# Patient Record
Sex: MALE | Race: OTHER | NOT HISPANIC OR LATINO
[De-identification: names, ages, dates, MRNs, and addresses within clinical notes are randomized per-mention and may not be internally consistent; named-entity substitution may affect disease eponyms.]

---

## 2023-04-14 PROBLEM — Z00.00 ENCOUNTER FOR PREVENTIVE HEALTH EXAMINATION: Status: ACTIVE | Noted: 2023-04-14

## 2023-04-18 ENCOUNTER — APPOINTMENT (OUTPATIENT)
Dept: RADIATION ONCOLOGY | Facility: CLINIC | Age: 68
End: 2023-04-18
Payer: COMMERCIAL

## 2023-04-18 VITALS
DIASTOLIC BLOOD PRESSURE: 86 MMHG | SYSTOLIC BLOOD PRESSURE: 136 MMHG | RESPIRATION RATE: 16 BRPM | HEIGHT: 70 IN | BODY MASS INDEX: 32.5 KG/M2 | WEIGHT: 227 LBS | HEART RATE: 56 BPM | OXYGEN SATURATION: 99 %

## 2023-04-18 DIAGNOSIS — R73.03 PREDIABETES.: ICD-10-CM

## 2023-04-18 DIAGNOSIS — M19.90 UNSPECIFIED OSTEOARTHRITIS, UNSPECIFIED SITE: ICD-10-CM

## 2023-04-18 DIAGNOSIS — I10 ESSENTIAL (PRIMARY) HYPERTENSION: ICD-10-CM

## 2023-04-18 PROCEDURE — 99072 ADDL SUPL MATRL&STAF TM PHE: CPT

## 2023-04-18 PROCEDURE — 99204 OFFICE O/P NEW MOD 45 MIN: CPT | Mod: 25,GC

## 2023-04-18 RX ORDER — LEVETIRACETAM 250 MG/1
250 TABLET, FILM COATED ORAL TWICE DAILY
Refills: 0 | Status: ACTIVE | COMMUNITY

## 2023-04-18 RX ORDER — AMLODIPINE BESYLATE 10 MG/1
10 TABLET ORAL
Refills: 0 | Status: ACTIVE | COMMUNITY

## 2023-04-18 RX ORDER — CYCLOBENZAPRINE HYDROCHLORIDE 5 MG/1
5 TABLET, FILM COATED ORAL
Refills: 0 | Status: ACTIVE | COMMUNITY

## 2023-04-18 NOTE — PHYSICAL EXAM
[] : no respiratory distress [Exaggerated Use Of Accessory Muscles For Inspiration] : no accessory muscle use [Normal] : oriented to person, place and time, the affect was normal, the mood was normal and not anxious [de-identified] : patient sitting in wheelchair and with sling on right arm  [de-identified] : 5/5 strength in bilateral LE on exam, 3/5 strength in RUE, unable to raise hand past elbow level.  Cranial nerves II-XII intact.  [de-identified] : Mild right-sided facial weakness noted.  Muscle strength 3 out of 5 in right hand, 4 out of 5 in right elbow and 5 out of 5 in right shoulder region

## 2023-04-18 NOTE — REVIEW OF SYSTEMS
[Negative] : Psychiatric [Proptosis] : no proptosis [Hot Flashes] : no hot flashes [Deepening Of The Voice] : no deepening of the voice [Easy Bleeding] : no tendency for easy bleeding [Easy Bruising] : no tendency for easy bruising [Swollen Glands] : no swollen glands [FreeTextEntry9] : Weakness in right hand, and endorses some weakness in right lower extremity s/p knee replacement for osteoarthritis  [de-identified] : weakness in right hand and right lower extremity, ambulating with assistance

## 2023-04-20 ENCOUNTER — APPOINTMENT (OUTPATIENT)
Dept: HEMATOLOGY ONCOLOGY | Facility: CLINIC | Age: 68
End: 2023-04-20
Payer: COMMERCIAL

## 2023-04-20 PROCEDURE — 99205 OFFICE O/P NEW HI 60 MIN: CPT | Mod: 95

## 2023-04-20 NOTE — REASON FOR VISIT
[Home] : at home, [unfilled] , at the time of the visit. [Medical Office: ([facility])___] : at the medical office located in  [Initial Consultation] : an initial consultation [Spouse] : spouse

## 2023-04-21 ENCOUNTER — NON-APPOINTMENT (OUTPATIENT)
Age: 68
End: 2023-04-21

## 2023-04-21 ENCOUNTER — APPOINTMENT (OUTPATIENT)
Dept: NEUROSURGERY | Facility: CLINIC | Age: 68
End: 2023-04-21
Payer: COMMERCIAL

## 2023-04-21 VITALS
OXYGEN SATURATION: 98 % | BODY MASS INDEX: 32.5 KG/M2 | DIASTOLIC BLOOD PRESSURE: 74 MMHG | HEART RATE: 59 BPM | SYSTOLIC BLOOD PRESSURE: 129 MMHG | RESPIRATION RATE: 18 BRPM | HEIGHT: 70 IN | WEIGHT: 227 LBS

## 2023-04-21 VITALS
HEART RATE: 93 BPM | SYSTOLIC BLOOD PRESSURE: 127 MMHG | HEIGHT: 70 IN | RESPIRATION RATE: 18 BRPM | OXYGEN SATURATION: 98 % | TEMPERATURE: 97 F | DIASTOLIC BLOOD PRESSURE: 83 MMHG | WEIGHT: 227 LBS | BODY MASS INDEX: 32.5 KG/M2

## 2023-04-21 PROCEDURE — 99204 OFFICE O/P NEW MOD 45 MIN: CPT

## 2023-04-21 PROCEDURE — 99072 ADDL SUPL MATRL&STAF TM PHE: CPT

## 2023-04-21 NOTE — REVIEW OF SYSTEMS
[Muscle Weakness] : muscle weakness [Difficulty Walking] : difficulty walking [Negative] : Allergic/Immunologic

## 2023-04-21 NOTE — ASSESSMENT
[FreeTextEntry1] : 67 year old male with past medical history of HTN, OA of the knee, status post replacement, now with newly diagnosed glioblastoma (MGMT pending), status post stereotactic needle biopsy and laser interstitial thermal therapy by Dr. Tobar (4/7/2023). \par \par GBM - pending MGMT status, pending moleculars.\par WIll request updated path report from [facility].\par SOC consists of concurrent chemoRT followed by adjuvant chemotherapy with TMZ.\par Side effects of therapy, schedule, efficacy and prognosis discussed.\par Referral to PT/OT and home care made.\par He will need weekly blood work during chemoRT.\par Rx for TMZ, Bactrim, Miralax and Zofran to be sent.\par Referred to  for clinical trials evaluation.\par \par RTC 5/11 in person.

## 2023-04-21 NOTE — HISTORY OF PRESENT ILLNESS
[Disease: _____________________] : Disease: [unfilled] [de-identified] : 67 year old male with past medical history of HTN, OA of the knee, status post replacement, now with newly diagnosed glioblastoma (MGMT pending), status post stereotactic needle biopsy and laser interstitial thermal therapy by Dr. Tobar (4/7/2023). \par \par Onc hx:\par End March 2023: He initially presented with right upper extremity weakness that started in his right thumb, he noticed he is not able to hold the pen normally.  A few days later the weakness progressed to his whole right hand.  The weakness started progressing, to his proximal right arm, which is when he presented to [facility] for further evaluation.\par 4/1/2023: CT spine- No evidence of acute fracture or traumatic subluxation to the visualized cervical spine. \par Partial erosion of a left maxillary molar tooth. A follow-up dental exam is recommended. \par Partially visualized paranasal sinus disease. \par MRI Brain ([facility], 4/2/2023):\par 2.2 x 2.1 x 1.9 cm intra-axial mass in the superior and posterior left frontal lobe. There is surrounding vasogenic edema with resulting local regional mass effect. The differential diagnosis includes a metastasis or a primary CNS malignancy. \par \par While hospitalized, a routine CT C/A/P was performed. \par \par CT Chest, Abdomen/Pelvis ([facility], 4/3/2023):\par 1. 4 x 7 mm hypodensity at the pancreatic tail. Incompletely characterized on this single phase study. Recommend dedicated CT or MRI with pancreatic mass protocol for further evaluation as clinically appropriate. \par 2. Gallstones/sludge. \par 3. No adenopathy. \par \par A pancreatic protocol CT scan was performed. \par \par CT Pancreas ([facility], 4/4/2023):\par 1. 0.4 x 0.7 cm hypodensity at the pancreatic tail is again noted with no abnormal enhancement, possibly representing an IPMN vs small focus of fat. \par \par MRI Brain ([facility], 4/4/2023):\par Stable heterogeneously enhancing 2.2 cm left frontal lobe lesion. Surrounding T2/FLAIR hyperintensity extending to the posterior body of the corpus callosum appears stable, likely vasogenic edema. No evidence of shift of midline structures. \par \par He underwent stereotactic needle biopsy and laser interstitial thermal therapy by Dr. Tobar (4/7/2023). Dr. Tobar notes that he opted for needle biopsy followed by HIRA, in lieu of open surgical resection, due to concern for risk of resulting hand paralysis, as well as damage to descending motor fibers of arm and leg. Following completion of the procedure, he notes in his operative report that, as per T1 post-contrast MRI, he achieved a gross total ablation.\par \par Surgical Pathology ([facility], 4/7/2023):\par A, B) Brain, left, tumor (excision):\par Glioblastoma, IDH pending, CNS WHO grade 4. \par Immunopositive for GFAP and p53\par The Ki-67: 80-90%\par \par MRI Brain ([facility], 4/8/2023):\par 1. Interval biopsy and posttreatment changes involving the intra-axial mass epicenter in the left posterior frontal centrum semiovale. The solid enhancing portions within the mass have decreased, T2 bright signal surrounding the mass has slightly increased and associated local regional mass effect has slightly increased. Correlation with MRI/MRCP as clinically warranted. \par 2. 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 \par 3. Gallstones/sludge. \par \par 4/12/2023: He saw Dr. Bolaños ([facility]). They discussed TMZ with radiation for GBM and observation for IPMN pancreatic tail. \par 4/18/2023: saw [facility]  [de-identified] : He was discharged from [facility] over the weekend.  He states he was discharged without home services.\par Since his surgery he reports progressive right sided weakness, mainly right arm. Also since discharge he reports unsteadiness in his gait, not able to walk stairs.\par He is mainly independent. Uses a wheelchair due to risk of falls. Able to walk. [70: Cares for self; unalbe to carry on normal activity or do active work.] : 71: Cares for self; unable to carry on normal activity or do active work.

## 2023-04-21 NOTE — ASSESSMENT
[FreeTextEntry1] : GBM.  Needs more surgery. I have explained the risks of the procedure to the patient including but not limited to pain, infection, seizure, stroke, blindness, residual or recurrent disease, neurovascular injury, weakness, paralysis, heart attack, pulmonary embolism and death and he/she clearly understands and agrees to proceed.\par

## 2023-04-24 ENCOUNTER — INPATIENT (INPATIENT)
Facility: HOSPITAL | Age: 68
LOS: 7 days | Discharge: NURSING, REHAB OR LONG-TERM CARE | DRG: 26 | End: 2023-05-02
Attending: NEUROLOGICAL SURGERY | Admitting: NEUROLOGICAL SURGERY
Payer: COMMERCIAL

## 2023-04-24 ENCOUNTER — TRANSCRIPTION ENCOUNTER (OUTPATIENT)
Age: 68
End: 2023-04-24

## 2023-04-24 VITALS
OXYGEN SATURATION: 97 % | HEART RATE: 80 BPM | TEMPERATURE: 98 F | DIASTOLIC BLOOD PRESSURE: 62 MMHG | SYSTOLIC BLOOD PRESSURE: 135 MMHG | RESPIRATION RATE: 18 BRPM

## 2023-04-24 DIAGNOSIS — I10 ESSENTIAL (PRIMARY) HYPERTENSION: ICD-10-CM

## 2023-04-24 DIAGNOSIS — Z96.651 PRESENCE OF RIGHT ARTIFICIAL KNEE JOINT: Chronic | ICD-10-CM

## 2023-04-24 DIAGNOSIS — G93.89 OTHER SPECIFIED DISORDERS OF BRAIN: ICD-10-CM

## 2023-04-24 LAB
A1C WITH ESTIMATED AVERAGE GLUCOSE RESULT: 6.8 % — HIGH (ref 4–5.6)
ALBUMIN SERPL ELPH-MCNC: 3.6 G/DL — SIGNIFICANT CHANGE UP (ref 3.3–5)
ALP SERPL-CCNC: 137 U/L — HIGH (ref 40–120)
ALT FLD-CCNC: 43 U/L — SIGNIFICANT CHANGE UP (ref 10–45)
ANION GAP SERPL CALC-SCNC: 10 MMOL/L — SIGNIFICANT CHANGE UP (ref 5–17)
AST SERPL-CCNC: 16 U/L — SIGNIFICANT CHANGE UP (ref 10–40)
BILIRUB SERPL-MCNC: 0.2 MG/DL — SIGNIFICANT CHANGE UP (ref 0.2–1.2)
BLD GP AB SCN SERPL QL: NEGATIVE — SIGNIFICANT CHANGE UP
BUN SERPL-MCNC: 11 MG/DL — SIGNIFICANT CHANGE UP (ref 7–23)
CALCIUM SERPL-MCNC: 8.9 MG/DL — SIGNIFICANT CHANGE UP (ref 8.4–10.5)
CHLORIDE SERPL-SCNC: 101 MMOL/L — SIGNIFICANT CHANGE UP (ref 96–108)
CO2 SERPL-SCNC: 26 MMOL/L — SIGNIFICANT CHANGE UP (ref 22–31)
CREAT SERPL-MCNC: 0.76 MG/DL — SIGNIFICANT CHANGE UP (ref 0.5–1.3)
EGFR: 99 ML/MIN/1.73M2 — SIGNIFICANT CHANGE UP
ESTIMATED AVERAGE GLUCOSE: 148 MG/DL — HIGH (ref 68–114)
GLUCOSE BLDC GLUCOMTR-MCNC: 133 MG/DL — HIGH (ref 70–99)
GLUCOSE BLDC GLUCOMTR-MCNC: 145 MG/DL — HIGH (ref 70–99)
GLUCOSE SERPL-MCNC: 193 MG/DL — HIGH (ref 70–99)
HCT VFR BLD CALC: 43.2 % — SIGNIFICANT CHANGE UP (ref 39–50)
HGB BLD-MCNC: 13.7 G/DL — SIGNIFICANT CHANGE UP (ref 13–17)
MAGNESIUM SERPL-MCNC: 2 MG/DL — SIGNIFICANT CHANGE UP (ref 1.6–2.6)
MCHC RBC-ENTMCNC: 27.8 PG — SIGNIFICANT CHANGE UP (ref 27–34)
MCHC RBC-ENTMCNC: 31.7 GM/DL — LOW (ref 32–36)
MCV RBC AUTO: 87.8 FL — SIGNIFICANT CHANGE UP (ref 80–100)
NRBC # BLD: 0 /100 WBCS — SIGNIFICANT CHANGE UP (ref 0–0)
PHOSPHATE SERPL-MCNC: 3.5 MG/DL — SIGNIFICANT CHANGE UP (ref 2.5–4.5)
PLATELET # BLD AUTO: 388 K/UL — SIGNIFICANT CHANGE UP (ref 150–400)
POTASSIUM SERPL-MCNC: 4.3 MMOL/L — SIGNIFICANT CHANGE UP (ref 3.5–5.3)
POTASSIUM SERPL-SCNC: 4.3 MMOL/L — SIGNIFICANT CHANGE UP (ref 3.5–5.3)
PROT SERPL-MCNC: 6.4 G/DL — SIGNIFICANT CHANGE UP (ref 6–8.3)
RBC # BLD: 4.92 M/UL — SIGNIFICANT CHANGE UP (ref 4.2–5.8)
RBC # FLD: 14.7 % — HIGH (ref 10.3–14.5)
RH IG SCN BLD-IMP: POSITIVE — SIGNIFICANT CHANGE UP
SODIUM SERPL-SCNC: 137 MMOL/L — SIGNIFICANT CHANGE UP (ref 135–145)
WBC # BLD: 6.89 K/UL — SIGNIFICANT CHANGE UP (ref 3.8–10.5)
WBC # FLD AUTO: 6.89 K/UL — SIGNIFICANT CHANGE UP (ref 3.8–10.5)

## 2023-04-24 PROCEDURE — 71045 X-RAY EXAM CHEST 1 VIEW: CPT | Mod: 26

## 2023-04-24 PROCEDURE — 99255 IP/OBS CONSLTJ NEW/EST HI 80: CPT | Mod: GC

## 2023-04-24 PROCEDURE — 70553 MRI BRAIN STEM W/O & W/DYE: CPT | Mod: 26

## 2023-04-24 RX ORDER — DEXAMETHASONE 0.5 MG/5ML
4 ELIXIR ORAL EVERY 12 HOURS
Refills: 0 | Status: DISCONTINUED | OUTPATIENT
Start: 2023-04-24 | End: 2023-04-25

## 2023-04-24 RX ORDER — POLYETHYLENE GLYCOL 3350 17 G/17G
17 POWDER, FOR SOLUTION ORAL DAILY
Refills: 0 | Status: DISCONTINUED | OUTPATIENT
Start: 2023-04-24 | End: 2023-04-25

## 2023-04-24 RX ORDER — SODIUM CHLORIDE 9 MG/ML
1000 INJECTION INTRAMUSCULAR; INTRAVENOUS; SUBCUTANEOUS
Refills: 0 | Status: DISCONTINUED | OUTPATIENT
Start: 2023-04-24 | End: 2023-04-25

## 2023-04-24 RX ORDER — PANTOPRAZOLE SODIUM 20 MG/1
40 TABLET, DELAYED RELEASE ORAL
Refills: 0 | Status: DISCONTINUED | OUTPATIENT
Start: 2023-04-24 | End: 2023-04-25

## 2023-04-24 RX ORDER — LEVETIRACETAM 250 MG/1
750 TABLET, FILM COATED ORAL EVERY 12 HOURS
Refills: 0 | Status: DISCONTINUED | OUTPATIENT
Start: 2023-04-24 | End: 2023-04-25

## 2023-04-24 RX ORDER — CHLORHEXIDINE GLUCONATE 213 G/1000ML
1 SOLUTION TOPICAL ONCE
Refills: 0 | Status: COMPLETED | OUTPATIENT
Start: 2023-04-25 | End: 2023-04-25

## 2023-04-24 RX ORDER — SENNA PLUS 8.6 MG/1
2 TABLET ORAL AT BEDTIME
Refills: 0 | Status: DISCONTINUED | OUTPATIENT
Start: 2023-04-24 | End: 2023-04-25

## 2023-04-24 RX ORDER — INSULIN LISPRO 100/ML
VIAL (ML) SUBCUTANEOUS
Refills: 0 | Status: DISCONTINUED | OUTPATIENT
Start: 2023-04-24 | End: 2023-04-25

## 2023-04-24 RX ORDER — POVIDONE-IODINE 5 %
1 AEROSOL (ML) TOPICAL ONCE
Refills: 0 | Status: COMPLETED | OUTPATIENT
Start: 2023-04-25 | End: 2023-04-25

## 2023-04-24 RX ORDER — ACETAMINOPHEN 500 MG
650 TABLET ORAL EVERY 6 HOURS
Refills: 0 | Status: DISCONTINUED | OUTPATIENT
Start: 2023-04-24 | End: 2023-04-25

## 2023-04-24 RX ORDER — AMLODIPINE BESYLATE 2.5 MG/1
10 TABLET ORAL DAILY
Refills: 0 | Status: DISCONTINUED | OUTPATIENT
Start: 2023-04-24 | End: 2023-04-25

## 2023-04-24 RX ADMIN — POLYETHYLENE GLYCOL 3350 17 GRAM(S): 17 POWDER, FOR SOLUTION ORAL at 23:43

## 2023-04-24 RX ADMIN — LEVETIRACETAM 750 MILLIGRAM(S): 250 TABLET, FILM COATED ORAL at 23:43

## 2023-04-24 RX ADMIN — SENNA PLUS 2 TABLET(S): 8.6 TABLET ORAL at 23:43

## 2023-04-24 RX ADMIN — Medication 4 MILLIGRAM(S): at 23:43

## 2023-04-24 NOTE — CONSULT NOTE ADULT - SUBJECTIVE AND OBJECTIVE BOX
*** INCOMPLETE ***    Patient is a 67y old  Male who presents with a chief complaint of Brain Tumor (24 Apr 2023 18:09)      HPI/HOSPITAL COURSE:  HPI: 67M PMH HTN (on amlodipine 10mg PO qd), Osteoarthritis (s/p right total knee replacement 2 months ago), brain mass (s/p biopsy 4/7/23 @ [facility] with Dr. Tobar) presented for brain tumor resection. Per family, patient started to have right hand weakness about a month ago that got progressively worse, then went to ED where brain mass was diagnosed. Started keppra 750 mg BID for seizure prophylaxis. Described right arm weakness worsening after surgery and some right lower extremity weakness. States today, feels like his right lower extremity weakness is worsening. Family also reported patient just finished a taper of decadron. Denies headache, new weakness, numbness, tingling, chest pain, sob, nausea/vomiting, visual abnormalities.     Family at bedside denies history of cardiac events, SOB, NGUYEN, CP. Patient reports normally being able to perform all ADLs prior to TKA 03/2023. Denies NGUYEN while walking >4 blocks while on level ground. Able to climb >2 flights of stairs. S/p TKA patient mobility was limited i/s/o post-op mechanical limitations. Patient denies adverse reactions to anesthesia. Has NKDA. Is a /Orthodox . Reports never to have smoked or used tobacco products, denies alcohol/illicit substance abuse. Patient was receiving dexamethasone, however, completed taper yesterday. Family at bedside concerned as patient's LE symptoms (weekness) began after dexamethasone cessation.     Normotensive on admission. Last dose of anti-HTN med this AM.    INTERVAL EVENTS:    SUBJECTIVE HPI: Patient seen and examined at bedside. Patient resting comfortably, no complaints at this time. Patient denies: fever, chills, weakness, dizziness, headaches, changes in vision, chest pain, palpitations, shortness of breath, cough, N/V, diarrhea or constipation, dysuria, LE edema. ROS otherwise negative.      PAST MEDICAL & SURGICAL HISTORY:  Hypertension      Osteoarthritis      Brain mass      S/P total knee replacement, right          FAMILY HISTORY:      SOCIAL HISTORY:     -Cigarrettes: never      -Alcohol: denies      -Ilicit Drug Use: denies    Home Medications:  amLODIPine 10 mg oral tablet: 1 orally once a day (24 Apr 2023 19:08)  Keppra 750 mg oral tablet: 1 orally 2 times a day (24 Apr 2023 19:11)      MEDICATIONS  (STANDING):  amLODIPine   Tablet 10 milliGRAM(s) Oral daily  dexAMETHasone     Tablet 4 milliGRAM(s) Oral every 12 hours  insulin lispro (ADMELOG) corrective regimen sliding scale   SubCutaneous three times a day before meals  levETIRAcetam 750 milliGRAM(s) Oral every 12 hours  pantoprazole    Tablet 40 milliGRAM(s) Oral before breakfast  polyethylene glycol 3350 17 Gram(s) Oral daily  senna 2 Tablet(s) Oral at bedtime  sodium chloride 0.9%. 1000 milliLiter(s) (100 mL/Hr) IV Continuous <Continuous>    MEDICATIONS  (PRN):  acetaminophen     Tablet .. 650 milliGRAM(s) Oral every 6 hours PRN Temp greater or equal to 38C (100.4F), Mild Pain (1 - 3)      VITAL SIGNS:  Vital Signs Last 24 Hrs  T(C): 36.8 (24 Apr 2023 20:53), Max: 36.8 (24 Apr 2023 20:53)  T(F): 98.2 (24 Apr 2023 20:53), Max: 98.2 (24 Apr 2023 20:53)  HR: 87 (24 Apr 2023 20:53) (80 - 87)  BP: 123/75 (24 Apr 2023 20:53) (123/75 - 135/62)  BP(mean): --  RR: 16 (24 Apr 2023 20:53) (16 - 18)  SpO2: 97% (24 Apr 2023 20:53) (97% - 97%)    Parameters below as of 24 Apr 2023 20:53  Patient On (Oxygen Delivery Method): room air        I&O's Detail      PHYSICAL EXAM:  General: Comfortable, pleasant/anxious/agitated, Ill-appearing, well-nourished/frail/cachectic, comfortable / in distress  Neurological: AAOx3, no focal deficits  HEENT: NC/AT; EOMI, PERRL, clear conjunctiva, no nasal or oropharyngeal discharge or exudates, MMM  Neck: supple, no cervical or post-auricular lymphadenopathy  Cardiovascular: +S1/S2, no murmurs/rubs/gallops, RRR  Respiratory: CTA B/L, no diminished breath sounds, no wheezes/rales/rhonchi, no increased work of breathing or accessory muscle use  Gastrointestinal: soft, NT/ND; active BSx4 quadrants  Genitourinary: no suprapubic tenderness, no CVA tenderness  Extremities: WWP; RUE weakness present on exam, shoulder 0/5, RLE 4/5 MS testing, no edema, clubbing or cyanosis  Vascular: 2+ radial, DP/PT pulses B/L  Skin: no rashes  Lines/Drains:     LABS:                        13.7   6.89  )-----------( 388      ( 24 Apr 2023 21:22 )             43.2     04-24    137  |  101  |  11  ----------------------------<  193<H>  4.3   |  26  |  0.76    Ca    8.9      24 Apr 2023 21:22  Phos  3.5     04-24  Mg     2.0     04-24    TPro  6.4  /  Alb  3.6  /  TBili  0.2  /  DBili  x   /  AST  16  /  ALT  43  /  AlkPhos  137<H>  04-24            BNP            Microbiology:        RADIOLOGY & ADDITIONAL STUDIES: Reviewed. Patient is a 67y old  Male who presents with a chief complaint of Brain Tumor (24 Apr 2023 18:09)      HPI/HOSPITAL COURSE:  HPI: 67M PMH HTN (on amlodipine 10mg PO qd), Osteoarthritis (s/p right total knee replacement 2 months ago), brain mass (s/p biopsy 4/7/23 @ [facility] with Dr. Tobar) presented for brain tumor resection. Per family, patient started to have right hand weakness about a month ago that got progressively worse, then went to ED where brain mass was diagnosed. Started keppra 750 mg BID for seizure prophylaxis. Described right arm weakness worsening after surgery and some right lower extremity weakness. States today, feels like his right lower extremity weakness is worsening. Family also reported patient just finished a taper of decadron. Denies headache, new weakness, numbness, tingling, chest pain, sob, nausea/vomiting, visual abnormalities.     Family at bedside denies history of cardiac events, SOB, NGUYEN, CP. Patient reports normally being able to perform all ADLs prior to TKA 03/2023. Denies NGUYEN while walking >4 blocks while on level ground. Able to climb >2 flights of stairs. S/p TKA patient mobility was limited i/s/o post-op mechanical limitations. Patient denies adverse reactions to anesthesia. Has NKDA. Is a /Anabaptism . Reports never to have smoked or used tobacco products, denies alcohol/illicit substance abuse. Patient was receiving dexamethasone, however, completed taper yesterday. Family at bedside concerned as patient's LE symptoms (weekness) began after dexamethasone cessation.     Normotensive on admission. Last dose of anti-HTN med this AM.    INTERVAL EVENTS:    SUBJECTIVE HPI: Patient seen and examined at bedside. Patient resting comfortably, no complaints at this time. Patient denies: fever, chills, weakness, dizziness, headaches, changes in vision, chest pain, palpitations, shortness of breath, cough, N/V, diarrhea or constipation, dysuria, LE edema. ROS otherwise negative.      PAST MEDICAL & SURGICAL HISTORY:  Hypertension      Osteoarthritis      Brain mass      S/P total knee replacement, right          FAMILY HISTORY:      SOCIAL HISTORY:     -Cigarrettes: never      -Alcohol: denies      -Ilicit Drug Use: denies    Home Medications:  amLODIPine 10 mg oral tablet: 1 orally once a day (24 Apr 2023 19:08)  Keppra 750 mg oral tablet: 1 orally 2 times a day (24 Apr 2023 19:11)      MEDICATIONS  (STANDING):  amLODIPine   Tablet 10 milliGRAM(s) Oral daily  dexAMETHasone     Tablet 4 milliGRAM(s) Oral every 12 hours  insulin lispro (ADMELOG) corrective regimen sliding scale   SubCutaneous three times a day before meals  levETIRAcetam 750 milliGRAM(s) Oral every 12 hours  pantoprazole    Tablet 40 milliGRAM(s) Oral before breakfast  polyethylene glycol 3350 17 Gram(s) Oral daily  senna 2 Tablet(s) Oral at bedtime  sodium chloride 0.9%. 1000 milliLiter(s) (100 mL/Hr) IV Continuous <Continuous>    MEDICATIONS  (PRN):  acetaminophen     Tablet .. 650 milliGRAM(s) Oral every 6 hours PRN Temp greater or equal to 38C (100.4F), Mild Pain (1 - 3)      VITAL SIGNS:  Vital Signs Last 24 Hrs  T(C): 36.8 (24 Apr 2023 20:53), Max: 36.8 (24 Apr 2023 20:53)  T(F): 98.2 (24 Apr 2023 20:53), Max: 98.2 (24 Apr 2023 20:53)  HR: 87 (24 Apr 2023 20:53) (80 - 87)  BP: 123/75 (24 Apr 2023 20:53) (123/75 - 135/62)  BP(mean): --  RR: 16 (24 Apr 2023 20:53) (16 - 18)  SpO2: 97% (24 Apr 2023 20:53) (97% - 97%)    Parameters below as of 24 Apr 2023 20:53  Patient On (Oxygen Delivery Method): room air        I&O's Detail      PHYSICAL EXAM:  General: Comfortable, pleasant/anxious/agitated, Ill-appearing, well-nourished/frail/cachectic, comfortable / in distress  Neurological: AAOx3, no focal deficits  HEENT: NC/AT; EOMI, PERRL, clear conjunctiva, no nasal or oropharyngeal discharge or exudates, MMM  Neck: supple, no cervical or post-auricular lymphadenopathy  Cardiovascular: +S1/S2, no murmurs/rubs/gallops, RRR  Respiratory: CTA B/L, no diminished breath sounds, no wheezes/rales/rhonchi, no increased work of breathing or accessory muscle use  Gastrointestinal: soft, NT/ND; active BSx4 quadrants  Genitourinary: no suprapubic tenderness, no CVA tenderness  Extremities: WWP; RUE weakness present on exam, shoulder 0/5, RLE 4/5 MS testing, no edema, clubbing or cyanosis  Vascular: 2+ radial, DP/PT pulses B/L  Skin: no rashes  Lines/Drains:     LABS:                        13.7   6.89  )-----------( 388      ( 24 Apr 2023 21:22 )             43.2     04-24    137  |  101  |  11  ----------------------------<  193<H>  4.3   |  26  |  0.76    Ca    8.9      24 Apr 2023 21:22  Phos  3.5     04-24  Mg     2.0     04-24    TPro  6.4  /  Alb  3.6  /  TBili  0.2  /  DBili  x   /  AST  16  /  ALT  43  /  AlkPhos  137<H>  04-24            BNP            Microbiology:        RADIOLOGY & ADDITIONAL STUDIES: Reviewed.

## 2023-04-24 NOTE — PATIENT PROFILE ADULT - FALL HARM RISK - HARM RISK INTERVENTIONS
Communicate Risk of Fall with Harm to all staff/Reinforce activity limits and safety measures with patient and family/Tailored Fall Risk Interventions/Visual Cue: Yellow wristband and red socks/Bed in lowest position, wheels locked, appropriate side rails in place/Call bell, personal items and telephone in reach/Instruct patient to call for assistance before getting out of bed or chair/Non-slip footwear when patient is out of bed/[place] to call system/Physically safe environment - no spills, clutter or unnecessary equipment/Purposeful Proactive Rounding/Room/bathroom lighting operational, light cord in reach Assistance with ambulation/Assistance OOB with selected safe patient handling equipment/Communicate Risk of Fall with Harm to all staff/Discuss with provider need for PT consult/Monitor gait and stability/Provide patient with walking aids - walker, cane, crutches/Reinforce activity limits and safety measures with patient and family/Tailored Fall Risk Interventions/Visual Cue: Yellow wristband and red socks/Bed in lowest position, wheels locked, appropriate side rails in place/Call bell, personal items and telephone in reach/Instruct patient to call for assistance before getting out of bed or chair/Non-slip footwear when patient is out of bed/[place] to call system/Physically safe environment - no spills, clutter or unnecessary equipment/Purposeful Proactive Rounding/Room/bathroom lighting operational, light cord in reach

## 2023-04-24 NOTE — H&P ADULT - HISTORY OF PRESENT ILLNESS
66 y/o male with h/o HTN, Osteoarthritis (s/p right total knee replacement 2 months ago), brain mass (s/p biopsy 4/7/23 @ [facility] with Dr. Tobar) presented for brain tumor resection. Per family, patient started to have right hand weakness about a month ago that got progressively worse, then went to ED where brain mass was diagnosed. Started keppra 750 mg BID for seizure prophylaxis. Described right arm weakness worsening after surgery and some right lower extremity weakness. States today, feels like his right lower extremity weakness is worsening. Family also reported patient just finished a taper of decadron. Denies headache, new weakness, numbness, tingling, chest pain, sob, nausea/vomiting, visual abnormalities.

## 2023-04-24 NOTE — H&P ADULT - ASSESSMENT
66 y/o male presents with finding of brain mass. Admit for workup and surgical intervention.  68 y/o male with h/o HTN, Osteoarthritis (s/p right total knee replacement 2 months ago), brain mass (s/p biopsy 4/7/23 @ [facility] with Dr. Tobar) presented for brain tumor resection.

## 2023-04-24 NOTE — H&P ADULT - NSHPSOCIALHISTORY_GEN_ALL_CORE
Patient lives at home in [place], [place] with wife. Sisters present at bedside. Denies tobacco, alcohol, or ilicit drug use.

## 2023-04-24 NOTE — H&P ADULT - NSHPPHYSICALEXAM_GEN_ALL_CORE
Constitutional: 66 y/o male awake, alert in no acute distress.  Eyes: Sclera anicteric, conjunctiva noninjected.   ENMT: Oropharyngeal mucosa moist, pink. Tongue midline.    Respiratory: Clear to auscultation bilaterally.   Cardiovascular: Regular rate and rhythm.   Gastrointestinal:  Soft, nontender, nondistended.    Vascular: Extremities warm, no ulcers, no discoloration of skin.   Neurological: AA&O x 3, conversant, appropriate. CN II-XII grossly intact. ESTRADA x 4, 5/5 throughout UE/LE. Sensation intact to light touch throughout. No pronator drift, no dysmetria.  Skin: Warm, dry, no erythema. Constitutional: 68 y/o male awake, alert in no acute distress.  Eyes: Sclera anicteric, conjunctiva noninjected. Disconjugate gaze.   ENMT: Oropharyngeal mucosa moist, pink. Tongue midline.    Respiratory: Clear to auscultation bilaterally.   Cardiovascular: Regular rate and rhythm.   Gastrointestinal:  Soft, nontender, nondistended.    Vascular: Extremities warm, no ulcers, no discoloration of skin.   Neurological: AA&O x 3, hypophonic, slow to speak. + right facial droop. CN II-XII grossly intact. LUE/LLE 5/5 throughout. RUE Delt 0, Bi/Tri 2, HG 1, RLE 4/5 throughout. Sensation intact to light touch throughout. No dysmetria.   Skin: Warm, dry, no erythema. Craniotomy sutures prior surgery.

## 2023-04-24 NOTE — PROGRESS NOTE ADULT - SUBJECTIVE AND OBJECTIVE BOX
Surgery: Left craniotomy tumor resection   Consent: Signed by patient     Representative Consent: [x] Signed by patient     Allergy Status Unknown    OVERNIGHT EVENTS:     T(C): 36.7 (04-24-23 @ 17:06), Max: 36.7 (04-24-23 @ 17:06)  HR: 80 (04-24-23 @ 17:06) (80 - 80)  BP: 135/62 (04-24-23 @ 17:06) (135/62 - 135/62)  RR: 18 (04-24-23 @ 17:06) (18 - 18)  SpO2: 97% (04-24-23 @ 17:06) (97% - 97%)  Wt(kg): --    EXAM:  Constitutional: 68 y/o male awake, alert in no acute distress.  Eyes:  Sclera anicteric, conjunctiva noninjected.   ENMT: Oropharyngeal mucosa moist, pink. Tongue midline.    Respiratory: Clear to auscultation bilaterally.  No rales, rhonchi, wheezes.  Cardiovascular: Regular rate and rhythm.  S1, S2 heard.  Gastrointestinal:  Soft, nontender, nondistended.  +BS.  Vascular: Extremities warm, no ulcers, no discoloration of skin.   Neurological: AA&O x 3, conversant, appropriate. CN II-XII grossly intact. ESTRADA x 4, 5/5 throughout UE/LE. Sensation intact to light touch throughout. No pronator drift, no dysmetria.  Skin: Warm, dry, no erythema.    Type & Screen (in past 72hrs): Pending     2 Type & Screen within 72 hours if anticipate blood need in OR:  _ Y _ N     Blood ordered and on hold for OR:   [ ] No need     [ ] 1u pRBC on hold      [ ] 2u pRBC on hold    CXR:   EKG:   Medical Clearances: Pending    Last dose of antiplatelet/anticoagulation drug: N/A    Implanted Devices (pacemaker, drug pump...etc):  []YES   [X] NO                  If yes --> EPS consulted to interrogate device: [ ] YES  [ ] NO                            If yes -->  EPS called to let them know patient going for surgery: [ ] device needs to be turned off                                                                                                                                                 [ ] magnet needs to be placed for surgery                                                                                                                                                [ ] nothing to do per EP, may proceed with bovie use in OR                                   3M nasal swab ordered? YES   Cranial surgery: Order written for hair to be shampooed night before surgery and morning before surgery  YES  Chlorhexidine Wipes ordered for Neck Down? YES               Assessment:    Plan:  - Pre-op for L crani   - 5 ALA @ 10 AM   - NPO after MN     D/W Dr. Johnston  Surgery: Left craniotomy tumor resection   Consent: Signed by patient     Representative Consent: [x] Signed by patient     Allergy Status Unknown    OVERNIGHT EVENTS:     T(C): 36.7 (04-24-23 @ 17:06), Max: 36.7 (04-24-23 @ 17:06)  HR: 80 (04-24-23 @ 17:06) (80 - 80)  BP: 135/62 (04-24-23 @ 17:06) (135/62 - 135/62)  RR: 18 (04-24-23 @ 17:06) (18 - 18)  SpO2: 97% (04-24-23 @ 17:06) (97% - 97%)  Wt(kg): --    EXAM:  Constitutional: 68 y/o male awake, alert in no acute distress.  Eyes: Sclera anicteric, conjunctiva noninjected. Disconjugate gaze.   ENMT: Oropharyngeal mucosa moist, pink. Tongue midline.    Respiratory: Clear to auscultation bilaterally.   Cardiovascular: Regular rate and rhythm.   Gastrointestinal:  Soft, nontender, nondistended.    Vascular: Extremities warm, no ulcers, no discoloration of skin.   Neurological: AA&O x 3, hypophonic, slow to speak. + right facial droop. CN II-XII grossly intact. LUE/LLE 5/5 throughout. RUE Delt 0, Bi/Tri 2, HG 1, RLE 4/5 throughout. Sensation intact to light touch throughout. No dysmetria.   Skin: Warm, dry, no erythema. Craniotomy sutures prior surgery.    Type & Screen (in past 72hrs): Pending     2 Type & Screen within 72 hours if anticipate blood need in OR:  _ Y _ N     Blood ordered and on hold for OR:   [ ] No need     [ ] 1u pRBC on hold      [ ] 2u pRBC on hold    CXR:   EKG:   Medical Clearances: Pending    Last dose of antiplatelet/anticoagulation drug: N/A    Implanted Devices (pacemaker, drug pump...etc):  []YES   [X] NO                  If yes --> EPS consulted to interrogate device: [ ] YES  [ ] NO                            If yes -->  EPS called to let them know patient going for surgery: [ ] device needs to be turned off                                                                                                                                                 [ ] magnet needs to be placed for surgery                                                                                                                                                [ ] nothing to do per EP, may proceed with bovie use in OR                                   3M nasal swab ordered? YES   Cranial surgery: Order written for hair to be shampooed night before surgery and morning before surgery  YES  Chlorhexidine Wipes ordered for Neck Down? YES               Assessment:    Plan:  - Pre-op for L crani   - 5 ALA @ 10 AM   - NPO after MN     D/W Dr. Johnston  Surgery: Left craniotomy tumor resection   Consent: Signed by patient     Representative Consent: [x] Signed by patient     Allergy Status Unknown    OVERNIGHT EVENTS:     T(C): 36.7 (23 @ 17:06), Max: 36.7 (23 @ 17:06)  HR: 80 (23 @ 17:06) (80 - 80)  BP: 135/62 (23 @ 17:06) (135/62 - 135/62)  RR: 18 (23 @ 17:06) (18 - 18)  SpO2: 97% (23 @ 17:06) (97% - 97%)  Wt(kg): --    EXAM:  Constitutional: 66 y/o male awake, alert in no acute distress.  Eyes: Sclera anicteric, conjunctiva noninjected. Disconjugate gaze.   ENMT: Oropharyngeal mucosa moist, pink. Tongue midline.    Respiratory: Clear to auscultation bilaterally.   Cardiovascular: Regular rate and rhythm.   Gastrointestinal:  Soft, nontender, nondistended.    Vascular: Extremities warm, no ulcers, no discoloration of skin.   Neurological: AA&O x 3, hypophonic, slow to speak. + right facial droop. CN II-XII grossly intact. LUE/LLE 5/5 throughout. RUE Delt 0, Bi/Tri 2, HG 1, RLE 4/5 throughout. Sensation intact to light touch throughout. No dysmetria.   Skin: Warm, dry, no erythema. Craniotomy sutures prior surgery.    Type & Screen (in past 72hrs): Pending     2 Type & Screen within 72 hours if anticipate blood need in OR:  x Y _ N     Blood ordered and on hold for OR:   [ ] No need     [ ] 1u pRBC on hold      [x ] 2u pRBC on hold    CXR:   EK/24  Medical Clearances: Cleared by Dr. Cunningham     Last dose of antiplatelet/anticoagulation drug: N/A    Implanted Devices (pacemaker, drug pump...etc):  []YES   [X] NO                  If yes --> EPS consulted to interrogate device: [ ] YES  [ ] NO                            If yes -->  EPS called to let them know patient going for surgery: [ ] device needs to be turned off                                                                                                                                                 [ ] magnet needs to be placed for surgery                                                                                                                                                [ ] nothing to do per EP, may proceed with bovie use in OR                                   3M nasal swab ordered? YES   Cranial surgery: Order written for hair to be shampooed night before surgery and morning before surgery  YES  Chlorhexidine Wipes ordered for Neck Down? YES               Assessment:    Plan:  - Pre-op for L crani   - 5 ALA @ 10 AM   - NPO after MN     D/W Dr. Johnston

## 2023-04-25 ENCOUNTER — TRANSCRIPTION ENCOUNTER (OUTPATIENT)
Age: 68
End: 2023-04-25

## 2023-04-25 ENCOUNTER — RESULT REVIEW (OUTPATIENT)
Age: 68
End: 2023-04-25

## 2023-04-25 ENCOUNTER — APPOINTMENT (OUTPATIENT)
Dept: NEUROSURGERY | Facility: HOSPITAL | Age: 68
End: 2023-04-25

## 2023-04-25 LAB
A1C WITH ESTIMATED AVERAGE GLUCOSE RESULT: 6.5 % — HIGH (ref 4–5.6)
ALBUMIN SERPL ELPH-MCNC: 3.4 G/DL — SIGNIFICANT CHANGE UP (ref 3.3–5)
ALP SERPL-CCNC: 86 U/L — SIGNIFICANT CHANGE UP (ref 40–120)
ALT FLD-CCNC: 34 U/L — SIGNIFICANT CHANGE UP (ref 10–45)
ANION GAP SERPL CALC-SCNC: 10 MMOL/L — SIGNIFICANT CHANGE UP (ref 5–17)
ANION GAP SERPL CALC-SCNC: 15 MMOL/L — SIGNIFICANT CHANGE UP (ref 5–17)
APTT BLD: 29.2 SEC — SIGNIFICANT CHANGE UP (ref 27.5–35.5)
APTT BLD: 32.5 SEC — SIGNIFICANT CHANGE UP (ref 27.5–35.5)
AST SERPL-CCNC: 14 U/L — SIGNIFICANT CHANGE UP (ref 10–40)
BILIRUB SERPL-MCNC: 0.5 MG/DL — SIGNIFICANT CHANGE UP (ref 0.2–1.2)
BLD GP AB SCN SERPL QL: NEGATIVE — SIGNIFICANT CHANGE UP
BUN SERPL-MCNC: 11 MG/DL — SIGNIFICANT CHANGE UP (ref 7–23)
BUN SERPL-MCNC: 11 MG/DL — SIGNIFICANT CHANGE UP (ref 7–23)
CALCIUM SERPL-MCNC: 8.4 MG/DL — SIGNIFICANT CHANGE UP (ref 8.4–10.5)
CALCIUM SERPL-MCNC: 9 MG/DL — SIGNIFICANT CHANGE UP (ref 8.4–10.5)
CHLORIDE SERPL-SCNC: 102 MMOL/L — SIGNIFICANT CHANGE UP (ref 96–108)
CHLORIDE SERPL-SCNC: 99 MMOL/L — SIGNIFICANT CHANGE UP (ref 96–108)
CO2 SERPL-SCNC: 19 MMOL/L — LOW (ref 22–31)
CO2 SERPL-SCNC: 26 MMOL/L — SIGNIFICANT CHANGE UP (ref 22–31)
CREAT SERPL-MCNC: 0.84 MG/DL — SIGNIFICANT CHANGE UP (ref 0.5–1.3)
CREAT SERPL-MCNC: 0.88 MG/DL — SIGNIFICANT CHANGE UP (ref 0.5–1.3)
EGFR: 95 ML/MIN/1.73M2 — SIGNIFICANT CHANGE UP
EGFR: 96 ML/MIN/1.73M2 — SIGNIFICANT CHANGE UP
ESTIMATED AVERAGE GLUCOSE: 140 MG/DL — HIGH (ref 68–114)
GLUCOSE BLDC GLUCOMTR-MCNC: 131 MG/DL — HIGH (ref 70–99)
GLUCOSE BLDC GLUCOMTR-MCNC: 148 MG/DL — HIGH (ref 70–99)
GLUCOSE BLDC GLUCOMTR-MCNC: 167 MG/DL — HIGH (ref 70–99)
GLUCOSE SERPL-MCNC: 171 MG/DL — HIGH (ref 70–99)
GLUCOSE SERPL-MCNC: 192 MG/DL — HIGH (ref 70–99)
HCT VFR BLD CALC: 41.1 % — SIGNIFICANT CHANGE UP (ref 39–50)
HCT VFR BLD CALC: 41.5 % — SIGNIFICANT CHANGE UP (ref 39–50)
HCV AB S/CO SERPL IA: 0.06 S/CO — SIGNIFICANT CHANGE UP (ref 0–0.99)
HCV AB SERPL-IMP: SIGNIFICANT CHANGE UP
HGB BLD-MCNC: 13.3 G/DL — SIGNIFICANT CHANGE UP (ref 13–17)
HGB BLD-MCNC: 13.4 G/DL — SIGNIFICANT CHANGE UP (ref 13–17)
INR BLD: 0.95 — SIGNIFICANT CHANGE UP (ref 0.88–1.16)
INR BLD: 1.01 — SIGNIFICANT CHANGE UP (ref 0.88–1.16)
MAGNESIUM SERPL-MCNC: 2.1 MG/DL — SIGNIFICANT CHANGE UP (ref 1.6–2.6)
MCHC RBC-ENTMCNC: 28.1 PG — SIGNIFICANT CHANGE UP (ref 27–34)
MCHC RBC-ENTMCNC: 28.3 PG — SIGNIFICANT CHANGE UP (ref 27–34)
MCHC RBC-ENTMCNC: 32 GM/DL — SIGNIFICANT CHANGE UP (ref 32–36)
MCHC RBC-ENTMCNC: 32.6 GM/DL — SIGNIFICANT CHANGE UP (ref 32–36)
MCV RBC AUTO: 86.7 FL — SIGNIFICANT CHANGE UP (ref 80–100)
MCV RBC AUTO: 87.7 FL — SIGNIFICANT CHANGE UP (ref 80–100)
NRBC # BLD: 0 /100 WBCS — SIGNIFICANT CHANGE UP (ref 0–0)
NRBC # BLD: 0 /100 WBCS — SIGNIFICANT CHANGE UP (ref 0–0)
PHOSPHATE SERPL-MCNC: 3.1 MG/DL — SIGNIFICANT CHANGE UP (ref 2.5–4.5)
PLATELET # BLD AUTO: 327 K/UL — SIGNIFICANT CHANGE UP (ref 150–400)
PLATELET # BLD AUTO: 328 K/UL — SIGNIFICANT CHANGE UP (ref 150–400)
POTASSIUM SERPL-MCNC: 4.2 MMOL/L — SIGNIFICANT CHANGE UP (ref 3.5–5.3)
POTASSIUM SERPL-MCNC: 4.6 MMOL/L — SIGNIFICANT CHANGE UP (ref 3.5–5.3)
POTASSIUM SERPL-SCNC: 4.2 MMOL/L — SIGNIFICANT CHANGE UP (ref 3.5–5.3)
POTASSIUM SERPL-SCNC: 4.6 MMOL/L — SIGNIFICANT CHANGE UP (ref 3.5–5.3)
PROT SERPL-MCNC: 5.9 G/DL — LOW (ref 6–8.3)
PROTHROM AB SERPL-ACNC: 11.3 SEC — SIGNIFICANT CHANGE UP (ref 10.5–13.4)
PROTHROM AB SERPL-ACNC: 12 SEC — SIGNIFICANT CHANGE UP (ref 10.5–13.4)
RBC # BLD: 4.73 M/UL — SIGNIFICANT CHANGE UP (ref 4.2–5.8)
RBC # BLD: 4.74 M/UL — SIGNIFICANT CHANGE UP (ref 4.2–5.8)
RBC # FLD: 14.8 % — HIGH (ref 10.3–14.5)
RBC # FLD: 14.9 % — HIGH (ref 10.3–14.5)
RH IG SCN BLD-IMP: POSITIVE — SIGNIFICANT CHANGE UP
SODIUM SERPL-SCNC: 133 MMOL/L — LOW (ref 135–145)
SODIUM SERPL-SCNC: 138 MMOL/L — SIGNIFICANT CHANGE UP (ref 135–145)
WBC # BLD: 6.21 K/UL — SIGNIFICANT CHANGE UP (ref 3.8–10.5)
WBC # BLD: 7.26 K/UL — SIGNIFICANT CHANGE UP (ref 3.8–10.5)
WBC # FLD AUTO: 6.21 K/UL — SIGNIFICANT CHANGE UP (ref 3.8–10.5)
WBC # FLD AUTO: 7.26 K/UL — SIGNIFICANT CHANGE UP (ref 3.8–10.5)

## 2023-04-25 PROCEDURE — 88331 PATH CONSLTJ SURG 1 BLK 1SPC: CPT | Mod: 26

## 2023-04-25 PROCEDURE — 61510 CRNEC TREPH EXC BRN TUM STTL: CPT | Mod: 22

## 2023-04-25 PROCEDURE — 88341 IMHCHEM/IMCYTCHM EA ADD ANTB: CPT | Mod: 26,59

## 2023-04-25 PROCEDURE — 88360 TUMOR IMMUNOHISTOCHEM/MANUAL: CPT | Mod: 26

## 2023-04-25 PROCEDURE — 61781 SCAN PROC CRANIAL INTRA: CPT

## 2023-04-25 PROCEDURE — 88342 IMHCHEM/IMCYTCHM 1ST ANTB: CPT | Mod: 26,59

## 2023-04-25 PROCEDURE — 88307 TISSUE EXAM BY PATHOLOGIST: CPT | Mod: 26

## 2023-04-25 PROCEDURE — 15750 NEUROVASCULAR PEDICLE FLAP: CPT

## 2023-04-25 PROCEDURE — 99291 CRITICAL CARE FIRST HOUR: CPT

## 2023-04-25 PROCEDURE — 99232 SBSQ HOSP IP/OBS MODERATE 35: CPT

## 2023-04-25 DEVICE — ELCTR SPINAL KIT (6 CONTACTS): Type: IMPLANTABLE DEVICE | Site: LEFT | Status: FUNCTIONAL

## 2023-04-25 DEVICE — MAYFIELD SKULL PIN ADULT PLASTIC: Type: IMPLANTABLE DEVICE | Site: LEFT | Status: FUNCTIONAL

## 2023-04-25 DEVICE — SURGIFLO HEMOSTATIC MATRIX KIT: Type: IMPLANTABLE DEVICE | Site: LEFT | Status: FUNCTIONAL

## 2023-04-25 DEVICE — SCREW UN3 AXS SELF DRILL 1.5X4MM: Type: IMPLANTABLE DEVICE | Site: LEFT | Status: FUNCTIONAL

## 2023-04-25 DEVICE — CLIP APPLIER ETHICON LIGACLIP 9 3/8" SMALL: Type: IMPLANTABLE DEVICE | Site: LEFT | Status: FUNCTIONAL

## 2023-04-25 DEVICE — PLATE COVER BURRHOLE UN3 W/TAB 14MM: Type: IMPLANTABLE DEVICE | Site: LEFT | Status: FUNCTIONAL

## 2023-04-25 DEVICE — SURGICEL 4 X 8": Type: IMPLANTABLE DEVICE | Site: LEFT | Status: FUNCTIONAL

## 2023-04-25 DEVICE — SURGIFOAM PAD 8CM X 12.5CM X 10MM (100): Type: IMPLANTABLE DEVICE | Site: LEFT | Status: FUNCTIONAL

## 2023-04-25 RX ORDER — ACETAMINOPHEN 500 MG
650 TABLET ORAL EVERY 6 HOURS
Refills: 0 | Status: DISCONTINUED | OUTPATIENT
Start: 2023-04-25 | End: 2023-04-25

## 2023-04-25 RX ORDER — CEFAZOLIN SODIUM 1 G
2000 VIAL (EA) INJECTION EVERY 8 HOURS
Refills: 0 | Status: COMPLETED | OUTPATIENT
Start: 2023-04-25 | End: 2023-04-26

## 2023-04-25 RX ORDER — INSULIN LISPRO 100/ML
VIAL (ML) SUBCUTANEOUS
Refills: 0 | Status: DISCONTINUED | OUTPATIENT
Start: 2023-04-25 | End: 2023-05-02

## 2023-04-25 RX ORDER — AMLODIPINE BESYLATE 2.5 MG/1
10 TABLET ORAL DAILY
Refills: 0 | Status: DISCONTINUED | OUTPATIENT
Start: 2023-04-25 | End: 2023-04-27

## 2023-04-25 RX ORDER — SODIUM CHLORIDE 5 G/100ML
150 INJECTION, SOLUTION INTRAVENOUS ONCE
Refills: 0 | Status: COMPLETED | OUTPATIENT
Start: 2023-04-25 | End: 2023-04-25

## 2023-04-25 RX ORDER — AMLODIPINE BESYLATE 2.5 MG/1
10 TABLET ORAL DAILY
Refills: 0 | Status: DISCONTINUED | OUTPATIENT
Start: 2023-04-25 | End: 2023-04-25

## 2023-04-25 RX ORDER — LEVETIRACETAM 250 MG/1
500 TABLET, FILM COATED ORAL EVERY 12 HOURS
Refills: 0 | Status: DISCONTINUED | OUTPATIENT
Start: 2023-04-25 | End: 2023-04-25

## 2023-04-25 RX ORDER — HYDROMORPHONE HYDROCHLORIDE 2 MG/ML
0.25 INJECTION INTRAMUSCULAR; INTRAVENOUS; SUBCUTANEOUS ONCE
Refills: 0 | Status: DISCONTINUED | OUTPATIENT
Start: 2023-04-25 | End: 2023-04-25

## 2023-04-25 RX ORDER — DEXAMETHASONE 0.5 MG/5ML
4 ELIXIR ORAL EVERY 6 HOURS
Refills: 0 | Status: DISCONTINUED | OUTPATIENT
Start: 2023-04-25 | End: 2023-04-25

## 2023-04-25 RX ORDER — AMINOLEVULINIC ACID HYDROCHLORIDE 1500 MG/1
2070 POWDER, FOR SOLUTION ORAL ONCE
Refills: 0 | Status: COMPLETED | OUTPATIENT
Start: 2023-04-25 | End: 2023-04-25

## 2023-04-25 RX ORDER — LEVETIRACETAM 250 MG/1
750 TABLET, FILM COATED ORAL EVERY 12 HOURS
Refills: 0 | Status: DISCONTINUED | OUTPATIENT
Start: 2023-04-25 | End: 2023-04-25

## 2023-04-25 RX ORDER — DEXAMETHASONE 0.5 MG/5ML
ELIXIR ORAL
Refills: 0 | Status: DISCONTINUED | OUTPATIENT
Start: 2023-04-25 | End: 2023-04-25

## 2023-04-25 RX ORDER — ONDANSETRON 8 MG/1
4 TABLET, FILM COATED ORAL EVERY 6 HOURS
Refills: 0 | Status: DISCONTINUED | OUTPATIENT
Start: 2023-04-25 | End: 2023-05-02

## 2023-04-25 RX ORDER — PANTOPRAZOLE SODIUM 20 MG/1
40 TABLET, DELAYED RELEASE ORAL DAILY
Refills: 0 | Status: DISCONTINUED | OUTPATIENT
Start: 2023-04-25 | End: 2023-04-26

## 2023-04-25 RX ORDER — SENNA PLUS 8.6 MG/1
2 TABLET ORAL AT BEDTIME
Refills: 0 | Status: DISCONTINUED | OUTPATIENT
Start: 2023-04-25 | End: 2023-05-02

## 2023-04-25 RX ORDER — LEVETIRACETAM 250 MG/1
750 TABLET, FILM COATED ORAL EVERY 12 HOURS
Refills: 0 | Status: DISCONTINUED | OUTPATIENT
Start: 2023-04-25 | End: 2023-04-26

## 2023-04-25 RX ORDER — SODIUM CHLORIDE 9 MG/ML
1000 INJECTION INTRAMUSCULAR; INTRAVENOUS; SUBCUTANEOUS
Refills: 0 | Status: DISCONTINUED | OUTPATIENT
Start: 2023-04-25 | End: 2023-04-26

## 2023-04-25 RX ORDER — DEXAMETHASONE 0.5 MG/5ML
4 ELIXIR ORAL EVERY 6 HOURS
Refills: 0 | Status: DISCONTINUED | OUTPATIENT
Start: 2023-04-25 | End: 2023-04-27

## 2023-04-25 RX ORDER — AMINOLEVULINIC ACID HYDROCHLORIDE 1500 MG/1
2060 POWDER, FOR SOLUTION ORAL ONCE
Refills: 0 | Status: DISCONTINUED | OUTPATIENT
Start: 2023-04-25 | End: 2023-04-25

## 2023-04-25 RX ORDER — ACETAMINOPHEN 500 MG
1000 TABLET ORAL EVERY 8 HOURS
Refills: 0 | Status: DISCONTINUED | OUTPATIENT
Start: 2023-04-25 | End: 2023-04-26

## 2023-04-25 RX ORDER — NICARDIPINE HYDROCHLORIDE 30 MG/1
5 CAPSULE, EXTENDED RELEASE ORAL
Qty: 40 | Refills: 0 | Status: DISCONTINUED | OUTPATIENT
Start: 2023-04-25 | End: 2023-04-26

## 2023-04-25 RX ORDER — PANTOPRAZOLE SODIUM 20 MG/1
40 TABLET, DELAYED RELEASE ORAL
Refills: 0 | Status: DISCONTINUED | OUTPATIENT
Start: 2023-04-25 | End: 2023-04-25

## 2023-04-25 RX ORDER — CHLORHEXIDINE GLUCONATE 213 G/1000ML
1 SOLUTION TOPICAL DAILY
Refills: 0 | Status: DISCONTINUED | OUTPATIENT
Start: 2023-04-25 | End: 2023-04-28

## 2023-04-25 RX ORDER — OXYCODONE HYDROCHLORIDE 5 MG/1
5 TABLET ORAL EVERY 4 HOURS
Refills: 0 | Status: DISCONTINUED | OUTPATIENT
Start: 2023-04-25 | End: 2023-04-25

## 2023-04-25 RX ORDER — OXYCODONE HYDROCHLORIDE 5 MG/1
10 TABLET ORAL EVERY 4 HOURS
Refills: 0 | Status: DISCONTINUED | OUTPATIENT
Start: 2023-04-25 | End: 2023-04-25

## 2023-04-25 RX ADMIN — LEVETIRACETAM 400 MILLIGRAM(S): 250 TABLET, FILM COATED ORAL at 19:51

## 2023-04-25 RX ADMIN — NICARDIPINE HYDROCHLORIDE 25 MG/HR: 30 CAPSULE, EXTENDED RELEASE ORAL at 19:51

## 2023-04-25 RX ADMIN — Medication 100 MILLIGRAM(S): at 21:06

## 2023-04-25 RX ADMIN — SODIUM CHLORIDE 600 MILLILITER(S): 5 INJECTION, SOLUTION INTRAVENOUS at 18:48

## 2023-04-25 RX ADMIN — NICARDIPINE HYDROCHLORIDE 25 MG/HR: 30 CAPSULE, EXTENDED RELEASE ORAL at 23:29

## 2023-04-25 RX ADMIN — SODIUM CHLORIDE 75 MILLILITER(S): 9 INJECTION INTRAMUSCULAR; INTRAVENOUS; SUBCUTANEOUS at 19:51

## 2023-04-25 RX ADMIN — HYDROMORPHONE HYDROCHLORIDE 0.25 MILLIGRAM(S): 2 INJECTION INTRAMUSCULAR; INTRAVENOUS; SUBCUTANEOUS at 18:15

## 2023-04-25 RX ADMIN — HYDROMORPHONE HYDROCHLORIDE 0.25 MILLIGRAM(S): 2 INJECTION INTRAMUSCULAR; INTRAVENOUS; SUBCUTANEOUS at 18:06

## 2023-04-25 RX ADMIN — PANTOPRAZOLE SODIUM 40 MILLIGRAM(S): 20 TABLET, DELAYED RELEASE ORAL at 05:50

## 2023-04-25 RX ADMIN — PANTOPRAZOLE SODIUM 40 MILLIGRAM(S): 20 TABLET, DELAYED RELEASE ORAL at 18:32

## 2023-04-25 RX ADMIN — AMINOLEVULINIC ACID HYDROCHLORIDE 2070 MILLIGRAM(S): 1500 POWDER, FOR SOLUTION ORAL at 10:07

## 2023-04-25 RX ADMIN — Medication 4 MILLIGRAM(S): at 18:32

## 2023-04-25 RX ADMIN — Medication 4 MILLIGRAM(S): at 23:13

## 2023-04-25 RX ADMIN — NICARDIPINE HYDROCHLORIDE 25 MG/HR: 30 CAPSULE, EXTENDED RELEASE ORAL at 18:25

## 2023-04-25 RX ADMIN — AMLODIPINE BESYLATE 10 MILLIGRAM(S): 2.5 TABLET ORAL at 05:50

## 2023-04-25 RX ADMIN — CHLORHEXIDINE GLUCONATE 1 APPLICATION(S): 213 SOLUTION TOPICAL at 05:48

## 2023-04-25 RX ADMIN — Medication 4 MILLIGRAM(S): at 05:49

## 2023-04-25 RX ADMIN — SODIUM CHLORIDE 100 MILLILITER(S): 9 INJECTION INTRAMUSCULAR; INTRAVENOUS; SUBCUTANEOUS at 10:07

## 2023-04-25 RX ADMIN — Medication 400 MILLIGRAM(S): at 18:32

## 2023-04-25 RX ADMIN — Medication 1000 MILLIGRAM(S): at 19:28

## 2023-04-25 RX ADMIN — Medication 1 APPLICATION(S): at 05:48

## 2023-04-25 RX ADMIN — LEVETIRACETAM 750 MILLIGRAM(S): 250 TABLET, FILM COATED ORAL at 10:07

## 2023-04-25 NOTE — PRE-OP CHECKLIST - HEIGHT IN INCHES
11 Additional Area 2 Volume In Cc: 0 Anesthesia Volume In Cc: 0.5 Include Cannula Information In Note?: No Include Cannula Size?: 27G Detail Level: Detailed Consent was obtained. Post-Care Instructions: After care instructions were provided verbally and in writing. Filler: Restylane Contour Map Statment: See Attach Map for Complete Details Include Cannula Information In Note?: Yes Anesthesia Type: 1% lidocaine with epinephrine

## 2023-04-25 NOTE — PROGRESS NOTE ADULT - SUBJECTIVE AND OBJECTIVE BOX
***********************************************  ADULT NSICU PROGRESS NOTE  KAREEM GANNON [id number] [facility] [id number]  ***********************************************    24H INTERVAL EVENTS:    ROS: negative except per mentioned above in 24h interval events.      HOSPITAL COURSE CARRIED FORWARD:    VITALS:    ICU Vital Signs Last 24 Hrs  T(C): 36.4 (25 Apr 2023 17:25), Max: 36.8 (24 Apr 2023 20:53)  T(F): 97.5 (25 Apr 2023 17:25), Max: 98.3 (25 Apr 2023 05:49)  HR: 95 (25 Apr 2023 19:30) (82 - 104)  BP: 115/60 (25 Apr 2023 19:30) (115/60 - 150/74)  BP(mean): 80 (25 Apr 2023 19:30) (80 - 106)  ABP: 111/50 (25 Apr 2023 19:30) (111/50 - 165/71)  ABP(mean): 69 (25 Apr 2023 19:30) (69 - 103)  RR: 14 (25 Apr 2023 19:30) (14 - 22)  SpO2: 95% (25 Apr 2023 19:30) (95% - 100%)    O2 Parameters below as of 25 Apr 2023 19:30  Patient On (Oxygen Delivery Method): room air                I&O's Summary    24 Apr 2023 07:01  -  25 Apr 2023 07:00  --------------------------------------------------------  IN: 0 mL / OUT: 900 mL / NET: -900 mL    25 Apr 2023 07:01  -  25 Apr 2023 19:58  --------------------------------------------------------  IN: 362.5 mL / OUT: 500 mL / NET: -137.5 mL        EXAM:     [place] Coma Scale:     General: normocephalic, atraumatic, laying in bed, in no distress  Neuro     MS: A/Ox3, cooperative, normal attention, no neglect, comprehension intact, speech with preserved fluency, naming, and repetition    CN: PERRL, VF FTC, EOMI and no ptosis bilaterally, sensation intact to crude touch V1-V3, face symmetric, hearing grossly intact    Mot: bulk normal, tone normal, power 5/5 in bilateral upper and lower proximal extremities    Sens: intact to crude touch in bilateral upper and lower extremities    Reflexes: deferred    Coord: no dysmetria or ataxia on finger to nose/heel to shin, respectively, no focal bradykinetic movements    Gait: deferred  Chest: nonlabored respirations, no adventitious lung sounds bilaterally, heart regular rate/rhythm, present S1/S2, no murmurs or rubs  Abdomen: nondistended, soft and nontender without peritoneal signs, normoactive bowel sounds  Extremities: no clubbing, well-perfused, no edema    LABORATORY DATA:                            13.4   7.26  )-----------( 327      ( 25 Apr 2023 17:51 )             41.1     04-25    133<L>  |  99  |  11  ----------------------------<  192<H>  4.2   |  19<L>  |  0.84    Ca    9.0      25 Apr 2023 17:51  Phos  3.1     04-25  Mg     2.1     04-25    TPro  5.9<L>  /  Alb  3.4  /  TBili  0.5  /  DBili  x   /  AST  14  /  ALT  34  /  AlkPhos  86  04-25    LIVER FUNCTIONS - ( 25 Apr 2023 17:51 )  Alb: 3.4 g/dL / Pro: 5.9 g/dL / ALK PHOS: 86 U/L / ALT: 34 U/L / AST: 14 U/L / GGT: x           PT/INR - ( 25 Apr 2023 17:51 )   PT: 12.0 sec;   INR: 1.01          PTT - ( 25 Apr 2023 17:51 )  PTT:29.2 sec    IMAGING DATA:    CARDIOLOGY DATA:    MICROBIOLOGY DATA:        MEDICATIONS  (STANDING):  acetaminophen   IVPB .. 1000 milliGRAM(s) IV Intermittent every 8 hours  ceFAZolin   IVPB 2000 milliGRAM(s) IV Intermittent every 8 hours  dexAMETHasone  Injectable 4 milliGRAM(s) IV Push every 6 hours  insulin lispro (ADMELOG) corrective regimen sliding scale   SubCutaneous three times a day before meals  levETIRAcetam  IVPB 750 milliGRAM(s) IV Intermittent every 12 hours  niCARdipine Infusion 5 mG/Hr (25 mL/Hr) IV Continuous <Continuous>  pantoprazole  Injectable 40 milliGRAM(s) IV Push daily  senna 2 Tablet(s) Oral at bedtime  sodium chloride 0.9%. 1000 milliLiter(s) (75 mL/Hr) IV Continuous <Continuous>    MEDICATIONS  (PRN):  ondansetron Injectable 4 milliGRAM(s) IV Push every 6 hours PRN Nausea and/or Vomiting      ***********************************************  ASSESSMENT AND PLAN  ***********************************************    NEURO - admit NSICU, Q1h neuro checks / Q1h vital signs  PULM - SpO2 goal > 92%, supplemental O2 and pulm toileting as needed  CARDIO - BP goal   GI - bowel regimen, stool count, diet:    /RENAL - monitor UOP/volume status, BUN/SCr  HEME - maintain Hb > 7.0, PLT > ***  ID - monitor for infectious s/s, fever curve, leukocytosis  ENDO - SGlu goal < 200 ***********************************************  ADULT [facility] PROGRESS NOTE  KAREEM GANNON [id number] [facility] 08EA [id number]  ***********************************************    HPI: 68yo M with history of OA, HTN, prior brain biopsy (4/7/23 @[facility]) with R. sided weakness [facility] fr resection of tumor (frozen: [facility])    ROS: negative except per mentioned above in 24h interval events.      HOSPITAL COURSE CARRIED FORWARD:    VITALS:    ICU Vital Signs Last 24 Hrs  T(C): 36.4 (25 Apr 2023 17:25), Max: 36.8 (24 Apr 2023 20:53)  T(F): 97.5 (25 Apr 2023 17:25), Max: 98.3 (25 Apr 2023 05:49)  HR: 95 (25 Apr 2023 19:30) (82 - 104)  BP: 115/60 (25 Apr 2023 19:30) (115/60 - 150/74)  BP(mean): 80 (25 Apr 2023 19:30) (80 - 106)  ABP: 111/50 (25 Apr 2023 19:30) (111/50 - 165/71)  ABP(mean): 69 (25 Apr 2023 19:30) (69 - 103)  RR: 14 (25 Apr 2023 19:30) (14 - 22)  SpO2: 95% (25 Apr 2023 19:30) (95% - 100%)    O2 Parameters below as of 25 Apr 2023 19:30  Patient On (Oxygen Delivery Method): room air                I&O's Summary    24 Apr 2023 07:01  -  25 Apr 2023 07:00  --------------------------------------------------------  IN: 0 mL / OUT: 900 mL / NET: -900 mL    25 Apr 2023 07:01  -  25 Apr 2023 19:58  --------------------------------------------------------  IN: 362.5 mL / OUT: 500 mL / NET: -137.5 mL        EXAM:     [place] Coma Scale: 15    General: normocephalic, atraumatic, laying in bed, in no distress  Neuro     MS: A/Ox3, cooperative, normal attention, no neglect, comprehension intact, speech with preserved fluency, naming, and repetition    CN: PERRL, VF FTC,(+)exotropia, sensation intact to crude touch V1-V3, (+)R. facial weakness, hearing grossly intact    Mot: bulk normal, tone normal, power UPPER 0/5, LOWER 1/5    Sens: intact to crude touch in bilateral upper and lower extremities    Reflexes: deferred  Chest: nonlabored respirations, no adventitious lung sounds bilaterally, heart regular rate/rhythm, present S1/S2, no murmurs or rubs  Abdomen: nondistended, soft and nontender without peritoneal signs, normoactive bowel sounds  Extremities: no clubbing, well-perfused, no edema    LABORATORY DATA:                            13.4   7.26  )-----------( 327      ( 25 Apr 2023 17:51 )             41.1     04-25    133<L>  |  99  |  11  ----------------------------<  192<H>  4.2   |  19<L>  |  0.84    Ca    9.0      25 Apr 2023 17:51  Phos  3.1     04-25  Mg     2.1     04-25    TPro  5.9<L>  /  Alb  3.4  /  TBili  0.5  /  DBili  x   /  AST  14  /  ALT  34  /  AlkPhos  86  04-25    LIVER FUNCTIONS - ( 25 Apr 2023 17:51 )  Alb: 3.4 g/dL / Pro: 5.9 g/dL / ALK PHOS: 86 U/L / ALT: 34 U/L / AST: 14 U/L / GGT: x           PT/INR - ( 25 Apr 2023 17:51 )   PT: 12.0 sec;   INR: 1.01          PTT - ( 25 Apr 2023 17:51 )  PTT:29.2 sec    IMAGING DATA:    CARDIOLOGY DATA:    MICROBIOLOGY DATA:        MEDICATIONS  (STANDING):  acetaminophen   IVPB .. 1000 milliGRAM(s) IV Intermittent every 8 hours  ceFAZolin   IVPB 2000 milliGRAM(s) IV Intermittent every 8 hours  dexAMETHasone  Injectable 4 milliGRAM(s) IV Push every 6 hours  insulin lispro (ADMELOG) corrective regimen sliding scale   SubCutaneous three times a day before meals  levETIRAcetam  IVPB 750 milliGRAM(s) IV Intermittent every 12 hours  niCARdipine Infusion 5 mG/Hr (25 mL/Hr) IV Continuous <Continuous>  pantoprazole  Injectable 40 milliGRAM(s) IV Push daily  senna 2 Tablet(s) Oral at bedtime  sodium chloride 0.9%. 1000 milliLiter(s) (75 mL/Hr) IV Continuous <Continuous>    MEDICATIONS  (PRN):  ondansetron Injectable 4 milliGRAM(s) IV Push every 6 hours PRN Nausea and/or Vomiting      ***********************************************  ASSESSMENT AND PLAN  ***********************************************    S/p L. crani fr resection of tumor (frozen: [facility]), POD#0  History of OA, HTN    NEURO - admit NSICU, Q1h neuro checks / Q1h vital signs, pain control, PT/OT, dex, keppra 750/750, post op MRI  PULM - SpO2 goal > 92%, supplemental O2 and pulm toileting as needed  CARDIO - BP goal < 140, cardene, holding norvasc  GI - bowel regimen, stool count, diet: NPO, pending swallow, PPI  /RENAL - monitor UOP/volume status, BUN/SCr  HEME - maintain Hb > 7.0, PLT > 100,000, holding chemoppx  ID - monitor for infectious s/s, fever curve, leukocytosis, periop ancef  ENDO - SGlu goal < 200

## 2023-04-25 NOTE — BRIEF OPERATIVE NOTE - NSICDXBRIEFPREOP_GEN_ALL_CORE_FT
"SUBJECTIVE:  Subjective  Ludwin Delatorre Jr. is a 6 y.o. male who is here with parents for Well Child    HPI  Current concerns include dx strep 2 days ago. Rash on his stomach last night.     Nutrition:  Current diet:well balanced diet- three meals/healthy snacks most days and drinks milk/other calcium sources    Elimination:  Stool pattern: daily, normal consistency  Urine accidents? no    Sleep:no problems    Dental:  Brushes teeth twice a day with fluoride? yes  Dental visit within past year?  yes    Social Screening:  School/Childcare: attends school; going well; no concerns  Physical Activity: frequent/daily outside time and screen time limited <2 hrs most days  Behavior: no concerns; age appropriate    Developmental Screening:  No SWYC result filed; not completed within the past 7 days or not in age range for screening.    Review of Systems  A comprehensive review of symptoms was completed and negative except as noted above.     OBJECTIVE:  Vital signs  Vitals:    12/15/22 1535   BP: 102/60   Pulse: 92   Weight: 22.8 kg (50 lb 4.2 oz)   Height: 3' 9.87" (1.165 m)       Physical Exam  Constitutional:       General: He is active. He is not in acute distress.     Appearance: Normal appearance. He is well-developed and normal weight. He is not toxic-appearing.   HENT:      Right Ear: Tympanic membrane and ear canal normal.      Left Ear: Tympanic membrane and ear canal normal.      Nose: Nose normal. No congestion or rhinorrhea.      Mouth/Throat:      Mouth: Mucous membranes are moist.      Pharynx: Oropharynx is clear. No oropharyngeal exudate or posterior oropharyngeal erythema.   Eyes:      General:         Right eye: No discharge.         Left eye: No discharge.      Extraocular Movements: Extraocular movements intact.      Conjunctiva/sclera: Conjunctivae normal.      Pupils: Pupils are equal, round, and reactive to light.   Cardiovascular:      Rate and Rhythm: Normal rate and regular rhythm.      " Pulses: Normal pulses.      Heart sounds: Normal heart sounds. No murmur heard.  Pulmonary:      Effort: Pulmonary effort is normal. No respiratory distress.      Breath sounds: Normal breath sounds. No wheezing.   Abdominal:      General: Bowel sounds are normal. There is no distension.      Palpations: Abdomen is soft. There is no mass.      Tenderness: There is no abdominal tenderness. There is no guarding.   Genitourinary:     Penis: Normal.       Testes: Normal.   Musculoskeletal:         General: No swelling, tenderness or deformity. Normal range of motion.      Cervical back: Normal range of motion and neck supple.   Skin:     General: Skin is warm and dry.      Capillary Refill: Capillary refill takes less than 2 seconds.      Findings: No rash.      Comments: Faint small raised rash to chest and torso   Neurological:      General: No focal deficit present.      Mental Status: He is alert.      Motor: No weakness or abnormal muscle tone.      Coordination: Coordination normal.      Gait: Gait normal.   Psychiatric:         Behavior: Behavior normal.        ASSESSMENT/PLAN:  Ludwin was seen today for well child.    Diagnoses and all orders for this visit:    Encounter for well child check without abnormal findings    Encounter for screening for global developmental delays (milestones)  -     Cancel: SWYC-Developmental Test  -     SWYC-Developmental Test    Auditory acuity evaluation  -     Hearing screen    Visual testing  -     Visual acuity screening  -     Ambulatory referral/consult to Optometry; Future    Failed vision screen  -     Ambulatory referral/consult to Optometry; Future    Strep pharyngitis  Comments:  complete amoxicillin as prescribed         Preventive Health Issues Addressed:  1. Anticipatory guidance discussed and a handout covering well-child issues for age was provided.     2. Age appropriate physical activity and nutritional counseling were completed during today's visit.      3.  Immunizations and screening tests today: per orders.        Follow Up:  Follow up in about 1 year (around 12/15/2023).       PRE-OP DIAGNOSIS:  Brain tumor 25-Apr-2023 13:23:18  Primo Joaquin

## 2023-04-25 NOTE — PROGRESS NOTE ADULT - ASSESSMENT
67M PMH HTN (on amlodipine 10mg PO qd), Osteoarthritis (s/p right total knee replacement 2 months ago), brain mass (s/p biopsy 4/7/23 @ [facility] with Dr. Tobar) presented for brain tumor resection. Medicine consulted for pre-operative medicine risk stratification.     #Perioperative management  -Cleared a/p yesterdays note  -can c/w home norvasc perioperatively    #DM:   -Monitor FS q6 while NPO. Careful glycemic control post op    #?Atelectasis  -No respiratory complaints, cough, SOB, etc. Normal O2  -Recommend IS post op. Discussed with patient.    #HTN:  - can continue home med post-procedure as above

## 2023-04-25 NOTE — PROGRESS NOTE ADULT - SUBJECTIVE AND OBJECTIVE BOX
S: patient seen bedside. Feels well. No acute complaints. Is mentally/spiritually prepared for OR. We discussed his post operative care.       Vital Signs Last 24 Hrs  T(C): 36.8 (25 Apr 2023 05:49), Max: 36.8 (24 Apr 2023 20:53)  T(F): 98.3 (25 Apr 2023 05:49), Max: 98.3 (25 Apr 2023 05:49)  HR: 82 (25 Apr 2023 05:49) (80 - 87)  BP: 133/81 (25 Apr 2023 05:49) (123/75 - 135/62)  BP(mean): --  RR: 16 (25 Apr 2023 05:49) (16 - 18)  SpO2: 97% (25 Apr 2023 05:49) (97% - 97%)    Parameters below as of 25 Apr 2023 05:49  Patient On (Oxygen Delivery Method): room air        PE:   Gen: pleasant overweight male in NAD  HEENT: EOMI, NCAT  Neck: no JVD  Lungs: CTA b/l non labored  CV: RRR S1S2  GI: +BS non tender  LE: no edema  Psych: pleasant, cooperative      04-25    138  |  102  |  11  ----------------------------<  171<H>  4.6   |  26  |  0.88    Ca    8.4      25 Apr 2023 05:30  Phos  3.1     04-25  Mg     2.1     04-25    TPro  6.4  /  Alb  3.6  /  TBili  0.2  /  DBili  x   /  AST  16  /  ALT  43  /  AlkPhos  137<H>  04-24                        13.3   6.21  )-----------( 328      ( 25 Apr 2023 05:30 )             41.5

## 2023-04-25 NOTE — BRIEF OPERATIVE NOTE - NSICDXBRIEFPROCEDURE_GEN_ALL_CORE_FT
PROCEDURES:  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 25-Apr-2023 13:23:12  Primo Joaquin

## 2023-04-25 NOTE — PROGRESS NOTE ADULT - SUBJECTIVE AND OBJECTIVE BOX
NEUROSURGERY POST OP NOTE:    POD# 0 S/P L crani for tumor resection, frozen HGG    S: Patient seen and evaluated in NSICU. Patient reports feeling well and denies any complaints at this time.       T(C): 36.4 (04-25-23 @ 17:25), Max: 36.8 (04-24-23 @ 20:53)  HR: 100 (04-25-23 @ 18:30) (82 - 104)  BP: 122/62 (04-25-23 @ 18:30) (119/64 - 150/74)  RR: 20 (04-25-23 @ 18:30) (15 - 22)  SpO2: 99% (04-25-23 @ 18:30) (97% - 100%)      04-24-23 @ 07:01  -  04-25-23 @ 07:00  --------------------------------------------------------  IN: 0 mL / OUT: 900 mL / NET: -900 mL    04-25-23 @ 07:01  -  04-25-23 @ 18:38  --------------------------------------------------------  IN: 75 mL / OUT: 200 mL / NET: -125 mL        acetaminophen   IVPB .. 1000 milliGRAM(s) IV Intermittent every 8 hours  ceFAZolin   IVPB 2000 milliGRAM(s) IV Intermittent every 8 hours  dexAMETHasone  Injectable 4 milliGRAM(s) IV Push every 6 hours  insulin lispro (ADMELOG) corrective regimen sliding scale   SubCutaneous three times a day before meals  levETIRAcetam  IVPB 750 milliGRAM(s) IV Intermittent every 12 hours  niCARdipine Infusion 5 mG/Hr IV Continuous <Continuous>  ondansetron Injectable 4 milliGRAM(s) IV Push every 6 hours PRN  pantoprazole  Injectable 40 milliGRAM(s) IV Push daily  senna 2 Tablet(s) Oral at bedtime  sodium chloride 0.9%. 1000 milliLiter(s) IV Continuous <Continuous>  sodium chloride 3% Bolus 150 milliLiter(s) IV Bolus once      RADIOLOGY:     Exam:  Constitutional: Patient is resting comfortably in bed in NAD  Respiratory: breathing non-labored, symmetrical chest wall movement  Cardiovascuar: RRR, no murmurs  Gastrointestinal: abdomen soft, non tender  Genitourinary: exam deffered  Neurological:  AAOX3. Verbal function intact  R facial droop, disconjugate gaze otherwise Cranial Nerves: II-XII grossly intact  Motor: L side 5/5, RUE withdraws to noxious, RLE 2/5. Sensation intact to light touch throughout.   Sensation: intact to light touch in all extremities  Pronator Drift: no LUE drift.   Wound: L crani incision with staples in place, clena/dry/intact    Assessment: 68 y/o M PMH HTN, OA (s/p R TKR 2 months ago), brain mass (s/p biopsy 4/7/23 at [facility] w/ Dr. Tobar) presents for pre-op for L crani tumor resection 4/24/23. Now s/p L crani for tumor resection, frozen HGG (4/25).    NEURO  - neuro/vitals q1h  - pain control w/ tylenol 1gq8  - cont keppra 750 bid  - decadron 4q6 taper over 1 week to off  - pending postop MR Brain w/wo    CARDIO  - SBP < 140  - cardene gtt   - h/o HTN: amlodipine 10mg on hold    PULM   - satting well on RA   - encourage IS    GI   - NPO  - Bowel regimen   - Protonix while on steroids    RENAL  - IVF until tolerating PO diet  - s/p 150cc 3% bolus 4/25  - Shields     ENDO   - ISS  - A1c 6.5    HEME   - SCDs for DVT ppx    ID   - postop Ancef  - afebrile    DISPO: ICU status pending PT/OT, full code    D/W Dr. Johnston and Dr. Espinoza

## 2023-04-26 LAB
ANION GAP SERPL CALC-SCNC: 9 MMOL/L — SIGNIFICANT CHANGE UP (ref 5–17)
ANION GAP SERPL CALC-SCNC: 9 MMOL/L — SIGNIFICANT CHANGE UP (ref 5–17)
BUN SERPL-MCNC: 12 MG/DL — SIGNIFICANT CHANGE UP (ref 7–23)
BUN SERPL-MCNC: 16 MG/DL — SIGNIFICANT CHANGE UP (ref 7–23)
CALCIUM SERPL-MCNC: 7.9 MG/DL — LOW (ref 8.4–10.5)
CALCIUM SERPL-MCNC: 8.6 MG/DL — SIGNIFICANT CHANGE UP (ref 8.4–10.5)
CHLORIDE SERPL-SCNC: 100 MMOL/L — SIGNIFICANT CHANGE UP (ref 96–108)
CHLORIDE SERPL-SCNC: 105 MMOL/L — SIGNIFICANT CHANGE UP (ref 96–108)
CO2 SERPL-SCNC: 24 MMOL/L — SIGNIFICANT CHANGE UP (ref 22–31)
CO2 SERPL-SCNC: 24 MMOL/L — SIGNIFICANT CHANGE UP (ref 22–31)
CREAT SERPL-MCNC: 0.76 MG/DL — SIGNIFICANT CHANGE UP (ref 0.5–1.3)
CREAT SERPL-MCNC: 0.85 MG/DL — SIGNIFICANT CHANGE UP (ref 0.5–1.3)
EGFR: 96 ML/MIN/1.73M2 — SIGNIFICANT CHANGE UP
EGFR: 99 ML/MIN/1.73M2 — SIGNIFICANT CHANGE UP
GLUCOSE BLDC GLUCOMTR-MCNC: 153 MG/DL — HIGH (ref 70–99)
GLUCOSE BLDC GLUCOMTR-MCNC: 158 MG/DL — HIGH (ref 70–99)
GLUCOSE BLDC GLUCOMTR-MCNC: 204 MG/DL — HIGH (ref 70–99)
GLUCOSE SERPL-MCNC: 149 MG/DL — HIGH (ref 70–99)
GLUCOSE SERPL-MCNC: 188 MG/DL — HIGH (ref 70–99)
HCT VFR BLD CALC: 36.9 % — LOW (ref 39–50)
HGB BLD-MCNC: 11.9 G/DL — LOW (ref 13–17)
MAGNESIUM SERPL-MCNC: 1.8 MG/DL — SIGNIFICANT CHANGE UP (ref 1.6–2.6)
MCHC RBC-ENTMCNC: 27.9 PG — SIGNIFICANT CHANGE UP (ref 27–34)
MCHC RBC-ENTMCNC: 32.2 GM/DL — SIGNIFICANT CHANGE UP (ref 32–36)
MCV RBC AUTO: 86.6 FL — SIGNIFICANT CHANGE UP (ref 80–100)
NRBC # BLD: 0 /100 WBCS — SIGNIFICANT CHANGE UP (ref 0–0)
PHOSPHATE SERPL-MCNC: 4.3 MG/DL — SIGNIFICANT CHANGE UP (ref 2.5–4.5)
PLATELET # BLD AUTO: 314 K/UL — SIGNIFICANT CHANGE UP (ref 150–400)
POTASSIUM SERPL-MCNC: 4.2 MMOL/L — SIGNIFICANT CHANGE UP (ref 3.5–5.3)
POTASSIUM SERPL-MCNC: 4.3 MMOL/L — SIGNIFICANT CHANGE UP (ref 3.5–5.3)
POTASSIUM SERPL-SCNC: 4.2 MMOL/L — SIGNIFICANT CHANGE UP (ref 3.5–5.3)
POTASSIUM SERPL-SCNC: 4.3 MMOL/L — SIGNIFICANT CHANGE UP (ref 3.5–5.3)
RBC # BLD: 4.26 M/UL — SIGNIFICANT CHANGE UP (ref 4.2–5.8)
RBC # FLD: 15 % — HIGH (ref 10.3–14.5)
SODIUM SERPL-SCNC: 133 MMOL/L — LOW (ref 135–145)
SODIUM SERPL-SCNC: 138 MMOL/L — SIGNIFICANT CHANGE UP (ref 135–145)
WBC # BLD: 11.98 K/UL — HIGH (ref 3.8–10.5)
WBC # FLD AUTO: 11.98 K/UL — HIGH (ref 3.8–10.5)

## 2023-04-26 PROCEDURE — 99291 CRITICAL CARE FIRST HOUR: CPT

## 2023-04-26 PROCEDURE — 99255 IP/OBS CONSLTJ NEW/EST HI 80: CPT

## 2023-04-26 PROCEDURE — 70450 CT HEAD/BRAIN W/O DYE: CPT | Mod: 26

## 2023-04-26 PROCEDURE — 93970 EXTREMITY STUDY: CPT | Mod: 26

## 2023-04-26 RX ORDER — HYDRALAZINE HCL 50 MG
10 TABLET ORAL
Refills: 0 | Status: DISCONTINUED | OUTPATIENT
Start: 2023-04-26 | End: 2023-04-27

## 2023-04-26 RX ORDER — MAGNESIUM SULFATE 500 MG/ML
2 VIAL (ML) INJECTION ONCE
Refills: 0 | Status: COMPLETED | OUTPATIENT
Start: 2023-04-26 | End: 2023-04-26

## 2023-04-26 RX ORDER — LEVETIRACETAM 250 MG/1
750 TABLET, FILM COATED ORAL
Refills: 0 | Status: DISCONTINUED | OUTPATIENT
Start: 2023-04-26 | End: 2023-05-02

## 2023-04-26 RX ORDER — PANTOPRAZOLE SODIUM 20 MG/1
40 TABLET, DELAYED RELEASE ORAL
Refills: 0 | Status: DISCONTINUED | OUTPATIENT
Start: 2023-04-27 | End: 2023-05-02

## 2023-04-26 RX ORDER — ACETAMINOPHEN 500 MG
1000 TABLET ORAL ONCE
Refills: 0 | Status: COMPLETED | OUTPATIENT
Start: 2023-04-26 | End: 2023-04-26

## 2023-04-26 RX ORDER — ACETAMINOPHEN 500 MG
650 TABLET ORAL EVERY 6 HOURS
Refills: 0 | Status: DISCONTINUED | OUTPATIENT
Start: 2023-04-26 | End: 2023-05-02

## 2023-04-26 RX ADMIN — Medication 1000 MILLIGRAM(S): at 18:39

## 2023-04-26 RX ADMIN — Medication 4 MILLIGRAM(S): at 12:10

## 2023-04-26 RX ADMIN — Medication 4 MILLIGRAM(S): at 17:09

## 2023-04-26 RX ADMIN — Medication 4 MILLIGRAM(S): at 05:09

## 2023-04-26 RX ADMIN — Medication 4 MILLIGRAM(S): at 23:41

## 2023-04-26 RX ADMIN — Medication 25 GRAM(S): at 06:51

## 2023-04-26 RX ADMIN — Medication 1000 MILLIGRAM(S): at 05:08

## 2023-04-26 RX ADMIN — Medication 4: at 23:40

## 2023-04-26 RX ADMIN — Medication 400 MILLIGRAM(S): at 14:09

## 2023-04-26 RX ADMIN — Medication 2: at 18:38

## 2023-04-26 RX ADMIN — Medication 400 MILLIGRAM(S): at 04:32

## 2023-04-26 RX ADMIN — LEVETIRACETAM 750 MILLIGRAM(S): 250 TABLET, FILM COATED ORAL at 18:37

## 2023-04-26 RX ADMIN — SENNA PLUS 2 TABLET(S): 8.6 TABLET ORAL at 21:51

## 2023-04-26 RX ADMIN — Medication 2: at 12:10

## 2023-04-26 RX ADMIN — PANTOPRAZOLE SODIUM 40 MILLIGRAM(S): 20 TABLET, DELAYED RELEASE ORAL at 12:10

## 2023-04-26 RX ADMIN — Medication 100 MILLIGRAM(S): at 05:09

## 2023-04-26 RX ADMIN — LEVETIRACETAM 400 MILLIGRAM(S): 250 TABLET, FILM COATED ORAL at 06:10

## 2023-04-26 NOTE — SWALLOW BEDSIDE ASSESSMENT ADULT - MODE OF PRESENTATION
PHYSICIAN NEXT STEPS:   Review Only      CHIEF COMPLAINT:   Chief Complaint/Protocol Used: Patient Declines Triage   Onset: Severe mid abdominal pain x 2 weeks. ASSESSMENT:   ÃÂ» Onset: Severe mid abdominal pain x 2 weeks. ÃÂ» Normal True   -------------------------------------------------------      DISPOSITION:   Disposition Recommendation: Unspecified   Patient Directed To: Unspecified   Patient Intended Action: Seek care in the doctor's office          CALL NOTES:   05/15/2018 at 10:43 AM by Sandrita Whitehead   ÃÂ» The patient has complaint of severe mid abdominal pain x 2 weeks. The patient did not want nurse triage. The patient requested an appointment, and was connected to [facility] in appointments  for scheduling. A message was sent to Dr. Kranthi Ferrer (PCP) in [facility] to notify. ÃÂ» Denies chest pains, difficulty breathing, feeling faint.        DISPOSITION OVERRIDE/PROVIDER CONSULT:   Disposition Override: N/A   Override Source: Unspecified   Consulted with PCP: No   Consulted with On-Call Physician: No         CALLER CONTACT INFO:   Name: Juventino Skinner (Self)   Phone 1: [phone] (Home) - Preferred   Phone 2: [phone] (Cell)         ENCOUNTER STARTED:   05/15/18 10:36:03 AM   ENCOUNTER ASSIGNED TO/CLOSED BY:   Sandrita Whitehead @ 05/15/18 10:44:36 AM         -------------------------------------------------------      UNDERSTANDS CARE ADVICE: Yes      AGREES WITH CARE ADVICE: Yes      WILL FOLLOW CARE ADVICE: Yes      ------------------------------------------------------- spoon sip x1, indep cup sips x3oz; 2 bites lora cracker/cup/spoon/self fed

## 2023-04-26 NOTE — SWALLOW BEDSIDE ASSESSMENT ADULT - SLP GENERAL OBSERVATIONS
Intact language skills at the conversational level. Mild articulatory imprecision. Pt is very aware & indep stated that he need to speak slowly & intentionally to be understood.

## 2023-04-26 NOTE — PROGRESS NOTE ADULT - SUBJECTIVE AND OBJECTIVE BOX
SUBJECTIVE: Patient denies new weakness, numbness, tingling, nausea, vomiting, chest pain, palpitations, shortness of breath, vision changes.     HOSPITAL COURSE:  4/24: Pre-op L crani tumor resection   4/25: preop L crani tumor resection today. POD 0 s/p L crani for tumor resection, frozen HGG. Given dilaudid 0.25mg for pain control. Failed dysphagia, made NPO, S&S evaluation in AM. PO meds converted to IV. Postop Na 133. S/p 150cc 3% bolus.   4/26: POD 1 L crani HGG. KYA o/n.     Vital Signs Last 24 Hrs  T(C): 37.1 (26 Apr 2023 00:12), Max: 37.1 (25 Apr 2023 22:01)  T(F): 98.7 (26 Apr 2023 00:12), Max: 98.8 (25 Apr 2023 22:01)  HR: 80 (26 Apr 2023 00:00) (80 - 104)  BP: 117/59 (26 Apr 2023 00:00) (112/60 - 150/74)  BP(mean): 81 (26 Apr 2023 00:00) (80 - 106)  RR: 15 (26 Apr 2023 00:00) (14 - 26)  SpO2: 96% (26 Apr 2023 00:00) (95% - 100%)    Parameters below as of 26 Apr 2023 00:00  Patient On (Oxygen Delivery Method): room air    I&O's Summary    24 Apr 2023 07:01  -  25 Apr 2023 07:00  --------------------------------------------------------  IN: 0 mL / OUT: 900 mL / NET: -900 mL    25 Apr 2023 07:01  -  26 Apr 2023 00:36  --------------------------------------------------------  IN: 817.5 mL / OUT: 1105 mL / NET: -287.5 mL    PHYSICAL EXAM:  Constitutional: Patient is resting comfortably in bed in NAD  Respiratory: breathing non-labored, symmetrical chest wall movement  Cardiovascuar: RRR, no murmurs  Gastrointestinal: abdomen soft, non tender  Genitourinary: exam deffered  Neurological:  AAOX3. Verbal function intact  R facial droop, disconjugate gaze otherwise Cranial Nerves: II-XII grossly intact  Motor: L side 5/5, RUE withdraws to noxious, RLE 2/5. Sensation intact to light touch throughout.   Sensation: intact to light touch in all extremities  Pronator Drift: no LUE drift.   Wound: L crani incision with staples in place, clena/dry/intact    LABS:                        13.4   7.26  )-----------( 327      ( 25 Apr 2023 17:51 )             41.1     04-25    133<L>  |  99  |  11  ----------------------------<  192<H>  4.2   |  19<L>  |  0.84    Ca    9.0      25 Apr 2023 17:51  Phos  3.1     04-25  Mg     2.1     04-25    TPro  5.9<L>  /  Alb  3.4  /  TBili  0.5  /  DBili  x   /  AST  14  /  ALT  34  /  AlkPhos  86  04-25    PT/INR - ( 25 Apr 2023 17:51 )   PT: 12.0 sec;   INR: 1.01          PTT - ( 25 Apr 2023 17:51 )  PTT:29.2 sec        CAPILLARY BLOOD GLUCOSE      POCT Blood Glucose.: 167 mg/dL (25 Apr 2023 21:32)  POCT Blood Glucose.: 131 mg/dL (25 Apr 2023 11:38)  POCT Blood Glucose.: 148 mg/dL (25 Apr 2023 06:30)      Drug Levels: [] N/A    CSF Analysis: [] N/A      Allergies    No Known Allergies    Intolerances      MEDICATIONS:  Antibiotics:  ceFAZolin   IVPB 2000 milliGRAM(s) IV Intermittent every 8 hours    Neuro:  acetaminophen   IVPB .. 1000 milliGRAM(s) IV Intermittent every 8 hours  levETIRAcetam  IVPB 750 milliGRAM(s) IV Intermittent every 12 hours  ondansetron Injectable 4 milliGRAM(s) IV Push every 6 hours PRN    Anticoagulation:    OTHER:  amLODIPine   Tablet 10 milliGRAM(s) Oral daily  chlorhexidine 2% Cloths 1 Application(s) Topical daily  dexAMETHasone  Injectable 4 milliGRAM(s) IV Push every 6 hours  insulin lispro (ADMELOG) corrective regimen sliding scale   SubCutaneous three times a day before meals  niCARdipine Infusion 5 mG/Hr IV Continuous <Continuous>  pantoprazole  Injectable 40 milliGRAM(s) IV Push daily  senna 2 Tablet(s) Oral at bedtime    IVF:  sodium chloride 0.9%. 1000 milliLiter(s) IV Continuous <Continuous>    CULTURES:    RADIOLOGY & ADDITIONAL TESTS:      ASSESSMENT:  66 y/o M PMH HTN, OA (s/p R TKR 2 months ago), brain mass (s/p biopsy 4/7/23 at [facility] w/ Dr. Tobar) presents for pre-op for L crani tumor resection 4/24/23. Now s/p L crani for tumor resection, frozen HGG (4/25).    NEURO  - neuro/vitals q1h  - pain control w/ tylenol 1gq8  - cont keppra 750 bid  - decadron 4q6 taper over 1 week to off  - pending postop MR Brain w/wo    CARDIO  - SBP < 140, cardene gtt prn    - h/o HTN: amlodipine 10 mg     PULM   - satting well on RA   - encourage IS    GI   - NPO, pending formal swallow eval   - Bowel regimen   - Protonix while on steroids    RENAL  - IVF until tolerating PO diet  - s/p 150cc 3% bolus 4/25  - + Shields     ENDO   - ISS, A1c 6.5    HEME   - SCDs for DVT ppx    ID   - postop Ancef  - afebrile    DISPO: ICU status pending PT/OT, full code    D/W Dr. Johnston and Dr. Espinoza

## 2023-04-26 NOTE — CONSULT NOTE ADULT - ATTENDING COMMENTS
Seen with fellow, .    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  Now post op. R sided weakness. Will need AR placement and then SOC Stupp.      Total time spent on encounter, including but not limited to counseling/coordination of care: 75 mis.
#Preop: No hx of cardiac/pulm dx. METs ~4, somewhat limited due to right sided weakness. Denies CP/SOB/NGUYEN. RCRI 0, Class 1 risk. EKG nsr non ischemic. Pt is low risk for intermediate risk procure. No further work up indicated.

## 2023-04-26 NOTE — DIETITIAN INITIAL EVALUATION ADULT - OTHER INFO
68 y/o M PMH HTN, OA (s/p R TKR 2 months ago), brain mass (s/p biopsy 4/7/23 at [facility] w/ Dr. Tobar) presents for pre-op for L crani tumor resection 4/24/23. Now s/p L crani for tumor resection, frozen HGG (4/25). On assessment, pt resting in bed. Currently NPO pending SLP eval. No n/v/d/c. Last BM 4/25. No pain noted at this time.  66 y/o M PMH HTN, OA (s/p R TKR 2 months ago), brain mass (s/p biopsy 4/7/23 at [facility] w/ Dr. Tobar) presents for pre-op for L crani tumor resection 4/24/23. Now s/p L crani for tumor resection, frozen HGG (4/25). On assessment, pt resting in bed. Currently NPO pending SLP eval. No n/v/d/c. Last BM 4/25. No pain noted at this time. Pt follows regular diet at home. New onset DM this admission (A1C 6.5%)- team made aware with plan for team to disc with pt and place a DM educator consult. RD to follow to provide edu once team has discussion. UBW consistent with admission weight. NKFA. At this time, RD focused education on likely diet advancement pending SLP and importance of adequate protein intake to support wound healing. RD to follow.

## 2023-04-26 NOTE — CONSULT NOTE ADULT - SUBJECTIVE AND OBJECTIVE BOX
Oncology Consult Note    HPI:  66 y/o male with h/o HTN, Osteoarthritis (s/p right total knee replacement 2 months ago), brain mass (s/p biopsy 4/7/23 @ [facility] with Dr. Tobar) presented for brain tumor resection. Per family, patient started to have right hand weakness about a month ago that got progressively worse, then went to ED where brain mass was diagnosed. Started keppra 750 mg BID for seizure prophylaxis. Described right arm weakness worsening after surgery and some right lower extremity weakness. States today, feels like his right lower extremity weakness is worsening. Family also reported patient just finished a taper of decadron. Denies headache, new weakness, numbness, tingling, chest pain, sob, nausea/vomiting, visual abnormalities.  (24 Apr 2023 18:02)    status post stereotactic needle biopsy and laser interstitial thermal therapy by Dr. Tobar (4/7/2023).     Onc hx:  End March 2023: He initially presented with right upper extremity weakness that started in his right thumb, he noticed he is not able to hold the pen normally. A few days later the weakness progressed to his whole right hand. The weakness started progressing, to his proximal right arm, which is when he presented to [facility] for further evaluation.  4/1/2023: CT spine- No evidence of acute fracture or traumatic subluxation to the visualized cervical spine.   Partial erosion of a left maxillary molar tooth. A follow-up dental exam is recommended.   Partially visualized paranasal sinus disease.   MRI Brain ([facility], 4/2/2023):  2.2 x 2.1 x 1.9 cm intra-axial mass in the superior and posterior left frontal lobe. There is surrounding vasogenic edema with resulting local regional mass effect. The differential diagnosis includes a metastasis or a primary CNS malignancy.     CT Chest, Abdomen/Pelvis ([facility], 4/3/2023):  1. 4 x 7 mm hypodensity at the pancreatic tail. Incompletely characterized on this single phase study. Recommend dedicated CT or MRI with pancreatic mass protocol for further evaluation as clinically appropriate.   2. Gallstones/sludge.   3. No adenopathy.     CT Pancreas ([facility], 4/4/2023):  1. 0.4 x 0.7 cm hypodensity at the pancreatic tail is again noted with no abnormal enhancement, possibly representing an IPMN vs small focus of fat.     MRI Brain ([facility], 4/4/2023):  Stable heterogeneously enhancing 2.2 cm left frontal lobe lesion. Surrounding T2/FLAIR hyperintensity extending to the posterior body of the corpus callosum appears stable, likely vasogenic edema. No evidence of shift of midline structures.     He underwent stereotactic needle biopsy and laser interstitial thermal therapy by Dr. Tobar (4/7/2023). Dr. Tobar notes that he opted for needle biopsy followed by HIRA, in lieu of open surgical resection, due to concern for risk of resulting hand paralysis, as well as damage to descending motor fibers of arm and leg. Following completion of the procedure, he notes in his operative report that, as per T1 post-contrast MRI, he achieved a gross total ablation.    Surgical Pathology ([facility], 4/7/2023):  A, B) Brain, left, tumor (excision):  Glioblastoma, IDH pending, CNS WHO grade 4.   Immunopositive for GFAP and p53  The Ki-67: 80-90%    MRI Brain ([facility], 4/8/2023):  1. Interval biopsy and posttreatment changes involving the intra-axial mass epicenter in the left posterior frontal centrum semiovale. The solid enhancing portions within the mass have decreased, T2 bright signal surrounding the mass has slightly increased and associated local regional mass effect has slightly increased. Correlation with MRI/MRCP as clinically warranted.   2. 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   3. Gallstones/sludge.     4/12/2023: He saw Dr. Bolaños ([facility], [facility]). They discussed TMZ with radiation for GBM and observation for IPMN pancreatic tail.   4/18/2023: saw [facility] .   4/25/2023: Underwent craniotomy for resection of L sided brain tumor with Dr. Johnston        Allergies    No Known Allergies    Intolerances        MEDICATIONS  (STANDING):  amLODIPine   Tablet 10 milliGRAM(s) Oral daily  chlorhexidine 2% Cloths 1 Application(s) Topical daily  dexAMETHasone  Injectable 4 milliGRAM(s) IV Push every 6 hours  insulin lispro (ADMELOG) corrective regimen sliding scale   SubCutaneous every 6 hours  levETIRAcetam  IVPB 750 milliGRAM(s) IV Intermittent every 12 hours  pantoprazole  Injectable 40 milliGRAM(s) IV Push daily  senna 2 Tablet(s) Oral at bedtime    MEDICATIONS  (PRN):  hydrALAZINE Injectable 10 milliGRAM(s) IV Push every 2 hours PRN SBP > 140  ondansetron Injectable 4 milliGRAM(s) IV Push every 6 hours PRN Nausea and/or Vomiting      PAST MEDICAL & SURGICAL HISTORY:  Hypertension      Osteoarthritis      Brain mass      S/P total knee replacement, right          FAMILY HISTORY:      SOCIAL HISTORY: No EtOH, no tobacco    REVIEW OF SYSTEMS:    CONSTITUTIONAL: No weakness, fevers or chills  EYES/ENT: No visual changes;  No vertigo or throat pain   NECK: No pain or stiffness  RESPIRATORY: No cough, wheezing, hemoptysis; No shortness of breath  CARDIOVASCULAR: No chest pain or palpitations  GASTROINTESTINAL: No abdominal or epigastric pain. No nausea, vomiting, or hematemesis; No diarrhea or constipation. No melena or hematochezia.  GENITOURINARY: No dysuria, frequency or hematuria  NEUROLOGICAL: No numbness or weakness  SKIN: No itching, burning, rashes, or lesions   All other review of systems is negative unless indicated above.        T(F): 98.3 (04-26-23 @ 14:48), Max: 98.9 (04-26-23 @ 05:36)  HR: 85 (04-26-23 @ 16:00)  BP: 140/77 (04-26-23 @ 10:00)  RR: 18 (04-26-23 @ 16:00)  SpO2: 98% (04-26-23 @ 16:00)  Wt(kg): --    GENERAL: NAD, well-developed  HEAD:  Atraumatic, Normocephalic, +surgical incision with staples c/d/i  EYES: EOMI, PERRLA, conjunctiva and sclera clear, disconjugate gaze  NECK: Supple, No JVD  CHEST/LUNG: Clear to auscultation bilaterally; No wheeze  HEART: Regular rate and rhythm; No murmurs, rubs, or gallops  ABDOMEN: Soft, Nontender, Nondistended; Bowel sounds present  EXTREMITIES:  2+ Peripheral Pulses, No clubbing, cyanosis, or edema  NEUROLOGY: AAOx3; R UE and LE weakness, R facial droop  SKIN: No rashes or lesions                          11.9   11.98 )-----------( 314      ( 26 Apr 2023 04:29 )             36.9       04-26    138  |  105  |  16  ----------------------------<  149<H>  4.3   |  24  |  0.76    Ca    7.9<L>      26 Apr 2023 15:59  Phos  4.3     04-26  Mg     1.8     04-26    TPro  5.9<L>  /  Alb  3.4  /  TBili  0.5  /  DBili  x   /  AST  14  /  ALT  34  /  AlkPhos  86  04-25      Magnesium, Serum: 1.8 mg/dL (04-26 @ 04:29)  Phosphorus Level, Serum: 4.3 mg/dL (04-26 @ 04:29)

## 2023-04-26 NOTE — DIETITIAN INITIAL EVALUATION ADULT - DIET TYPE
Recommend SLP evaluation prior to PO intake to assess for safest, least restrictive texture modification/consistent carbohydrate (evening snack)

## 2023-04-26 NOTE — SWALLOW BEDSIDE ASSESSMENT ADULT - PHARYNGEAL PHASE
Hyolaryngeal excursion palpated during swallow trigger & appears brisk & timely. No cough, throat clear or change in voice after PO trials.

## 2023-04-26 NOTE — SWALLOW BEDSIDE ASSESSMENT ADULT - NS SPL SWALLOW CLINIC TRIAL FT
Pt demonstrates difficulty with hand-to-mouth coordination. would benefit from assistance at beginning of meals and/or f/u with OT for feeding training.

## 2023-04-26 NOTE — PROGRESS NOTE ADULT - ASSESSMENT
***********************************************  ASSESSMENT AND PLAN  ***********************************************  S/P L. crani fr resection of tumor (frozen: HGG), POD1   History of OA, HTN    NEURO:  Q1h neuro checks / Q1h vital signs  Analgesia prn   Continue decadron, continue keppra 750mg bid   Will need post op MRI  Activity: PT/OT    PULM:  SpO2 goal > 92%, supplemental O2 and pulm toileting as needed    CARDIO:  BP goal < 140, cardene, holding norvasc    GI:  Bowel regimen, stool count  LBM:  Diet: NPO, pending swallow  PPI    /RENAL: Pseudohyponatremia  Monitor UOP/volume status, BUN/SCr  Corrected Na ~ 135 monitor    HEME:  Maintain Hb > 7.0, PLT > 100,000  Chemoppx    MISC:    SOCIAL/FAMILY:  [] awaiting [] updated at bedside [] family meeting    CODE STATUS:  [] Full Code [] DNR [] DNI [] Palliative/Comfort Care    DISPOSITION:  [] ICU [] Stroke Unit [] Floor [] EMU [] RCU [] PCU    [] Patient is at high risk of neurologic deterioration/death due to:     Time spent: ___ [] critical care minutes          ID - monitor for infectious s/s, fever curve, leukocytosis, periop ancef  ENDO - SGlu goal < 200 =============================  ASSESSMENT AND PLAN  =============================    S/P L. crani for resection of tumor (frozen: HGG), POD 1   History of OA, HTN    NEURO:  Q4h neurochecks   Analgesia prn   Continue decadron, continue Keppra 750mg bid   Will need post op MRI  Activity: PT/OT    PULM:  SpO2 goal > 92%, supplemental O2 and pulm toileting as needed    CARDIO:  BP goal 100-40  Cardene weaned off  Continue Norvasc  Hydralazine prn SBP >140    GI:  Bowel regimen, stool count  LBM: 4/25  Diet: NPO, pending swallow  Continue PPI    /RENAL: Mild pseudohyponatremia  Monitor UOP/volume status, BUN/SCr  Corrected Na ~ 134 monitor.   Shields DCed.    HEME:  Maintain Hb > 7.0, PLT > 100,000  Chemoppx  Check LE dopplers as high risk    ID: Monitor for infectious s/s, fever curve, leukocytosis, periop ancef    ENDO: Glu goal 140-180mg/dL    MISC:    SOCIAL/FAMILY:  [x] awaiting [] updated at bedside [] family meeting    CODE STATUS:  [x] Full Code [] DNR [] DNI [] Palliative/Comfort Care    DISPOSITION:  [x] ICU [] Stroke Unit [] Floor [] EMU [] RCU [] PCU

## 2023-04-26 NOTE — PROGRESS NOTE ADULT - SUBJECTIVE AND OBJECTIVE BOX
==========================  ADULT NSICU PROGRESS NOTE  ==========================    HPI: Patient is a 66 y/o M with history of OA, HTN, prior brain biopsy (4/7/23 @[facility]) with R. sided weakness admitted to NSICU s/p L. crani fr resection of tumor (frozen: [facility])              ICU Vital Signs Last 24 Hrs  T(C): 37.2 (26 Apr 2023 05:36), Max: 37.2 (26 Apr 2023 05:36)  T(F): 98.9 (26 Apr 2023 05:36), Max: 98.9 (26 Apr 2023 05:36)  HR: 71 (26 Apr 2023 07:00) (71 - 104)  BP: 118/60 (26 Apr 2023 07:00) (112/60 - 150/74)  BP(mean): 83 (26 Apr 2023 07:00) (80 - 106)  ABP: 111/46 (26 Apr 2023 07:00) (105/44 - 165/71)  ABP(mean): 66 (26 Apr 2023 07:00) (63 - 103)  RR: 16 (26 Apr 2023 07:00) (14 - 26)  SpO2: 96% (26 Apr 2023 07:00) (95% - 100%)      04-25-23 @ 07:01 - 04-26-23 @ 07:00  --------------------------------------------------------  IN: 1352.5 mL / OUT: 1545 mL / NET: -192.5 mL    04-26-23 @ 07:01  - 04-26-23 @ 08:26  --------------------------------------------------------  IN: 110 mL / OUT: 0 mL / NET: 110 mL      EXAM:     [place] Coma Scale: 15    General: normocephalic, atraumatic, laying in bed, in no distress  Neuro     MS: A/Ox3, cooperative, normal attention, no neglect, comprehension intact, speech with preserved fluency, naming, and repetition    CN: PERRL, VF FTC,(+)exotropia, sensation intact to crude touch V1-V3, (+)R. facial weakness, hearing grossly intact    Mot: bulk normal, tone normal, power UPPER 0/5, LOWER 1/5    Sens: intact to crude touch in bilateral upper and lower extremities    Reflexes: deferred  Chest: nonlabored respirations, no adventitious lung sounds bilaterally, heart regular rate/rhythm, present S1/S2, no murmurs or rubs  Abdomen: nondistended, soft and nontender without peritoneal signs, normoactive bowel sounds  Extremities: no clubbing, well-perfused, no edema      MEDICATIONS:   acetaminophen   IVPB .. 1000 milliGRAM(s) IV Intermittent every 8 hours  amLODIPine   Tablet 10 milliGRAM(s) Oral daily  chlorhexidine 2% Cloths 1 Application(s) Topical daily  dexAMETHasone  Injectable 4 milliGRAM(s) IV Push every 6 hours  insulin lispro (ADMELOG) corrective regimen sliding scale   SubCutaneous every 6 hours  levETIRAcetam  IVPB 750 milliGRAM(s) IV Intermittent every 12 hours  niCARdipine Infusion 5 mG/Hr (25 mL/Hr) IV Continuous <Continuous>  ondansetron Injectable 4 milliGRAM(s) IV Push every 6 hours PRN  pantoprazole  Injectable 40 milliGRAM(s) IV Push daily  senna 2 Tablet(s) Oral at bedtime  sodium chloride 0.9%. 1000 milliLiter(s) (75 mL/Hr) IV Continuous <Continuous>    LABS:                      11.9   11.98 )-----------( 314      ( 26 Apr 2023 04:29 )             36.9     04-26    133<L>  |  100  |  12  ----------------------------<  188<H>  4.2   |  24  |  0.85    Ca    8.6      26 Apr 2023 04:29  Phos  4.3     04-26  Mg     1.8     04-26    TPro  5.9<L>  /  Alb  3.4  /  TBili  0.5  /  DBili  x   /  AST  14  /  ALT  34  /  AlkPhos  86  04-25    LIVER FUNCTIONS - ( 25 Apr 2023 17:51 )  Alb: 3.4 g/dL / Pro: 5.9 g/dL / ALK PHOS: 86 U/L / ALT: 34 U/L / AST: 14 U/L / GGT: x                                      ==========================  ADULT NSICU PROGRESS NOTE  ==========================    HPI: Patient is a 66 y/o M with history of OA, HTN, prior brain biopsy (4/7/23 @[facility]) with R. sided weakness admitted to NSICU s/p L. crani fr resection of tumor (frozen: [facility])    ICU Vital Signs Last 24 Hrs  T(C): 37.2 (26 Apr 2023 05:36), Max: 37.2 (26 Apr 2023 05:36)  T(F): 98.9 (26 Apr 2023 05:36), Max: 98.9 (26 Apr 2023 05:36)  HR: 71 (26 Apr 2023 07:00) (71 - 104)  BP: 118/60 (26 Apr 2023 07:00) (112/60 - 150/74)  BP(mean): 83 (26 Apr 2023 07:00) (80 - 106)  ABP: 111/46 (26 Apr 2023 07:00) (105/44 - 165/71)  ABP(mean): 66 (26 Apr 2023 07:00) (63 - 103)  RR: 16 (26 Apr 2023 07:00) (14 - 26)  SpO2: 96% (26 Apr 2023 07:00) (95% - 100%)      04-25-23 @ 07:01 - 04-26-23 @ 07:00  --------------------------------------------------------  IN: 1352.5 mL / OUT: 1545 mL / NET: -192.5 mL    04-26-23 @ 07:01  - 04-26-23 @ 08:26  --------------------------------------------------------  IN: 110 mL / OUT: 0 mL / NET: 110 mL      EXAM:     [place] Coma Scale: 15    General: Normocephalic, atraumatic, laying in bed, in no distress  Neuro     MS: A/Ox3, cooperative, normal attention, no neglect, comprehension intact, speech with preserved fluency, naming, and repetition    CN: PERRL, VF FTC,(+)exotropia, sensation intact to crude touch V1-V3, (+)R. facial weakness, hearing grossly intact    Mot: Power RUE            RLE:            LUE:            LLE:    Sens: intact to crude touch in bilateral upper and lower extremities  Chest: nonlabored respirations, no adventitious lung sounds bilaterally, heart regular rate/rhythm, present S1/S2, no murmurs or rubs  Abdomen: nondistended, soft and nontender without peritoneal signs, normoactive bowel sounds  Extremities: no clubbing, well-perfused, no edema      MEDICATIONS:   acetaminophen   IVPB .. 1000 milliGRAM(s) IV Intermittent every 8 hours  amLODIPine   Tablet 10 milliGRAM(s) Oral daily  chlorhexidine 2% Cloths 1 Application(s) Topical daily  dexAMETHasone  Injectable 4 milliGRAM(s) IV Push every 6 hours  insulin lispro (ADMELOG) corrective regimen sliding scale   SubCutaneous every 6 hours  levETIRAcetam  IVPB 750 milliGRAM(s) IV Intermittent every 12 hours  niCARdipine Infusion 5 mG/Hr (25 mL/Hr) IV Continuous <Continuous>  ondansetron Injectable 4 milliGRAM(s) IV Push every 6 hours PRN  pantoprazole  Injectable 40 milliGRAM(s) IV Push daily  senna 2 Tablet(s) Oral at bedtime  sodium chloride 0.9%. 1000 milliLiter(s) (75 mL/Hr) IV Continuous <Continuous>    LABS:                      11.9   11.98 )-----------( 314      ( 26 Apr 2023 04:29 )             36.9     04-26    133<L>  |  100  |  12  ----------------------------<  188<H>  4.2   |  24  |  0.85    Ca    8.6      26 Apr 2023 04:29  Phos  4.3     04-26  Mg     1.8     04-26    TPro  5.9<L>  /  Alb  3.4  /  TBili  0.5  /  DBili  x   /  AST  14  /  ALT  34  /  AlkPhos  86  04-25    LIVER FUNCTIONS - ( 25 Apr 2023 17:51 )  Alb: 3.4 g/dL / Pro: 5.9 g/dL / ALK PHOS: 86 U/L / ALT: 34 U/L / AST: 14 U/L / GGT: x                                      ==========================  ADULT NSICU PROGRESS NOTE  ==========================    HPI: Patient is a 66 y/o M with history of OA, HTN, prior brain biopsy (4/7/23 @[facility]) with R. sided weakness admitted to NSICU s/p L. crani fr resection of tumor (frozen: [facility])    ICU Vital Signs Last 24 Hrs  T(C): 37.2 (26 Apr 2023 05:36), Max: 37.2 (26 Apr 2023 05:36)  T(F): 98.9 (26 Apr 2023 05:36), Max: 98.9 (26 Apr 2023 05:36)  HR: 71 (26 Apr 2023 07:00) (71 - 104)  BP: 118/60 (26 Apr 2023 07:00) (112/60 - 150/74)  BP(mean): 83 (26 Apr 2023 07:00) (80 - 106)  ABP: 111/46 (26 Apr 2023 07:00) (105/44 - 165/71)  ABP(mean): 66 (26 Apr 2023 07:00) (63 - 103)  RR: 16 (26 Apr 2023 07:00) (14 - 26)  SpO2: 96% (26 Apr 2023 07:00) (95% - 100%)      04-25-23 @ 07:01 - 04-26-23 @ 07:00  --------------------------------------------------------  IN: 1352.5 mL / OUT: 1545 mL / NET: -192.5 mL    04-26-23 @ 07:01  - 04-26-23 @ 08:26  --------------------------------------------------------  IN: 110 mL / OUT: 0 mL / NET: 110 mL      EXAM:     [place] Coma Scale: 15    General: Normocephalic, atraumatic, laying in bed, in no distress  Neuro     MS: A/Ox3, cooperative, normal attention, no neglect, comprehension intact, speech with preserved fluency, naming, and repetition    CN: PERRL, VF FTC,(+)exotropia, sensation intact to crude touch V1-V3, (+)R. facial weakness, hearing grossly intact    Mot: Power RUE and RLE withdraws. LUE, LLE: 5/5    Sens: intact to crude touch in bilateral upper and lower extremities  Chest: nonlabored respirations, no adventitious lung sounds bilaterally, heart regular rate/rhythm, present S1/S2, no murmurs or rubs  Abdomen: nondistended, soft and nontender without peritoneal signs, normoactive bowel sounds  Extremities: no clubbing, well-perfused, no edema      MEDICATIONS:   acetaminophen   IVPB .. 1000 milliGRAM(s) IV Intermittent every 8 hours  amLODIPine   Tablet 10 milliGRAM(s) Oral daily  chlorhexidine 2% Cloths 1 Application(s) Topical daily  dexAMETHasone  Injectable 4 milliGRAM(s) IV Push every 6 hours  insulin lispro (ADMELOG) corrective regimen sliding scale   SubCutaneous every 6 hours  levETIRAcetam  IVPB 750 milliGRAM(s) IV Intermittent every 12 hours  niCARdipine Infusion 5 mG/Hr (25 mL/Hr) IV Continuous <Continuous>  ondansetron Injectable 4 milliGRAM(s) IV Push every 6 hours PRN  pantoprazole  Injectable 40 milliGRAM(s) IV Push daily  senna 2 Tablet(s) Oral at bedtime  sodium chloride 0.9%. 1000 milliLiter(s) (75 mL/Hr) IV Continuous <Continuous>    LABS:                      11.9   11.98 )-----------( 314      ( 26 Apr 2023 04:29 )             36.9     04-26    133<L>  |  100  |  12  ----------------------------<  188<H>  4.2   |  24  |  0.85    Ca    8.6      26 Apr 2023 04:29  Phos  4.3     04-26  Mg     1.8     04-26    TPro  5.9<L>  /  Alb  3.4  /  TBili  0.5  /  DBili  x   /  AST  14  /  ALT  34  /  AlkPhos  86  04-25    LIVER FUNCTIONS - ( 25 Apr 2023 17:51 )  Alb: 3.4 g/dL / Pro: 5.9 g/dL / ALK PHOS: 86 U/L / ALT: 34 U/L / AST: 14 U/L / GGT: x

## 2023-04-26 NOTE — SWALLOW BEDSIDE ASSESSMENT ADULT - SWALLOW EVAL: DIAGNOSIS
Fnxl oropharyngeal swallow for regular solids & thin liquids. Mild right-sided weakness does not impede ability to accept & prepare bolus.

## 2023-04-26 NOTE — PROGRESS NOTE ADULT - SUBJECTIVE AND OBJECTIVE BOX
***********************************************  ADULT NSICU PROGRESS NOTE  KAREEM GANNON [id number] [facility] [id number]  ***********************************************    INTERVAL:    ROS: negative except per mentioned above in 24h interval events.      HOSPITAL COURSE CARRIED FORWARD:    VITALS:    ICU Vital Signs Last 24 Hrs  T(C): 36.4 (25 Apr 2023 17:25), Max: 36.8 (24 Apr 2023 20:53)  T(F): 97.5 (25 Apr 2023 17:25), Max: 98.3 (25 Apr 2023 05:49)  HR: 95 (25 Apr 2023 19:30) (82 - 104)  BP: 115/60 (25 Apr 2023 19:30) (115/60 - 150/74)  BP(mean): 80 (25 Apr 2023 19:30) (80 - 106)  ABP: 111/50 (25 Apr 2023 19:30) (111/50 - 165/71)  ABP(mean): 69 (25 Apr 2023 19:30) (69 - 103)  RR: 14 (25 Apr 2023 19:30) (14 - 22)  SpO2: 95% (25 Apr 2023 19:30) (95% - 100%)    O2 Parameters below as of 25 Apr 2023 19:30  Patient On (Oxygen Delivery Method): room air                I&O's Summary    24 Apr 2023 07:01  -  25 Apr 2023 07:00  --------------------------------------------------------  IN: 0 mL / OUT: 900 mL / NET: -900 mL    25 Apr 2023 07:01  -  25 Apr 2023 19:58  --------------------------------------------------------  IN: 362.5 mL / OUT: 500 mL / NET: -137.5 mL        EXAM:     [place] Coma Scale: 15    General: normocephalic, atraumatic, laying in bed, in no distress  Neuro     MS: A/Ox3, cooperative, normal attention, no neglect, comprehension intact, speech with preserved fluency, naming, and repetition    CN: PERRL, VF FTC,(+)exotropia, sensation intact to crude touch V1-V3, (+)R. facial weakness, hearing grossly intact    Mot: bulk normal, tone normal, power UPPER 0/5, LOWER 1/5    Sens: intact to crude touch in bilateral upper and lower extremities    Reflexes: deferred  Chest: nonlabored respirations, no adventitious lung sounds bilaterally, heart regular rate/rhythm, present S1/S2, no murmurs or rubs  Abdomen: nondistended, soft and nontender without peritoneal signs, normoactive bowel sounds  Extremities: no clubbing, well-perfused, no edema    LABORATORY DATA:                            13.4   7.26  )-----------( 327      ( 25 Apr 2023 17:51 )             41.1     04-25    133<L>  |  99  |  11  ----------------------------<  192<H>  4.2   |  19<L>  |  0.84    Ca    9.0      25 Apr 2023 17:51  Phos  3.1     04-25  Mg     2.1     04-25    TPro  5.9<L>  /  Alb  3.4  /  TBili  0.5  /  DBili  x   /  AST  14  /  ALT  34  /  AlkPhos  86  04-25    LIVER FUNCTIONS - ( 25 Apr 2023 17:51 )  Alb: 3.4 g/dL / Pro: 5.9 g/dL / ALK PHOS: 86 U/L / ALT: 34 U/L / AST: 14 U/L / GGT: x           PT/INR - ( 25 Apr 2023 17:51 )   PT: 12.0 sec;   INR: 1.01          PTT - ( 25 Apr 2023 17:51 )  PTT:29.2 sec    IMAGING DATA:    CARDIOLOGY DATA:    MICROBIOLOGY DATA:        MEDICATIONS  (STANDING):  acetaminophen   IVPB .. 1000 milliGRAM(s) IV Intermittent every 8 hours  ceFAZolin   IVPB 2000 milliGRAM(s) IV Intermittent every 8 hours  dexAMETHasone  Injectable 4 milliGRAM(s) IV Push every 6 hours  insulin lispro (ADMELOG) corrective regimen sliding scale   SubCutaneous three times a day before meals  levETIRAcetam  IVPB 750 milliGRAM(s) IV Intermittent every 12 hours  niCARdipine Infusion 5 mG/Hr (25 mL/Hr) IV Continuous <Continuous>  pantoprazole  Injectable 40 milliGRAM(s) IV Push daily  senna 2 Tablet(s) Oral at bedtime  sodium chloride 0.9%. 1000 milliLiter(s) (75 mL/Hr) IV Continuous <Continuous>    MEDICATIONS  (PRN):  ondansetron Injectable 4 milliGRAM(s) IV Push every 6 hours PRN Nausea and/or Vomiting      ***********************************************  ASSESSMENT AND PLAN  ***********************************************    S/p L. crani fr resection of tumor (frozen: [facility]), POD#0  History of OA, HTN    NEURO - admit NSICU, Q1h neuro checks / Q1h vital signs, pain control, PT/OT, dex, keppra 750/750, post op MRI  PULM - SpO2 goal > 92%, supplemental O2 and pulm toileting as needed  CARDIO - BP goal < 140, cardene, holding norvasc  GI - bowel regimen, stool count, diet: NPO, pending swallow, PPI  /RENAL - monitor UOP/volume status, BUN/SCr  HEME - maintain Hb > 7.0, PLT > 100,000, holding chemoppx  ID - monitor for infectious s/s, fever curve, leukocytosis, periop ancef  ENDO - SGlu goal < 200 ***********************************************  ADULT NSICU PROGRESS NOTE  KAREEM GANNON [id number] [facility] [id number]  ***********************************************    INTERVAL: no acute overnight events asof documentation time of this note.    ROS: negative except per mentioned above in 24h interval events.      VITALS:    ICU Vital Signs Last 24 Hrs  T(C): 36.4 (25 Apr 2023 17:25), Max: 36.8 (24 Apr 2023 20:53)  T(F): 97.5 (25 Apr 2023 17:25), Max: 98.3 (25 Apr 2023 05:49)  HR: 95 (25 Apr 2023 19:30) (82 - 104)  BP: 115/60 (25 Apr 2023 19:30) (115/60 - 150/74)  BP(mean): 80 (25 Apr 2023 19:30) (80 - 106)  ABP: 111/50 (25 Apr 2023 19:30) (111/50 - 165/71)  ABP(mean): 69 (25 Apr 2023 19:30) (69 - 103)  RR: 14 (25 Apr 2023 19:30) (14 - 22)  SpO2: 95% (25 Apr 2023 19:30) (95% - 100%)    O2 Parameters below as of 25 Apr 2023 19:30  Patient On (Oxygen Delivery Method): room air                I&O's Summary    24 Apr 2023 07:01  -  25 Apr 2023 07:00  --------------------------------------------------------  IN: 0 mL / OUT: 900 mL / NET: -900 mL    25 Apr 2023 07:01  -  25 Apr 2023 19:58  --------------------------------------------------------  IN: 362.5 mL / OUT: 500 mL / NET: -137.5 mL        EXAM:     [place] Coma Scale: 15    General: normocephalic, atraumatic, laying in bed, in no distress  Neuro     MS: A/Ox3, cooperative, normal attention, no neglect, comprehension intact, speech with preserved fluency, naming, and repetition    CN: PERRL, VF FTC,(+)exotropia, sensation intact to crude touch V1-V3, (+)R. facial weakness, hearing grossly intact    Mot: bulk normal, tone normal, power UPPER 0/5, LOWER 1/5    Sens: intact to crude touch in bilateral upper and lower extremities    Reflexes: deferred  Chest: nonlabored respirations, no adventitious lung sounds bilaterally, heart regular rate/rhythm, present S1/S2, no murmurs or rubs  Abdomen: nondistended, soft and nontender without peritoneal signs, normoactive bowel sounds  Extremities: no clubbing, well-perfused, no edema    LABORATORY DATA:                            11.9   11.98 )-----------( 314      ( 26 Apr 2023 04:29 )             36.9     04-26    138  |  105  |  16  ----------------------------<  149<H>  4.3   |  24  |  0.76    Ca    7.9<L>      26 Apr 2023 15:59  Phos  4.3     04-26  Mg     1.8     04-26    TPro  5.9<L>  /  Alb  3.4  /  TBili  0.5  /  DBili  x   /  AST  14  /  ALT  34  /  AlkPhos  86  04-25      MEDICATIONS  (STANDING):  amLODIPine   Tablet 10 milliGRAM(s) Oral daily  chlorhexidine 2% Cloths 1 Application(s) Topical daily  dexAMETHasone  Injectable 4 milliGRAM(s) IV Push every 6 hours  insulin lispro (ADMELOG) corrective regimen sliding scale   SubCutaneous every 6 hours  levETIRAcetam 750 milliGRAM(s) Oral two times a day  pantoprazole    Tablet 40 milliGRAM(s) Oral before breakfast  senna 2 Tablet(s) Oral at bedtime    MEDICATIONS  (PRN):  acetaminophen     Tablet .. 650 milliGRAM(s) Oral every 6 hours PRN Mild Pain (1 - 3)  hydrALAZINE Injectable 10 milliGRAM(s) IV Push every 2 hours PRN SBP > 140  ondansetron Injectable 4 milliGRAM(s) IV Push every 6 hours PRN Nausea and/or Vomiting      ***********************************************  ASSESSMENT AND PLAN  ***********************************************    S/p L. crani fr resection of tumor (frozen: [facility]), POD#1  History of OA, HTN    Please refer to day note authored by Dr. Espinoza for full assessment and plan.

## 2023-04-26 NOTE — DIETITIAN INITIAL EVALUATION ADULT - PERTINENT MEDS FT
MEDICATIONS  (STANDING):  acetaminophen   IVPB .. 1000 milliGRAM(s) IV Intermittent once  amLODIPine   Tablet 10 milliGRAM(s) Oral daily  chlorhexidine 2% Cloths 1 Application(s) Topical daily  dexAMETHasone  Injectable 4 milliGRAM(s) IV Push every 6 hours  insulin lispro (ADMELOG) corrective regimen sliding scale   SubCutaneous every 6 hours  levETIRAcetam  IVPB 750 milliGRAM(s) IV Intermittent every 12 hours  niCARdipine Infusion 5 mG/Hr (25 mL/Hr) IV Continuous <Continuous>  pantoprazole  Injectable 40 milliGRAM(s) IV Push daily  senna 2 Tablet(s) Oral at bedtime  sodium chloride 0.9%. 1000 milliLiter(s) (75 mL/Hr) IV Continuous <Continuous>    MEDICATIONS  (PRN):  hydrALAZINE Injectable 10 milliGRAM(s) IV Push every 2 hours PRN SBP > 140  ondansetron Injectable 4 milliGRAM(s) IV Push every 6 hours PRN Nausea and/or Vomiting

## 2023-04-26 NOTE — DIETITIAN INITIAL EVALUATION ADULT - PERTINENT LABORATORY DATA
04-26    133<L>  |  100  |  12  ----------------------------<  188<H>  4.2   |  24  |  0.85    Ca    8.6      26 Apr 2023 04:29  Phos  4.3     04-26  Mg     1.8     04-26    TPro  5.9<L>  /  Alb  3.4  /  TBili  0.5  /  DBili  x   /  AST  14  /  ALT  34  /  AlkPhos  86  04-25  POCT Blood Glucose.: 158 mg/dL (04-26-23 @ 05:34)  A1C with Estimated Average Glucose Result: 6.5 % (04-25-23 @ 05:30)  A1C with Estimated Average Glucose Result: 6.8 % (04-24-23 @ 21:22)

## 2023-04-27 LAB
ANION GAP SERPL CALC-SCNC: 5 MMOL/L — SIGNIFICANT CHANGE UP (ref 5–17)
BUN SERPL-MCNC: 18 MG/DL — SIGNIFICANT CHANGE UP (ref 7–23)
CALCIUM SERPL-MCNC: 8.5 MG/DL — SIGNIFICANT CHANGE UP (ref 8.4–10.5)
CHLORIDE SERPL-SCNC: 102 MMOL/L — SIGNIFICANT CHANGE UP (ref 96–108)
CO2 SERPL-SCNC: 27 MMOL/L — SIGNIFICANT CHANGE UP (ref 22–31)
CREAT SERPL-MCNC: 0.79 MG/DL — SIGNIFICANT CHANGE UP (ref 0.5–1.3)
EGFR: 98 ML/MIN/1.73M2 — SIGNIFICANT CHANGE UP
GLUCOSE BLDC GLUCOMTR-MCNC: 125 MG/DL — HIGH (ref 70–99)
GLUCOSE BLDC GLUCOMTR-MCNC: 152 MG/DL — HIGH (ref 70–99)
GLUCOSE BLDC GLUCOMTR-MCNC: 164 MG/DL — HIGH (ref 70–99)
GLUCOSE BLDC GLUCOMTR-MCNC: 180 MG/DL — HIGH (ref 70–99)
GLUCOSE BLDC GLUCOMTR-MCNC: 216 MG/DL — HIGH (ref 70–99)
GLUCOSE SERPL-MCNC: 142 MG/DL — HIGH (ref 70–99)
HCT VFR BLD CALC: 36.2 % — LOW (ref 39–50)
HGB BLD-MCNC: 11.9 G/DL — LOW (ref 13–17)
MAGNESIUM SERPL-MCNC: 2.1 MG/DL — SIGNIFICANT CHANGE UP (ref 1.6–2.6)
MCHC RBC-ENTMCNC: 28.1 PG — SIGNIFICANT CHANGE UP (ref 27–34)
MCHC RBC-ENTMCNC: 32.9 GM/DL — SIGNIFICANT CHANGE UP (ref 32–36)
MCV RBC AUTO: 85.6 FL — SIGNIFICANT CHANGE UP (ref 80–100)
NRBC # BLD: 0 /100 WBCS — SIGNIFICANT CHANGE UP (ref 0–0)
PHOSPHATE SERPL-MCNC: 3.1 MG/DL — SIGNIFICANT CHANGE UP (ref 2.5–4.5)
PLATELET # BLD AUTO: 290 K/UL — SIGNIFICANT CHANGE UP (ref 150–400)
POTASSIUM SERPL-MCNC: 4.4 MMOL/L — SIGNIFICANT CHANGE UP (ref 3.5–5.3)
POTASSIUM SERPL-SCNC: 4.4 MMOL/L — SIGNIFICANT CHANGE UP (ref 3.5–5.3)
RBC # BLD: 4.23 M/UL — SIGNIFICANT CHANGE UP (ref 4.2–5.8)
RBC # FLD: 15 % — HIGH (ref 10.3–14.5)
SODIUM SERPL-SCNC: 134 MMOL/L — LOW (ref 135–145)
WBC # BLD: 9.85 K/UL — SIGNIFICANT CHANGE UP (ref 3.8–10.5)
WBC # FLD AUTO: 9.85 K/UL — SIGNIFICANT CHANGE UP (ref 3.8–10.5)

## 2023-04-27 PROCEDURE — 99024 POSTOP FOLLOW-UP VISIT: CPT

## 2023-04-27 PROCEDURE — 99233 SBSQ HOSP IP/OBS HIGH 50: CPT

## 2023-04-27 RX ORDER — DEXAMETHASONE 0.5 MG/5ML
2 ELIXIR ORAL EVERY 6 HOURS
Refills: 0 | Status: DISCONTINUED | OUTPATIENT
Start: 2023-05-02 | End: 2023-05-02

## 2023-04-27 RX ORDER — DEXAMETHASONE 0.5 MG/5ML
ELIXIR ORAL
Refills: 0 | Status: DISCONTINUED | OUTPATIENT
Start: 2023-04-27 | End: 2023-05-02

## 2023-04-27 RX ORDER — DEXAMETHASONE 0.5 MG/5ML
4 ELIXIR ORAL EVERY 6 HOURS
Refills: 0 | Status: COMPLETED | OUTPATIENT
Start: 2023-04-27 | End: 2023-04-29

## 2023-04-27 RX ORDER — DEXAMETHASONE 0.5 MG/5ML
2 ELIXIR ORAL EVERY 12 HOURS
Refills: 0 | Status: CANCELLED | OUTPATIENT
Start: 2023-05-04 | End: 2023-05-02

## 2023-04-27 RX ORDER — DEXAMETHASONE 0.5 MG/5ML
4 ELIXIR ORAL EVERY 6 HOURS
Refills: 0 | Status: DISCONTINUED | OUTPATIENT
Start: 2023-04-27 | End: 2023-04-27

## 2023-04-27 RX ORDER — SODIUM CHLORIDE 9 MG/ML
2 INJECTION INTRAMUSCULAR; INTRAVENOUS; SUBCUTANEOUS EVERY 8 HOURS
Refills: 0 | Status: DISCONTINUED | OUTPATIENT
Start: 2023-04-27 | End: 2023-05-02

## 2023-04-27 RX ORDER — DEXAMETHASONE 0.5 MG/5ML
ELIXIR ORAL
Refills: 0 | Status: DISCONTINUED | OUTPATIENT
Start: 2023-04-27 | End: 2023-04-27

## 2023-04-27 RX ORDER — HYDRALAZINE HCL 50 MG
10 TABLET ORAL EVERY 4 HOURS
Refills: 0 | Status: DISCONTINUED | OUTPATIENT
Start: 2023-04-27 | End: 2023-05-02

## 2023-04-27 RX ORDER — AMLODIPINE BESYLATE 2.5 MG/1
10 TABLET ORAL EVERY 24 HOURS
Refills: 0 | Status: DISCONTINUED | OUTPATIENT
Start: 2023-04-28 | End: 2023-05-02

## 2023-04-27 RX ORDER — ENOXAPARIN SODIUM 100 MG/ML
40 INJECTION SUBCUTANEOUS EVERY 24 HOURS
Refills: 0 | Status: DISCONTINUED | OUTPATIENT
Start: 2023-04-27 | End: 2023-05-02

## 2023-04-27 RX ORDER — ENOXAPARIN SODIUM 100 MG/ML
40 INJECTION SUBCUTANEOUS EVERY 24 HOURS
Refills: 0 | Status: DISCONTINUED | OUTPATIENT
Start: 2023-04-27 | End: 2023-04-27

## 2023-04-27 RX ORDER — DEXAMETHASONE 0.5 MG/5ML
4 ELIXIR ORAL EVERY 8 HOURS
Refills: 0 | Status: COMPLETED | OUTPATIENT
Start: 2023-04-30 | End: 2023-05-01

## 2023-04-27 RX ORDER — DEXAMETHASONE 0.5 MG/5ML
4 ELIXIR ORAL
Refills: 0 | Status: DISCONTINUED | OUTPATIENT
Start: 2023-04-27 | End: 2023-04-27

## 2023-04-27 RX ORDER — DEXAMETHASONE 0.5 MG/5ML
2 ELIXIR ORAL EVERY 8 HOURS
Refills: 0 | Status: DISCONTINUED | OUTPATIENT
Start: 2023-05-03 | End: 2023-05-02

## 2023-04-27 RX ADMIN — SODIUM CHLORIDE 2 GRAM(S): 9 INJECTION INTRAMUSCULAR; INTRAVENOUS; SUBCUTANEOUS at 19:40

## 2023-04-27 RX ADMIN — Medication 2: at 17:49

## 2023-04-27 RX ADMIN — AMLODIPINE BESYLATE 10 MILLIGRAM(S): 2.5 TABLET ORAL at 05:32

## 2023-04-27 RX ADMIN — Medication 10 MILLIGRAM(S): at 09:09

## 2023-04-27 RX ADMIN — PANTOPRAZOLE SODIUM 40 MILLIGRAM(S): 20 TABLET, DELAYED RELEASE ORAL at 07:02

## 2023-04-27 RX ADMIN — Medication 4 MILLIGRAM(S): at 05:32

## 2023-04-27 RX ADMIN — ENOXAPARIN SODIUM 40 MILLIGRAM(S): 100 INJECTION SUBCUTANEOUS at 21:43

## 2023-04-27 RX ADMIN — Medication 4 MILLIGRAM(S): at 11:37

## 2023-04-27 RX ADMIN — Medication 2: at 23:21

## 2023-04-27 RX ADMIN — SODIUM CHLORIDE 2 GRAM(S): 9 INJECTION INTRAMUSCULAR; INTRAVENOUS; SUBCUTANEOUS at 09:32

## 2023-04-27 RX ADMIN — LEVETIRACETAM 750 MILLIGRAM(S): 250 TABLET, FILM COATED ORAL at 17:50

## 2023-04-27 RX ADMIN — Medication 4 MILLIGRAM(S): at 17:50

## 2023-04-27 RX ADMIN — SENNA PLUS 2 TABLET(S): 8.6 TABLET ORAL at 21:43

## 2023-04-27 RX ADMIN — LEVETIRACETAM 750 MILLIGRAM(S): 250 TABLET, FILM COATED ORAL at 05:33

## 2023-04-27 RX ADMIN — Medication 4: at 11:36

## 2023-04-27 RX ADMIN — CHLORHEXIDINE GLUCONATE 1 APPLICATION(S): 213 SOLUTION TOPICAL at 05:32

## 2023-04-27 NOTE — PROGRESS NOTE ADULT - ASSESSMENT
=============================  ASSESSMENT AND PLAN  =============================    S/P L. crani for resection of tumor (frozen: [facility]), POD 2  History of OA, HTN    NEURO:  Q4h neurochecks   Analgesia prn   Continue decadron, continue Keppra 750mg bid   Will need post op MRI  Activity: PT/OT    PULM:  SpO2 goal > 92%, supplemental O2 and pulm toileting as needed    CARDIO:  BP goal 100-40  Cardene weaned off  Continue Norvasc  Hydralazine prn SBP >140    GI:  Bowel regimen, stool count  LBM: 4/25  Diet: Consistent carb  Continue PPI    /RENAL: Mild pseudohyponatremia  Monitor UOP/volume status, BUN/SCr  Corrected Na ~ 134 monitor.   Shields DCed.    HEME: B/L IM calf DVTs  Maintain Hb > 7.0, PLT > 100,000  Chemoppx SQL, will need bid dosing  Monitor Anti Xa  Repeat LE dopplers in 4 days    ID: Monitor for infectious s/s, fever curve, leukocytosis, periop ancef    ENDO: Glu goal 140-180mg/dL    MISC:    SOCIAL/FAMILY:  [x] awaiting [] updated at bedside [] family meeting    CODE STATUS:  [x] Full Code [] DNR [] DNI [] Palliative/Comfort Care    DISPOSITION:  [] ICU [] Stroke Unit [x] Floor [] EMU [] RCU [] PCU             =============================  ASSESSMENT AND PLAN  =============================    S/P L. crani for resection of tumor (frozen: [facility]), POD 2  History of OA, HTN    NEURO:  Q4h neurochecks   Analgesia prn   Continue decadron, continue Keppra 750mg bid   Will need post op MRI  Activity: PT/OT    PULM:  SpO2 goal > 92%, supplemental O2 and pulm toileting as needed    CARDIO:  BP goal 100-40  Cardene weaned off  Continue Norvasc    GI:  Bowel regimen, stool count  LBM: 4/26  Diet: Consistent carb  Continue PPI    /RENAL: Mild hyponatremia  Monitor UOP/volume status, BUN/Cr  Na 134. Add salt tabs 2Q8 (Goal 135-145)  Shields DCed.    HEME: B/L IM calf DVTs  Maintain Hb > 7.0, PLT > 100,000  Chemoppx SQL, may need bid dosing  Monitor Anti Xa level  Repeat LE dopplers in 4 days  If propagation to deep/ above knee and contraindication to AC, will recommend IVC filter.    ID: Monitor for infectious s/s, fever curve, leukocytosis, periop ancef    ENDO: Glu goal 140-180mg/dL    MISC:    SOCIAL/FAMILY:  [x] awaiting [] updated at bedside [] family meeting    CODE STATUS:  [x] Full Code [] DNR [] DNI [] Palliative/Comfort Care    DISPOSITION:  [] ICU [] Stroke Unit [x] Floor [] EMU [] RCU [] PCU

## 2023-04-27 NOTE — PROGRESS NOTE ADULT - SUBJECTIVE AND OBJECTIVE BOX
==========================  ADULT NSICU PROGRESS NOTE  ==========================    HPI:  68 y/o male with h/o HTN, Osteoarthritis (s/p right total knee replacement 2 months ago), brain mass (s/p biopsy 4/7/23 @ [facility] with Dr. Tobar) presented for brain tumor resection. Per family, patient started to have right hand weakness about a month ago that got progressively worse, then went to ED where brain mass was diagnosed. Started keppra 750 mg BID for seizure prophylaxis. Described right arm weakness worsening after surgery and some right lower extremity weakness. States today, feels like his right lower extremity weakness is worsening. Family also reported patient just finished a taper of decadron. Denies headache, new weakness, numbness, tingling, chest pain, sob, nausea/vomiting, visual abnormalities.  (24 Apr 2023 18:02)  He is admitted to NSICU s/p L. crani fr resection of tumor (frozen: [facility])      On admission, the patient was:  GCS:  [place]:  modified Sigala:  ICH score:  NIHSS:    *** HIGH RISK OF DVT PRESENT ON ADMISSION ***    ICU Vital Signs Last 24 Hrs  T(C): 36.7 (27 Apr 2023 05:28), Max: 37.1 (26 Apr 2023 17:29)  T(F): 98 (27 Apr 2023 05:28), Max: 98.7 (26 Apr 2023 17:29)  HR: 58 (27 Apr 2023 06:00) (57 - 88)  BP: 146/81 (27 Apr 2023 06:00) (128/68 - 148/76)  BP(mean): 109 (27 Apr 2023 06:00) (92 - 109)  ABP: 129/73 (27 Apr 2023 06:00) (105/68 - 146/75)  ABP(mean): 93 (27 Apr 2023 06:00) (79 - 101)  RR: 16 (27 Apr 2023 06:00) (15 - 20)  SpO2: 96% (27 Apr 2023 06:00) (96% - 98%)      04-26-23 @ 07:01 - 04-27-23 @ 07:00  --------------------------------------------------------  IN: 1620 mL / OUT: 2400 mL / NET: -780 mL      EXAM:   [place] Coma Scale: 15    General: Normocephalic, atraumatic  Neuro     MS: A/Ox3, cooperative    CN: PERRL, VF FTC,(+)exotropia, sensation intact to crude touch V1-V3, (+)R. facial weakness, hearing grossly intact    Mot: Power RUE and RLE withdraws. LUE, LLE: 5/5    Sens: Intact  Chest: Clear  Heart: Regular rate/rhythm, present S1/S2, no murmurs or rubs  Abdomen: nondistended, soft and nontender without peritoneal signs, normoactive bowel sounds  Extremities: no clubbing, well-perfused, no edema  Dressing: C/D/I.      MEDICATIONS:  acetaminophen     Tablet .. 650 milliGRAM(s) Oral every 6 hours PRN  amLODIPine   Tablet 10 milliGRAM(s) Oral daily  chlorhexidine 2% Cloths 1 Application(s) Topical daily  dexAMETHasone  Injectable 4 milliGRAM(s) IV Push every 6 hours  enoxaparin Injectable 40 milliGRAM(s) SubCutaneous every 24 hours  hydrALAZINE Injectable 10 milliGRAM(s) IV Push every 2 hours PRN  insulin lispro (ADMELOG) corrective regimen sliding scale   SubCutaneous every 6 hours  levETIRAcetam 750 milliGRAM(s) Oral two times a day  ondansetron Injectable 4 milliGRAM(s) IV Push every 6 hours PRN  pantoprazole    Tablet 40 milliGRAM(s) Oral before breakfast  senna 2 Tablet(s) Oral at bedtime                          11.9   9.85  )-----------( 290      ( 27 Apr 2023 05:09 )             36.2     04-27    134<L>  |  102  |  18  ----------------------------<  142<H>  4.4   |  27  |  0.79    Ca    8.5      27 Apr 2023 05:09  Phos  3.1     04-27  Mg     2.1     04-27    TPro  5.9<L>  /  Alb  3.4  /  TBili  0.5  /  DBili  x   /  AST  14  /  ALT  34  /  AlkPhos  86  04-25    LIVER FUNCTIONS - ( 25 Apr 2023 17:51 )  Alb: 3.4 g/dL / Pro: 5.9 g/dL / ALK PHOS: 86 U/L / ALT: 34 U/L / AST: 14 U/L / GGT: x                                ==========================  ADULT NSICU PROGRESS NOTE  ==========================    HPI:  68 y/o male with h/o HTN, Osteoarthritis (s/p right total knee replacement 2 months ago), brain mass (s/p biopsy 4/7/23 @ [facility] with Dr. Tobar) presented for brain tumor resection. Per family, patient started to have right hand weakness about a month ago that got progressively worse, then went to ED where brain mass was diagnosed. Started keppra 750 mg BID for seizure prophylaxis. Described right arm weakness worsening after surgery and some right lower extremity weakness. States today, feels like his right lower extremity weakness is worsening. Family also reported patient just finished a taper of decadron. Denies headache, new weakness, numbness, tingling, chest pain, sob, nausea/vomiting, visual abnormalities.  (24 Apr 2023 18:02)  He is admitted to NSICU s/p L. crani fr resection of tumor (frozen: [facility])    On admission, the patient was:  GCS: 15  [place]:  modified Sigala:  ICH score:  NIHSS:    *** HIGH RISK OF DVT PRESENT ON ADMISSION ***    ICU Vital Signs Last 24 Hrs  T(C): 36.7 (27 Apr 2023 05:28), Max: 37.1 (26 Apr 2023 17:29)  T(F): 98 (27 Apr 2023 05:28), Max: 98.7 (26 Apr 2023 17:29)  HR: 58 (27 Apr 2023 06:00) (57 - 88)  BP: 146/81 (27 Apr 2023 06:00) (128/68 - 148/76)  BP(mean): 109 (27 Apr 2023 06:00) (92 - 109)  ABP: 129/73 (27 Apr 2023 06:00) (105/68 - 146/75)  ABP(mean): 93 (27 Apr 2023 06:00) (79 - 101)  RR: 16 (27 Apr 2023 06:00) (15 - 20)  SpO2: 96% (27 Apr 2023 06:00) (96% - 98%)      04-26-23 @ 07:01  -  04-27-23 @ 07:00  --------------------------------------------------------  IN: 1620 mL / OUT: 2400 mL / NET: -780 mL      EXAM:   [place] Coma Scale: 15    General: Normocephalic, atraumatic  Neuro     MS: A/Ox3, cooperative    CN: PERRL, VF FTC,(+)exotropia, sensation intact to crude touch V1-V3, (+)R. facial weakness, hearing grossly intact    Mot: Power RUE and RLE withdraws. LUE, LLE: 5/5    Sens: Intact  Chest: Clear  Heart: Regular rate/rhythm, present S1/S2, no murmurs or rubs  Abdomen: nondistended, soft and nontender without peritoneal signs, normoactive bowel sounds  Extremities: no clubbing, well-perfused, no edema  Dressing: C/D/I.      MEDICATIONS:  acetaminophen     Tablet .. 650 milliGRAM(s) Oral every 6 hours PRN  amLODIPine   Tablet 10 milliGRAM(s) Oral daily  chlorhexidine 2% Cloths 1 Application(s) Topical daily  dexAMETHasone  Injectable 4 milliGRAM(s) IV Push every 6 hours  enoxaparin Injectable 40 milliGRAM(s) SubCutaneous every 24 hours  hydrALAZINE Injectable 10 milliGRAM(s) IV Push every 2 hours PRN  insulin lispro (ADMELOG) corrective regimen sliding scale   SubCutaneous every 6 hours  levETIRAcetam 750 milliGRAM(s) Oral two times a day  ondansetron Injectable 4 milliGRAM(s) IV Push every 6 hours PRN  pantoprazole    Tablet 40 milliGRAM(s) Oral before breakfast  senna 2 Tablet(s) Oral at bedtime    LABS:                    11.9   9.85  )-----------( 290      ( 27 Apr 2023 05:09 )             36.2     04-27    134<L>  |  102  |  18  ----------------------------<  142<H>  4.4   |  27  |  0.79    Ca    8.5      27 Apr 2023 05:09  Phos  3.1     04-27  Mg     2.1     04-27    TPro  5.9<L>  /  Alb  3.4  /  TBili  0.5  /  DBili  x   /  AST  14  /  ALT  34  /  AlkPhos  86  04-25    LIVER FUNCTIONS - ( 25 Apr 2023 17:51 )  Alb: 3.4 g/dL / Pro: 5.9 g/dL / ALK PHOS: 86 U/L / ALT: 34 U/L / AST: 14 U/L / GGT: x

## 2023-04-27 NOTE — PROGRESS NOTE ADULT - SUBJECTIVE AND OBJECTIVE BOX
S/Overnight events:  KYA overnight. Patient resting comfortably.       Hospital Course:   4/24: Pre-op L crani tumor resection   4/25: preop L crani tumor resection today. POD 0 s/p L crani for tumor resection, frozen HGG. Given dilaudid 0.25mg for pain control. Failed dysphagia, made NPO, S&S evaluation in AM. PO meds converted to IV. Postop Na 133. S/p 150cc 3% bolus.   4/26: POD 1 L crani HGG. KYA o/n. 4PM BMP to f/u sodium, cardene gtt off, passed S+S eval, LE dopplers re-ordered because high risk 2/2 plegia on R-side showing b/l IM calf DVT, CTH today stable, afternoon BMP Na 138, Pend SDU tomorrow.   4/27: POD2 L crani HGG. KYA overnight.       Vital Signs Last 24 Hrs  T(C): 36.9 (26 Apr 2023 21:28), Max: 37.2 (26 Apr 2023 05:36)  T(F): 98.5 (26 Apr 2023 21:28), Max: 98.9 (26 Apr 2023 05:36)  HR: 57 (27 Apr 2023 00:00) (57 - 89)  BP: 144/67 (26 Apr 2023 22:00) (118/60 - 144/67)  BP(mean): 97 (26 Apr 2023 22:00) (83 - 103)  RR: 16 (27 Apr 2023 00:00) (15 - 20)  SpO2: 97% (27 Apr 2023 00:00) (96% - 99%)    Parameters below as of 27 Apr 2023 00:00  Patient On (Oxygen Delivery Method): room air        I&O's Detail    25 Apr 2023 07:01  -  26 Apr 2023 07:00  --------------------------------------------------------  IN:    IV PiggyBack: 75 mL    NiCARdipine: 227.5 mL    sodium chloride 0.9%: 1050 mL  Total IN: 1352.5 mL    OUT:    Indwelling Catheter - Urethral (mL): 1545 mL  Total OUT: 1545 mL    Total NET: -192.5 mL      26 Apr 2023 07:01  -  27 Apr 2023 01:09  --------------------------------------------------------  IN:    IV PiggyBack: 125 mL    NiCARdipine: 10 mL    Oral Fluid: 360 mL    sodium chloride 0.9%: 675 mL  Total IN: 1170 mL    OUT:    Voided (mL): 1050 mL  Total OUT: 1050 mL    Total NET: 120 mL        I&O's Summary    25 Apr 2023 07:01  -  26 Apr 2023 07:00  --------------------------------------------------------  IN: 1352.5 mL / OUT: 1545 mL / NET: -192.5 mL    26 Apr 2023 07:01  -  27 Apr 2023 01:09  --------------------------------------------------------  IN: 1170 mL / OUT: 1050 mL / NET: 120 mL    PHYSICAL EXAM:  Constitutional: awake, alert, sitting up in bed. NAD.   Respiratory: non-labored breathing. Normal chest rise.   Cardiovascular: Regular rate and rhythm  Gastrointestinal:  Soft, nontender, nondistended.  .  Vascular: Extremities warm, no ulcers, no discoloration of skin.   Neurological: Gen: AA&O x 3, conversant, appropriate.      CN II-XII grossly intact.    Motor: ESTRADA x 4, 5/5 throughout UE/LE.    Sens: Sensation intact to light touch throughout.    Extremities: distal pulses 2+ x 4 DPPT symmetric throughout.     Hoffmans negative bilaterally.  Plantar downgoing bilaterally.  No clonus.      No pronator drift, no dysmetria.  Wound/incision:     TUBES/LINES:  [] CVC  [] A-line  [] Lumbar Drain  [] Ventriculostomy  [] Other    DIET:  [] NPO  [] Mechanical  [] Tube feeds    LABS:                        11.9   11.98 )-----------( 314      ( 26 Apr 2023 04:29 )             36.9     04-26    138  |  105  |  16  ----------------------------<  149<H>  4.3   |  24  |  0.76    Ca    7.9<L>      26 Apr 2023 15:59  Phos  4.3     04-26  Mg     1.8     04-26    TPro  5.9<L>  /  Alb  3.4  /  TBili  0.5  /  DBili  x   /  AST  14  /  ALT  34  /  AlkPhos  86  04-25    PT/INR - ( 25 Apr 2023 17:51 )   PT: 12.0 sec;   INR: 1.01          PTT - ( 25 Apr 2023 17:51 )  PTT:29.2 sec        CAPILLARY BLOOD GLUCOSE      POCT Blood Glucose.: 204 mg/dL (26 Apr 2023 23:36)  POCT Blood Glucose.: 153 mg/dL (26 Apr 2023 11:52)  POCT Blood Glucose.: 158 mg/dL (26 Apr 2023 05:34)      Drug Levels: [] N/A    CSF Analysis: [] N/A      Allergies    No Known Allergies    Intolerances      MEDICATIONS:  Antibiotics:    Neuro:  acetaminophen     Tablet .. 650 milliGRAM(s) Oral every 6 hours PRN  levETIRAcetam 750 milliGRAM(s) Oral two times a day  ondansetron Injectable 4 milliGRAM(s) IV Push every 6 hours PRN    Anticoagulation:    OTHER:  amLODIPine   Tablet 10 milliGRAM(s) Oral daily  chlorhexidine 2% Cloths 1 Application(s) Topical daily  dexAMETHasone  Injectable 4 milliGRAM(s) IV Push every 6 hours  hydrALAZINE Injectable 10 milliGRAM(s) IV Push every 2 hours PRN  insulin lispro (ADMELOG) corrective regimen sliding scale   SubCutaneous every 6 hours  pantoprazole    Tablet 40 milliGRAM(s) Oral before breakfast  senna 2 Tablet(s) Oral at bedtime    IVF:    CULTURES:    RADIOLOGY & ADDITIONAL TESTS:      ASSESSMENT:  67y Male s/p    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    S/P total knee replacement, right    [email or web address]        PLAN:  NEURO:    CARDIOVASCULAR:    PULMONARY:    RENAL:    GI:    HEME:    ID:    ENDO:    DVT PROPHYLAXIS:  [] Venodynes                                [] Heparin/Lovenox    FALL RISK:  [] Low Risk                                    [] Impulsive    DISPOSITION:       Assessment:  Present when checked    []  GCS  E   V  M     Heart Failure: []Acute, [] acute on chronic , []chronic  Heart Failure:  [] Diastolic (HFpEF), [] Systolic (HFrEF), []Combined (HFpEF and HFrEF), [] RHF, [] Pulm HTN, [] Other    [] ROMERO, [] ATN, [] AIN, [] other  [] CKD1, [] CKD2, [] CKD 3, [] CKD 4, [] CKD 5, []ESRD    Encephalopathy: [] Metabolic, [] Hepatic, [] toxic, [] Neurological, [] Other    Abnormal Nurtitional Status: [] malnurtition (see nutrition note), [ ]underweight: BMI < 19, [] morbid obesity: BMI >40, [] Cachexia    [] Sepsis  [] hypovolemic shock,[] cardiogenic shock, [] hemorrhagic shock, [] neuogenic shock  [] Acute Respiratory Failure  []Cerebral edema, [] Brain compression/ herniation,   [] Functional quadriplegia  [] Acute blood loss anemia   S/Overnight events:  KYA overnight. Patient resting comfortably.       Hospital Course:   4/24: Pre-op L crani tumor resection   4/25: preop L crani tumor resection today. POD 0 s/p L crani for tumor resection, frozen HGG. Given dilaudid 0.25mg for pain control. Failed dysphagia, made NPO, S&S evaluation in AM. PO meds converted to IV. Postop Na 133. S/p 150cc 3% bolus.   4/26: POD 1 L crani HGG. KYA o/n. 4PM BMP to f/u sodium, cardene gtt off, passed S+S eval, LE dopplers re-ordered because high risk 2/2 plegia on R-side showing b/l IM calf DVT, CTH today stable, afternoon BMP Na 138, Pend SDU tomorrow.   4/27: POD2 L crani HGG. KYA overnight.       Vital Signs Last 24 Hrs  T(C): 36.9 (26 Apr 2023 21:28), Max: 37.2 (26 Apr 2023 05:36)  T(F): 98.5 (26 Apr 2023 21:28), Max: 98.9 (26 Apr 2023 05:36)  HR: 57 (27 Apr 2023 00:00) (57 - 89)  BP: 144/67 (26 Apr 2023 22:00) (118/60 - 144/67)  BP(mean): 97 (26 Apr 2023 22:00) (83 - 103)  RR: 16 (27 Apr 2023 00:00) (15 - 20)  SpO2: 97% (27 Apr 2023 00:00) (96% - 99%)    Parameters below as of 27 Apr 2023 00:00  Patient On (Oxygen Delivery Method): room air        I&O's Detail    25 Apr 2023 07:01  -  26 Apr 2023 07:00  --------------------------------------------------------  IN:    IV PiggyBack: 75 mL    NiCARdipine: 227.5 mL    sodium chloride 0.9%: 1050 mL  Total IN: 1352.5 mL    OUT:    Indwelling Catheter - Urethral (mL): 1545 mL  Total OUT: 1545 mL    Total NET: -192.5 mL      26 Apr 2023 07:01  -  27 Apr 2023 01:09  --------------------------------------------------------  IN:    IV PiggyBack: 125 mL    NiCARdipine: 10 mL    Oral Fluid: 360 mL    sodium chloride 0.9%: 675 mL  Total IN: 1170 mL    OUT:    Voided (mL): 1050 mL  Total OUT: 1050 mL    Total NET: 120 mL        I&O's Summary    25 Apr 2023 07:01  -  26 Apr 2023 07:00  --------------------------------------------------------  IN: 1352.5 mL / OUT: 1545 mL / NET: -192.5 mL    26 Apr 2023 07:01  -  27 Apr 2023 01:09  --------------------------------------------------------  IN: 1170 mL / OUT: 1050 mL / NET: 120 mL    PHYSICAL EXAM:  Constitutional: awake, alert, sitting up in bed. NAD.   Respiratory: non-labored breathing. Normal chest rise.   Cardiovascular: Regular rate and rhythm  Gastrointestinal:  Soft, nontender, nondistended.  .  Vascular: Extremities warm, no ulcers, no discoloration of skin.   Neurological: Gen: AA&O x 3, conversant, appropriate.      Disconjugate gaze otherwise CN II-XII grossly intact.    Motor: ESTRADA x 4, 5/5 throughout UE/LE.    Sens: Sensation intact to light touch throughout.    Extremities: distal pulses 2+ x 4 DPPT symmetric throughout.     Hoffmans negative bilaterally.  Plantar downgoing bilaterally.  No clonus.      No pronator drift, no dysmetria.  Wound/incision:     TUBES/LINES:  [] CVC  [] A-line  [] Lumbar Drain  [] Ventriculostomy  [] Other    DIET:  [] NPO  [] Mechanical  [] Tube feeds    LABS:                        11.9   11.98 )-----------( 314      ( 26 Apr 2023 04:29 )             36.9     04-26    138  |  105  |  16  ----------------------------<  149<H>  4.3   |  24  |  0.76    Ca    7.9<L>      26 Apr 2023 15:59  Phos  4.3     04-26  Mg     1.8     04-26    TPro  5.9<L>  /  Alb  3.4  /  TBili  0.5  /  DBili  x   /  AST  14  /  ALT  34  /  AlkPhos  86  04-25    PT/INR - ( 25 Apr 2023 17:51 )   PT: 12.0 sec;   INR: 1.01          PTT - ( 25 Apr 2023 17:51 )  PTT:29.2 sec        CAPILLARY BLOOD GLUCOSE      POCT Blood Glucose.: 204 mg/dL (26 Apr 2023 23:36)  POCT Blood Glucose.: 153 mg/dL (26 Apr 2023 11:52)  POCT Blood Glucose.: 158 mg/dL (26 Apr 2023 05:34)      Drug Levels: [] N/A    CSF Analysis: [] N/A      Allergies    No Known Allergies    Intolerances      MEDICATIONS:  Antibiotics:    Neuro:  acetaminophen     Tablet .. 650 milliGRAM(s) Oral every 6 hours PRN  levETIRAcetam 750 milliGRAM(s) Oral two times a day  ondansetron Injectable 4 milliGRAM(s) IV Push every 6 hours PRN    Anticoagulation:    OTHER:  amLODIPine   Tablet 10 milliGRAM(s) Oral daily  chlorhexidine 2% Cloths 1 Application(s) Topical daily  dexAMETHasone  Injectable 4 milliGRAM(s) IV Push every 6 hours  hydrALAZINE Injectable 10 milliGRAM(s) IV Push every 2 hours PRN  insulin lispro (ADMELOG) corrective regimen sliding scale   SubCutaneous every 6 hours  pantoprazole    Tablet 40 milliGRAM(s) Oral before breakfast  senna 2 Tablet(s) Oral at bedtime    IVF:    CULTURES:    RADIOLOGY & ADDITIONAL TESTS:      ASSESSMENT:  67y Male s/p    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    S/P total knee replacement, right    [email or web address]        PLAN:  NEURO:    CARDIOVASCULAR:    PULMONARY:    RENAL:    GI:    HEME:    ID:    ENDO:    DVT PROPHYLAXIS:  [] Venodynes                                [] Heparin/Lovenox    FALL RISK:  [] Low Risk                                    [] Impulsive    DISPOSITION:       Assessment:  Present when checked    []  GCS  E   V  M     Heart Failure: []Acute, [] acute on chronic , []chronic  Heart Failure:  [] Diastolic (HFpEF), [] Systolic (HFrEF), []Combined (HFpEF and HFrEF), [] RHF, [] Pulm HTN, [] Other    [] ROMERO, [] ATN, [] AIN, [] other  [] CKD1, [] CKD2, [] CKD 3, [] CKD 4, [] CKD 5, []ESRD    Encephalopathy: [] Metabolic, [] Hepatic, [] toxic, [] Neurological, [] Other    Abnormal Nurtitional Status: [] malnurtition (see nutrition note), [ ]underweight: BMI < 19, [] morbid obesity: BMI >40, [] Cachexia    [] Sepsis  [] hypovolemic shock,[] cardiogenic shock, [] hemorrhagic shock, [] neuogenic shock  [] Acute Respiratory Failure  []Cerebral edema, [] Brain compression/ herniation,   [] Functional quadriplegia  [] Acute blood loss anemia   S/Overnight events:  KYA overnight. Patient resting comfortably.       Hospital Course:   4/24: Pre-op L crani tumor resection   4/25: preop L crani tumor resection today. POD 0 s/p L crani for tumor resection, frozen HGG. Given dilaudid 0.25mg for pain control. Failed dysphagia, made NPO, S&S evaluation in AM. PO meds converted to IV. Postop Na 133. S/p 150cc 3% bolus.   4/26: POD 1 L crani HGG. KYA o/n. 4PM BMP to f/u sodium, cardene gtt off, passed S+S eval, LE dopplers re-ordered because high risk 2/2 plegia on R-side showing b/l IM calf DVT, CTH today stable, afternoon BMP Na 138, Pend SDU tomorrow.   4/27: POD2 L crani HGG. KYA overnight.       Vital Signs Last 24 Hrs  T(C): 36.9 (26 Apr 2023 21:28), Max: 37.2 (26 Apr 2023 05:36)  T(F): 98.5 (26 Apr 2023 21:28), Max: 98.9 (26 Apr 2023 05:36)  HR: 57 (27 Apr 2023 00:00) (57 - 89)  BP: 144/67 (26 Apr 2023 22:00) (118/60 - 144/67)  BP(mean): 97 (26 Apr 2023 22:00) (83 - 103)  RR: 16 (27 Apr 2023 00:00) (15 - 20)  SpO2: 97% (27 Apr 2023 00:00) (96% - 99%)    Parameters below as of 27 Apr 2023 00:00  Patient On (Oxygen Delivery Method): room air        I&O's Detail    25 Apr 2023 07:01  -  26 Apr 2023 07:00  --------------------------------------------------------  IN:    IV PiggyBack: 75 mL    NiCARdipine: 227.5 mL    sodium chloride 0.9%: 1050 mL  Total IN: 1352.5 mL    OUT:    Indwelling Catheter - Urethral (mL): 1545 mL  Total OUT: 1545 mL    Total NET: -192.5 mL      26 Apr 2023 07:01  -  27 Apr 2023 01:09  --------------------------------------------------------  IN:    IV PiggyBack: 125 mL    NiCARdipine: 10 mL    Oral Fluid: 360 mL    sodium chloride 0.9%: 675 mL  Total IN: 1170 mL    OUT:    Voided (mL): 1050 mL  Total OUT: 1050 mL    Total NET: 120 mL        I&O's Summary    25 Apr 2023 07:01  -  26 Apr 2023 07:00  --------------------------------------------------------  IN: 1352.5 mL / OUT: 1545 mL / NET: -192.5 mL    26 Apr 2023 07:01  -  27 Apr 2023 01:09  --------------------------------------------------------  IN: 1170 mL / OUT: 1050 mL / NET: 120 mL    PHYSICAL EXAM:  Constitutional: awake, alert, sitting up in bed. NAD.   Respiratory: non-labored breathing. Normal chest rise.   Cardiovascular: Regular rate and rhythm  Gastrointestinal:  Soft, nontender, nondistended.  .  Vascular: Extremities warm, no ulcers, no discoloration of skin.   Neurological: Gen: AA&O x 3, conversant, appropriate.      Right Facial. Disconjugate gaze otherwise CN II-XII grossly intact.    Motor: RUE withdraws, RLE HF 1/5, KE/KF/DF/PF 0/5, LUE/LLE 5/5    Sens: Sensation intact to light touch throughout.    Extremities: warm and well perfused   Wound/incision: L crani incision c/d/i.     TUBES/LINES:  [] CVC  [x] A-line  [] Lumbar Drain  [] Ventriculostomy  [] Other    DIET:  [] NPO  [x] Mechanical  [] Tube feeds    LABS:                        11.9   11.98 )-----------( 314      ( 26 Apr 2023 04:29 )             36.9     04-26    138  |  105  |  16  ----------------------------<  149<H>  4.3   |  24  |  0.76    Ca    7.9<L>      26 Apr 2023 15:59  Phos  4.3     04-26  Mg     1.8     04-26    TPro  5.9<L>  /  Alb  3.4  /  TBili  0.5  /  DBili  x   /  AST  14  /  ALT  34  /  AlkPhos  86  04-25    PT/INR - ( 25 Apr 2023 17:51 )   PT: 12.0 sec;   INR: 1.01          PTT - ( 25 Apr 2023 17:51 )  PTT:29.2 sec        CAPILLARY BLOOD GLUCOSE      POCT Blood Glucose.: 204 mg/dL (26 Apr 2023 23:36)  POCT Blood Glucose.: 153 mg/dL (26 Apr 2023 11:52)  POCT Blood Glucose.: 158 mg/dL (26 Apr 2023 05:34)      Drug Levels: [] N/A    CSF Analysis: [] N/A      Allergies    No Known Allergies    Intolerances      MEDICATIONS:  Antibiotics:    Neuro:  acetaminophen     Tablet .. 650 milliGRAM(s) Oral every 6 hours PRN  levETIRAcetam 750 milliGRAM(s) Oral two times a day  ondansetron Injectable 4 milliGRAM(s) IV Push every 6 hours PRN    Anticoagulation:    OTHER:  amLODIPine   Tablet 10 milliGRAM(s) Oral daily  chlorhexidine 2% Cloths 1 Application(s) Topical daily  dexAMETHasone  Injectable 4 milliGRAM(s) IV Push every 6 hours  hydrALAZINE Injectable 10 milliGRAM(s) IV Push every 2 hours PRN  insulin lispro (ADMELOG) corrective regimen sliding scale   SubCutaneous every 6 hours  pantoprazole    Tablet 40 milliGRAM(s) Oral before breakfast  senna 2 Tablet(s) Oral at bedtime    IVF:    CULTURES:    RADIOLOGY & ADDITIONAL TESTS:      ASSESSMENT:  68 y/o M PMH HTN, OA (s/p R TKR 2 months ago), brain mass (s/p biopsy 4/7/23 at [facility] w/ Dr. Tobar) presents for pre-op for L crani tumor resection 4/24/23. Now s/p L crani for tumor resection, frozen HGG (4/25).    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    S/P total knee replacement, right    SysAdmin_VstLnk        PLAN:    NEURO  - neuro/vitals q1h  - pain control w/ tylenol 1gq8  - cont keppra 750 bid  - decadron 4q6 taper over 1 week to off  - pending postop MR Brain w/wo  - CT 4/26 stable    CARDIO  - SBP < 140, cardene gtt off   - h/o HTN: amlodipine 10 mg     PULM   - satting well on RA   - encourage IS    GI   - CCB diet  - Bowel regimen   - Protonix while on steroids  - BM 4/26    RENAL  - IVL  - s/p 150cc 3% bolus 4/25  - metzger removed, f/u TOV    ENDO   - ISS, A1c 6.5    HEME   - SCDs for DVT ppx  - b/l LE IM calf DVTs     ID   - postop Ancef  - afebrile    DISPO: ICU status pending PT/OT, full code    D/W Dr. Johnston and Dr. Espinoza    Assessment:  Present when checked    []  GCS  E   V  M     Heart Failure: []Acute, [] acute on chronic , []chronic  Heart Failure:  [] Diastolic (HFpEF), [] Systolic (HFrEF), []Combined (HFpEF and HFrEF), [] RHF, [] Pulm HTN, [] Other    [] ROMERO, [] ATN, [] AIN, [] other  [] CKD1, [] CKD2, [] CKD 3, [] CKD 4, [] CKD 5, []ESRD    Encephalopathy: [] Metabolic, [] Hepatic, [] toxic, [] Neurological, [] Other    Abnormal Nurtitional Status: [] malnurtition (see nutrition note), [ ]underweight: BMI < 19, [] morbid obesity: BMI >40, [] Cachexia    [] Sepsis  [] hypovolemic shock,[] cardiogenic shock, [] hemorrhagic shock, [] neuogenic shock  [] Acute Respiratory Failure  []Cerebral edema, [] Brain compression/ herniation,   [] Functional quadriplegia  [] Acute blood loss anemia

## 2023-04-28 DIAGNOSIS — I82.4Z9 ACUTE EMBOLISM AND THROMBOSIS OF UNSPECIFIED DEEP VEINS OF UNSPECIFIED DISTAL LOWER EXTREMITY: ICD-10-CM

## 2023-04-28 DIAGNOSIS — R00.1 BRADYCARDIA, UNSPECIFIED: ICD-10-CM

## 2023-04-28 DIAGNOSIS — E11.9 TYPE 2 DIABETES MELLITUS WITHOUT COMPLICATIONS: ICD-10-CM

## 2023-04-28 LAB
ANION GAP SERPL CALC-SCNC: 7 MMOL/L — SIGNIFICANT CHANGE UP (ref 5–17)
BUN SERPL-MCNC: 19 MG/DL — SIGNIFICANT CHANGE UP (ref 7–23)
CALCIUM SERPL-MCNC: 8.6 MG/DL — SIGNIFICANT CHANGE UP (ref 8.4–10.5)
CHLORIDE SERPL-SCNC: 102 MMOL/L — SIGNIFICANT CHANGE UP (ref 96–108)
CO2 SERPL-SCNC: 28 MMOL/L — SIGNIFICANT CHANGE UP (ref 22–31)
CREAT SERPL-MCNC: 0.77 MG/DL — SIGNIFICANT CHANGE UP (ref 0.5–1.3)
EGFR: 99 ML/MIN/1.73M2 — SIGNIFICANT CHANGE UP
GLUCOSE BLDC GLUCOMTR-MCNC: 179 MG/DL — HIGH (ref 70–99)
GLUCOSE BLDC GLUCOMTR-MCNC: 190 MG/DL — HIGH (ref 70–99)
GLUCOSE BLDC GLUCOMTR-MCNC: 204 MG/DL — HIGH (ref 70–99)
GLUCOSE BLDC GLUCOMTR-MCNC: 217 MG/DL — HIGH (ref 70–99)
GLUCOSE SERPL-MCNC: 159 MG/DL — HIGH (ref 70–99)
HCT VFR BLD CALC: 37.2 % — LOW (ref 39–50)
HGB BLD-MCNC: 12.1 G/DL — LOW (ref 13–17)
MAGNESIUM SERPL-MCNC: 2.1 MG/DL — SIGNIFICANT CHANGE UP (ref 1.6–2.6)
MCHC RBC-ENTMCNC: 28.3 PG — SIGNIFICANT CHANGE UP (ref 27–34)
MCHC RBC-ENTMCNC: 32.5 GM/DL — SIGNIFICANT CHANGE UP (ref 32–36)
MCV RBC AUTO: 86.9 FL — SIGNIFICANT CHANGE UP (ref 80–100)
NRBC # BLD: 0 /100 WBCS — SIGNIFICANT CHANGE UP (ref 0–0)
PHOSPHATE SERPL-MCNC: 3.4 MG/DL — SIGNIFICANT CHANGE UP (ref 2.5–4.5)
PLATELET # BLD AUTO: 286 K/UL — SIGNIFICANT CHANGE UP (ref 150–400)
POTASSIUM SERPL-MCNC: 4 MMOL/L — SIGNIFICANT CHANGE UP (ref 3.5–5.3)
POTASSIUM SERPL-SCNC: 4 MMOL/L — SIGNIFICANT CHANGE UP (ref 3.5–5.3)
RBC # BLD: 4.28 M/UL — SIGNIFICANT CHANGE UP (ref 4.2–5.8)
RBC # FLD: 14.8 % — HIGH (ref 10.3–14.5)
SODIUM SERPL-SCNC: 137 MMOL/L — SIGNIFICANT CHANGE UP (ref 135–145)
WBC # BLD: 6.65 K/UL — SIGNIFICANT CHANGE UP (ref 3.8–10.5)
WBC # FLD AUTO: 6.65 K/UL — SIGNIFICANT CHANGE UP (ref 3.8–10.5)

## 2023-04-28 PROCEDURE — 99233 SBSQ HOSP IP/OBS HIGH 50: CPT

## 2023-04-28 PROCEDURE — 99233 SBSQ HOSP IP/OBS HIGH 50: CPT | Mod: GC

## 2023-04-28 PROCEDURE — 93971 EXTREMITY STUDY: CPT | Mod: 26,RT

## 2023-04-28 RX ORDER — POLYETHYLENE GLYCOL 3350 17 G/17G
17 POWDER, FOR SOLUTION ORAL
Refills: 0 | Status: DISCONTINUED | OUTPATIENT
Start: 2023-04-28 | End: 2023-05-02

## 2023-04-28 RX ADMIN — Medication 4 MILLIGRAM(S): at 11:45

## 2023-04-28 RX ADMIN — SODIUM CHLORIDE 2 GRAM(S): 9 INJECTION INTRAMUSCULAR; INTRAVENOUS; SUBCUTANEOUS at 00:08

## 2023-04-28 RX ADMIN — Medication 2: at 22:30

## 2023-04-28 RX ADMIN — POLYETHYLENE GLYCOL 3350 17 GRAM(S): 17 POWDER, FOR SOLUTION ORAL at 11:45

## 2023-04-28 RX ADMIN — LEVETIRACETAM 750 MILLIGRAM(S): 250 TABLET, FILM COATED ORAL at 05:43

## 2023-04-28 RX ADMIN — Medication 4 MILLIGRAM(S): at 05:43

## 2023-04-28 RX ADMIN — Medication 4: at 17:55

## 2023-04-28 RX ADMIN — SODIUM CHLORIDE 2 GRAM(S): 9 INJECTION INTRAMUSCULAR; INTRAVENOUS; SUBCUTANEOUS at 17:55

## 2023-04-28 RX ADMIN — AMLODIPINE BESYLATE 10 MILLIGRAM(S): 2.5 TABLET ORAL at 05:43

## 2023-04-28 RX ADMIN — SODIUM CHLORIDE 2 GRAM(S): 9 INJECTION INTRAMUSCULAR; INTRAVENOUS; SUBCUTANEOUS at 09:45

## 2023-04-28 RX ADMIN — PANTOPRAZOLE SODIUM 40 MILLIGRAM(S): 20 TABLET, DELAYED RELEASE ORAL at 05:43

## 2023-04-28 RX ADMIN — SENNA PLUS 2 TABLET(S): 8.6 TABLET ORAL at 22:31

## 2023-04-28 RX ADMIN — CHLORHEXIDINE GLUCONATE 1 APPLICATION(S): 213 SOLUTION TOPICAL at 05:47

## 2023-04-28 RX ADMIN — Medication 4: at 06:38

## 2023-04-28 RX ADMIN — Medication 4 MILLIGRAM(S): at 17:55

## 2023-04-28 RX ADMIN — Medication 4 MILLIGRAM(S): at 00:08

## 2023-04-28 RX ADMIN — LEVETIRACETAM 750 MILLIGRAM(S): 250 TABLET, FILM COATED ORAL at 17:55

## 2023-04-28 RX ADMIN — ENOXAPARIN SODIUM 40 MILLIGRAM(S): 100 INJECTION SUBCUTANEOUS at 22:31

## 2023-04-28 RX ADMIN — Medication 2: at 12:23

## 2023-04-28 NOTE — PHYSICAL THERAPY INITIAL EVALUATION ADULT - MANUAL MUSCLE TESTING RESULTS, REHAB EVAL
RUE:  strength 2-/5, wrist 0/5, elbow flexion/extension 2-/5, shoulder flexion/extension 2-/5, LUE: 5/5.

## 2023-04-28 NOTE — PROGRESS NOTE ADULT - SUBJECTIVE AND OBJECTIVE BOX
INTERVAL HPI/OVERNIGHT EVENTS:    SUBJECTIVE: Patient seen and examined at bedside. He was working with OT when we walked in the room. He is very motivated and we observed him standing and taking a few steps.    VITAL SIGNS:  ICU Vital Signs Last 24 Hrs  T(C): 36.8 (28 Apr 2023 17:10), Max: 37.2 (27 Apr 2023 20:24)  T(F): 98.2 (28 Apr 2023 17:10), Max: 99 (27 Apr 2023 20:24)  HR: 64 (28 Apr 2023 17:10) (54 - 75)  BP: 149/84 (28 Apr 2023 17:10) (120/76 - 152/88)  BP(mean): --  ABP: --  ABP(mean): --  RR: 18 (28 Apr 2023 17:10) (16 - 18)  SpO2: 97% (28 Apr 2023 17:10) (95% - 100%)    O2 Parameters below as of 28 Apr 2023 17:10  Patient On (Oxygen Delivery Method): room air              04-27 @ 07:01  -  04-28 @ 07:00  --------------------------------------------------------  IN: 180 mL / OUT: 1525 mL / NET: -1345 mL      CAPILLARY BLOOD GLUCOSE      POCT Blood Glucose.: 217 mg/dL (28 Apr 2023 17:25)      PHYSICAL EXAM:    Constitutional: NAD  HEENT: NC/AT; PERRL, anicteric sclera; MMM; craniotomy incision w staples c/d/i; disconjugate gaze  Neck: supple, no JVD  Cardiovascular: +S1/S2, RRR  Respiratory: CTA B/L, no W/R/R  Gastrointestinal: abdomen soft, NT/ND; no rebound or guarding; +BSx4  Genitourinary: no suprapubic tenderness or fullness  Extremities: WWP; no LE edema; no clubbing or cyanosis  Vascular: 2+ radial, DP/PT pulses B/L  Dermatologic: normal color and turgor; no visible rashes  Neurological: AAOx3; R facial droop, R sided weakness slowly improving - pt can now shrug R shoulder    MEDICATIONS:  MEDICATIONS  (STANDING):  amLODIPine   Tablet 10 milliGRAM(s) Oral every 24 hours  dexAMETHasone     Tablet 4 milliGRAM(s) Oral every 6 hours  dexAMETHasone     Tablet   Oral   enoxaparin Injectable 40 milliGRAM(s) SubCutaneous every 24 hours  insulin lispro (ADMELOG) corrective regimen sliding scale   SubCutaneous Before meals and at bedtime  levETIRAcetam 750 milliGRAM(s) Oral two times a day  pantoprazole    Tablet 40 milliGRAM(s) Oral before breakfast  polyethylene glycol 3350 17 Gram(s) Oral two times a day  senna 2 Tablet(s) Oral at bedtime  sodium chloride 2 Gram(s) Oral every 8 hours    MEDICATIONS  (PRN):  acetaminophen     Tablet .. 650 milliGRAM(s) Oral every 6 hours PRN Mild Pain (1 - 3)  hydrALAZINE Injectable 10 milliGRAM(s) IV Push every 4 hours PRN SBP > 150  ondansetron Injectable 4 milliGRAM(s) IV Push every 6 hours PRN Nausea and/or Vomiting      ALLERGIES:  Allergies    No Known Allergies    Intolerances        LABS:                        12.1   6.65  )-----------( 286      ( 28 Apr 2023 07:34 )             37.2     04-28    137  |  102  |  19  ----------------------------<  159<H>  4.0   |  28  |  0.77    Ca    8.6      28 Apr 2023 07:34  Phos  3.4     04-28  Mg     2.1     04-28            RADIOLOGY & ADDITIONAL TESTS: Reviewed.

## 2023-04-28 NOTE — PROGRESS NOTE ADULT - ATTENDING COMMENTS
Seen with onc fellow, .    GBM, s/p re-resection of left motor cortex tumor by .  Seen working with PT today, was able to stand with assistance. 2/5 strength on righ proximal extremities.  Would consider starting AC for b/l LE DVT once safe from NeuroSx perspective. Can consider DOAC.  Plan for dc to [place], then SOC with chemoRT.    Total time spent on encounter, including but not limited to counseling/coordination of care: 35 mis. Seen with onc fellow, .    GBM, s/p re-resection of left motor cortex tumor by .  Seen working with PT today, was able to stand with assistance. 2/5 strength on righ proximal extremities.  Would consider starting AC for b/l LE DVT once safe from NeuroSx perspective if repeat 2 wks US shows clot persistence. Can consider DOAC.  Plan for dc to [place], then SOC with chemoRT.    Total time spent on encounter, including but not limited to counseling/coordination of care: 35 mis.

## 2023-04-28 NOTE — OCCUPATIONAL THERAPY INITIAL EVALUATION ADULT - BATHING, PREVIOUS LEVEL OF FUNCTION, OT EVAL
Please let pt know that she has bacterial vaginosis and I have sent flagyl to her pharmacy to take for 7 days. needed assist

## 2023-04-28 NOTE — PHYSICAL THERAPY INITIAL EVALUATION ADULT - IMPAIRMENTS FOUND, PT EVAL
cranial and peripheral nerve integrity/gait, locomotion, and balance/muscle strength/neuromotor development and sensory integration/poor safety awareness/ROM/sensory integrity

## 2023-04-28 NOTE — PHYSICAL THERAPY INITIAL EVALUATION ADULT - GENERAL OBSERVATIONS, REHAB EVAL
PT IE Completed. Pt received semi-supine in bed, A&Ox4, +RA,+heplock, in NAD and agreeable to work with PT, wife at bedside, medical team arriving, covering LORI Worthington notified.Pt s/p L crani for tumor resection POD#3 . PT exam shows trace impaired vision, R facial droop, mildly impaired LT b/l UE and LE sensation, RLE movement, 0-2-/5 RUE strength, poor plus sitting balance, impaired gait and decreased functional strength. Pt completed bed mob, transfers and gait.  Pt left as found, +bed alarm, +call plummer within reach, LORI Worthington  notified. Pt can benefit from PT in order to improve quality of life by increasing strength/endurance/balance with functional mobility and ADLS.

## 2023-04-28 NOTE — OCCUPATIONAL THERAPY INITIAL EVALUATION ADULT - PLANNED THERAPY INTERVENTIONS, OT EVAL
ADL retraining/balance training/bed mobility training/fine motor coordination training/motor coordination training/neuromuscular re-education/strengthening/stretching/transfer training

## 2023-04-28 NOTE — PROGRESS NOTE ADULT - SUBJECTIVE AND OBJECTIVE BOX
HPI:  68 y/o male with h/o HTN, Osteoarthritis (s/p right total knee replacement 2 months ago), brain mass (s/p biopsy 4/7/23 @ [facility] with Dr. Tobar) presented for brain tumor resection. Per family, patient started to have right hand weakness about a month ago that got progressively worse, then went to ED where brain mass was diagnosed. Started keppra 750 mg BID for seizure prophylaxis. Described right arm weakness worsening after surgery and some right lower extremity weakness. States today, feels like his right lower extremity weakness is worsening. Family also reported patient just finished a taper of decadron. Denies headache, new weakness, numbness, tingling, chest pain, sob, nausea/vomiting, visual abnormalities.  (24 Apr 2023 18:02)    OVERNIGHT EVENTS:   POD 3 L crani HGG, KYA ovn, neuro stable    Hospital course:   4/24: Pre-op L crani tumor resection   4/25: preop L crani tumor resection today. POD 0 s/p L crani for tumor resection, frozen HGG. Given dilaudid 0.25mg for pain control. Failed dysphagia, made NPO, S&S evaluation in AM. PO meds converted to IV. Postop Na 133. S/p 150cc 3% bolus.   4/26: POD 1 L crani HGG. KYA o/n. 4PM BMP to f/u sodium, cardene gtt off, passed S+S eval, LE dopplers re-ordered because high risk 2/2 plegia on R-side showing b/l IM calf DVT, CTH today stable, afternoon BMP Na 138, Pend SDU tomorrow.   4/27: POD2 L crani HGG. KYA overnight. SDU today. Pend MRI brain w/ and w/o on SDU. NaCl tabs 2g q8hrs for hyponatremia. SQL starting tonight, will f/u anti-xa 4/29 @2am (4 hours after 2nd dose). Decadron taper to 2 BID.   4/28: POD 3 L crani HGG, KYA ovn, neuro stable    Vital Signs Last 24 Hrs  T(C): 36.6 (28 Apr 2023 00:05), Max: 37.2 (27 Apr 2023 17:22)  T(F): 97.9 (28 Apr 2023 00:05), Max: 99 (27 Apr 2023 20:24)  HR: 64 (28 Apr 2023 00:05) (56 - 92)  BP: 146/52 (28 Apr 2023 00:05) (128/68 - 153/76)  BP(mean): 103 (27 Apr 2023 11:45) (92 - 114)  RR: 16 (28 Apr 2023 00:05) (12 - 22)  SpO2: 95% (28 Apr 2023 00:05) (95% - 97%)    Parameters below as of 28 Apr 2023 00:05  Patient On (Oxygen Delivery Method): room air        I&O's Summary    26 Apr 2023 07:01  -  27 Apr 2023 07:00  --------------------------------------------------------  IN: 1620 mL / OUT: 2400 mL / NET: -780 mL    27 Apr 2023 07:01  -  28 Apr 2023 00:30  --------------------------------------------------------  IN: 180 mL / OUT: 1325 mL / NET: -1145 mL        PHYSICAL EXAM:  Constitutional: awake, alert, sitting up in bed. NAD.   Respiratory: non-labored breathing. Normal chest rise.   Cardiovascular: Regular rate and rhythm  Gastrointestinal:  Soft, nontender, nondistended.  .  Vascular: Extremities warm, no ulcers, no discoloration of skin.   Neurological: Gen: AA&O x 3, conversant, appropriate.      Right Facial. Disconjugate gaze otherwise CN II-XII grossly intact.    Motor: RUE withdraws, RLE HF 1/5, KE/KF/DF/PF 0/5, LUE/LLE 5/5    Sens: Sensation intact to light touch throughout.    Extremities: warm and well perfused   Wound/incision: L crani incision c/d/i.     TUBES/LINES:  [] Shields  [] A-line  [] Lumbar Drain  [] Wound Drains  [] NGT   [] EVD   [] CVC  [] Other      DIET:  [] NPO  [x] Mechanical  [] Tube feeds    LABS:                        11.9   9.85  )-----------( 290      ( 27 Apr 2023 05:09 )             36.2     04-27    134<L>  |  102  |  18  ----------------------------<  142<H>  4.4   |  27  |  0.79    Ca    8.5      27 Apr 2023 05:09  Phos  3.1     04-27  Mg     2.1     04-27              CAPILLARY BLOOD GLUCOSE      POCT Blood Glucose.: 152 mg/dL (27 Apr 2023 23:20)  POCT Blood Glucose.: 180 mg/dL (27 Apr 2023 17:19)  POCT Blood Glucose.: 216 mg/dL (27 Apr 2023 11:30)  POCT Blood Glucose.: 125 mg/dL (27 Apr 2023 06:41)      Drug Levels: [] N/A    CSF Analysis: [] N/A      Allergies    No Known Allergies    Intolerances        Home Medications:  amLODIPine 10 mg oral tablet: 1 orally once a day (24 Apr 2023 19:08)  Keppra 750 mg oral tablet: 1 orally 2 times a day (24 Apr 2023 19:11)      MEDICATIONS:  MEDICATIONS  (STANDING):  amLODIPine   Tablet 10 milliGRAM(s) Oral every 24 hours  chlorhexidine 2% Cloths 1 Application(s) Topical daily  dexAMETHasone     Tablet   Oral   dexAMETHasone     Tablet 4 milliGRAM(s) Oral every 6 hours  enoxaparin Injectable 40 milliGRAM(s) SubCutaneous every 24 hours  insulin lispro (ADMELOG) corrective regimen sliding scale   SubCutaneous Before meals and at bedtime  levETIRAcetam 750 milliGRAM(s) Oral two times a day  pantoprazole    Tablet 40 milliGRAM(s) Oral before breakfast  senna 2 Tablet(s) Oral at bedtime  sodium chloride 2 Gram(s) Oral every 8 hours    MEDICATIONS  (PRN):  acetaminophen     Tablet .. 650 milliGRAM(s) Oral every 6 hours PRN Mild Pain (1 - 3)  hydrALAZINE Injectable 10 milliGRAM(s) IV Push every 4 hours PRN SBP > 150  ondansetron Injectable 4 milliGRAM(s) IV Push every 6 hours PRN Nausea and/or Vomiting      CULTURES:      RADIOLOGY & ADDITIONAL TESTS:      ASSESSMENT:  68 y/o M PMH HTN, OA (s/p R TKR 2 months ago), brain mass (s/p biopsy 4/7/23 at [facility] w/ Dr. Tobar) presents for pre-op for L crani tumor resection 4/24/23. Now s/p L crani for tumor resection, frozen HGG (4/25).    NEURO  - neuro/vitals q4h  - pain control w/ tylenol 1g q8  - cont keppra 750 bid  - decadron 4q6 taper over 1 week to 2mg BID  - pending postop MR Brain w/wo  - CT 4/26 stable    CARDIO  - SBP < 150, hydralazine prn  - h/o HTN: amlodipine 10 mg     PULM   - satting well on RA   - encourage IS    GI   - CCB diet  - Bowel regimen   - Protonix while on steroids  - BM 4/26    RENAL  - IVL  - s/p 150cc 3% bolus 4/25, NaCl 2g q8h  - voiding    ENDO   - ISS, A1c 6.5    HEME   - SCDs, SQL for DVT ppx  - b/l LE IM calf DVTs, repeat dopplers 5/1    ID   - postop Ancef  - afebrile    DISPO: ICU status pending PT/OT, full code    D/W Dr. Johnston and Dr. Espinoza    Assessment: present when checked     [] GCS   E   V   M     Heart Failure: [] Acute, [] acute on chronic, [] chronic   Heart Failure: [] Diastolic (HFpEF), [] Systolic (HRrEF), [] Combined (HFpEF and HFrEF), [] RHF, [] Pulm HTN, [] Other     [] ROMERO, [] ATN, [] AIN, [] other   [] CKD1, [] CKD2, [] CKD3, [] CKD4, [] CKD5, [] ESRD     Encephalopathy: [] Metabolic, [] Hepatic, [] Toxic, [] Neurological, [] Other     Abnormal Nutritional Status: [] malnutrition (see nutrition note), []underweight: BMI <19, [] morbid obesity: BMI >40, [] Cachexia     [] Sepsis   [] Hypovolemic shock, [] Cardiogenic shock, [] Hemorrhagic shock, [] Neurogenic shock   [] Acute respiratory failure   [] Cerebral edema, [] Brain compression / herniation   [] Functional quadriplegia   [] Acute blood loss anemia

## 2023-04-28 NOTE — OCCUPATIONAL THERAPY INITIAL EVALUATION ADULT - GROSSLY INTACT, SENSORY
mildly impaired throughout (R>L), pt demo difficulty identifying location of light touch to all extremities with vision occluded. Able to identify extremity being touched, however unable to consistently identify specific location.

## 2023-04-28 NOTE — OCCUPATIONAL THERAPY INITIAL EVALUATION ADULT - DIAGNOSIS, OT EVAL
Pt presenting with impaired RUE/RLE strength, impaired RUE/RLE motor control, visual impairment (impaired visual tracking and L eye strabismus), impaired balance, impaired sensation, and impaired postural control, impacting ability to perform functional mobility/ADLs.

## 2023-04-28 NOTE — OCCUPATIONAL THERAPY INITIAL EVALUATION ADULT - MANUAL MUSCLE TESTING RESULTS, REHAB EVAL
RUE:  strength 2-/5, wrist 0/5, elbow flexion/extension 2-/5, shoulder flexion/extension 2-/5, LUE/LLE: 5/5.; R hip 0/5, R knee flexion 0/5, R knee extension 1/5, R ankle DF/PF 1/5

## 2023-04-28 NOTE — PHYSICAL THERAPY INITIAL EVALUATION ADULT - PERTINENT HX OF CURRENT PROBLEM, REHAB EVAL
68 y/o M PMH HTN, OA (s/p R TKR Jan 2023) , brain mass (s/p biopsy 4/7/23 at [facility] w/ Dr. Tobar) presents for pre-op for L crani tumor resection 4/24/23. Now s/p L crani for tumor resection, frozen HGG (4/25).

## 2023-04-28 NOTE — PHYSICAL THERAPY INITIAL EVALUATION ADULT - MODALITIES TREATMENT COMMENTS
Cranial Nerves II - XII: II: L eye strabismus Vision H-Test: inconsistent tracking to all visual fields Convergence/Divergence: no convergence; Vision Quadrant Test: intact V: pt reported facial sensation intact to light touch V1-V3 b/l VII: no ptosis, R facial droop VIII: hearing intact to finger rub b/l  XI: head turning intact and no L shoulder shrug intact b/l XII: tongue protrusion midline.

## 2023-04-28 NOTE — PROGRESS NOTE ADULT - SUBJECTIVE AND OBJECTIVE BOX
O/N Events: KYA  Subjective/ROS: No real complaints. Denies HA, CP, SOB, n/v, changes in bowel/urinary habits.  12pt ROS otherwise negative.    VITALS  Vital Signs Last 24 Hrs  T(C): 36.8 (28 Apr 2023 08:10), Max: 37.2 (27 Apr 2023 17:22)  T(F): 98.2 (28 Apr 2023 08:10), Max: 99 (27 Apr 2023 20:24)  HR: 54 (28 Apr 2023 12:48) (54 - 75)  BP: 120/76 (28 Apr 2023 12:48) (120/76 - 152/88)  BP(mean): --  RR: 18 (28 Apr 2023 12:48) (16 - 22)  SpO2: 100% (28 Apr 2023 12:48) (95% - 100%)    Parameters below as of 28 Apr 2023 12:48  Patient On (Oxygen Delivery Method): room air        I&O's Summary    27 Apr 2023 07:01  -  28 Apr 2023 07:00  --------------------------------------------------------  IN: 180 mL / OUT: 1525 mL / NET: -1345 mL        CAPILLARY BLOOD GLUCOSE      POCT Blood Glucose.: 190 mg/dL (28 Apr 2023 11:53)  POCT Blood Glucose.: 204 mg/dL (28 Apr 2023 06:17)  POCT Blood Glucose.: 152 mg/dL (27 Apr 2023 23:20)  POCT Blood Glucose.: 180 mg/dL (27 Apr 2023 17:19)      PHYSICAL EXAM  General: A&Ox3; NAD  Head: NC/AT; PERRL; EOMI; anicteric sclera  Neck: Supple; no JVD  Respiratory: CTA B/L; no wheezes/crackles/rales auscultated w/ good air movement  Cardiovascular: Regular rhythm/rate; S1/S2; no gallops or murmurs auscultated  Gastrointestinal: Soft; NTND w/out rebound tenderness or guarding; bowel sounds normal  Extremities: WWP; no edema or cyanosis; radial/pedal pulses palpable  Neurological:  CNII-XII grossly intact; no obvious focal deficits  Skin: No rashes noted  Vasc: +2 DP/PT pulses b/l   Psych: Appropriate Affect    MEDICATIONS  (STANDING):  amLODIPine   Tablet 10 milliGRAM(s) Oral every 24 hours  dexAMETHasone     Tablet 4 milliGRAM(s) Oral every 6 hours  dexAMETHasone     Tablet   Oral   enoxaparin Injectable 40 milliGRAM(s) SubCutaneous every 24 hours  insulin lispro (ADMELOG) corrective regimen sliding scale   SubCutaneous Before meals and at bedtime  levETIRAcetam 750 milliGRAM(s) Oral two times a day  pantoprazole    Tablet 40 milliGRAM(s) Oral before breakfast  polyethylene glycol 3350 17 Gram(s) Oral two times a day  senna 2 Tablet(s) Oral at bedtime  sodium chloride 2 Gram(s) Oral every 8 hours    MEDICATIONS  (PRN):  acetaminophen     Tablet .. 650 milliGRAM(s) Oral every 6 hours PRN Mild Pain (1 - 3)  hydrALAZINE Injectable 10 milliGRAM(s) IV Push every 4 hours PRN SBP > 150  ondansetron Injectable 4 milliGRAM(s) IV Push every 6 hours PRN Nausea and/or Vomiting      No Known Allergies      LABS                        12.1   6.65  )-----------( 286      ( 28 Apr 2023 07:34 )             37.2     04-28    137  |  102  |  19  ----------------------------<  159<H>  4.0   |  28  |  0.77    Ca    8.6      28 Apr 2023 07:34  Phos  3.4     04-28  Mg     2.1     04-28                IMAGING/EKG/ETC: reviewed

## 2023-04-28 NOTE — OCCUPATIONAL THERAPY INITIAL EVALUATION ADULT - LEVEL OF INDEPENDENCE: DRESS LOWER BODY, OT EVAL
adjusting socks sitting EOB requiring assist 2/2 impaired postural control and decreased functional reach 2/2 R sided weakness and decreased motor control./maximum assist (25% patients effort)

## 2023-04-28 NOTE — OCCUPATIONAL THERAPY INITIAL EVALUATION ADULT - IMPAIRMENTS CONTRIBUTING IMPAIRED BED MOBILITY, REHAB EVAL
impaired balance/decreased flexibility/impaired motor control/abnormal muscle tone/impaired postural control/decreased sensation/decreased strength

## 2023-04-28 NOTE — PROGRESS NOTE ADULT - ASSESSMENT
67M PMH HTN (on amlodipine 10mg PO qd), Osteoarthritis (s/p right total knee replacement 2 months ago), brain mass (s/p biopsy 4/7/23 @ [facility] with Dr. Tobar) presented for brain tumor resection now s/p resection.

## 2023-04-28 NOTE — PROGRESS NOTE ADULT - ASSESSMENT
67M with HTN, OA of the knee, status post replacement, now with newly diagnosed glioblastoma (MGMT pending), status post stereotactic needle biopsy and laser interstitial thermal therapy by Dr. Tobar (4/7/2023), s/p craniotomy for resection of L brain tumor 4/25/23.    Glioblastoma Multiforme  -MGMT status pending  -molecular studies pending  -SOC consists of concurrent chemoRT followed by adjuvant chemotherapy with temozolomide as per [facility] protocol  -pending AR, w initiation of chemoRT typicall 4 weeks post surgery    Pt seen and discussed with Dr. Causey

## 2023-04-28 NOTE — OCCUPATIONAL THERAPY INITIAL EVALUATION ADULT - GENERAL OBSERVATIONS, REHAB EVAL
OT IE completed. Pt admitted to [facility] 2/2 brain mass, now s/p L crani for tumor resection. Orders received, chart reviewed, pt cleared for OT by LES dueñas (nsx) and covering RN Mason. Pt received semi supine in bed, NAD, +heplock, +tele. Pt A&Ox4, agreeable to OT, and tolerated session well. Pt is highly motivated to participate and demo good functional activity tolerance throughout session.

## 2023-04-28 NOTE — OCCUPATIONAL THERAPY INITIAL EVALUATION ADULT - IMPAIRED TRANSFERS: SIT/STAND, REHAB EVAL
impaired balance/decreased flexibility/abnormal muscle tone/impaired postural control/decreased sensation/decreased strength

## 2023-04-28 NOTE — OCCUPATIONAL THERAPY INITIAL EVALUATION ADULT - MODALITIES TREATMENT COMMENTS
Cranial Nerves II - XII: II: L eye strabismus Vision H-Test: inconsistent tracking to all visual fields Convergence/Divergence: absent convergence; Vision Quadrant Test: intact/all fields full to confrontation V: facial sensation intact to light touch V1-V3 b/l VII: no ptosis, impaired R eyebrow raise, R facial droop VIII: hearing intact to finger rub b/l  XI: head turning intact, absent R shoulder shrug, L shoulder shrug intact  b/l XII: tongue protrusion midline. // Pt able to ambulate 4 R side steps along EOB with Max Ax1 2/2 R sided weakness with L sided leaning throughout, and impaired ability to weight shift with significantly decreased RLE step length. Pt required R knee blocking throughout to prevent buckling.

## 2023-04-29 LAB
ANION GAP SERPL CALC-SCNC: 9 MMOL/L — SIGNIFICANT CHANGE UP (ref 5–17)
BUN SERPL-MCNC: 19 MG/DL — SIGNIFICANT CHANGE UP (ref 7–23)
CALCIUM SERPL-MCNC: 9.2 MG/DL — SIGNIFICANT CHANGE UP (ref 8.4–10.5)
CHLORIDE SERPL-SCNC: 102 MMOL/L — SIGNIFICANT CHANGE UP (ref 96–108)
CO2 SERPL-SCNC: 27 MMOL/L — SIGNIFICANT CHANGE UP (ref 22–31)
CREAT SERPL-MCNC: 0.76 MG/DL — SIGNIFICANT CHANGE UP (ref 0.5–1.3)
EGFR: 99 ML/MIN/1.73M2 — SIGNIFICANT CHANGE UP
GLUCOSE BLDC GLUCOMTR-MCNC: 136 MG/DL — HIGH (ref 70–99)
GLUCOSE BLDC GLUCOMTR-MCNC: 156 MG/DL — HIGH (ref 70–99)
GLUCOSE BLDC GLUCOMTR-MCNC: 170 MG/DL — HIGH (ref 70–99)
GLUCOSE BLDC GLUCOMTR-MCNC: 195 MG/DL — HIGH (ref 70–99)
GLUCOSE SERPL-MCNC: 156 MG/DL — HIGH (ref 70–99)
HCT VFR BLD CALC: 41.5 % — SIGNIFICANT CHANGE UP (ref 39–50)
HGB BLD-MCNC: 13.1 G/DL — SIGNIFICANT CHANGE UP (ref 13–17)
MAGNESIUM SERPL-MCNC: 2.1 MG/DL — SIGNIFICANT CHANGE UP (ref 1.6–2.6)
MCHC RBC-ENTMCNC: 28 PG — SIGNIFICANT CHANGE UP (ref 27–34)
MCHC RBC-ENTMCNC: 31.6 GM/DL — LOW (ref 32–36)
MCV RBC AUTO: 88.7 FL — SIGNIFICANT CHANGE UP (ref 80–100)
NRBC # BLD: 0 /100 WBCS — SIGNIFICANT CHANGE UP (ref 0–0)
PHOSPHATE SERPL-MCNC: 2.9 MG/DL — SIGNIFICANT CHANGE UP (ref 2.5–4.5)
PLATELET # BLD AUTO: 273 K/UL — SIGNIFICANT CHANGE UP (ref 150–400)
POTASSIUM SERPL-MCNC: 4.9 MMOL/L — SIGNIFICANT CHANGE UP (ref 3.5–5.3)
POTASSIUM SERPL-SCNC: 4.9 MMOL/L — SIGNIFICANT CHANGE UP (ref 3.5–5.3)
RBC # BLD: 4.68 M/UL — SIGNIFICANT CHANGE UP (ref 4.2–5.8)
RBC # FLD: 14.6 % — HIGH (ref 10.3–14.5)
SODIUM SERPL-SCNC: 138 MMOL/L — SIGNIFICANT CHANGE UP (ref 135–145)
WBC # BLD: 5.97 K/UL — SIGNIFICANT CHANGE UP (ref 3.8–10.5)
WBC # FLD AUTO: 5.97 K/UL — SIGNIFICANT CHANGE UP (ref 3.8–10.5)

## 2023-04-29 PROCEDURE — 99233 SBSQ HOSP IP/OBS HIGH 50: CPT

## 2023-04-29 PROCEDURE — 70553 MRI BRAIN STEM W/O & W/DYE: CPT | Mod: 26

## 2023-04-29 RX ADMIN — SODIUM CHLORIDE 2 GRAM(S): 9 INJECTION INTRAMUSCULAR; INTRAVENOUS; SUBCUTANEOUS at 00:28

## 2023-04-29 RX ADMIN — AMLODIPINE BESYLATE 10 MILLIGRAM(S): 2.5 TABLET ORAL at 06:21

## 2023-04-29 RX ADMIN — Medication 2: at 12:48

## 2023-04-29 RX ADMIN — Medication 2: at 19:04

## 2023-04-29 RX ADMIN — ENOXAPARIN SODIUM 40 MILLIGRAM(S): 100 INJECTION SUBCUTANEOUS at 22:02

## 2023-04-29 RX ADMIN — LEVETIRACETAM 750 MILLIGRAM(S): 250 TABLET, FILM COATED ORAL at 06:21

## 2023-04-29 RX ADMIN — Medication 4 MILLIGRAM(S): at 06:21

## 2023-04-29 RX ADMIN — Medication 4 MILLIGRAM(S): at 19:04

## 2023-04-29 RX ADMIN — LEVETIRACETAM 750 MILLIGRAM(S): 250 TABLET, FILM COATED ORAL at 19:04

## 2023-04-29 RX ADMIN — SODIUM CHLORIDE 2 GRAM(S): 9 INJECTION INTRAMUSCULAR; INTRAVENOUS; SUBCUTANEOUS at 19:04

## 2023-04-29 RX ADMIN — Medication 4 MILLIGRAM(S): at 00:28

## 2023-04-29 RX ADMIN — Medication 4 MILLIGRAM(S): at 12:48

## 2023-04-29 RX ADMIN — PANTOPRAZOLE SODIUM 40 MILLIGRAM(S): 20 TABLET, DELAYED RELEASE ORAL at 06:21

## 2023-04-29 RX ADMIN — Medication 2: at 22:02

## 2023-04-29 RX ADMIN — SODIUM CHLORIDE 2 GRAM(S): 9 INJECTION INTRAMUSCULAR; INTRAVENOUS; SUBCUTANEOUS at 10:16

## 2023-04-29 NOTE — PROGRESS NOTE ADULT - SUBJECTIVE AND OBJECTIVE BOX
HPI:  68 y/o male with h/o HTN, Osteoarthritis (s/p right total knee replacement 2 months ago), brain mass (s/p biopsy 4/7/23 @ [facility] with Dr. Tobar) presented for brain tumor resection. Per family, patient started to have right hand weakness about a month ago that got progressively worse, then went to ED where brain mass was diagnosed. Started keppra 750 mg BID for seizure prophylaxis. Described right arm weakness worsening after surgery and some right lower extremity weakness. States today, feels like his right lower extremity weakness is worsening. Family also reported patient just finished a taper of decadron. Denies headache, new weakness, numbness, tingling, chest pain, sob, nausea/vomiting, visual abnormalities.  (24 Apr 2023 18:02).    Hospital course:   4/24: Pre-op L crani tumor resection   4/25: preop L crani tumor resection today. POD 0 s/p L crani for tumor resection, frozen HGG. Given dilaudid 0.25mg for pain control. Failed dysphagia, made NPO, S&S evaluation in AM. PO meds converted to IV. Postop Na 133. S/p 150cc 3% bolus.   4/26: POD 1 L crani HGG. KYA o/n. 4PM BMP to f/u sodium, cardene gtt off, passed S+S eval, LE dopplers re-ordered because high risk 2/2 plegia on R-side showing b/l IM calf DVT, CTH today stable, afternoon BMP Na 138, Pend SDU tomorrow.   4/27: POD2 L crani HGG. KYA overnight. SDU today. Pend MRI brain w/ and w/o on SDU. NaCl tabs 2g q8hrs for hyponatremia. SQL starting tonight, will f/u anti-xa 4/29 @2am (4 hours after 2nd dose). Decadron taper to 2 BID.   4/28: POD 3 L crani HGG, KYA ovn, neuro stable  4/29: POD 4 Left craniotomy for resection of mass. Frozen = high grade glioma. RUE doppler is negative for thrombus. Numerological stable.    OVERNIGHT EVENTS: no major events overnight.    Vital Signs Last 24 Hrs  T(C): 36.5 (28 Apr 2023 21:08), Max: 36.8 (28 Apr 2023 08:10)  T(F): 97.7 (28 Apr 2023 21:08), Max: 98.2 (28 Apr 2023 08:10)  HR: 66 (28 Apr 2023 21:08) (54 - 66)  BP: 124/78 (28 Apr 2023 21:08) (120/76 - 149/84)  BP(mean): --  RR: 17 (28 Apr 2023 21:08) (17 - 18)  SpO2: 95% (28 Apr 2023 21:08) (95% - 100%)    Parameters below as of 28 Apr 2023 21:08  Patient On (Oxygen Delivery Method): room air        I&O's Summary    27 Apr 2023 07:01  -  28 Apr 2023 07:00  --------------------------------------------------------  IN: 180 mL / OUT: 1525 mL / NET: -1345 mL        PHYSICAL EXAM:  Constitutional: awake, alert, sitting up in bed. NAD.   Respiratory: non-labored breathing. Normal chest rise.   Cardiovascular: Regular rate and rhythm  Gastrointestinal:  Soft, nontender, nondistended.  .  Vascular: Extremities warm, no ulcers, no discoloration of skin.   Neurological: Gen: AA&O x 3, conversant, appropriate.      Right Facial. Disconjugate gaze otherwise CN II-XII grossly intact.    Motor: RUE withdraws, RLE HF 1/5, KE/KF/DF/PF 0/5, LUE/LLE 5/5    Sens: Sensation intact to light touch throughout.    Extremities: warm and well perfused   Wound/incision: L crani incision c/d/i.       TUBES/LINES:  none    DIET:  - consistent cabs - Vegan.    LABS:                        12.1   6.65  )-----------( 286      ( 28 Apr 2023 07:34 )             37.2     04-28    137  |  102  |  19  ----------------------------<  159<H>  4.0   |  28  |  0.77    Ca    8.6      28 Apr 2023 07:34  Phos  3.4     04-28  Mg     2.1     04-28              CAPILLARY BLOOD GLUCOSE      POCT Blood Glucose.: 179 mg/dL (28 Apr 2023 21:35)  POCT Blood Glucose.: 217 mg/dL (28 Apr 2023 17:25)  POCT Blood Glucose.: 190 mg/dL (28 Apr 2023 11:53)  POCT Blood Glucose.: 204 mg/dL (28 Apr 2023 06:17)      Drug Levels: [] N/A    CSF Analysis: [] N/A      Allergies    No Known Allergies    Intolerances      MEDICATIONS:  Antibiotics:    Neuro:  acetaminophen     Tablet .. 650 milliGRAM(s) Oral every 6 hours PRN  levETIRAcetam 750 milliGRAM(s) Oral two times a day  ondansetron Injectable 4 milliGRAM(s) IV Push every 6 hours PRN    Anticoagulation:  enoxaparin Injectable 40 milliGRAM(s) SubCutaneous every 24 hours    OTHER:  amLODIPine   Tablet 10 milliGRAM(s) Oral every 24 hours  dexAMETHasone     Tablet 4 milliGRAM(s) Oral every 6 hours  dexAMETHasone     Tablet   Oral   hydrALAZINE Injectable 10 milliGRAM(s) IV Push every 4 hours PRN  insulin lispro (ADMELOG) corrective regimen sliding scale   SubCutaneous Before meals and at bedtime  pantoprazole    Tablet 40 milliGRAM(s) Oral before breakfast  polyethylene glycol 3350 17 Gram(s) Oral two times a day  senna 2 Tablet(s) Oral at bedtime    IVF:  sodium chloride 2 Gram(s) Oral every 8 hours    CULTURES:    RADIOLOGY & ADDITIONAL TESTS:      ASSESSMENT:  67M with HTN, OA of the knee, status post replacement, now with newly diagnosed glioblastoma (MGMT pending), status post stereotactic needle biopsy and laser interstitial thermal therapy by Dr. Tobar (4/7/2023), s/p craniotomy for resection of L brain tumor 4/25/23.    BLIOGLASTOMA    Handoff    MEWS Score    Hypertension    Osteoarthritis    Brain mass    Brain tumor    Brain mass    Hypertension    DVT, lower extremity, distal    Bradycardia    Diabetes    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    S/P total knee replacement, right    [id number]        PLAN:  NEURO  - neuro/vitals q4h  - pain control w/ tylenol 1g q8  - cont keppra 750 bid  - decadron 4q6 taper over 1 week to 2mg BID  - pending postop MR Brain w/wo  - CT 4/26 stable    CARDIO  - SBP < 150, hydralazine prn  - h/o HTN: amlodipine 10 mg     PULM   - satting well on RA   - encourage IS    GI   - CCB diet  - Bowel regimen   - Protonix while on steroids  - BM 4/26    RENAL  - IVL  - s/p 150cc 3% bolus 4/25, NaCl 2g q8h  - voiding    ENDO   - ISS, A1c 6.5    HEME   - , SQL for DVT ppx  - b/l LE IM calf DVTs, repeat dopplers 5/1    ID   - postop Ancef  - afebrile    DVT PROPHYLAXIS:  [] Venodynes                                [] Heparin/Lovenox    DISPOSITION:    Assessment:  Present when checked    []  GCS  E   V  M     Heart Failure: []Acute, [] acute on chronic , []chronic  Heart Failure:  [] Diastolic (HFpEF), [] Systolic (HFrEF), []Combined (HFpEF and HFrEF), [] RHF, [] Pulm HTN, [] Other    [] ROMERO, [] ATN, [] AIN, [] other  [] CKD1, [] CKD2, [] CKD 3, [] CKD 4, [] CKD 5, []ESRD    Encephalopathy: [] Metabolic, [] Hepatic, [] toxic, [] Neurological, [] Other    Abnormal Nurtitional Status: [] malnurtition (see nutrition note), [ ]underweight: BMI < 19, [] morbid obesity: BMI >40, [] Cachexia    [] Sepsis  [] hypovolemic shock,[] cardiogenic shock, [] hemorrhagic shock, [] neuogenic shock  [] Acute Respiratory Failure  []Cerebral edema, [] Brain compression/ herniation,   [] Functional quadriplegia  [] Acute blood loss anemia

## 2023-04-29 NOTE — CONSULT NOTE ADULT - ASSESSMENT
67M PMH HTN (on amlodipine 10mg PO qd), Osteoarthritis (s/p right total knee replacement 2 months ago), brain mass (s/p biopsy 4/7/23 @ [facility] with Dr. Tobar) presented for brain tumor resection. Medicine consulted for pre-operative medicine risk stratification.     #Pre-op risk stratification: Patient without high risk cardiac hx or symptoms. METS >4 with ability to walk more than 4 blocks on level ground and > 2 flights of stairs. RCRI score 0 (3.9% risk), ROBERTSON 0.3% risk.  - patient low risk for intermediate risk procedure    #DM: given age likely represents T2DM v less likely hyperglycemia i/s/o prolonged steroid utilization.  evening of admission, A1c 6.8%. At goal A1c at this time, however will require monitoring during admission.   - recommend BG monitoring q6h while NPO -- after transitioning to diet patient should be receive BG monitoring with meals and at bedtime.   - start Franca     #HTN: on home amlodipine 10mg PO qd. Normotensive on admission. Last dose of anti-HTN med 4/24 AM.   - can continue home med post-procedure with hold parameters MAP >70
Neurosurgery    67 y o M with HTN, OA of the knee, status post replacement, now with newly diagnosed glioblastoma (MGMT pending), status post stereotactic needle biopsy and laser interstitial thermal therapy by Dr. Tobar (4/7/2023), s/p craniotomy for resection of L brain tumor 4/25/23.  PLAN:  NEURO  - neuro/vitals q4h  - pain control w/ tylenol 1g q8  - cont keppra 750 bid  - decadron 4q6 taper over 1 week to 2mg BID  - pending postop MR Brain w/wo  - CT 4/26 stable    CARDIO  - SBP < 150, hydralazine prn  - h/o HTN: amlodipine 10 mg     PULM   - satting well on RA   - encourage IS    GI   - CCB diet  - Bowel regimen   - Protonix while on steroids  - BM 4/26    RENAL  - IVL  - s/p 150cc 3% bolus 4/25, NaCl 2g q8h  - voiding    ENDO   - ISS, A1c 6.5    HEME   - , SQL for DVT ppx  - b/l LE IM calf DVTs, repeat dopplers 5/1    ID   - postop Ancef  - afebrile    DVT PROPHYLAXIS:  [] Venodynes                                [] Heparin/Lovenox    
67M with HTN, OA of the knee, status post replacement, now with newly diagnosed glioblastoma (MGMT pending), status post stereotactic needle biopsy and laser interstitial thermal therapy by Dr. Tobar (4/7/2023), s/p craniotomy for resection of L brain tumor 4/25/23.    Glioblastoma Multiforme  -MGMT status pending  -molecular studies pending  -SOC consists of concurrent chemoRT followed by adjuvant chemotherapy with temozolomide  -Referred to  for clinical trials evaluation    Pt seen and discussed with Dr. Causey

## 2023-04-29 NOTE — PROGRESS NOTE ADULT - SUBJECTIVE AND OBJECTIVE BOX
O/N Events: KYA  Subjective/ROS: No complaints. Denies HA, CP, SOB, n/v, changes in bowel/urinary habits.  12pt ROS otherwise negative.    VITALS  Vital Signs Last 24 Hrs  T(C): 36.6 (29 Apr 2023 08:24), Max: 36.8 (28 Apr 2023 17:10)  T(F): 97.9 (29 Apr 2023 08:24), Max: 98.2 (28 Apr 2023 17:10)  HR: 65 (29 Apr 2023 08:24) (52 - 66)  BP: 145/86 (29 Apr 2023 08:24) (120/76 - 149/84)  BP(mean): --  RR: 16 (29 Apr 2023 08:24) (16 - 18)  SpO2: 96% (29 Apr 2023 08:24) (95% - 100%)    Parameters below as of 29 Apr 2023 08:24  Patient On (Oxygen Delivery Method): room air        I&O's Summary    28 Apr 2023 07:01  -  29 Apr 2023 07:00  --------------------------------------------------------  IN: 180 mL / OUT: 560 mL / NET: -380 mL        CAPILLARY BLOOD GLUCOSE      POCT Blood Glucose.: 136 mg/dL (29 Apr 2023 06:07)  POCT Blood Glucose.: 179 mg/dL (28 Apr 2023 21:35)  POCT Blood Glucose.: 217 mg/dL (28 Apr 2023 17:25)  POCT Blood Glucose.: 190 mg/dL (28 Apr 2023 11:53)      PHYSICAL EXAM  General: A&Ox3; NAD  Head: NC/AT; PERRL; EOMI; anicteric sclera  Neck: Supple; no JVD  Respiratory: CTA B/L; no wheezes/crackles/rales auscultated w/ good air movement  Cardiovascular: Regular rhythm/rate; S1/S2; no gallops or murmurs auscultated  Gastrointestinal: Soft; NTND w/out rebound tenderness or guarding; bowel sounds normal  Extremities: WWP; no edema or cyanosis; radial/pedal pulses palpable  Neurological:  CNII-XII grossly intact; right sided weakness  Skin: No rashes noted  Vasc: +2 DP/PT pulses b/l   Psych: Appropriate Affect    MEDICATIONS  (STANDING):  amLODIPine   Tablet 10 milliGRAM(s) Oral every 24 hours  dexAMETHasone     Tablet 4 milliGRAM(s) Oral every 6 hours  dexAMETHasone     Tablet   Oral   enoxaparin Injectable 40 milliGRAM(s) SubCutaneous every 24 hours  insulin lispro (ADMELOG) corrective regimen sliding scale   SubCutaneous Before meals and at bedtime  levETIRAcetam 750 milliGRAM(s) Oral two times a day  pantoprazole    Tablet 40 milliGRAM(s) Oral before breakfast  polyethylene glycol 3350 17 Gram(s) Oral two times a day  senna 2 Tablet(s) Oral at bedtime  sodium chloride 2 Gram(s) Oral every 8 hours    MEDICATIONS  (PRN):  acetaminophen     Tablet .. 650 milliGRAM(s) Oral every 6 hours PRN Mild Pain (1 - 3)  hydrALAZINE Injectable 10 milliGRAM(s) IV Push every 4 hours PRN SBP > 150  ondansetron Injectable 4 milliGRAM(s) IV Push every 6 hours PRN Nausea and/or Vomiting      No Known Allergies      LABS                        13.1   5.97  )-----------( 273      ( 29 Apr 2023 05:30 )             41.5     04-29    138  |  102  |  19  ----------------------------<  156<H>  4.9   |  27  |  0.76    Ca    9.2      29 Apr 2023 05:30  Phos  2.9     04-29  Mg     2.1     04-29                IMAGING/EKG/ETC: reviewed

## 2023-04-29 NOTE — PROGRESS NOTE ADULT - ASSESSMENT
67M PMH HTN (on amlodipine 10mg PO qd), Osteoarthritis (s/p right total knee replacement 2 months ago), brain mass (s/p biopsy 4/7/23 @ [facility] with Dr. Tobar) presented for brain tumor resection now s/p resection.

## 2023-04-29 NOTE — CONSULT NOTE ADULT - SUBJECTIVE AND OBJECTIVE BOX
Patient is a 67y old  Male who presents with a chief complaint of Brain Tumor (29 Apr 2023 10:30)      HPI:  68 y/o male with h/o HTN, Osteoarthritis (s/p right total knee replacement 2 months ago), brain mass (s/p biopsy 4/7/23 @ [facility] with Dr. Tobar) presented for brain tumor resection. Per family, patient started to have right hand weakness about a month ago that got progressively worse, then went to ED where brain mass was diagnosed. Started keppra 750 mg BID for seizure prophylaxis. Described right arm weakness worsening after surgery and some right lower extremity weakness. States today, feels like his right lower extremity weakness is worsening. Family also reported patient just finished a taper of decadron. Denies headache, new weakness, numbness, tingling, chest pain, sob, nausea/vomiting, visual abnormalities.  (24 Apr 2023 18:02)    PAST MEDICAL & SURGICAL HISTORY:  Hypertension      Osteoarthritis      Brain mass      S/P total knee replacement, right        MEDICATIONS  (STANDING):  amLODIPine   Tablet 10 milliGRAM(s) Oral every 24 hours  dexAMETHasone     Tablet   Oral   dexAMETHasone     Tablet 4 milliGRAM(s) Oral every 6 hours  enoxaparin Injectable 40 milliGRAM(s) SubCutaneous every 24 hours  insulin lispro (ADMELOG) corrective regimen sliding scale   SubCutaneous Before meals and at bedtime  levETIRAcetam 750 milliGRAM(s) Oral two times a day  pantoprazole    Tablet 40 milliGRAM(s) Oral before breakfast  polyethylene glycol 3350 17 Gram(s) Oral two times a day  senna 2 Tablet(s) Oral at bedtime  sodium chloride 2 Gram(s) Oral every 8 hours    MEDICATIONS  (PRN):  acetaminophen     Tablet .. 650 milliGRAM(s) Oral every 6 hours PRN Mild Pain (1 - 3)  hydrALAZINE Injectable 10 milliGRAM(s) IV Push every 4 hours PRN SBP > 150  ondansetron Injectable 4 milliGRAM(s) IV Push every 6 hours PRN Nausea and/or Vomiting       Social History:  denied ETOH abuse,  IVDA,  recreational drugs            -  Home Living Status :  lives with his wife and 1 of 2 adult sons in [place], [place] with 2 then 5 steps to enter , 1 flight to 2nd floor          -  Prior Home Care Services :  none            -  Family support : spouse, 2 adult sons, 1 daughter (RN)          -  Occupation :     Baseline Functional Level Prior to Admission :             - ADL's/ IADL's :  independent          - Ambulatory status prior to admission :   walked with a cane post R TKR        Radiology :     < from: CT Head No Cont (04.26.23 @ 10:42) >  ACC: [id number] EXAM:  CT BRAIN   ORDERED BY: RANJIT DEUTSCH     PROCEDURE DATE:  04/26/2023          INTERPRETATION:  PROCEDURE: CT head without intravenous contrast    CLINICAL INDICATION: Right lower extremity weakness    TECHNIQUE: Axial CT imaging of the brain is obtained with multiplanar   images reviewed and displayed with both bone and soft tissue algorithm.    COMPARISON: MRI brain 04/23/2023    FINDINGS:    The patient is status post left high parietal craniotomy and resection of   a superior-posterior frontal mass. A surgical cavity is visible with   small volume gas, as expected. Follow-up MRI will better assess the   cavity for presence of potential residual tumoral disease. There is   surrounding edema and local mass effect. No hydrocephalus or midline   shift. There is small extra-axial hemorrhage deep to the craniotomy site   and also over the left lateral frontal convexity on axial image 24, 3 mm   thick. Trace left frontal pneumocephalus also demonstrated.    No acute hemorrhage or infarct identified remote from the tumor/operative   site. Cerebral white matter demonstrates a background of mild small   vessel ischemic low-density. Chronic lacunar infarcts noted at the left   anterior caudate and putamen.    No additional calvarial findings from the craniotomy. Mastoids are clear.   Fluid seen layering in the left sphenoid sinus is nonspecific.      IMPRESSION:    Status post resection of left posterior frontal lobe mass. No large or   unexpected hemorrhage or infarct change.     REVIEW OF SYSTEMS:  per hpi   Vital Signs Last 24 Hrs  T(C): 36.6 (29 Apr 2023 08:24), Max: 36.8 (28 Apr 2023 17:10)  T(F): 97.9 (29 Apr 2023 08:24), Max: 98.2 (28 Apr 2023 17:10)  HR: 65 (29 Apr 2023 08:24) (52 - 66)  BP: 145/86 (29 Apr 2023 08:24) (120/76 - 149/84)  BP(mean): --  RR: 16 (29 Apr 2023 08:24) (16 - 18)  SpO2: 96% (29 Apr 2023 08:24) (95% - 100%)    Parameters below as of 29 Apr 2023 08:24  Patient On (Oxygen Delivery Method): room air      Physical Exam :  WDWN 67 y na lying comfortably in semi Hawthorne's position , awake , alert , no acute complaints ,  wife present at bedside     Head : normocephalic , atraumatic    Eyes : PERRLA , EOMI , no nystagmus , sclera anicteric    ENT : neg nasal discharge , uvula midline , no oropharyngeal erythema / exudate    Face: no ecchymosis , hematoma     Neck : supple , negative JVD , negative carotid bruits , no thyromegaly    Chest : CTA bilaterally      Cardiovascular : regular rate and rhythm      Abdomen : soft , non distended , non tender to palpation in all 4 quadrants ,   normal bowel sounds     Extremities :  R foot edema     Neurologic Exam :    Alert and oriented to person , place , date/year , speech fluent w/o dysarthria , follows commands , recent and remote memory intact , repetition intact , comprehension intact ,  attention/concentration intact , fund of knowledge appropriate    Cranial Nerves:     II :                         pupils equal , round and reactive to light , visual fields intact   III/ IV/VI :               ? strabismus (chronic per patient and wife) , extraocular movements intact , neg nystagmus , neg ptosis  V :                        facial sensation intact , V1-3 normal  VII :                     R droop , normal eye closure    VIII :                     hearing intact to finger rub bilaterally  IX and X :             no hoarseness , gag intact , palate/ uvula rise symmetrically  XI :                       0/5 R shrug / head turning  XII :                      no tongue deviation    Motor Exam:          Right UE:              0/5     Left UE:                  5/5 :     5/5 :   wrist extensors/ flexors  5/5 :   biceps  5/5 :   triceps  5/5 :   deltoid    negative pronator drift        Right LE:  trace R EHL / hip flexor , rest 0/5        Left LE:     5/5 : dorsiflexors   5/5 : plantar flexors  5/5 : quadriceps  5/5 : hamstrings  5/5 : hip flexors      Sensation :         intact to light touch x 4 extremities                            no neglect or extinction on double simultaneous testing      DTR :     biceps/brachioradialis : equal                      patella/ankle : equal     R Babinski       Gait :  not tested    PT/OT assessment on 4/28/2023    Bed Mobility: Rolling/Turning:     · Level of [place]	moderate assist (50% patients effort)  · Physical Assist/Nonphysical Assist	verbal cues; nonverbal cues (demo/gestures); 1 person assist  · Assistive Device	bed rails    Bed Mobility: Scooting/Bridging:     · Level of [place]	maximum assist (25% patients effort)  · Physical Assist/Nonphysical Assist	2 person assist; verbal cues    Bed Mobility: Sit to Supine:     · Level of [place]	maximum assist (25% patients effort)  · Physical Assist/Nonphysical Assist	2 person assist; nonverbal cues (demo/gestures); verbal cues; set-up required    Bed Mobility: Supine to Sit:     · Level of [place]	moderate assist (50% patients effort)  · Physical Assist/Nonphysical Assist	1 person assist; nonverbal cues (demo/gestures); set-up required; verbal cues  · Assistive Device	bed rails    Bed Mobility Analysis:     · Bed Mobility Limitations	decreased ability to use arms for pushing/pulling; decreased ability to use legs for bridging/pushing; impaired ability to control trunk for mobility  · Impairments Contributing to Impaired Bed Mobility	decreased strength; impaired balance; impaired postural control; decreased ROM    Transfer: Sit to Stand:     · Level of [place]	moderate assist (50% patients effort)  · Physical Assist/Nonphysical Assist	2 person assist; nonverbal cues (demo/gestures); verbal cues; set-up required  · Weight-Bearing Restrictions	full weight-bearing  · Assistive Device	bilateral hand held assist    Transfer: Stand to Sit:     · Level of [place]	moderate assist (50% patients effort)  · Physical Assist/Nonphysical Assist	2 person assist  · Weight-Bearing Restrictions	full weight-bearing  · Assistive Device	bilateral hand held assist    Sit/Stand Transfer Safety Analysis:     · Transfer Safety Concerns Noted	decreased weight-shifting ability; Pt performed STS from EOB at elevated seat height  · Impairments Contributing to Impaired Transfers	decreased strength; impaired balance; impaired postural control; impaired coordination; impaired motor control    Gait Skills:     · Level of [place]	maximum assist (25% patients effort)  · Physical Assist/Nonphysical Assist	nonverbal cues (demo/gestures); verbal cues; set-up required; 2 person assist  · Weight-Bearing Restrictions	full weight-bearing  · Assistive Device	rolling walker  · Gait Distance	4 side steps to the R    Gait Analysis:     · Gait Pattern Used	3-point gait  · Gait Deviations Noted	decreased che; decreased weight-shifting ability; decreased step length; decreased stride length  · Impairments Contributing to Gait Deviations	impaired balance; impaired motor control; decreased strength; impaired postural control; impaired coordination; ataxic    Balance Skills Assessment:     · Sitting Balance: Static	poor plus  · Sitting Balance: Dynamic	poor plus  · Sit-to-Stand Balance	poor balance  · Standing Balance: Static	poor balance  · Standing Balance: Dynamic	poor minus  · Systems Impairment Contributing to Balance Disturbance	musculoskeletal; neuromuscular  · Identified Impairments Contributing to Balance Disturbance	decreased strength; impaired postural control; impaired coordination; impaired motor control; decreased ROM    Sensory Examination:   Sensory Examination:    Grossly Intact:   · Gross Sensory Examination	Grossly Intact; mild impairment sensory loss      Light Touch Sensation:   · Left UE	mild impairment  · Right UE	mild impairment  · Left LE	mild impairment  · Right LE	mild impairment    · Coordination Assessed	finger to nose      Proprioception:   · Left UE	within normal limits  · Right UE	within normal limits  · Left LE	within normal limits  · Right LE	within normal limits  · Coordination Assessed	finger to nose      Fine Motor Coordination:   Fine Motor Coordination Examination:    Fine Motor Coordination:   · Left Hand, Finger to Nose	normal performance  · Right Hand, Finger to Nose	unable to perform  · Left Hand Thumb/Finger Opposition Skills	normal performance  · Right Hand Thumb/Finger Opposition Skills	unable to perform  · Left Hand, Diadochokinesis Skills	not tested  · Right Hand, Diadochokinesis Skills	unable to perform    Treatment Location:   · Comments	Cranial Nerves II - XII: II: L eye strabismus Vision H-Test: inconsistent tracking to all visual fields Convergence/Divergence: no convergence; Vision Quadrant Test: intact V: pt reported facial sensation intact to light touch V1-V3 b/l VII: no ptosis, R facial droop VIII: hearing intact to finger rub b/l  XI: head turning intact and no L shoulder shrug intact b/l XII: tongue protrusion midline.        Lower Body Dressing Training:     · Level of [place]	maximum assist (25% patients effort); adjusting socks sitting EOB requiring assist 2/2 impaired postural control and decreased functional reach 2/2 R sided weakness and decreased motor control.  · Physical Assist/Nonphysical Assist	1 person assist; verbal cues          PM&R Impression :  admitted for newly diagnosed glioblastoma , status post stereotactic needle biopsy and laser interstitial thermal therapy on 4/7/2023,  s/p craniotomy for resection of L brain tumor 4/25/23.      1) Right hemiplegia       Recommendations / Plan :       1) Physical / Occupational therapy focusing on therapeutic exercises , equipment evaluation , bed mobility/transfer out of bed evaluation , progressive ambulation with assistive devices prn .    2) Disposition plan recommendation : Patient is an excellent candidate for acute rehab placement.    - Patient has an acute neurologic diagnosis .    - Patient is extremely motivated in rehab and is actively participating in PT and OT and requires ongoing multiple therapy disciplines .    - Patient is not at baseline functional level .    - Patient requires an intensive inpatient acute rehabilitation therapy and can tolerate > 3 hrs of combined PT and OT per day and at least 5 days per week with a reasonable expectation that the patient will make measurable functional improvement that only an acute rehab program can provide .    - Patient requires Physiatry supervision specializing in acute inpatient rehab care .     - Has strong family support for post acute rehab stay .

## 2023-04-30 LAB
ANION GAP SERPL CALC-SCNC: 10 MMOL/L — SIGNIFICANT CHANGE UP (ref 5–17)
BUN SERPL-MCNC: 18 MG/DL — SIGNIFICANT CHANGE UP (ref 7–23)
CALCIUM SERPL-MCNC: 8.7 MG/DL — SIGNIFICANT CHANGE UP (ref 8.4–10.5)
CHLORIDE SERPL-SCNC: 101 MMOL/L — SIGNIFICANT CHANGE UP (ref 96–108)
CO2 SERPL-SCNC: 27 MMOL/L — SIGNIFICANT CHANGE UP (ref 22–31)
CREAT SERPL-MCNC: 0.7 MG/DL — SIGNIFICANT CHANGE UP (ref 0.5–1.3)
EGFR: 102 ML/MIN/1.73M2 — SIGNIFICANT CHANGE UP
GLUCOSE BLDC GLUCOMTR-MCNC: 118 MG/DL — HIGH (ref 70–99)
GLUCOSE BLDC GLUCOMTR-MCNC: 149 MG/DL — HIGH (ref 70–99)
GLUCOSE BLDC GLUCOMTR-MCNC: 167 MG/DL — HIGH (ref 70–99)
GLUCOSE SERPL-MCNC: 140 MG/DL — HIGH (ref 70–99)
HCT VFR BLD CALC: 40 % — SIGNIFICANT CHANGE UP (ref 39–50)
HGB BLD-MCNC: 13 G/DL — SIGNIFICANT CHANGE UP (ref 13–17)
LMWH PPP CHRO-ACNC: 0.3 IU/ML — LOW (ref 0.5–1.1)
MAGNESIUM SERPL-MCNC: 2 MG/DL — SIGNIFICANT CHANGE UP (ref 1.6–2.6)
MCHC RBC-ENTMCNC: 28.3 PG — SIGNIFICANT CHANGE UP (ref 27–34)
MCHC RBC-ENTMCNC: 32.5 GM/DL — SIGNIFICANT CHANGE UP (ref 32–36)
MCV RBC AUTO: 87.1 FL — SIGNIFICANT CHANGE UP (ref 80–100)
NRBC # BLD: 0 /100 WBCS — SIGNIFICANT CHANGE UP (ref 0–0)
PHOSPHATE SERPL-MCNC: 2.9 MG/DL — SIGNIFICANT CHANGE UP (ref 2.5–4.5)
PLATELET # BLD AUTO: 286 K/UL — SIGNIFICANT CHANGE UP (ref 150–400)
POTASSIUM SERPL-MCNC: 3.9 MMOL/L — SIGNIFICANT CHANGE UP (ref 3.5–5.3)
POTASSIUM SERPL-SCNC: 3.9 MMOL/L — SIGNIFICANT CHANGE UP (ref 3.5–5.3)
RBC # BLD: 4.59 M/UL — SIGNIFICANT CHANGE UP (ref 4.2–5.8)
RBC # FLD: 14.5 % — SIGNIFICANT CHANGE UP (ref 10.3–14.5)
SODIUM SERPL-SCNC: 138 MMOL/L — SIGNIFICANT CHANGE UP (ref 135–145)
WBC # BLD: 6.15 K/UL — SIGNIFICANT CHANGE UP (ref 3.8–10.5)
WBC # FLD AUTO: 6.15 K/UL — SIGNIFICANT CHANGE UP (ref 3.8–10.5)

## 2023-04-30 PROCEDURE — 99024 POSTOP FOLLOW-UP VISIT: CPT

## 2023-04-30 PROCEDURE — 99233 SBSQ HOSP IP/OBS HIGH 50: CPT

## 2023-04-30 RX ADMIN — Medication 4 MILLIGRAM(S): at 05:18

## 2023-04-30 RX ADMIN — LEVETIRACETAM 750 MILLIGRAM(S): 250 TABLET, FILM COATED ORAL at 05:18

## 2023-04-30 RX ADMIN — Medication 4 MILLIGRAM(S): at 21:44

## 2023-04-30 RX ADMIN — SODIUM CHLORIDE 2 GRAM(S): 9 INJECTION INTRAMUSCULAR; INTRAVENOUS; SUBCUTANEOUS at 13:00

## 2023-04-30 RX ADMIN — AMLODIPINE BESYLATE 10 MILLIGRAM(S): 2.5 TABLET ORAL at 05:18

## 2023-04-30 RX ADMIN — Medication 100 MILLIGRAM(S): at 21:44

## 2023-04-30 RX ADMIN — SODIUM CHLORIDE 2 GRAM(S): 9 INJECTION INTRAMUSCULAR; INTRAVENOUS; SUBCUTANEOUS at 21:44

## 2023-04-30 RX ADMIN — SODIUM CHLORIDE 2 GRAM(S): 9 INJECTION INTRAMUSCULAR; INTRAVENOUS; SUBCUTANEOUS at 02:58

## 2023-04-30 RX ADMIN — Medication 2: at 12:56

## 2023-04-30 RX ADMIN — LEVETIRACETAM 750 MILLIGRAM(S): 250 TABLET, FILM COATED ORAL at 17:38

## 2023-04-30 RX ADMIN — ENOXAPARIN SODIUM 40 MILLIGRAM(S): 100 INJECTION SUBCUTANEOUS at 21:44

## 2023-04-30 RX ADMIN — PANTOPRAZOLE SODIUM 40 MILLIGRAM(S): 20 TABLET, DELAYED RELEASE ORAL at 07:56

## 2023-04-30 RX ADMIN — Medication 4 MILLIGRAM(S): at 13:00

## 2023-04-30 NOTE — PROGRESS NOTE ADULT - SUBJECTIVE AND OBJECTIVE BOX
HPI:  68 y/o male with h/o HTN, Osteoarthritis (s/p right total knee replacement 2 months ago), brain mass (s/p biopsy 4/7/23 @ [facility] with Dr. Tobar) presented for brain tumor resection. Per family, patient started to have right hand weakness about a month ago that got progressively worse, then went to ED where brain mass was diagnosed. Started keppra 750 mg BID for seizure prophylaxis. Described right arm weakness worsening after surgery and some right lower extremity weakness. States today, feels like his right lower extremity weakness is worsening. Family also reported patient just finished a taper of decadron. Denies headache, new weakness, numbness, tingling, chest pain, sob, nausea/vomiting, visual abnormalities.  (24 Apr 2023 18:02)    OVERNIGHT EVENTS: POD 5 L crani. KYA overnight. Neuro stable.     Hospital Course:  4/24: Pre-op L crani tumor resection   4/25: preop L crani tumor resection today. POD 0 s/p L crani for tumor resection, frozen HGG. Given dilaudid 0.25mg for pain control. Failed dysphagia, made NPO, S&S evaluation in AM. PO meds converted to IV. Postop Na 133. S/p 150cc 3% bolus.   4/26: POD 1 L crani HGG. KYA o/n. 4PM BMP to f/u sodium, cardene gtt off, passed S+S eval, LE dopplers re-ordered because high risk 2/2 plegia on R-side showing b/l IM calf DVT, CTH today stable, afternoon BMP Na 138, Pend SDU tomorrow.   4/27: POD2 L crani HGG. KYA overnight. SDU today. Pend MRI brain w/ and w/o on SDU. NaCl tabs 2g q8hrs for hyponatremia. SQL starting tonight, will f/u anti-xa 4/29 @2am (4 hours after 2nd dose). Decadron taper to 2 BID.   4/28: POD 3 L crani HGG, KYA ovn, neuro stable. Bowel regimen increased. RUE doppler completed for RUE edema, negative. Pending MRI.  4/29: POD 4 L crani HCG, KYA o/n. Neuro stable, pending repeat dopplers Monday, pending AR. Postop MR completed, follow up read.   4/30: POD 5 L crani. KYA overnight. Neuro stable.     Vital Signs Last 24 Hrs  T(C): 36.7 (29 Apr 2023 21:17), Max: 36.7 (29 Apr 2023 05:18)  T(F): 98.1 (29 Apr 2023 21:17), Max: 98.1 (29 Apr 2023 21:17)  HR: 57 (29 Apr 2023 21:17) (52 - 65)  BP: 144/78 (29 Apr 2023 21:17) (120/73 - 146/79)  BP(mean): 100 (29 Apr 2023 21:17) (100 - 100)  RR: 16 (29 Apr 2023 21:17) (16 - 18)  SpO2: 96% (29 Apr 2023 21:17) (95% - 100%)    Parameters below as of 29 Apr 2023 21:17  Patient On (Oxygen Delivery Method): room air        I&O's Summary    28 Apr 2023 07:01  -  29 Apr 2023 07:00  --------------------------------------------------------  IN: 180 mL / OUT: 560 mL / NET: -380 mL        PHYSICAL EXAM:  Constitutional: awake, alert, sitting up in bed. NAD.   Respiratory: non-labored breathing. Normal chest rise.   Cardiovascular: Regular rate and rhythm  Gastrointestinal:  Soft, nontender, nondistended.  .  Vascular: Extremities warm, no ulcers, no discoloration of skin.   Neurological: Gen: AA&O x 3, conversant, appropriate.      Right Facial. Disconjugate gaze otherwise CN II-XII grossly intact.    Motor: RUE HG 1, tricep 2, bicep 0, RLE HF 1/5, KE/KF/DF/PF 0/5, LUE/LLE 5/5    Sens: Sensation intact to light touch throughout.    Extremities: warm and well perfused   Wound/incision: L crani incision c/d/i.     LABS:                        13.1   5.97  )-----------( 273      ( 29 Apr 2023 05:30 )             41.5     04-29    138  |  102  |  19  ----------------------------<  156<H>  4.9   |  27  |  0.76    Ca    9.2      29 Apr 2023 05:30  Phos  2.9     04-29  Mg     2.1     04-29              CAPILLARY BLOOD GLUCOSE      POCT Blood Glucose.: 156 mg/dL (29 Apr 2023 21:58)  POCT Blood Glucose.: 170 mg/dL (29 Apr 2023 18:46)  POCT Blood Glucose.: 195 mg/dL (29 Apr 2023 12:34)  POCT Blood Glucose.: 136 mg/dL (29 Apr 2023 06:07)      Drug Levels: [] N/A    CSF Analysis: [] N/A      Allergies    No Known Allergies    Intolerances      MEDICATIONS:  Antibiotics:    Neuro:  acetaminophen     Tablet .. 650 milliGRAM(s) Oral every 6 hours PRN  levETIRAcetam 750 milliGRAM(s) Oral two times a day  ondansetron Injectable 4 milliGRAM(s) IV Push every 6 hours PRN    Anticoagulation:  enoxaparin Injectable 40 milliGRAM(s) SubCutaneous every 24 hours    OTHER:  amLODIPine   Tablet 10 milliGRAM(s) Oral every 24 hours  dexAMETHasone     Tablet   Oral   dexAMETHasone     Tablet 4 milliGRAM(s) Oral every 8 hours  hydrALAZINE Injectable 10 milliGRAM(s) IV Push every 4 hours PRN  insulin lispro (ADMELOG) corrective regimen sliding scale   SubCutaneous Before meals and at bedtime  pantoprazole    Tablet 40 milliGRAM(s) Oral before breakfast  polyethylene glycol 3350 17 Gram(s) Oral two times a day  senna 2 Tablet(s) Oral at bedtime    IVF:  sodium chloride 2 Gram(s) Oral every 8 hours    CULTURES:    RADIOLOGY & ADDITIONAL TESTS:      ASSESSMENT:  68 y/o M PMH HTN, OA (s/p R TKR 2 months ago), brain mass (s/p biopsy 4/7/23 at [facility] w/ Dr. Tobar) presents for pre-op for L crani tumor resection 4/24/23. Now s/p L crani for tumor resection, frozen HGG (4/25).    NEURO  - neuro/vitals q4h  - pain control w/ tylenol prn  - cont keppra 750 bid  - decadron 4q6 taper over 1 week to 2mg BID  - postop MR Brain w/wo complete 4/29: f/u final read  - CT 4/26 stable    CARDIO  - SBP < 150, hydralazine prn  - h/o HTN: amlodipine 10 mg     PULM   - satting well on RA   - encourage IS    GI   - CCB diet  - Bowel regimen   - Protonix while on steroids  - last BM 4/29    RENAL  - IVL  - salt tabs 2g q8h  - voiding    ENDO   - ISS, A1c 6.5    HEME   - SQL for DVT ppx, no SCDs b/l due to DVTs  - b/l LE IM calf DVTs on dopplers 4/26, pend repeat dopplers 5/1  - RUE doppler completed for edema 4/28: negative for DVT    ID   - afebrile    DISPO: SDU status, PT/OT recommending AR, full code    D/W Dr. Johnston

## 2023-04-30 NOTE — PROGRESS NOTE ADULT - SUBJECTIVE AND OBJECTIVE BOX
O/N Events:  Subjective/ROS: Denies HA, CP, SOB, n/v, changes in bowel/urinary habits.  12pt ROS otherwise negative.    VITALS  Vital Signs Last 24 Hrs  T(C): 36.5 (30 Apr 2023 08:59), Max: 36.7 (29 Apr 2023 21:17)  T(F): 97.7 (30 Apr 2023 08:59), Max: 98.1 (29 Apr 2023 21:17)  HR: 58 (30 Apr 2023 08:59) (45 - 58)  BP: 123/70 (30 Apr 2023 08:59) (120/73 - 174/96)  BP(mean): 111 (30 Apr 2023 05:11) (100 - 122)  RR: 16 (30 Apr 2023 08:59) (16 - 18)  SpO2: 94% (30 Apr 2023 08:59) (94% - 100%)    Parameters below as of 30 Apr 2023 08:59  Patient On (Oxygen Delivery Method): room air        I&O's Summary    29 Apr 2023 07:01  -  30 Apr 2023 07:00  --------------------------------------------------------  IN: 0 mL / OUT: 575 mL / NET: -575 mL        CAPILLARY BLOOD GLUCOSE      POCT Blood Glucose.: 118 mg/dL (30 Apr 2023 07:52)  POCT Blood Glucose.: 156 mg/dL (29 Apr 2023 21:58)  POCT Blood Glucose.: 170 mg/dL (29 Apr 2023 18:46)  POCT Blood Glucose.: 195 mg/dL (29 Apr 2023 12:34)      PHYSICAL EXAM  General: A&Ox3; NAD  Head: NC/AT; PERRL; EOMI; anicteric sclera  Neck: Supple; no JVD  Respiratory: CTA B/L; no wheezes/crackles/rales auscultated w/ good air movement  Cardiovascular: Regular rhythm/rate; S1/S2; no gallops or murmurs auscultated  Gastrointestinal: Soft; NTND w/out rebound tenderness or guarding; bowel sounds normal  Extremities: WWP; no edema or cyanosis; radial/pedal pulses palpable  Neurological:  CNII-XII grossly intact; right sided weakness  Skin: No rashes noted  Vasc: +2 DP/PT pulses b/l   Psych: Appropriate Affect      MEDICATIONS  (STANDING):  amLODIPine   Tablet 10 milliGRAM(s) Oral every 24 hours  dexAMETHasone     Tablet   Oral   dexAMETHasone     Tablet 4 milliGRAM(s) Oral every 8 hours  enoxaparin Injectable 40 milliGRAM(s) SubCutaneous every 24 hours  insulin lispro (ADMELOG) corrective regimen sliding scale   SubCutaneous Before meals and at bedtime  levETIRAcetam 750 milliGRAM(s) Oral two times a day  pantoprazole    Tablet 40 milliGRAM(s) Oral before breakfast  polyethylene glycol 3350 17 Gram(s) Oral two times a day  senna 2 Tablet(s) Oral at bedtime  sodium chloride 2 Gram(s) Oral every 8 hours    MEDICATIONS  (PRN):  acetaminophen     Tablet .. 650 milliGRAM(s) Oral every 6 hours PRN Mild Pain (1 - 3)  hydrALAZINE Injectable 10 milliGRAM(s) IV Push every 4 hours PRN SBP > 150  ondansetron Injectable 4 milliGRAM(s) IV Push every 6 hours PRN Nausea and/or Vomiting      No Known Allergies      LABS                        13.0   6.15  )-----------( 286      ( 30 Apr 2023 07:17 )             40.0     04-30    138  |  101  |  18  ----------------------------<  140<H>  3.9   |  27  |  0.70    Ca    8.7      30 Apr 2023 07:17  Phos  2.9     04-30  Mg     2.0     04-30                IMAGING/EKG/ETC: reviewed

## 2023-04-30 NOTE — PROGRESS NOTE ADULT - ASSESSMENT
67M PMH HTN (on amlodipine 10mg PO qd), Osteoarthritis (s/p right total knee replacement 2 months ago), brain mass (s/p biopsy 4/7/23 @ [facility] with Dr. Tobar) presented for brain tumor resection now s/p resection.

## 2023-05-01 LAB
ANION GAP SERPL CALC-SCNC: 7 MMOL/L — SIGNIFICANT CHANGE UP (ref 5–17)
BUN SERPL-MCNC: 17 MG/DL — SIGNIFICANT CHANGE UP (ref 7–23)
CALCIUM SERPL-MCNC: 9.4 MG/DL — SIGNIFICANT CHANGE UP (ref 8.4–10.5)
CHLORIDE SERPL-SCNC: 100 MMOL/L — SIGNIFICANT CHANGE UP (ref 96–108)
CO2 SERPL-SCNC: 33 MMOL/L — HIGH (ref 22–31)
CREAT SERPL-MCNC: 0.77 MG/DL — SIGNIFICANT CHANGE UP (ref 0.5–1.3)
EGFR: 99 ML/MIN/1.73M2 — SIGNIFICANT CHANGE UP
GLUCOSE BLDC GLUCOMTR-MCNC: 126 MG/DL — HIGH (ref 70–99)
GLUCOSE BLDC GLUCOMTR-MCNC: 152 MG/DL — HIGH (ref 70–99)
GLUCOSE BLDC GLUCOMTR-MCNC: 161 MG/DL — HIGH (ref 70–99)
GLUCOSE BLDC GLUCOMTR-MCNC: 245 MG/DL — HIGH (ref 70–99)
GLUCOSE SERPL-MCNC: 155 MG/DL — HIGH (ref 70–99)
HCT VFR BLD CALC: 42.4 % — SIGNIFICANT CHANGE UP (ref 39–50)
HGB BLD-MCNC: 13.3 G/DL — SIGNIFICANT CHANGE UP (ref 13–17)
MAGNESIUM SERPL-MCNC: 2.1 MG/DL — SIGNIFICANT CHANGE UP (ref 1.6–2.6)
MCHC RBC-ENTMCNC: 27.8 PG — SIGNIFICANT CHANGE UP (ref 27–34)
MCHC RBC-ENTMCNC: 31.4 GM/DL — LOW (ref 32–36)
MCV RBC AUTO: 88.5 FL — SIGNIFICANT CHANGE UP (ref 80–100)
NRBC # BLD: 0 /100 WBCS — SIGNIFICANT CHANGE UP (ref 0–0)
PHOSPHATE SERPL-MCNC: 3.6 MG/DL — SIGNIFICANT CHANGE UP (ref 2.5–4.5)
PLATELET # BLD AUTO: 302 K/UL — SIGNIFICANT CHANGE UP (ref 150–400)
POTASSIUM SERPL-MCNC: 5.2 MMOL/L — SIGNIFICANT CHANGE UP (ref 3.5–5.3)
POTASSIUM SERPL-SCNC: 5.2 MMOL/L — SIGNIFICANT CHANGE UP (ref 3.5–5.3)
RBC # BLD: 4.79 M/UL — SIGNIFICANT CHANGE UP (ref 4.2–5.8)
RBC # FLD: 14.1 % — SIGNIFICANT CHANGE UP (ref 10.3–14.5)
SODIUM SERPL-SCNC: 140 MMOL/L — SIGNIFICANT CHANGE UP (ref 135–145)
WBC # BLD: 6.56 K/UL — SIGNIFICANT CHANGE UP (ref 3.8–10.5)
WBC # FLD AUTO: 6.56 K/UL — SIGNIFICANT CHANGE UP (ref 3.8–10.5)

## 2023-05-01 PROCEDURE — 71046 X-RAY EXAM CHEST 2 VIEWS: CPT | Mod: 26

## 2023-05-01 PROCEDURE — 99233 SBSQ HOSP IP/OBS HIGH 50: CPT

## 2023-05-01 PROCEDURE — 93970 EXTREMITY STUDY: CPT | Mod: 26

## 2023-05-01 RX ADMIN — AMLODIPINE BESYLATE 10 MILLIGRAM(S): 2.5 TABLET ORAL at 05:43

## 2023-05-01 RX ADMIN — Medication 4 MILLIGRAM(S): at 14:41

## 2023-05-01 RX ADMIN — Medication 4: at 16:57

## 2023-05-01 RX ADMIN — SODIUM CHLORIDE 2 GRAM(S): 9 INJECTION INTRAMUSCULAR; INTRAVENOUS; SUBCUTANEOUS at 21:37

## 2023-05-01 RX ADMIN — SODIUM CHLORIDE 2 GRAM(S): 9 INJECTION INTRAMUSCULAR; INTRAVENOUS; SUBCUTANEOUS at 05:43

## 2023-05-01 RX ADMIN — LEVETIRACETAM 750 MILLIGRAM(S): 250 TABLET, FILM COATED ORAL at 05:43

## 2023-05-01 RX ADMIN — PANTOPRAZOLE SODIUM 40 MILLIGRAM(S): 20 TABLET, DELAYED RELEASE ORAL at 06:00

## 2023-05-01 RX ADMIN — LEVETIRACETAM 750 MILLIGRAM(S): 250 TABLET, FILM COATED ORAL at 19:04

## 2023-05-01 RX ADMIN — Medication 4 MILLIGRAM(S): at 05:43

## 2023-05-01 RX ADMIN — Medication 2: at 06:57

## 2023-05-01 RX ADMIN — Medication 4 MILLIGRAM(S): at 21:37

## 2023-05-01 RX ADMIN — Medication 2: at 13:09

## 2023-05-01 RX ADMIN — ENOXAPARIN SODIUM 40 MILLIGRAM(S): 100 INJECTION SUBCUTANEOUS at 21:37

## 2023-05-01 NOTE — PROGRESS NOTE ADULT - SUBJECTIVE AND OBJECTIVE BOX
O/N Events:  Subjective/ROS: Denies HA, CP, SOB, n/v, changes in bowel/urinary habits.  12pt ROS otherwise negative.    VITALS  Vital Signs Last 24 Hrs  T(C): 36.8 (01 May 2023 08:53), Max: 36.9 (01 May 2023 00:05)  T(F): 98.3 (01 May 2023 08:53), Max: 98.5 (01 May 2023 00:05)  HR: 54 (01 May 2023 08:53) (48 - 61)  BP: 137/83 (01 May 2023 08:53) (121/73 - 146/72)  BP(mean): --  RR: 17 (01 May 2023 08:53) (16 - 18)  SpO2: 97% (01 May 2023 08:53) (94% - 97%)    Parameters below as of 01 May 2023 08:53  Patient On (Oxygen Delivery Method): room air        I&O's Summary    30 Apr 2023 07:01  -  01 May 2023 07:00  --------------------------------------------------------  IN: 150 mL / OUT: 200 mL / NET: -50 mL        CAPILLARY BLOOD GLUCOSE      POCT Blood Glucose.: 161 mg/dL (01 May 2023 13:01)  POCT Blood Glucose.: 152 mg/dL (01 May 2023 06:51)  POCT Blood Glucose.: 149 mg/dL (30 Apr 2023 21:54)      PHYSICAL EXAM  General: A&Ox3; NAD  Head: NC/AT; PERRL; EOMI; anicteric sclera  Neck: Supple; no JVD  Respiratory: CTA B/L; no wheezes/crackles/rales auscultated w/ good air movement  Cardiovascular: Regular rhythm/rate; S1/S2; no gallops or murmurs auscultated  Gastrointestinal: Soft; NTND w/out rebound tenderness or guarding; bowel sounds normal  Extremities: WWP; no edema or cyanosis; radial/pedal pulses palpable  Neurological:  CNII-XII grossly intact; right sided weakness  Skin: No rashes noted  Vasc: +2 DP/PT pulses b/l   Psych: Appropriate Affect      MEDICATIONS  (STANDING):  amLODIPine   Tablet 10 milliGRAM(s) Oral every 24 hours  dexAMETHasone     Tablet   Oral   dexAMETHasone     Tablet 4 milliGRAM(s) Oral every 8 hours  enoxaparin Injectable 40 milliGRAM(s) SubCutaneous every 24 hours  insulin lispro (ADMELOG) corrective regimen sliding scale   SubCutaneous Before meals and at bedtime  levETIRAcetam 750 milliGRAM(s) Oral two times a day  pantoprazole    Tablet 40 milliGRAM(s) Oral before breakfast  polyethylene glycol 3350 17 Gram(s) Oral two times a day  senna 2 Tablet(s) Oral at bedtime  sodium chloride 2 Gram(s) Oral every 8 hours    MEDICATIONS  (PRN):  acetaminophen     Tablet .. 650 milliGRAM(s) Oral every 6 hours PRN Mild Pain (1 - 3)  benzonatate 100 milliGRAM(s) Oral three times a day PRN Cough  hydrALAZINE Injectable 10 milliGRAM(s) IV Push every 4 hours PRN SBP > 150  ondansetron Injectable 4 milliGRAM(s) IV Push every 6 hours PRN Nausea and/or Vomiting      No Known Allergies      LABS                        13.3   6.56  )-----------( 302      ( 01 May 2023 05:30 )             42.4     05-01    140  |  100  |  17  ----------------------------<  155<H>  5.2   |  33<H>  |  0.77    Ca    9.4      01 May 2023 05:30  Phos  3.6     05-01  Mg     2.1     05-01                IMAGING/EKG/ETC: reviewed

## 2023-05-01 NOTE — PROGRESS NOTE ADULT - SUBJECTIVE AND OBJECTIVE BOX
Physical Medicine and Rehabilitation Progress Note :       Patient is a 67y old  Male who presents with a chief complaint of Brain Tumor (01 May 2023 09:31)      HPI:  68 y/o male with h/o HTN, Osteoarthritis (s/p right total knee replacement 2 months ago), brain mass (s/p biopsy 4/7/23 @ [facility] with Dr. Tobar) presented for brain tumor resection. Per family, patient started to have right hand weakness about a month ago that got progressively worse, then went to ED where brain mass was diagnosed. Started keppra 750 mg BID for seizure prophylaxis. Described right arm weakness worsening after surgery and some right lower extremity weakness. States today, feels like his right lower extremity weakness is worsening. Family also reported patient just finished a taper of decadron. Denies headache, new weakness, numbness, tingling, chest pain, sob, nausea/vomiting, visual abnormalities.  (24 Apr 2023 18:02)                            13.3   6.56  )-----------( 302      ( 01 May 2023 05:30 )             42.4       05-01    140  |  100  |  17  ----------------------------<  155<H>  5.2   |  33<H>  |  0.77    Ca    9.4      01 May 2023 05:30  Phos  3.6     05-01  Mg     2.1     05-01      Vital Signs Last 24 Hrs  T(C): 36.8 (01 May 2023 08:53), Max: 36.9 (01 May 2023 00:05)  T(F): 98.3 (01 May 2023 08:53), Max: 98.5 (01 May 2023 00:05)  HR: 54 (01 May 2023 08:53) (48 - 61)  BP: 137/83 (01 May 2023 08:53) (121/73 - 146/72)  BP(mean): --  RR: 17 (01 May 2023 08:53) (16 - 18)  SpO2: 97% (01 May 2023 08:53) (94% - 97%)    Parameters below as of 01 May 2023 08:53  Patient On (Oxygen Delivery Method): room air        MEDICATIONS  (STANDING):  amLODIPine   Tablet 10 milliGRAM(s) Oral every 24 hours  dexAMETHasone     Tablet   Oral   dexAMETHasone     Tablet 4 milliGRAM(s) Oral every 8 hours  enoxaparin Injectable 40 milliGRAM(s) SubCutaneous every 24 hours  insulin lispro (ADMELOG) corrective regimen sliding scale   SubCutaneous Before meals and at bedtime  levETIRAcetam 750 milliGRAM(s) Oral two times a day  pantoprazole    Tablet 40 milliGRAM(s) Oral before breakfast  polyethylene glycol 3350 17 Gram(s) Oral two times a day  senna 2 Tablet(s) Oral at bedtime  sodium chloride 2 Gram(s) Oral every 8 hours    MEDICATIONS  (PRN):  acetaminophen     Tablet .. 650 milliGRAM(s) Oral every 6 hours PRN Mild Pain (1 - 3)  benzonatate 100 milliGRAM(s) Oral three times a day PRN Cough  hydrALAZINE Injectable 10 milliGRAM(s) IV Push every 4 hours PRN SBP > 150  ondansetron Injectable 4 milliGRAM(s) IV Push every 6 hours PRN Nausea and/or Vomiting      4/30/2023 Functional Status Assessment :         Pain Assessment/Number Scale (0-10) Adult  Presence of Pain: denies pain/discomfort (Rating = 0)  Pain Rating (0-10): Rest: 0 (no pain/absence of nonverbal indicators of pain)  Pain Rating (0-10): Activity: 0 (no pain/absence of nonverbal indicators of pain)    Safety      [facility] Functional Assessment: Basic Mobility  Type of Assessment: Daily assessment  Turning from your back to your side while in a flat bed without using bedrails?: 2 = A lot of assistance  Moving from lying on your back to sitting on the flat side of a flat bed without using bedrails?: 2 = A lot of assistance  Moving to and from a bed to a chair (including a wheelchair)?: 1 = Total assistance  Standing up from a chair using your arms (e.g. wheelchair or bedside chair)?: 1 = Total assistance  Walking in hospital room?: 1 = Total assistance  Climbing 3-5 steps with a railing?: 1 = Total assistance  Score: 8   Row Comment: Ask the patient "How much help from another person do you currently need? (If the patient hasn't done an activity recently, how much help from another person do you think he/she needs if he/she tried?)    Therapeutic Interventions      Bed Mobility  Bed Mobility Training Rehab Potential: fair, will monitor progress closely  Bed Mobility Training Symptoms Noted During/After Treatment: fatigue  Bed Mobility Training Rolling/Turning: minimum assist (75% patient effort);  1 person assist  Bed Mobility Training Scooting: moderate assist (50% patient effort);  1 person assist  Bed Mobility Training Sit-to-Supine: moderate assist (50% patient effort);  1 person assist  Bed Mobility Training Supine-to-Sit: moderate assist (50% patient effort);  1 person assist  Bed Mobility Training Limitations: decreased strength;  impaired balance;  impaired motor control;  impaired postural control;  decreased ability to use arms for pushing/pulling;  decreased ability to use legs for bridging/pushing;  impaired ability to control trunk for mobility    Sit-Stand Transfer Training  Sit-to-Stand Transfer Training Rehab Potential: fair, will monitor progress closely  Sit-to-Stand Transfer Training Symptoms Noted During/After Treatment: fatigue  Transfer Training Sit-to-Stand Transfer: maximum assist (25% patient effort);  2 person assist;  B hand held assist  Transfer Training Stand-to-Sit Transfer: maximum assist (25% patient effort);  2 person assist;  B hand held assist  Sit-to-Stand Transfer Training Transfer Safety Analysis: decreased balance;  decreased weight-shifting ability;  decreased strength;  impaired balance;  impaired coordination;  impaired motor control;  impaired postural control    Gait Training  Gait Training Rehab Potential: fair, will monitor progress closely  Gait Training Symptoms Noted During/After Treatment: fatigue  Gait Training: maximum assist (25% patient effort);  2 person assist;  B hand held assist;  5 shuffle side steps +2 side steps  Gait Analysis: increased time in double stance;  decreased hip/knee flexion;  shuffling;  decreased step length;  decreased toe clearance;  decreased weight-shifting ability;  R knee blocked throughout, max VCs for weight shifting and sequencing, no step clearance on RLE;  decreased strength;  impaired balance;  impaired coordination;  impaired motor control;  impaired postural control    Therapeutic Exercise  Therapeutic Exercise Detail: seated therapeutic exercise: weight shifting ant/post/lateral x 10 repsstanding weight shifting x 10 reps         PM&R Impression : as above    Current Disposition Plan Recommendations :    acute rehab placement

## 2023-05-01 NOTE — PROGRESS NOTE ADULT - ASSESSMENT
Neurosurgery    67 y o Male s/p brain tumor resection (4/25) with Dr. Johnston. Doing well today, no complaints. Denies headaches, nausea, vomiting, changes in vision, new onset weakness/loss of sensation.     PLAN:  NEURO:  - vitals / neuro checks q4   - pain control with Tylenol as needed   - continue Keppra 750 mg BID   - Decadron 4q6 taper over 1 week to 2 mg BID   - postop MR brain w/wo complete 4/29   - CT 4/26 stable     CARDIOVASCULAR:  - SBP <150, hydralazine as needed   - h/o HTN, amlodipine 10 mg     PULMONARY:  - breathing well on RA   - encourage IS   - benzonatate as needed for cough   - AP chest x-ray today in setting of productive cough results pending     RENAL:  - voiding   - IVL  - salt tabs 2g q8h     GI:  - consistent carb / no snacks diet   - bowel regimen  - protonix while on steroids   - last BM 5/1    HEME:  - repeat b/l LE dopplers; consider vasc consult in setting of results   - SQL for DVT ppx   - no SCDs d/t DVT hx   - b/l LE IM calf DVTs on dopplers 4/26   - RUE doppler completed for edema 4/28; negative for DVT     ID:  - afebrile     ENDO:  - DM educator consult for new onset DM   - ISS     DISPO: SDU status, PM&R /  PT/OT recommending AR, full code

## 2023-05-01 NOTE — PROGRESS NOTE ADULT - SUBJECTIVE AND OBJECTIVE BOX
HPI:  68 y/o male with h/o HTN, Osteoarthritis (s/p right total knee replacement 2 months ago), brain mass (s/p biopsy 4/7/23 @ [facility] with Dr. Tobar) presented for brain tumor resection. Per family, patient started to have right hand weakness about a month ago that got progressively worse, then went to ED where brain mass was diagnosed. Started keppra 750 mg BID for seizure prophylaxis. Described right arm weakness worsening after surgery and some right lower extremity weakness. States today, feels like his right lower extremity weakness is worsening. Family also reported patient just finished a taper of decadron. Denies headache, new weakness, numbness, tingling, chest pain, sob, nausea/vomiting, visual abnormalities.  (24 Apr 2023 18:02)    INTERVAL EVENTS: No acute events overnight. Neuro exam stable.     HOSPITAL COURSE:  4/24: Pre-op L crani tumor resection   4/25: preop L crani tumor resection today. POD 0 s/p L crani for tumor resection, frozen HGG. Given dilaudid 0.25mg for pain control. Failed dysphagia, made NPO, S&S evaluation in AM. PO meds converted to IV. Postop Na 133. S/p 150cc 3% bolus.   4/26: POD 1 L crani HGG. KYA o/n. 4PM BMP to f/u sodium, cardene gtt off, passed S+S eval, LE dopplers re-ordered because high risk 2/2 plegia on R-side showing b/l IM calf DVT, CTH today stable, afternoon BMP Na 138, Pend SDU tomorrow.   4/27: POD2 L crani HGG. KYA overnight. SDU today. Pend MRI brain w/ and w/o on SDU. NaCl tabs 2g q8hrs for hyponatremia. SQL starting tonight, will f/u anti-xa 4/29 @2am (4 hours after 2nd dose). Decadron taper to 2 BID.   4/28: POD 3 L crani HGG, KYA ovn, neuro stable. Bowel regimen increased. RUE doppler completed for RUE edema, negative. Pending MRI.  4/29: POD 4 L crani HCG, KYA o/n. Neuro stable, pending repeat dopplers Monday, pending AR. Postop MR completed, follow up read.   4/30: POD 5 L crani. KYA overnight. Neuro stable. Anti-xa 0.30, within prophylactic range. pending AR. C/o productive cough, given tessalon pearls.   5/1: POD 6 L crani. KYA overnight. Neuro stable. Pending repeat LE dopplers.     Vital Signs Last 24 Hrs  T(C): 36.8 (01 May 2023 08:53), Max: 36.9 (01 May 2023 00:05)  T(F): 98.3 (01 May 2023 08:53), Max: 98.5 (01 May 2023 00:05)  HR: 54 (01 May 2023 08:53) (48 - 61)  BP: 137/83 (01 May 2023 08:53) (121/73 - 146/72)  BP(mean): --  RR: 17 (01 May 2023 08:53) (16 - 18)  SpO2: 97% (01 May 2023 08:53) (94% - 97%)    Parameters below as of 01 May 2023 08:53  Patient On (Oxygen Delivery Method): room air    I&O's Summary    30 Apr 2023 07:01  -  01 May 2023 07:00  --------------------------------------------------------  IN: 150 mL / OUT: 200 mL / NET: -50 mL    PHYSICAL EXAM:  General: NAD. Non-toxic appearing. Sleeping comfortably.   HENT: PERRL, 3 mm b/l. Disconjugate gaze (R eye exotropia). Slight R facial droop.   Cardiac: Regular rate and rhythm   Pulm: Lungs      TUBES/LINES:  [] Shields  [] Trach  [] NGT  [] PEG  [] Wound Drains  [] Others    DIET:  [] NPO  [] Mechanical  [] Tube feeds    LABS:                        13.3   6.56  )-----------( 302      ( 01 May 2023 05:30 )             42.4     05-01    140  |  100  |  17  ----------------------------<  155<H>  5.2   |  33<H>  |  0.77    Ca    9.4      01 May 2023 05:30  Phos  3.6     05-01  Mg     2.1     05-01              CAPILLARY BLOOD GLUCOSE      POCT Blood Glucose.: 152 mg/dL (01 May 2023 06:51)  POCT Blood Glucose.: 149 mg/dL (30 Apr 2023 21:54)  POCT Blood Glucose.: 167 mg/dL (30 Apr 2023 12:07)      Drug Levels: [] N/A    CSF Analysis: [] N/A      Allergies    No Known Allergies    Intolerances      MEDICATIONS:  Antibiotics:    Neuro:  acetaminophen     Tablet .. 650 milliGRAM(s) Oral every 6 hours PRN  levETIRAcetam 750 milliGRAM(s) Oral two times a day  ondansetron Injectable 4 milliGRAM(s) IV Push every 6 hours PRN    Anticoagulation:  enoxaparin Injectable 40 milliGRAM(s) SubCutaneous every 24 hours    OTHER:  amLODIPine   Tablet 10 milliGRAM(s) Oral every 24 hours  benzonatate 100 milliGRAM(s) Oral three times a day PRN  dexAMETHasone     Tablet   Oral   dexAMETHasone     Tablet 4 milliGRAM(s) Oral every 8 hours  hydrALAZINE Injectable 10 milliGRAM(s) IV Push every 4 hours PRN  insulin lispro (ADMELOG) corrective regimen sliding scale   SubCutaneous Before meals and at bedtime  pantoprazole    Tablet 40 milliGRAM(s) Oral before breakfast  polyethylene glycol 3350 17 Gram(s) Oral two times a day  senna 2 Tablet(s) Oral at bedtime    IVF:  sodium chloride 2 Gram(s) Oral every 8 hours    CULTURES:    RADIOLOGY & ADDITIONAL TESTS:      ASSESSMENT:  67y Male s/p    BLIOGLASTOMA    Handoff    MEWS Score    Hypertension    Osteoarthritis    Brain mass    Brain tumor    Brain mass    Hypertension    DVT, lower extremity, distal    Bradycardia    Diabetes    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    S/P total knee replacement, right    [email or web address]        PLAN:  NEURO:    CARDIOVASCULAR:    PULMONARY:    RENAL:    GI:    HEME:    ID:    ENDO:    DVT PROPHYLAXIS:  [] Venodynes                                [] Heparin/Lovenox    DISPOSITION:    Assessment:  Present when checked    []  GCS  E   V  M     Heart Failure: []Acute, [] acute on chronic , []chronic  Heart Failure:  [] Diastolic (HFpEF), [] Systolic (HFrEF), []Combined (HFpEF and HFrEF), [] RHF, [] Pulm HTN, [] Other    [] ROMERO, [] ATN, [] AIN, [] other  [] CKD1, [] CKD2, [] CKD 3, [] CKD 4, [] CKD 5, []ESRD    Encephalopathy: [] Metabolic, [] Hepatic, [] toxic, [] Neurological, [] Other    Abnormal Nurtitional Status: [] malnurtition (see nutrition note), [ ]underweight: BMI < 19, [] morbid obesity: BMI >40, [] Cachexia    [] Sepsis  [] hypovolemic shock,[] cardiogenic shock, [] hemorrhagic shock, [] neuogenic shock  [] Acute Respiratory Failure  []Cerebral edema, [] Brain compression/ herniation,   [] Functional quadriplegia  [] Acute blood loss anemia   HPI:  66 y/o male with h/o HTN, Osteoarthritis (s/p right total knee replacement 2 months ago), brain mass (s/p biopsy 4/7/23 @ [facility] with Dr. Tobar) presented for brain tumor resection. Per family, patient started to have right hand weakness about a month ago that got progressively worse, then went to ED where brain mass was diagnosed. Started keppra 750 mg BID for seizure prophylaxis. Described right arm weakness worsening after surgery and some right lower extremity weakness. States today, feels like his right lower extremity weakness is worsening. Family also reported patient just finished a taper of decadron. Denies headache, new weakness, numbness, tingling, chest pain, sob, nausea/vomiting, visual abnormalities.  (24 Apr 2023 18:02)    INTERVAL EVENTS: No acute events overnight. Neuro exam stable.     HOSPITAL COURSE:  4/24: Pre-op L crani tumor resection   4/25: preop L crani tumor resection today. POD 0 s/p L crani for tumor resection, frozen HGG. Given dilaudid 0.25mg for pain control. Failed dysphagia, made NPO, S&S evaluation in AM. PO meds converted to IV. Postop Na 133. S/p 150cc 3% bolus.   4/26: POD 1 L crani HGG. KYA o/n. 4PM BMP to f/u sodium, cardene gtt off, passed S+S eval, LE dopplers re-ordered because high risk 2/2 plegia on R-side showing b/l IM calf DVT, CTH today stable, afternoon BMP Na 138, Pend SDU tomorrow.   4/27: POD2 L crani HGG. KYA overnight. SDU today. Pend MRI brain w/ and w/o on SDU. NaCl tabs 2g q8hrs for hyponatremia. SQL starting tonight, will f/u anti-xa 4/29 @2am (4 hours after 2nd dose). Decadron taper to 2 BID.   4/28: POD 3 L crani HGG, KYA ovn, neuro stable. Bowel regimen increased. RUE doppler completed for RUE edema, negative. Pending MRI.  4/29: POD 4 L crani HCG, KYA o/n. Neuro stable, pending repeat dopplers Monday, pending AR. Postop MR completed, follow up read.   4/30: POD 5 L crani. KYA overnight. Neuro stable. Anti-xa 0.30, within prophylactic range. pending AR. C/o productive cough, given tessalon pearls.   5/1: POD 6 L crani. KYA overnight. Neuro stable. Pending repeat LE dopplers.     Vital Signs Last 24 Hrs  T(C): 36.8 (01 May 2023 08:53), Max: 36.9 (01 May 2023 00:05)  T(F): 98.3 (01 May 2023 08:53), Max: 98.5 (01 May 2023 00:05)  HR: 54 (01 May 2023 08:53) (48 - 61)  BP: 137/83 (01 May 2023 08:53) (121/73 - 146/72)  BP(mean): --  RR: 17 (01 May 2023 08:53) (16 - 18)  SpO2: 97% (01 May 2023 08:53) (94% - 97%)    Parameters below as of 01 May 2023 08:53  Patient On (Oxygen Delivery Method): room air    I&O's Summary    30 Apr 2023 07:01  -  01 May 2023 07:00  --------------------------------------------------------  IN: 150 mL / OUT: 200 mL / NET: -50 mL    PHYSICAL EXAM:  General: NAD. Non-toxic appearing. Sleeping comfortably.   HENT: PERRL, 3 mm b/l. Disconjugate gaze (R eye exotropia). R facial droop. Scalp incision without erythema, purulence.   Cardiac: Regular rate and rhythm. S1, S2 appreciated. No murmurs, gallops, rubs.   Pulm: Breathing non-labored on RA. Intermittent productive cough. CTAB, breath sounds vesicular.  Abd: Soft, non-tender, nondistended +BS x 4   Neuro: A&O x3. CN II-XII grossly intact. See HENT.  RUE/RLE 0/5 throughout. Does not withdraw to noxious stimuli  LUE/LLE 5/5 throughout.   Extremities: Skin warm, well-perfused. Right calf edema > left. Scar on R knee. No erythema, tenderness. DP 2+ b/l.      LABS:                        13.3   6.56  )-----------( 302      ( 01 May 2023 05:30 )             42.4     05-01    140  |  100  |  17  ----------------------------<  155<H>  5.2   |  33<H>  |  0.77    Ca    9.4      01 May 2023 05:30  Phos  3.6     05-01  Mg     2.1     05-01    CAPILLARY BLOOD GLUCOSE      POCT Blood Glucose.: 152 mg/dL (01 May 2023 06:51)  POCT Blood Glucose.: 149 mg/dL (30 Apr 2023 21:54)  POCT Blood Glucose.: 167 mg/dL (30 Apr 2023 12:07)      Drug Levels: [] N/A    CSF Analysis: [] N/A      Allergies    No Known Allergies    Intolerances      MEDICATIONS:  Antibiotics:    Neuro:  acetaminophen     Tablet .. 650 milliGRAM(s) Oral every 6 hours PRN  levETIRAcetam 750 milliGRAM(s) Oral two times a day  ondansetron Injectable 4 milliGRAM(s) IV Push every 6 hours PRN    Anticoagulation:  enoxaparin Injectable 40 milliGRAM(s) SubCutaneous every 24 hours    OTHER:  amLODIPine   Tablet 10 milliGRAM(s) Oral every 24 hours  benzonatate 100 milliGRAM(s) Oral three times a day PRN  dexAMETHasone     Tablet   Oral   dexAMETHasone     Tablet 4 milliGRAM(s) Oral every 8 hours  hydrALAZINE Injectable 10 milliGRAM(s) IV Push every 4 hours PRN  insulin lispro (ADMELOG) corrective regimen sliding scale   SubCutaneous Before meals and at bedtime  pantoprazole    Tablet 40 milliGRAM(s) Oral before breakfast  polyethylene glycol 3350 17 Gram(s) Oral two times a day  senna 2 Tablet(s) Oral at bedtime    IVF:  sodium chloride 2 Gram(s) Oral every 8 hours      RADIOLOGY & ADDITIONAL TESTS:  - AP Chest X-ray pending today     ASSESSMENT:  67y Male s/p brain tumor resection (4/25) with Dr. Johnston. Doing well today, no complaints. Denies headaches, nausea, vomiting, changes in vision, new onset weakness/loss of sensation.     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    S/P total knee replacement, right    [email or web address]        PLAN:  NEURO:  - vitals / neuro checks q4   - pain control with Tylenol as needed   - continue Keppra 750 mg BID   - Decadron 4q6 taper over 1 week to 2 mg BID   - postop MR brain w/wo complete 4/29   - CT 4/26 stable     CARDIOVASCULAR:  - SBP <150, hydralazine as needed   - h/o HTN, amlodipine 10 mg     PULMONARY:  - breathing well on RA   - encourage IS   - benzonatate as needed for cough   - AP chest x-ray today in setting of productive cough results pending     RENAL:  - voiding   - IVL  - salt tabs 2g q8h     GI:  - consistent carb / no snacks diet   - bowel regimen  - protonix while on steroids   - last BM 4/29     HEME:  - repeat b/l LE dopplers; consider vasc consult in setting of results   - SQL for DVT ppx   - no SCDs d/t DVT hx   - b/l LE IM calf DVTs on dopplers 4/26   - RUE doppler completed for edema 4/28; negative for DVT     ID:  - afebrile     ENDO:  - DM educator consult for new onset DM   - ISS     DISPO: SDU status, PT/OT recommending AR, full code     Case discussed with Dr. Johnston  HPI:  68 y/o male with h/o HTN, Osteoarthritis (s/p right total knee replacement 2 months ago), brain mass (s/p biopsy 4/7/23 @ [facility] with Dr. Tobar) presented for brain tumor resection. Per family, patient started to have right hand weakness about a month ago that got progressively worse, then went to ED where brain mass was diagnosed. Started keppra 750 mg BID for seizure prophylaxis. Described right arm weakness worsening after surgery and some right lower extremity weakness. States today, feels like his right lower extremity weakness is worsening. Family also reported patient just finished a taper of decadron. Denies headache, new weakness, numbness, tingling, chest pain, sob, nausea/vomiting, visual abnormalities.  (24 Apr 2023 18:02)    INTERVAL EVENTS: No acute events overnight. Neuro exam stable.     HOSPITAL COURSE:  4/24: Pre-op L crani tumor resection   4/25: preop L crani tumor resection today. POD 0 s/p L crani for tumor resection, frozen HGG. Given dilaudid 0.25mg for pain control. Failed dysphagia, made NPO, S&S evaluation in AM. PO meds converted to IV. Postop Na 133. S/p 150cc 3% bolus.   4/26: POD 1 L crani HGG. KYA o/n. 4PM BMP to f/u sodium, cardene gtt off, passed S+S eval, LE dopplers re-ordered because high risk 2/2 plegia on R-side showing b/l IM calf DVT, CTH today stable, afternoon BMP Na 138, Pend SDU tomorrow.   4/27: POD2 L crani HGG. KYA overnight. SDU today. Pend MRI brain w/ and w/o on SDU. NaCl tabs 2g q8hrs for hyponatremia. SQL starting tonight, will f/u anti-xa 4/29 @2am (4 hours after 2nd dose). Decadron taper to 2 BID.   4/28: POD 3 L crani HGG, KYA ovn, neuro stable. Bowel regimen increased. RUE doppler completed for RUE edema, negative. Pending MRI.  4/29: POD 4 L crani HCG, KYA o/n. Neuro stable, pending repeat dopplers Monday, pending AR. Postop MR completed, follow up read.   4/30: POD 5 L crani. KYA overnight. Neuro stable. Anti-xa 0.30, within prophylactic range. pending AR. C/o productive cough, given tessalon pearls.   5/1: POD 6 L crani. KYA overnight. Neuro stable. Pending repeat LE dopplers.     Vital Signs Last 24 Hrs  T(C): 36.8 (01 May 2023 08:53), Max: 36.9 (01 May 2023 00:05)  T(F): 98.3 (01 May 2023 08:53), Max: 98.5 (01 May 2023 00:05)  HR: 54 (01 May 2023 08:53) (48 - 61)  BP: 137/83 (01 May 2023 08:53) (121/73 - 146/72)  BP(mean): --  RR: 17 (01 May 2023 08:53) (16 - 18)  SpO2: 97% (01 May 2023 08:53) (94% - 97%)    Parameters below as of 01 May 2023 08:53  Patient On (Oxygen Delivery Method): room air    I&O's Summary    30 Apr 2023 07:01  -  01 May 2023 07:00  --------------------------------------------------------  IN: 150 mL / OUT: 200 mL / NET: -50 mL    PHYSICAL EXAM:  General: NAD. Non-toxic appearing. Sleeping comfortably.   HENT: PERRL, 3 mm b/l. Disconjugate gaze (R eye exotropia). R facial droop. Scalp incision without erythema, purulence.   Cardiac: Regular rate and rhythm. S1, S2 appreciated. No murmurs, gallops, rubs.   Pulm: Breathing non-labored on RA. Intermittent productive cough. CTAB, breath sounds vesicular.  Abd: Soft, non-tender, nondistended +BS x 4   Neuro: A&O x3. CN II-XII grossly intact. See HENT.  RUE/RLE 0/5 throughout. Does not withdraw to noxious stimuli  LUE/LLE 5/5 throughout.   Extremities: Skin warm, well-perfused. Right calf edema > left. Scar on R knee. No erythema, tenderness. DP 2+ b/l.      LABS:                        13.3   6.56  )-----------( 302      ( 01 May 2023 05:30 )             42.4     05-01    140  |  100  |  17  ----------------------------<  155<H>  5.2   |  33<H>  |  0.77    Ca    9.4      01 May 2023 05:30  Phos  3.6     05-01  Mg     2.1     05-01    CAPILLARY BLOOD GLUCOSE      POCT Blood Glucose.: 152 mg/dL (01 May 2023 06:51)  POCT Blood Glucose.: 149 mg/dL (30 Apr 2023 21:54)  POCT Blood Glucose.: 167 mg/dL (30 Apr 2023 12:07)      Allergies    No Known Allergies    Intolerances      MEDICATIONS:  Antibiotics:    Neuro:  acetaminophen     Tablet .. 650 milliGRAM(s) Oral every 6 hours PRN  levETIRAcetam 750 milliGRAM(s) Oral two times a day  ondansetron Injectable 4 milliGRAM(s) IV Push every 6 hours PRN    Anticoagulation:  enoxaparin Injectable 40 milliGRAM(s) SubCutaneous every 24 hours    OTHER:  amLODIPine   Tablet 10 milliGRAM(s) Oral every 24 hours  benzonatate 100 milliGRAM(s) Oral three times a day PRN  dexAMETHasone     Tablet   Oral   dexAMETHasone     Tablet 4 milliGRAM(s) Oral every 8 hours  hydrALAZINE Injectable 10 milliGRAM(s) IV Push every 4 hours PRN  insulin lispro (ADMELOG) corrective regimen sliding scale   SubCutaneous Before meals and at bedtime  pantoprazole    Tablet 40 milliGRAM(s) Oral before breakfast  polyethylene glycol 3350 17 Gram(s) Oral two times a day  senna 2 Tablet(s) Oral at bedtime    IVF:  sodium chloride 2 Gram(s) Oral every 8 hours      RADIOLOGY & ADDITIONAL TESTS:  - AP Chest X-ray pending today     ASSESSMENT:  67y Male s/p brain tumor resection (4/25) with Dr. Johnston. Doing well today, no complaints. Denies headaches, nausea, vomiting, changes in vision, new onset weakness/loss of sensation.     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    S/P total knee replacement, right    [email or web address]      PLAN:  NEURO:  - vitals / neuro checks q4   - pain control with Tylenol as needed   - continue Keppra 750 mg BID   - Decadron 4q6 taper over 1 week to 2 mg BID   - postop MR brain w/wo complete 4/29   - CT 4/26 stable     CARDIOVASCULAR:  - SBP <150, hydralazine as needed   - h/o HTN, amlodipine 10 mg     PULMONARY:  - breathing well on RA   - encourage IS   - benzonatate as needed for cough   - AP chest x-ray today in setting of productive cough results pending     RENAL:  - voiding   - IVL  - salt tabs 2g q8h     GI:  - consistent carb / no snacks diet   - bowel regimen  - protonix while on steroids   - last BM 5/1    HEME:  - repeat b/l LE dopplers; consider vasc consult in setting of results   - SQL for DVT ppx   - no SCDs d/t DVT hx   - b/l LE IM calf DVTs on dopplers 4/26   - RUE doppler completed for edema 4/28; negative for DVT     ID:  - afebrile     ENDO:  - DM educator consult for new onset DM   - ISS     DISPO: SDU status, PT/OT recommending AR, full code     Case discussed with Dr. Johnston  HPI:  66 y/o male with h/o HTN, Osteoarthritis (s/p right total knee replacement 2 months ago), brain mass (s/p biopsy 4/7/23 @ [facility] with Dr. Tobar) presented for brain tumor resection. Per family, patient started to have right hand weakness about a month ago that got progressively worse, then went to ED where brain mass was diagnosed. Started keppra 750 mg BID for seizure prophylaxis. Described right arm weakness worsening after surgery and some right lower extremity weakness. States today, feels like his right lower extremity weakness is worsening. Family also reported patient just finished a taper of decadron. Denies headache, new weakness, numbness, tingling, chest pain, sob, nausea/vomiting, visual abnormalities.  (24 Apr 2023 18:02)    INTERVAL EVENTS: No acute events overnight. Neuro exam stable.     HOSPITAL COURSE:  4/24: Pre-op L crani tumor resection   4/25: preop L crani tumor resection today. POD 0 s/p L crani for tumor resection, frozen HGG. Given dilaudid 0.25mg for pain control. Failed dysphagia, made NPO, S&S evaluation in AM. PO meds converted to IV. Postop Na 133. S/p 150cc 3% bolus.   4/26: POD 1 L crani HGG. KYA o/n. 4PM BMP to f/u sodium, cardene gtt off, passed S+S eval, LE dopplers re-ordered because high risk 2/2 plegia on R-side showing b/l IM calf DVT, CTH today stable, afternoon BMP Na 138, Pend SDU tomorrow.   4/27: POD2 L crani HGG. KYA overnight. SDU today. Pend MRI brain w/ and w/o on SDU. NaCl tabs 2g q8hrs for hyponatremia. SQL starting tonight, will f/u anti-xa 4/29 @2am (4 hours after 2nd dose). Decadron taper to 2 BID.   4/28: POD 3 L crani HGG, KYA ovn, neuro stable. Bowel regimen increased. RUE doppler completed for RUE edema, negative. Pending MRI.  4/29: POD 4 L crani HCG, KYA o/n. Neuro stable, pending repeat dopplers Monday, pending AR. Postop MR completed, follow up read.   4/30: POD 5 L crani. KYA overnight. Neuro stable. Anti-xa 0.30, within prophylactic range. pending AR. C/o productive cough, given tessalon pearls.   5/1: POD 6 L crani. KYA overnight. Neuro stable. Pending repeat LE dopplers.     Vital Signs Last 24 Hrs  T(C): 36.8 (01 May 2023 08:53), Max: 36.9 (01 May 2023 00:05)  T(F): 98.3 (01 May 2023 08:53), Max: 98.5 (01 May 2023 00:05)  HR: 54 (01 May 2023 08:53) (48 - 61)  BP: 137/83 (01 May 2023 08:53) (121/73 - 146/72)  BP(mean): --  RR: 17 (01 May 2023 08:53) (16 - 18)  SpO2: 97% (01 May 2023 08:53) (94% - 97%)    Parameters below as of 01 May 2023 08:53  Patient On (Oxygen Delivery Method): room air    I&O's Summary    30 Apr 2023 07:01  -  01 May 2023 07:00  --------------------------------------------------------  IN: 150 mL / OUT: 200 mL / NET: -50 mL    PHYSICAL EXAM:  General: NAD. Non-toxic appearing. Sleeping comfortably.   HENT: PERRL, 3 mm b/l. Disconjugate gaze (R eye exotropia). R facial droop. Scalp incision without erythema, purulence.   Cardiac: Regular rate and rhythm. S1, S2 appreciated. No murmurs, gallops, rubs.   Pulm: Breathing non-labored on RA. Intermittent productive cough. CTAB, breath sounds vesicular.  Abd: Soft, non-tender, nondistended +BS x 4   Neuro: A&O x3. Affect appropriate, speech coherent. CN II-XII grossly intact. See HENT.  RUE/RLE 0/5 throughout. Does not withdraw to noxious stimuli  LUE/LLE 5/5 throughout.   SILT throughout.   Extremities: Skin warm, well-perfused. Right calf edema > left. Scar on R knee. No erythema, tenderness. DP 2+ b/l.      LABS:                        13.3   6.56  )-----------( 302      ( 01 May 2023 05:30 )             42.4     05-01    140  |  100  |  17  ----------------------------<  155<H>  5.2   |  33<H>  |  0.77    Ca    9.4      01 May 2023 05:30  Phos  3.6     05-01  Mg     2.1     05-01    CAPILLARY BLOOD GLUCOSE      POCT Blood Glucose.: 152 mg/dL (01 May 2023 06:51)  POCT Blood Glucose.: 149 mg/dL (30 Apr 2023 21:54)  POCT Blood Glucose.: 167 mg/dL (30 Apr 2023 12:07)      Allergies    No Known Allergies    Intolerances      MEDICATIONS:  Antibiotics:    Neuro:  acetaminophen     Tablet .. 650 milliGRAM(s) Oral every 6 hours PRN  levETIRAcetam 750 milliGRAM(s) Oral two times a day  ondansetron Injectable 4 milliGRAM(s) IV Push every 6 hours PRN    Anticoagulation:  enoxaparin Injectable 40 milliGRAM(s) SubCutaneous every 24 hours    OTHER:  amLODIPine   Tablet 10 milliGRAM(s) Oral every 24 hours  benzonatate 100 milliGRAM(s) Oral three times a day PRN  dexAMETHasone     Tablet   Oral   dexAMETHasone     Tablet 4 milliGRAM(s) Oral every 8 hours  hydrALAZINE Injectable 10 milliGRAM(s) IV Push every 4 hours PRN  insulin lispro (ADMELOG) corrective regimen sliding scale   SubCutaneous Before meals and at bedtime  pantoprazole    Tablet 40 milliGRAM(s) Oral before breakfast  polyethylene glycol 3350 17 Gram(s) Oral two times a day  senna 2 Tablet(s) Oral at bedtime    IVF:  sodium chloride 2 Gram(s) Oral every 8 hours      RADIOLOGY & ADDITIONAL TESTS:  - AP Chest X-ray pending today     ASSESSMENT:  67y Male s/p brain tumor resection (4/25) with Dr. Johnston. Doing well today, no complaints. Denies headaches, nausea, vomiting, changes in vision, new onset weakness/loss of sensation.     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    S/P total knee replacement, right    [id number]_[id number]      PLAN:  NEURO:  - vitals / neuro checks q4   - pain control with Tylenol as needed   - continue Keppra 750 mg BID   - Decadron 4q6 taper over 1 week to 2 mg BID   - postop MR brain w/wo complete 4/29   - CT 4/26 stable     CARDIOVASCULAR:  - SBP <150, hydralazine as needed   - h/o HTN, amlodipine 10 mg     PULMONARY:  - breathing well on RA   - encourage IS   - benzonatate as needed for cough   - AP chest x-ray today in setting of productive cough results pending     RENAL:  - voiding   - IVL  - salt tabs 2g q8h     GI:  - consistent carb / no snacks diet   - bowel regimen  - protonix while on steroids   - last BM 5/1    HEME:  - repeat b/l LE dopplers; consider vasc consult in setting of results   - SQL for DVT ppx   - no SCDs d/t DVT hx   - b/l LE IM calf DVTs on dopplers 4/26   - RUE doppler completed for edema 4/28; negative for DVT     ID:  - afebrile     ENDO:  - DM educator consult for new onset DM   - ISS     DISPO: SDU status, PT/OT recommending AR, full code     Case discussed with Dr. Johnston  HPI:  68 y/o male with h/o HTN, Osteoarthritis (s/p right total knee replacement 2 months ago), brain mass (s/p biopsy 4/7/23 @ [facility] with Dr. Tobar) presented for brain tumor resection. Per family, patient started to have right hand weakness about a month ago that got progressively worse, then went to ED where brain mass was diagnosed. Started keppra 750 mg BID for seizure prophylaxis. Described right arm weakness worsening after surgery and some right lower extremity weakness. States today, feels like his right lower extremity weakness is worsening. Family also reported patient just finished a taper of decadron. Denies headache, new weakness, numbness, tingling, chest pain, sob, nausea/vomiting, visual abnormalities.  (24 Apr 2023 18:02)    INTERVAL EVENTS: No acute events overnight. Neuro exam stable.     HOSPITAL COURSE:  4/24: Pre-op L crani tumor resection   4/25: preop L crani tumor resection today. POD 0 s/p L crani for tumor resection, frozen HGG. Given dilaudid 0.25mg for pain control. Failed dysphagia, made NPO, S&S evaluation in AM. PO meds converted to IV. Postop Na 133. S/p 150cc 3% bolus.   4/26: POD 1 L crani HGG. KYA o/n. 4PM BMP to f/u sodium, cardene gtt off, passed S+S eval, LE dopplers re-ordered because high risk 2/2 plegia on R-side showing b/l IM calf DVT, CTH today stable, afternoon BMP Na 138, Pend SDU tomorrow.   4/27: POD2 L crani HGG. KYA overnight. SDU today. Pend MRI brain w/ and w/o on SDU. NaCl tabs 2g q8hrs for hyponatremia. SQL starting tonight, will f/u anti-xa 4/29 @2am (4 hours after 2nd dose). Decadron taper to 2 BID.   4/28: POD 3 L crani HGG, KYA ovn, neuro stable. Bowel regimen increased. RUE doppler completed for RUE edema, negative. Pending MRI.  4/29: POD 4 L crani HCG, KYA o/n. Neuro stable, pending repeat dopplers Monday, pending AR. Postop MR completed, follow up read.   4/30: POD 5 L crani. KYA overnight. Neuro stable. Anti-xa 0.30, within prophylactic range. pending AR. C/o productive cough, given tessalon pearls.   5/1: POD 6 L crani. KYA overnight. Neuro stable. Pending repeat LE dopplers.     Vital Signs Last 24 Hrs  T(C): 36.8 (01 May 2023 08:53), Max: 36.9 (01 May 2023 00:05)  T(F): 98.3 (01 May 2023 08:53), Max: 98.5 (01 May 2023 00:05)  HR: 54 (01 May 2023 08:53) (48 - 61)  BP: 137/83 (01 May 2023 08:53) (121/73 - 146/72)  BP(mean): --  RR: 17 (01 May 2023 08:53) (16 - 18)  SpO2: 97% (01 May 2023 08:53) (94% - 97%)    Parameters below as of 01 May 2023 08:53  Patient On (Oxygen Delivery Method): room air    I&O's Summary    30 Apr 2023 07:01  -  01 May 2023 07:00  --------------------------------------------------------  IN: 150 mL / OUT: 200 mL / NET: -50 mL    PHYSICAL EXAM:  General: NAD. Non-toxic appearing. Sleeping comfortably.   HENT: PERRL, 3 mm b/l. Disconjugate gaze (R eye exotropia). R facial droop. Scalp incision without erythema or purulence.   Cardiac: Regular rate and rhythm. S1, S2 appreciated. No murmurs, gallops, rubs.   Pulm: Breathing non-labored on RA. Intermittent productive cough. CTAB, breath sounds vesicular.  Abd: Soft, non-tender, nondistended +BS x 4   Neuro: A&O x3. Affect appropriate, speech coherent. CN II-XII grossly intact. See HENT.  RUE/RLE 0/5 throughout. Does not withdraw to noxious stimuli  LUE/LLE 5/5 throughout.   SILT throughout.   Extremities: Skin warm, well-perfused. Right calf edema > left. Scar on R knee. No erythema, tenderness. DP 2+ b/l.      LABS:                        13.3   6.56  )-----------( 302      ( 01 May 2023 05:30 )             42.4     05-01    140  |  100  |  17  ----------------------------<  155<H>  5.2   |  33<H>  |  0.77    Ca    9.4      01 May 2023 05:30  Phos  3.6     05-01  Mg     2.1     05-01    CAPILLARY BLOOD GLUCOSE      POCT Blood Glucose.: 152 mg/dL (01 May 2023 06:51)  POCT Blood Glucose.: 149 mg/dL (30 Apr 2023 21:54)  POCT Blood Glucose.: 167 mg/dL (30 Apr 2023 12:07)      Allergies    No Known Allergies    Intolerances      MEDICATIONS:  Antibiotics:    Neuro:  acetaminophen     Tablet .. 650 milliGRAM(s) Oral every 6 hours PRN  levETIRAcetam 750 milliGRAM(s) Oral two times a day  ondansetron Injectable 4 milliGRAM(s) IV Push every 6 hours PRN    Anticoagulation:  enoxaparin Injectable 40 milliGRAM(s) SubCutaneous every 24 hours    OTHER:  amLODIPine   Tablet 10 milliGRAM(s) Oral every 24 hours  benzonatate 100 milliGRAM(s) Oral three times a day PRN  dexAMETHasone     Tablet   Oral   dexAMETHasone     Tablet 4 milliGRAM(s) Oral every 8 hours  hydrALAZINE Injectable 10 milliGRAM(s) IV Push every 4 hours PRN  insulin lispro (ADMELOG) corrective regimen sliding scale   SubCutaneous Before meals and at bedtime  pantoprazole    Tablet 40 milliGRAM(s) Oral before breakfast  polyethylene glycol 3350 17 Gram(s) Oral two times a day  senna 2 Tablet(s) Oral at bedtime    IVF:  sodium chloride 2 Gram(s) Oral every 8 hours      RADIOLOGY & ADDITIONAL TESTS:  - AP Chest X-ray pending today     ASSESSMENT:  67y Male s/p brain tumor resection (4/25) with Dr. Johnston. Doing well today, no complaints. Denies headaches, nausea, vomiting, changes in vision, new onset weakness/loss of sensation.     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    S/P total knee replacement, right    SysAdmin_VstLnk      PLAN:  NEURO:  - vitals / neuro checks q4   - pain control with Tylenol as needed   - continue Keppra 750 mg BID   - Decadron 4q6 taper over 1 week to 2 mg BID   - postop MR brain w/wo complete 4/29   - CT 4/26 stable     CARDIOVASCULAR:  - SBP <150, hydralazine as needed   - h/o HTN, amlodipine 10 mg     PULMONARY:  - breathing well on RA   - encourage IS   - benzonatate as needed for cough   - AP chest x-ray today in setting of productive cough results pending     RENAL:  - voiding   - IVL  - salt tabs 2g q8h     GI:  - consistent carb / no snacks diet   - bowel regimen  - protonix while on steroids   - last BM 5/1    HEME:  - repeat b/l LE dopplers; consider vasc consult in setting of results   - SQL for DVT ppx   - no SCDs d/t DVT hx   - b/l LE IM calf DVTs on dopplers 4/26   - RUE doppler completed for edema 4/28; negative for DVT     ID:  - afebrile     ENDO:  - DM educator consult for new onset DM   - ISS     DISPO: SDU status, PT/OT recommending AR, full code     Case discussed with Dr. Johnston

## 2023-05-01 NOTE — PROGRESS NOTE ADULT - ASSESSMENT
67M PMH HTN (on amlodipine 10mg PO qd), Osteoarthritis (s/p right total knee replacement 2 months ago), brain mass (s/p biopsy 4/7/23 @ [facility] with Dr. Tobar) presented for brain tumor resection now s/p resection.

## 2023-05-02 ENCOUNTER — INPATIENT (INPATIENT)
Facility: HOSPITAL | Age: 68
LOS: 28 days | Discharge: NURSING, REHAB OR LONG-TERM CARE | DRG: 949 | End: 2023-05-31
Attending: PHYSICAL MEDICINE & REHABILITATION | Admitting: PHYSICAL MEDICINE & REHABILITATION
Payer: COMMERCIAL

## 2023-05-02 ENCOUNTER — TRANSCRIPTION ENCOUNTER (OUTPATIENT)
Age: 68
End: 2023-05-02

## 2023-05-02 VITALS
OXYGEN SATURATION: 99 % | HEART RATE: 60 BPM | WEIGHT: 218.26 LBS | RESPIRATION RATE: 16 BRPM | DIASTOLIC BLOOD PRESSURE: 77 MMHG | SYSTOLIC BLOOD PRESSURE: 131 MMHG | HEIGHT: 70 IN | TEMPERATURE: 98 F

## 2023-05-02 VITALS
RESPIRATION RATE: 17 BRPM | DIASTOLIC BLOOD PRESSURE: 76 MMHG | SYSTOLIC BLOOD PRESSURE: 129 MMHG | OXYGEN SATURATION: 98 % | HEART RATE: 65 BPM | TEMPERATURE: 98 F

## 2023-05-02 DIAGNOSIS — Z96.651 PRESENCE OF RIGHT ARTIFICIAL KNEE JOINT: Chronic | ICD-10-CM

## 2023-05-02 DIAGNOSIS — C71.9 MALIGNANT NEOPLASM OF BRAIN, UNSPECIFIED: ICD-10-CM

## 2023-05-02 LAB
ANION GAP SERPL CALC-SCNC: 10 MMOL/L — SIGNIFICANT CHANGE UP (ref 5–17)
BUN SERPL-MCNC: 18 MG/DL — SIGNIFICANT CHANGE UP (ref 7–23)
CALCIUM SERPL-MCNC: 8.4 MG/DL — SIGNIFICANT CHANGE UP (ref 8.4–10.5)
CHLORIDE SERPL-SCNC: 100 MMOL/L — SIGNIFICANT CHANGE UP (ref 96–108)
CO2 SERPL-SCNC: 28 MMOL/L — SIGNIFICANT CHANGE UP (ref 22–31)
CREAT SERPL-MCNC: 0.74 MG/DL — SIGNIFICANT CHANGE UP (ref 0.5–1.3)
EGFR: 100 ML/MIN/1.73M2 — SIGNIFICANT CHANGE UP
GLUCOSE BLDC GLUCOMTR-MCNC: 129 MG/DL — HIGH (ref 70–99)
GLUCOSE BLDC GLUCOMTR-MCNC: 139 MG/DL — HIGH (ref 70–99)
GLUCOSE BLDC GLUCOMTR-MCNC: 167 MG/DL — HIGH (ref 70–99)
GLUCOSE BLDC GLUCOMTR-MCNC: 202 MG/DL — HIGH (ref 70–99)
GLUCOSE SERPL-MCNC: 139 MG/DL — HIGH (ref 70–99)
HCT VFR BLD CALC: 43.9 % — SIGNIFICANT CHANGE UP (ref 39–50)
HGB BLD-MCNC: 14 G/DL — SIGNIFICANT CHANGE UP (ref 13–17)
MAGNESIUM SERPL-MCNC: 2.1 MG/DL — SIGNIFICANT CHANGE UP (ref 1.6–2.6)
MCHC RBC-ENTMCNC: 28.5 PG — SIGNIFICANT CHANGE UP (ref 27–34)
MCHC RBC-ENTMCNC: 31.9 GM/DL — LOW (ref 32–36)
MCV RBC AUTO: 89.4 FL — SIGNIFICANT CHANGE UP (ref 80–100)
NRBC # BLD: 0 /100 WBCS — SIGNIFICANT CHANGE UP (ref 0–0)
PHOSPHATE SERPL-MCNC: 4.2 MG/DL — SIGNIFICANT CHANGE UP (ref 2.5–4.5)
PLATELET # BLD AUTO: 256 K/UL — SIGNIFICANT CHANGE UP (ref 150–400)
POTASSIUM SERPL-MCNC: 4.5 MMOL/L — SIGNIFICANT CHANGE UP (ref 3.5–5.3)
POTASSIUM SERPL-SCNC: 4.5 MMOL/L — SIGNIFICANT CHANGE UP (ref 3.5–5.3)
RBC # BLD: 4.91 M/UL — SIGNIFICANT CHANGE UP (ref 4.2–5.8)
RBC # FLD: 14.5 % — SIGNIFICANT CHANGE UP (ref 10.3–14.5)
SODIUM SERPL-SCNC: 138 MMOL/L — SIGNIFICANT CHANGE UP (ref 135–145)
WBC # BLD: 7.33 K/UL — SIGNIFICANT CHANGE UP (ref 3.8–10.5)
WBC # FLD AUTO: 7.33 K/UL — SIGNIFICANT CHANGE UP (ref 3.8–10.5)

## 2023-05-02 PROCEDURE — 86850 RBC ANTIBODY SCREEN: CPT

## 2023-05-02 PROCEDURE — 88360 TUMOR IMMUNOHISTOCHEM/MANUAL: CPT

## 2023-05-02 PROCEDURE — A9585: CPT

## 2023-05-02 PROCEDURE — 85027 COMPLETE CBC AUTOMATED: CPT

## 2023-05-02 PROCEDURE — 82962 GLUCOSE BLOOD TEST: CPT

## 2023-05-02 PROCEDURE — 80053 COMPREHEN METABOLIC PANEL: CPT

## 2023-05-02 PROCEDURE — 92610 EVALUATE SWALLOWING FUNCTION: CPT

## 2023-05-02 PROCEDURE — 83735 ASSAY OF MAGNESIUM: CPT

## 2023-05-02 PROCEDURE — 86901 BLOOD TYPING SEROLOGIC RH(D): CPT

## 2023-05-02 PROCEDURE — 85730 THROMBOPLASTIN TIME PARTIAL: CPT

## 2023-05-02 PROCEDURE — 70553 MRI BRAIN STEM W/O & W/DYE: CPT

## 2023-05-02 PROCEDURE — 97162 PT EVAL MOD COMPLEX 30 MIN: CPT

## 2023-05-02 PROCEDURE — 97112 NEUROMUSCULAR REEDUCATION: CPT

## 2023-05-02 PROCEDURE — 97116 GAIT TRAINING THERAPY: CPT

## 2023-05-02 PROCEDURE — 83036 HEMOGLOBIN GLYCOSYLATED A1C: CPT

## 2023-05-02 PROCEDURE — C1713: CPT

## 2023-05-02 PROCEDURE — 70450 CT HEAD/BRAIN W/O DYE: CPT

## 2023-05-02 PROCEDURE — 86803 HEPATITIS C AB TEST: CPT

## 2023-05-02 PROCEDURE — 36415 COLL VENOUS BLD VENIPUNCTURE: CPT

## 2023-05-02 PROCEDURE — 71046 X-RAY EXAM CHEST 2 VIEWS: CPT

## 2023-05-02 PROCEDURE — 88342 IMHCHEM/IMCYTCHM 1ST ANTB: CPT

## 2023-05-02 PROCEDURE — 99233 SBSQ HOSP IP/OBS HIGH 50: CPT

## 2023-05-02 PROCEDURE — 86900 BLOOD TYPING SEROLOGIC ABO: CPT

## 2023-05-02 PROCEDURE — 84100 ASSAY OF PHOSPHORUS: CPT

## 2023-05-02 PROCEDURE — 88331 PATH CONSLTJ SURG 1 BLK 1SPC: CPT

## 2023-05-02 PROCEDURE — 93971 EXTREMITY STUDY: CPT

## 2023-05-02 PROCEDURE — 93970 EXTREMITY STUDY: CPT

## 2023-05-02 PROCEDURE — 85520 HEPARIN ASSAY: CPT

## 2023-05-02 PROCEDURE — 80048 BASIC METABOLIC PNL TOTAL CA: CPT

## 2023-05-02 PROCEDURE — 85610 PROTHROMBIN TIME: CPT

## 2023-05-02 PROCEDURE — 71045 X-RAY EXAM CHEST 1 VIEW: CPT

## 2023-05-02 PROCEDURE — C1889: CPT

## 2023-05-02 PROCEDURE — 97530 THERAPEUTIC ACTIVITIES: CPT

## 2023-05-02 PROCEDURE — 88307 TISSUE EXAM BY PATHOLOGIST: CPT

## 2023-05-02 PROCEDURE — 99024 POSTOP FOLLOW-UP VISIT: CPT

## 2023-05-02 PROCEDURE — 88341 IMHCHEM/IMCYTCHM EA ADD ANTB: CPT

## 2023-05-02 PROCEDURE — 97165 OT EVAL LOW COMPLEX 30 MIN: CPT

## 2023-05-02 PROCEDURE — 97110 THERAPEUTIC EXERCISES: CPT

## 2023-05-02 RX ORDER — DEXTROSE 50 % IN WATER 50 %
25 SYRINGE (ML) INTRAVENOUS ONCE
Refills: 0 | Status: DISCONTINUED | OUTPATIENT
Start: 2023-05-02 | End: 2023-05-31

## 2023-05-02 RX ORDER — SENNA PLUS 8.6 MG/1
2 TABLET ORAL
Qty: 0 | Refills: 0 | DISCHARGE
Start: 2023-05-02

## 2023-05-02 RX ORDER — DEXAMETHASONE 0.5 MG/5ML
2 ELIXIR ORAL
Qty: 0 | Refills: 0 | DISCHARGE
Start: 2023-05-02

## 2023-05-02 RX ORDER — DEXAMETHASONE 0.5 MG/5ML
2 ELIXIR ORAL EVERY 8 HOURS
Refills: 0 | Status: COMPLETED | OUTPATIENT
Start: 2023-05-04 | End: 2023-05-04

## 2023-05-02 RX ORDER — INSULIN LISPRO 100/ML
2 VIAL (ML) SUBCUTANEOUS
Qty: 0 | Refills: 0 | DISCHARGE
Start: 2023-05-02

## 2023-05-02 RX ORDER — SODIUM CHLORIDE 9 MG/ML
1000 INJECTION, SOLUTION INTRAVENOUS
Refills: 0 | Status: DISCONTINUED | OUTPATIENT
Start: 2023-05-02 | End: 2023-05-31

## 2023-05-02 RX ORDER — INSULIN LISPRO 100/ML
VIAL (ML) SUBCUTANEOUS
Refills: 0 | Status: DISCONTINUED | OUTPATIENT
Start: 2023-05-02 | End: 2023-05-04

## 2023-05-02 RX ORDER — POLYETHYLENE GLYCOL 3350 17 G/17G
17 POWDER, FOR SOLUTION ORAL
Qty: 0 | Refills: 0 | DISCHARGE
Start: 2023-05-02

## 2023-05-02 RX ORDER — SODIUM CHLORIDE 9 MG/ML
1 INJECTION INTRAMUSCULAR; INTRAVENOUS; SUBCUTANEOUS EVERY 8 HOURS
Refills: 0 | Status: DISCONTINUED | OUTPATIENT
Start: 2023-05-02 | End: 2023-05-04

## 2023-05-02 RX ORDER — ACETAMINOPHEN 500 MG
650 TABLET ORAL EVERY 6 HOURS
Refills: 0 | Status: DISCONTINUED | OUTPATIENT
Start: 2023-05-02 | End: 2023-05-31

## 2023-05-02 RX ORDER — GLUCAGON INJECTION, SOLUTION 0.5 MG/.1ML
1 INJECTION, SOLUTION SUBCUTANEOUS ONCE
Refills: 0 | Status: DISCONTINUED | OUTPATIENT
Start: 2023-05-02 | End: 2023-05-31

## 2023-05-02 RX ORDER — DEXTROSE 50 % IN WATER 50 %
12.5 SYRINGE (ML) INTRAVENOUS ONCE
Refills: 0 | Status: DISCONTINUED | OUTPATIENT
Start: 2023-05-02 | End: 2023-05-31

## 2023-05-02 RX ORDER — LEVETIRACETAM 250 MG/1
750 TABLET, FILM COATED ORAL
Refills: 0 | Status: DISCONTINUED | OUTPATIENT
Start: 2023-05-03 | End: 2023-05-31

## 2023-05-02 RX ORDER — POLYETHYLENE GLYCOL 3350 17 G/17G
17 POWDER, FOR SOLUTION ORAL
Refills: 0 | Status: DISCONTINUED | OUTPATIENT
Start: 2023-05-02 | End: 2023-05-31

## 2023-05-02 RX ORDER — DEXTROSE 50 % IN WATER 50 %
15 SYRINGE (ML) INTRAVENOUS ONCE
Refills: 0 | Status: DISCONTINUED | OUTPATIENT
Start: 2023-05-02 | End: 2023-05-31

## 2023-05-02 RX ORDER — SODIUM CHLORIDE 9 MG/ML
1 INJECTION INTRAMUSCULAR; INTRAVENOUS; SUBCUTANEOUS EVERY 8 HOURS
Refills: 0 | Status: DISCONTINUED | OUTPATIENT
Start: 2023-05-02 | End: 2023-05-02

## 2023-05-02 RX ORDER — DEXAMETHASONE 0.5 MG/5ML
ELIXIR ORAL
Refills: 0 | Status: DISCONTINUED | OUTPATIENT
Start: 2023-05-02 | End: 2023-05-31

## 2023-05-02 RX ORDER — SODIUM CHLORIDE 9 MG/ML
1 INJECTION INTRAMUSCULAR; INTRAVENOUS; SUBCUTANEOUS
Qty: 0 | Refills: 0 | DISCHARGE
Start: 2023-05-02

## 2023-05-02 RX ORDER — PANTOPRAZOLE SODIUM 20 MG/1
40 TABLET, DELAYED RELEASE ORAL
Refills: 0 | Status: DISCONTINUED | OUTPATIENT
Start: 2023-05-03 | End: 2023-05-31

## 2023-05-02 RX ORDER — DEXAMETHASONE 0.5 MG/5ML
2 ELIXIR ORAL EVERY 6 HOURS
Refills: 0 | Status: COMPLETED | OUTPATIENT
Start: 2023-05-02 | End: 2023-05-03

## 2023-05-02 RX ORDER — PANTOPRAZOLE SODIUM 20 MG/1
1 TABLET, DELAYED RELEASE ORAL
Qty: 0 | Refills: 0 | DISCHARGE
Start: 2023-05-02

## 2023-05-02 RX ORDER — SENNA PLUS 8.6 MG/1
2 TABLET ORAL AT BEDTIME
Refills: 0 | Status: DISCONTINUED | OUTPATIENT
Start: 2023-05-02 | End: 2023-05-31

## 2023-05-02 RX ORDER — INSULIN LISPRO 100/ML
VIAL (ML) SUBCUTANEOUS AT BEDTIME
Refills: 0 | Status: DISCONTINUED | OUTPATIENT
Start: 2023-05-02 | End: 2023-05-04

## 2023-05-02 RX ORDER — DEXAMETHASONE 0.5 MG/5ML
2 ELIXIR ORAL EVERY 12 HOURS
Refills: 0 | Status: DISCONTINUED | OUTPATIENT
Start: 2023-05-05 | End: 2023-05-31

## 2023-05-02 RX ORDER — HYDRALAZINE HCL 50 MG
10 TABLET ORAL
Qty: 0 | Refills: 0 | DISCHARGE
Start: 2023-05-02

## 2023-05-02 RX ORDER — AMLODIPINE BESYLATE 2.5 MG/1
10 TABLET ORAL EVERY 24 HOURS
Refills: 0 | Status: DISCONTINUED | OUTPATIENT
Start: 2023-05-03 | End: 2023-05-31

## 2023-05-02 RX ORDER — ONDANSETRON 8 MG/1
4 TABLET, FILM COATED ORAL
Qty: 0 | Refills: 0 | DISCHARGE
Start: 2023-05-02

## 2023-05-02 RX ORDER — ENOXAPARIN SODIUM 100 MG/ML
40 INJECTION SUBCUTANEOUS EVERY 24 HOURS
Refills: 0 | Status: DISCONTINUED | OUTPATIENT
Start: 2023-05-02 | End: 2023-05-31

## 2023-05-02 RX ORDER — ACETAMINOPHEN 500 MG
2 TABLET ORAL
Qty: 0 | Refills: 0 | DISCHARGE
Start: 2023-05-02

## 2023-05-02 RX ORDER — ENOXAPARIN SODIUM 100 MG/ML
40 INJECTION SUBCUTANEOUS
Qty: 0 | Refills: 0 | DISCHARGE
Start: 2023-05-02

## 2023-05-02 RX ADMIN — PANTOPRAZOLE SODIUM 40 MILLIGRAM(S): 20 TABLET, DELAYED RELEASE ORAL at 05:37

## 2023-05-02 RX ADMIN — Medication 4: at 17:24

## 2023-05-02 RX ADMIN — Medication 2: at 12:24

## 2023-05-02 RX ADMIN — SODIUM CHLORIDE 2 GRAM(S): 9 INJECTION INTRAMUSCULAR; INTRAVENOUS; SUBCUTANEOUS at 05:37

## 2023-05-02 RX ADMIN — LEVETIRACETAM 750 MILLIGRAM(S): 250 TABLET, FILM COATED ORAL at 05:36

## 2023-05-02 RX ADMIN — Medication 2 MILLIGRAM(S): at 05:37

## 2023-05-02 RX ADMIN — SENNA PLUS 2 TABLET(S): 8.6 TABLET ORAL at 22:00

## 2023-05-02 RX ADMIN — SODIUM CHLORIDE 1 GRAM(S): 9 INJECTION INTRAMUSCULAR; INTRAVENOUS; SUBCUTANEOUS at 22:00

## 2023-05-02 RX ADMIN — Medication 2 MILLIGRAM(S): at 17:56

## 2023-05-02 RX ADMIN — SODIUM CHLORIDE 1 GRAM(S): 9 INJECTION INTRAMUSCULAR; INTRAVENOUS; SUBCUTANEOUS at 13:26

## 2023-05-02 RX ADMIN — AMLODIPINE BESYLATE 10 MILLIGRAM(S): 2.5 TABLET ORAL at 05:37

## 2023-05-02 RX ADMIN — Medication 2 MILLIGRAM(S): at 23:32

## 2023-05-02 RX ADMIN — Medication 2 MILLIGRAM(S): at 12:25

## 2023-05-02 RX ADMIN — LEVETIRACETAM 750 MILLIGRAM(S): 250 TABLET, FILM COATED ORAL at 17:56

## 2023-05-02 NOTE — H&P ADULT - NSHPPHYSICALEXAM_GEN_ALL_CORE
Constitutional - NAD, Comfortable  HEENT - NCAT, EOMI +L lateral strabismus  Neck - Supple, No limited ROM  Chest - good chest expansion, good respiratory effort, CTAB   Cardio - warm and well perfused, RRR, no murmur  Abdomen -  Soft, NTND  Extremities - R>L edema 1+, No calf tenderness   Neurologic Exam:                    Cognitive -             Orientation: Awake, Alert, AAO to self, place, date, year, situation            Attention:  Days of week, recall 3 objects without cuing            Memory: Recent/ remote memory intact            Thought: process, content appropriate     Speech - Fluent, Comprehensible, No dysarthria, No aphasia      Cranial Nerves - +R facial droop, Tongue midline, EOMI, Shoulder shrug weak on R     Motor -                      LEFT    UE - ShAB 5/5, EF 5/5, EE 5/5, WE 5/5,  WNL                    RIGHT UE - ShAB 1/5, EF 1/5, EE 0/5, WE 0/5, +Trace Finger Extension                    LEFT    LE - HF 5/5, KE 5/5, DF 5/5, PF 5/5                    RIGHT LE - HF 1/5, KE 0/5, DF 0/5, PF 0/5        Sensory - Diminished to LT bilateral     Reflexes - DTR intact including R biceps and R achilles, neg Gunderson's b/l, neg Babinski's b/l     Coordination - FTN intact     OculoVestibular -  No nystagmus  Psychiatric - Mood stable, Affect WNL  Skin on admission: +crani incision with staples CDI Constitutional - NAD, Comfortable. O x 3, follows 1-2 step commands good simple attention  HEENT - NCAT, EOMI +L lateral strabismus (not new)    Chest - good chest expansion, good respiratory effort, CTAB   Cardio - warm and well perfused, RRR, no murmur  Abdomen -  Soft, NTND  Extremities - R>L edema 1+, No calf tenderness   Neurologic Exam:                    Cognitive -             Orientation: Awake, Alert, AAO to self, place, date, year, situation            Attention:  Days of week, recall 3 objects without cuing            Memory: Recent/ remote memory intact            Thought: process, content appropriate     Speech - Fluent, Comprehensible, No dysarthria, No aphasia      Cranial Nerves - +R facial droop, Tongue midline, EOMI, Shoulder shrug weak on R     Motor -                      LEFT    UE - ShAB 5/5, EF 5/5, EE 5/5, WE 5/5,  WNL                    RIGHT UE - ShAB 1/5, EF 1/5, EE 0/5, WE 0/5, +Trace Finger Extension                    LEFT    LE - HF 5/5, KE 5/5, DF 5/5, PF 5/5                    RIGHT LE - HF 1/5, KE 0/5, DF 0/5, PF 0/5        Sensory - Diminished to LT bilateral     Reflexes - DTR intact including R biceps and R achilles, neg Gunderson's b/l, neg Babinski's b/l. no clonus     Coordination - FTN intact     OculoVestibular -  No nystagmus  Psychiatric - Mood stable, Affect WNL  Skin on admission: +crani incision with staples CDI

## 2023-05-02 NOTE — PROGRESS NOTE ADULT - PROBLEM SELECTOR PLAN 1
s/p resection  PT/OT  OOB  Bowel Regimen  Pain Control  IS

## 2023-05-02 NOTE — H&P ADULT - HISTORY OF PRESENT ILLNESS
Patient is a 68 y/o male PMH of HTN, Osteoarthritis (s/p right total knee replacement 2 months ago), brain mass (s/p biopsy 4/7/23 @ [facility] with Dr. Tobar) presented for brain tumor resection at [facility] 4/24. Per family, patient started to have right hand weakness about a month ago that got progressively worse, then went to ED where brain mass was diagnosed.Started keppra 750 mg BID for seizure prophylaxis. Patient had sterotactic needle biuopsy adn bijal interstitial thermal therpay by Dr. Tobar on 4/7/23 which was positive for glioblastoma  s/p craniotomy for resection of L brain tumor 4/25/23. Hospital course complicated by hyponatremia and RUE edema with f/u doppler negative.     Patient was evaluated by PM&R and therapy for functional deficits and gait/ ADL impairments and recommended acute rehabilitation. Patient was medically optimized for discharge to [facility] on 5/2/23     Patient is a 66 y/o male PMH of HTN, Osteoarthritis, right total knee replacement 2 months ago, brain mass s/p biopsy 4/7/23 @ [facility] with Dr. Tobar, who presented to [facility] for brain tumor resection 4/24/23. Per family, patient started to have right hand weakness about a month ago that grew progressively worse, then went to ED where brain mass was diagnosed. He was started on keppra 750 mg BID for seizure prophylaxis. Patient had sterotactic needle biopsy and laser interstitial thermal therapy by Dr. Tobar on 4/7/23 which was positive for glioblastoma  s/p craniotomy for resection of L brain tumor 4/25/23. Hospital course complicated by hyponatremia and RUE edema. Doppler negative.     Patient was evaluated by PM&R and therapy for functional deficits and gait/ ADL impairments and recommended acute rehabilitation. Patient was medically optimized for discharge to [facility] on 5/2/23     Patient is a 66 y/o male RH dominant PMH of HTN, Osteoarthritis, right total knee replacement 2 months ago, brain mass s/p biopsy 4/7/23 @ [facility] with Dr. Tobar, who presented to [facility] for brain tumor resection 4/24/23. Per family, patient started to have right hand weakness about a month ago that grew progressively worse, then went to ED where brain mass was diagnosed. He was started on keppra 750 mg BID for seizure prophylaxis. Patient had sterotactic needle biopsy and laser interstitial thermal therapy by Dr. Tobar on 4/7/23 which was positive for glioblastoma  s/p craniotomy for resection of L brain tumor 4/25/23. Hospital course complicated by hyponatremia and RUE edema. Doppler negative.     Patient was evaluated by PM&R and therapy for functional deficits and gait/ ADL impairments and recommended acute rehabilitation. Patient was medically optimized for discharge to [facility] on 5/2/23

## 2023-05-02 NOTE — PROGRESS NOTE ADULT - ASSESSMENT
67M PMH HTN (on amlodipine 10mg PO qd), Osteoarthritis (s/p right total knee replacement 2 months ago), brain mass (s/p biopsy 4/7/23 @ [facility] with Dr. Tobar) presented for brain tumor resection now s/p resection.

## 2023-05-02 NOTE — H&P ADULT - ATTENDING COMMENTS
Progress note amended to include my discussions with patient, resident, hospitalist, RN, SW, PT and my findings    Patient is pleasant male, alert O x3-4, follows 1-2 step commands. He is a , RH dominant, and communication is a strength; he has not noted signficiant deficits, and we discussed that his therapy minutes may be adjusted following SLP cognitive linguistic assessment. He denies any H/a, dizziness, N/V, appetite change. He is compliant, mood stable and appears motivated for Rx. Neuropsychology and Rec therapy ordered. Importance of positioning to prevent shoulder subluxation and rec for right PRAFO discussed. right sling ordered    EOMI, has left eye exotropia (baseline) without visual change. right UE: flaccid, trace finger subluxation, no TTP GH joint. No pain with PROM. 0/5 right shoudler, elbow flexion, extension or grasp. right LE hypotonic, HF  and quad 1/5 0/5 ankle. Has non pitting edema RLE without erythema or warmth. trace non pitting edema left no TTP. +right UE and LE reduced sensation to LT but feels is improving. +right facial droop, no signficiant dysarthria. tongue midline. Had doppler previously with DVT, most recent doppler negative, patient aware. No palpitations, SOB or CP; HR controlled    Labs and vitals reviewed, stable. Comprehensive rehab evaluation in progress. Continue steroid taper, monitoring FS qAC and qhs. Diet changed to vegan, no dairy after discussing preferences.      RECENT LABS    Vital Signs Last 24 Hrs  T(C): 36.5 (03 May 2023 07:40), Max: 36.8 (02 May 2023 15:31)  T(F): 97.7 (03 May 2023 07:40), Max: 98.3 (02 May 2023 15:31)  HR: 59 (03 May 2023 07:40) (59 - 65)  BP: 126/73 (03 May 2023 07:40) (126/73 - 135/75)  BP(mean): --  RR: 16 (03 May 2023 07:40) (16 - 17)  SpO2: 98% (03 May 2023 07:40) (97% - 99%)    Parameters below as of 03 May 2023 07:40  Patient On (Oxygen Delivery Method): room air                              13.5   6.48  )-----------( 240      ( 03 May 2023 06:00 )             41.3     05-03    136  |  101  |  19  ----------------------------<  155<H>  4.4   |  29  |  0.83    Ca    8.8      03 May 2023 06:00  Phos  4.2     05-02  Mg     2.1     05-02    TPro  6.1  /  Alb  2.6<L>  /  TBili  0.4  /  DBili  x   /  AST  25  /  ALT  83<H>  /  AlkPhos  88  05-03        CAPILLARY BLOOD GLUCOSE      POCT Blood Glucose.: 164 mg/dL (03 May 2023 11:39)  POCT Blood Glucose.: 124 mg/dL (03 May 2023 07:40)  POCT Blood Glucose.: 129 mg/dL (02 May 2023 21:36)  POCT Blood Glucose.: 202 mg/dL (02 May 2023 16:24)

## 2023-05-02 NOTE — H&P ADULT - ASSESSMENT
Patient is a 68 y/o male PMH of HTN, Osteoarthritis (s/p right total knee replacement 2 months ago), brain mass (s/p biopsy 4/7/23 @ [facility] with Dr. Tobar) presented for brain tumor resection at [facility] 4/24.Patient had sterotactic needle biuopsy adn bijal interstitial thermal therpay by Dr. Tobar on 4/7/23 which was positive for glioblastoma  s/p craniotomy for resection of L brain tumor 4/25/23.Hospital course complicated by hyponatremia and RUE edema with f/u doppler negative. Admitted for multidisciplinary rehab program      #Glioblastoma s/p craniotomy for resection of L brain tumor 4/25/23  - c/w decadron taper 2mg PO Q6 5/2-5/3  decadron 2mg PO Q8 5/3-5/4  decadron 2mg PO BID 5/4-6/3  -c/w keppra 750mg PO BID for seizure ppx  #Comprehensive Multidisciplinary Rehab Program:  - Gait, ADL, Functional impairments  - PT/OT/ SLP 3 hours a day 5 days a week    #HTN   -c/w amlodopine 10mg PO QD     #Mood / Cognition:  - Neuropsych evaluation     #Sleep:  - Maintain quiet hours and low stim environment  - Melatonin PRN    #Pain:  - Tylenol PRN  - avoid sedating meds that may affect cognitive recovery    #GI/Bowel:  - miralax 17g PO BID   - Senna 2 tabs daily  - GI ppx: pantoprazole 40mg PO before breakfast      #/Bladder:  - Monitor PVR if no void in 8h; SC for >400 cc  - Toileting schedule q4h    #Diet / Dysphagia:    - Diet: Regular, CCH   - ongoing SLP assessment  - Nutrition to follow    #Skin/ Pressure Injury Prevention:  - assessment on admission   - Incisions:  - Turn Q2hrs in bed while awake, OOB to Chair, PT/OT/SLP     #DVT prophylaxis:  - lovenox 40mg SC QD   - SCDs  - Last doppler 5/1/23 - Neg     #Precautions/ Restrictions  - Falls, Seizures   - Ortho: WBAT   - Lungs: Aspiration, Incentive Spirometer        --------------------------------------------  Outpatient Follow up:    Juanjose Johnston)  Neurosurgery  [address], [address] [place] [address]  Phone: [phone]  Fax: [phone]  Follow Up Time:     Delmis Causey; MIKHAIL)  Hematology; Internal Medicine; Medical Oncology  [address] [place] [address]  Phone: [phone]  Fax: [phone]  Follow Up Time:    --------------------------------------------      MEDICAL PROGNOSIS: GOOD            REHAB POTENTIAL: GOOD             ESTIMATED DISPOSITION: HOME WITH HOME CARE            ELOS: 10-14 Days   EXPECTED THERAPY:     P.T. 1hr/day       O.T. 1hr/day      S.L.P. 1hr/day     P&O Unnecessary     EXP FREQUENCY: 5 days per 7 day period     PRESCREEN COMPARISON:   I have reviewed the prescreen information and I have found no relevant changes between the preadmission screening and my post admission evaluation     RATIONALE FOR INPATIENT ADMISSION - Patient demonstrates the following: (check all that apply)  [X] Medically appropriate for rehabilitation admission  [X] Has attainable rehab goals with an appropriate initial discharge plan  [X] Has rehabilitation potential (expected to make a significant improvement within a reasonable period of time)   [X] Requires close medical management by a rehab physician, rehab nursing care, Hospitalist and comprehensive inte Patient is a 68 y/o male PMH of HTN, Osteoarthritis (s/p right total knee replacement 2 months ago), brain mass (s/p biopsy 4/7/23 @ [facility] with Dr. Tobar) presented for brain tumor resection at [facility] 4/24.Patient had sterotactic needle biuopsy adn bijal interstitial thermal therpay by Dr. Tobar on 4/7/23 which was positive for glioblastoma  s/p craniotomy for resection of L brain tumor 4/25/23.Hospital course complicated by hyponatremia and RUE edema with f/u doppler negative. Admitted for multidisciplinary rehab program      #Glioblastoma s/p craniotomy for resection of L brain tumor 4/25/23  - c/w decadron taper 2mg PO Q6 5/2-5/3  decadron 2mg PO Q8 5/3-5/4  decadron 2mg PO BID 5/4-6/3  -c/w keppra 750mg PO BID for seizure ppx  #Comprehensive Multidisciplinary Rehab Program:  - Gait, ADL, Functional impairments  - PT/OT/ SLP 3 hours a day 5 days a week    #HTN   -c/w amlodopine 10mg PO QD     #DMType2   -c/w ISS 3 times a day before meals and at bedtime     #Mood / Cognition:  - Neuropsych evaluation     #Sleep:  - Maintain quiet hours and low stim environment  - Melatonin PRN    #Pain:  - Tylenol PRN  - avoid sedating meds that may affect cognitive recovery    #GI/Bowel:  - miralax 17g PO BID   - Senna 2 tabs daily  - GI ppx: pantoprazole 40mg PO before breakfast      #/Bladder:  - Monitor PVR if no void in 8h; SC for >400 cc  - Toileting schedule q4h    #Diet / Dysphagia:    - Diet: Regular, CCH   - ongoing SLP assessment  - Nutrition to follow    #Skin/ Pressure Injury Prevention:  - assessment on admission   - Incisions:  - Turn Q2hrs in bed while awake, OOB to Chair, PT/OT/SLP     #DVT prophylaxis:  - lovenox 40mg SC QD   - SCDs  - Last doppler 5/1/23 - Neg     #Precautions/ Restrictions  - Falls, Seizures   - Ortho: WBAT   - Lungs: Aspiration, Incentive Spirometer        --------------------------------------------  Outpatient Follow up:    Juanjose Johnston)  Neurosurgery  [address], [address] [place] [address]  Phone: [phone]  Fax: [phone]  Follow Up Time:     Delmis Causey; MIKHAIL)  Hematology; Internal Medicine; Medical Oncology  [address] [place] [address]  Phone: [phone]  Fax: [phone]  Follow Up Time:    --------------------------------------------      MEDICAL PROGNOSIS: GOOD            REHAB POTENTIAL: GOOD             ESTIMATED DISPOSITION: HOME WITH HOME CARE            ELOS: 10-14 Days   EXPECTED THERAPY:     P.T. 1hr/day       O.T. 1hr/day      S.L.P. 1hr/day     P&O Unnecessary     EXP FREQUENCY: 5 days per 7 day period     PRESCREEN COMPARISON:   I have reviewed the prescreen information and I have found no relevant changes between the preadmission screening and my post admission evaluation     RATIONALE FOR INPATIENT ADMISSION - Patient demonstrates the following: (check all that apply)  [X] Medically appropriate for rehabilitation admission  [X] Has attainable rehab goals with an appropriate initial discharge plan  [X] Has rehabilitation potential (expected to make a significant improvement within a reasonable period of time)   [X] Requires close medical management by a rehab physician, rehab nursing care, Hospitalist and comprehensive inte Patient is a 66 y/o male PMH of HTN, OA, right TKA, who presented with recently diagnosed, brain mass, who presented to [facility] for brain tumor resection 4/24/23 Pathology positive for glioblastoma; patient  s/p craniotomy for resection of L brain tumor 4/25/23. Hospital course complicated by hyponatremia. Admitted for multidisciplinary rehab program    # Glioblastoma s/p craniotomy for resection of L brain tumor 4/25/23  - c/w decadron taper   ·	2mg PO Q6 5/2-5/3  ·	decadron 2mg PO Q8 5/3-5/4  ·	decadron 2mg PO BID 5/4-6/3  - c/w keppra 750mg PO BID for seizure ppx  - Comprehensive Multidisciplinary Rehab Program: PT/OT/ SLP 3 hours a day 5 days a week  - neuropsychology consult  - Precautions: steroid, SZ, cardiac, fall    # HTN   -c/w amlodopine 10mg PO QD   - monitor vitals, hospitalist consult    # DMType2   - c/w ISS 3 times a day before meals and at bedtime   - hospitalist and nutrition consult    # Sleep:  - Maintain quiet hours and low stim environment  - Melatonin PRN    # Pain:  - Tylenol PRN  - avoid sedating meds that may affect cognitive recovery    # GI/Bowel:  - miralax 17g PO BID   - Senna 2 tabs daily  - GI ppx: pantoprazole 40mg PO before breakfast      # /Bladder:  - Monitor PVR if no void in 8h; SC for >400 cc  - Toileting schedule q4h    # Diet   - Diet: Regular, CCH     # Skin/ Pressure Injury Prevention:  - assessment on admission   - Incisions:  - Turn Q2hrs in bed while awake, OOB to Chair, PT/OT/SLP     # DVT prophylaxis:  - Last doppler 5/1/23 - Neg   - lovenox 40mg SC QD   - SCDs    # LABs  CBC BMP 5/3      --------------------------------------------  Outpatient Follow up:    Juanjose Johnston)  Neurosurgery  [address], [address] [place] [address]  Phone: [phone]  Fax: [phone]  Follow Up Time:     Vojnic, Morana (MD; MIKHAIL)  Hematology; Internal Medicine; Medical Oncology  [address] [place] [address]  Phone: [phone]  Fax: [phone]  Follow Up Time:    --------------------------------------------      MEDICAL PROGNOSIS: GOOD            REHAB POTENTIAL: GOOD             ESTIMATED DISPOSITION: HOME WITH HOME CARE            ELOS: 10-14 Days   EXPECTED THERAPY:     P.T. 1hr/day       O.T. 1hr/day      S.L.P. 1hr/day     P&O Unnecessary     EXP FREQUENCY: 5 days per 7 day period     PRESCREEN COMPARISON:   I have reviewed the prescreen information and I have found no relevant changes between the preadmission screening and my post admission evaluation     RATIONALE FOR INPATIENT ADMISSION - Patient demonstrates the following: (check all that apply)  [X] Medically appropriate for rehabilitation admission  [X] Has attainable rehab goals with an appropriate initial discharge plan  [X] Has rehabilitation potential (expected to make a significant improvement within a reasonable period of time)   [X] Requires close medical management by a rehab physician, rehab nursing care, Hospitalist and comprehensive inte Patient is a 66 y/o male PMH of HTN, OA, right TKA, who presented with recently diagnosed, brain mass, who presented to [facility] for brain tumor resection 4/24/23 Pathology positive for glioblastoma; patient  s/p craniotomy for resection of L brain tumor 4/25/23. Hospital course complicated by hyponatremia. Admitted for multidisciplinary rehab program    # Glioblastoma s/p craniotomy for resection of L brain tumor 4/25/23  - c/w decadron taper   ·	2mg PO Q6 5/2-5/3  ·	decadron 2mg PO Q8 5/3-5/4  ·	decadron 2mg PO BID 5/4-6/3  - c/w keppra 750mg PO BID for seizure ppx  - Comprehensive Multidisciplinary Rehab Program: PT/OT/ SLP 3 hours a day 5 days a week  - neuropsychology consult  - Precautions: steroid, SZ, cardiac, fall    # HTN   -c/w amlodopine 10mg PO QD   - monitor vitals, hospitalist consult    # DMType2   - c/w ISS 3 times a day before meals and at bedtime   - hospitalist and nutrition consult    # Sleep:  - Maintain quiet hours and low stim environment  - Melatonin PRN    # Pain:  - Tylenol PRN  - avoid sedating meds that may affect cognitive recovery    # GI/Bowel:  - miralax 17g PO BID   - Senna 2 tabs daily  - GI ppx: pantoprazole 40mg PO before breakfast      # /Bladder:  - Monitor PVR if no void in 8h; SC for >400 cc  - Toileting schedule q4h    # Diet   - Diet: Regular, CCH     # Skin/ Pressure Injury Prevention:  - assessment on admission   - Incisions: Crani with staples CDI  - Turn Q2hrs in bed while awake, OOB to Chair, PT/OT/SLP     # DVT prophylaxis:  - Last doppler 5/1/23 - Neg   - lovenox 40mg SC QD   - SCDs    # LABs  CBC BMP 5/3      --------------------------------------------  Outpatient Follow up:    Juanjose Johnston)  Neurosurgery  [address], [address] [place] [address]  Phone: [phone]  Fax: [phone]  Follow Up Time:     Delmis Causey; MIKHAIL)  Hematology; Internal Medicine; Medical Oncology  [address], [address] [place] [address]  Phone: [phone]  Fax: [phone]  Follow Up Time:    --------------------------------------------      MEDICAL PROGNOSIS: GOOD            REHAB POTENTIAL: GOOD             ESTIMATED DISPOSITION: HOME WITH HOME CARE            ELOS: 10-14 Days   EXPECTED THERAPY:     P.T. 1hr/day       O.T. 1hr/day      S.L.P. 1hr/day     P&O Unnecessary     EXP FREQUENCY: 5 days per 7 day period     PRESCREEN COMPARISON:   I have reviewed the prescreen information and I have found no relevant changes between the preadmission screening and my post admission evaluation     RATIONALE FOR INPATIENT ADMISSION - Patient demonstrates the following: (check all that apply)  [X] Medically appropriate for rehabilitation admission  [X] Has attainable rehab goals with an appropriate initial discharge plan  [X] Has rehabilitation potential (expected to make a significant improvement within a reasonable period of time)   [X] Requires close medical management by a rehab physician, rehab nursing care, Hospitalist and comprehensive inte Patient is a 68 y/o male PMH of HTN, OA, right TKA, who presented with recently diagnosed, brain mass, who presented to [facility] for brain tumor resection 4/24/23 Pathology positive for glioblastoma; patient  s/p craniotomy for resection of L brain tumor 4/25/23. Hospital course complicated by hyponatremia. Admitted for multidisciplinary rehab program    # Glioblastoma s/p craniotomy for resection of L brain tumor 4/25/23 right hemiparesis  - c/w decadron taper   ·	2mg PO Q6 5/2-5/3  ·	decadron 2mg PO Q8 5/3-5/4  ·	decadron 2mg PO BID 5/4-6/3  - c/w keppra 750mg PO BID for seizure ppx  - Comprehensive Multidisciplinary Rehab Program: PT/OT/ SLP 3 hours a day 5 days a week  - right shoulder sling for support. right PRAFO  - may need right SAFO with liner for foot drop  - neuropsychology consult. Recreation therapy  - Precautions: steroid, SZ, cardiac, fall    # HTN   -c/w amlodopine 10mg PO QD   - monitor vitals, hospitalist consult  - (126/73 - 135/75) 5/3    # DMType2   - c/w ISS 3 times a day before meals and at bedtime   - hospitalist and nutrition consult    # Sleep:  - Maintain quiet hours and low stim environment  - Melatonin PRN    # Pain:  - Tylenol PRN  - avoid sedating meds that may affect cognitive recovery    # GI/Bowel:  - miralax 17g PO BID   - Senna 2 tabs daily  - GI ppx: pantoprazole 40mg PO before breakfast      # /Bladder:  - Monitor PVR if no void in 8h; SC for >400 cc  - Toileting schedule q4h    # Diet   - Diet: Regular, CCH     # Skin/ Pressure Injury Prevention:  - assessment on admission   - Incisions: Crani with staples CDI  - Turn Q2hrs in bed while awake, OOB to Chair, PT/OT/SLP     # DVT prophylaxis:  - Last doppler 5/1/23 - Neg. Patient aware  - lovenox 40mg SC QD   - SCDs    # LABs  CBC BMP 5/3      --------------------------------------------  Outpatient Follow up:    Juanjose Johnston)  Neurosurgery  [address], [address] [place] [address]  Phone: [phone]  Fax: [phone]  Follow Up Time:     Delmis Causey; MIKHAIL)  Hematology; Internal Medicine; Medical Oncology  [address], [address] [place] [address]  Phone: [phone]  Fax: [phone]  Follow Up Time:    --------------------------------------------      MEDICAL PROGNOSIS: fair-           REHAB POTENTIAL: GOOD             ESTIMATED DISPOSITION: HOME WITH HOME CARE            ELOS: 17 Days   EXPECTED THERAPY:     P.T. 1hr/day       O.T. 1hr/day      S.L.P. 1hr/day     P&O rigth SAFO evaluation with liner     EXP FREQUENCY: 5 days per 7 day period     PRESCREEN COMPARISON:   I have reviewed the prescreen information and I have found no relevant changes between the preadmission screening and my post admission evaluation     RATIONALE FOR INPATIENT ADMISSION - Patient demonstrates the following: (check all that apply)  [X] Medically appropriate for rehabilitation admission  [X] Has attainable rehab goals with an appropriate initial discharge plan  [X] Has rehabilitation potential (expected to make a significant improvement within a reasonable period of time)   [X] Requires close medical management by a rehab physician, rehab nursing care, Hospitalist and comprehensive inte

## 2023-05-02 NOTE — PROGRESS NOTE ADULT - PROVIDER SPECIALTY LIST ADULT
Neurosurgery
Neurosurgery
Hospitalist
NSICU
Neurosurgery
Neurosurgery
Heme/Onc
NSICU
Neurosurgery
Neurosurgery
Internal Medicine
NSICU
NSICU
Neurosurgery
Rehab Medicine
Hospitalist

## 2023-05-02 NOTE — PROGRESS NOTE ADULT - PROBLEM SELECTOR PROBLEM 3
DVT, lower extremity, distal

## 2023-05-02 NOTE — H&P ADULT - NSHPLABSRESULTS_GEN_ALL_CORE
RECENT LABS    Vital Signs Last 24 Hrs  T(C): 36.8 (02 May 2023 15:31), Max: 36.8 (02 May 2023 15:31)  T(F): 98.3 (02 May 2023 15:31), Max: 98.3 (02 May 2023 15:31)  HR: 65 (02 May 2023 15:31) (59 - 82)  BP: 129/76 (02 May 2023 15:31) (119/65 - 150/80)  BP(mean): --  RR: 17 (02 May 2023 15:31) (17 - 17)  SpO2: 98% (02 May 2023 15:31) (96% - 98%)                              14.0   7.33  )-----------( 256      ( 02 May 2023 07:46 )             43.9     05-02    138  |  100  |  18  ----------------------------<  139<H>  4.5   |  28  |  0.74    Ca    8.4      02 May 2023 07:46  Phos  4.2     05-02  Mg     2.1     05-02          CAPILLARY BLOOD GLUCOSE      POCT Blood Glucose.: 202 mg/dL (02 May 2023 16:24)  POCT Blood Glucose.: 167 mg/dL (02 May 2023 11:33)  POCT Blood Glucose.: 139 mg/dL (02 May 2023 06:11)  POCT Blood Glucose.: 126 mg/dL (01 May 2023 21:27)  POCT Blood Glucose.: 245 mg/dL (01 May 2023 16:49)    < from: MR Head w/wo IV Cont (04.29.23 @ 18:42) >    INDINGS:    Again demonstrated status post left parietal craniotomy for resection of   a left posterior frontal lobe mass. There is continued evolution of   postsurgical changes with subcutaneous swelling and the crescentic   subcutaneous fluid collection. There is a trace left cerebral convexity   subdural hematoma and emphysema. There is a small extra-axial fluid   collection deep to craniotomy site containing air and hemorrhagic   products which is not significant change from prior exam.    There is a surgical resection cavity within the left posterior frontal   lobe extending to the left periventricular region containing air and   hemorrhagic products. There has been resection of the heterogeneously   enhancing mass in the posterior left frontal lobe. There is intrinsic T1   hyperintensity within the mass secondary to acute/subacute blood   products. There is no evidence of nodular enhancement to suggest residual   tumor.There is interval decreased surrounding vasogenic edema and mass   effect.    The ventricles are stable in size and configuration.    There is no evidence of recent infarction diffusion-weighted imaging.    The large vessel flow voids are preserved.    There is an empty sella.    IMPRESSION:    Status post resection of left frontal lobe mass with associated   postsurgical changes as described above. Gross total resection of the   mass.    --- End of Report ---      < end of copied text >

## 2023-05-02 NOTE — PROGRESS NOTE ADULT - REASON FOR ADMISSION
Brain Tumor

## 2023-05-02 NOTE — DISCHARGE NOTE PROVIDER - NSDCFUADDAPPT_GEN_ALL_CORE_FT
Please follow up with Dr. Johnston  Please follow up with Dr. Johnston. Call [phone] after being discharged from rehab to confirm a follow up appointment.    Please follow up with Dr. Causey (oncology). Please call [phone] to make an appointment.     Please follow up with your primary care provider.

## 2023-05-02 NOTE — DISCHARGE NOTE PROVIDER - HOSPITAL COURSE
HPI:    Hospital Course:    Patient evaluated by PT/OT who recommended:  Patient is going home? rehab? hospice? Facility Name:     Hospital course c/b:     Exam on day of discharge:    Checklist:   - Obtained follow up appointment from NP  - Reviewed final recommendations of inpatient consults  - review discharge planning on provider handoff  - post op imaging completed  - Neurologically stable for discharge  - Vitals stable for discharge   - Afebrile for discharge  - WBC is stable  - Sodium level is normal  - Pain is adequately controlled  - Pt has PICC/walker/brace/collar   - LACE score (10 or > needs PCP apt)   - stroke patient? Dishcarge NIHSS score   HPI: 68 y/o male with h/o HTN, Osteoarthritis (s/p right total knee replacement 2 months ago), brain mass (s/p biopsy 4/7/23 @ [facility] with Dr. Tobar) presented for brain tumor resection. Per family, patient started to have right hand weakness about a month ago that got progressively worse, then went to ED where brain mass was diagnosed. Started keppra 750 mg BID for seizure prophylaxis. Described right arm weakness worsening after surgery and some right lower extremity weakness. States today, feels like his right lower extremity weakness is worsening. Family also reported patient just finished a taper of decadron. Denies headache, new weakness, numbness, tingling, chest pain, sob, nausea/vomiting, visual abnormalities.       Hospital Course:  4/24: Pre-op L crani tumor resection   4/25: preop L crani tumor resection today. POD 0 s/p L crani for tumor resection, frozen HGG. Given dilaudid 0.25mg for pain control. Failed dysphagia, made NPO, S&S evaluation in AM. PO meds converted to IV. Postop Na 133. S/p 150cc 3% bolus.   4/26: POD 1 L crani HGG. KYA o/n. 4PM BMP to f/u sodium, cardene gtt off, passed S+S eval, LE dopplers re-ordered because high risk 2/2 plegia on R-side showing b/l IM calf DVT, CTH today stable, afternoon BMP Na 138, Pend SDU tomorrow.   4/27: POD2 L crani HGG. KYA overnight. SDU today. Pend MRI brain w/ and w/o on SDU. NaCl tabs 2g q8hrs for hyponatremia. SQL starting tonight, will f/u anti-xa 4/29 @2am (4 hours after 2nd dose). Decadron taper to 2 BID.   4/28: POD 3 L crani HGG, KYA ovn, neuro stable. Bowel regimen increased. RUE doppler completed for RUE edema, negative. Pending MRI.  4/29: POD 4 L crani HCG, KYA o/n. Neuro stable, pending repeat dopplers Monday, pending AR. Postop MR completed, follow up read.   4/30: POD 5 L crani. KYA overnight. Neuro stable. Anti-xa 0.30, within prophylactic range. pending AR. C/o productive cough, given tessalon pearls.   5/1: POD 6 L crani. KYA overnight. Neuro stable. Pending repeat LE dopplers. Repeat dopplers show no evidence of DVT.   5/2: POD 7 L crani. KYA overnight. Neuro stable. Weaned salt tabs to 1 q8.    Patient evaluated by PT/OT who recommended: AR   Patient is going to [facility] course c/b: RUE swelling, doppler negative for DVT. Bilateral intramuscular calf DVTs, self resolved on prophylactic dosing of lovenox.     Exam on day of discharge:  General: NAD. Non-toxic appearing. Sleeping comfortably.   HENT: PERRL, 3 mm b/l. Disconjugate gaze (R eye exotropia). R facial droop. L crani incision without erythema or purulence.   Cardiac: Regular rate and rhythm. S1, S2 appreciated. No murmurs, gallops, rubs.   Pulm: Breathing non-labored on RA. Intermittent productive cough. CTAB, breath sounds vesicular.  Abd: Soft, non-tender, nondistended +BS x 4   Neuro: A&O x3. Affect appropriate, speech coherent. R facial droop, otherwise CN II-XII grossly intact. See HENT.  RUE trace withdraw to noxious, RLE HF 1/5, KE/KF/DF/PF 0/5, LUE/LLE 5/5  SILT throughout.   Extremities: Skin warm, well-perfused. Right calf edema > left. Scar on R knee. No erythema, tenderness. DP 2+ b/l.      Checklist:   - Obtained follow up appointment from NP - pending   - Reviewed final recommendations of inpatient consults - yes, oncology  - review discharge planning on provider handoff - yes   - post op imaging completed -yes   - Neurologically stable for discharge - yes   - Vitals stable for discharge - yes   - Afebrile for discharge -yes   - WBC is stable - 7.33   - Sodium level is normal- 138  - Pain is adequately controlled - yes   - Pt has PICC/walker/brace/collar - going to rehab  - LACE score (10 or > needs PCP apt)  - 8   - stroke patient? Dishcarge NIHSS score - n/a

## 2023-05-02 NOTE — H&P ADULT - NSHPSOCIALHISTORY_GEN_ALL_CORE
SOCIAL HISTORY  Smoking -   EtOH -   Marital Status -        FUNCTIONAL HISTORY  Home Living Status :  lives with his wife and 1 of 2 adult sons in [place], [place] with 2 then 5 steps to enter , 1 flight to 2nd floor   Prior Home Care Services :  none     Family support : spouse, 2 adult sons, 1 daughter (RN)   Occupation :     Previous Functional Status:  Independent in ambulation, ADL's, transfers prior to hospitalization    Current Functional Status:  Bed mobility: Mod-Max0A   Transfers: Sit to Stand, Stand to Sit  Mod-A x2 person asssit   Gait / ambulation: full weight bearing, RW 4 side steps to the R Max-A x2 person assist  Speech: Regular, CCB SOCIAL HISTORY  Smoking -   EtOH -   Marital Status -        FUNCTIONAL HISTORY  Home Living Status :  lives with his wife and 1 of 2 adult sons in [place], [place] with 2 then 5 steps to enter , 1 flight to 2nd floor . family is available to assist on dc per patient  Prior Home Care Services :  none     Family support : spouse, 2 adult sons, 1 daughter (RN)   Occupation :     Previous Functional Status:  Independent in ambulation, ADL's, transfers prior to hospitalization    Current Functional Status:  Bed mobility: Mod-Max0A   Transfers: Sit to Stand, Stand to Sit  Mod-A x2 person asssit   Gait / ambulation: full weight bearing, RW 4 side steps to the R Max-A x2 person assist  Speech: Regular, CCB

## 2023-05-02 NOTE — DISCHARGE NOTE NURSING/CASE MANAGEMENT/SOCIAL WORK - PATIENT PORTAL LINK FT
You can access the FollowMyHealth Patient Portal offered by [facility] by registering at the following website: [email or web address]. By joining [employer]’s FollowMyHealth portal, you will also be able to view your health information using other applications (apps) compatible with our system.

## 2023-05-02 NOTE — DISCHARGE NOTE PROVIDER - NSDCMRMEDTOKEN_GEN_ALL_CORE_FT
amLODIPine 10 mg oral tablet: 1 orally once a day  Keppra 750 mg oral tablet: 1 orally 2 times a day   acetaminophen 325 mg oral tablet: 2 tab(s) orally every 6 hours As needed Mild Pain (1 - 3)  amLODIPine 10 mg oral tablet: 1 orally once a day  benzonatate 100 mg oral capsule: 1 cap(s) orally 3 times a day As needed Cough  dexamethasone: 2 milligram(s) orally every 8 hours 2mg every 8 hours for 5/2 and 5/3 then 2mg every 12 hours indefinitely.  enoxaparin: 40 milligram(s) subcutaneous once a day (at bedtime)  hydrALAZINE 20 mg/mL injectable solution: 10 milligram(s) injectable every 6 hours as needed for SBP &gt;160  insulin lispro 100 units/mL injectable solution: 2 unit(s) injectable 4 times a day (before meals and at bedtime) as needed for blood glucose &gt;150 Insulin sliding scale  Keppra 750 mg oral tablet: 1 orally 2 times a day  ondansetron 2 mg/mL injectable solution: 4 milligram(s) injectable every 6 hours as needed for  nausea/vomiting  pantoprazole 40 mg oral delayed release tablet: 1 tab(s) orally once a day (before a meal)  polyethylene glycol 3350 oral powder for reconstitution: 17 gram(s) orally 2 times a day  senna leaf extract oral tablet: 2 tab(s) orally once a day (at bedtime)  sodium chloride 1 g oral tablet: 1 tab(s) orally every 8 hours

## 2023-05-02 NOTE — DISCHARGE NOTE NURSING/CASE MANAGEMENT/SOCIAL WORK - NSDCFUADDAPPT_GEN_ALL_CORE_FT
Please follow up with Dr. Johnston. Call [phone] after being discharged from rehab to confirm a follow up appointment.    Please follow up with Dr. Causey (oncology). Please call [phone] to make an appointment.     Please follow up with your primary care provider.

## 2023-05-02 NOTE — DISCHARGE NOTE PROVIDER - NSDCCPCAREPLAN_GEN_ALL_CORE_FT
PRINCIPAL DISCHARGE DIAGNOSIS  Diagnosis: Brain mass  Assessment and Plan of Treatment:       SECONDARY DISCHARGE DIAGNOSES  Diagnosis: Diabetes  Assessment and Plan of Treatment:     Diagnosis: Bradycardia  Assessment and Plan of Treatment:     Diagnosis: DVT, lower extremity, distal  Assessment and Plan of Treatment: resolved 5/1    Diagnosis: Hypertension  Assessment and Plan of Treatment:

## 2023-05-02 NOTE — PROGRESS NOTE ADULT - PROBLEM SELECTOR PLAN 4
asymptomatic, continue to monitor

## 2023-05-02 NOTE — PROGRESS NOTE ADULT - PROBLEM SELECTOR PLAN 3
Would hold AC given bleeding risk, repeat Doppler in 2-4 weeks to see if extension, if extension proximally would re-visit start of AC vs IVC filter at that time
Repeat showing clot resolution, problem resolved
Would hold AC given bleeding risk, repeat Doppler in 2-4 weeks to see if extension, if extension proximally would re-visit start of AC vs IVC filter at that time
Repeat showed extension of clot  Vasc consult for poss IVC Filter placement
Would hold AC given bleeding risk, repeat Doppler in 2-4 weeks to see if extension, if extension proximally would re-visit start of AC vs IVC filter at that time

## 2023-05-02 NOTE — PROGRESS NOTE ADULT - SUBJECTIVE AND OBJECTIVE BOX
O/N Events:  Subjective/ROS: Denies HA, CP, SOB, n/v, changes in bowel/urinary habits.  12pt ROS otherwise negative.    VITALS  Vital Signs Last 24 Hrs  T(C): 36.7 (02 May 2023 08:39), Max: 36.7 (01 May 2023 15:47)  T(F): 98 (02 May 2023 08:39), Max: 98 (01 May 2023 15:47)  HR: 61 (02 May 2023 08:39) (59 - 82)  BP: 131/68 (02 May 2023 08:39) (119/65 - 150/80)  BP(mean): --  RR: 17 (02 May 2023 08:39) (17 - 17)  SpO2: 97% (02 May 2023 08:39) (96% - 98%)    Parameters below as of 02 May 2023 08:39  Patient On (Oxygen Delivery Method): room air        I&O's Summary    01 May 2023 07:01  -  02 May 2023 07:00  --------------------------------------------------------  IN: 720 mL / OUT: 1500 mL / NET: -780 mL    02 May 2023 07:01  -  02 May 2023 13:18  --------------------------------------------------------  IN: 240 mL / OUT: 450 mL / NET: -210 mL        CAPILLARY BLOOD GLUCOSE      POCT Blood Glucose.: 167 mg/dL (02 May 2023 11:33)  POCT Blood Glucose.: 139 mg/dL (02 May 2023 06:11)  POCT Blood Glucose.: 126 mg/dL (01 May 2023 21:27)  POCT Blood Glucose.: 245 mg/dL (01 May 2023 16:49)      PHYSICAL EXAM  General: A&Ox3; NAD  Head: NC/AT; PERRL; EOMI; anicteric sclera  Neck: Supple; no JVD  Respiratory: CTA B/L; no wheezes/crackles/rales auscultated w/ good air movement  Cardiovascular: Regular rhythm/rate; S1/S2; no gallops or murmurs auscultated  Gastrointestinal: Soft; NTND w/out rebound tenderness or guarding; bowel sounds normal  Extremities: WWP; no edema or cyanosis; radial/pedal pulses palpable  Neurological:  CNII-XII grossly intact; right sided weakness  Skin: No rashes noted  Vasc: +2 DP/PT pulses b/l   Psych: Appropriate Affect    MEDICATIONS  (STANDING):  amLODIPine   Tablet 10 milliGRAM(s) Oral every 24 hours  dexAMETHasone     Tablet 2 milliGRAM(s) Oral every 6 hours  dexAMETHasone     Tablet   Oral   enoxaparin Injectable 40 milliGRAM(s) SubCutaneous every 24 hours  insulin lispro (ADMELOG) corrective regimen sliding scale   SubCutaneous Before meals and at bedtime  levETIRAcetam 750 milliGRAM(s) Oral two times a day  pantoprazole    Tablet 40 milliGRAM(s) Oral before breakfast  polyethylene glycol 3350 17 Gram(s) Oral two times a day  senna 2 Tablet(s) Oral at bedtime  sodium chloride 1 Gram(s) Oral every 8 hours    MEDICATIONS  (PRN):  acetaminophen     Tablet .. 650 milliGRAM(s) Oral every 6 hours PRN Mild Pain (1 - 3)  benzonatate 100 milliGRAM(s) Oral three times a day PRN Cough  hydrALAZINE Injectable 10 milliGRAM(s) IV Push every 4 hours PRN SBP > 150  ondansetron Injectable 4 milliGRAM(s) IV Push every 6 hours PRN Nausea and/or Vomiting      No Known Allergies      LABS                        14.0   7.33  )-----------( 256      ( 02 May 2023 07:46 )             43.9     05-02    138  |  100  |  18  ----------------------------<  139<H>  4.5   |  28  |  0.74    Ca    8.4      02 May 2023 07:46  Phos  4.2     05-02  Mg     2.1     05-02                IMAGING/EKG/ETC: reviewed

## 2023-05-02 NOTE — PATIENT PROFILE ADULT - FALL HARM RISK - HARM RISK INTERVENTIONS

## 2023-05-02 NOTE — H&P ADULT - NSHPREVIEWOFSYSTEMS_GEN_ALL_CORE
REVIEW OF SYSTEMS  Constitutional: No fever, No Chills  HEENT: No eye pain, No visual disturbances, No difficulty hearing +Chronic strabismus  Pulm: No cough,  No shortness of breath  Cardio: No chest pain, No palpitations  GI:  No abdominal pain, No nausea, No vomiting, No diarrhea, No constipation  : No dysuria, No frequency, No hematuria  Neuro: No headaches, No memory loss, +R sided weakness and numbness  Skin: No itching, No rashes, No lesions   MSK: No joint pain, No joint swelling, No muscle pain, No Neck or back pain REVIEW OF SYSTEMS  Constitutional: No fever, No Chills RH dominant  HEENT: No eye pain, No visual disturbances, No difficulty hearing +Chronic strabismus  Pulm: No cough,  No shortness of breath  Cardio: No chest pain, No palpitations  GI:  No abdominal pain, No nausea, No vomiting, No diarrhea, No constipation  : No dysuria, No frequency, No hematuria  Neuro: No headaches, No memory loss, +R sided weakness and numbness (sensation improving)  Skin: No itching, No rashes, No lesions   MSK: No joint pain, No joint swelling, No muscle pain, No Neck or back pain

## 2023-05-02 NOTE — PROGRESS NOTE ADULT - SUBJECTIVE AND OBJECTIVE BOX
HPI:  68 y/o male with h/o HTN, Osteoarthritis (s/p right total knee replacement 2 months ago), brain mass (s/p biopsy 4/7/23 @ [facility] with Dr. Tobar) presented for brain tumor resection. Per family, patient started to have right hand weakness about a month ago that got progressively worse, then went to ED where brain mass was diagnosed. Started keppra 750 mg BID for seizure prophylaxis. Described right arm weakness worsening after surgery and some right lower extremity weakness. States today, feels like his right lower extremity weakness is worsening. Family also reported patient just finished a taper of decadron. Denies headache, new weakness, numbness, tingling, chest pain, sob, nausea/vomiting, visual abnormalities.  (24 Apr 2023 18:02)    OVERNIGHT EVENTS: POD 7 L crani. KYA overnight. Neuro stable.     Hospital Course:  4/24: Pre-op L crani tumor resection   4/25: preop L crani tumor resection today. POD 0 s/p L crani for tumor resection, frozen HGG. Given dilaudid 0.25mg for pain control. Failed dysphagia, made NPO, S&S evaluation in AM. PO meds converted to IV. Postop Na 133. S/p 150cc 3% bolus.   4/26: POD 1 L crani HGG. KYA o/n. 4PM BMP to f/u sodium, cardene gtt off, passed S+S eval, LE dopplers re-ordered because high risk 2/2 plegia on R-side showing b/l IM calf DVT, CTH today stable, afternoon BMP Na 138, Pend SDU tomorrow.   4/27: POD2 L crani HGG. KYA overnight. SDU today. Pend MRI brain w/ and w/o on SDU. NaCl tabs 2g q8hrs for hyponatremia. SQL starting tonight, will f/u anti-xa 4/29 @2am (4 hours after 2nd dose). Decadron taper to 2 BID.   4/28: POD 3 L crani HGG, KYA ovn, neuro stable. Bowel regimen increased. RUE doppler completed for RUE edema, negative. Pending MRI.  4/29: POD 4 L crani HCG, KYA o/n. Neuro stable, pending repeat dopplers Monday, pending AR. Postop MR completed, follow up read.   4/30: POD 5 L crani. KYA overnight. Neuro stable. Anti-xa 0.30, within prophylactic range. pending AR. C/o productive cough, given tessalon pearls.   5/1: POD 6 L crani. KYA overnight. Neuro stable. Pending repeat LE dopplers. Repeat dopplers show no evidence of DVT.   5/2: POD 7 L crani. KYA overnight. Neuro stable.     Vital Signs Last 24 Hrs  T(C): 36.6 (01 May 2023 20:30), Max: 36.8 (01 May 2023 04:40)  T(F): 97.8 (01 May 2023 20:30), Max: 98.3 (01 May 2023 04:40)  HR: 82 (01 May 2023 20:30) (52 - 82)  BP: 119/65 (01 May 2023 20:30) (119/65 - 146/72)  BP(mean): --  RR: 17 (01 May 2023 20:30) (17 - 18)  SpO2: 96% (01 May 2023 20:30) (94% - 97%)    Parameters below as of 01 May 2023 20:30  Patient On (Oxygen Delivery Method): room air        I&O's Summary    30 Apr 2023 07:01  -  01 May 2023 07:00  --------------------------------------------------------  IN: 150 mL / OUT: 200 mL / NET: -50 mL    01 May 2023 07:01  -  02 May 2023 00:57  --------------------------------------------------------  IN: 720 mL / OUT: 1200 mL / NET: -480 mL        PHYSICAL EXAM:  General: NAD. Non-toxic appearing. Sleeping comfortably.   HENT: PERRL, 3 mm b/l. Disconjugate gaze (R eye exotropia). R facial droop. L crani incision without erythema or purulence.   Cardiac: Regular rate and rhythm. S1, S2 appreciated. No murmurs, gallops, rubs.   Pulm: Breathing non-labored on RA. Intermittent productive cough. CTAB, breath sounds vesicular.  Abd: Soft, non-tender, nondistended +BS x 4   Neuro: A&O x3. Affect appropriate, speech coherent. R facial droop, otherwise CN II-XII grossly intact. See HENT.  RUE trace withdraw to noxious, RLE HF 1/5, KE/KF/DF/PF 0/5, LUE/LLE 5/5  SILT throughout.   Extremities: Skin warm, well-perfused. Right calf edema > left. Scar on R knee. No erythema, tenderness. DP 2+ b/l.      LABS:                        13.3   6.56  )-----------( 302      ( 01 May 2023 05:30 )             42.4     05-01    140  |  100  |  17  ----------------------------<  155<H>  5.2   |  33<H>  |  0.77    Ca    9.4      01 May 2023 05:30  Phos  3.6     05-01  Mg     2.1     05-01              CAPILLARY BLOOD GLUCOSE      POCT Blood Glucose.: 126 mg/dL (01 May 2023 21:27)  POCT Blood Glucose.: 245 mg/dL (01 May 2023 16:49)  POCT Blood Glucose.: 161 mg/dL (01 May 2023 13:01)  POCT Blood Glucose.: 152 mg/dL (01 May 2023 06:51)      Drug Levels: [] N/A    CSF Analysis: [] N/A      Allergies    No Known Allergies    Intolerances      MEDICATIONS:  Antibiotics:    Neuro:  acetaminophen     Tablet .. 650 milliGRAM(s) Oral every 6 hours PRN  levETIRAcetam 750 milliGRAM(s) Oral two times a day  ondansetron Injectable 4 milliGRAM(s) IV Push every 6 hours PRN    Anticoagulation:  enoxaparin Injectable 40 milliGRAM(s) SubCutaneous every 24 hours    OTHER:  amLODIPine   Tablet 10 milliGRAM(s) Oral every 24 hours  benzonatate 100 milliGRAM(s) Oral three times a day PRN  dexAMETHasone     Tablet 2 milliGRAM(s) Oral every 6 hours  dexAMETHasone     Tablet   Oral   hydrALAZINE Injectable 10 milliGRAM(s) IV Push every 4 hours PRN  insulin lispro (ADMELOG) corrective regimen sliding scale   SubCutaneous Before meals and at bedtime  pantoprazole    Tablet 40 milliGRAM(s) Oral before breakfast  polyethylene glycol 3350 17 Gram(s) Oral two times a day  senna 2 Tablet(s) Oral at bedtime    IVF:  sodium chloride 2 Gram(s) Oral every 8 hours    CULTURES:    RADIOLOGY & ADDITIONAL TESTS:      ASSESSMENT:  68 y/o M PMH HTN, OA (s/p R TKR 2 months ago), brain mass (s/p biopsy 4/7/23 at [facility] w/ Dr. Tobar) presents for pre-op for L crani tumor resection 4/24/23. Now s/p L crani for tumor resection, frozen HGG (4/25).    NEURO  - neuro/vitals q4h  - pain control w/ tylenol prn  - cont keppra 750 bid  - decadron 4q6 taper over 1 week to 2mg BID  - postop MR Brain w/wo complete 4/29  - CT 4/26 stable    CARDIO  - SBP < 150, hydralazine prn  - h/o HTN: amlodipine 10 mg     PULM   - satting well on RA   - encourage IS    GI   - CCB diet  - Bowel regimen   - Protonix while on steroids  - last BM 4/30    RENAL  - IVL  - salt tabs 2g q8h  - voiding    ENDO   - ISS, A1c 6.5    HEME   - SQL, SCDs for DVT ppx  - b/l LE IM calf DVTs on dopplers 4/26, pend repeat dopplers 5/1 negative  - RUE doppler completed for edema 4/28: negative for DVT    ID   - afebrile    DISPO: regional status, PT/OT recommending AR, full code    D/W Dr. Johnston

## 2023-05-02 NOTE — DISCHARGE NOTE PROVIDER - PROVIDER TOKENS
PROVIDER:[TOKEN:[[id number]]] PROVIDER:[TOKEN:[[id number]]],PROVIDER:[TOKEN:[[id number]]] PROVIDER:[TOKEN:[[id number]],FOLLOWUP:[2 weeks]],PROVIDER:[TOKEN:[[id number]],FOLLOWUP:[2 weeks]]

## 2023-05-02 NOTE — DISCHARGE NOTE PROVIDER - CARE PROVIDER_API CALL
Juanjose Johnston)  Neurosurgery  [address], [place] [address]  Phone: [phone]  Fax: [phone]  Follow Up Time:    Juanjose Johnston)  Neurosurgery  [address], [address] [place] [address]  Phone: [phone]  Fax: [phone]  Follow Up Time:     Delmis Causey; MIKHAIL)  Hematology; Internal Medicine; Medical Oncology  [address] [place] [address]  Phone: [phone]  Fax: [phone]  Follow Up Time:    Juanjose Johnston)  Neurosurgery  [address], [address] [place] [address]  Phone: [phone]  Fax: [phone]  Follow Up Time: 2 weeks    Delmis Causey; MIKHAIL)  Hematology; Internal Medicine; Medical Oncology  [address] [place] [address]  Phone: [phone]  Fax: [phone]  Follow Up Time: 2 weeks

## 2023-05-02 NOTE — DISCHARGE NOTE PROVIDER - NSDCFUADDINST_GEN_ALL_CORE_FT
Neurosurgery follow up appointment date/time:  - are staples/sutures in place?  - what day should staples/sutures be removed (POD 10-14)?  - please call the office to confirm appointment:     Wound Care:  - can patient shower?  - does dressing need to be changed/removed?  - no picking at incision  - pressure ulcer?     Devices:  - does patient need collar or brace or helmet?   - does collar/brace need to be worn at all times or just when OOB?  - RW or cane for ambulation?    Drains/Lines:  - PICC in place? ID follow up? (Paper Rx for: antibiotics, heparin flush, weekly lab draws)  - CHELSEY in place? Management?  - metzger in place? Management/Urology follow up?  - Tracheostomy?  - PEG tube?      Activity:  - fatigue is common after surgery, rest if you feel tired   - no bending, lifting, twisting or heavy lifting   - walking is recommended, ambulate as tolerated  - you may shower when you get home, keep your incision dry  - no soaking in a tub/pool/hot tub   - no driving within 24 hours of anesthesia or while taking prescription pain medications   - keep hydrated, drink plenty of water   - skullbase precautions: no nose blowing, sneeze with mouth open, no drinking out of a straw, no straining      Inpatient consults:  - final recommendations  - you will need follow up with....    Please also follow up with your primary care doctor.     Pain Expectations:  - pain after surgery is expected  - please take pain meds as prescribed     Medications:  - changes to home meds (ex. AED's)?  - new meds?  - pain meds?  - when can antiplatelets or anticoagulants be restarted?  - were adverse affects of meds discussed with patients?   - pain medications can cause constipation, you should eat a high fiber diet and may take a stool softener while on pain meds   - Avoid taking Advil (ibuprofen), Motrin (naproxen), or Aspirin for pain as they can cause bleeding     Call the office or come to ED if:  - wound has drainage or bleeding, increased redness or pain at incision site, neurological change, fever (>101), chills, night sweats, syncope, nausea/vomiting, chest pain, shortness of breath      Playback:  - are discharge instruction on playback?  - is a picture of the incision on playback?     WITHIN 24 HOURS OF DISCHARGE, PLEASE CONTACT NEURO PA  WITH ANY QUESTIONS OR CONCERNS: [phone]   OTHERWISE, PLEASE CALL THE OFFICE WITH ANY QUESTIONS OR CONCERNS: [phone] Neurosurgery follow up appointment date/time:  - You have sutures in place, they will be removed on   - please call the office to confirm appointment: [phone]     Wound Care:  - You can shower and wash your hair with baby shampoo. Pat incision dry with clean towel.  - No picking/scratching at incision. Avoid wearing hats/head coverings as this can cause an infection.   - No bathing or swimming.    Activity:  - fatigue is common after surgery, rest if you feel tired   - no bending, lifting, twisting or heavy lifting   - walking is recommended, ambulate as tolerated  - you may shower when you get home, keep your incision dry  - no soaking in a tub/pool/hot tub   - no driving within 24 hours of anesthesia or while taking prescription pain medications   - keep hydrated, drink plenty of water     Inpatient consults:  - final recommendations  - you will need follow up with Dr. Causey (oncology) - you will need radiation and chemotherapy after your finish at rehab.     Please also follow up with your primary care doctor.     Pain Expectations:  - pain after surgery is expected  - please take pain meds as prescribed   - tylenol 1-2 tabs every 6 hours as needed for pain     Medications:  - Keppra 750mg BID   - decadron 2mg every 8 hours from 5/2-5/3 then decadron 2mg every 12 hours from 5/4 indefinitely.   - protonix 40mg daily while taking decadron to prevent stress ulcers   - sodium chloride tablets 1 grams every 8 hours   - Amlodipine 10mg daily  - lovenox 40mg subcutaneously daily  - senna and miralax daily.   - adverse affects of meds discussed with patients  - pain medications can cause constipation, you should eat a high fiber diet and may take a stool softener while on pain meds   - Avoid taking Advil (ibuprofen), Motrin (naproxen), or Aspirin for pain as they can cause bleeding     Call the office or come to ED if:  - wound has drainage or bleeding, increased redness or pain at incision site, neurological change, fever (>101), chills, night sweats, syncope, nausea/vomiting, chest pain, shortness of breath      Playback:  -discharge instruction on playback  - picture of the incision on playback    WITHIN 24 HOURS OF DISCHARGE, PLEASE CONTACT NEURO PA  WITH ANY QUESTIONS OR CONCERNS: [phone]   OTHERWISE, PLEASE CALL THE OFFICE WITH ANY QUESTIONS OR CONCERNS: [phone] Neurosurgery follow up appointment date/time:  - You have staples in place, they will be removed on 5/9 at rehab   - please call the office to confirm appointment: [phone]     Wound Care:  - You can shower and wash your hair with baby shampoo. Pat incision dry with clean towel.  - No picking/scratching at incision. Avoid wearing hats/head coverings as this can cause an infection.   - No bathing or swimming.    Activity:  - fatigue is common after surgery, rest if you feel tired   - no bending, lifting, twisting or heavy lifting   - walking is recommended, ambulate as tolerated  - you may shower when you get home, keep your incision dry  - no soaking in a tub/pool/hot tub   - no driving within 24 hours of anesthesia or while taking prescription pain medications   - keep hydrated, drink plenty of water     Inpatient consults:  - final recommendations  - you will need follow up with Dr. Causey (oncology) - you will need radiation and chemotherapy after your finish at rehab.     Please also follow up with your primary care doctor.     Pain Expectations:  - pain after surgery is expected  - please take pain meds as prescribed   - tylenol 1-2 tabs every 6 hours as needed for pain     Medications:  - Keppra 750mg BID   - decadron 2mg every 8 hours from 5/2-5/3 then decadron 2mg every 12 hours from 5/4 indefinitely.   - protonix 40mg daily while taking decadron to prevent stress ulcers   - sodium chloride tablets 1 grams every 8 hours   - Amlodipine 10mg daily  - lovenox 40mg subcutaneously daily  - senna and miralax daily.   - adverse affects of meds discussed with patients  - pain medications can cause constipation, you should eat a high fiber diet and may take a stool softener while on pain meds   - Avoid taking Advil (ibuprofen), Motrin (naproxen), or Aspirin for pain as they can cause bleeding     Call the office or come to ED if:  - wound has drainage or bleeding, increased redness or pain at incision site, neurological change, fever (>101), chills, night sweats, syncope, nausea/vomiting, chest pain, shortness of breath      Playback:  -discharge instruction on playback  - picture of the incision on playback    WITHIN 24 HOURS OF DISCHARGE, PLEASE CONTACT NEURO PA  WITH ANY QUESTIONS OR CONCERNS: [phone]   OTHERWISE, PLEASE CALL THE OFFICE WITH ANY QUESTIONS OR CONCERNS: [phone]

## 2023-05-02 NOTE — DISCHARGE NOTE PROVIDER - CARE PROVIDERS DIRECT ADDRESSES
Voice message left to PICC RN for IV assistance    ,[email or web address] ,[email or web address]_Unknown

## 2023-05-03 LAB
ALBUMIN SERPL ELPH-MCNC: 2.6 G/DL — LOW (ref 3.3–5)
ALP SERPL-CCNC: 88 U/L — SIGNIFICANT CHANGE UP (ref 40–120)
ALT FLD-CCNC: 83 U/L — HIGH (ref 10–45)
ANION GAP SERPL CALC-SCNC: 6 MMOL/L — SIGNIFICANT CHANGE UP (ref 5–17)
AST SERPL-CCNC: 25 U/L — SIGNIFICANT CHANGE UP (ref 10–40)
BASOPHILS # BLD AUTO: 0 K/UL — SIGNIFICANT CHANGE UP (ref 0–0.2)
BASOPHILS NFR BLD AUTO: 0 % — SIGNIFICANT CHANGE UP (ref 0–2)
BILIRUB SERPL-MCNC: 0.4 MG/DL — SIGNIFICANT CHANGE UP (ref 0.2–1.2)
BUN SERPL-MCNC: 19 MG/DL — SIGNIFICANT CHANGE UP (ref 7–23)
CALCIUM SERPL-MCNC: 8.8 MG/DL — SIGNIFICANT CHANGE UP (ref 8.4–10.5)
CHLORIDE SERPL-SCNC: 101 MMOL/L — SIGNIFICANT CHANGE UP (ref 96–108)
CO2 SERPL-SCNC: 29 MMOL/L — SIGNIFICANT CHANGE UP (ref 22–31)
CREAT SERPL-MCNC: 0.83 MG/DL — SIGNIFICANT CHANGE UP (ref 0.5–1.3)
EGFR: 96 ML/MIN/1.73M2 — SIGNIFICANT CHANGE UP
EOSINOPHIL # BLD AUTO: 0.01 K/UL — SIGNIFICANT CHANGE UP (ref 0–0.5)
EOSINOPHIL NFR BLD AUTO: 0.2 % — SIGNIFICANT CHANGE UP (ref 0–6)
GLUCOSE BLDC GLUCOMTR-MCNC: 124 MG/DL — HIGH (ref 70–99)
GLUCOSE BLDC GLUCOMTR-MCNC: 127 MG/DL — HIGH (ref 70–99)
GLUCOSE BLDC GLUCOMTR-MCNC: 164 MG/DL — HIGH (ref 70–99)
GLUCOSE BLDC GLUCOMTR-MCNC: 170 MG/DL — HIGH (ref 70–99)
GLUCOSE SERPL-MCNC: 155 MG/DL — HIGH (ref 70–99)
HCT VFR BLD CALC: 41.3 % — SIGNIFICANT CHANGE UP (ref 39–50)
HGB BLD-MCNC: 13.5 G/DL — SIGNIFICANT CHANGE UP (ref 13–17)
IMM GRANULOCYTES NFR BLD AUTO: 0.6 % — SIGNIFICANT CHANGE UP (ref 0–0.9)
LYMPHOCYTES # BLD AUTO: 0.99 K/UL — LOW (ref 1–3.3)
LYMPHOCYTES # BLD AUTO: 15.3 % — SIGNIFICANT CHANGE UP (ref 13–44)
MCHC RBC-ENTMCNC: 28 PG — SIGNIFICANT CHANGE UP (ref 27–34)
MCHC RBC-ENTMCNC: 32.7 GM/DL — SIGNIFICANT CHANGE UP (ref 32–36)
MCV RBC AUTO: 85.5 FL — SIGNIFICANT CHANGE UP (ref 80–100)
MONOCYTES # BLD AUTO: 0.44 K/UL — SIGNIFICANT CHANGE UP (ref 0–0.9)
MONOCYTES NFR BLD AUTO: 6.8 % — SIGNIFICANT CHANGE UP (ref 2–14)
NEUTROPHILS # BLD AUTO: 5 K/UL — SIGNIFICANT CHANGE UP (ref 1.8–7.4)
NEUTROPHILS NFR BLD AUTO: 77.1 % — HIGH (ref 43–77)
NRBC # BLD: 0 /100 WBCS — SIGNIFICANT CHANGE UP (ref 0–0)
PLATELET # BLD AUTO: 240 K/UL — SIGNIFICANT CHANGE UP (ref 150–400)
POTASSIUM SERPL-MCNC: 4.4 MMOL/L — SIGNIFICANT CHANGE UP (ref 3.5–5.3)
POTASSIUM SERPL-SCNC: 4.4 MMOL/L — SIGNIFICANT CHANGE UP (ref 3.5–5.3)
PROT SERPL-MCNC: 6.1 G/DL — SIGNIFICANT CHANGE UP (ref 6–8.3)
RBC # BLD: 4.83 M/UL — SIGNIFICANT CHANGE UP (ref 4.2–5.8)
RBC # FLD: 14.5 % — SIGNIFICANT CHANGE UP (ref 10.3–14.5)
SODIUM SERPL-SCNC: 136 MMOL/L — SIGNIFICANT CHANGE UP (ref 135–145)
WBC # BLD: 6.48 K/UL — SIGNIFICANT CHANGE UP (ref 3.8–10.5)
WBC # FLD AUTO: 6.48 K/UL — SIGNIFICANT CHANGE UP (ref 3.8–10.5)

## 2023-05-03 PROCEDURE — 99223 1ST HOSP IP/OBS HIGH 75: CPT

## 2023-05-03 PROCEDURE — 96116 NUBHVL XM PHYS/QHP 1ST HR: CPT

## 2023-05-03 PROCEDURE — 99255 IP/OBS CONSLTJ NEW/EST HI 80: CPT

## 2023-05-03 RX ADMIN — SODIUM CHLORIDE 1 GRAM(S): 9 INJECTION INTRAMUSCULAR; INTRAVENOUS; SUBCUTANEOUS at 22:06

## 2023-05-03 RX ADMIN — LEVETIRACETAM 750 MILLIGRAM(S): 250 TABLET, FILM COATED ORAL at 05:24

## 2023-05-03 RX ADMIN — ENOXAPARIN SODIUM 40 MILLIGRAM(S): 100 INJECTION SUBCUTANEOUS at 05:24

## 2023-05-03 RX ADMIN — Medication 2 MILLIGRAM(S): at 17:11

## 2023-05-03 RX ADMIN — SODIUM CHLORIDE 1 GRAM(S): 9 INJECTION INTRAMUSCULAR; INTRAVENOUS; SUBCUTANEOUS at 05:24

## 2023-05-03 RX ADMIN — Medication 2 MILLIGRAM(S): at 11:41

## 2023-05-03 RX ADMIN — PANTOPRAZOLE SODIUM 40 MILLIGRAM(S): 20 TABLET, DELAYED RELEASE ORAL at 05:24

## 2023-05-03 RX ADMIN — SENNA PLUS 2 TABLET(S): 8.6 TABLET ORAL at 22:06

## 2023-05-03 RX ADMIN — SODIUM CHLORIDE 1 GRAM(S): 9 INJECTION INTRAMUSCULAR; INTRAVENOUS; SUBCUTANEOUS at 13:52

## 2023-05-03 RX ADMIN — Medication 2 MILLIGRAM(S): at 05:24

## 2023-05-03 RX ADMIN — AMLODIPINE BESYLATE 10 MILLIGRAM(S): 2.5 TABLET ORAL at 05:24

## 2023-05-03 RX ADMIN — LEVETIRACETAM 750 MILLIGRAM(S): 250 TABLET, FILM COATED ORAL at 17:11

## 2023-05-03 RX ADMIN — Medication 2: at 11:40

## 2023-05-03 NOTE — CONSULT NOTE ADULT - SUBJECTIVE AND OBJECTIVE BOX
Pt is a 66 y/o right-handed male with PMHx of HTN, Osteoarthritis, right total knee replacement 2 months ago, brain mass s/p biopsy 4/7/23 @ [facility] with Dr. Tobar, who presented to [facility] for brain tumor resection 4/24/23. Per family, Pt started to have right hand weakness about a month ago that grew progressively worse, then went to ED where brain mass was diagnosed. He was started on keppra 750 mg BID for seizure prophylaxis. Pt had sterotactic needle biopsy and laser interstitial thermal therapy by Dr. Tobar on 4/7/23 which was positive for glioblastoma  s/p craniotomy for resection of L brain tumor 4/25/23. Hospital course complicated by hyponatremia and RUE edema. Doppler negative. Pt was evaluated by PM&R and therapy for functional deficits and gait/ ADL impairments and recommended acute rehabilitation. Pt was medically optimized for discharge to [facility] on 5/2/23. PMHx: As noted above. Current meds: Please see list in Pt’s chart. Social Hx: Pt is  and has three adult children. Pt has a master's degree and several credits toward a doctorate, both in divinity; Pt is a . Pt lacks hx of mental illness and substance abuse. Pt is a [employer] . Pt enjoys gardening and travelling.     Findings: Pt was seen for an initial assessment of his cognitive and emotional functioning. The Modified MMSE (3MS) was administered; Pt’s results (/100) were in the range. His scores in geriatric mood measures suggested minimal levels of anxiety and depression (GAS = 1/30; GDS = 1/15). Pt was alert, fully Ox3, and cooperative.  Attn/Conc: Simple auditory attention - intact.  Concentration/Working memory for reversed counting and spelling – mildly impaired (5/7). Language: Pt’s speech was of normal volume, pitch and pace Naming - mildly impaired (4/5 body parts correctly named). Sentence repetition - intact. Auditory Comprehension - intact. Reading - intact. Writing - not assessed, impeded by right hemiparesis. Memory: Encoding of 3 words was intact (3/3); short- and long-delayed verbal recall – both intact (3/3). LTM was intact for US presidents (4/4). Visual memory – not assessed. Visuospatial: Visuomotor integration – not assessed, impeded by right hemiparesis. Figure-ground perception - 3/4, mildly impaired. Executive Functions: Motor Planning - intact. Organizational skills - mildly impaired. Abstract reasoning - mildly impaired. Initiation/Phonemic Fluency - intact. Verbal problem solving – mildly impaired. Emotional functioning: Affect - depressed, responsive to humor. Mood - euthymic ("very grateful"); Pt reported experiencing poor sleep, low energy and fatigue. On mood measures he additionally reported feelings of isolation and fatigue.  Thought processes were goal-directed.  No abnormal thought contents were observed.  Pt denied any AH/VH. Pt also denied SI/HI/I/P. Insight - WFL. Judgment - fair.    
Patient is a 67y old  Male who presents with a chief complaint of GBm sp craniotomy and resection (03 May 2023 07:19)    HPI:  Patient is a 66 y/o male PMH of HTN, Osteoarthritis, right total knee replacement 2 months ago, brain mass s/p biopsy 23 @ [facility] with Dr. Tobar, who presented to [facility] for brain tumor resection 23. Per family, patient started to have right hand weakness about a month ago that grew progressively worse, then went to ED where brain mass was diagnosed. He was started on keppra 750 mg BID for seizure prophylaxis. Patient had sterotactic needle biopsy and laser interstitial thermal therapy by Dr. Tobar on 23 which was positive for glioblastoma  s/p craniotomy for resection of L brain tumor 23. Hospital course complicated by hyponatremia and RUE edema. Doppler negative.     Patient was evaluated by PM&R and therapy for functional deficits and gait/ ADL impairments and recommended acute rehabilitation. Patient was medically optimized for discharge to [facility] on 23     (02 May 2023 16:29)    Patient seen and examined at bedside. Reports right sided hemiparesis. Denies numbness, tingling. Denies headache.   States symptoms started initially as right thumb weakness and progressed to hand then arm prompting evaluation and discovery of brain mass.   Reports recent right total knee replacement. Denies knee pain.     PAST MEDICAL & SURGICAL HISTORY:  Hypertension      Osteoarthritis      Brain mass      S/P total knee replacement, right        SOCIAL HISTORY: Prior to hospitalization independent of ADLs and ambulation. Works as a . Denies smoking, alcohol use, illicit drug use.     FAMILY HISTORY: Brother alive age 74 hx of prostate Ca. Father  age 80 hx of HTN. Mother  age 42 hx of breast Ca.    ALLERGIES:  No Known Allergies    MEDICATIONS  (STANDING):  amLODIPine   Tablet 10 milliGRAM(s) Oral every 24 hours  dexAMETHasone     Tablet   Oral   dexAMETHasone     Tablet 2 milliGRAM(s) Oral every 6 hours  dextrose 5%. 1000 milliLiter(s) (100 mL/Hr) IV Continuous <Continuous>  dextrose 5%. 1000 milliLiter(s) (50 mL/Hr) IV Continuous <Continuous>  dextrose 50% Injectable 25 Gram(s) IV Push once  dextrose 50% Injectable 12.5 Gram(s) IV Push once  dextrose 50% Injectable 25 Gram(s) IV Push once  enoxaparin Injectable 40 milliGRAM(s) SubCutaneous every 24 hours  glucagon  Injectable 1 milliGRAM(s) IntraMuscular once  insulin lispro (ADMELOG) corrective regimen sliding scale   SubCutaneous three times a day before meals  insulin lispro (ADMELOG) corrective regimen sliding scale   SubCutaneous at bedtime  levETIRAcetam 750 milliGRAM(s) Oral two times a day  pantoprazole    Tablet 40 milliGRAM(s) Oral before breakfast  polyethylene glycol 3350 17 Gram(s) Oral two times a day  senna 2 Tablet(s) Oral at bedtime  sodium chloride 1 Gram(s) Oral every 8 hours    MEDICATIONS  (PRN):  acetaminophen     Tablet .. 650 milliGRAM(s) Oral every 6 hours PRN Mild Pain (1 - 3)  benzonatate 100 milliGRAM(s) Oral three times a day PRN Cough  dextrose Oral Gel 15 Gram(s) Oral once PRN Blood Glucose LESS THAN 70 milliGRAM(s)/deciliter    Review of Systems: Refer to HPI for pertinent positives and negatives. All other ROS reviewed and negative except as otherwise stated above.    Vital Signs Last 24 Hrs  T(F): 97.7 (03 May 2023 07:40), Max: 98.3 (02 May 2023 15:31)  HR: 59 (03 May 2023 07:40) (59 - 65)  BP: 126/73 (03 May 2023 07:40) (126/73 - 135/75)  RR: 16 (03 May 2023 07:40) (16 - 17)  SpO2: 98% (03 May 2023 07:40) (97% - 99%)  I&O's Summary    02 May 2023 07:01  -  03 May 2023 07:00  --------------------------------------------------------  IN: 0 mL / OUT: 1500 mL / NET: -1500 mL      PHYSICAL EXAM:  GENERAL: NAD  HEAD:  Atraumatic, Normocephalic  EYES: EOMI, PERRL, conjunctiva and sclera clear, left lateral strabismus  ENMT: Moist mucous membranes, Good dentition  NECK: Supple, No JVD  CHEST/LUNG: Clear to auscultation bilaterally, non-labored breathing, good air entry  HEART: RRR; S1/S2, No murmur  ABDOMEN: Soft, Nontender, Nondistended; Bowel sounds present  VASCULAR: Normal pulses, Normal capillary refill  EXTREMITIES: No cyanosis, No edema  LYMPH: No lymphadenopathy noted  SKIN: Warm, Intact  PSYCH: Normal mood and affect  NERVOUS SYSTEM:  A/O x3, Good concentration; right sided hemiparesis 0/5 muscle strength in RUE, RLE; 5/5 muscle strength in LUE, LLE    LABS:                        13.5   6.48  )-----------( 240      ( 03 May 2023 06:00 )             41.3     05-03    136  |  101  |  19  ----------------------------<  155  4.4   |  29  |  0.83    Ca    8.8      03 May 2023 06:00  Phos  4.2     05-02  Mg     2.1     05-02    TPro  6.1  /  Alb  2.6  /  TBili  0.4  /  DBili  x   /  AST  25  /  ALT  83  /  AlkPhos  88  05-03                          POCT Blood Glucose.: 124 mg/dL (03 May 2023 07:40)  POCT Blood Glucose.: 129 mg/dL (02 May 2023 21:36)  POCT Blood Glucose.: 202 mg/dL (02 May 2023 16:24)  POCT Blood Glucose.: 167 mg/dL (02 May 2023 11:33)            RADIOLOGY & ADDITIONAL TESTS: < from: MR Head w/wo IV Cont (23 @ 18:42) >    ACC: [id number] EXAM:  MR BRAIN WAW IC   ORDERED BY: VÍCTOR WOLFE     PROCEDURE DATE:  2023          INTERPRETATION:  PROCEDURE: MRI Brain with and without contrast    INDICATION: Status post resection of left-sided brain mass    TECHNIQUE: Sagittal T1, axial T2 FLAIR, axial T1, axial DWI, axial SWI,   axial T2 and coronal T2 weighted images of the brain were obtained..   Following the intravenous administration of 10 mL Gadavist contrast   material, axial T1 volumetric imaging with MPR provided.    COMPARISON: MRI brain 2023 and CT head 2023    FINDINGS:    Again demonstrated status post left parietal craniotomy for resection of   a left posterior frontal lobe mass. There is continued evolution of   postsurgical changes with subcutaneous swelling and the crescentic   subcutaneous fluid collection. There is a trace left cerebral convexity   subdural hematoma and emphysema. There is a small extra-axial fluid   collection deep to craniotomy site containing air and hemorrhagic   products which is not significant change from prior exam.    There is a surgical resection cavity within the left posterior frontal   lobe extending to the left periventricular region containing air and   hemorrhagic products. There has been resection of the heterogeneously   enhancing mass in the posterior left frontal lobe. There is intrinsic T1   hyperintensity within the mass secondary to acute/subacute blood   products. There is no evidence of nodular enhancement to suggest residual   tumor.There is interval decreased surrounding vasogenic edema and mass   effect.    The ventricles are stable in size and configuration.    There is no evidence of recent infarction diffusion-weighted imaging.    The large vessel flow voids are preserved.    There is an empty sella.    IMPRESSION:    Status post resection of left frontal lobe mass with associated   postsurgical changes as described above. Gross total resection of the   mass.    --- End of Report ---            AISHWARYA DENTON MD; Attending Radiologist  This document has been electronically signed. 2023  8:55AM    < end of copied text >      Care Discussed with Consultants/Other Providers: Dr Sepulveda (physiatrist) continue Amlodipine for HTN.

## 2023-05-03 NOTE — CONSULT NOTE ADULT - ASSESSMENT
67M with PMH HTN, who presented with recently diagnosed brain mass for brain tumor resection. Pathology positive for glioblastoma; patient s/p craniotomy for resection of L brain tumor. Hospital course complicated by hyponatremia. Now admitted to [facility] for initiation of a multidisciplinary rehab program.    #Glioblastoma s/p craniotomy for resection of L brain tumor   -Start comprehensive rehab program   -Continue decadron taper: 2mg PO q 6 hours -> q 8 hours -> BID  -Continue Keppra BID   -Continue Tylenol PRN for pain    #Hyponatremia  -Continue NaCl 1G q 8 hours  -Monitor Na levels  -Plan to taper to discontinuation with appropriate range Na    #HTN   -Continue Amlodipine  -Monitor vitals    #Type 2 DM   A1C: 6.5  -Continue moderate dose insulin sliding scale  -Hypoglycemia protocol, accu-checks  -Blood glucose goal 100-180 in hospital setting    #Prophylactic Measure  -DVT ppx: Lovenox  -GI ppx: Protonix while on steroids  -Bowel regimen  
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 Pt exhibits difficulties in concentration/working memory, visuospatial skills (i.e., figure-ground perception), language (i.e., naming) and aspects of executive functioning (including abstract reasoning, organizational skills, and problem solving). Pt's graphomotor skills are currently impeded by right hemiparesis which makes it difficulty to write or draw. Pt's affect is somewhat depressed but responsive to humor, and he only reports a few adjustment symptoms in response to his current medical condition and personal situation. FIM scores: Social Interaction 6; Problem Solving 5; Memory 7.    Plan: Individual supportive psychotherapy to monitor cognition, affect/mood, and behavior. Cognitive remediation during speech therapy sessions is recommended. Participation in recreation/art therapy in order to have pleasure and mastery experiences and regain/reestablish a sense of routine.    Time spent with Pt, 50 minutes.

## 2023-05-03 NOTE — CONSULT NOTE ADULT - TIME BILLING
Reviewing chart notes and data, face to face time counseling the patient, communicating with Dr Sepulveda (physiatrist), coordinating care with SW/CM at [facility]

## 2023-05-04 LAB
ALBUMIN SERPL ELPH-MCNC: 2.7 G/DL — LOW (ref 3.3–5)
ALP SERPL-CCNC: 89 U/L — SIGNIFICANT CHANGE UP (ref 40–120)
ALT FLD-CCNC: 83 U/L — HIGH (ref 10–45)
ANION GAP SERPL CALC-SCNC: 3 MMOL/L — LOW (ref 5–17)
AST SERPL-CCNC: 22 U/L — SIGNIFICANT CHANGE UP (ref 10–40)
BILIRUB SERPL-MCNC: 0.4 MG/DL — SIGNIFICANT CHANGE UP (ref 0.2–1.2)
BUN SERPL-MCNC: 16 MG/DL — SIGNIFICANT CHANGE UP (ref 7–23)
CALCIUM SERPL-MCNC: 9 MG/DL — SIGNIFICANT CHANGE UP (ref 8.4–10.5)
CHLORIDE SERPL-SCNC: 102 MMOL/L — SIGNIFICANT CHANGE UP (ref 96–108)
CO2 SERPL-SCNC: 34 MMOL/L — HIGH (ref 22–31)
CREAT SERPL-MCNC: 0.84 MG/DL — SIGNIFICANT CHANGE UP (ref 0.5–1.3)
EGFR: 96 ML/MIN/1.73M2 — SIGNIFICANT CHANGE UP
GLUCOSE BLDC GLUCOMTR-MCNC: 138 MG/DL — HIGH (ref 70–99)
GLUCOSE SERPL-MCNC: 130 MG/DL — HIGH (ref 70–99)
HCT VFR BLD CALC: 42.3 % — SIGNIFICANT CHANGE UP (ref 39–50)
HGB BLD-MCNC: 13.6 G/DL — SIGNIFICANT CHANGE UP (ref 13–17)
MCHC RBC-ENTMCNC: 28.2 PG — SIGNIFICANT CHANGE UP (ref 27–34)
MCHC RBC-ENTMCNC: 32.2 GM/DL — SIGNIFICANT CHANGE UP (ref 32–36)
MCV RBC AUTO: 87.6 FL — SIGNIFICANT CHANGE UP (ref 80–100)
NRBC # BLD: 0 /100 WBCS — SIGNIFICANT CHANGE UP (ref 0–0)
PLATELET # BLD AUTO: 285 K/UL — SIGNIFICANT CHANGE UP (ref 150–400)
POTASSIUM SERPL-MCNC: 4.8 MMOL/L — SIGNIFICANT CHANGE UP (ref 3.5–5.3)
POTASSIUM SERPL-SCNC: 4.8 MMOL/L — SIGNIFICANT CHANGE UP (ref 3.5–5.3)
PROT SERPL-MCNC: 6.2 G/DL — SIGNIFICANT CHANGE UP (ref 6–8.3)
RBC # BLD: 4.83 M/UL — SIGNIFICANT CHANGE UP (ref 4.2–5.8)
RBC # FLD: 14.5 % — SIGNIFICANT CHANGE UP (ref 10.3–14.5)
SODIUM SERPL-SCNC: 139 MMOL/L — SIGNIFICANT CHANGE UP (ref 135–145)
SURGICAL PATHOLOGY STUDY: SIGNIFICANT CHANGE UP
WBC # BLD: 6.08 K/UL — SIGNIFICANT CHANGE UP (ref 3.8–10.5)
WBC # FLD AUTO: 6.08 K/UL — SIGNIFICANT CHANGE UP (ref 3.8–10.5)

## 2023-05-04 PROCEDURE — 99233 SBSQ HOSP IP/OBS HIGH 50: CPT

## 2023-05-04 PROCEDURE — 99232 SBSQ HOSP IP/OBS MODERATE 35: CPT

## 2023-05-04 RX ORDER — SODIUM CHLORIDE 9 MG/ML
1 INJECTION INTRAMUSCULAR; INTRAVENOUS; SUBCUTANEOUS EVERY 12 HOURS
Refills: 0 | Status: DISCONTINUED | OUTPATIENT
Start: 2023-05-04 | End: 2023-05-31

## 2023-05-04 RX ADMIN — SODIUM CHLORIDE 1 GRAM(S): 9 INJECTION INTRAMUSCULAR; INTRAVENOUS; SUBCUTANEOUS at 05:40

## 2023-05-04 RX ADMIN — Medication 2 MILLIGRAM(S): at 14:07

## 2023-05-04 RX ADMIN — SODIUM CHLORIDE 1 GRAM(S): 9 INJECTION INTRAMUSCULAR; INTRAVENOUS; SUBCUTANEOUS at 17:19

## 2023-05-04 RX ADMIN — SENNA PLUS 2 TABLET(S): 8.6 TABLET ORAL at 22:58

## 2023-05-04 RX ADMIN — LEVETIRACETAM 750 MILLIGRAM(S): 250 TABLET, FILM COATED ORAL at 17:19

## 2023-05-04 RX ADMIN — PANTOPRAZOLE SODIUM 40 MILLIGRAM(S): 20 TABLET, DELAYED RELEASE ORAL at 05:41

## 2023-05-04 RX ADMIN — LEVETIRACETAM 750 MILLIGRAM(S): 250 TABLET, FILM COATED ORAL at 05:41

## 2023-05-04 RX ADMIN — Medication 2 MILLIGRAM(S): at 05:41

## 2023-05-04 RX ADMIN — Medication 2 MILLIGRAM(S): at 22:58

## 2023-05-04 RX ADMIN — AMLODIPINE BESYLATE 10 MILLIGRAM(S): 2.5 TABLET ORAL at 05:41

## 2023-05-04 RX ADMIN — ENOXAPARIN SODIUM 40 MILLIGRAM(S): 100 INJECTION SUBCUTANEOUS at 05:40

## 2023-05-04 NOTE — PROGRESS NOTE ADULT - MOTOR
LEFT    UE - ShAB 5/5, EF 5/5, EE 5/5, WE 5/5,  WNL  RIGHT UE - ShAB 2/5, EF 2/5, EE 0/5, WE 1/5, 2/5    LEFT    LE - HF 5/5, KE 5/5, DF 5/5, PF 5/5  RIGHT LE - HF 1/5, KE 0/5, DF 0/5, PF 0/5

## 2023-05-04 NOTE — DIETITIAN INITIAL EVALUATION ADULT - PERTINENT LABORATORY DATA
05-04    139  |  102  |  16  ----------------------------<  130<H>  4.8   |  34<H>  |  0.84    Ca    9.0      04 May 2023 06:40    TPro  6.2  /  Alb  2.7<L>  /  TBili  0.4  /  DBili  x   /  AST  22  /  ALT  83<H>  /  AlkPhos  89  05-04  POCT Blood Glucose.: 138 mg/dL (05-04-23 @ 07:15)  A1C with Estimated Average Glucose Result: 6.5 % (04-25-23 @ 05:30)  A1C with Estimated Average Glucose Result: 6.8 % (04-24-23 @ 21:22)

## 2023-05-04 NOTE — PROGRESS NOTE ADULT - ASSESSMENT
Patient is a 66 y/o male PMH of HTN, OA, right TKA, who presented with recently diagnosed, brain mass, who presented to [facility] for brain tumor resection 4/24/23 Pathology positive for glioblastoma; patient  s/p craniotomy for resection of L brain tumor 4/25/23. Hospital course complicated by hyponatremia. Admitted for multidisciplinary rehab program    # Glioblastoma s/p craniotomy for resection of L brain tumor 4/25/23 right hemiparesis  - c/w decadron taper   2mg PO Q6 5/2-5/3  decadron 2mg PO Q8 5/3-5/4  decadron 2mg PO BID 5/4-6/3  - c/w keppra 750mg PO BID for seizure ppx  - Comprehensive Multidisciplinary Rehab Program: PT/OT/ SLP 3 hours a day 5 days a week  - right shoulder sling for support. right PRAFO  - may need right SAFO with liner for foot drop  - R kinesiology tape for R shoulder 5/4  - neuropsychology consult. Recreation therapy  - Precautions: steroid, SZ, cardiac, fall    # HTN   -c/w amlodopine 10mg PO QD   - monitor vitals, hospitalist consult  - (137//80) 5/4    # DMType2   - c/w ISS 3 times a day before meals and at bedtime   - hospitalist and nutrition consult    # Sleep:  - Maintain quiet hours and low stim environment  - Melatonin PRN    # Pain:  - Tylenol PRN  - avoid sedating meds that may affect cognitive recovery    # GI/Bowel:  - miralax 17g PO BID   - Senna 2 tabs daily  - GI ppx: pantoprazole 40mg PO before breakfast      # /Bladder:  - Monitor PVR if no void in 8h; SC for >400 cc  - Toileting schedule q4h    # Diet   - Diet: Regular, CCH     # Skin/ Pressure Injury Prevention:  - assessment on admission   - Incisions: Crani with staples CDI  - Turn Q2hrs in bed while awake, OOB to Chair, PT/OT/SLP     # DVT prophylaxis:  - Last doppler 5/1/23 - Neg. Patient aware  - lovenox 40mg SC QD   - SCDs    # LABs  CBC BMP 5/8      --------------------------------------------  Outpatient Follow up:    Juanjose Johnston)  Neurosurgery  [address], [address] [place] [address]  Phone: [phone]  Fax: [phone]  Follow Up Time:     Delmis Causey; MIKHAIL)  Hematology; Internal Medicine; Medical Oncology  [address] [place] [address]  Phone: [phone]  Fax: [phone]  Follow Up Time:   Patient is a 66 y/o male PMH of HTN, OA, right TKA, who presented with recently diagnosed, brain mass, who presented to [facility] for brain tumor resection 4/24/23 Pathology positive for glioblastoma; patient  s/p craniotomy for resection of L brain tumor 4/25/23. Hospital course complicated by hyponatremia. Admitted for multidisciplinary rehab program    # Glioblastoma s/p craniotomy for resection of L brain tumor 4/25/23 right hemiparesis  - c/w decadron taper   ·	decadron 2mg PO Q8 5/3-5/4  ·	decadron 2mg PO BID 5/4-6/3  - keppra 750mg PO BID for seizure ppx  - Comprehensive Multidisciplinary Rehab Program: PT/OT/ SLP 3 hours a day 5 days a week  - may need right SAFO with liner for foot drop  - R kinesiology tape for R shoulder 5/4, FES right shoulder and forearm muscles  - neuropsychology consult. Recreation therapy  - Precautions: steroid, SZ, cardiac, fall    # HTN   - amlodopine 10mg PO QD   - (137//80) 5/4    # DM2   - c/w ISS   - controlled    # Sleep:  - Melatonin PRN    # Pain:  - Tylenol PRN    # GI/Bowel:  - miralax 17g PO BID   - Senna 2 tabs daily  - pantoprazole 40mg PO before breakfast      # /Bladder:  - Monitor PVR if no void in 8h; SC for >400 cc  - Toileting schedule q4h    # Diet   - Diet: Regular, CCH     # DVT prophylaxis:  - Last doppler 5/1/23 - Neg. Patient aware  - lovenox 40mg SC QD   - SCDs    # LABs  CBC BMP 5/8      --------------------------------------------  Outpatient Follow up:    Juanjose Johnston)  Neurosurgery  [address], [address] [place] [address]  Phone: [phone]  Fax: [phone]  Follow Up Time:     Delmis Causey; MIKHAIL)  Hematology; Internal Medicine; Medical Oncology  [address] [place] [address]  Phone: [phone]  Fax: [phone]  Follow Up Time:   Patient is a 66 y/o male PMH of HTN, OA, right TKA, who presented with recently diagnosed, brain mass, who presented to [facility] for brain tumor resection 4/24/23 Pathology positive for glioblastoma; patient  s/p craniotomy for resection of L brain tumor 4/25/23. Hospital course complicated by hyponatremia. Admitted for multidisciplinary rehab program    # Glioblastoma s/p craniotomy for resection of L brain tumor 4/25/23 right hemiparesis  - c/w decadron taper   ·	decadron 2mg PO Q8 5/3-5/4  ·	decadron 2mg PO BID 5/4-6/3  - keppra 750mg PO BID for seizure ppx  - Comprehensive Multidisciplinary Rehab Program: PT/OT/ SLP 3 hours a day 5 days a week--> adjust to 30 min SLP. 90 Pt 60 OT  - may need right SAFO with liner for foot drop  - R kinesiology tape for R shoulder 5/4, FES right shoulder and forearm muscles  - neuropsychology consult. Recreation therapy  - Precautions: steroid, SZ, cardiac, fall    # HTN   - amlodopine 10mg PO QD   - (137//80) 5/4    # DM2   - c/w ISS   - controlled    # Sleep:  - Melatonin PRN    # Pain:  - Tylenol PRN    # GI/Bowel:  - miralax 17g PO BID   - Senna 2 tabs daily  - pantoprazole 40mg PO before breakfast      # /Bladder:  - Monitor PVR if no void in 8h; SC for >400 cc  - Toileting schedule q4h    # Diet   - Diet: Regular, CCH     # DVT prophylaxis:  - Last doppler 5/1/23 - Neg. Patient aware  - lovenox 40mg SC QD   - SCDs    # LABs  CBC BMP 5/8      --------------------------------------------  Outpatient Follow up:    Juanjose Johnston)  Neurosurgery  [address], [address] [place] [address]  Phone: [phone]  Fax: [phone]  Follow Up Time:     Delmis Causey; MIKHAIL)  Hematology; Internal Medicine; Medical Oncology  [address] [place] [address]  Phone: [phone]  Fax: [phone]  Follow Up Time:

## 2023-05-04 NOTE — DIETITIAN NUTRITION RISK NOTIFICATION - TREATMENT: THE FOLLOWING DIET HAS BEEN RECOMMENDED
Diet, Consistent Carbohydrate/No Snacks:   Vegan {Accepts Vegetable Products Only}  No Dairy (05-03-23 @ 11:16) [Active]

## 2023-05-04 NOTE — PROGRESS NOTE ADULT - SUBJECTIVE AND OBJECTIVE BOX
Patient is a 67y old  Male who presents with a chief complaint of GBm sp craniotomy and resection    No events overnight      Patient seen and examined at bedside.    ALLERGIES:  No Known Allergies    MEDICATIONS  (STANDING):  amLODIPine   Tablet 10 milliGRAM(s) Oral every 24 hours  dexAMETHasone     Tablet   Oral   dexAMETHasone     Tablet 2 milliGRAM(s) Oral every 8 hours  dextrose 5%. 1000 milliLiter(s) (100 mL/Hr) IV Continuous <Continuous>  dextrose 5%. 1000 milliLiter(s) (50 mL/Hr) IV Continuous <Continuous>  dextrose 50% Injectable 25 Gram(s) IV Push once  dextrose 50% Injectable 12.5 Gram(s) IV Push once  dextrose 50% Injectable 25 Gram(s) IV Push once  enoxaparin Injectable 40 milliGRAM(s) SubCutaneous every 24 hours  glucagon  Injectable 1 milliGRAM(s) IntraMuscular once  levETIRAcetam 750 milliGRAM(s) Oral two times a day  pantoprazole    Tablet 40 milliGRAM(s) Oral before breakfast  polyethylene glycol 3350 17 Gram(s) Oral two times a day  senna 2 Tablet(s) Oral at bedtime  sodium chloride 1 Gram(s) Oral every 8 hours    MEDICATIONS  (PRN):  acetaminophen     Tablet .. 650 milliGRAM(s) Oral every 6 hours PRN Mild Pain (1 - 3)  benzonatate 100 milliGRAM(s) Oral three times a day PRN Cough  dextrose Oral Gel 15 Gram(s) Oral once PRN Blood Glucose LESS THAN 70 milliGRAM(s)/deciliter    Vital Signs Last 24 Hrs  T(F): 98 (04 May 2023 07:31), Max: 98 (03 May 2023 20:06)  HR: 57 (04 May 2023 07:31) (57 - 66)  BP: 140/80 (04 May 2023 07:31) (129/76 - 140/80)  RR: 16 (04 May 2023 07:31) (16 - 16)  SpO2: 97% (04 May 2023 07:31) (97% - 98%)  I&O's Summary    BMI (kg/m2): 31.3 (05-02-23 @ 20:36)  PHYSICAL EXAM:  GENERAL: NAD  HEAD:  Atraumatic, Normocephalic  EYES: EOMI, PERRL, conjunctiva and sclera clear, left lateral strabismus  ENMT: Moist mucous membranes, Good dentition  NECK: Supple, No JVD  CHEST/LUNG: Clear to auscultation bilaterally, non-labored breathing, good air entry  HEART: RRR; S1/S2, No murmur  ABDOMEN: Soft, Nontender, Nondistended; Bowel sounds present  VASCULAR: Normal pulses, Normal capillary refill  EXTREMITIES: No cyanosis, No edema  LYMPH: No lymphadenopathy noted  SKIN: Warm, Intact  PSYCH: Normal mood and affect  NERVOUS SYSTEM:  A/O x3, Good concentration; right sided hemiparesis 0/5 muscle strength in RUE, RLE; 5/5 muscle strength in LUE, LLE      LABS:                        13.6   6.08  )-----------( 285      ( 04 May 2023 06:40 )             42.3       05-04    139  |  102  |  16  ----------------------------<  130  4.8   |  34  |  0.84    Ca    9.0      04 May 2023 06:40  Phos  4.2     05-02  Mg     2.1     05-02    TPro  6.2  /  Alb  2.7  /  TBili  0.4  /  DBili  x   /  AST  22  /  ALT  83  /  AlkPhos  89  05-04     POCT Blood Glucose.: 138 mg/dL (04 May 2023 07:15)  POCT Blood Glucose.: 170 mg/dL (03 May 2023 22:04)  POCT Blood Glucose.: 127 mg/dL (03 May 2023 16:15)  POCT Blood Glucose.: 164 mg/dL (03 May 2023 11:39)

## 2023-05-04 NOTE — PROGRESS NOTE ADULT - ATTENDING COMMENTS
Progress note amended to include my discussions with patient, resident, OT    Patient seen in OT, sitting in wheelchair. Remembers my name, meeting yesterday. In good spirits, remains motivated and optimistic. No H/A, no visual issues, happy with diet change. No B/B complaints, had large BM yesterday. No dysuria. Noted to have trace return in RUE, hwoever also noted to have subluxation in right shoulder >1 finger breadth. Continue sling and support/positioning, but will add K tape and FES. Trace hand swelling, no erythema or warmth, no TTP. Consider edema glove if persists. Calves no TTP, reduced swelling, no erythema or warmth. left cranial incision healing well, +staples, C/D/I no subcutaneous swelling or oozing    Labs 5/4 reviewed, stable. Vitals stable, FS stable. tolerating steroid taper. Continue current program

## 2023-05-04 NOTE — DIETITIAN INITIAL EVALUATION ADULT - ORAL INTAKE PTA/DIET HISTORY
Pt follow Vegan diet at home (no animal products) with generally good appetite- tries to eat mostly whole foods (whole grain, fruits, vegetables, legumes/nuts). Main protein source: soy, beans, nuts. Micronutrient supplements: Vitamin C, Vitamin D, Zinc. Takes Vitamin B12 inconsistently. No chewing/swallowing issues.

## 2023-05-04 NOTE — DIETITIAN INITIAL EVALUATION ADULT - ENTER FROM (CAL/KG)
Patients wife is calling to verify that you received her fax regarding the patients BP records. Please advise.     Phone: [phone]

## 2023-05-04 NOTE — DIETITIAN INITIAL EVALUATION ADULT - PERTINENT MEDS FT
MEDICATIONS  (STANDING):  amLODIPine   Tablet 10 milliGRAM(s) Oral every 24 hours  dexAMETHasone     Tablet   Oral   dexAMETHasone     Tablet 2 milliGRAM(s) Oral every 8 hours  dextrose 5%. 1000 milliLiter(s) (100 mL/Hr) IV Continuous <Continuous>  dextrose 5%. 1000 milliLiter(s) (50 mL/Hr) IV Continuous <Continuous>  dextrose 50% Injectable 25 Gram(s) IV Push once  dextrose 50% Injectable 12.5 Gram(s) IV Push once  dextrose 50% Injectable 25 Gram(s) IV Push once  enoxaparin Injectable 40 milliGRAM(s) SubCutaneous every 24 hours  glucagon  Injectable 1 milliGRAM(s) IntraMuscular once  levETIRAcetam 750 milliGRAM(s) Oral two times a day  pantoprazole    Tablet 40 milliGRAM(s) Oral before breakfast  polyethylene glycol 3350 17 Gram(s) Oral two times a day  senna 2 Tablet(s) Oral at bedtime  sodium chloride 1 Gram(s) Oral every 12 hours    MEDICATIONS  (PRN):  acetaminophen     Tablet .. 650 milliGRAM(s) Oral every 6 hours PRN Mild Pain (1 - 3)  benzonatate 100 milliGRAM(s) Oral three times a day PRN Cough  dextrose Oral Gel 15 Gram(s) Oral once PRN Blood Glucose LESS THAN 70 milliGRAM(s)/deciliter

## 2023-05-04 NOTE — PROGRESS NOTE ADULT - SUBJECTIVE AND OBJECTIVE BOX
Patient is a 67y old  Male who presents with a chief complaint of Malignant neoplasm of brain     (04 May 2023 11:18)      HPI:  Patient is a 68 y/o male RH dominant PMH of HTN, Osteoarthritis, right total knee replacement 2 months ago, brain mass s/p biopsy 4/7/23 @ [facility] with Dr. Tobar, who presented to [facility] for brain tumor resection 4/24/23. Per family, patient started to have right hand weakness about a month ago that grew progressively worse, then went to ED where brain mass was diagnosed. He was started on keppra 750 mg BID for seizure prophylaxis. Patient had sterotactic needle biopsy and laser interstitial thermal therapy by Dr. Tobar on 4/7/23 which was positive for glioblastoma  s/p craniotomy for resection of L brain tumor 4/25/23. Hospital course complicated by hyponatremia and RUE edema. Doppler negative.     Patient was evaluated by PM&R and therapy for functional deficits and gait/ ADL impairments and recommended acute rehabilitation. Patient was medically optimized for discharge to [facility] on 5/2/23     (02 May 2023 16:29)      PAST MEDICAL & SURGICAL HISTORY:  Hypertension      Osteoarthritis      Brain mass      S/P total knee replacement, right          Allergies    No Known Allergies    Intolerances        VITALS  67y  Vital Signs Last 24 Hrs  T(C): 36.7 (04 May 2023 07:31), Max: 36.7 (03 May 2023 20:06)  T(F): 98 (04 May 2023 07:31), Max: 98 (03 May 2023 20:06)  HR: 57 (04 May 2023 07:31) (57 - 66)  BP: 140/80 (04 May 2023 07:31) (129/76 - 140/80)  BP(mean): --  RR: 16 (04 May 2023 07:31) (16 - 16)  SpO2: 97% (04 May 2023 07:31) (97% - 98%)    Parameters below as of 04 May 2023 07:31  Patient On (Oxygen Delivery Method): room air      Daily     Daily         RECENT LABS:                          13.6   6.08  )-----------( 285      ( 04 May 2023 06:40 )             42.3     05-04    139  |  102  |  16  ----------------------------<  130<H>  4.8   |  34<H>  |  0.84    Ca    9.0      04 May 2023 06:40    TPro  6.2  /  Alb  2.7<L>  /  TBili  0.4  /  DBili  x   /  AST  22  /  ALT  83<H>  /  AlkPhos  89  05-04    LIVER FUNCTIONS - ( 04 May 2023 06:40 )  Alb: 2.7 g/dL / Pro: 6.2 g/dL / ALK PHOS: 89 U/L / ALT: 83 U/L / AST: 22 U/L / GGT: x                   CAPILLARY BLOOD GLUCOSE      POCT Blood Glucose.: 138 mg/dL (04 May 2023 07:15)  POCT Blood Glucose.: 170 mg/dL (03 May 2023 22:04)  POCT Blood Glucose.: 127 mg/dL (03 May 2023 16:15)      MEDICATIONS  (STANDING):  amLODIPine   Tablet 10 milliGRAM(s) Oral every 24 hours  dexAMETHasone     Tablet   Oral   dexAMETHasone     Tablet 2 milliGRAM(s) Oral every 8 hours  dextrose 5%. 1000 milliLiter(s) (100 mL/Hr) IV Continuous <Continuous>  dextrose 5%. 1000 milliLiter(s) (50 mL/Hr) IV Continuous <Continuous>  dextrose 50% Injectable 25 Gram(s) IV Push once  dextrose 50% Injectable 12.5 Gram(s) IV Push once  dextrose 50% Injectable 25 Gram(s) IV Push once  enoxaparin Injectable 40 milliGRAM(s) SubCutaneous every 24 hours  glucagon  Injectable 1 milliGRAM(s) IntraMuscular once  levETIRAcetam 750 milliGRAM(s) Oral two times a day  pantoprazole    Tablet 40 milliGRAM(s) Oral before breakfast  polyethylene glycol 3350 17 Gram(s) Oral two times a day  senna 2 Tablet(s) Oral at bedtime  sodium chloride 1 Gram(s) Oral every 12 hours    MEDICATIONS  (PRN):  acetaminophen     Tablet .. 650 milliGRAM(s) Oral every 6 hours PRN Mild Pain (1 - 3)  benzonatate 100 milliGRAM(s) Oral three times a day PRN Cough  dextrose Oral Gel 15 Gram(s) Oral once PRN Blood Glucose LESS THAN 70 milliGRAM(s)/deciliter           Patient is a 67y old  Male who presents with a chief complaint of Malignant neoplasm of brain     (04 May 2023 11:18)      HPI:  Patient is a 68 y/o male RH dominant PMH of HTN, Osteoarthritis, right total knee replacement 2 months ago, brain mass s/p biopsy 4/7/23 @ [facility] with Dr. Tobar, who presented to [facility] for brain tumor resection 4/24/23. Per family, patient started to have right hand weakness about a month ago that grew progressively worse, then went to ED where brain mass was diagnosed. He was started on keppra 750 mg BID for seizure prophylaxis. Patient had sterotactic needle biopsy and laser interstitial thermal therapy by Dr. Tobar on 4/7/23 which was positive for glioblastoma  s/p craniotomy for resection of L brain tumor 4/25/23. Hospital course complicated by hyponatremia and RUE edema. Doppler negative.     Patient was evaluated by PM&R and therapy for functional deficits and gait/ ADL impairments and recommended acute rehabilitation. Patient was medically optimized for discharge to [facility] on 5/2/23     (02 May 2023 16:29)      PAST MEDICAL & SURGICAL HISTORY:  Hypertension      Osteoarthritis      Brain mass      S/P total knee replacement, right          Allergies    No Known Allergies    Intolerances        VITALS  67y  Vital Signs Last 24 Hrs  T(C): 36.7 (04 May 2023 07:31), Max: 36.7 (03 May 2023 20:06)  T(F): 98 (04 May 2023 07:31), Max: 98 (03 May 2023 20:06)  HR: 57 (04 May 2023 07:31) (57 - 66)  BP: 140/80 (04 May 2023 07:31) (129/76 - 140/80)  BP(mean): --  RR: 16 (04 May 2023 07:31) (16 - 16)  SpO2: 97% (04 May 2023 07:31) (97% - 98%)    Parameters below as of 04 May 2023 07:31  Patient On (Oxygen Delivery Method): room air      Daily     Daily         RECENT LABS:                          13.6   6.08  )-----------( 285      ( 04 May 2023 06:40 )             42.3     05-04    139  |  102  |  16  ----------------------------<  130<H>  4.8   |  34<H>  |  0.84    Ca    9.0      04 May 2023 06:40    TPro  6.2  /  Alb  2.7<L>  /  TBili  0.4  /  DBili  x   /  AST  22  /  ALT  83<H>  /  AlkPhos  89  05-04    LIVER FUNCTIONS - ( 04 May 2023 06:40 )  Alb: 2.7 g/dL / Pro: 6.2 g/dL / ALK PHOS: 89 U/L / ALT: 83 U/L / AST: 22 U/L / GGT: x                   CAPILLARY BLOOD GLUCOSE      POCT Blood Glucose.: 138 mg/dL (04 May 2023 07:15)  POCT Blood Glucose.: 170 mg/dL (03 May 2023 22:04)  POCT Blood Glucose.: 127 mg/dL (03 May 2023 16:15)      MEDICATIONS  (STANDING):  amLODIPine   Tablet 10 milliGRAM(s) Oral every 24 hours  dexAMETHasone     Tablet   Oral   dexAMETHasone     Tablet 2 milliGRAM(s) Oral every 8 hours  dextrose 5%. 1000 milliLiter(s) (100 mL/Hr) IV Continuous <Continuous>  dextrose 5%. 1000 milliLiter(s) (50 mL/Hr) IV Continuous <Continuous>  dextrose 50% Injectable 25 Gram(s) IV Push once  dextrose 50% Injectable 12.5 Gram(s) IV Push once  dextrose 50% Injectable 25 Gram(s) IV Push once  enoxaparin Injectable 40 milliGRAM(s) SubCutaneous every 24 hours  glucagon  Injectable 1 milliGRAM(s) IntraMuscular once  levETIRAcetam 750 milliGRAM(s) Oral two times a day  pantoprazole    Tablet 40 milliGRAM(s) Oral before breakfast  polyethylene glycol 3350 17 Gram(s) Oral two times a day  senna 2 Tablet(s) Oral at bedtime  sodium chloride 1 Gram(s) Oral every 12 hours    MEDICATIONS  (PRN):  acetaminophen     Tablet .. 650 milliGRAM(s) Oral every 6 hours PRN Mild Pain (1 - 3)  benzonatate 100 milliGRAM(s) Oral three times a day PRN Cough  dextrose Oral Gel 15 Gram(s) Oral once PRN Blood Glucose LESS THAN 70 milliGRAM(s)/deciliter

## 2023-05-04 NOTE — PROGRESS NOTE ADULT - CONSTITUTIONAL COMMENTS
NAD, Comfortable. O x 3 NAD, Comfortable. O x 3. exotropia (chronic) pleasant. left cranial incision healing well +staples no swelling

## 2023-05-04 NOTE — PROGRESS NOTE ADULT - COMMENTS
Patient in NAD and tolerating therapies well. Patient slept well and denies headache, dizziness, abdominal pain or N/V. No other acute primary complaints. Patient sees progress in shoulder abduction, elbow flexion, wrist extension and  RUE. Patient does exhibit some persistent weakness in the LLE. No complaints of increased tone in RUE/RLE and denies shoulder pain.

## 2023-05-04 NOTE — DIETITIAN INITIAL EVALUATION ADULT - ADD RECOMMEND
well developed, well nourished , in no acute distress , ambulating without difficulty , normal communication ability
1. Soy milk with meals  2. Encourage protein-rich vegan food sources  3. Obtain and honor preferences as able  4. Add MVI, Vitamin C, B12

## 2023-05-04 NOTE — DIETITIAN INITIAL EVALUATION ADULT - PERSON TAUGHT/METHOD
Increased nutritional needs, vegan protein sources/verbal instruction/teach back - (Patient repeats in own words)/patient instructed

## 2023-05-04 NOTE — DIETITIAN INITIAL EVALUATION ADULT - OTHER INFO
67M with PMH HTN, who presented with recently diagnosed brain mass for brain tumor resection. Pathology positive for glioblastoma; patient s/p craniotomy for resection of L brain tumor. Hospital course complicated by hyponatremia. Now admitted to [facility] for initiation of a multidisciplinary rehab program.  Pt tolerating diet with report of good appetite- consumed >75% x 1 meal. UBW 227lbs, indicates 4% weight change/9lbs. Pt continues on decadron, NaCl tabs. A1c 6.5%, reports hx pre-DM (diet controlled). Pt receptive to soy-milk (will obtain specific brand- without added sugar-from home) for added protein. Preferences obtained to optimize protein intake at meals, increased nutritional needs explained. Recommend MVI, Vitamin C & Vitamin B12 supplements added. Last BM 5/3.

## 2023-05-04 NOTE — PROGRESS NOTE ADULT - ASSESSMENT
67M with PMH HTN, who presented with recently diagnosed brain mass for brain tumor resection. Pathology positive for glioblastoma; patient s/p craniotomy for resection of L brain tumor. Hospital course complicated by hyponatremia. Now admitted to [facility] for initiation of a multidisciplinary rehab program.    #Glioblastoma s/p craniotomy for resection of L brain tumor   -Continue decadron taper: 2mg PO q 6 hours -> q 8 hours -> BID  -Continue Keppra BID   -Continue Tylenol PRN for pain    #Hyponatremia  -Will decrease NaCl  to 1gm q12hrs  -Monitor Na levels  -Plan to taper to discontinuation with appropriate range Na    #HTN   -Continue Amlodipine 10mg daily    #Type 2 DM   A1C: 6.5  -Continue moderate dose insulin sliding scale  -Hypoglycemia protocol, accu-checks  -Blood glucose goal 100-180 in hospital setting    DVT ppx: Lovenox

## 2023-05-05 PROCEDURE — 99232 SBSQ HOSP IP/OBS MODERATE 35: CPT

## 2023-05-05 RX ADMIN — LEVETIRACETAM 750 MILLIGRAM(S): 250 TABLET, FILM COATED ORAL at 18:08

## 2023-05-05 RX ADMIN — SODIUM CHLORIDE 1 GRAM(S): 9 INJECTION INTRAMUSCULAR; INTRAVENOUS; SUBCUTANEOUS at 18:08

## 2023-05-05 RX ADMIN — SENNA PLUS 2 TABLET(S): 8.6 TABLET ORAL at 21:50

## 2023-05-05 RX ADMIN — LEVETIRACETAM 750 MILLIGRAM(S): 250 TABLET, FILM COATED ORAL at 05:13

## 2023-05-05 RX ADMIN — AMLODIPINE BESYLATE 10 MILLIGRAM(S): 2.5 TABLET ORAL at 05:13

## 2023-05-05 RX ADMIN — PANTOPRAZOLE SODIUM 40 MILLIGRAM(S): 20 TABLET, DELAYED RELEASE ORAL at 05:13

## 2023-05-05 RX ADMIN — ENOXAPARIN SODIUM 40 MILLIGRAM(S): 100 INJECTION SUBCUTANEOUS at 05:13

## 2023-05-05 RX ADMIN — Medication 2 MILLIGRAM(S): at 18:08

## 2023-05-05 RX ADMIN — SODIUM CHLORIDE 1 GRAM(S): 9 INJECTION INTRAMUSCULAR; INTRAVENOUS; SUBCUTANEOUS at 05:14

## 2023-05-05 NOTE — PROGRESS NOTE ADULT - ASSESSMENT
67M with PMH HTN, who presented with recently diagnosed brain mass for brain tumor resection. Pathology positive for glioblastoma; patient s/p craniotomy for resection of L brain tumor. Hospital course complicated by hyponatremia. Now admitted to [facility] for initiation of a multidisciplinary rehab program.    #Glioblastoma s/p craniotomy for resection of L brain tumor   -Continue decadron taper: 2mg PO q 6 hours -> q 8 hours -> BID  -Continue Keppra BID   -Continue Tylenol PRN for pain    #Hyponatremia  -Will decrease NaCl  to 1gm q12hrs  -Monitor Na levels  -Plan to taper to discontinuation with appropriate range Na    #HTN   -Continue Amlodipine 10mg daily    #Type 2 DM   A1C: 6.5  -Continue moderate dose insulin sliding scale  -Hypoglycemia protocol, accu-checks  -Blood glucose goal 100-180 in hospital setting    DVT ppx: Lovenox

## 2023-05-05 NOTE — PROGRESS NOTE ADULT - ATTENDING COMMENTS
Progress note amended to include my discussions with patient, resident, hospitalist, RN, SW, PT and my findings    Patient seen in OT. Had good night's sleep, no H.A, no dizziness, no visual complaints. No N/V and doing well with meals, improved appetite. He denies any extremity pain; right UE has sublux, use of K tape for positioning and clarification between subluxation and dislocation discussed. Also starting FES. right UE with trace movement in shoulder 0/5 hand, no swelling. No LE TTP bilaterally    tolerating decadron taper, reduced again to maintenance dose starting today , to continue current dose on dc if stable. Pictures of incision taken with patient's permission and sent to NSGY for evaluation; cleared to remove staples after 10 days post op, will dc 5/8. Discussed with patient who is agreeable.    Vitals reviewed, stable. Continue program, cleared for patio privileges with staff/caregiver. For IDT rounds 5/8

## 2023-05-05 NOTE — CHART NOTE - NSCHARTNOTESELECT_GEN_ALL_CORE
4-26-23/Event Note
Event Note
Acute Rehab - 3 hours therapy note/Event Note
Event Note
Event Note
Rehab/Event Note
chart/Event Note

## 2023-05-05 NOTE — PROGRESS NOTE ADULT - MOTOR
LEFT    UE - ShAB 5/5, EF 5/5, EE 5/5, WE 5/5,  WNL  RIGHT UE - ShAB 2/5, EF 2/5, EE 0/5, WE 1/5, 2/5    LEFT    LE - HF 5/5, KE 5/5, DF 5/5, PF 5/5  RIGHT LE - HF 1/5, KE 0/5, DF 0/5, PF 0/5 LEFT    UE - ShAB 5/5, EF 5/5, EE 5/5, WE 5/5,  WNL  RIGHT UE - ShAB 2/5, EF 2/5, EE 0/5, WE 1/5, 2/5    LEFT    LE - HF 5/5, KE 5/5, DF 5/5, PF 5/5  RIGHT LE - HF 1/5, KE 0/5, DF 0/5, PF 0/5  calves soft no TTP

## 2023-05-05 NOTE — PROGRESS NOTE ADULT - SUBJECTIVE AND OBJECTIVE BOX
Patient is a 67y old  Male who presents with a chief complaint of GBm sp craniotomy and resection    No events overnight      Patient seen and examined at bedside.    ALLERGIES:  No Known Allergies    MEDICATIONS  (STANDING):  amLODIPine   Tablet 10 milliGRAM(s) Oral every 24 hours  dexAMETHasone     Tablet   Oral   dexAMETHasone     Tablet 2 milliGRAM(s) Oral every 8 hours  dextrose 5%. 1000 milliLiter(s) (100 mL/Hr) IV Continuous <Continuous>  dextrose 5%. 1000 milliLiter(s) (50 mL/Hr) IV Continuous <Continuous>  dextrose 50% Injectable 25 Gram(s) IV Push once  dextrose 50% Injectable 12.5 Gram(s) IV Push once  dextrose 50% Injectable 25 Gram(s) IV Push once  enoxaparin Injectable 40 milliGRAM(s) SubCutaneous every 24 hours  glucagon  Injectable 1 milliGRAM(s) IntraMuscular once  levETIRAcetam 750 milliGRAM(s) Oral two times a day  pantoprazole    Tablet 40 milliGRAM(s) Oral before breakfast  polyethylene glycol 3350 17 Gram(s) Oral two times a day  senna 2 Tablet(s) Oral at bedtime  sodium chloride 1 Gram(s) Oral every 8 hours    MEDICATIONS  (PRN):  acetaminophen     Tablet .. 650 milliGRAM(s) Oral every 6 hours PRN Mild Pain (1 - 3)  benzonatate 100 milliGRAM(s) Oral three times a day PRN Cough  dextrose Oral Gel 15 Gram(s) Oral once PRN Blood Glucose LESS THAN 70 milliGRAM(s)/deciliter    Vital Signs Last 24 Hrs  T(C): 36.8 (05 May 2023 07:16), Max: 36.8 (05 May 2023 07:16)  T(F): 98.2 (05 May 2023 07:16), Max: 98.2 (05 May 2023 07:16)  HR: 61 (05 May 2023 07:16) (50 - 66)  BP: 136/81 (05 May 2023 07:16) (134/77 - 144/78)  RR: 16 (05 May 2023 07:16) (16 - 16)  SpO2: 98% (05 May 2023 07:16) (97% - 98%)    Parameters below as of 05 May 2023 07:16  Patient On (Oxygen Delivery Method): room air      BMI (kg/m2): 31.3 (05-02-23 @ 20:36)  PHYSICAL EXAM:  GENERAL: NAD  HEAD:  Atraumatic, Normocephalic  EYES: EOMI, PERRL, conjunctiva and sclera clear, left lateral strabismus  ENMT: Moist mucous membranes, Good dentition  NECK: Supple, No JVD  CHEST/LUNG: Clear to auscultation bilaterally, non-labored breathing, good air entry  HEART: RRR; S1/S2, No murmur  ABDOMEN: Soft, Nontender, Nondistended; Bowel sounds present  VASCULAR: Normal pulses, Normal capillary refill  EXTREMITIES: No cyanosis, No edema  LYMPH: No lymphadenopathy noted  SKIN: Warm, Intact  PSYCH: Normal mood and affect  NERVOUS SYSTEM:  A/O x3, Good concentration; right sided hemiparesis 0/5 muscle strength in RUE, RLE; 5/5 muscle strength in LUE, LLE      LABS:                        13.6   6.08  )-----------( 285      ( 04 May 2023 06:40 )             42.3       05-04    139  |  102  |  16  ----------------------------<  130  4.8   |  34  |  0.84    Ca    9.0      04 May 2023 06:40  Phos  4.2     05-02  Mg     2.1     05-02    TPro  6.2  /  Alb  2.7  /  TBili  0.4  /  DBili  x   /  AST  22  /  ALT  83  /  AlkPhos  89  05-04     POCT Blood Glucose.: 138 mg/dL (04 May 2023 07:15)  POCT Blood Glucose.: 170 mg/dL (03 May 2023 22:04)  POCT Blood Glucose.: 127 mg/dL (03 May 2023 16:15)  POCT Blood Glucose.: 164 mg/dL (03 May 2023 11:39)

## 2023-05-05 NOTE — PROGRESS NOTE ADULT - CONSTITUTIONAL COMMENTS
NAD, Comfortable. O x 3. exotropia (chronic) pleasant. left cranial incision healing well +staples no swelling NAD, Comfortable. O x 3. exotropia (chronic) pleasant. left cranial incision healing well +staples C/D/I

## 2023-05-05 NOTE — PROGRESS NOTE ADULT - SUBJECTIVE AND OBJECTIVE BOX
Patient is a 67y old  Male who presents with a chief complaint of GBm sp craniotomy and resection (05 May 2023 11:38)      HPI:  Patient is a 66 y/o male RH dominant PMH of HTN, Osteoarthritis, right total knee replacement 2 months ago, brain mass s/p biopsy 4/7/23 @ [facility] with Dr. Tobar, who presented to [facility] for brain tumor resection 4/24/23. Per family, patient started to have right hand weakness about a month ago that grew progressively worse, then went to ED where brain mass was diagnosed. He was started on keppra 750 mg BID for seizure prophylaxis. Patient had sterotactic needle biopsy and laser interstitial thermal therapy by Dr. Tobar on 4/7/23 which was positive for glioblastoma  s/p craniotomy for resection of L brain tumor 4/25/23. Hospital course complicated by hyponatremia and RUE edema. Doppler negative.     Patient was evaluated by PM&R and therapy for functional deficits and gait/ ADL impairments and recommended acute rehabilitation. Patient was medically optimized for discharge to [facility] on 5/2/23     (02 May 2023 16:29)      PAST MEDICAL & SURGICAL HISTORY:  Hypertension      Osteoarthritis      Brain mass      S/P total knee replacement, right          Allergies    No Known Allergies    Intolerances        VITALS  67y  Vital Signs Last 24 Hrs  T(C): 36.8 (05 May 2023 07:16), Max: 36.8 (05 May 2023 07:16)  T(F): 98.2 (05 May 2023 07:16), Max: 98.2 (05 May 2023 07:16)  HR: 61 (05 May 2023 07:16) (50 - 66)  BP: 136/81 (05 May 2023 07:16) (134/77 - 144/78)  BP(mean): --  RR: 16 (05 May 2023 07:16) (16 - 16)  SpO2: 98% (05 May 2023 07:16) (97% - 98%)    Parameters below as of 05 May 2023 07:16  Patient On (Oxygen Delivery Method): room air      Daily     Daily         RECENT LABS:                          13.6   6.08  )-----------( 285      ( 04 May 2023 06:40 )             42.3     05-04    139  |  102  |  16  ----------------------------<  130<H>  4.8   |  34<H>  |  0.84    Ca    9.0      04 May 2023 06:40    TPro  6.2  /  Alb  2.7<L>  /  TBili  0.4  /  DBili  x   /  AST  22  /  ALT  83<H>  /  AlkPhos  89  05-04    LIVER FUNCTIONS - ( 04 May 2023 06:40 )  Alb: 2.7 g/dL / Pro: 6.2 g/dL / ALK PHOS: 89 U/L / ALT: 83 U/L / AST: 22 U/L / GGT: x                   CAPILLARY BLOOD GLUCOSE          MEDICATIONS  (STANDING):  amLODIPine   Tablet 10 milliGRAM(s) Oral every 24 hours  dexAMETHasone     Tablet 2 milliGRAM(s) Oral every 12 hours  dexAMETHasone     Tablet   Oral   dextrose 5%. 1000 milliLiter(s) (50 mL/Hr) IV Continuous <Continuous>  dextrose 5%. 1000 milliLiter(s) (100 mL/Hr) IV Continuous <Continuous>  dextrose 50% Injectable 12.5 Gram(s) IV Push once  dextrose 50% Injectable 25 Gram(s) IV Push once  dextrose 50% Injectable 25 Gram(s) IV Push once  enoxaparin Injectable 40 milliGRAM(s) SubCutaneous every 24 hours  glucagon  Injectable 1 milliGRAM(s) IntraMuscular once  levETIRAcetam 750 milliGRAM(s) Oral two times a day  pantoprazole    Tablet 40 milliGRAM(s) Oral before breakfast  polyethylene glycol 3350 17 Gram(s) Oral two times a day  senna 2 Tablet(s) Oral at bedtime  sodium chloride 1 Gram(s) Oral every 12 hours    MEDICATIONS  (PRN):  acetaminophen     Tablet .. 650 milliGRAM(s) Oral every 6 hours PRN Mild Pain (1 - 3)  benzonatate 100 milliGRAM(s) Oral three times a day PRN Cough  dextrose Oral Gel 15 Gram(s) Oral once PRN Blood Glucose LESS THAN 70 milliGRAM(s)/deciliter           Patient is a 67y old  Male who presents with a chief complaint of GBm sp craniotomy and resection (05 May 2023 11:38)      HPI:  Patient is a 66 y/o male RH dominant PMH of HTN, Osteoarthritis, right total knee replacement 2 months ago, brain mass s/p biopsy 4/7/23 @ [facility] with Dr. Tobar, who presented to [facility] for brain tumor resection 4/24/23. Per family, patient started to have right hand weakness about a month ago that grew progressively worse, then went to ED where brain mass was diagnosed. He was started on keppra 750 mg BID for seizure prophylaxis. Patient had sterotactic needle biopsy and laser interstitial thermal therapy by Dr. Tobar on 4/7/23 which was positive for glioblastoma  s/p craniotomy for resection of L brain tumor 4/25/23. Hospital course complicated by hyponatremia and RUE edema. Doppler negative.     Patient was evaluated by PM&R and therapy for functional deficits and gait/ ADL impairments and recommended acute rehabilitation. Patient was medically optimized for discharge to [facility] on 5/2/23     (02 May 2023 16:29)      PAST MEDICAL & SURGICAL HISTORY:  Hypertension      Osteoarthritis      Brain mass      S/P total knee replacement, right          Allergies    No Known Allergies    Intolerances        VITALS  67y  Vital Signs Last 24 Hrs  T(C): 36.8 (05 May 2023 07:16), Max: 36.8 (05 May 2023 07:16)  T(F): 98.2 (05 May 2023 07:16), Max: 98.2 (05 May 2023 07:16)  HR: 61 (05 May 2023 07:16) (50 - 66)  BP: 136/81 (05 May 2023 07:16) (134/77 - 144/78)  BP(mean): --  RR: 16 (05 May 2023 07:16) (16 - 16)  SpO2: 98% (05 May 2023 07:16) (97% - 98%)    Parameters below as of 05 May 2023 07:16  Patient On (Oxygen Delivery Method): room air      Daily     Daily         RECENT LABS:                          13.6   6.08  )-----------( 285      ( 04 May 2023 06:40 )             42.3     05-04    139  |  102  |  16  ----------------------------<  130<H>  4.8   |  34<H>  |  0.84    Ca    9.0      04 May 2023 06:40    TPro  6.2  /  Alb  2.7<L>  /  TBili  0.4  /  DBili  x   /  AST  22  /  ALT  83<H>  /  AlkPhos  89  05-04    LIVER FUNCTIONS - ( 04 May 2023 06:40 )  Alb: 2.7 g/dL / Pro: 6.2 g/dL / ALK PHOS: 89 U/L / ALT: 83 U/L / AST: 22 U/L / GGT: x                   CAPILLARY BLOOD GLUCOSE          MEDICATIONS  (STANDING):  amLODIPine   Tablet 10 milliGRAM(s) Oral every 24 hours  dexAMETHasone     Tablet 2 milliGRAM(s) Oral every 12 hours  dexAMETHasone     Tablet   Oral   dextrose 5%. 1000 milliLiter(s) (50 mL/Hr) IV Continuous <Continuous>  dextrose 5%. 1000 milliLiter(s) (100 mL/Hr) IV Continuous <Continuous>  dextrose 50% Injectable 12.5 Gram(s) IV Push once  dextrose 50% Injectable 25 Gram(s) IV Push once  dextrose 50% Injectable 25 Gram(s) IV Push once  enoxaparin Injectable 40 milliGRAM(s) SubCutaneous every 24 hours  glucagon  Injectable 1 milliGRAM(s) IntraMuscular once  levETIRAcetam 750 milliGRAM(s) Oral two times a day  pantoprazole    Tablet 40 milliGRAM(s) Oral before breakfast  polyethylene glycol 3350 17 Gram(s) Oral two times a day  senna 2 Tablet(s) Oral at bedtime  sodium chloride 1 Gram(s) Oral every 12 hours    MEDICATIONS  (PRN):  acetaminophen     Tablet .. 650 milliGRAM(s) Oral every 6 hours PRN Mild Pain (1 - 3)  benzonatate 100 milliGRAM(s) Oral three times a day PRN Cough  dextrose Oral Gel 15 Gram(s) Oral once PRN Blood Glucose LESS THAN 70 milliGRAM(s)/deciliter

## 2023-05-05 NOTE — CHART NOTE - NSCHARTNOTEFT_GEN_A_CORE
Bowel regimen increased. RUE doppler completed for RUE edema. Pending MRI.
POD 4 L crani HCG, KYA o/n. Neuro stable, pending repeat dopplers Monday, pending AR
Patient is able to tolerate 3 hours daily of rehabilitation therapy at acute rehab.
Patient will not require chemotherapy or radiation while at rehab.
findings in both CT and MRI  regarding post op subdural hematoma and hemorrhagic products:    Expected post op surgical hemorrhage not clinically significant, as well as a surgical bed surrounding edema.
4/26: POD 1 L crani HGG. KYA o/n. 4PM BMP to f/u sodium, cardene gtt off, pending S+S, LE dopplers re-ordered because high risk 2/2 plegia on R-side showing b/l IM calf DVT, CTH today stable, afternoon BMP Na 138, Pend SDU tomorrow.
POD 5 L crani. KYA overnight. Neuro stable. Anti-xa 0.30, within prophylactic range. pending AR
Patient examined at bedside.    EXAM:  AO x3, +r facial, left intact, RUE 0/5, LUE 2/5; patient states there is a waxing and waning component to his strength but current exam is not drastically different from baseline.
The patient is a 2-person assist and will require medical transportation for a safe discharge to rehab.

## 2023-05-05 NOTE — PROGRESS NOTE ADULT - ASSESSMENT
Patient is a 68 y/o male PMH of HTN, OA, right TKA, who presented with recently diagnosed, brain mass, who presented to [facility] for brain tumor resection 4/24/23 Pathology positive for glioblastoma; patient  s/p craniotomy for resection of L brain tumor 4/25/23. Hospital course complicated by hyponatremia. Admitted for multidisciplinary rehab program    # Glioblastoma s/p craniotomy for resection of L brain tumor 4/25/23 right hemiparesis  - c/w decadron taper   decadron 2mg PO Q8 5/3-5/4  decadron 2mg PO BID 5/4-6/3  - keppra 750mg PO BID for seizure ppx  - Comprehensive Multidisciplinary Rehab Program: PT/OT/ SLP 3 hours a day 5 days a week--> adjust to 30 min SLP. 90 Pt 60 OT  - may need right SAFO with liner for foot drop  - R kinesiology tape for R shoulder 5/4, FES right shoulder and forearm muscles  - neuropsychology consult. Recreation therapy  - Precautions: steroid, SZ, cardiac, fall    # HTN   - amlodopine 10mg PO QD   - (136/81 - 144/78) 5/5    # DM2   - c/w ISS   - controlled    # Sleep:  - Melatonin PRN    # Pain:  - Tylenol PRN    # GI/Bowel:  - miralax 17g PO BID   - Senna 2 tabs daily  - pantoprazole 40mg PO before breakfast      # /Bladder:  - Monitor PVR if no void in 8h; SC for >400 cc  - Toileting schedule q4h    # Diet   - Diet: Regular, CCH     # DVT prophylaxis:  - Last doppler 5/1/23 - Neg. Patient aware  - lovenox 40mg SC QD   - SCDs    # LABs  CBC BMP 5/8      --------------------------------------------  Outpatient Follow up:    Juanjose Johnston)  Neurosurgery  [address], [address] [place] [address]  Phone: [phone]  Fax: [phone]  Follow Up Time:     Delmis Causey; MIKHAIL)  Hematology; Internal Medicine; Medical Oncology  [address], [address] [place] [address]  Phone: [phone]  Fax: [phone]  Follow Up Time:   Patient is a 66 y/o male PMH of HTN, OA, right TKA, who presented with recently diagnosed, brain mass, who presented to [facility] for brain tumor resection 4/24/23 Pathology positive for glioblastoma; patient  s/p craniotomy for resection of L brain tumor 4/25/23. Hospital course complicated by hyponatremia. Admitted for multidisciplinary rehab program    # Glioblastoma s/p craniotomy for resection of L brain tumor 4/25/23 right hemiparesis  - c/w decadron taper   ·	decadron 2mg PO BID 5/4-6/3  - keppra 750mg PO BID for seizure ppx  - for staple removal 5/8  - Comprehensive Multidisciplinary Rehab Program: PT/OT/ SLP 3 hours a day 5 days a week--> adjust to 30 min SLP. 90 Pt 60 OT  - may need right SAFO with liner for foot drop  - R kinesiology tape for R shoulder 5/4, FES right shoulder and forearm muscles  - neuropsychology consult. Recreation therapy  - Precautions: steroid, SZ, cardiac, fall    # HTN   - amlodipine 10mg PO QD   - (136/81 - 144/78) 5/5    # DM2   - c/w ISS   - controlled    # Sleep:  - Melatonin PRN    # Pain:  - Tylenol PRN    # GI/Bowel:  - miralax 17g PO BID   - Senna 2 tabs daily  - pantoprazole 40mg PO before breakfast      # /Bladder:  - Toileting schedule q4h    # Diet   - Diet: Regular, CCH     # DVT prophylaxis:  - Last doppler 5/1/23 - Neg. Patient aware  - lovenox 40mg SC QD   - SCDs    # Case to be discussed in IDT rounds 5/5    # LABs  CBC BMP 5/8      --------------------------------------------  Outpatient Follow up:    Juanjose Johnston)  Neurosurgery  [address], [address] [place] [address]  Phone: [phone]  Fax: [phone]  Follow Up Time:     Delmis Causey; MIKHAIL)  Hematology; Internal Medicine; Medical Oncology  [address] [place] [address]  Phone: [phone]  Fax: [phone]  Follow Up Time:

## 2023-05-05 NOTE — PROGRESS NOTE ADULT - COMMENTS
Patient in NAD and tolerating therapies well. No other acute primary complaints. Denies HA, NV, abdominal pain, SOB or N/V. Slept well last night. Discussed patient plan with current surgical incision. Discussed with neurosurgeon Dr. Juanjose Johnston regarding sutures which can be taken out 10 days post op. Plan for suture removal on Monday. Craniotomy incision c/d/i. Patient in NAD and tolerating therapies well. No other acute primary complaints. Denies HA, NV, abdominal pain, SOB or N/V. Slept well last night. Discussed patient plan with current surgical incision.     Discussed with neurosurgeon Dr. Juanjose Johnston regarding staples which can be taken out 10 days post op. Plan for staple removal on Monday. Craniotomy incision c/d/i. Reviewed with patient

## 2023-05-06 PROCEDURE — 99232 SBSQ HOSP IP/OBS MODERATE 35: CPT | Mod: GC

## 2023-05-06 PROCEDURE — 99232 SBSQ HOSP IP/OBS MODERATE 35: CPT

## 2023-05-06 RX ADMIN — Medication 2 MILLIGRAM(S): at 17:36

## 2023-05-06 RX ADMIN — LEVETIRACETAM 750 MILLIGRAM(S): 250 TABLET, FILM COATED ORAL at 05:26

## 2023-05-06 RX ADMIN — SENNA PLUS 2 TABLET(S): 8.6 TABLET ORAL at 21:53

## 2023-05-06 RX ADMIN — AMLODIPINE BESYLATE 10 MILLIGRAM(S): 2.5 TABLET ORAL at 05:26

## 2023-05-06 RX ADMIN — LEVETIRACETAM 750 MILLIGRAM(S): 250 TABLET, FILM COATED ORAL at 17:36

## 2023-05-06 RX ADMIN — ENOXAPARIN SODIUM 40 MILLIGRAM(S): 100 INJECTION SUBCUTANEOUS at 05:27

## 2023-05-06 RX ADMIN — SODIUM CHLORIDE 1 GRAM(S): 9 INJECTION INTRAMUSCULAR; INTRAVENOUS; SUBCUTANEOUS at 17:36

## 2023-05-06 RX ADMIN — SODIUM CHLORIDE 1 GRAM(S): 9 INJECTION INTRAMUSCULAR; INTRAVENOUS; SUBCUTANEOUS at 05:27

## 2023-05-06 RX ADMIN — Medication 2 MILLIGRAM(S): at 05:27

## 2023-05-06 RX ADMIN — PANTOPRAZOLE SODIUM 40 MILLIGRAM(S): 20 TABLET, DELAYED RELEASE ORAL at 05:26

## 2023-05-06 NOTE — PROGRESS NOTE ADULT - SUBJECTIVE AND OBJECTIVE BOX
Patient is a 67y old  Male who presents with a chief complaint of GBm sp craniotomy and resection (05 May 2023 12:29)      Patient seen and examined at bedside. No overnight events.    REVIEW OF SYSTEMS:  CONSTITUTIONAL: No fever or chills  CARDIOVASCULAR: No chest pain, palpitations    ALLERGIES:  No Known Allergies    MEDICATIONS  (STANDING):  amLODIPine   Tablet 10 milliGRAM(s) Oral every 24 hours  dexAMETHasone     Tablet   Oral   dexAMETHasone     Tablet 2 milliGRAM(s) Oral every 12 hours  dextrose 5%. 1000 milliLiter(s) (50 mL/Hr) IV Continuous <Continuous>  dextrose 5%. 1000 milliLiter(s) (100 mL/Hr) IV Continuous <Continuous>  dextrose 50% Injectable 25 Gram(s) IV Push once  dextrose 50% Injectable 25 Gram(s) IV Push once  dextrose 50% Injectable 12.5 Gram(s) IV Push once  enoxaparin Injectable 40 milliGRAM(s) SubCutaneous every 24 hours  glucagon  Injectable 1 milliGRAM(s) IntraMuscular once  levETIRAcetam 750 milliGRAM(s) Oral two times a day  pantoprazole    Tablet 40 milliGRAM(s) Oral before breakfast  polyethylene glycol 3350 17 Gram(s) Oral two times a day  senna 2 Tablet(s) Oral at bedtime  sodium chloride 1 Gram(s) Oral every 12 hours    MEDICATIONS  (PRN):  acetaminophen     Tablet .. 650 milliGRAM(s) Oral every 6 hours PRN Mild Pain (1 - 3)  benzonatate 100 milliGRAM(s) Oral three times a day PRN Cough  dextrose Oral Gel 15 Gram(s) Oral once PRN Blood Glucose LESS THAN 70 milliGRAM(s)/deciliter    Vital Signs Last 24 Hrs  T(F): 97.8 (06 May 2023 09:25), Max: 98.1 (05 May 2023 19:59)  HR: 84 (06 May 2023 09:25) (60 - 84)  BP: 123/79 (06 May 2023 09:25) (118/76 - 142/81)  RR: 16 (06 May 2023 09:25) (16 - 16)  SpO2: 98% (06 May 2023 09:25) (98% - 98%)  I&O's Summary    05 May 2023 07:01  -  06 May 2023 07:00  --------------------------------------------------------  IN: 0 mL / OUT: 950 mL / NET: -950 mL      BMI (kg/m2): 31.3 (05-02-23 @ 20:36)    PHYSICAL EXAM:  GENERAL: NAD  HEENT:  cranial staples incision healed c/d/i, anicteric, moist mucous membranes, EOMI, PERRL, no lid-lag, conjunctiva and sclera clear  CHEST/LUNG:  CTA b/l, no rales, wheezes, or rhonchi,  normal respiratory effort, no intercostal retractions  HEART:  RRR, S1, S2, no murmurs; no pitting edema  ABDOMEN:  BS+, soft, nontender, nondistended  MSK/EXTREMITIES: palpable peripheral pulses, no clubbing or cyanosis  NERVOUS SYSTEM: answers questions and follows commands appropriately A&Ox3 right sided weakness   PSYCH: Appropriate affect, Alert & Awake; Good judgement    LABS: Personally reviewed    CBC                        13.6   6.08  )-----------( 285      ( 04 May 2023 06:40 )             42.3         CMP  05-04    139  |  102  |  16  ----------------------------<  130  4.8   |  34  |  0.84    Ca    9.0      04 May 2023 06:40    TPro  6.2  /  Alb  2.7  /  TBili  0.4  /  DBili  x   /  AST  22  /  ALT  83  /  AlkPhos  89  05-04                  RADIOLOGY & ADDITIONAL TESTS: Personally reviewed    Medical management discussed with Dr. Palma (physiatry)

## 2023-05-06 NOTE — PROGRESS NOTE ADULT - ASSESSMENT
67M with PMH HTN, who presented with recently diagnosed brain mass for brain tumor resection. Pathology positive for glioblastoma; patient s/p craniotomy for resection of L brain tumor. Hospital course complicated by hyponatremia. Now admitted to [facility] for initiation of a multidisciplinary rehab program.    #Glioblastoma s/p craniotomy for resection of L brain tumor   -Continue comprehensive rehab program  -Continue decadron taper: currently on 2mg BID. Continue Protonix while on steroids  -Continue Keppra BID   -Continue Tylenol PRN for pain    #Hyponatremia  -Decrease NaCl  to 1gm q12hrs  -Monitor Na levels  -Plan to taper to discontinuation with appropriate range Na    #HTN   -Continue Amlodipine 10mg daily  -Monitor vitals     #Type 2 DM   A1C: 6.5  -Continue moderate dose insulin sliding scale  -Hypoglycemia protocol, accu-checks  -Blood glucose goal 100-180 in hospital setting    DVT ppx: Lovenox

## 2023-05-06 NOTE — PROGRESS NOTE ADULT - SUBJECTIVE AND OBJECTIVE BOX
Cc: Gait dysfunction    HPI: Patient with no new medical issues today. +bm   Pain controlled, no chest pain, no N/V, no Fevers/Chills. No other new ROS  Has been tolerating rehabilitation program.    acetaminophen     Tablet .. 650 milliGRAM(s) Oral every 6 hours PRN  amLODIPine   Tablet 10 milliGRAM(s) Oral every 24 hours  benzonatate 100 milliGRAM(s) Oral three times a day PRN  dexAMETHasone     Tablet   Oral   dexAMETHasone     Tablet 2 milliGRAM(s) Oral every 12 hours  dextrose 5%. 1000 milliLiter(s) IV Continuous <Continuous>  dextrose 5%. 1000 milliLiter(s) IV Continuous <Continuous>  dextrose 50% Injectable 12.5 Gram(s) IV Push once  dextrose 50% Injectable 25 Gram(s) IV Push once  dextrose 50% Injectable 25 Gram(s) IV Push once  dextrose Oral Gel 15 Gram(s) Oral once PRN  enoxaparin Injectable 40 milliGRAM(s) SubCutaneous every 24 hours  glucagon  Injectable 1 milliGRAM(s) IntraMuscular once  levETIRAcetam 750 milliGRAM(s) Oral two times a day  pantoprazole    Tablet 40 milliGRAM(s) Oral before breakfast  polyethylene glycol 3350 17 Gram(s) Oral two times a day  senna 2 Tablet(s) Oral at bedtime  sodium chloride 1 Gram(s) Oral every 12 hours      T(C): 36.6 (05-06-23 @ 09:25), Max: 36.7 (05-05-23 @ 19:59)  HR: 84 (05-06-23 @ 09:25) (60 - 84)  BP: 123/79 (05-06-23 @ 09:25) (118/76 - 142/81)  RR: 16 (05-06-23 @ 09:25) (16 - 16)  SpO2: 98% (05-06-23 @ 09:25) (98% - 98%)    In NAD  HEENT- EOMI  Heart- RRR, S1S2  Lungs- CTA bl.  Abd- + BS, NT  Ext- No calf pain  Neuro- Exam unchanged          Imp: Patient with diagnosis of GBM admitted for comprehensive acute rehabilitation.    Plan:  - Continue PT/OT/SLP  - DVT prophylaxis  - Skin- Turn q2h, check skin daily  - Continue current medications; patient medically stable.   -Active issues- none  - Patient is stable to continue current rehabilitation program.

## 2023-05-07 PROCEDURE — 99232 SBSQ HOSP IP/OBS MODERATE 35: CPT | Mod: GC

## 2023-05-07 RX ADMIN — SODIUM CHLORIDE 1 GRAM(S): 9 INJECTION INTRAMUSCULAR; INTRAVENOUS; SUBCUTANEOUS at 05:23

## 2023-05-07 RX ADMIN — SODIUM CHLORIDE 1 GRAM(S): 9 INJECTION INTRAMUSCULAR; INTRAVENOUS; SUBCUTANEOUS at 17:48

## 2023-05-07 RX ADMIN — AMLODIPINE BESYLATE 10 MILLIGRAM(S): 2.5 TABLET ORAL at 05:24

## 2023-05-07 RX ADMIN — Medication 2 MILLIGRAM(S): at 05:23

## 2023-05-07 RX ADMIN — PANTOPRAZOLE SODIUM 40 MILLIGRAM(S): 20 TABLET, DELAYED RELEASE ORAL at 05:23

## 2023-05-07 RX ADMIN — LEVETIRACETAM 750 MILLIGRAM(S): 250 TABLET, FILM COATED ORAL at 05:24

## 2023-05-07 RX ADMIN — LEVETIRACETAM 750 MILLIGRAM(S): 250 TABLET, FILM COATED ORAL at 17:48

## 2023-05-07 RX ADMIN — Medication 2 MILLIGRAM(S): at 17:48

## 2023-05-07 NOTE — PROGRESS NOTE ADULT - SUBJECTIVE AND OBJECTIVE BOX
Cc: Gait dysfunction    HPI: Patient with no new medical issues today. +bm   Pain controlled, no chest pain, no N/V, no Fevers/Chills. No other new ROS  Has been tolerating rehabilitation program.    acetaminophen     Tablet .. 650 milliGRAM(s) Oral every 6 hours PRN  amLODIPine   Tablet 10 milliGRAM(s) Oral every 24 hours  benzonatate 100 milliGRAM(s) Oral three times a day PRN  dexAMETHasone     Tablet   Oral   dexAMETHasone     Tablet 2 milliGRAM(s) Oral every 12 hours  dextrose 5%. 1000 milliLiter(s) IV Continuous <Continuous>  dextrose 5%. 1000 milliLiter(s) IV Continuous <Continuous>  dextrose 50% Injectable 12.5 Gram(s) IV Push once  dextrose 50% Injectable 25 Gram(s) IV Push once  dextrose 50% Injectable 25 Gram(s) IV Push once  dextrose Oral Gel 15 Gram(s) Oral once PRN  enoxaparin Injectable 40 milliGRAM(s) SubCutaneous every 24 hours  glucagon  Injectable 1 milliGRAM(s) IntraMuscular once  levETIRAcetam 750 milliGRAM(s) Oral two times a day  pantoprazole    Tablet 40 milliGRAM(s) Oral before breakfast  polyethylene glycol 3350 17 Gram(s) Oral two times a day  senna 2 Tablet(s) Oral at bedtime  sodium chloride 1 Gram(s) Oral every 12 hours      Vital Signs Last 24 Hrs  T(C): 36.6 (07 May 2023 08:11), Max: 36.6 (07 May 2023 08:11)  T(F): 97.8 (07 May 2023 08:11), Max: 97.8 (07 May 2023 08:11)  HR: 62 (07 May 2023 08:11) (62 - 76)  BP: 136/78 (07 May 2023 08:11) (132/79 - 137/83)  BP(mean): --  RR: 16 (07 May 2023 08:11) (16 - 16)  SpO2: 98% (07 May 2023 08:11) (98% - 98%)    Parameters below as of 07 May 2023 08:11  Patient On (Oxygen Delivery Method): room air        In NAD  HEENT- EOMI  Heart- RRR, S1S2  Lungs- CTA bl.  Abd- + BS, NT  Ext- No calf pain  Neuro- Exam unchanged          Imp: Patient with diagnosis of GBM admitted for comprehensive acute rehabilitation.    Plan:  - Continue PT/OT/SLP  - DVT prophylaxis  - Skin- Turn q2h, check skin daily  - Continue current medications; patient medically stable.   -Active issues- none  - Patient is stable to continue current rehabilitation program.

## 2023-05-08 ENCOUNTER — NON-APPOINTMENT (OUTPATIENT)
Age: 68
End: 2023-05-08

## 2023-05-08 LAB
ALBUMIN SERPL ELPH-MCNC: 2.9 G/DL — LOW (ref 3.3–5)
ALP SERPL-CCNC: 89 U/L — SIGNIFICANT CHANGE UP (ref 40–120)
ALT FLD-CCNC: 59 U/L — HIGH (ref 10–45)
ANION GAP SERPL CALC-SCNC: 6 MMOL/L — SIGNIFICANT CHANGE UP (ref 5–17)
AST SERPL-CCNC: 17 U/L — SIGNIFICANT CHANGE UP (ref 10–40)
BILIRUB SERPL-MCNC: 0.4 MG/DL — SIGNIFICANT CHANGE UP (ref 0.2–1.2)
BUN SERPL-MCNC: 16 MG/DL — SIGNIFICANT CHANGE UP (ref 7–23)
CALCIUM SERPL-MCNC: 9 MG/DL — SIGNIFICANT CHANGE UP (ref 8.4–10.5)
CHLORIDE SERPL-SCNC: 102 MMOL/L — SIGNIFICANT CHANGE UP (ref 96–108)
CO2 SERPL-SCNC: 31 MMOL/L — SIGNIFICANT CHANGE UP (ref 22–31)
CREAT SERPL-MCNC: 0.86 MG/DL — SIGNIFICANT CHANGE UP (ref 0.5–1.3)
EGFR: 96 ML/MIN/1.73M2 — SIGNIFICANT CHANGE UP
GLUCOSE SERPL-MCNC: 132 MG/DL — HIGH (ref 70–99)
HCT VFR BLD CALC: 39.6 % — SIGNIFICANT CHANGE UP (ref 39–50)
HGB BLD-MCNC: 13 G/DL — SIGNIFICANT CHANGE UP (ref 13–17)
MCHC RBC-ENTMCNC: 28.4 PG — SIGNIFICANT CHANGE UP (ref 27–34)
MCHC RBC-ENTMCNC: 32.8 GM/DL — SIGNIFICANT CHANGE UP (ref 32–36)
MCV RBC AUTO: 86.5 FL — SIGNIFICANT CHANGE UP (ref 80–100)
NRBC # BLD: 0 /100 WBCS — SIGNIFICANT CHANGE UP (ref 0–0)
PLATELET # BLD AUTO: 282 K/UL — SIGNIFICANT CHANGE UP (ref 150–400)
POTASSIUM SERPL-MCNC: 4.6 MMOL/L — SIGNIFICANT CHANGE UP (ref 3.5–5.3)
POTASSIUM SERPL-SCNC: 4.6 MMOL/L — SIGNIFICANT CHANGE UP (ref 3.5–5.3)
PROT SERPL-MCNC: 6.1 G/DL — SIGNIFICANT CHANGE UP (ref 6–8.3)
RBC # BLD: 4.58 M/UL — SIGNIFICANT CHANGE UP (ref 4.2–5.8)
RBC # FLD: 15.2 % — HIGH (ref 10.3–14.5)
SODIUM SERPL-SCNC: 139 MMOL/L — SIGNIFICANT CHANGE UP (ref 135–145)
WBC # BLD: 6.27 K/UL — SIGNIFICANT CHANGE UP (ref 3.8–10.5)
WBC # FLD AUTO: 6.27 K/UL — SIGNIFICANT CHANGE UP (ref 3.8–10.5)

## 2023-05-08 PROCEDURE — 99232 SBSQ HOSP IP/OBS MODERATE 35: CPT

## 2023-05-08 RX ORDER — TRAZODONE HCL 50 MG
25 TABLET ORAL AT BEDTIME
Refills: 0 | Status: DISCONTINUED | OUTPATIENT
Start: 2023-05-08 | End: 2023-05-10

## 2023-05-08 RX ADMIN — Medication 2 MILLIGRAM(S): at 05:10

## 2023-05-08 RX ADMIN — LEVETIRACETAM 750 MILLIGRAM(S): 250 TABLET, FILM COATED ORAL at 05:10

## 2023-05-08 RX ADMIN — ENOXAPARIN SODIUM 40 MILLIGRAM(S): 100 INJECTION SUBCUTANEOUS at 05:11

## 2023-05-08 RX ADMIN — AMLODIPINE BESYLATE 10 MILLIGRAM(S): 2.5 TABLET ORAL at 05:10

## 2023-05-08 RX ADMIN — SODIUM CHLORIDE 1 GRAM(S): 9 INJECTION INTRAMUSCULAR; INTRAVENOUS; SUBCUTANEOUS at 21:02

## 2023-05-08 RX ADMIN — Medication 25 MILLIGRAM(S): at 21:02

## 2023-05-08 RX ADMIN — LEVETIRACETAM 750 MILLIGRAM(S): 250 TABLET, FILM COATED ORAL at 21:02

## 2023-05-08 RX ADMIN — Medication 2 MILLIGRAM(S): at 21:02

## 2023-05-08 RX ADMIN — PANTOPRAZOLE SODIUM 40 MILLIGRAM(S): 20 TABLET, DELAYED RELEASE ORAL at 05:10

## 2023-05-08 RX ADMIN — SODIUM CHLORIDE 1 GRAM(S): 9 INJECTION INTRAMUSCULAR; INTRAVENOUS; SUBCUTANEOUS at 05:10

## 2023-05-08 NOTE — PROGRESS NOTE ADULT - SUBJECTIVE AND OBJECTIVE BOX
Patient is a 67y old  Male who presents with a chief complaint of GBm sp craniotomy and resection (07 May 2023 14:21)      HPI:  Patient is a 66 y/o male RH dominant PMH of HTN, Osteoarthritis, right total knee replacement 2 months ago, brain mass s/p biopsy 23 @ [facility] with Dr. Tobar, who presented to [facility] for brain tumor resection 23. Per family, patient started to have right hand weakness about a month ago that grew progressively worse, then went to ED where brain mass was diagnosed. He was started on keppra 750 mg BID for seizure prophylaxis. Patient had sterotactic needle biopsy and laser interstitial thermal therapy by Dr. Tobar on 23 which was positive for glioblastoma  s/p craniotomy for resection of L brain tumor 23. Hospital course complicated by hyponatremia and RUE edema. Doppler negative.     Patient was evaluated by PM&R and therapy for functional deficits and gait/ ADL impairments and recommended acute rehabilitation. Patient was medically optimized for discharge to [facility] on 23     (02 May 2023 16:29)      PAST MEDICAL & SURGICAL HISTORY:  Hypertension      Osteoarthritis      Brain mass      S/P total knee replacement, right          MEDICATIONS  (STANDING):  amLODIPine   Tablet 10 milliGRAM(s) Oral every 24 hours  dexAMETHasone     Tablet 2 milliGRAM(s) Oral every 12 hours  dexAMETHasone     Tablet   Oral   dextrose 5%. 1000 milliLiter(s) (100 mL/Hr) IV Continuous <Continuous>  dextrose 5%. 1000 milliLiter(s) (50 mL/Hr) IV Continuous <Continuous>  dextrose 50% Injectable 12.5 Gram(s) IV Push once  dextrose 50% Injectable 25 Gram(s) IV Push once  dextrose 50% Injectable 25 Gram(s) IV Push once  enoxaparin Injectable 40 milliGRAM(s) SubCutaneous every 24 hours  glucagon  Injectable 1 milliGRAM(s) IntraMuscular once  levETIRAcetam 750 milliGRAM(s) Oral two times a day  pantoprazole    Tablet 40 milliGRAM(s) Oral before breakfast  polyethylene glycol 3350 17 Gram(s) Oral two times a day  senna 2 Tablet(s) Oral at bedtime  sodium chloride 1 Gram(s) Oral every 12 hours  traZODone 25 milliGRAM(s) Oral at bedtime    MEDICATIONS  (PRN):  acetaminophen     Tablet .. 650 milliGRAM(s) Oral every 6 hours PRN Mild Pain (1 - 3)  benzonatate 100 milliGRAM(s) Oral three times a day PRN Cough  dextrose Oral Gel 15 Gram(s) Oral once PRN Blood Glucose LESS THAN 70 milliGRAM(s)/deciliter      Allergies    No Known Allergies    Intolerances          VITALS  67y  Vital Signs Last 24 Hrs  T(C): 36.7 (08 May 2023 07:53), Max: 36.7 (07 May 2023 19:22)  T(F): 98.1 (08 May 2023 07:53), Max: 98.1 (08 May 2023 07:53)  HR: 50 (08 May 2023 07:53) (50 - 64)  BP: 133/78 (08 May 2023 07:53) (122/71 - 144/84)  BP(mean): --  RR: 16 (08 May 2023 07:53) (16 - 17)  SpO2: 97% (08 May 2023 07:53) (97% - 98%)    Parameters below as of 08 May 2023 07:53  Patient On (Oxygen Delivery Method): room air      Daily     Daily Weight in k (07 May 2023 23:49)        RECENT LABS:                          13.0   6.27  )-----------( 282      ( 08 May 2023 06:16 )             39.6     05-08    139  |  102  |  16  ----------------------------<  132<H>  4.6   |  31  |  0.86    Ca    9.0      08 May 2023 06:16    TPro  6.1  /  Alb  2.9<L>  /  TBili  0.4  /  DBili  x   /  AST  17  /  ALT  59<H>  /  AlkPhos  89  05-08    LIVER FUNCTIONS - ( 08 May 2023 06:16 )  Alb: 2.9 g/dL / Pro: 6.1 g/dL / ALK PHOS: 89 U/L / ALT: 59 U/L / AST: 17 U/L / GGT: x                   CAPILLARY BLOOD GLUCOSE

## 2023-05-08 NOTE — PROGRESS NOTE ADULT - COMMENTS
Patient pleasant, stable over weekend, had good sessions of therapy. he does report some difficulty sleeping--denies that it isdue to pain or environemnt. Has interrupted sleep, several times, and can't fall back asleep until around 5 AM. Takes naps during day. Is open to medication to assist, and this was disucssed with his wife as well who is agreeable

## 2023-05-08 NOTE — PROGRESS NOTE ADULT - MOTOR
2 finger shoulder subluxation  no GH joint TTP  shoulder elevation 2/5 0/5 elbow flexoin, wrist flexion or finger movement  calves soft noTTP

## 2023-05-08 NOTE — PROGRESS NOTE ADULT - ASSESSMENT
Patient is a 66 y/o male PMH of HTN, OA, right TKA, who presented with recently diagnosed, brain mass, who presented to [facility] for brain tumor resection 4/24/23 Pathology positive for glioblastoma; patient  s/p craniotomy for resection of L brain tumor 4/25/23. Hospital course complicated by hyponatremia. Admitted for multidisciplinary rehab program    # Glioblastoma s/p craniotomy for resection of L brain tumor 4/25/23 right hemiparesis  - continue decadron 2mg PO BID through 6/3  - keppra 750mg PO BID for seizure ppx  - dc staples tomorrow 5/9  - Continue PT/OT/ SLP 3 hours a day 5 days a week  - R kinesiology tape for R shoulder 5/4, FES right shoulder and forearm muscles  - neuropsychology consult. Recreation therapy  - Precautions: steroid, SZ, cardiac, fall    # HTN   - amlodipine 10mg PO QD   -  (122/71 - 144/84) 5/8    # DM2   - c/w ISS     # Sleep:  - Melatonin PRN  - interrupted sleep. Will trial trazodone 25 qhs, patient and wife agreeable    # Pain:  - Tylenol PRN    # GI/Bowel:  - miralax 17g PO BID   - Senna 2 tabs daily  - pantoprazole 40mg PO before breakfast      # Diet   - Regular, [facility]     # DVT prophylaxis:  - Last doppler 5/1/23 - Neg. Patient aware  - lovenox 40mg SC QD   - SCDs    # Case discussed in IDT rounds 5/8:  - max assist bADLs , max assist transfers, mod assit ambulation 16 feet with fernando rail and WC follow, tolerating reg solid thin liquid diet, mild receptive language defictis, reduced complex reasoning and attention, mild dysarthria, difficulty in unstructured taskss  - goals: min assist bADLs, CG/min assist transfers and short distance household ambulation  - target: patient may benefit from BAIRON for continued OT PT SLP 5/17/23 given severity of right sided weakness, stairs. Will discuss with family    # LABS  CBC [facility] 5/8: reviewed, stable   CBC [facility] 5/11      --------------------------------------------  Outpatient Follow up:    Juanjose Johnston)  Neurosurgery  [address], [address] [place] [address]  Phone: [phone]  Fax: [phone]  Follow Up Time:     Delmis Causey; MIKHAIL)  Hematology; Internal Medicine; Medical Oncology  [address] [place] [address]  Phone: [phone]  Fax: [phone]  Follow Up Time:

## 2023-05-09 DIAGNOSIS — R29.810 FACIAL WEAKNESS: ICD-10-CM

## 2023-05-09 DIAGNOSIS — K03.2 EROSION OF TEETH: ICD-10-CM

## 2023-05-09 DIAGNOSIS — E87.1 HYPO-OSMOLALITY AND HYPONATREMIA: ICD-10-CM

## 2023-05-09 DIAGNOSIS — R00.1 BRADYCARDIA, UNSPECIFIED: ICD-10-CM

## 2023-05-09 DIAGNOSIS — I82.4Z3 ACUTE EMBOLISM AND THROMBOSIS OF UNSPECIFIED DEEP VEINS OF DISTAL LOWER EXTREMITY, BILATERAL: ICD-10-CM

## 2023-05-09 DIAGNOSIS — C71.0 MALIGNANT NEOPLASM OF CEREBRUM, EXCEPT LOBES AND VENTRICLES: ICD-10-CM

## 2023-05-09 DIAGNOSIS — G93.9 DISORDER OF BRAIN, UNSPECIFIED: ICD-10-CM

## 2023-05-09 DIAGNOSIS — Z86.73 PERSONAL HISTORY OF TRANSIENT ISCHEMIC ATTACK (TIA), AND CEREBRAL INFARCTION WITHOUT RESIDUAL DEFICITS: ICD-10-CM

## 2023-05-09 DIAGNOSIS — R05.9 COUGH, UNSPECIFIED: ICD-10-CM

## 2023-05-09 DIAGNOSIS — E11.9 TYPE 2 DIABETES MELLITUS WITHOUT COMPLICATIONS: ICD-10-CM

## 2023-05-09 DIAGNOSIS — H50.10 UNSPECIFIED EXOTROPIA: ICD-10-CM

## 2023-05-09 DIAGNOSIS — Z96.651 PRESENCE OF RIGHT ARTIFICIAL KNEE JOINT: ICD-10-CM

## 2023-05-09 DIAGNOSIS — G81.91 HEMIPLEGIA, UNSPECIFIED AFFECTING RIGHT DOMINANT SIDE: ICD-10-CM

## 2023-05-09 DIAGNOSIS — I10 ESSENTIAL (PRIMARY) HYPERTENSION: ICD-10-CM

## 2023-05-09 PROBLEM — Z00.00 ENCOUNTER FOR PREVENTIVE HEALTH EXAMINATION: Noted: 2023-05-09

## 2023-05-09 PROCEDURE — 99233 SBSQ HOSP IP/OBS HIGH 50: CPT

## 2023-05-09 RX ADMIN — Medication 2 MILLIGRAM(S): at 18:50

## 2023-05-09 RX ADMIN — AMLODIPINE BESYLATE 10 MILLIGRAM(S): 2.5 TABLET ORAL at 05:45

## 2023-05-09 RX ADMIN — Medication 2 MILLIGRAM(S): at 05:45

## 2023-05-09 RX ADMIN — LEVETIRACETAM 750 MILLIGRAM(S): 250 TABLET, FILM COATED ORAL at 05:44

## 2023-05-09 RX ADMIN — Medication 650 MILLIGRAM(S): at 08:48

## 2023-05-09 RX ADMIN — PANTOPRAZOLE SODIUM 40 MILLIGRAM(S): 20 TABLET, DELAYED RELEASE ORAL at 05:45

## 2023-05-09 RX ADMIN — SODIUM CHLORIDE 1 GRAM(S): 9 INJECTION INTRAMUSCULAR; INTRAVENOUS; SUBCUTANEOUS at 18:50

## 2023-05-09 RX ADMIN — Medication 650 MILLIGRAM(S): at 09:48

## 2023-05-09 RX ADMIN — Medication 25 MILLIGRAM(S): at 21:35

## 2023-05-09 RX ADMIN — LEVETIRACETAM 750 MILLIGRAM(S): 250 TABLET, FILM COATED ORAL at 18:50

## 2023-05-09 RX ADMIN — SODIUM CHLORIDE 1 GRAM(S): 9 INJECTION INTRAMUSCULAR; INTRAVENOUS; SUBCUTANEOUS at 05:44

## 2023-05-09 RX ADMIN — ENOXAPARIN SODIUM 40 MILLIGRAM(S): 100 INJECTION SUBCUTANEOUS at 05:45

## 2023-05-09 NOTE — PROGRESS NOTE ADULT - COMMENTS
Patient seen in PT, ambulating with ACE wrap for DF assist, using fernando rail for ambulation with mod assist and WC follow/cues. Patient reports sleeping better last night, awoke twice but was able to return to sleep quickly (once was at 5 AM). feels less fatigued today. No H/A no B/B complaints. He is spiritual, and thanks God and the staff for his improvement.     continued progress, recommendations for BAIRON also discussed to continue intensity of therapy. He is motivated for his 40 wedding anniversary to walk, which is in July

## 2023-05-09 NOTE — PROGRESS NOTE ADULT - ASSESSMENT
Patient is a 66 y/o male PMH of HTN, OA, right TKA, who presented with recently diagnosed, brain mass, who presented to [facility] for brain tumor resection 4/24/23 Pathology positive for glioblastoma; patient  s/p craniotomy for resection of L brain tumor 4/25/23. Hospital course complicated by hyponatremia. Admitted for multidisciplinary rehab program    # Glioblastoma s/p craniotomy for resection of L brain tumor 4/25/23 right hemiparesis  - continue decadron 2mg PO BID through 6/3  - keppra 750mg PO BID for seizure ppx  - Odell craniotomy dc'd today 5/9. Patient wished to take video and pictures of removal on his phone to share with his wife; recorded on his own device. Staples removed without incident. May shower, wash over incision with gentle baby shampoo, no scratching  - Continue PT/OT/ SLP 3 hours a day 5 days a week  - R kinesiology tape for R shoulder 5/4, FES right shoulder and forearm muscles  - will benefit from sAFO. Recommend waiting for evaluaton at [facility] given potential for further motor recovery, discussed with PT and patient  - neuropsychology consult. Recreation therapy  - Precautions: steroid, SZ, cardiac, fall    # HTN   - amlodipine 10mg PO QD   -  (119/71 - 123/72) 5/9    # DM2   - c/w ISS     # Sleep:  - Melatonin PRN  -  trazodone 25 qhs 5/8. improved sleep, awoke at 1 and 5 AM with ability to fall back to sleep after. consider increased dose if still with interruptions    # Pain:  - Tylenol PRN    # GI/Bowel:  - miralax 17g PO BID   - Senna 2 tabs daily  - pantoprazole 40mg PO before breakfast      # Diet   - Regular, [facility]     # DVT prophylaxis:  - Last doppler 5/1/23 - Neg. Patient aware  - lovenox 40mg SC QD   - SCDs    # Case discussed in IDT rounds 5/8:  - max assist bADLs , max assist transfers, mod assit ambulation 16 feet with fernando rail and WC follow, tolerating reg solid thin liquid diet, mild receptive language defictis, reduced complex reasoning and attention, mild dysarthria, difficulty in unstructured tasks  - goals: min assist bADLs, CG/min assist transfers and short distance household ambulation  - target: patient may benefit from BAIRON for continued OT PT SLP 5/17/23 given severity of right sided weakness, stairs. Will discuss with family  - discussed with patient today. including progress, time course for neuro recovery, and benefits of daily therapy at this stage. He is agreeable to [facility] working with family on facility choices  - time spent for evaluation, education, management and staple removal 50 min    # LABS   CBC BMP 5/11      --------------------------------------------  Outpatient Follow up:    Juanjose Johnston)  Neurosurgery  [address], [address] [place] [address]  Phone: [phone]  Fax: [phone]  Follow Up Time:     Delmis Causey; MIKHAIL)  Hematology; Internal Medicine; Medical Oncology  [address] [place] [address]  Phone: [phone]  Fax: [phone]  Follow Up Time:   Patient is a 66 y/o male PMH of HTN, OA, right TKA, who presented with recently diagnosed, brain mass, who presented to [facility] for brain tumor resection 4/24/23 Pathology positive for glioblastoma; patient  s/p craniotomy for resection of L brain tumor 4/25/23. Hospital course complicated by hyponatremia. Admitted for multidisciplinary rehab program    # Glioblastoma s/p craniotomy for resection of L brain tumor 4/25/23 right hemiparesis  - continue decadron 2mg PO BID through 6/3  - keppra 750mg PO BID for seizure ppx  - Odell craniotomy dc'd today 5/9. Patient wished to take video and pictures of removal on his phone to share with his wife; recorded on his own device. Staples removed without incident. May shower, wash over incision with gentle baby shampoo, no scratching  - Continue PT/OT/ SLP 3 hours a day 5 days a week  - R kinesiology tape for R shoulder 5/4, FES right shoulder and forearm muscles  - will benefit from sAFO. Recommend waiting for evaluaton at [facility] given potential for further motor recovery, discussed with PT and patient  - neuropsychology consult. Recreation therapy  - Precautions: steroid, SZ, cardiac, fall    # HTN   - amlodipine 10mg PO QD   -  (119/71 - 123/72) 5/9    # DM2   - c/w ISS     # Sleep:  - Melatonin PRN  -  trazodone 25 qhs 5/8. improved sleep, awoke at 1 and 5 AM with ability to fall back to sleep after. consider increased dose if still with interruptions    # Pain:  - Tylenol PRN    # GI/Bowel:  - miralax 17g PO BID   - Senna 2 tabs daily  - pantoprazole 40mg PO before breakfast      # Diet   - Regular, [facility]   - add MVI 5/9    # DVT prophylaxis:  - Last doppler 5/1/23 - Neg. Patient aware  - lovenox 40mg SC QD   - SCDs    # Case discussed in IDT rounds 5/8:  - max assist bADLs , max assist transfers, mod assit ambulation 16 feet with fernando rail and WC follow, tolerating reg solid thin liquid diet, mild receptive language defictis, reduced complex reasoning and attention, mild dysarthria, difficulty in unstructured tasks  - goals: min assist bADLs, CG/min assist transfers and short distance household ambulation  - target: patient may benefit from BAIRON for continued OT PT SLP 5/17/23 given severity of right sided weakness, stairs. Will discuss with family  - discussed with patient today. including progress, time course for neuro recovery, and benefits of daily therapy at this stage. He is agreeable to [facility] working with family on facility choices  - time spent for evaluation, education, management and staple removal 50 min    # LABS   CBC BMP 5/11  B12 level 5/10    --------------------------------------------  Outpatient Follow up:    Juanjose Johnston)  Neurosurgery  [address], [address] [place] [address]  Phone: [phone]  Fax: [phone]  Follow Up Time:     Delmis Causey; MIKHAIL)  Hematology; Internal Medicine; Medical Oncology  [address] [place] [address]  Phone: [phone]  Fax: [phone]  Follow Up Time:

## 2023-05-09 NOTE — PROGRESS NOTE ADULT - SUBJECTIVE AND OBJECTIVE BOX
Patient is a 67y old  Male who presents with a chief complaint of GBm sp craniotomy and resection (08 May 2023 13:08)      HPI:  Patient is a 66 y/o male RH dominant PMH of HTN, Osteoarthritis, right total knee replacement 2 months ago, brain mass s/p biopsy 4/7/23 @ [facility] with Dr. Tobar, who presented to [facility] for brain tumor resection 4/24/23. Per family, patient started to have right hand weakness about a month ago that grew progressively worse, then went to ED where brain mass was diagnosed. He was started on keppra 750 mg BID for seizure prophylaxis. Patient had sterotactic needle biopsy and laser interstitial thermal therapy by Dr. Tobar on 4/7/23 which was positive for glioblastoma  s/p craniotomy for resection of L brain tumor 4/25/23. Hospital course complicated by hyponatremia and RUE edema. Doppler negative.     Patient was evaluated by PM&R and therapy for functional deficits and gait/ ADL impairments and recommended acute rehabilitation. Patient was medically optimized for discharge to [facility] on 5/2/23     (02 May 2023 16:29)      PAST MEDICAL & SURGICAL HISTORY:  Hypertension      Osteoarthritis      Brain mass      S/P total knee replacement, right          MEDICATIONS  (STANDING):  amLODIPine   Tablet 10 milliGRAM(s) Oral every 24 hours  dexAMETHasone     Tablet   Oral   dexAMETHasone     Tablet 2 milliGRAM(s) Oral every 12 hours  dextrose 5%. 1000 milliLiter(s) (50 mL/Hr) IV Continuous <Continuous>  dextrose 5%. 1000 milliLiter(s) (100 mL/Hr) IV Continuous <Continuous>  dextrose 50% Injectable 12.5 Gram(s) IV Push once  dextrose 50% Injectable 25 Gram(s) IV Push once  dextrose 50% Injectable 25 Gram(s) IV Push once  enoxaparin Injectable 40 milliGRAM(s) SubCutaneous every 24 hours  glucagon  Injectable 1 milliGRAM(s) IntraMuscular once  levETIRAcetam 750 milliGRAM(s) Oral two times a day  pantoprazole    Tablet 40 milliGRAM(s) Oral before breakfast  polyethylene glycol 3350 17 Gram(s) Oral two times a day  senna 2 Tablet(s) Oral at bedtime  sodium chloride 1 Gram(s) Oral every 12 hours  traZODone 25 milliGRAM(s) Oral at bedtime    MEDICATIONS  (PRN):  acetaminophen     Tablet .. 650 milliGRAM(s) Oral every 6 hours PRN Mild Pain (1 - 3)  benzonatate 100 milliGRAM(s) Oral three times a day PRN Cough  dextrose Oral Gel 15 Gram(s) Oral once PRN Blood Glucose LESS THAN 70 milliGRAM(s)/deciliter      Allergies    No Known Allergies    Intolerances          VITALS  67y  Vital Signs Last 24 Hrs  T(C): 36.6 (08 May 2023 21:05), Max: 36.6 (08 May 2023 21:05)  T(F): 97.9 (08 May 2023 21:05), Max: 97.9 (08 May 2023 21:05)  HR: 54 (09 May 2023 05:40) (54 - 78)  BP: 123/72 (09 May 2023 05:40) (119/71 - 123/72)  BP(mean): --  RR: 16 (09 May 2023 05:40) (16 - 16)  SpO2: 98% (09 May 2023 05:40) (95% - 98%)    Parameters below as of 09 May 2023 05:40  Patient On (Oxygen Delivery Method): room air      Daily     Daily         RECENT LABS:                          13.0   6.27  )-----------( 282      ( 08 May 2023 06:16 )             39.6     05-08    139  |  102  |  16  ----------------------------<  132<H>  4.6   |  31  |  0.86    Ca    9.0      08 May 2023 06:16    TPro  6.1  /  Alb  2.9<L>  /  TBili  0.4  /  DBili  x   /  AST  17  /  ALT  59<H>  /  AlkPhos  89  05-08    LIVER FUNCTIONS - ( 08 May 2023 06:16 )  Alb: 2.9 g/dL / Pro: 6.1 g/dL / ALK PHOS: 89 U/L / ALT: 59 U/L / AST: 17 U/L / GGT: x                   CAPILLARY BLOOD GLUCOSE

## 2023-05-10 LAB — VIT B12 SERPL-MCNC: 654 PG/ML — SIGNIFICANT CHANGE UP (ref 232–1245)

## 2023-05-10 PROCEDURE — 99233 SBSQ HOSP IP/OBS HIGH 50: CPT

## 2023-05-10 PROCEDURE — 99232 SBSQ HOSP IP/OBS MODERATE 35: CPT

## 2023-05-10 RX ORDER — TRAZODONE HCL 50 MG
50 TABLET ORAL AT BEDTIME
Refills: 0 | Status: DISCONTINUED | OUTPATIENT
Start: 2023-05-10 | End: 2023-05-31

## 2023-05-10 RX ADMIN — ENOXAPARIN SODIUM 40 MILLIGRAM(S): 100 INJECTION SUBCUTANEOUS at 05:04

## 2023-05-10 RX ADMIN — POLYETHYLENE GLYCOL 3350 17 GRAM(S): 17 POWDER, FOR SOLUTION ORAL at 05:04

## 2023-05-10 RX ADMIN — Medication 2 MILLIGRAM(S): at 18:12

## 2023-05-10 RX ADMIN — AMLODIPINE BESYLATE 10 MILLIGRAM(S): 2.5 TABLET ORAL at 05:04

## 2023-05-10 RX ADMIN — PANTOPRAZOLE SODIUM 40 MILLIGRAM(S): 20 TABLET, DELAYED RELEASE ORAL at 05:03

## 2023-05-10 RX ADMIN — Medication 50 MILLIGRAM(S): at 21:24

## 2023-05-10 RX ADMIN — LEVETIRACETAM 750 MILLIGRAM(S): 250 TABLET, FILM COATED ORAL at 05:04

## 2023-05-10 RX ADMIN — Medication 1 TABLET(S): at 12:05

## 2023-05-10 RX ADMIN — SODIUM CHLORIDE 1 GRAM(S): 9 INJECTION INTRAMUSCULAR; INTRAVENOUS; SUBCUTANEOUS at 05:03

## 2023-05-10 RX ADMIN — SODIUM CHLORIDE 1 GRAM(S): 9 INJECTION INTRAMUSCULAR; INTRAVENOUS; SUBCUTANEOUS at 18:11

## 2023-05-10 RX ADMIN — LEVETIRACETAM 750 MILLIGRAM(S): 250 TABLET, FILM COATED ORAL at 18:12

## 2023-05-10 RX ADMIN — Medication 2 MILLIGRAM(S): at 05:04

## 2023-05-10 NOTE — PROGRESS NOTE ADULT - TIME BILLING
Reviewing chart notes and data, face to face time counseling the patient, communicating with Dr. Palma (physiatrist), coordinating care with SW/CM at [facility]
care coordination, plan of care discussed with patient face to face, [facility] team

## 2023-05-10 NOTE — PROGRESS NOTE ADULT - SUBJECTIVE AND OBJECTIVE BOX
Patient is a 67y old  Male who presents with a chief complaint of GBm sp craniotomy and resection (10 May 2023 07:27)      HPI:  Patient is a 68 y/o male RH dominant PMH of HTN, Osteoarthritis, right total knee replacement 2 months ago, brain mass s/p biopsy 23 @ [facility] with Dr. Tobar, who presented to [facility] for brain tumor resection 23. Per family, patient started to have right hand weakness about a month ago that grew progressively worse, then went to ED where brain mass was diagnosed. He was started on keppra 750 mg BID for seizure prophylaxis. Patient had sterotactic needle biopsy and laser interstitial thermal therapy by Dr. Tobar on 23 which was positive for glioblastoma  s/p craniotomy for resection of L brain tumor 23. Hospital course complicated by hyponatremia and RUE edema. Doppler negative.     Patient was evaluated by PM&R and therapy for functional deficits and gait/ ADL impairments and recommended acute rehabilitation. Patient was medically optimized for discharge to [facility] on 23     (02 May 2023 16:29)      PAST MEDICAL & SURGICAL HISTORY:  Hypertension      Osteoarthritis      Brain mass      S/P total knee replacement, right          MEDICATIONS  (STANDING):  amLODIPine   Tablet 10 milliGRAM(s) Oral every 24 hours  dexAMETHasone     Tablet 2 milliGRAM(s) Oral every 12 hours  dexAMETHasone     Tablet   Oral   dextrose 5%. 1000 milliLiter(s) (100 mL/Hr) IV Continuous <Continuous>  dextrose 5%. 1000 milliLiter(s) (50 mL/Hr) IV Continuous <Continuous>  dextrose 50% Injectable 12.5 Gram(s) IV Push once  dextrose 50% Injectable 25 Gram(s) IV Push once  dextrose 50% Injectable 25 Gram(s) IV Push once  enoxaparin Injectable 40 milliGRAM(s) SubCutaneous every 24 hours  glucagon  Injectable 1 milliGRAM(s) IntraMuscular once  levETIRAcetam 750 milliGRAM(s) Oral two times a day  multivitamin 1 Tablet(s) Oral daily  pantoprazole    Tablet 40 milliGRAM(s) Oral before breakfast  polyethylene glycol 3350 17 Gram(s) Oral two times a day  senna 2 Tablet(s) Oral at bedtime  sodium chloride 1 Gram(s) Oral every 12 hours  traZODone 50 milliGRAM(s) Oral at bedtime    MEDICATIONS  (PRN):  acetaminophen     Tablet .. 650 milliGRAM(s) Oral every 6 hours PRN Mild Pain (1 - 3)  benzonatate 100 milliGRAM(s) Oral three times a day PRN Cough  dextrose Oral Gel 15 Gram(s) Oral once PRN Blood Glucose LESS THAN 70 milliGRAM(s)/deciliter      Allergies    No Known Allergies    Intolerances          VITALS  67y  Vital Signs Last 24 Hrs  T(C): 36.7 (10 May 2023 08:16), Max: 36.8 (09 May 2023 18:56)  T(F): 98 (10 May 2023 08:16), Max: 98.2 (09 May 2023 18:56)  HR: 61 (10 May 2023 08:16) (61 - 100)  BP: 132/77 (10 May 2023 08:16) (129/78 - 132/77)  BP(mean): --  RR: 16 (10 May 2023 08:16) (16 - 16)  SpO2: 96% (10 May 2023 08:16) (96% - 97%)    Parameters below as of 09 May 2023 20:38  Patient On (Oxygen Delivery Method): room air      Daily     Daily Weight in k.1 (09 May 2023 22:27)        RECENT LABS:                      CAPILLARY BLOOD GLUCOSE

## 2023-05-10 NOTE — PROGRESS NOTE ADULT - COMMENTS
Patient seen in PT. He is doing well, reports improved sleep throughout night although he still awakens early around 4:30 AM. He would like to trial increased trazodone dose 50 for longer period sleep; no sedation on 25 mg. Will increase and monitor    He asks about BAIRON, prognosis for return, his goals of standing/walking. We discussed neuro recovery, time frame, prognosis, bracing

## 2023-05-10 NOTE — PROGRESS NOTE ADULT - ASSESSMENT
67M with PMH HTN, who presented with recently diagnosed brain mass for brain tumor resection. Pathology positive for glioblastoma; patient s/p craniotomy for resection of L brain tumor. Hospital course complicated by hyponatremia. Now admitted to [facility] for initiation of a multidisciplinary rehab program.    #Glioblastoma s/p craniotomy for resection of L brain tumor   -Continue comprehensive rehab program  -- continue decadron 2mg PO BID through 6/3  - keppra 750mg PO BID for seizure ppx      #Hyponatremia-resolved  -Decrease NaCl  to 1gm q12hrs, may change to daily tomorrow if na ok  -Monitor Na levels  -Plan to taper to discontinuation with appropriate range Na    #HTN   -Continue Amlodipine 10mg daily  -Monitor vitals     #Type 2 DM   A1C: 6.5  -Continue moderate dose insulin sliding scale  -Hypoglycemia protocol, accu-checks  -Blood glucose goal 100-180 in hospital setting    DVT ppx: Lovenox

## 2023-05-10 NOTE — PROGRESS NOTE ADULT - SUBJECTIVE AND OBJECTIVE BOX
Patient is a 67y old  Male who presents with a chief complaint of GBm sp craniotomy and resection (09 May 2023 12:51)      INTERVAL HPI:  OVERNIGHT EVENTS:  T(F): 98 (05-10-23 @ 08:16), Max: 98.2 (05-09-23 @ 18:56)  HR: 61 (05-10-23 @ 08:16) (61 - 100)  BP: 132/77 (05-10-23 @ 08:16) (129/78 - 132/77)  RR: 16 (05-10-23 @ 08:16) (16 - 16)  SpO2: 96% (05-10-23 @ 08:16) (96% - 97%)  I&O's Summary        PHYSICAL EXAM:  GENERAL: NAD, well-groomed, well-developed  HEAD:  Atraumatic, Normocephalic  EYES: EOMI, PERRLA, conjunctiva and sclera clear  ENMT: No tonsillar erythema, exudates, or enlargement; Moist mucous membranes, Good dentition, No lesions  NECK: Supple, No JVD, Normal thyroid  NERVOUS SYSTEM:  Alert & Oriented X3, Good concentration; Motor Strength 5/5 B/L upper and lower extremities; DTRs 2+ intact and symmetric  CHEST/LUNG: Clear to percussion bilaterally; No rales, rhonchi, wheezing, or rubs  HEART: Regular rate and rhythm; No murmurs, rubs, or gallops  ABDOMEN: Soft, Nontender, Nondistended; Bowel sounds present  EXTREMITIES:  2+ Peripheral Pulses, No clubbing, cyanosis, or edema  LYMPH: No lymphadenopathy noted  SKIN: No rashes or lesions    LABS:              CAPILLARY BLOOD GLUCOSE                  MEDICATIONS  (STANDING):  amLODIPine   Tablet 10 milliGRAM(s) Oral every 24 hours  dexAMETHasone     Tablet 2 milliGRAM(s) Oral every 12 hours  dexAMETHasone     Tablet   Oral   dextrose 5%. 1000 milliLiter(s) (50 mL/Hr) IV Continuous <Continuous>  dextrose 5%. 1000 milliLiter(s) (100 mL/Hr) IV Continuous <Continuous>  dextrose 50% Injectable 25 Gram(s) IV Push once  dextrose 50% Injectable 25 Gram(s) IV Push once  dextrose 50% Injectable 12.5 Gram(s) IV Push once  enoxaparin Injectable 40 milliGRAM(s) SubCutaneous every 24 hours  glucagon  Injectable 1 milliGRAM(s) IntraMuscular once  levETIRAcetam 750 milliGRAM(s) Oral two times a day  multivitamin 1 Tablet(s) Oral daily  pantoprazole    Tablet 40 milliGRAM(s) Oral before breakfast  polyethylene glycol 3350 17 Gram(s) Oral two times a day  senna 2 Tablet(s) Oral at bedtime  sodium chloride 1 Gram(s) Oral every 12 hours  traZODone 25 milliGRAM(s) Oral at bedtime    MEDICATIONS  (PRN):  acetaminophen     Tablet .. 650 milliGRAM(s) Oral every 6 hours PRN Mild Pain (1 - 3)  benzonatate 100 milliGRAM(s) Oral three times a day PRN Cough  dextrose Oral Gel 15 Gram(s) Oral once PRN Blood Glucose LESS THAN 70 milliGRAM(s)/deciliter       Patient is a 67y old  Male who presents with a chief complaint of GBm sp craniotomy and resection (09 May 2023 12:51)      INTERVAL HPI: Pt seen and examined. States he feels ok, denies any other acute complaints at htis time.     OVERNIGHT EVENTS: none noted  T(F): 98 (05-10-23 @ 08:16), Max: 98.2 (05-09-23 @ 18:56)  HR: 61 (05-10-23 @ 08:16) (61 - 100)  BP: 132/77 (05-10-23 @ 08:16) (129/78 - 132/77)  RR: 16 (05-10-23 @ 08:16) (16 - 16)  SpO2: 96% (05-10-23 @ 08:16) (96% - 97%)  I&O's Summary        PHYSICAL EXAM:  GENERAL: NAD  HEAD:  Atraumatic, Normocephalic  EYES: EOMI, conjunctiva and sclera clear, left lateral strabismus  ENMT: Moist mucous membranes, Good dentition  NECK: Supple, No JVD  CHEST/LUNG: Clear to auscultation bilaterally, non-labored breathing, good air entry  HEART: RRR; S1/S2, No murmur  ABDOMEN: Soft, Nontender, Nondistended; Bowel sounds present  VASCULAR: Normal pulses, Normal capillary refill  EXTREMITIES: No cyanosis, No edema  SKIN: Warm, Intact  PSYCH: Normal mood and affect  NERVOUS SYSTEM:  A/O x3, Good concentration; right sided hemiparesis 0/5 muscle strength in RUE, RLE; 5/5 muscle strength in LUE, LLE    LABS:              CAPILLARY BLOOD GLUCOSE                  MEDICATIONS  (STANDING):  amLODIPine   Tablet 10 milliGRAM(s) Oral every 24 hours  dexAMETHasone     Tablet 2 milliGRAM(s) Oral every 12 hours  dexAMETHasone     Tablet   Oral   dextrose 5%. 1000 milliLiter(s) (50 mL/Hr) IV Continuous <Continuous>  dextrose 5%. 1000 milliLiter(s) (100 mL/Hr) IV Continuous <Continuous>  dextrose 50% Injectable 25 Gram(s) IV Push once  dextrose 50% Injectable 25 Gram(s) IV Push once  dextrose 50% Injectable 12.5 Gram(s) IV Push once  enoxaparin Injectable 40 milliGRAM(s) SubCutaneous every 24 hours  glucagon  Injectable 1 milliGRAM(s) IntraMuscular once  levETIRAcetam 750 milliGRAM(s) Oral two times a day  multivitamin 1 Tablet(s) Oral daily  pantoprazole    Tablet 40 milliGRAM(s) Oral before breakfast  polyethylene glycol 3350 17 Gram(s) Oral two times a day  senna 2 Tablet(s) Oral at bedtime  sodium chloride 1 Gram(s) Oral every 12 hours  traZODone 25 milliGRAM(s) Oral at bedtime    MEDICATIONS  (PRN):  acetaminophen     Tablet .. 650 milliGRAM(s) Oral every 6 hours PRN Mild Pain (1 - 3)  benzonatate 100 milliGRAM(s) Oral three times a day PRN Cough  dextrose Oral Gel 15 Gram(s) Oral once PRN Blood Glucose LESS THAN 70 milliGRAM(s)/deciliter

## 2023-05-10 NOTE — PROGRESS NOTE ADULT - ASSESSMENT
Patient is a 66 y/o male PMH of HTN, OA, right TKA, who presented with recently diagnosed, brain mass, who presented to [facility] for brain tumor resection 4/24/23 Pathology positive for glioblastoma; patient  s/p craniotomy for resection of L brain tumor 4/25/23. Hospital course complicated by hyponatremia. Admitted for multidisciplinary rehab program    # Glioblastoma s/p craniotomy for resection of L brain tumor 4/25/23 right hemiparesis  - continue decadron 2mg PO BID through 6/3  - keppra 750mg PO BID for seizure ppx  - [place] craniotomy dc'd 5/9, Cleared to shower, no scrubbing over incision, gentle baby shampoo  - Continue PT/OT/ SLP 3 hours a day 5 days a week  - R kinesiology tape for R shoulder 5/4, FES right shoulder and forearm muscles  - stroke counseling and recovery timeframe discussed 5/10  - neuropsychology consult. Recreation therapy  - Precautions: steroid, SZ, cardiac, fall    # HTN   - amlodipine 10mg PO QD   - (129/78 - 132/77) 5/10    # DM2   - c/w ISS     # Sleep:  - Melatonin PRN  -  trazodone 25 qhs 5/8. --> increased to 50 mg qhs 5/10 due to persistent early awakening although overall improved    # Pain:  - Tylenol PRN    # GI/Bowel:  - miralax 17g PO BID   - Senna 2 tabs daily  - pantoprazole 40mg PO before breakfast      # Diet   - Regular, CCH   - MVI 5/9  - B12 level 5/10 654 WNL    # DVT prophylaxis:  - Last doppler 5/1/23 - Neg. Patient aware  - lovenox 40mg SC QD   - SCDs    # Case discussed in IDT rounds 5/8:  - max assist bADLs , max assist transfers, mod assit ambulation 16 feet with fernando rail and WC follow, tolerating reg solid thin liquid diet, mild receptive language defictis, reduced complex reasoning and attention, mild dysarthria, difficulty in unstructured tasks  - goals: min assist bADLs, CG/min assist transfers and short distance household ambulation  - target: patient may benefit from BAIRON for continued OT PT SLP 5/17/23 given severity of right sided weakness, stairs. Will discuss with family    # LABS   CBC BMP 5/11      --------------------------------------------  Outpatient Follow up:    Juanjose Johnston)  Neurosurgery  [address] [place] [address]  Phone: [phone]  Fax: [phone]  Follow Up Time:     Delmis Causey; MIKHAIL)  Hematology; Internal Medicine; Medical Oncology  [address] [place] [address]  Phone: [phone]  Fax: [phone]  Follow Up Time:

## 2023-05-10 NOTE — PROGRESS NOTE ADULT - MOTOR
right shoulder: KT done in OT today  right HF 2-/5 ham 2-/5 0/5 ankle  no calf swelling +soft no TTP

## 2023-05-11 ENCOUNTER — TRANSCRIPTION ENCOUNTER (OUTPATIENT)
Age: 68
End: 2023-05-11

## 2023-05-11 LAB
ALBUMIN SERPL ELPH-MCNC: 2.9 G/DL — LOW (ref 3.3–5)
ALP SERPL-CCNC: 139 U/L — HIGH (ref 40–120)
ALT FLD-CCNC: 45 U/L — SIGNIFICANT CHANGE UP (ref 10–45)
ANION GAP SERPL CALC-SCNC: 6 MMOL/L — SIGNIFICANT CHANGE UP (ref 5–17)
AST SERPL-CCNC: 13 U/L — SIGNIFICANT CHANGE UP (ref 10–40)
BILIRUB SERPL-MCNC: 0.3 MG/DL — SIGNIFICANT CHANGE UP (ref 0.2–1.2)
BUN SERPL-MCNC: 16 MG/DL — SIGNIFICANT CHANGE UP (ref 7–23)
CALCIUM SERPL-MCNC: 8.9 MG/DL — SIGNIFICANT CHANGE UP (ref 8.4–10.5)
CHLORIDE SERPL-SCNC: 103 MMOL/L — SIGNIFICANT CHANGE UP (ref 96–108)
CO2 SERPL-SCNC: 31 MMOL/L — SIGNIFICANT CHANGE UP (ref 22–31)
CREAT SERPL-MCNC: 0.81 MG/DL — SIGNIFICANT CHANGE UP (ref 0.5–1.3)
EGFR: 97 ML/MIN/1.73M2 — SIGNIFICANT CHANGE UP
GLUCOSE BLDC GLUCOMTR-MCNC: 135 MG/DL — HIGH (ref 70–99)
GLUCOSE SERPL-MCNC: 154 MG/DL — HIGH (ref 70–99)
HCT VFR BLD CALC: 39.8 % — SIGNIFICANT CHANGE UP (ref 39–50)
HGB BLD-MCNC: 12.9 G/DL — LOW (ref 13–17)
MCHC RBC-ENTMCNC: 28.4 PG — SIGNIFICANT CHANGE UP (ref 27–34)
MCHC RBC-ENTMCNC: 32.4 GM/DL — SIGNIFICANT CHANGE UP (ref 32–36)
MCV RBC AUTO: 87.7 FL — SIGNIFICANT CHANGE UP (ref 80–100)
NRBC # BLD: 0 /100 WBCS — SIGNIFICANT CHANGE UP (ref 0–0)
PLATELET # BLD AUTO: 318 K/UL — SIGNIFICANT CHANGE UP (ref 150–400)
POTASSIUM SERPL-MCNC: 4.3 MMOL/L — SIGNIFICANT CHANGE UP (ref 3.5–5.3)
POTASSIUM SERPL-SCNC: 4.3 MMOL/L — SIGNIFICANT CHANGE UP (ref 3.5–5.3)
PROT SERPL-MCNC: 5.9 G/DL — LOW (ref 6–8.3)
RBC # BLD: 4.54 M/UL — SIGNIFICANT CHANGE UP (ref 4.2–5.8)
RBC # FLD: 15.3 % — HIGH (ref 10.3–14.5)
SODIUM SERPL-SCNC: 140 MMOL/L — SIGNIFICANT CHANGE UP (ref 135–145)
WBC # BLD: 6.28 K/UL — SIGNIFICANT CHANGE UP (ref 3.8–10.5)
WBC # FLD AUTO: 6.28 K/UL — SIGNIFICANT CHANGE UP (ref 3.8–10.5)

## 2023-05-11 PROCEDURE — 99232 SBSQ HOSP IP/OBS MODERATE 35: CPT

## 2023-05-11 RX ADMIN — LEVETIRACETAM 750 MILLIGRAM(S): 250 TABLET, FILM COATED ORAL at 05:40

## 2023-05-11 RX ADMIN — AMLODIPINE BESYLATE 10 MILLIGRAM(S): 2.5 TABLET ORAL at 05:41

## 2023-05-11 RX ADMIN — SODIUM CHLORIDE 1 GRAM(S): 9 INJECTION INTRAMUSCULAR; INTRAVENOUS; SUBCUTANEOUS at 19:22

## 2023-05-11 RX ADMIN — Medication 1 TABLET(S): at 12:05

## 2023-05-11 RX ADMIN — SODIUM CHLORIDE 1 GRAM(S): 9 INJECTION INTRAMUSCULAR; INTRAVENOUS; SUBCUTANEOUS at 05:41

## 2023-05-11 RX ADMIN — ENOXAPARIN SODIUM 40 MILLIGRAM(S): 100 INJECTION SUBCUTANEOUS at 05:41

## 2023-05-11 RX ADMIN — Medication 50 MILLIGRAM(S): at 21:57

## 2023-05-11 RX ADMIN — Medication 2 MILLIGRAM(S): at 05:41

## 2023-05-11 RX ADMIN — Medication 2 MILLIGRAM(S): at 19:23

## 2023-05-11 RX ADMIN — PANTOPRAZOLE SODIUM 40 MILLIGRAM(S): 20 TABLET, DELAYED RELEASE ORAL at 05:42

## 2023-05-11 RX ADMIN — LEVETIRACETAM 750 MILLIGRAM(S): 250 TABLET, FILM COATED ORAL at 19:23

## 2023-05-11 NOTE — PROGRESS NOTE ADULT - SUBJECTIVE AND OBJECTIVE BOX
Patient is a 67y old  Male who presents with a chief complaint of GBm sp craniotomy and resection (10 May 2023 07:27)    HPI:  Patient is a 66 y/o male RH dominant PMH of HTN, Osteoarthritis, right total knee replacement 2 months ago, brain mass s/p biopsy 23 @ [facility] with Dr. Tobar, who presented to [facility] for brain tumor resection 23. Per family, patient started to have right hand weakness about a month ago that grew progressively worse, then went to ED where brain mass was diagnosed. He was started on keppra 750 mg BID for seizure prophylaxis. Patient had sterotactic needle biopsy and laser interstitial thermal therapy by Dr. Tobar on 23 which was positive for glioblastoma  s/p craniotomy for resection of L brain tumor 23. Hospital course complicated by hyponatremia and RUE edema. Doppler negative.     Patient was evaluated by PM&R and therapy for functional deficits and gait/ ADL impairments and recommended acute rehabilitation. Patient was medically optimized for discharge to [facility] on 23    PAST MEDICAL & SURGICAL HISTORY:  Hypertension  Osteoarthritis  Brain mass  S/P total knee replacement, right    MEDICATIONS  (STANDING):  amLODIPine   Tablet 10 milliGRAM(s) Oral every 24 hours  dexAMETHasone     Tablet 2 milliGRAM(s) Oral every 12 hours  dexAMETHasone     Tablet   Oral   dextrose 5%. 1000 milliLiter(s) (100 mL/Hr) IV Continuous <Continuous>  dextrose 5%. 1000 milliLiter(s) (50 mL/Hr) IV Continuous <Continuous>  dextrose 50% Injectable 12.5 Gram(s) IV Push once  dextrose 50% Injectable 25 Gram(s) IV Push once  dextrose 50% Injectable 25 Gram(s) IV Push once  enoxaparin Injectable 40 milliGRAM(s) SubCutaneous every 24 hours  glucagon  Injectable 1 milliGRAM(s) IntraMuscular once  levETIRAcetam 750 milliGRAM(s) Oral two times a day  multivitamin 1 Tablet(s) Oral daily  pantoprazole    Tablet 40 milliGRAM(s) Oral before breakfast  polyethylene glycol 3350 17 Gram(s) Oral two times a day  senna 2 Tablet(s) Oral at bedtime  sodium chloride 1 Gram(s) Oral every 12 hours  traZODone 50 milliGRAM(s) Oral at bedtime    MEDICATIONS  (PRN):  acetaminophen     Tablet .. 650 milliGRAM(s) Oral every 6 hours PRN Mild Pain (1 - 3)  benzonatate 100 milliGRAM(s) Oral three times a day PRN Cough  dextrose Oral Gel 15 Gram(s) Oral once PRN Blood Glucose LESS THAN 70 milliGRAM(s)/deciliter    Allergies  No Known Allergies    VITALS  67y  Vital Signs Last 24 Hrs  T(C): 36.5 (11 May 2023 08:10), Max: 36.7 (10 May 2023 20:09)  T(F): 97.7 (11 May 2023 08:10), Max: 98.1 (10 May 2023 20:09)  HR: 62 (11 May 2023 08:10) (61 - 83)  BP: 118/69 (11 May 2023 08:10) (118/69 - 128/78)  RR: 16 (11 May 2023 08:10) (15 - 16)  SpO2: 97% (11 May 2023 08:10) (97% - 98%)    Parameters below as of 11 May 2023 08:10  Patient On (Oxygen Delivery Method): room air    Daily     Daily Weight in k.4 (10 May 2023 23:07)    RECENT LABS:               12.9   6.28  )-----------( 318      ( 11 May 2023 06:10 )             39.8     05-11    140  |  103  |  16  ----------------------------<  154<H>  4.3   |  31  |  0.81    Ca    8.9      11 May 2023 06:10    TPro  5.9<L>  /  Alb  2.9<L>  /  TBili  0.3  /  DBili  x   /  AST  13  /  ALT  45  /  AlkPhos  139<H>  05-11    LIVER FUNCTIONS - ( 11 May 2023 06:10 )  Alb: 2.9 g/dL / Pro: 5.9 g/dL / ALK PHOS: 139 U/L / ALT: 45 U/L / AST: 13 U/L / GGT: x

## 2023-05-11 NOTE — PROGRESS NOTE ADULT - ASSESSMENT
Patient is a 68 y/o male PMH of HTN, OA, right TKA, who presented with recently diagnosed, brain mass, who presented to [facility] for brain tumor resection 4/24/23 Pathology positive for glioblastoma; patient  s/p craniotomy for resection of L brain tumor 4/25/23. Hospital course complicated by hyponatremia. Admitted for multidisciplinary rehab program    # Glioblastoma s/p craniotomy for resection of L brain tumor 4/25/23 right hemiparesis  - continue decadron 2mg PO BID through 6/3  - keppra 750mg PO BID for seizure ppx  - [place] craniotomy dc'd 5/9, Cleared to shower, no scrubbing over incision, gentle baby shampoo  - Continue PT/OT/ SLP 3 hours a day 5 days a week  - R kinesiology tape for R shoulder 5/4, FES right shoulder and forearm muscles  - stroke counseling and recovery timeframe discussed 5/10  - neuropsychology consult. Recreation therapy  - Precautions: steroid, SZ, cardiac, fall  #right shoulder subluxation  - KT tape  - have right elbow supported on pillow    # HTN   - amlodipine 10mg PO QD   - 119/71 - 132/77 (5/11)    # DM2   - c/w ISS - dc, monitor via lab  - monitor: CMP Glucose 154 (5/11)    # Sleep:  - Melatonin PRN  -  trazodone 25 qhs 5/8. --> increased to 50 mg qhs 5/10 - slept slightly better, interrupted sleep d/t nocturia     # Pain:  - Tylenol PRN    # GI/Bowel:  - miralax 17g PO BID   - Senna 2 tabs daily  - pantoprazole 40mg PO before breakfast      # Diet   - Regular, CCH   - MVI 5/9  - B12 level: 654 (5/10) WNL    # DVT prophylaxis:  - Last doppler 5/1/23 - Neg. Patient aware  - lovenox 40mg SC QD   - SCDs    # Case discussed in IDT rounds 5/8:  - max assist bADLs , max assist transfers, mod assit ambulation 16 feet with fernando rail and WC follow, tolerating reg solid thin liquid diet, mild receptive language defictis, reduced complex reasoning and attention, mild dysarthria, difficulty in unstructured tasks  - goals: min assist bADLs, CG/min assist transfers and short distance household ambulation  - target: patient may benefit from BAIRON for continued OT PT SLP 5/17/23 given severity of right sided weakness, stairs. Will discuss with family    # LABS   CBC BMP 5/15      --------------------------------------------  Outpatient Follow up:    Juanjose Johnston)  Neurosurgery  [address], [address] [place] [address]  Phone: [phone]  Fax: [phone]  Follow Up Time:     Delmis Causey; MIKHAIL)  Hematology; Internal Medicine; Medical Oncology  [address] [place] [address]  Phone: [phone]  Fax: [phone]  Follow Up Time:   Patient is a 68 y/o male PMH of HTN, OA, right TKA, who presented with recently diagnosed, brain mass, who presented to [facility] for brain tumor resection 4/24/23 Pathology positive for glioblastoma; patient  s/p craniotomy for resection of L brain tumor 4/25/23. Hospital course complicated by hyponatremia. Admitted for multidisciplinary rehab program    # Glioblastoma s/p craniotomy for resection of L brain tumor 4/25/23 right hemiparesis  - continue decadron 2mg PO BID through 6/3  - keppra 750mg PO BID for seizure ppx  - [place] craniotomy dc'd 5/9, Cleared to shower, no scrubbing over incision, gentle baby shampoo  - Continue PT/OT 3 hours daily 5 x week. SLP dc, 90 min each Ot and PT 5/11  - R kinesiology tape for R shoulder 5/4, FES right shoulder and forearm muscles  - stroke counseling and recovery timeframe discussed. Planning for future Radiation therapy and chemo also discussed 5/11  patient has had mapping/mask made already   - neuropsychology consult. Recreation therapy  - Precautions: steroid, SZ, cardiac, fall    #right shoulder subluxation  - KT tape  - have right elbow supported on pillow    # HTN   - amlodipine 10mg PO QD   - 119/71 - 132/77 (5/11)    # DM2   - c/w ISS - dc, monitor via lab  - monitor: CMP Glucose 154 (5/11)    # Sleep:  - Melatonin PRN  -  trazodone increased to 50 mg qhs 5/10 - slept slightly better, interrupted sleep d/t nocturia. Adjusting intake fluid evening/night hours to decrease freqeuncy discussed    # Pain:  - Tylenol PRN    # GI/Bowel:  - miralax 17g PO BID   - Senna 2 tabs daily  - pantoprazole 40mg PO before breakfast    - elevated alk phos 139 5/11. Benign abdominal exam, no h/o stones. monitor, LFTs in AM 5/12    # Diet   - Regular, CCH   - MVI 5/9  - B12 level: 654 (5/10) WNL. Discussed with patient    # DVT prophylaxis:  - Last doppler 5/1/23 - Neg. Patient aware  - lovenox 40mg SC QD   - SCDs    # Case discussed in IDT rounds 5/8:  - max assist bADLs , max assist transfers, mod assit ambulation 16 feet with fernando rail and WC follow, tolerating reg solid thin liquid diet, mild receptive language defictis, reduced complex reasoning and attention, mild dysarthria, difficulty in unstructured tasks  - goals: min assist bADLs, CG/min assist transfers and short distance household ambulation  - target: patient may benefit from BAIRON for continued OT PT SLP 5/17/23 given severity of right sided weakness, stairs. Will discuss with family  - arranging dc with facility that can provide radiation Rx as well. Patient states that he had planned for Rx in [facility]    # LABS  LFTs 5/12   CBC BMP 5/15      --------------------------------------------  Outpatient Follow up:    Juanjose Johnston)  Neurosurgery  [address], [address] [place] [address]  Phone: [phone]  Fax: [phone]  Follow Up Time:     Delmis Causey; MIKHAIL)  Hematology; Internal Medicine; Medical Oncology  [address] [place] [address]  Phone: [phone]  Fax: [phone]  Follow Up Time:

## 2023-05-11 NOTE — PROGRESS NOTE ADULT - NS ATTEND AMEND GEN_ALL_CORE FT
Progress note amended to include my discussions with patient, NP, SW, SLP    Patient has made progress in SLP and communication WFL, to adjust time to 90 min OT and PT from today onward. Patient states sleep continues to improve overall, no sedation from increased trazodone dose, but still awakens, mostly to urinate. He does drink a lot of fluids including PM hours; no dysuria, no hesitancy. adjusting fluid intake for reduced amounts dinner and onward discussed, will trial tonight. Also noted to have elevation alk phos but dnies history of gallstones, no abdominal pain, no N/v. Eats vegan diet. Abdominal exam soft, benign, no masses, no TTP. Will repeat LFTs in AM. He inquires about whether a keto diet would be good for him in preparation for chemo, however not advised especially as undergoing treatment and with vegan diet.    Otherwise, labs and vitals stable. continue current program

## 2023-05-11 NOTE — PROGRESS NOTE ADULT - NS ATTEND BILL GEN_ALL_CORE
Attending to bill Island Pedicle Flap With Canthal Suspension Text: The defect edges were debeveled with a #15 scalpel blade.  Given the location of the defect, shape of the defect and the proximity to free margins an island pedicle advancement flap was deemed most appropriate.  Using a sterile surgical marker, an appropriate advancement flap was drawn incorporating the defect, outlining the appropriate donor tissue and placing the expected incisions within the relaxed skin tension lines where possible. The area thus outlined was incised deep to adipose tissue with a #15 scalpel blade.  The skin margins were undermined to an appropriate distance in all directions around the primary defect and laterally outward around the island pedicle utilizing iris scissors.  There was minimal undermining beneath the pedicle flap. A suspension suture was placed in the canthal tendon to prevent tension and prevent ectropion.

## 2023-05-11 NOTE — PROGRESS NOTE ADULT - COMMENTS
Patient seen and evaluated while resting in bed.  He was able to eat 100% of his breakfast using LUE.  Slept better on trazodone 50mg.  Reports interrupted sleep d/t nocturia (2 times nightly).  Educated on limiting fluid intake after 4pm.  Denies fever, chills, CP, SOB, HA, dizziness, nausea, abd pain, or dysuria.  He asked about BAIRON and radiation treatment.  Evaluate discharge home vs BAIRON - at this time because he's requiring 50-75% assistance, he's benefit from BAIRON.  Goal is eventually home, when he requires maybe 25% assistance.

## 2023-05-12 ENCOUNTER — NON-APPOINTMENT (OUTPATIENT)
Age: 68
End: 2023-05-12

## 2023-05-12 LAB
ALBUMIN SERPL ELPH-MCNC: 2.9 G/DL — LOW (ref 3.3–5)
ALP SERPL-CCNC: 107 U/L — SIGNIFICANT CHANGE UP (ref 40–120)
ALT FLD-CCNC: 43 U/L — SIGNIFICANT CHANGE UP (ref 10–45)
AST SERPL-CCNC: 15 U/L — SIGNIFICANT CHANGE UP (ref 10–40)
BILIRUB DIRECT SERPL-MCNC: 0.1 MG/DL — SIGNIFICANT CHANGE UP (ref 0–0.3)
BILIRUB INDIRECT FLD-MCNC: 0.3 MG/DL — SIGNIFICANT CHANGE UP (ref 0.2–1)
BILIRUB SERPL-MCNC: 0.4 MG/DL — SIGNIFICANT CHANGE UP (ref 0.2–1.2)
PROT SERPL-MCNC: 6.1 G/DL — SIGNIFICANT CHANGE UP (ref 6–8.3)

## 2023-05-12 PROCEDURE — 99232 SBSQ HOSP IP/OBS MODERATE 35: CPT

## 2023-05-12 RX ADMIN — SODIUM CHLORIDE 1 GRAM(S): 9 INJECTION INTRAMUSCULAR; INTRAVENOUS; SUBCUTANEOUS at 05:13

## 2023-05-12 RX ADMIN — SODIUM CHLORIDE 1 GRAM(S): 9 INJECTION INTRAMUSCULAR; INTRAVENOUS; SUBCUTANEOUS at 20:51

## 2023-05-12 RX ADMIN — Medication 650 MILLIGRAM(S): at 12:01

## 2023-05-12 RX ADMIN — Medication 50 MILLIGRAM(S): at 20:51

## 2023-05-12 RX ADMIN — LEVETIRACETAM 750 MILLIGRAM(S): 250 TABLET, FILM COATED ORAL at 20:51

## 2023-05-12 RX ADMIN — AMLODIPINE BESYLATE 10 MILLIGRAM(S): 2.5 TABLET ORAL at 05:13

## 2023-05-12 RX ADMIN — LEVETIRACETAM 750 MILLIGRAM(S): 250 TABLET, FILM COATED ORAL at 05:13

## 2023-05-12 RX ADMIN — Medication 650 MILLIGRAM(S): at 11:01

## 2023-05-12 RX ADMIN — Medication 2 MILLIGRAM(S): at 05:13

## 2023-05-12 RX ADMIN — PANTOPRAZOLE SODIUM 40 MILLIGRAM(S): 20 TABLET, DELAYED RELEASE ORAL at 05:14

## 2023-05-12 RX ADMIN — Medication 1 TABLET(S): at 20:51

## 2023-05-12 RX ADMIN — Medication 2 MILLIGRAM(S): at 20:51

## 2023-05-12 RX ADMIN — ENOXAPARIN SODIUM 40 MILLIGRAM(S): 100 INJECTION SUBCUTANEOUS at 05:12

## 2023-05-12 NOTE — PROGRESS NOTE ADULT - SUBJECTIVE AND OBJECTIVE BOX
Patient is a 67y old  Male who presents with a chief complaint of GBm sp craniotomy and resection (11 May 2023 11:39)      HPI:  Patient is a 68 y/o male RH dominant PMH of HTN, Osteoarthritis, right total knee replacement 2 months ago, brain mass s/p biopsy 4/7/23 @ [facility] with Dr. Tobar, who presented to [facility] for brain tumor resection 4/24/23. Per family, patient started to have right hand weakness about a month ago that grew progressively worse, then went to ED where brain mass was diagnosed. He was started on keppra 750 mg BID for seizure prophylaxis. Patient had sterotactic needle biopsy and laser interstitial thermal therapy by Dr. Tobar on 4/7/23 which was positive for glioblastoma  s/p craniotomy for resection of L brain tumor 4/25/23. Hospital course complicated by hyponatremia and RUE edema. Doppler negative.     Patient was evaluated by PM&R and therapy for functional deficits and gait/ ADL impairments and recommended acute rehabilitation. Patient was medically optimized for discharge to [facility] on 5/2/23     (02 May 2023 16:29)      PAST MEDICAL & SURGICAL HISTORY:  Hypertension      Osteoarthritis      Brain mass      S/P total knee replacement, right          MEDICATIONS  (STANDING):  amLODIPine   Tablet 10 milliGRAM(s) Oral every 24 hours  dexAMETHasone     Tablet 2 milliGRAM(s) Oral every 12 hours  dexAMETHasone     Tablet   Oral   dextrose 5%. 1000 milliLiter(s) (50 mL/Hr) IV Continuous <Continuous>  dextrose 5%. 1000 milliLiter(s) (100 mL/Hr) IV Continuous <Continuous>  dextrose 50% Injectable 12.5 Gram(s) IV Push once  dextrose 50% Injectable 25 Gram(s) IV Push once  dextrose 50% Injectable 25 Gram(s) IV Push once  enoxaparin Injectable 40 milliGRAM(s) SubCutaneous every 24 hours  glucagon  Injectable 1 milliGRAM(s) IntraMuscular once  levETIRAcetam 750 milliGRAM(s) Oral two times a day  multivitamin 1 Tablet(s) Oral daily  pantoprazole    Tablet 40 milliGRAM(s) Oral before breakfast  polyethylene glycol 3350 17 Gram(s) Oral two times a day  senna 2 Tablet(s) Oral at bedtime  sodium chloride 1 Gram(s) Oral every 12 hours  traZODone 50 milliGRAM(s) Oral at bedtime    MEDICATIONS  (PRN):  acetaminophen     Tablet .. 650 milliGRAM(s) Oral every 6 hours PRN Mild Pain (1 - 3)  benzonatate 100 milliGRAM(s) Oral three times a day PRN Cough  dextrose Oral Gel 15 Gram(s) Oral once PRN Blood Glucose LESS THAN 70 milliGRAM(s)/deciliter      Allergies    No Known Allergies    Intolerances          VITALS  67y  Vital Signs Last 24 Hrs  T(C): 36.2 (12 May 2023 08:18), Max: 36.7 (11 May 2023 19:58)  T(F): 97.2 (12 May 2023 08:18), Max: 98 (11 May 2023 19:58)  HR: 62 (12 May 2023 08:18)     BP: 127/72 (12 May 2023 08:18) (127/72 - 135/74)  BP(mean): --  RR: 16 (12 May 2023 08:18) (14 - 16)  SpO2: 96% (12 May 2023 08:18) (96% - 100%)    Parameters below as of 12 May 2023 08:18  Patient On (Oxygen Delivery Method): room air      Daily     Daily         RECENT LABS:                          12.9   6.28  )-----------( 318      ( 11 May 2023 06:10 )             39.8     05-11    140  |  103  |  16  ----------------------------<  154<H>  4.3   |  31  |  0.81    Ca    8.9      11 May 2023 06:10    TPro  6.1  /  Alb  2.9<L>  /  TBili  0.4  /  DBili  0.1  /  AST  15  /  ALT  43  /  AlkPhos  107  05-12    LIVER FUNCTIONS - ( 12 May 2023 05:44 )  Alb: 2.9 g/dL / Pro: 6.1 g/dL / ALK PHOS: 107 U/L / ALT: 43 U/L / AST: 15 U/L / GGT: x                   CAPILLARY BLOOD GLUCOSE

## 2023-05-12 NOTE — PROGRESS NOTE ADULT - ASSESSMENT
Patient is a 68 y/o male PMH of HTN, OA, right TKA, who presented with recently diagnosed, brain mass, who presented to [facility] for brain tumor resection 4/24/23 Pathology positive for glioblastoma; patient  s/p craniotomy for resection of L brain tumor 4/25/23. Hospital course complicated by hyponatremia. Admitted for multidisciplinary rehab program    # Glioblastoma s/p craniotomy for resection of L brain tumor 4/25/23 right hemiparesis  - continue decadron 2mg PO BID through 6/3  - keppra 750mg PO BID for seizure ppx  - [place] craniotomy dc'd 5/9, Cleared to shower, no scrubbing over incision, gentle baby shampoo  - Continue PT/OT 3 hours daily 5 x week. Neuropsychology and rec therapy  - R kinesiology tape for R shoulder 5/4, FES right shoulder and forearm muscles  - Planning for future Radiation therapy and chemo. patient has had mapping/mask made already   - Precautions: steroid, SZ, cardiac, fall    # right shoulder subluxation  - KT tape  - have right elbow supported on pillow    # HTN   - amlodipine 10mg PO QD   -  (127/72 - 135/74) 5/12    # DM2   - glu 154 5/11    # Sleep:  - Melatonin PRN  -  trazodone 50 mg qhs for sleep    # Pain:  - Tylenol PRN    # GI/Bowel:  - miralax 17g PO BID   - Senna 2 tabs daily  - pantoprazole 40mg PO before breakfast    - elevated alk phos 139 5/11. Benign abdominal exam, no h/o stones. monitor, Repeat alk phos 107 5/12, imrpoved, WNL    # Diet   - Regular, CCH   - MVI 5/9  - B12 level: 654 (5/10) WNL. Discussed with patient    # DVT prophylaxis:  - Last doppler 5/1/23 - Neg. Patient aware  - lovenox 40mg SC QD   - SCDs    # Case discussed in IDT rounds 5/8:  - max assist bADLs , max assist transfers, mod assit ambulation 16 feet with fernando rail and WC follow, tolerating reg solid thin liquid diet, mild receptive language defictis, reduced complex reasoning and attention, mild dysarthria, difficulty in unstructured tasks  - goals: min assist bADLs, CG/min assist transfers and short distance household ambulation  - target: patient may benefit from BAIRON for continued OT PT SLP 5/17/23 given severity of right sided weakness, stairs. Will discuss with family  - arranging dc with facility that can provide radiation Rx as well. Patient states that he had planned for Rx in [facility]    # LABS   CBC BMP 5/15      --------------------------------------------  Outpatient Follow up:    Juanjose Johnston)  Neurosurgery  [address], [address] [place] [address]  Phone: [phone]  Fax: [phone]  Follow Up Time:     Delmis Causey; MIKHAIL)  Hematology; Internal Medicine; Medical Oncology  [address], [address] [place] [address]  Phone: [phone]  Fax: [phone]  Follow Up Time:

## 2023-05-12 NOTE — PROGRESS NOTE ADULT - COMMENTS
Patient doing well, reports sleeping last night, woke up once for urination then fell back asleep after. No dysuria, no hesitancy, no dribbling. Urine clear yeallow. no hematuria or fever    no H/A no dizzines, no new complaints.

## 2023-05-12 NOTE — PROGRESS NOTE ADULT - MOTOR
calves soft no TTP  right shoulder subluxation  1/5 shoulder 0/5 elbow and hand  2/5 hip flexor, ham 0.5 ankle

## 2023-05-13 PROCEDURE — 99232 SBSQ HOSP IP/OBS MODERATE 35: CPT | Mod: GC

## 2023-05-13 RX ADMIN — PANTOPRAZOLE SODIUM 40 MILLIGRAM(S): 20 TABLET, DELAYED RELEASE ORAL at 05:17

## 2023-05-13 RX ADMIN — SODIUM CHLORIDE 1 GRAM(S): 9 INJECTION INTRAMUSCULAR; INTRAVENOUS; SUBCUTANEOUS at 05:17

## 2023-05-13 RX ADMIN — ENOXAPARIN SODIUM 40 MILLIGRAM(S): 100 INJECTION SUBCUTANEOUS at 05:17

## 2023-05-13 RX ADMIN — SODIUM CHLORIDE 1 GRAM(S): 9 INJECTION INTRAMUSCULAR; INTRAVENOUS; SUBCUTANEOUS at 17:59

## 2023-05-13 RX ADMIN — LEVETIRACETAM 750 MILLIGRAM(S): 250 TABLET, FILM COATED ORAL at 05:17

## 2023-05-13 RX ADMIN — Medication 2 MILLIGRAM(S): at 05:17

## 2023-05-13 RX ADMIN — Medication 50 MILLIGRAM(S): at 21:03

## 2023-05-13 RX ADMIN — Medication 2 MILLIGRAM(S): at 17:59

## 2023-05-13 RX ADMIN — LEVETIRACETAM 750 MILLIGRAM(S): 250 TABLET, FILM COATED ORAL at 17:59

## 2023-05-13 RX ADMIN — AMLODIPINE BESYLATE 10 MILLIGRAM(S): 2.5 TABLET ORAL at 05:17

## 2023-05-13 RX ADMIN — Medication 1 TABLET(S): at 11:31

## 2023-05-13 NOTE — PROGRESS NOTE ADULT - ASSESSMENT
Patient is a 66 y/o male PMH of HTN, OA, right TKA, who presented with recently diagnosed, brain mass, who presented to [facility] for brain tumor resection 4/24/23 Pathology positive for glioblastoma; patient  s/p craniotomy for resection of L brain tumor 4/25/23. Hospital course complicated by hyponatremia. Admitted for multidisciplinary rehab program    # Glioblastoma s/p craniotomy for resection of L brain tumor 4/25/23 right hemiparesis  - continue decadron 2mg PO BID through 6/3  - keppra 750mg PO BID for seizure ppx  - [place] craniotomy dc'd 5/9, Cleared to shower, no scrubbing over incision, gentle baby shampoo  - Continue PT/OT 3 hours daily 5 x week. Neuropsychology and rec therapy  - R kinesiology tape for R shoulder 5/4, FES right shoulder and forearm muscles  - Planning for future Radiation therapy and chemo. patient has had mapping/mask made already   - Precautions: steroid, SZ, cardiac, fall    # right shoulder subluxation  - KT tape  - have right elbow supported on pillow    # HTN   - amlodipine 10mg PO QD   -  (127/72 - 135/74) 5/12  - BP: 148/81 (05-13-23 @ 08:13) (130/70 - 148/81)    # DM2   - glu 154 5/11, 135 5/12    # Sleep:  - Melatonin PRN  -  trazodone 50 mg qhs for sleep    # Pain:  - Tylenol PRN    # GI/Bowel:  - miralax 17g PO BID   - Senna 2 tabs daily  - pantoprazole 40mg PO before breakfast    - elevated alk phos 139 5/11. Benign abdominal exam, no h/o stones. monitor, Repeat alk phos 107 5/12, imrroved, WNL    # Diet   - Regular, CCH   - MVI 5/9  - B12 level: 654 (5/10) WNL. Discussed with patient    # DVT prophylaxis:  - Last doppler 5/1/23 - Neg. Patient aware  - lovenox 40mg SC QD   - SCDs    # Case discussed in IDT rounds 5/8:  - max assist bADLs , max assist transfers, mod assit ambulation 16 feet with fernando rail and WC follow, tolerating reg solid thin liquid diet, mild receptive language defictis, reduced complex reasoning and attention, mild dysarthria, difficulty in unstructured tasks  - goals: min assist bADLs, CG/min assist transfers and short distance household ambulation  - target: patient may benefit from BAIRON for continued OT PT SLP 5/17/23 given severity of right sided weakness, stairs. Will discuss with family  - arranging dc with facility that can provide radiation Rx as well. Patient states that he had planned for Rx in [facility]    # LABS   CBC BMP 5/15      --------------------------------------------  Outpatient Follow up:    Juanjose Johnston)  Neurosurgery  [address], [address] [place] [address]  Phone: [phone]  Fax: [phone]  Follow Up Time:     Delmis Causey; MIKHAIL)  Hematology; Internal Medicine; Medical Oncology  [address], [address] [place] [address]  Phone: [phone]  Fax: [phone]  Follow Up Time:

## 2023-05-13 NOTE — PROGRESS NOTE ADULT - SUBJECTIVE AND OBJECTIVE BOX
Cc:     HPI: Patient with no new medical issues today.  Pain controlled, no chest pain, no N/V, no Fevers/Chills. No other new ROS  Has been tolerating rehabilitation program.    acetaminophen     Tablet .. 650 milliGRAM(s) Oral every 6 hours PRN  amLODIPine   Tablet 10 milliGRAM(s) Oral every 24 hours  benzonatate 100 milliGRAM(s) Oral three times a day PRN  dexAMETHasone     Tablet   Oral   dexAMETHasone     Tablet 2 milliGRAM(s) Oral every 12 hours  dextrose 5%. 1000 milliLiter(s) IV Continuous <Continuous>  dextrose 5%. 1000 milliLiter(s) IV Continuous <Continuous>  dextrose 50% Injectable 12.5 Gram(s) IV Push once  dextrose 50% Injectable 25 Gram(s) IV Push once  dextrose 50% Injectable 25 Gram(s) IV Push once  dextrose Oral Gel 15 Gram(s) Oral once PRN  enoxaparin Injectable 40 milliGRAM(s) SubCutaneous every 24 hours  glucagon  Injectable 1 milliGRAM(s) IntraMuscular once  levETIRAcetam 750 milliGRAM(s) Oral two times a day  multivitamin 1 Tablet(s) Oral daily  pantoprazole    Tablet 40 milliGRAM(s) Oral before breakfast  polyethylene glycol 3350 17 Gram(s) Oral two times a day  senna 2 Tablet(s) Oral at bedtime  sodium chloride 1 Gram(s) Oral every 12 hours  traZODone 50 milliGRAM(s) Oral at bedtime      T(C): 36.6 (05-13-23 @ 08:13), Max: 36.9 (05-12-23 @ 20:56)  HR: 60 (05-13-23 @ 08:13) (60 - 70)  BP: 148/81 (05-13-23 @ 08:13) (130/70 - 148/81)  RR: 17 (05-13-23 @ 08:13) (14 - 17)  SpO2: 98% (05-13-23 @ 08:13) (97% - 99%)    In NAD  HEENT- EOMI  Heart- Well Perfused  Lungs- No resp distress, no use of accessory resp muscles  Abd- + BS, NT  Ext- No calf pain  Neuro- Exam unchanged  Psych- Affect wnl

## 2023-05-14 PROCEDURE — 99232 SBSQ HOSP IP/OBS MODERATE 35: CPT | Mod: GC

## 2023-05-14 RX ADMIN — Medication 2 MILLIGRAM(S): at 05:12

## 2023-05-14 RX ADMIN — Medication 2 MILLIGRAM(S): at 17:22

## 2023-05-14 RX ADMIN — SODIUM CHLORIDE 1 GRAM(S): 9 INJECTION INTRAMUSCULAR; INTRAVENOUS; SUBCUTANEOUS at 17:22

## 2023-05-14 RX ADMIN — Medication 650 MILLIGRAM(S): at 15:10

## 2023-05-14 RX ADMIN — Medication 1 TABLET(S): at 11:12

## 2023-05-14 RX ADMIN — PANTOPRAZOLE SODIUM 40 MILLIGRAM(S): 20 TABLET, DELAYED RELEASE ORAL at 05:12

## 2023-05-14 RX ADMIN — Medication 50 MILLIGRAM(S): at 21:56

## 2023-05-14 RX ADMIN — LEVETIRACETAM 750 MILLIGRAM(S): 250 TABLET, FILM COATED ORAL at 05:12

## 2023-05-14 RX ADMIN — ENOXAPARIN SODIUM 40 MILLIGRAM(S): 100 INJECTION SUBCUTANEOUS at 05:12

## 2023-05-14 RX ADMIN — LEVETIRACETAM 750 MILLIGRAM(S): 250 TABLET, FILM COATED ORAL at 17:22

## 2023-05-14 RX ADMIN — AMLODIPINE BESYLATE 10 MILLIGRAM(S): 2.5 TABLET ORAL at 05:12

## 2023-05-14 RX ADMIN — SODIUM CHLORIDE 1 GRAM(S): 9 INJECTION INTRAMUSCULAR; INTRAVENOUS; SUBCUTANEOUS at 05:12

## 2023-05-14 RX ADMIN — Medication 650 MILLIGRAM(S): at 14:39

## 2023-05-14 NOTE — PROGRESS NOTE ADULT - ASSESSMENT
Patient is a 68 y/o male PMH of HTN, OA, right TKA, who presented with recently diagnosed, brain mass, who presented to [facility] for brain tumor resection 4/24/23 Pathology positive for glioblastoma; patient  s/p craniotomy for resection of L brain tumor 4/25/23. Hospital course complicated by hyponatremia. Admitted for multidisciplinary rehab program    # Glioblastoma s/p craniotomy for resection of L brain tumor 4/25/23 right hemiparesis  - continue decadron 2mg PO BID through 6/3  - keppra 750mg PO BID for seizure ppx  - [place] craniotomy dc'd 5/9, Cleared to shower, no scrubbing over incision, gentle baby shampoo  - Continue PT/OT 3 hours daily 5 x week. Neuropsychology and rec therapy  - R kinesiology tape for R shoulder 5/4, FES right shoulder and forearm muscles  - Planning for future Radiation therapy and chemo. patient has had mapping/mask made already   - Precautions: steroid, SZ, cardiac, fall    # right shoulder subluxation  - KT tape  - have right elbow supported on pillow    # HTN   - amlodipine 10mg PO QD   -  (127/72 - 135/74) 5/12  - BP: 148/81 (05-13-23 @ 08:13) (130/70 - 148/81)  - BP: 142/81 (05-14-23 @ 07:35) (133/78 - 144/76), stable    # DM2   - glu 135 5/11    # Sleep:  - Melatonin PRN  -  trazodone 50 mg qhs for sleep    # Pain:  - Tylenol PRN    # GI/Bowel:  - miralax 17g PO BID   - Senna 2 tabs daily  - pantoprazole 40mg PO before breakfast    - elevated alk phos 139 5/11. Benign abdominal exam, no h/o stones. monitor, Repeat alk phos 107 5/12, improved, WNL    # Diet   - Regular, CCH   - MVI 5/9  - B12 level: 654 (5/10) WNL. Discussed with patient    # DVT prophylaxis:  - Last doppler 5/1/23 - Neg. Patient aware  - lovenox 40mg SC QD   - SCDs    # Case discussed in IDT rounds 5/8:  - max assist bADLs , max assist transfers, mod assit ambulation 16 feet with fernando rail and WC follow, tolerating reg solid thin liquid diet, mild receptive language defictis, reduced complex reasoning and attention, mild dysarthria, difficulty in unstructured tasks  - goals: min assist bADLs, CG/min assist transfers and short distance household ambulation  - target: patient may benefit from BAIRON for continued OT PT SLP 5/17/23 given severity of right sided weakness, stairs. Will discuss with family  - arranging dc with facility that can provide radiation Rx as well. Patient states that he had planned for Rx in [facility]    # LABS   CBC BMP 5/15      --------------------------------------------  Outpatient Follow up:    Juanjose Johnston)  Neurosurgery  [address], [address] [place] [address]  Phone: [phone]  Fax: [phone]  Follow Up Time:     Delmis Causey; MIKHAIL)  Hematology; Internal Medicine; Medical Oncology  [address] [place] [address]  Phone: [phone]  Fax: [phone]  Follow Up Time:

## 2023-05-14 NOTE — PROGRESS NOTE ADULT - SUBJECTIVE AND OBJECTIVE BOX
S: Patient with no new medical issues today.  No headache, no chest pain, no N/V, no Fevers/Chills. No other new ROS  Has been tolerating rehabilitation program.    acetaminophen     Tablet .. 650 milliGRAM(s) Oral every 6 hours PRN  amLODIPine   Tablet 10 milliGRAM(s) Oral every 24 hours  benzonatate 100 milliGRAM(s) Oral three times a day PRN  dexAMETHasone     Tablet 2 milliGRAM(s) Oral every 12 hours  dexAMETHasone     Tablet   Oral   dextrose 5%. 1000 milliLiter(s) IV Continuous <Continuous>  dextrose 5%. 1000 milliLiter(s) IV Continuous <Continuous>  dextrose 50% Injectable 12.5 Gram(s) IV Push once  dextrose 50% Injectable 25 Gram(s) IV Push once  dextrose 50% Injectable 25 Gram(s) IV Push once  dextrose Oral Gel 15 Gram(s) Oral once PRN  enoxaparin Injectable 40 milliGRAM(s) SubCutaneous every 24 hours  glucagon  Injectable 1 milliGRAM(s) IntraMuscular once  levETIRAcetam 750 milliGRAM(s) Oral two times a day  multivitamin 1 Tablet(s) Oral daily  pantoprazole    Tablet 40 milliGRAM(s) Oral before breakfast  polyethylene glycol 3350 17 Gram(s) Oral two times a day  senna 2 Tablet(s) Oral at bedtime  sodium chloride 1 Gram(s) Oral every 12 hours  traZODone 50 milliGRAM(s) Oral at bedtime      T(C): 36.6 (05-14-23 @ 07:35), Max: 36.9 (05-13-23 @ 20:01)  HR: 55 (05-14-23 @ 07:35) (55 - 84)  BP: 142/81 (05-14-23 @ 07:35) (133/78 - 144/76)  RR: 15 (05-14-23 @ 07:35) (14 - 15)  SpO2: 98% (05-14-23 @ 07:35) (97% - 98%)    In NAD  HEENT- EOMI  Heart- Well Perfused  Lungs- No resp distress, no use of accessory resp muscles  Abd- + BS, NT  Ext- No calf pain  Neuro- Exam unchanged  Psych- Affect wnl

## 2023-05-15 ENCOUNTER — APPOINTMENT (OUTPATIENT)
Dept: NEUROSURGERY | Facility: CLINIC | Age: 68
End: 2023-05-15

## 2023-05-15 LAB
ALBUMIN SERPL ELPH-MCNC: 3.2 G/DL — LOW (ref 3.3–5)
ALP SERPL-CCNC: 98 U/L — SIGNIFICANT CHANGE UP (ref 40–120)
ALT FLD-CCNC: 45 U/L — SIGNIFICANT CHANGE UP (ref 10–45)
ANION GAP SERPL CALC-SCNC: 4 MMOL/L — LOW (ref 5–17)
AST SERPL-CCNC: 15 U/L — SIGNIFICANT CHANGE UP (ref 10–40)
BILIRUB SERPL-MCNC: 0.5 MG/DL — SIGNIFICANT CHANGE UP (ref 0.2–1.2)
BUN SERPL-MCNC: 14 MG/DL — SIGNIFICANT CHANGE UP (ref 7–23)
CALCIUM SERPL-MCNC: 9.5 MG/DL — SIGNIFICANT CHANGE UP (ref 8.4–10.5)
CHLORIDE SERPL-SCNC: 102 MMOL/L — SIGNIFICANT CHANGE UP (ref 96–108)
CO2 SERPL-SCNC: 34 MMOL/L — HIGH (ref 22–31)
CREAT SERPL-MCNC: 0.81 MG/DL — SIGNIFICANT CHANGE UP (ref 0.5–1.3)
EGFR: 97 ML/MIN/1.73M2 — SIGNIFICANT CHANGE UP
GLUCOSE SERPL-MCNC: 132 MG/DL — HIGH (ref 70–99)
HCT VFR BLD CALC: 43.4 % — SIGNIFICANT CHANGE UP (ref 39–50)
HGB BLD-MCNC: 14 G/DL — SIGNIFICANT CHANGE UP (ref 13–17)
MCHC RBC-ENTMCNC: 28.5 PG — SIGNIFICANT CHANGE UP (ref 27–34)
MCHC RBC-ENTMCNC: 32.3 GM/DL — SIGNIFICANT CHANGE UP (ref 32–36)
MCV RBC AUTO: 88.4 FL — SIGNIFICANT CHANGE UP (ref 80–100)
NRBC # BLD: 0 /100 WBCS — SIGNIFICANT CHANGE UP (ref 0–0)
PLATELET # BLD AUTO: 343 K/UL — SIGNIFICANT CHANGE UP (ref 150–400)
POTASSIUM SERPL-MCNC: 4.8 MMOL/L — SIGNIFICANT CHANGE UP (ref 3.5–5.3)
POTASSIUM SERPL-SCNC: 4.8 MMOL/L — SIGNIFICANT CHANGE UP (ref 3.5–5.3)
PROT SERPL-MCNC: 6.5 G/DL — SIGNIFICANT CHANGE UP (ref 6–8.3)
RBC # BLD: 4.91 M/UL — SIGNIFICANT CHANGE UP (ref 4.2–5.8)
RBC # FLD: 15.3 % — HIGH (ref 10.3–14.5)
SODIUM SERPL-SCNC: 140 MMOL/L — SIGNIFICANT CHANGE UP (ref 135–145)
WBC # BLD: 5.56 K/UL — SIGNIFICANT CHANGE UP (ref 3.8–10.5)
WBC # FLD AUTO: 5.56 K/UL — SIGNIFICANT CHANGE UP (ref 3.8–10.5)

## 2023-05-15 PROCEDURE — 99232 SBSQ HOSP IP/OBS MODERATE 35: CPT

## 2023-05-15 PROCEDURE — 90832 PSYTX W PT 30 MINUTES: CPT

## 2023-05-15 RX ADMIN — SODIUM CHLORIDE 1 GRAM(S): 9 INJECTION INTRAMUSCULAR; INTRAVENOUS; SUBCUTANEOUS at 17:46

## 2023-05-15 RX ADMIN — Medication 50 MILLIGRAM(S): at 21:49

## 2023-05-15 RX ADMIN — Medication 2 MILLIGRAM(S): at 05:36

## 2023-05-15 RX ADMIN — LEVETIRACETAM 750 MILLIGRAM(S): 250 TABLET, FILM COATED ORAL at 17:47

## 2023-05-15 RX ADMIN — PANTOPRAZOLE SODIUM 40 MILLIGRAM(S): 20 TABLET, DELAYED RELEASE ORAL at 05:37

## 2023-05-15 RX ADMIN — LEVETIRACETAM 750 MILLIGRAM(S): 250 TABLET, FILM COATED ORAL at 05:36

## 2023-05-15 RX ADMIN — Medication 1 TABLET(S): at 11:11

## 2023-05-15 RX ADMIN — Medication 2 MILLIGRAM(S): at 17:47

## 2023-05-15 RX ADMIN — AMLODIPINE BESYLATE 10 MILLIGRAM(S): 2.5 TABLET ORAL at 05:36

## 2023-05-15 RX ADMIN — SODIUM CHLORIDE 1 GRAM(S): 9 INJECTION INTRAMUSCULAR; INTRAVENOUS; SUBCUTANEOUS at 05:36

## 2023-05-15 RX ADMIN — ENOXAPARIN SODIUM 40 MILLIGRAM(S): 100 INJECTION SUBCUTANEOUS at 05:36

## 2023-05-15 NOTE — PROGRESS NOTE ADULT - SUBJECTIVE AND OBJECTIVE BOX
Patient is a 67y old  Male who presents with a chief complaint of GBm sp craniotomy and resection (14 May 2023 12:12)      HPI:  Patient is a 68 y/o male RH dominant PMH of HTN, Osteoarthritis, right total knee replacement 2 months ago, brain mass s/p biopsy 4/7/23 @ [facility] with Dr. Tobar, who presented to [facility] for brain tumor resection 4/24/23. Per family, patient started to have right hand weakness about a month ago that grew progressively worse, then went to ED where brain mass was diagnosed. He was started on keppra 750 mg BID for seizure prophylaxis. Patient had sterotactic needle biopsy and laser interstitial thermal therapy by Dr. Tobar on 4/7/23 which was positive for glioblastoma  s/p craniotomy for resection of L brain tumor 4/25/23. Hospital course complicated by hyponatremia and RUE edema. Doppler negative.     Patient was evaluated by PM&R and therapy for functional deficits and gait/ ADL impairments and recommended acute rehabilitation. Patient was medically optimized for discharge to [facility] on 5/2/23     (02 May 2023 16:29)      SUBJECTIVE HISTORY:  Patient reports poor sleep over the weekend.  He states he is typically a night person.  Discussed options to increase the trazodone but patient would prefer to keep dose as is.  Reinforced to try not to take nap during the day.    REVIEW OF SYSTEMS:  +poor sleep other wise without complaints       PHYSICAL EXAM  Constitutional - NAD, Comfortable  Neck - Supple, No limited ROM  Chest - good chest expansion, good respiratory effort, CTAB   Cardio - warm and well perfused, RRR, no murmur  Abdomen -  Soft, NTND  Extremities - No peripheral edema, No calf tenderness   Neurologic Exam:                    Cognitive -             Orientation: Awake, Alert, AAO to self, place, date, year, situation            Thought: process, content appropriate     Motor -                      RIGHT UE - ShAB 1/5, EF 0/5, EE 0/5, WE 0/5,  0/5                    RIGHT LE - HF 2/5, KE 2/5, DF 0/5, PF 0/5     Psychiatric - Mood stable, Affect WNL      VITALS  67y  Vital Signs Last 24 Hrs  T(C): 36.6 (15 May 2023 07:45), Max: 36.6 (14 May 2023 21:55)  T(F): 97.8 (15 May 2023 07:45), Max: 97.9 (14 May 2023 21:55)  HR: 58 (15 May 2023 07:45) (56 - 68)  BP: 145/87 (15 May 2023 07:45) (121/74 - 145/87)  BP(mean): --  RR: 12 (15 May 2023 07:45) (12 - 14)  SpO2: 99% (15 May 2023 07:45) (98% - 99%)    Parameters below as of 15 May 2023 07:45  Patient On (Oxygen Delivery Method): room air      Daily     Daily         RECENT LABS:                          14.0   5.56  )-----------( 343      ( 15 May 2023 07:20 )             43.4     05-15    140  |  102  |  14  ----------------------------<  132<H>  4.8   |  34<H>  |  0.81    Ca    9.5      15 May 2023 07:20    TPro  6.5  /  Alb  3.2<L>  /  TBili  0.5  /  DBili  x   /  AST  15  /  ALT  45  /  AlkPhos  98  05-15    LIVER FUNCTIONS - ( 15 May 2023 07:20 )  Alb: 3.2 g/dL / Pro: 6.5 g/dL / ALK PHOS: 98 U/L / ALT: 45 U/L / AST: 15 U/L / GGT: x                   CAPILLARY BLOOD GLUCOSE          MEDICATIONS  (STANDING):  amLODIPine   Tablet 10 milliGRAM(s) Oral every 24 hours  dexAMETHasone     Tablet 2 milliGRAM(s) Oral every 12 hours  dexAMETHasone     Tablet   Oral   dextrose 5%. 1000 milliLiter(s) (100 mL/Hr) IV Continuous <Continuous>  dextrose 5%. 1000 milliLiter(s) (50 mL/Hr) IV Continuous <Continuous>  dextrose 50% Injectable 12.5 Gram(s) IV Push once  dextrose 50% Injectable 25 Gram(s) IV Push once  dextrose 50% Injectable 25 Gram(s) IV Push once  enoxaparin Injectable 40 milliGRAM(s) SubCutaneous every 24 hours  glucagon  Injectable 1 milliGRAM(s) IntraMuscular once  levETIRAcetam 750 milliGRAM(s) Oral two times a day  multivitamin 1 Tablet(s) Oral daily  pantoprazole    Tablet 40 milliGRAM(s) Oral before breakfast  polyethylene glycol 3350 17 Gram(s) Oral two times a day  senna 2 Tablet(s) Oral at bedtime  sodium chloride 1 Gram(s) Oral every 12 hours  traZODone 50 milliGRAM(s) Oral at bedtime    MEDICATIONS  (PRN):  acetaminophen     Tablet .. 650 milliGRAM(s) Oral every 6 hours PRN Mild Pain (1 - 3)  benzonatate 100 milliGRAM(s) Oral three times a day PRN Cough  dextrose Oral Gel 15 Gram(s) Oral once PRN Blood Glucose LESS THAN 70 milliGRAM(s)/deciliter           Patient is a 67y old  Male who presents with a chief complaint of GBm sp craniotomy and resection (14 May 2023 12:12)      HPI:  Patient is a 66 y/o male RH dominant PMH of HTN, Osteoarthritis, right total knee replacement 2 months ago, brain mass s/p biopsy 4/7/23 @ [facility] with Dr. Tobar, who presented to [facility] for brain tumor resection 4/24/23. Per family, patient started to have right hand weakness about a month ago that grew progressively worse, then went to ED where brain mass was diagnosed. He was started on keppra 750 mg BID for seizure prophylaxis. Patient had sterotactic needle biopsy and laser interstitial thermal therapy by Dr. Tobar on 4/7/23 which was positive for glioblastoma  s/p craniotomy for resection of L brain tumor 4/25/23. Hospital course complicated by hyponatremia and RUE edema. Doppler negative.     Patient was evaluated by PM&R and therapy for functional deficits and gait/ ADL impairments and recommended acute rehabilitation. Patient was medically optimized for discharge to [facility] on 5/2/23     (02 May 2023 16:29)      SUBJECTIVE HISTORY:  Patient reports poor sleep over the weekend.  He states he is typically a night person.  Discussed options to increase the trazodone but patient would prefer to keep dose as is.  Reinforced to try not to take nap during the day.    REVIEW OF SYSTEMS:  +poor sleep other wise without complaints       PHYSICAL EXAM  Constitutional - NAD, Comfortable Incision healing well  Neck - Supple, No limited ROM  Chest - good chest expansion, good respiratory effort, CTAB   Cardio - warm and well perfused, RRR, no murmur  Abdomen -  Soft, NTND  Extremities - No peripheral edema, No calf tenderness   Neurologic Exam:                    Cognitive -             Orientation: Awake, Alert, AAO to self, place, date, year, situation            Thought: process, content appropriate     Motor -                      RIGHT UE - ShAB 1/5, EF 0/5, EE 0/5, WE 0/5,  0/5                    RIGHT LE - HF 2/5, KE 2/5, DF 0/5, PF 0/5     Psychiatric - Mood stable, Affect WNL      VITALS  67y  Vital Signs Last 24 Hrs  T(C): 36.6 (15 May 2023 07:45), Max: 36.6 (14 May 2023 21:55)  T(F): 97.8 (15 May 2023 07:45), Max: 97.9 (14 May 2023 21:55)  HR: 58 (15 May 2023 07:45) (56 - 68)  BP: 145/87 (15 May 2023 07:45) (121/74 - 145/87)  BP(mean): --  RR: 12 (15 May 2023 07:45) (12 - 14)  SpO2: 99% (15 May 2023 07:45) (98% - 99%)    Parameters below as of 15 May 2023 07:45  Patient On (Oxygen Delivery Method): room air      Daily     Daily         RECENT LABS:                          14.0   5.56  )-----------( 343      ( 15 May 2023 07:20 )             43.4     05-15    140  |  102  |  14  ----------------------------<  132<H>  4.8   |  34<H>  |  0.81    Ca    9.5      15 May 2023 07:20    TPro  6.5  /  Alb  3.2<L>  /  TBili  0.5  /  DBili  x   /  AST  15  /  ALT  45  /  AlkPhos  98  05-15    LIVER FUNCTIONS - ( 15 May 2023 07:20 )  Alb: 3.2 g/dL / Pro: 6.5 g/dL / ALK PHOS: 98 U/L / ALT: 45 U/L / AST: 15 U/L / GGT: x                   CAPILLARY BLOOD GLUCOSE          MEDICATIONS  (STANDING):  amLODIPine   Tablet 10 milliGRAM(s) Oral every 24 hours  dexAMETHasone     Tablet 2 milliGRAM(s) Oral every 12 hours  dexAMETHasone     Tablet   Oral   dextrose 5%. 1000 milliLiter(s) (100 mL/Hr) IV Continuous <Continuous>  dextrose 5%. 1000 milliLiter(s) (50 mL/Hr) IV Continuous <Continuous>  dextrose 50% Injectable 12.5 Gram(s) IV Push once  dextrose 50% Injectable 25 Gram(s) IV Push once  dextrose 50% Injectable 25 Gram(s) IV Push once  enoxaparin Injectable 40 milliGRAM(s) SubCutaneous every 24 hours  glucagon  Injectable 1 milliGRAM(s) IntraMuscular once  levETIRAcetam 750 milliGRAM(s) Oral two times a day  multivitamin 1 Tablet(s) Oral daily  pantoprazole    Tablet 40 milliGRAM(s) Oral before breakfast  polyethylene glycol 3350 17 Gram(s) Oral two times a day  senna 2 Tablet(s) Oral at bedtime  sodium chloride 1 Gram(s) Oral every 12 hours  traZODone 50 milliGRAM(s) Oral at bedtime    MEDICATIONS  (PRN):  acetaminophen     Tablet .. 650 milliGRAM(s) Oral every 6 hours PRN Mild Pain (1 - 3)  benzonatate 100 milliGRAM(s) Oral three times a day PRN Cough  dextrose Oral Gel 15 Gram(s) Oral once PRN Blood Glucose LESS THAN 70 milliGRAM(s)/deciliter

## 2023-05-15 NOTE — PROGRESS NOTE ADULT - SUBJECTIVE AND OBJECTIVE BOX
Pt was seen for 25 min for supportive tx. Pt had no complaints. Pt reported doing well since last seen. He denied pain, feelings of anxiety or sadness or any pressing concerns. Pt reported feeling elated, excited of being alive even if there were additional difficulties over the course of his illness. Pt reported being open to whatever the course that his illness takes, whether he improves or not, or eventually dies. Discussed the importance of sin, and realistic optimism in regards to brain cancer rehabilitation, and encouraged Pt to use his strengths in the [employer] field (Pt is a ) to cope with his current health difficulties. Also discussed how when adversity cannot be changed there is always the possibility ot changing one's attitudes, feelings and behaviors ot better cope with the situation. Pt expressed awareness of the seriousness of his illness and of its lethality, as well as of being willing to continue his tx; he reported going to a facility in [facility] later this week to continue his rehab and brain cancer txs. Pt reported improvement in response to rehab txs, including standing up, ambulation assisted by grabbing the hand rails on the unit walls, and stimulation for his right arm.   Pt reported good appetite, sleep and energy levels since last seen. Support and encouragement were provided.

## 2023-05-15 NOTE — PROGRESS NOTE ADULT - ASSESSMENT
Patient is a 68 y/o male PMH of HTN, OA, right TKA, who presented with recently diagnosed, brain mass, who presented to [facility] for brain tumor resection 4/24/23 Pathology positive for glioblastoma; patient  s/p craniotomy for resection of L brain tumor 4/25/23. Hospital course complicated by hyponatremia. Admitted for multidisciplinary rehab program    # Glioblastoma s/p craniotomy for resection of L brain tumor 4/25/23 right hemiparesis  - continue decadron 2mg PO BID through 6/3  - keppra 750mg PO BID for seizure ppx  - Odell craniotomy dc'd 5/9, Cleared to shower, no scrubbing over incision, gentle baby shampoo  - Continue PT/OT 3 hours daily 5 x week. Neuropsychology and rec therapy  - R kinesiology tape for R shoulder 5/4, FES right shoulder and forearm muscles  - Planning for future Radiation therapy and chemo. patient has had mapping/mask made already   - Precautions: steroid, SZ, cardiac, fall    # right shoulder subluxation  - KT tape  - have right elbow supported on pillow    # HTN   - amlodipine 10mg PO QD   -  (121/74 - 133/71) 5/15    # DM2   - glu 154 5/11    # Sleep:  - Melatonin PRN  -  trazodone 50 mg qhs for sleep    # Pain:  - Tylenol PRN    # GI/Bowel:  - miralax 17g PO BID   - Senna 2 tabs daily  - pantoprazole 40mg PO before breakfast    - elevated alk phos 139 5/11. Benign abdominal exam, no h/o stones. monitor, Repeat alk phos5/12 and 5/15, improved WNL    # Diet   - Regular, [facility]   - MVI 5/9  - B12 level: 654 (5/10) WNL. Discussed with patient    # DVT prophylaxis:  - Last doppler 5/1/23 - Neg. Patient aware  - lovenox 40mg SC QD   - SCDs    # Case discussed in IDT rounds 5/15:  - max assist bADLs , max assist transfers (from prior report), min/mod assist ambulation 40 feet with fernando rail, tolerating reg solid thin liquid diet, SLP goals met  - goals: min assist bADLs, CG/min assist transfers and short distance household ambulation  - target: patient may benefit from BAIRON for continued OT PT SLP 5/17/23 given severity of right sided weakness, stairs. Will discuss with family  - arranging dc with facility that can provide radiation Rx as well. Patient states that he had planned for Rx in [facility]  -Medical forms for provided to for verification for cancelled travel plans due to illness    # LABS   CBC BMP 5/18    --------------------------------------------  Outpatient Follow up:    Juanjose Johnston)  Neurosurgery  [address], [address] [place] [address]  Phone: [phone]  Fax: [phone]  Follow Up Time:     Delmis Causey; MIKHAIL)  Hematology; Internal Medicine; Medical Oncology  [address] [place] [address]  Phone: [phone]  Fax: [phone]  Follow Up Time:   Patient is a 66 y/o male PMH of HTN, OA, right TKA, who presented with recently diagnosed, brain mass, who presented to [facility] for brain tumor resection 4/24/23 Pathology positive for glioblastoma; patient  s/p craniotomy for resection of L brain tumor 4/25/23. Hospital course complicated by hyponatremia. Admitted for multidisciplinary rehab program    # Glioblastoma s/p craniotomy for resection of L brain tumor 4/25/23 right hemiparesis  - continue decadron 2mg PO BID through 6/3  - keppra 750mg PO BID for seizure ppx  - Odell craniotomy dc'd 5/9, Cleared to shower, no scrubbing over incision, gentle baby shampoo  - Continue PT/OT 3 hours daily 5 x week. Neuropsychology and rec therapy  - Planning for future Radiation therapy and chemo. patient has had mapping/mask made already   - Precautions: steroid, SZ, cardiac, fall    # right shoulder subluxation  - KT tape, FES  - have right elbow supported on pillow    # HTN   - amlodipine 10mg PO QD   -  (121/74 - 133/71) 5/15    # DM2   - glu 154 5/11    # Sleep:  - Melatonin PRN  -  trazodone 50 mg qhs for sleep. Patient will practice sleep hygiene tonight, reduce naps during day    # Pain:  - Tylenol PRN    # GI/Bowel:  - miralax 17g PO BID   - Senna 2 tabs daily  - pantoprazole   - elevated alk phos 139 5/11. Benign abdominal exam, no h/o stones. monitor, Repeat alk phos5/12 and 5/15, improved WNL    # Diet   - Regular, C[facility]   - MVI 5/9  - B12 level: 654 (5/10) WNL. Discussed with patient    # DVT prophylaxis:  - Last doppler 5/1/23 - Neg. Patient aware  - lovenox 40mg SC QD   - SCDs    # Case discussed in IDT rounds 5/15:  - max assist bADLs , max assist transfers (from prior report), min/mod assist ambulation 40 feet with fernando rail, tolerating reg solid thin liquid diet, SLP goals met  - goals: min assist bADLs, CG/min assist transfers and short distance household ambulation  - target: BAIRON for continued OT PT SLP 5/17/23. arranging dc with facility that can provide radiation Rx as well. Patient states that he had planned for Rx in [facility]  -Medical forms for provided to for verification for cancelled travel plans due to illness, completed 5/15    # LABS   CBC BMP 5/18    --------------------------------------------  Outpatient Follow up:    Juanjose Johnston)  Neurosurgery  [address], [address] [place] [address]  Phone: [phone]  Fax: [phone]  Follow Up Time:     Delmis Causey; MIKHAIL)  Hematology; Internal Medicine; Medical Oncology  [address] [place] [address]  Phone: [phone]  Fax: [phone]  Follow Up Time:

## 2023-05-15 NOTE — PROGRESS NOTE ADULT - ASSESSMENT
Pt alert, attentive, relatively intact language, thought processes goal-directed , no abnormal thought contents noted, euthymic affect and mood, denied AH/VH, denied SI/HI/I/P, calm behavior. Plan: Continue supportive tx.

## 2023-05-15 NOTE — PROGRESS NOTE ADULT - ATTENDING COMMENTS
Progress note amended to include my discussions with patient, resident, hospitalist, RN, SW, PT and my findings    Patient seen in PT. He reports difficulty sleeping, is reducing po intake fluids prior to bed. States trazodone worked initially and had great sleep but kept having interrupted sleep yesteday. Denies mood being factor; states he is generally a positive person, relies on spiritual prayer. He does admit to napping, especially over weekend, and slept for about 2 hours napping yesterday. Will try to avoid today , sleep hygiene, and assess    Patient provides travel insurance paperwork to fill out, completed today 5/15. Vitals, CBC and BMP today reviewed, stable    Case discussed in IDt rounds today. Performs bed mobility min-mod assist, ambulates 40 feet with left hallrail min-mod assist, improvements in sequencing. Cotninue program, awaiting BAIRON this week, patient and family aware, working with facility choices that allow him to pursue RT/chemo. DC 5/17 targeted

## 2023-05-16 PROCEDURE — 99232 SBSQ HOSP IP/OBS MODERATE 35: CPT

## 2023-05-16 RX ORDER — TEMOZOLOMIDE 20 MG/1
20 CAPSULE ORAL
Qty: 45 | Refills: 0 | Status: ACTIVE | COMMUNITY
Start: 2023-05-16 | End: 1900-01-01

## 2023-05-16 RX ORDER — ONDANSETRON 4 MG/1
4 TABLET, ORALLY DISINTEGRATING ORAL EVERY 8 HOURS
Qty: 60 | Refills: 2 | Status: ACTIVE | COMMUNITY
Start: 2023-05-16 | End: 1900-01-01

## 2023-05-16 RX ADMIN — AMLODIPINE BESYLATE 10 MILLIGRAM(S): 2.5 TABLET ORAL at 05:50

## 2023-05-16 RX ADMIN — Medication 2 MILLIGRAM(S): at 05:48

## 2023-05-16 RX ADMIN — Medication 1 TABLET(S): at 12:35

## 2023-05-16 RX ADMIN — Medication 50 MILLIGRAM(S): at 21:41

## 2023-05-16 RX ADMIN — SODIUM CHLORIDE 1 GRAM(S): 9 INJECTION INTRAMUSCULAR; INTRAVENOUS; SUBCUTANEOUS at 17:59

## 2023-05-16 RX ADMIN — Medication 2 MILLIGRAM(S): at 17:59

## 2023-05-16 RX ADMIN — ENOXAPARIN SODIUM 40 MILLIGRAM(S): 100 INJECTION SUBCUTANEOUS at 05:48

## 2023-05-16 RX ADMIN — PANTOPRAZOLE SODIUM 40 MILLIGRAM(S): 20 TABLET, DELAYED RELEASE ORAL at 05:48

## 2023-05-16 RX ADMIN — LEVETIRACETAM 750 MILLIGRAM(S): 250 TABLET, FILM COATED ORAL at 17:58

## 2023-05-16 RX ADMIN — LEVETIRACETAM 750 MILLIGRAM(S): 250 TABLET, FILM COATED ORAL at 05:49

## 2023-05-16 RX ADMIN — SODIUM CHLORIDE 1 GRAM(S): 9 INJECTION INTRAMUSCULAR; INTRAVENOUS; SUBCUTANEOUS at 05:48

## 2023-05-16 NOTE — PROGRESS NOTE ADULT - SUBJECTIVE AND OBJECTIVE BOX
Patient is a 67y old  Male who presents with a chief complaint of GBm sp craniotomy and resection (15 May 2023 15:00)      HPI:  Patient is a 68 y/o male RH dominant PMH of HTN, Osteoarthritis, right total knee replacement 2 months ago, brain mass s/p biopsy 4/7/23 @ [facility] with Dr. Tobar, who presented to [facility] for brain tumor resection 4/24/23. Per family, patient started to have right hand weakness about a month ago that grew progressively worse, then went to ED where brain mass was diagnosed. He was started on keppra 750 mg BID for seizure prophylaxis. Patient had sterotactic needle biopsy and laser interstitial thermal therapy by Dr. Tobar on 4/7/23 which was positive for glioblastoma  s/p craniotomy for resection of L brain tumor 4/25/23. Hospital course complicated by hyponatremia and RUE edema. Doppler negative.     Patient was evaluated by PM&R and therapy for functional deficits and gait/ ADL impairments and recommended acute rehabilitation. Patient was medically optimized for discharge to [facility] on 5/2/23     (02 May 2023 16:29)      PAST MEDICAL & SURGICAL HISTORY:  Hypertension      Osteoarthritis      Brain mass      S/P total knee replacement, right          MEDICATIONS  (STANDING):  amLODIPine   Tablet 10 milliGRAM(s) Oral every 24 hours  dexAMETHasone     Tablet 2 milliGRAM(s) Oral every 12 hours  dexAMETHasone     Tablet   Oral   dextrose 5%. 1000 milliLiter(s) (100 mL/Hr) IV Continuous <Continuous>  dextrose 5%. 1000 milliLiter(s) (50 mL/Hr) IV Continuous <Continuous>  dextrose 50% Injectable 12.5 Gram(s) IV Push once  dextrose 50% Injectable 25 Gram(s) IV Push once  dextrose 50% Injectable 25 Gram(s) IV Push once  enoxaparin Injectable 40 milliGRAM(s) SubCutaneous every 24 hours  glucagon  Injectable 1 milliGRAM(s) IntraMuscular once  levETIRAcetam 750 milliGRAM(s) Oral two times a day  multivitamin 1 Tablet(s) Oral daily  pantoprazole    Tablet 40 milliGRAM(s) Oral before breakfast  polyethylene glycol 3350 17 Gram(s) Oral two times a day  senna 2 Tablet(s) Oral at bedtime  sodium chloride 1 Gram(s) Oral every 12 hours  traZODone 50 milliGRAM(s) Oral at bedtime    MEDICATIONS  (PRN):  acetaminophen     Tablet .. 650 milliGRAM(s) Oral every 6 hours PRN Mild Pain (1 - 3)  benzonatate 100 milliGRAM(s) Oral three times a day PRN Cough  dextrose Oral Gel 15 Gram(s) Oral once PRN Blood Glucose LESS THAN 70 milliGRAM(s)/deciliter      Allergies    No Known Allergies    Intolerances          VITALS  67y  Vital Signs Last 24 Hrs  T(C): 36.7 (15 May 2023 21:48), Max: 36.7 (15 May 2023 21:48)  T(F): 98.1 (15 May 2023 21:48), Max: 98.1 (15 May 2023 21:48)  HR: 65 (16 May 2023 05:50) (65 - 83)  BP: 143/83 (16 May 2023 05:50) (127/81 - 143/83)  BP(mean): --  RR: 14 (15 May 2023 21:48) (14 - 14)  SpO2: 97% (15 May 2023 21:48) (97% - 97%)    Parameters below as of 15 May 2023 21:48  Patient On (Oxygen Delivery Method): room air      Daily     Daily         RECENT LABS:                          14.0   5.56  )-----------( 343      ( 15 May 2023 07:20 )             43.4     05-15    140  |  102  |  14  ----------------------------<  132<H>  4.8   |  34<H>  |  0.81    Ca    9.5      15 May 2023 07:20    TPro  6.5  /  Alb  3.2<L>  /  TBili  0.5  /  DBili  x   /  AST  15  /  ALT  45  /  AlkPhos  98  05-15    LIVER FUNCTIONS - ( 15 May 2023 07:20 )  Alb: 3.2 g/dL / Pro: 6.5 g/dL / ALK PHOS: 98 U/L / ALT: 45 U/L / AST: 15 U/L / GGT: x                   CAPILLARY BLOOD GLUCOSE

## 2023-05-16 NOTE — PROGRESS NOTE ADULT - COMMENTS
Patient slept much better last night. Seen this morning with wife Toya on speaker phone per patient request. He is aware that he has not had an acceptance yet from [facility], and is looking into facility in [place] as well. SW working with family. He received the travel insurance medical forms yesterday    requests some imaging and chart reports that he can't find on patient portal. I will provide recent labs; will have SW follow up with  authorization request for reports from [facility] as wife and pt request. Last BM 5/15

## 2023-05-16 NOTE — PROGRESS NOTE ADULT - MOTOR
right UE hypotonic, no pain with ROM 2/5 shoulder shrug 0/5 hand  mild right ortiz inattention  no calf swelling +soft no TTP

## 2023-05-16 NOTE — PROGRESS NOTE ADULT - ASSESSMENT
Patient is a 68 y/o male PMH of HTN, OA, right TKA, who presented with recently diagnosed, brain mass, who presented to [facility] for brain tumor resection 4/24/23 Pathology positive for glioblastoma; patient  s/p craniotomy for resection of L brain tumor 4/25/23. Hospital course complicated by hyponatremia. Admitted for multidisciplinary rehab program    # Glioblastoma s/p craniotomy for resection of L brain tumor 4/25/23 right hemiparesis  - continue decadron 2mg PO BID through 6/3  - keppra 750mg PO BID for seizure ppx  - Odell craniotomy dc'd 5/9  - Continue PT/OT 3 hours daily 5 x week. Neuropsychology and rec therapy. BAIRON in progress  - Planning for future Radiation therapy and chemo. patient has had mapping/mask made already   - Precautions: steroid, SZ, cardiac, fall    # right shoulder subluxation  - KT tape, FES  - have right elbow supported on pillow    # HTN   - amlodipine 10mg PO QD   - PCP f/u on dc    # DM2   - glu 154 5/11  - PCP f/u on dc    # Sleep:  - Melatonin PRN  -  trazodone 50 mg qhs for sleep. Improved today 5/16, continue sleep hygiene    # Pain:  - Tylenol PRN    # GI/Bowel:  - miralax 17g PO BID   - Senna 2 tabs daily  - pantoprazole   - asymptomatic elevated alk phos 139 5/11--> 98 5/15, WNL    # Diet   - Regular, [facility]   - MVI 5/9  - B12 level: 654 (5/10) WNL. Discussed with patient    # DVT prophylaxis:  - Last doppler 5/1/23 - Neg. Patient aware  - lovenox 40mg SC QD   - SCDs    # Case discussed in IDT rounds 5/15:  - max assist bADLs , max assist transfers (from prior report), min/mod assist ambulation 40 feet with fernando rail, tolerating reg solid thin liquid diet, SLP goals met  - goals: min assist bADLs, CG/min assist transfers and short distance household ambulation  - target: BAIRON for continued OT PT SLP 5/17/23. arranging dc with facility that can provide radiation Rx as well. Patient states that he had planned for Rx in [facility]  -Medical forms for provided to for verification for cancelled travel plans due to illness, completed 5/15  - will provide copies recent labs to patient per request. SW and nutrition to follow up with patient/family and request for copies of chart notes from [facility]/nutrition notes    # LABS   CBC BMP 5/18    --------------------------------------------  Outpatient Follow up:    Juanjose Johnston)  Neurosurgery  [address], [address] [place] [address]  Phone: [phone]  Fax: [phone]  Follow Up Time:     Delmis Causey; MIKHAIL)  Hematology; Internal Medicine; Medical Oncology  [address] [place] [address]  Phone: [phone]  Fax: [phone]  Follow Up Time:

## 2023-05-17 ENCOUNTER — TRANSCRIPTION ENCOUNTER (OUTPATIENT)
Age: 68
End: 2023-05-17

## 2023-05-17 LAB
SARS-COV-2 RNA SPEC QL NAA+PROBE: DETECTED
SARS-COV-2 RNA SPEC QL NAA+PROBE: DETECTED

## 2023-05-17 PROCEDURE — 99232 SBSQ HOSP IP/OBS MODERATE 35: CPT

## 2023-05-17 RX ORDER — PANTOPRAZOLE SODIUM 20 MG/1
1 TABLET, DELAYED RELEASE ORAL
Qty: 0 | Refills: 0 | DISCHARGE
Start: 2023-05-17

## 2023-05-17 RX ORDER — AMLODIPINE BESYLATE 2.5 MG/1
1 TABLET ORAL
Qty: 0 | Refills: 0 | DISCHARGE
Start: 2023-05-17

## 2023-05-17 RX ORDER — SODIUM CHLORIDE 9 MG/ML
1 INJECTION INTRAMUSCULAR; INTRAVENOUS; SUBCUTANEOUS
Qty: 0 | Refills: 0 | DISCHARGE
Start: 2023-05-17

## 2023-05-17 RX ORDER — TRAZODONE HCL 50 MG
1 TABLET ORAL
Qty: 0 | Refills: 0 | DISCHARGE
Start: 2023-05-17

## 2023-05-17 RX ORDER — SENNA PLUS 8.6 MG/1
2 TABLET ORAL
Qty: 0 | Refills: 0 | DISCHARGE
Start: 2023-05-17

## 2023-05-17 RX ORDER — ENOXAPARIN SODIUM 100 MG/ML
40 INJECTION SUBCUTANEOUS
Qty: 0 | Refills: 0 | DISCHARGE
Start: 2023-05-17

## 2023-05-17 RX ORDER — LEVETIRACETAM 250 MG/1
1 TABLET, FILM COATED ORAL
Qty: 0 | Refills: 0 | DISCHARGE
Start: 2023-05-17

## 2023-05-17 RX ORDER — POLYETHYLENE GLYCOL 3350 17 G/17G
17 POWDER, FOR SOLUTION ORAL
Qty: 0 | Refills: 0 | DISCHARGE
Start: 2023-05-17

## 2023-05-17 RX ORDER — ACETAMINOPHEN 500 MG
2 TABLET ORAL
Qty: 0 | Refills: 0 | DISCHARGE
Start: 2023-05-17

## 2023-05-17 RX ADMIN — LEVETIRACETAM 750 MILLIGRAM(S): 250 TABLET, FILM COATED ORAL at 05:18

## 2023-05-17 RX ADMIN — LEVETIRACETAM 750 MILLIGRAM(S): 250 TABLET, FILM COATED ORAL at 17:20

## 2023-05-17 RX ADMIN — AMLODIPINE BESYLATE 10 MILLIGRAM(S): 2.5 TABLET ORAL at 05:18

## 2023-05-17 RX ADMIN — Medication 2 MILLIGRAM(S): at 19:04

## 2023-05-17 RX ADMIN — Medication 2 MILLIGRAM(S): at 05:18

## 2023-05-17 RX ADMIN — SODIUM CHLORIDE 1 GRAM(S): 9 INJECTION INTRAMUSCULAR; INTRAVENOUS; SUBCUTANEOUS at 05:18

## 2023-05-17 RX ADMIN — PANTOPRAZOLE SODIUM 40 MILLIGRAM(S): 20 TABLET, DELAYED RELEASE ORAL at 05:18

## 2023-05-17 RX ADMIN — SODIUM CHLORIDE 1 GRAM(S): 9 INJECTION INTRAMUSCULAR; INTRAVENOUS; SUBCUTANEOUS at 17:20

## 2023-05-17 RX ADMIN — Medication 1 TABLET(S): at 12:41

## 2023-05-17 RX ADMIN — Medication 50 MILLIGRAM(S): at 20:11

## 2023-05-17 RX ADMIN — ENOXAPARIN SODIUM 40 MILLIGRAM(S): 100 INJECTION SUBCUTANEOUS at 05:19

## 2023-05-17 NOTE — PROGRESS NOTE ADULT - CONSTITUTIONAL COMMENTS
alert, pleasant Incision healing well C/D/I. some increased senxation around edgardo incisonal area, no redness or swelling

## 2023-05-17 NOTE — DISCHARGE NOTE PROVIDER - CARE PROVIDERS DIRECT ADDRESSES
,[email or web address]

## 2023-05-17 NOTE — DISCHARGE NOTE NURSING/CASE MANAGEMENT/SOCIAL WORK - NSDPFAC_GEN_ALL_CORE
[facility] [place] Phone: [phone]   [facility] [phone]

## 2023-05-17 NOTE — PROGRESS NOTE ADULT - SUBJECTIVE AND OBJECTIVE BOX
67y old  Male who presents with a chief complaint of GBM sp craniotomy and resection (15 May 2023 15:00)      Vital Signs Last 24 Hrs  T(C): 36.7 (17 May 2023 10:17), Max: 36.8 (16 May 2023 21:38)  T(F): 98 (17 May 2023 10:17), Max: 98.2 (16 May 2023 21:38)  HR: 64 (17 May 2023 10:17) (64 - 86)  BP: 134/75 (17 May 2023 10:17) (125/78 - 134/75)  BP(mean): --  RR: 16 (17 May 2023 10:17) (14 - 16)  SpO2: 98% (17 May 2023 10:17) (97% - 98%)    Parameters below as of 17 May 2023 10:17  Patient On (Oxygen Delivery Method): room air    GENERAL- NAD  EAR/NOSE/MOUTH/THROAT -   MMM  EYES- SHANTELL, conjunctiva and Sclera clear  NECK- supple  RESPIRATORY-  clear to auscultation bilaterally, non laboured breathing  CARDIOVASCULAR - SIS2, RRR  GI - soft NT BS present  EXTREMITIES- no pedal edema  NEUROLOGY- right sided weakness, facial droop  PSYCHIATRY- AAO X 3      CAPILLARY BLOOD GLUCOSE        MEDICATIONS  (STANDING):  amLODIPine   Tablet 10 milliGRAM(s) Oral every 24 hours  dexAMETHasone     Tablet   Oral   dexAMETHasone     Tablet 2 milliGRAM(s) Oral every 12 hours  enoxaparin Injectable 40 milliGRAM(s) SubCutaneous every 24 hours  glucagon  Injectable 1 milliGRAM(s) IntraMuscular once  levETIRAcetam 750 milliGRAM(s) Oral two times a day  multivitamin 1 Tablet(s) Oral daily  pantoprazole    Tablet 40 milliGRAM(s) Oral before breakfast  polyethylene glycol 3350 17 Gram(s) Oral two times a day  senna 2 Tablet(s) Oral at bedtime  sodium chloride 1 Gram(s) Oral every 12 hours  traZODone 50 milliGRAM(s) Oral at bedtime    MEDICATIONS  (PRN):  acetaminophen     Tablet .. 650 milliGRAM(s) Oral every 6 hours PRN Mild Pain (1 - 3)  benzonatate 100 milliGRAM(s) Oral three times a day PRN Cough  dextrose Oral Gel 15 Gram(s) Oral once PRN Blood Glucose LESS THAN 70 milliGRAM(s)/deciliter

## 2023-05-17 NOTE — DISCHARGE NOTE PROVIDER - HOSPITAL COURSE
Patient is a 66 y/o male PMH of HTN, OA, right TKA, who presented with recently diagnosed, brain mass, who presented to [facility] for brain tumor resection 4/24/23 Pathology positive for glioblastoma; patient  s/p craniotomy for resection of L brain tumor 4/25/23. Hospital course complicated by hyponatremia. Admitted for multidisciplinary rehab program    REHAB COURSE:  Patient was stable upon rehab admission to  [facility]. Admitted with gait instability, ADL, and functional impairments.     Rehab Course was significant for ____     Patient to continue decadron 2mg PO BID through 6/3/23  Patient tolerated course of inpatient PT/OT/SLP rehab with significant functional improvements and met rehab goals prior to discharge. Patient was medically stable and optimized for discharge ____ home with follow-up with ______.   Patient is a 68 y/o male RH dominant PMH of HTN, Osteoarthritis, right total knee replacement 2 months ago, brain mass s/p biopsy 4/7/23 @ [facility] with Dr. Tobar, who presented to [facility] for brain tumor resection 4/24/23. Per family, patient started to have right hand weakness about a month ago that grew progressively worse, then went to ED where brain mass was diagnosed. He was started on keppra 750 mg BID for seizure prophylaxis. Patient had sterotactic needle biopsy and laser interstitial thermal therapy by Dr. Tobar on 4/7/23 which was positive for glioblastoma  s/p craniotomy for resection of L brain tumor 4/25/23. Hospital course complicated by hyponatremia and RUE edema. Doppler negative.     Patient was evaluated by PM&R and therapy for functional deficits and gait/ ADL impairments and recommended acute rehabilitation. Patient was medically optimized for discharge to [facility] on 5/2/23    Craniotomy incisoin staples were removed 5/9 without incident after confirmation with neurosurgery. He remained without seizure on keppra PPX. He was admitted with a significant right shoulder subluxation, and trialed kinseotaping and FES as well as use of sling for support and correction. His BP and glucose levels were well controlled - (125/78 - 134/75) 5/17. he had difficulty sleeping, and sleep hygiene as well as avoidance large intake fluids in evenings; trazodone 50 qhs also started with improvement.     Patient to continue decadron 2mg PO BID through 6/3/23. His plan is to start radiation and chemotherapy with neuro oncology and rads oncology    Patient tolerated course of inpatient PT/OT/SLP rehab with significant functional improvements and met rehab goals prior to discharge. At the time of discharge, patient was max assist bADLs , max assist transfers (from prior report), min/mod assist ambulation 40 feet with fernando rail, tolerating reg solid thin liquid diet,  He is being transferred to [facility] for additional OT PT SLP and medical care/skilled nursing care   Patient is a 66 y/o male RH dominant PMH of HTN, Osteoarthritis, right total knee replacement 2 months ago, brain mass s/p biopsy 4/7/23 @ [facility] with Dr. Tobar, who presented to [facility] for brain tumor resection 4/24/23. Per family, patient started to have right hand weakness about a month ago that grew progressively worse, then went to ED where brain mass was diagnosed. He was started on keppra 750 mg BID for seizure prophylaxis. Patient had sterotactic needle biopsy and laser interstitial thermal therapy by Dr. Tobar on 4/7/23 which was positive for glioblastoma  s/p craniotomy for resection of L brain tumor 4/25/23. Hospital course complicated by hyponatremia and RUE edema. Doppler negative.     Patient was evaluated by PM&R and therapy for functional deficits and gait/ ADL impairments and recommended acute rehabilitation. Patient was medically optimized for discharge to [facility] on 5/2/23    Craniotomy incisoin staples were removed 5/9 without incident after confirmation with neurosurgery. He remained without seizure on keppra PPX. He was admitted with a significant right shoulder subluxation, and trialed kinseotaping and FES as well as use of sling for support and correction. His BP and glucose levels were well controlled - (125/78 - 134/75) 5/17. he had difficulty sleeping, and sleep hygiene as well as avoidance large intake fluids in evenings; trazodone 50 qhs also started with improvement.     Patient to continue decadron 2mg PO BID through 6/3/23. His plan is to start radiation and chemotherapy with neuro oncology and rads oncology.  On 5/17 patient tested positive on routine Covid testing.  Patient completed 10 days of isolation per hospital protocol.  During this time patient remained asymptomatic.  Patient developed right foot swelling.  Doppler ultrasound was negative for DVT on 5/25.  Swelling improved with ANJELICA stockings.    Patient tolerated course of inpatient PT/OT/SLP rehab with significant functional improvements and met rehab goals prior to discharge. At the time of discharge, patient was max assist bADLs , max assist transfers (from prior report), min/mod assist ambulation 40 feet with fernando rail, tolerating reg solid thin liquid diet,  He is being transferred to [facility] for additional OT PT SLP and medical care/skilled nursing care   Patient is a 68 y/o male RH dominant PMH of HTN, Osteoarthritis, right total knee replacement 2 months ago, brain mass s/p biopsy 4/7/23 @ [facility] with Dr. Tobar, who presented to [facility] for brain tumor resection 4/24/23. Per family, patient started to have right hand weakness about a month ago that grew progressively worse, then went to ED where brain mass was diagnosed. He was started on keppra 750 mg BID for seizure prophylaxis. Patient had sterotactic needle biopsy and laser interstitial thermal therapy by Dr. Tobar on 4/7/23 which was positive for glioblastoma  s/p craniotomy for resection of L brain tumor 4/25/23. Hospital course complicated by hyponatremia and RUE edema. Doppler negative.     Patient was evaluated by PM&R and therapy for functional deficits and gait/ ADL impairments and recommended acute rehabilitation. Patient was medically optimized for discharge to [facility] on 5/2/23    Craniotomy incisoin staples were removed 5/9 without incident after confirmation with neurosurgery. He remained without seizure on keppra PPX. He was admitted with a significant right shoulder subluxation, and trialed kinseotaping and FES as well as use of sling for support and correction. His BP and glucose levels were well controlled - (125/78 - 134/75) 5/17. he had difficulty sleeping, and sleep hygiene as well as avoidance large intake fluids in evenings; trazodone 50 qhs also started with improvement.     Patient to continue decadron 2mg PO BID through 6/3/23. His plan is to start radiation and chemotherapy with neuro oncology and rads oncology.  On 5/17 patient tested positive on routine Covid testing.  Patient completed 10 days of isolation per hospital protocol.  During this time patient remained asymptomatic, afebrile, with stable O2 sats and no leukocytosis.  Patient developed right foot swelling.  Doppler ultrasound was negative for DVT on 5/25.  Swelling improved with ANJELICA stockings.    Patient tolerated course of inpatient PT/OT/SLP rehab with significant functional improvements and met rehab goals prior to discharge. At the time of discharge, patient was max assist bADLs , max assist transfers (from prior report), min/mod assist ambulation 40 feet with fernando rail, tolerating reg solid thin liquid diet,  He is being transferred to [facility] for additional OT PT SLP and medical care/skilled nursing care as well as initiation of RT treatment

## 2023-05-17 NOTE — DISCHARGE NOTE PROVIDER - NSDCMRMEDTOKEN_GEN_ALL_CORE_FT
acetaminophen 325 mg oral tablet: 2 tab(s) orally every 6 hours As needed Mild Pain (1 - 3)  amLODIPine 10 mg oral tablet: 1 orally once a day  benzonatate 100 mg oral capsule: 1 cap(s) orally 3 times a day As needed Cough  dexamethasone: 2 milligram(s) orally every 8 hours 2mg every 8 hours for 5/2 and 5/3 then 2mg every 12 hours indefinitely.  enoxaparin: 40 milligram(s) subcutaneous once a day (at bedtime)  hydrALAZINE 20 mg/mL injectable solution: 10 milligram(s) injectable every 6 hours as needed for SBP &gt;160  insulin lispro 100 units/mL injectable solution: 2 unit(s) injectable 4 times a day (before meals and at bedtime) as needed for blood glucose &gt;150 Insulin sliding scale  Keppra 750 mg oral tablet: 1 orally 2 times a day  ondansetron 2 mg/mL injectable solution: 4 milligram(s) injectable every 6 hours as needed for  nausea/vomiting  pantoprazole 40 mg oral delayed release tablet: 1 tab(s) orally once a day (before a meal)  polyethylene glycol 3350 oral powder for reconstitution: 17 gram(s) orally 2 times a day  senna leaf extract oral tablet: 2 tab(s) orally once a day (at bedtime)  sodium chloride 1 g oral tablet: 1 tab(s) orally every 8 hours   acetaminophen 325 mg oral tablet: 2 tab(s) orally every 6 hours As needed Mild Pain (1 - 3)  amLODIPine 10 mg oral tablet: 1 tab(s) orally every 24 hours  benzonatate 100 mg oral capsule: 1 cap(s) orally 3 times a day As needed Cough  dexamethasone: 2 milligram(s) orally every 8 hours 2mg every 12 hours until 6/3/23  enoxaparin: 40 milligram(s) subcutaneous every 24 hours  levETIRAcetam 750 mg oral tablet: 1 tab(s) orally 2 times a day  Multiple Vitamins oral tablet: 1 tab(s) orally once a day  pantoprazole 40 mg oral delayed release tablet: 1 tab(s) orally once a day (before a meal)  polyethylene glycol 3350 oral powder for reconstitution: 17 gram(s) orally 2 times a day  senna leaf extract oral tablet: 2 tab(s) orally once a day (at bedtime)  sodium chloride 1 g oral tablet: 1 tab(s) orally every 12 hours  traZODone 50 mg oral tablet: 1 tab(s) orally once a day (at bedtime)   acetaminophen 325 mg oral tablet: 2 tab(s) orally every 6 hours As needed Mild Pain (1 - 3)  amLODIPine 10 mg oral tablet: 1 tab(s) orally every 24 hours  benzonatate 100 mg oral capsule: 1 cap(s) orally 3 times a day As needed Cough  dexamethasone: 2 milligram(s) orally every 12 hours until 6/3/23  enoxaparin: 40 milligram(s) subcutaneous every 24 hours  levETIRAcetam 750 mg oral tablet: 1 tab(s) orally 2 times a day  Multiple Vitamins oral tablet: 1 tab(s) orally once a day  pantoprazole 40 mg oral delayed release tablet: 1 tab(s) orally once a day (before a meal)  polyethylene glycol 3350 oral powder for reconstitution: 17 gram(s) orally 2 times a day  senna leaf extract oral tablet: 2 tab(s) orally once a day (at bedtime)  sodium chloride 1 g oral tablet: 1 tab(s) orally every 12 hours  traZODone 50 mg oral tablet: 1 tab(s) orally once a day (at bedtime)

## 2023-05-17 NOTE — PROGRESS NOTE ADULT - SUBJECTIVE AND OBJECTIVE BOX
Patient is a 67y old  Male who presents with a chief complaint of GBm sp craniotomy and resection (17 May 2023 12:24)      HPI:  Patient is a 68 y/o male RH dominant PMH of HTN, Osteoarthritis, right total knee replacement 2 months ago, brain mass s/p biopsy 4/7/23 @ [facility] with Dr. Tobar, who presented to [facility] for brain tumor resection 4/24/23. Per family, patient started to have right hand weakness about a month ago that grew progressively worse, then went to ED where brain mass was diagnosed. He was started on keppra 750 mg BID for seizure prophylaxis. Patient had sterotactic needle biopsy and laser interstitial thermal therapy by Dr. Tobar on 4/7/23 which was positive for glioblastoma  s/p craniotomy for resection of L brain tumor 4/25/23. Hospital course complicated by hyponatremia and RUE edema. Doppler negative.     Patient was evaluated by PM&R and therapy for functional deficits and gait/ ADL impairments and recommended acute rehabilitation. Patient was medically optimized for discharge to [facility] on 5/2/23     (02 May 2023 16:29)      PAST MEDICAL & SURGICAL HISTORY:  Hypertension      Osteoarthritis      Brain mass      S/P total knee replacement, right          MEDICATIONS  (STANDING):  amLODIPine   Tablet 10 milliGRAM(s) Oral every 24 hours  dexAMETHasone     Tablet   Oral   dexAMETHasone     Tablet 2 milliGRAM(s) Oral every 12 hours  dextrose 5%. 1000 milliLiter(s) (100 mL/Hr) IV Continuous <Continuous>  dextrose 5%. 1000 milliLiter(s) (50 mL/Hr) IV Continuous <Continuous>  dextrose 50% Injectable 12.5 Gram(s) IV Push once  dextrose 50% Injectable 25 Gram(s) IV Push once  dextrose 50% Injectable 25 Gram(s) IV Push once  enoxaparin Injectable 40 milliGRAM(s) SubCutaneous every 24 hours  glucagon  Injectable 1 milliGRAM(s) IntraMuscular once  levETIRAcetam 750 milliGRAM(s) Oral two times a day  multivitamin 1 Tablet(s) Oral daily  pantoprazole    Tablet 40 milliGRAM(s) Oral before breakfast  polyethylene glycol 3350 17 Gram(s) Oral two times a day  senna 2 Tablet(s) Oral at bedtime  sodium chloride 1 Gram(s) Oral every 12 hours  traZODone 50 milliGRAM(s) Oral at bedtime    MEDICATIONS  (PRN):  acetaminophen     Tablet .. 650 milliGRAM(s) Oral every 6 hours PRN Mild Pain (1 - 3)  benzonatate 100 milliGRAM(s) Oral three times a day PRN Cough  dextrose Oral Gel 15 Gram(s) Oral once PRN Blood Glucose LESS THAN 70 milliGRAM(s)/deciliter      Allergies    No Known Allergies    Intolerances          VITALS  67y  Vital Signs Last 24 Hrs  T(C): 36.7 (17 May 2023 10:17), Max: 36.8 (16 May 2023 21:38)  T(F): 98 (17 May 2023 10:17), Max: 98.2 (16 May 2023 21:38)  HR: 64 (17 May 2023 10:17) (64 - 86)  BP: 134/75 (17 May 2023 10:17) (125/78 - 134/75)  BP(mean): --  RR: 16 (17 May 2023 10:17) (14 - 16)  SpO2: 98% (17 May 2023 10:17) (97% - 98%)    Parameters below as of 17 May 2023 10:17  Patient On (Oxygen Delivery Method): room air      Daily     Daily         RECENT LABS:                      CAPILLARY BLOOD GLUCOSE

## 2023-05-17 NOTE — DISCHARGE NOTE NURSING/CASE MANAGEMENT/SOCIAL WORK - PATIENT PORTAL LINK FT
You can access the FollowMyHealth Patient Portal offered by [facility] by registering at the following website: [email or web address]. By joining [facility]’s FollowMyHealth portal, you will also be able to view your health information using other applications (apps) compatible with our system.

## 2023-05-17 NOTE — DISCHARGE NOTE NURSING/CASE MANAGEMENT/SOCIAL WORK - NSDCPEFALRISK_GEN_ALL_CORE
For information on Fall & Injury Prevention, visit: [email or web address] OR  [email or web address] OR  https://www.cdc.gov/steadi/patient.html

## 2023-05-17 NOTE — PROGRESS NOTE ADULT - ASSESSMENT
Patient is a 68 y/o male PMH of HTN, OA, right TKA, who presented with recently diagnosed, brain mass, who presented to [facility] for brain tumor resection 4/24/23 Pathology positive for glioblastoma; patient  s/p craniotomy for resection of L brain tumor 4/25/23. Hospital course complicated by hyponatremia. Admitted for multidisciplinary rehab program- pt/ot/dvt ppx    # Glioblastoma s/p craniotomy for resection of L brain tumor 4/25/23 right hemiparesis  - continue decadron  through 6/3  - Keppra seizure ppx  - Planning for future Radiation therapy and chemo. patient has had mapping/mask made already   - Precautions: steroid, SZ, cardiac, fall    # right shoulder subluxation  - KT tape, FES    # HTN   - amlodipine     # DM2   - diet controlled.  - A1C with Estimated Average Glucose Result: 6.5 % (04.25.23 @ 05:30)      # insomnia  - Melatonin   -  trazodone     # DVT prophylaxis:  - Lovenox     will follow  d/w dr. Sepulveda

## 2023-05-17 NOTE — PROGRESS NOTE ADULT - COMMENTS
Patient seen with wife via speaker phone. Printout of recent lab values provided and reviewed. Sheila also wishes to have reports from prior hospital, including biopsy results, op report, dc summary etc. Will have SW help facilitate request through med records and sign release form, discussed with SW and patient and family.     Authorization obtained . For [facility] tomorrow at one of their choices   Patient reports sleeping well last night, woke at 5 AM but feels more well rested, no significant interrupted sleep. Last BM 5/16

## 2023-05-17 NOTE — DISCHARGE NOTE PROVIDER - NSDCCPCAREPLAN_GEN_ALL_CORE_FT
PRINCIPAL DISCHARGE DIAGNOSIS  Diagnosis: Glioblastoma  Assessment and Plan of Treatment: continue decadron 2 mg every 12 hours until at leaset 6/3 or instructed by your neurosurgeon/oncologist. Notify your provider for any worsening weakness, headaches, difficulty speaking, visual change, or seizure. Continue keppra for prevention seizures. Continue PT, OT SLP therapies to address the weakness on the right side and you function. Follow with your radiation and neuro oncologist to begin radiation therapy and chemo treatments      SECONDARY DISCHARGE DIAGNOSES  Diagnosis: Essential hypertension  Assessment and Plan of Treatment: continue amlodipine. follow with your PCP    Diagnosis: Diabetes mellitus  Assessment and Plan of Treatment: continue carb controlled diet . Follow with your PCP and montior sugars especially on decadron    Diagnosis: Insomnia  Assessment and Plan of Treatment: trazodone for sleep. Practice sleep hygiene techniques

## 2023-05-17 NOTE — DISCHARGE NOTE PROVIDER - CARE PROVIDER_API CALL
Juanjose Johnston)  Neurosurgery  [address], [address] [place] [address]  Phone: [phone]  Fax: [phone]  Follow Up Time: 1 month    Maxine Sepulveda  Physical Medicine and Rehabilitation  [address] [place] [address]  Phone: [phone]  Fax: [phone]  Follow Up Time: 1 month    Delmis Causey; MIKHAIL)  Hematology; Internal Medicine; Medical Oncology  [address] [place] [address]  Phone: [phone]  Fax: [phone]  Follow Up Time: 1 week

## 2023-05-17 NOTE — PROGRESS NOTE ADULT - MOTOR
bilateral calves soft no TTP  right shoulder subluxation, no warmth 0/5 fingers, elbow 2/5 shoulder elevation

## 2023-05-17 NOTE — PROGRESS NOTE ADULT - ASSESSMENT
Patient is a 68 y/o male PMH of HTN, OA, right TKA, who presented with recently diagnosed, brain mass, who presented to [facility] for brain tumor resection 4/24/23 Pathology positive for glioblastoma; patient  s/p craniotomy for resection of L brain tumor 4/25/23.    # Glioblastoma s/p craniotomy for resection of L brain tumor 4/25/23 right hemiparesis  - continue decadron 2mg PO BID through 6/3  - keppra 750mg PO BID for seizure ppx  - Odell craniotomy dc'd 5/9  - Continue PT/OT 3 hours daily 5 x week. Neuropsychology and rec therapy. For [facility] transfer tomorrow 5/18  - Planning for future Radiation therapy and chemo. patient has had mapping/mask made already   - Precautions: steroid, SZ, cardiac, fall    # right shoulder subluxation  - KT tape, FES  - have right elbow supported on pillow    # HTN   - amlodipine 10mg PO QD   - (125/78 - 134/75) 5/17  - PCP f/u on dc    # DM2   - glu 154 5/11  - PCP f/u on dc    # Sleep:  - Melatonin PRN  -  trazodone 50 mg qhs    # Pain:  - Tylenol PRN    # GI/Bowel:  - miralax 17g PO BID   - Senna 2 tabs daily  - pantoprazole   - asymptomatic elevated alk phos 139 5/11--> 98 5/15, WNL    # Diet   - Regular, CCH   - MVI 5/9  - B12 level: 654 (5/10) WNL. Discussed with patient , provided copies of labwork    # DVT prophylaxis:  - Last doppler 5/1/23 - Neg.  - lovenox 40mg SC QD   - SCDs    # Case discussed in IDT rounds 5/15:  - max assist bADLs , max assist transfers (from prior report), min/mod assist ambulation 40 feet with fernando rail, tolerating reg solid thin liquid diet, SLP goals met  - goals: min assist bADLs, CG/min assist transfers and short distance household ambulation  -Medical forms for provided to for verification for cancelled travel plans due to illness, completed 5/15  - COVID swab PCR ordered for dc to [facility]. Scheduled for dc tomorrow 5/18 per facility request, auth obtained    # LABS   COVID swab    --------------------------------------------  Outpatient Follow up:    Juanjose Johnston)  Neurosurgery  [address] [address]  Phone: [phone]  Fax: [phone]  Follow Up Time:     Delmis Causey; MIKHAIL)  Hematology; Internal Medicine; Medical Oncology  [address] [place] [address]  Phone: [phone]  Fax: [phone]  Follow Up Time:

## 2023-05-17 NOTE — DISCHARGE NOTE PROVIDER - PROVIDER TOKENS
PROVIDER:[TOKEN:[[id number]],FOLLOWUP:[1 month]],PROVIDER:[TOKEN:[[id number]],FOLLOWUP:[1 month]],PROVIDER:[TOKEN:[[id number]],FOLLOWUP:[1 week]]

## 2023-05-17 NOTE — DISCHARGE NOTE PROVIDER - NSDCCAREPROVSEEN_GEN_ALL_CORE_FT
Kim, Bibiana Sepulveda, Maxine Aranda, Sarika Vazquez-Casals, Gonzalo A Bibiana Alvarez, Lisa P Vazquez-Casals, Jessica Conteh

## 2023-05-18 ENCOUNTER — APPOINTMENT (OUTPATIENT)
Dept: HEMATOLOGY ONCOLOGY | Facility: CLINIC | Age: 68
End: 2023-05-18
Payer: COMMERCIAL

## 2023-05-18 PROCEDURE — 99232 SBSQ HOSP IP/OBS MODERATE 35: CPT

## 2023-05-18 PROCEDURE — 99215 OFFICE O/P EST HI 40 MIN: CPT | Mod: 95

## 2023-05-18 RX ADMIN — AMLODIPINE BESYLATE 10 MILLIGRAM(S): 2.5 TABLET ORAL at 05:38

## 2023-05-18 RX ADMIN — ENOXAPARIN SODIUM 40 MILLIGRAM(S): 100 INJECTION SUBCUTANEOUS at 05:37

## 2023-05-18 RX ADMIN — SODIUM CHLORIDE 1 GRAM(S): 9 INJECTION INTRAMUSCULAR; INTRAVENOUS; SUBCUTANEOUS at 17:07

## 2023-05-18 RX ADMIN — Medication 2 MILLIGRAM(S): at 17:07

## 2023-05-18 RX ADMIN — Medication 1 TABLET(S): at 17:06

## 2023-05-18 RX ADMIN — PANTOPRAZOLE SODIUM 40 MILLIGRAM(S): 20 TABLET, DELAYED RELEASE ORAL at 05:36

## 2023-05-18 RX ADMIN — LEVETIRACETAM 750 MILLIGRAM(S): 250 TABLET, FILM COATED ORAL at 17:07

## 2023-05-18 RX ADMIN — SODIUM CHLORIDE 1 GRAM(S): 9 INJECTION INTRAMUSCULAR; INTRAVENOUS; SUBCUTANEOUS at 05:38

## 2023-05-18 RX ADMIN — Medication 50 MILLIGRAM(S): at 21:51

## 2023-05-18 RX ADMIN — Medication 2 MILLIGRAM(S): at 05:37

## 2023-05-18 RX ADMIN — LEVETIRACETAM 750 MILLIGRAM(S): 250 TABLET, FILM COATED ORAL at 05:38

## 2023-05-18 NOTE — HISTORY OF PRESENT ILLNESS
[Disease: _____________________] : Disease: [unfilled] [de-identified] : 67 year old male with past medical history of HTN, OA of the knee, status post replacement, now with newly diagnosed glioblastoma (MGMT methylated), status post stereotactic needle biopsy and laser interstitial thermal therapy by Dr. Tobar (4/7/2023) here for f/u after recent admission with re-operation of motor cortex GBM by .\par \par Onc hx:\par End March 2023: He initially presented with right upper extremity weakness that started in his right thumb, he noticed he is not able to hold the pen normally.  A few days later the weakness progressed to his whole right hand.  The weakness started progressing, to his proximal right arm, which is when he presented to [facility] [place] for further evaluation.\par 4/1/2023: CT spine- No evidence of acute fracture or traumatic subluxation to the visualized cervical spine. \par Partial erosion of a left maxillary molar tooth. A follow-up dental exam is recommended. \par Partially visualized paranasal sinus disease. \par MRI Brain ([facility], 4/2/2023):\par 2.2 x 2.1 x 1.9 cm intra-axial mass in the superior and posterior left frontal lobe. There is surrounding vasogenic edema with resulting local regional mass effect. The differential diagnosis includes a metastasis or a primary CNS malignancy. \par \par While hospitalized, a routine CT C/A/P was performed. \par \par CT Chest, Abdomen/Pelvis ([facility], 4/3/2023):\par 1. 4 x 7 mm hypodensity at the pancreatic tail. Incompletely characterized on this single phase study. Recommend dedicated CT or MRI with pancreatic mass protocol for further evaluation as clinically appropriate. \par 2. Gallstones/sludge. \par 3. No adenopathy. \par \par A pancreatic protocol CT scan was performed. \par \par CT Pancreas ([facility], 4/4/2023):\par 1. 0.4 x 0.7 cm hypodensity at the pancreatic tail is again noted with no abnormal enhancement, possibly representing an IPMN vs small focus of fat. \par \par MRI Brain ([facility], 4/4/2023):\par Stable heterogeneously enhancing 2.2 cm left frontal lobe lesion. Surrounding T2/FLAIR hyperintensity extending to the posterior body of the corpus callosum appears stable, likely vasogenic edema. No evidence of shift of midline structures. \par \par He underwent stereotactic needle biopsy and laser interstitial thermal therapy by Dr. Tobar (4/7/2023). Dr. Tobar notes that he opted for needle biopsy followed by [facility], in lieu of open surgical resection, due to concern for risk of resulting hand paralysis, as well as damage to descending motor fibers of arm and leg. Following completion of the procedure, he notes in his operative report that, as per T1 post-contrast MRI, he achieved a gross total ablation.\par \par Surgical Pathology ([facility], 4/7/2023):\par A, B) Brain, left, tumor (excision):\par Glioblastoma, IDH pending, CNS WHO grade 4. \par Immunopositive for GFAP and p53\par The Ki-67: 80-90%\par \par MRI Brain ([facility], 4/8/2023):\par 1. Interval biopsy and posttreatment changes involving the intra-axial mass epicenter in the left posterior frontal centrum semiovale. The solid enhancing portions within the mass have decreased, T2 bright signal surrounding the mass has slightly increased and associated local regional mass effect has slightly increased. Correlation with MRI/MRCP as clinically warranted. \par 2. 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 \par 3. Gallstones/sludge. \par \par 4/12/2023: He saw Dr. Bolaños ([facility], [facility]). They discussed TMZ with radiation for GBM and observation for IPMN pancreatic tail. \par 4/18/2023: saw [facility] \par 4/25/2023: Re-operation by var: Path with WHO grade IV GBM, MGMT methylated, IDH WT, EGFR 3+\par Tier I: Variants of Strong Clinical Significance\par TERT C228T\par TP53 p.([id number])\par PIK3CA p.([id number])\par PTEN p.([id number])\par RB1 p.([id number])\par Tier III: Variants of Unknown Clinical\par Significance\par ARID1A p.([id number])\par ERBB2 p.(Qox74Fnq)\par GNAS p.([id number])\par GNAS p.([id number])\par POLE p.([id number]) [de-identified] : Rupesh grade IV, MGMT methylated, IDH WT [de-identified] : NeuroSx: \par Radonc: \par  [Therapy: ___] : Therapy: [unfilled] [FreeTextEntry1] : Planned to start TMZ-RT next week [de-identified] : He reports feeling well.  Currently at [facility] rehab during exam.\par He shows me videos of how he is ambulating with a walker.\par He was supposed to be discharged from [facility] today to [facility], however he was just diagnosed incidentally with COVID.  He was told he needs to be isolated for 3-5 days prior to any transfer to another facility.\par

## 2023-05-18 NOTE — ASSESSMENT
[FreeTextEntry1] : 67 year old male with past medical history of HTN, OA of the knee, status post replacement, now with newly diagnosed glioblastoma (MGMT methylated), status post stereotactic needle biopsy and laser interstitial thermal therapy by Dr. Tobar (4/7/2023). \par \par GBM - MGMT methylated\par Plan to start concurrent chemoRT next week after he goes to subacute rehab, followed by adjuvant chemotherapy with TMZ.\par Side effects of therapy, schedule, efficacy and prognosis discussed.\par He will need weekly blood work during chemoRT.\par Rx for TMZ, Bactrim, Miralax and Zofran all sent to pharmacy.\par Seeing  for clinical trials eval tomorrow via TH.\par \par RTC 6/22 in person at [facility].

## 2023-05-18 NOTE — REASON FOR VISIT
[Home] : at home, [unfilled] , at the time of the visit. [Medical Office: ([facility])___] : at the medical office located in  [Spouse] : spouse [Follow-Up Visit] : a follow-up

## 2023-05-18 NOTE — PROGRESS NOTE ADULT - SUBJECTIVE AND OBJECTIVE BOX
Patient is a 67y old  Male who presents with a chief complaint of GBm sp craniotomy and resection (17 May 2023 15:47)      HPI:  Patient is a 68 y/o male RH dominant PMH of HTN, Osteoarthritis, right total knee replacement 2 months ago, brain mass s/p biopsy 4/7/23 @ [facility] with Dr. Tobar, who presented to [facility] for brain tumor resection 4/24/23. Per family, patient started to have right hand weakness about a month ago that grew progressively worse, then went to ED where brain mass was diagnosed. He was started on keppra 750 mg BID for seizure prophylaxis. Patient had sterotactic needle biopsy and laser interstitial thermal therapy by Dr. Tobar on 4/7/23 which was positive for glioblastoma  s/p craniotomy for resection of L brain tumor 4/25/23. Hospital course complicated by hyponatremia and RUE edema. Doppler negative.     Patient was evaluated by PM&R and therapy for functional deficits and gait/ ADL impairments and recommended acute rehabilitation. Patient was medically optimized for discharge to [facility] on 5/2/23     (02 May 2023 16:29)      PAST MEDICAL & SURGICAL HISTORY:  Hypertension      Osteoarthritis      Brain mass      S/P total knee replacement, right          MEDICATIONS  (STANDING):  amLODIPine   Tablet 10 milliGRAM(s) Oral every 24 hours  dexAMETHasone     Tablet   Oral   dexAMETHasone     Tablet 2 milliGRAM(s) Oral every 12 hours  dextrose 5%. 1000 milliLiter(s) (50 mL/Hr) IV Continuous <Continuous>  dextrose 5%. 1000 milliLiter(s) (100 mL/Hr) IV Continuous <Continuous>  dextrose 50% Injectable 25 Gram(s) IV Push once  dextrose 50% Injectable 25 Gram(s) IV Push once  dextrose 50% Injectable 12.5 Gram(s) IV Push once  enoxaparin Injectable 40 milliGRAM(s) SubCutaneous every 24 hours  glucagon  Injectable 1 milliGRAM(s) IntraMuscular once  levETIRAcetam 750 milliGRAM(s) Oral two times a day  multivitamin 1 Tablet(s) Oral daily  pantoprazole    Tablet 40 milliGRAM(s) Oral before breakfast  polyethylene glycol 3350 17 Gram(s) Oral two times a day  senna 2 Tablet(s) Oral at bedtime  sodium chloride 1 Gram(s) Oral every 12 hours  traZODone 50 milliGRAM(s) Oral at bedtime    MEDICATIONS  (PRN):  acetaminophen     Tablet .. 650 milliGRAM(s) Oral every 6 hours PRN Mild Pain (1 - 3)  benzonatate 100 milliGRAM(s) Oral three times a day PRN Cough  dextrose Oral Gel 15 Gram(s) Oral once PRN Blood Glucose LESS THAN 70 milliGRAM(s)/deciliter      Allergies    No Known Allergies    Intolerances          VITALS  67y  Vital Signs Last 24 Hrs  T(C): 37 (17 May 2023 20:19), Max: 37 (17 May 2023 20:19)  T(F): 98.6 (17 May 2023 20:19), Max: 98.6 (17 May 2023 20:19)  HR: 68 (18 May 2023 05:43) (68 - 81)  BP: 127/79 (18 May 2023 05:43) (124/73 - 127/79)  BP(mean): --  RR: 16 (18 May 2023 05:43) (16 - 16)  SpO2: 96% (18 May 2023 05:43) (96% - 96%)    Parameters below as of 18 May 2023 05:43  Patient On (Oxygen Delivery Method): room air      Daily     Daily         RECENT LABS:                      CAPILLARY BLOOD GLUCOSE

## 2023-05-18 NOTE — PROGRESS NOTE ADULT - ASSESSMENT
Patient is a 66 y/o male PMH of HTN, OA, right TKA, who presented with recently diagnosed, brain mass, who presented to [facility] for brain tumor resection 4/24/23 Pathology positive for glioblastoma; patient  s/p craniotomy for resection of L brain tumor 4/25/23.    # Glioblastoma s/p craniotomy for resection of L brain tumor 4/25/23 right hemiparesis  - continue decadron 2mg PO BID through 6/3  - keppra 750mg PO BID for seizure ppx  - Odell craniotomy dc'd 5/9  - Continue PT/OT 3 hours daily 5 x week. Neuropsychology and rec therapy  - Planning for future Radiation therapy and chemo. patient has had mapping/mask made already   - Precautions: steroid, SZ, cardiac, fall. contact .airborne precautions    # right shoulder subluxation  - KT tape, FES  - have right elbow supported on pillow    # newly dx COVID + 5/17  - on isolation day #1 5/18  - asymnptomatic, afebrile O2 sat RA 96%  - reviewed dx, isolation protocol with patient and family 5/18    # HTN   - amlodipine 10mg PO QD   -  (124/73 - 127/79) 5/18  - PCP f/u on dc    # DM2   - glu 154 5/11  - PCP f/u on dc    # Sleep:  - Melatonin PRN  -  trazodone 50 mg qhs    # Pain:  - Tylenol PRN    # GI/Bowel:  - miralax 17g PO BID   - Senna 2 tabs daily  - pantoprazole   - asymptomatic elevated alk phos, resolved    # Diet   - Regular, CCH   - MVI 5/9  - B12 level: 654 (5/10) WNL. Discussed with patient , provided copies of labwork 5/17    # DVT prophylaxis:  - Last doppler 5/1/23 - Neg.  - lovenox 40mg SC QD   - SCDs    # Case discussed in IDT rounds 5/15:  - max assist bADLs , max assist transfers (from prior report), min/mod assist ambulation 40 feet with fernando rail, tolerating reg solid thin liquid diet, SLP goals met  - goals: min assist bADLs, CG/min assist transfers and short distance household ambulation  -Medical forms for provided to for verification for cancelled travel plans due to illness, completed 5/15  - Joann on hold due to newly dx COVID + status 5/17 resulted. Patient and family aware, RT also delayed due to isolation and diagnosis    # LABS  CBC BMP 5/22      --------------------------------------------  Outpatient Follow up:    Juanjose Johnston)  Neurosurgery  [address], [address] [place] [address]  Phone: [phone]  Fax: [phone]  Follow Up Time:     Delmis Causey; MIKHAIL)  Hematology; Internal Medicine; Medical Oncology  [address] [place] [address]  Phone: [phone]  Fax: [phone]  Follow Up Time:

## 2023-05-18 NOTE — PROGRESS NOTE ADULT - COMMENTS
Patient seen with family at bedside. No fever, no cough, no chills or myalgia. No nasal congestion, no N/V or diarrhea. no leukocytosis    COVId+ test, recommended isolation period, and impact on planned RT discussed with patient and wife

## 2023-05-19 ENCOUNTER — NON-APPOINTMENT (OUTPATIENT)
Age: 68
End: 2023-05-19

## 2023-05-19 ENCOUNTER — APPOINTMENT (OUTPATIENT)
Dept: NEUROSURGERY | Facility: CLINIC | Age: 68
End: 2023-05-19
Payer: COMMERCIAL

## 2023-05-19 PROCEDURE — 99024 POSTOP FOLLOW-UP VISIT: CPT

## 2023-05-19 PROCEDURE — 99232 SBSQ HOSP IP/OBS MODERATE 35: CPT

## 2023-05-19 RX ADMIN — Medication 2 MILLIGRAM(S): at 17:32

## 2023-05-19 RX ADMIN — Medication 1 TABLET(S): at 17:32

## 2023-05-19 RX ADMIN — AMLODIPINE BESYLATE 10 MILLIGRAM(S): 2.5 TABLET ORAL at 05:58

## 2023-05-19 RX ADMIN — SODIUM CHLORIDE 1 GRAM(S): 9 INJECTION INTRAMUSCULAR; INTRAVENOUS; SUBCUTANEOUS at 17:31

## 2023-05-19 RX ADMIN — LEVETIRACETAM 750 MILLIGRAM(S): 250 TABLET, FILM COATED ORAL at 05:58

## 2023-05-19 RX ADMIN — Medication 50 MILLIGRAM(S): at 21:36

## 2023-05-19 RX ADMIN — SODIUM CHLORIDE 1 GRAM(S): 9 INJECTION INTRAMUSCULAR; INTRAVENOUS; SUBCUTANEOUS at 05:59

## 2023-05-19 RX ADMIN — ENOXAPARIN SODIUM 40 MILLIGRAM(S): 100 INJECTION SUBCUTANEOUS at 05:58

## 2023-05-19 RX ADMIN — PANTOPRAZOLE SODIUM 40 MILLIGRAM(S): 20 TABLET, DELAYED RELEASE ORAL at 05:59

## 2023-05-19 RX ADMIN — Medication 2 MILLIGRAM(S): at 05:58

## 2023-05-19 RX ADMIN — LEVETIRACETAM 750 MILLIGRAM(S): 250 TABLET, FILM COATED ORAL at 17:31

## 2023-05-19 NOTE — PROGRESS NOTE ADULT - ASSESSMENT
Patient is a 68 y/o male PMH of HTN, OA, right TKA, who presented with recently diagnosed, brain mass, who presented to [facility] for brain tumor resection 4/24/23 Pathology positive for glioblastoma; patient  s/p craniotomy for resection of L brain tumor 4/25/23.    # Glioblastoma s/p craniotomy for resection of L brain tumor 4/25/23 right hemiparesis  - continue decadron 2mg PO BID through 6/3  - keppra 750mg PO BID for seizure ppx  - Odell craniotomy dc'd 5/9  - Continue PT/OT 3 hours daily 5 x week. Neuropsychology and rec therapy  - Planning for future Radiation therapy and chemo. patient has had mapping/mask made already   - Precautions: steroid, SZ, cardiac, fall. contact .airborne precautions    # right shoulder subluxation  - KT tape, FES  - have right elbow supported on pillow    # newly dx COVID + 5/17  - on isolation day #2 5/19  - asymptomatic afebrile O2 sat RA 96%  - reviewed dx, isolation protocol with patient and family 5/18    # HTN   - amlodipine 10mg PO QD   -  (125/72 - 146/83) 5/19  - PCP f/u on dc    # DM2   - glu 154 5/11  - PCP f/u on dc    # Sleep:  - Melatonin PRN  -  trazodone 50 mg qhs    # Pain:  - Tylenol PRN    # GI/Bowel:  - miralax 17g PO BID   - Senna 2 tabs daily  - pantoprazole   - asymptomatic elevated alk phos, resolved    # Diet   - Regular, CCH   - MVI 5/9  - B12 level: 654 (5/10) WNL. Discussed with patient , provided copies of labwork 5/17    # DVT prophylaxis:  - Last doppler 5/1/23 - Neg.  - lovenox 40mg SC QD   - SCDs    # Case discussed in IDT rounds 5/15:  - max assist bADLs , max assist transfers (from prior report), min/mod assist ambulation 40 feet with fernando rail, tolerating reg solid thin liquid diet, SLP goals met  - goals: min assist bADLs, CG/min assist transfers and short distance household ambulation  -Medical forms for provided to for verification for cancelled travel plans due to illness, completed 5/15  - Joann on hold due to newly dx COVID + status 5/17 resulted. Patient and family aware, RT also delayed due to isolation and diagnosis    # LABS  CBC BMP 5/22      --------------------------------------------  Outpatient Follow up:    Juanjose Johnston)  Neurosurgery  [address], [address] [place] [address]  Phone: [phone]  Fax: [phone]  Follow Up Time:     Delmis Causey; MIKHAIL)  Hematology; Internal Medicine; Medical Oncology  [address] [place] [address]  Phone: [phone]  Fax: [phone]  Follow Up Time:   Patient is a 68 y/o male PMH of HTN, OA, right TKA, who presented with recently diagnosed, brain mass, who presented to [facility] for brain tumor resection 4/24/23 Pathology positive for glioblastoma; patient  s/p craniotomy for resection of L brain tumor 4/25/23.    # Glioblastoma s/p craniotomy for resection of L brain tumor 4/25/23 right hemiparesis  - continue decadron 2mg PO BID through 6/3  - keppra 750mg PO BID for seizure ppx  - Odell craniotomy dc'd 5/9  - Continue PT/OT 3 hours daily 5 x week. Neuropsychology and rec therapy  - Planning for future Radiation therapy and chemo. patient has had mapping/mask made already   - Precautions: steroid, SZ, cardiac, fall. contact .airborne precautions    # right shoulder subluxation  - KT tape, FES  - have right elbow supported on pillow    # newly dx COVID + 5/17  - on isolation day #2 5/19  - asymptomatic afebrile O2 sat RA 96%  - reviewed dx, isolation protocol with patient and family 5/18  - SW will f/u with isolation protocol at [facility] as may not require 10 days    # HTN   - amlodipine 10mg PO QD   - (125/72 - 146/83) 5/19  - PCP f/u on dc    # DM2   - glu 154 5/11  - PCP f/u on dc    # Sleep:  - Melatonin PRN  -  trazodone 50 mg qhs    # Pain:  - Tylenol PRN    # GI/Bowel:  - miralax 17g PO BID   - Senna 2 tabs daily  - pantoprazole   - asymptomatic elevated alk phos, resolved    # Diet   - Regular, CCH   - MVI 5/9  - B12 level: 654 (5/10) WNL. Discussed with patient , provided copies of labwork 5/17    # DVT prophylaxis:  - Last doppler 5/1/23 - Neg.  - lovenox 40mg SC QD   - SCDs    # Case discussed in IDT rounds 5/15:  - max assist bADLs , max assist transfers (from prior report), min/mod assist ambulation 40 feet with fernando rail, tolerating reg solid thin liquid diet, SLP goals met  - goals: min assist bADLs, CG/min assist transfers and short distance household ambulation  -Medical forms for provided to for verification for cancelled travel plans due to illness, completed 5/15  - Joann on hold due to newly dx COVID + status 5/17 resulted. Patient and family aware, RT also delayed due to isolation and diagnosis. SW to follow on facility's isolation protocol  - copies of labwork, MRI brain, surgical path report provided to patient 5/19    # LABS  CBC BMP 5/22      --------------------------------------------  Outpatient Follow up:    Juanjose Johnston)  Neurosurgery  [address], [address] [place] [address]  Phone: [phone]  Fax: [phone]  Follow Up Time:     Delmis Causey; MIKHAIL)  Hematology; Internal Medicine; Medical Oncology  [address] [place] [address]  Phone: [phone]  Fax: [phone]  Follow Up Time:

## 2023-05-19 NOTE — ASSESSMENT
[FreeTextEntry1] : My impression is that the patient suffers from a newly diagnosed WHO grade IV GBM.Wound is healing well.  Will start ChemoRT soon and will come in with next MRI

## 2023-05-19 NOTE — PROGRESS NOTE ADULT - ATTENDING COMMENTS
Progress note amended to include my discussions with patient, resident, hospitalist, RN, SW, PT and my findings    I had met with patient and family yesterday to discuss COVID diagnosis and our isolation protocol, delay in [facility] transfer. Was informed today that protocol in [facility] may differ (5 days); team will follow up. Provided patient this morning with path results as well as recent MRI read as per his/family's request. He is sitting in EOB, comfortable, no cough orSOB, no fever, no chills. Baseline mentation. He wasn't sure if there was reduced eye opening on right; no visual  change, no injection. EOMI, no ptosis, no lid or periorbital swelling. He later states he may have lay on that side to sleep and only noted it on awakening. O2 sat after exertion with PT 96% RA.     Incision healing well, has teleheatlh appt with NSGY at 10 AM. Current steroid dose and right HP as result of location GBM/surgery and f/u discussed. Vitals reviewed, stable. On airborne/contact isolation due to new COVID status Day #2. continue program, dc planning

## 2023-05-19 NOTE — REASON FOR VISIT
[de-identified] : craniotomy for resection [de-identified] : 4/25/23 [de-identified] : PATH: WHO grade IV GBM, IDH wild type, 1p19q non-codeleted, MGMT methylated, EGFR amplified

## 2023-05-19 NOTE — HISTORY OF PRESENT ILLNESS
[FreeTextEntry1] :  66 y/o male with h/o HTN, Osteoarthritis (s/p right total knee replacement \par 2 months ago), brain mass (s/p biopsy 4/7/23 @ [facility] with Dr. Tobar) presented \par for brain tumor resection. Per family, patient started to have right hand \par weakness about a month ago that got progressively worse, then went to ED where \par brain mass was diagnosed. Started keppra 750 mg BID for seizure prophylaxis. \par Described right arm weakness worsening after surgery and some right lower \par extremity weakness. \par \par S/p craniotomy for resection 4/25/23\par PATH: WHO grade IV GBM, IDH wild type, 1p19q non-codeleted, MGMT methylated, EGFR amplified 3+\par \par Presents today for routine postop visit and screening for upfront clinical trials

## 2023-05-19 NOTE — PROGRESS NOTE ADULT - SUBJECTIVE AND OBJECTIVE BOX
Patient is a 67y old  Male who presents with a chief complaint of GBm sp craniotomy and resection (18 May 2023 17:21)      HPI:  Patient is a 68 y/o male RH dominant PMH of HTN, Osteoarthritis, right total knee replacement 2 months ago, brain mass s/p biopsy 4/7/23 @ [facility] with Dr. Tobar, who presented to [facility] for brain tumor resection 4/24/23. Per family, patient started to have right hand weakness about a month ago that grew progressively worse, then went to ED where brain mass was diagnosed. He was started on keppra 750 mg BID for seizure prophylaxis. Patient had sterotactic needle biopsy and laser interstitial thermal therapy by Dr. Tobar on 4/7/23 which was positive for glioblastoma  s/p craniotomy for resection of L brain tumor 4/25/23. Hospital course complicated by hyponatremia and RUE edema. Doppler negative.     Patient was evaluated by PM&R and therapy for functional deficits and gait/ ADL impairments and recommended acute rehabilitation. Patient was medically optimized for discharge to [facility] on 5/2/23     (02 May 2023 16:29)      SUBJECTIVE HISTORY:  Patient seen and evaluated at bedside.  He reports poor sleep but otherwise without complaints.  Discussed the need to wait till isolation is over to move forward with radiation.    REVIEW OF SYSTEMS:  Denies SOB, cough      PHYSICAL EXAM  Constitutional - NAD, Comfortable  HEENT - EOMI  Chest - good chest expansion, good respiratory effort, CTAB   Cardio - warm and well perfused, RRR, no murmur  Abdomen -  Soft, NTND  Extremities - No peripheral edema, No calf tenderness   Neurologic Exam:                    Cognitive - Awake, Alert, AAOx 3     Cranial Nerves - No facial asymmetry,     Motor -  RUE in sling     OculoVestibular -  No nystagmus  Psychiatric - Mood stable, Affect WNL    VITALS  67y  Vital Signs Last 24 Hrs  T(C): 36.7 (18 May 2023 21:49), Max: 36.7 (18 May 2023 21:49)  T(F): 98 (18 May 2023 21:49), Max: 98 (18 May 2023 21:49)  HR: 63 (19 May 2023 05:56) (63 - 82)  BP: 146/83 (19 May 2023 05:56) (125/72 - 146/83)  BP(mean): --  RR: 16 (18 May 2023 21:49) (16 - 16)  SpO2: 96% (18 May 2023 21:49) (96% - 96%)    Parameters below as of 18 May 2023 21:49  Patient On (Oxygen Delivery Method): room air      Daily     Daily         RECENT LABS:                      CAPILLARY BLOOD GLUCOSE          MEDICATIONS  (STANDING):  amLODIPine   Tablet 10 milliGRAM(s) Oral every 24 hours  dexAMETHasone     Tablet   Oral   dexAMETHasone     Tablet 2 milliGRAM(s) Oral every 12 hours  dextrose 5%. 1000 milliLiter(s) (50 mL/Hr) IV Continuous <Continuous>  dextrose 5%. 1000 milliLiter(s) (100 mL/Hr) IV Continuous <Continuous>  dextrose 50% Injectable 25 Gram(s) IV Push once  dextrose 50% Injectable 25 Gram(s) IV Push once  dextrose 50% Injectable 12.5 Gram(s) IV Push once  enoxaparin Injectable 40 milliGRAM(s) SubCutaneous every 24 hours  glucagon  Injectable 1 milliGRAM(s) IntraMuscular once  levETIRAcetam 750 milliGRAM(s) Oral two times a day  multivitamin 1 Tablet(s) Oral daily  pantoprazole    Tablet 40 milliGRAM(s) Oral before breakfast  polyethylene glycol 3350 17 Gram(s) Oral two times a day  senna 2 Tablet(s) Oral at bedtime  sodium chloride 1 Gram(s) Oral every 12 hours  traZODone 50 milliGRAM(s) Oral at bedtime    MEDICATIONS  (PRN):  acetaminophen     Tablet .. 650 milliGRAM(s) Oral every 6 hours PRN Mild Pain (1 - 3)  benzonatate 100 milliGRAM(s) Oral three times a day PRN Cough  dextrose Oral Gel 15 Gram(s) Oral once PRN Blood Glucose LESS THAN 70 milliGRAM(s)/deciliter           Patient is a 67y old  Male who presents with a chief complaint of GBm sp craniotomy and resection (18 May 2023 17:21)      HPI:  Patient is a 68 y/o male RH dominant PMH of HTN, Osteoarthritis, right total knee replacement 2 months ago, brain mass s/p biopsy 4/7/23 @ [facility] with Dr. Tobar, who presented to [facility] for brain tumor resection 4/24/23. Per family, patient started to have right hand weakness about a month ago that grew progressively worse, then went to ED where brain mass was diagnosed. He was started on keppra 750 mg BID for seizure prophylaxis. Patient had sterotactic needle biopsy and laser interstitial thermal therapy by Dr. Tobar on 4/7/23 which was positive for glioblastoma  s/p craniotomy for resection of L brain tumor 4/25/23. Hospital course complicated by hyponatremia and RUE edema. Doppler negative.     Patient was evaluated by PM&R and therapy for functional deficits and gait/ ADL impairments and recommended acute rehabilitation. Patient was medically optimized for discharge to [facility] on 5/2/23     (02 May 2023 16:29)      SUBJECTIVE HISTORY:  Patient seen and evaluated at bedside.  He reports poor sleep but otherwise without complaints.  Discussed the need to wait till isolation is over to move forward with radiation. Has teleheatlh appt  this morning with NSGY 10 AM    REVIEW OF SYSTEMS:  Denies SOB, cough      PHYSICAL EXAM  Constitutional - NAD, Comfortable. incision healing well C/D/I  HEENT - EOMI  Chest - good chest expansion, good respiratory effort, CTAB   Cardio - warm and well perfused, RRR, no murmur  Abdomen -  Soft, NTND  Extremities - No peripheral edema, No calf tenderness   Neurologic Exam:                    Cognitive - Awake, Alert, AAOx 3     Cranial Nerves - No facial asymmetry,     Motor -  RUE in sling     OculoVestibular -  No nystagmus  Psychiatric - Mood stable, Affect WNL    VITALS  67y  Vital Signs Last 24 Hrs  T(C): 36.7 (18 May 2023 21:49), Max: 36.7 (18 May 2023 21:49)  T(F): 98 (18 May 2023 21:49), Max: 98 (18 May 2023 21:49)  HR: 63 (19 May 2023 05:56) (63 - 82)  BP: 146/83 (19 May 2023 05:56) (125/72 - 146/83)  BP(mean): --  RR: 16 (18 May 2023 21:49) (16 - 16)  SpO2: 96% (18 May 2023 21:49) (96% - 96%)    Parameters below as of 18 May 2023 21:49  Patient On (Oxygen Delivery Method): room air      Daily     Daily         RECENT LABS:                      CAPILLARY BLOOD GLUCOSE          MEDICATIONS  (STANDING):  amLODIPine   Tablet 10 milliGRAM(s) Oral every 24 hours  dexAMETHasone     Tablet   Oral   dexAMETHasone     Tablet 2 milliGRAM(s) Oral every 12 hours  dextrose 5%. 1000 milliLiter(s) (50 mL/Hr) IV Continuous <Continuous>  dextrose 5%. 1000 milliLiter(s) (100 mL/Hr) IV Continuous <Continuous>  dextrose 50% Injectable 25 Gram(s) IV Push once  dextrose 50% Injectable 25 Gram(s) IV Push once  dextrose 50% Injectable 12.5 Gram(s) IV Push once  enoxaparin Injectable 40 milliGRAM(s) SubCutaneous every 24 hours  glucagon  Injectable 1 milliGRAM(s) IntraMuscular once  levETIRAcetam 750 milliGRAM(s) Oral two times a day  multivitamin 1 Tablet(s) Oral daily  pantoprazole    Tablet 40 milliGRAM(s) Oral before breakfast  polyethylene glycol 3350 17 Gram(s) Oral two times a day  senna 2 Tablet(s) Oral at bedtime  sodium chloride 1 Gram(s) Oral every 12 hours  traZODone 50 milliGRAM(s) Oral at bedtime    MEDICATIONS  (PRN):  acetaminophen     Tablet .. 650 milliGRAM(s) Oral every 6 hours PRN Mild Pain (1 - 3)  benzonatate 100 milliGRAM(s) Oral three times a day PRN Cough  dextrose Oral Gel 15 Gram(s) Oral once PRN Blood Glucose LESS THAN 70 milliGRAM(s)/deciliter

## 2023-05-20 PROCEDURE — 99232 SBSQ HOSP IP/OBS MODERATE 35: CPT

## 2023-05-20 RX ADMIN — SODIUM CHLORIDE 1 GRAM(S): 9 INJECTION INTRAMUSCULAR; INTRAVENOUS; SUBCUTANEOUS at 05:56

## 2023-05-20 RX ADMIN — AMLODIPINE BESYLATE 10 MILLIGRAM(S): 2.5 TABLET ORAL at 05:56

## 2023-05-20 RX ADMIN — SODIUM CHLORIDE 1 GRAM(S): 9 INJECTION INTRAMUSCULAR; INTRAVENOUS; SUBCUTANEOUS at 17:56

## 2023-05-20 RX ADMIN — Medication 50 MILLIGRAM(S): at 21:56

## 2023-05-20 RX ADMIN — Medication 1 TABLET(S): at 18:02

## 2023-05-20 RX ADMIN — LEVETIRACETAM 750 MILLIGRAM(S): 250 TABLET, FILM COATED ORAL at 17:55

## 2023-05-20 RX ADMIN — PANTOPRAZOLE SODIUM 40 MILLIGRAM(S): 20 TABLET, DELAYED RELEASE ORAL at 06:00

## 2023-05-20 RX ADMIN — Medication 2 MILLIGRAM(S): at 05:56

## 2023-05-20 RX ADMIN — LEVETIRACETAM 750 MILLIGRAM(S): 250 TABLET, FILM COATED ORAL at 05:55

## 2023-05-20 RX ADMIN — Medication 2 MILLIGRAM(S): at 17:56

## 2023-05-20 RX ADMIN — ENOXAPARIN SODIUM 40 MILLIGRAM(S): 100 INJECTION SUBCUTANEOUS at 05:56

## 2023-05-20 NOTE — PROGRESS NOTE ADULT - ASSESSMENT
66 yo M  admitted to acute Rehabilitation with Left GBM s/p resection    Pt. Stable  Active Issues    GBM:    --Cont. Dexamethasone   --Cont. Keppra    COVID +  --on isolation day #3  - asymptomatic afebrile O2 sat RA 96%    HTN:   --amlodipine    Sleep--  Trazodone    Hyponatremia  --NaCl tab    DVT ppx:  --Lovenox    No Medication changes:    Cont. comprehensive inpatient PT, OT and Speech    No acute issues,   Cont. current plan of care and medications as per primary team

## 2023-05-20 NOTE — PROGRESS NOTE ADULT - SUBJECTIVE AND OBJECTIVE BOX
Subjective: Seen this AM.  No new complaints or overnight issues, No fever/chills/coughing/congestion    VITALS  T(C): 37.1 (05-19-23 @ 20:47), Max: 37.1 (05-19-23 @ 20:47)  HR: 60 (05-20-23 @ 06:04) (60 - 74)  BP: 138/79 (05-20-23 @ 06:04) (138/79 - 156/72)  RR: 15 (05-20-23 @ 06:04) (15 - 16)  SpO2: 98% (05-20-23 @ 06:04) (98% - 99%)  Wt(kg): --    REVIEW OF SYSTEMS  Pertinent in last 24 h: Neurological deficits      Physical Exam:  Constitutional - NAD, Comfortable  HEENT - NCAT, EOMI  Chest - breathing comfortably on RA  Cardiovascular -  Warm well perfused  Extremities - No edema, No calf tenderness   Neurologic Exam -    Stable                Cognitive - Awake, Alert, AAO x3     Cranial Nerves -stable     Motor - right HP  Psychiatric - Mood stable, Affect WNL  -    RECENT LABS/IMAGING                  MEDICATIONS   acetaminophen     Tablet .. 650 milliGRAM(s) every 6 hours PRN  amLODIPine   Tablet 10 milliGRAM(s) every 24 hours  benzonatate 100 milliGRAM(s) three times a day PRN  dexAMETHasone     Tablet 2 milliGRAM(s) every 12 hours  dexAMETHasone     Tablet     dextrose 5%. 1000 milliLiter(s) <Continuous>  dextrose 5%. 1000 milliLiter(s) <Continuous>  dextrose 50% Injectable 12.5 Gram(s) once  dextrose 50% Injectable 25 Gram(s) once  dextrose 50% Injectable 25 Gram(s) once  dextrose Oral Gel 15 Gram(s) once PRN  enoxaparin Injectable 40 milliGRAM(s) every 24 hours  glucagon  Injectable 1 milliGRAM(s) once  levETIRAcetam 750 milliGRAM(s) two times a day  multivitamin 1 Tablet(s) daily  pantoprazole    Tablet 40 milliGRAM(s) before breakfast  polyethylene glycol 3350 17 Gram(s) two times a day  senna 2 Tablet(s) at bedtime  sodium chloride 1 Gram(s) every 12 hours  traZODone 50 milliGRAM(s) at bedtime      --------------------------------------------------------------------

## 2023-05-21 PROCEDURE — 99232 SBSQ HOSP IP/OBS MODERATE 35: CPT

## 2023-05-21 RX ADMIN — Medication 1 TABLET(S): at 12:35

## 2023-05-21 RX ADMIN — LEVETIRACETAM 750 MILLIGRAM(S): 250 TABLET, FILM COATED ORAL at 06:20

## 2023-05-21 RX ADMIN — SODIUM CHLORIDE 1 GRAM(S): 9 INJECTION INTRAMUSCULAR; INTRAVENOUS; SUBCUTANEOUS at 18:50

## 2023-05-21 RX ADMIN — SODIUM CHLORIDE 1 GRAM(S): 9 INJECTION INTRAMUSCULAR; INTRAVENOUS; SUBCUTANEOUS at 06:20

## 2023-05-21 RX ADMIN — PANTOPRAZOLE SODIUM 40 MILLIGRAM(S): 20 TABLET, DELAYED RELEASE ORAL at 06:20

## 2023-05-21 RX ADMIN — ENOXAPARIN SODIUM 40 MILLIGRAM(S): 100 INJECTION SUBCUTANEOUS at 06:20

## 2023-05-21 RX ADMIN — LEVETIRACETAM 750 MILLIGRAM(S): 250 TABLET, FILM COATED ORAL at 18:50

## 2023-05-21 RX ADMIN — Medication 50 MILLIGRAM(S): at 21:13

## 2023-05-21 RX ADMIN — AMLODIPINE BESYLATE 10 MILLIGRAM(S): 2.5 TABLET ORAL at 06:20

## 2023-05-21 RX ADMIN — Medication 2 MILLIGRAM(S): at 18:50

## 2023-05-21 RX ADMIN — Medication 2 MILLIGRAM(S): at 06:20

## 2023-05-21 NOTE — PROGRESS NOTE ADULT - SUBJECTIVE AND OBJECTIVE BOX
Subjective: No new complaints or overnight issues,  Seen this AM.  Denies coughing, congestion, Fever, chills or myalgias    VITALS  T(C): 36.8 (05-21-23 @ 08:20), Max: 36.8 (05-21-23 @ 08:20)  HR: 62 (05-21-23 @ 08:20) (60 - 81)  BP: 146/83 (05-21-23 @ 08:20) (122/77 - 146/83)  RR: 14 (05-21-23 @ 08:20) (14 - 16)  SpO2: 98% (05-21-23 @ 08:20) (97% - 98%)  Wt(kg): --    REVIEW OF SYSTEMS  Pertinent in last 24 h: Neurological deficits      Physical Exam:  Constitutional - NAD, Comfortable  HEENT - NCAT, EOMI  Chest - breathing comfortably on RA  Cardiovascular -  Warm well perfused  Extremities - No edema, No calf tenderness   Neurologic Exam -    Stable                Cognitive - Awake, Alert, AAO x3     Cranial Nerves -stable     Motor - right HP  Psychiatric - Mood stable, Affect WNL  -    RECENT LABS/IMAGING                  MEDICATIONS   acetaminophen     Tablet .. 650 milliGRAM(s) every 6 hours PRN  amLODIPine   Tablet 10 milliGRAM(s) every 24 hours  benzonatate 100 milliGRAM(s) three times a day PRN  dexAMETHasone     Tablet     dexAMETHasone     Tablet 2 milliGRAM(s) every 12 hours  dextrose 5%. 1000 milliLiter(s) <Continuous>  dextrose 5%. 1000 milliLiter(s) <Continuous>  dextrose 50% Injectable 12.5 Gram(s) once  dextrose 50% Injectable 25 Gram(s) once  dextrose 50% Injectable 25 Gram(s) once  dextrose Oral Gel 15 Gram(s) once PRN  enoxaparin Injectable 40 milliGRAM(s) every 24 hours  glucagon  Injectable 1 milliGRAM(s) once  levETIRAcetam 750 milliGRAM(s) two times a day  multivitamin 1 Tablet(s) daily  pantoprazole    Tablet 40 milliGRAM(s) before breakfast  polyethylene glycol 3350 17 Gram(s) two times a day  senna 2 Tablet(s) at bedtime  sodium chloride 1 Gram(s) every 12 hours  traZODone 50 milliGRAM(s) at bedtime      --------------------------------------------------------------------

## 2023-05-21 NOTE — PROGRESS NOTE ADULT - ASSESSMENT
66 yo M  admitted to acute Rehabilitation with Left GBM s/p resection    Pt. Stable  Active Issues    GBM:    --Cont. Dexamethasone   --Cont. Keppra    COVID +  --on isolation day #4  - asymptomatic afebrile O2 sat RA 96%    HTN:   --amlodipine    Sleep--  Trazodone    Hyponatremia  --NaCl tab    DVT ppx:  --Lovenox    No Medication changes:    Cont. comprehensive inpatient PT, OT and Speech    No acute issues,   Cont. current plan of care and medications as per primary team

## 2023-05-22 LAB
ALBUMIN SERPL ELPH-MCNC: 3.2 G/DL — LOW (ref 3.3–5)
ALP SERPL-CCNC: 102 U/L — SIGNIFICANT CHANGE UP (ref 40–120)
ALT FLD-CCNC: 40 U/L — SIGNIFICANT CHANGE UP (ref 10–45)
ANION GAP SERPL CALC-SCNC: 8 MMOL/L — SIGNIFICANT CHANGE UP (ref 5–17)
AST SERPL-CCNC: 14 U/L — SIGNIFICANT CHANGE UP (ref 10–40)
BILIRUB SERPL-MCNC: 0.4 MG/DL — SIGNIFICANT CHANGE UP (ref 0.2–1.2)
BUN SERPL-MCNC: 17 MG/DL — SIGNIFICANT CHANGE UP (ref 7–23)
CALCIUM SERPL-MCNC: 9.2 MG/DL — SIGNIFICANT CHANGE UP (ref 8.4–10.5)
CHLORIDE SERPL-SCNC: 103 MMOL/L — SIGNIFICANT CHANGE UP (ref 96–108)
CO2 SERPL-SCNC: 29 MMOL/L — SIGNIFICANT CHANGE UP (ref 22–31)
CREAT SERPL-MCNC: 0.86 MG/DL — SIGNIFICANT CHANGE UP (ref 0.5–1.3)
EGFR: 95 ML/MIN/1.73M2 — SIGNIFICANT CHANGE UP
GLUCOSE SERPL-MCNC: 132 MG/DL — HIGH (ref 70–99)
HCT VFR BLD CALC: 43.9 % — SIGNIFICANT CHANGE UP (ref 39–50)
HGB BLD-MCNC: 14.3 G/DL — SIGNIFICANT CHANGE UP (ref 13–17)
MCHC RBC-ENTMCNC: 28.9 PG — SIGNIFICANT CHANGE UP (ref 27–34)
MCHC RBC-ENTMCNC: 32.6 GM/DL — SIGNIFICANT CHANGE UP (ref 32–36)
MCV RBC AUTO: 88.7 FL — SIGNIFICANT CHANGE UP (ref 80–100)
NRBC # BLD: 0 /100 WBCS — SIGNIFICANT CHANGE UP (ref 0–0)
PLATELET # BLD AUTO: 306 K/UL — SIGNIFICANT CHANGE UP (ref 150–400)
POTASSIUM SERPL-MCNC: 4.3 MMOL/L — SIGNIFICANT CHANGE UP (ref 3.5–5.3)
POTASSIUM SERPL-SCNC: 4.3 MMOL/L — SIGNIFICANT CHANGE UP (ref 3.5–5.3)
PROT SERPL-MCNC: 6.6 G/DL — SIGNIFICANT CHANGE UP (ref 6–8.3)
RBC # BLD: 4.95 M/UL — SIGNIFICANT CHANGE UP (ref 4.2–5.8)
RBC # FLD: 15.2 % — HIGH (ref 10.3–14.5)
SODIUM SERPL-SCNC: 140 MMOL/L — SIGNIFICANT CHANGE UP (ref 135–145)
WBC # BLD: 5.03 K/UL — SIGNIFICANT CHANGE UP (ref 3.8–10.5)
WBC # FLD AUTO: 5.03 K/UL — SIGNIFICANT CHANGE UP (ref 3.8–10.5)

## 2023-05-22 PROCEDURE — 99232 SBSQ HOSP IP/OBS MODERATE 35: CPT

## 2023-05-22 RX ADMIN — ENOXAPARIN SODIUM 40 MILLIGRAM(S): 100 INJECTION SUBCUTANEOUS at 05:35

## 2023-05-22 RX ADMIN — Medication 2 MILLIGRAM(S): at 05:35

## 2023-05-22 RX ADMIN — Medication 1 TABLET(S): at 12:42

## 2023-05-22 RX ADMIN — PANTOPRAZOLE SODIUM 40 MILLIGRAM(S): 20 TABLET, DELAYED RELEASE ORAL at 05:35

## 2023-05-22 RX ADMIN — AMLODIPINE BESYLATE 10 MILLIGRAM(S): 2.5 TABLET ORAL at 05:35

## 2023-05-22 RX ADMIN — LEVETIRACETAM 750 MILLIGRAM(S): 250 TABLET, FILM COATED ORAL at 18:30

## 2023-05-22 RX ADMIN — LEVETIRACETAM 750 MILLIGRAM(S): 250 TABLET, FILM COATED ORAL at 05:35

## 2023-05-22 RX ADMIN — SODIUM CHLORIDE 1 GRAM(S): 9 INJECTION INTRAMUSCULAR; INTRAVENOUS; SUBCUTANEOUS at 18:30

## 2023-05-22 RX ADMIN — Medication 50 MILLIGRAM(S): at 21:34

## 2023-05-22 RX ADMIN — Medication 2 MILLIGRAM(S): at 18:30

## 2023-05-22 RX ADMIN — SODIUM CHLORIDE 1 GRAM(S): 9 INJECTION INTRAMUSCULAR; INTRAVENOUS; SUBCUTANEOUS at 05:35

## 2023-05-22 NOTE — PROGRESS NOTE ADULT - SUBJECTIVE AND OBJECTIVE BOX
Patient is a 67y old  Male who presents with a chief complaint of GBm sp craniotomy and resection (21 May 2023 14:19)      HPI:  Patient is a 68 y/o male RH dominant PMH of HTN, Osteoarthritis, right total knee replacement 2 months ago, brain mass s/p biopsy 23 @ [facility] with Dr. Tobar, who presented to [facility] for brain tumor resection 23. Per family, patient started to have right hand weakness about a month ago that grew progressively worse, then went to ED where brain mass was diagnosed. He was started on keppra 750 mg BID for seizure prophylaxis. Patient had sterotactic needle biopsy and laser interstitial thermal therapy by Dr. Tobar on 23 which was positive for glioblastoma  s/p craniotomy for resection of L brain tumor 23. Hospital course complicated by hyponatremia and RUE edema. Doppler negative.     Patient was evaluated by PM&R and therapy for functional deficits and gait/ ADL impairments and recommended acute rehabilitation. Patient was medically optimized for discharge to [facility] on 23     (02 May 2023 16:29)      SUBJECTIVE HISTORY:      REVIEW OF SYSTEMS:        PHYSICAL EXAM    VITALS  67y  Vital Signs Last 24 Hrs  T(C): 36.7 (22 May 2023 08:00), Max: 36.7 (22 May 2023 08:00)  T(F): 98 (22 May 2023 08:00), Max: 98 (22 May 2023 08:00)  HR: 84 (22 May 2023 08:00) (79 - 84)  BP: 154/82 (22 May 2023 08:00) (129/80 - 154/82)  BP(mean): --  RR: 14 (22 May 2023 08:00) (14 - 16)  SpO2: 95% (22 May 2023 08:00) (95% - 100%)    Parameters below as of 22 May 2023 08:00  Patient On (Oxygen Delivery Method): room air      Daily     Daily Weight in k.4 (21 May 2023 23:59)        RECENT LABS:                      CAPILLARY BLOOD GLUCOSE          MEDICATIONS  (STANDING):  amLODIPine   Tablet 10 milliGRAM(s) Oral every 24 hours  dexAMETHasone     Tablet 2 milliGRAM(s) Oral every 12 hours  dexAMETHasone     Tablet   Oral   dextrose 5%. 1000 milliLiter(s) (100 mL/Hr) IV Continuous <Continuous>  dextrose 5%. 1000 milliLiter(s) (50 mL/Hr) IV Continuous <Continuous>  dextrose 50% Injectable 12.5 Gram(s) IV Push once  dextrose 50% Injectable 25 Gram(s) IV Push once  dextrose 50% Injectable 25 Gram(s) IV Push once  enoxaparin Injectable 40 milliGRAM(s) SubCutaneous every 24 hours  glucagon  Injectable 1 milliGRAM(s) IntraMuscular once  levETIRAcetam 750 milliGRAM(s) Oral two times a day  multivitamin 1 Tablet(s) Oral daily  pantoprazole    Tablet 40 milliGRAM(s) Oral before breakfast  polyethylene glycol 3350 17 Gram(s) Oral two times a day  senna 2 Tablet(s) Oral at bedtime  sodium chloride 1 Gram(s) Oral every 12 hours  traZODone 50 milliGRAM(s) Oral at bedtime    MEDICATIONS  (PRN):  acetaminophen     Tablet .. 650 milliGRAM(s) Oral every 6 hours PRN Mild Pain (1 - 3)  benzonatate 100 milliGRAM(s) Oral three times a day PRN Cough  dextrose Oral Gel 15 Gram(s) Oral once PRN Blood Glucose LESS THAN 70 milliGRAM(s)/deciliter           Patient is a 67y old  Male who presents with a chief complaint of GBm sp craniotomy and resection (21 May 2023 14:19)      HPI:  Patient is a 68 y/o male RH dominant PMH of HTN, Osteoarthritis, right total knee replacement 2 months ago, brain mass s/p biopsy 23 @ [facility] with Dr. Tobar, who presented to [facility] for brain tumor resection 23. Per family, patient started to have right hand weakness about a month ago that grew progressively worse, then went to ED where brain mass was diagnosed. He was started on keppra 750 mg BID for seizure prophylaxis. Patient had sterotactic needle biopsy and laser interstitial thermal therapy by Dr. Tobar on 23 which was positive for glioblastoma  s/p craniotomy for resection of L brain tumor 23. Hospital course complicated by hyponatremia and RUE edema. Doppler negative.     Patient was evaluated by PM&R and therapy for functional deficits and gait/ ADL impairments and recommended acute rehabilitation. Patient was medically optimized for discharge to [facility] on 23     (02 May 2023 16:29)      SUBJECTIVE HISTORY:  Patient seen and evaluated at bedside.  He request letter for medical diagnosis and hospitalization dates.  Discussed possible discharge Wednesday after completion of 5 day isolation.  He reports feeling like his mouth on the right is weaker.  Per patient and OT no noted impact on speech or swallowing.      REVIEW OF SYSTEMS:  Denies SOB, cough  +mild facial droop on right       PHYSICAL EXAM  Constitutional - NAD, Comfortable. incision healing well C/D/I  HEENT - EOMI  Chest - good chest expansion, good respiratory effort, CTAB   Cardio - warm and well perfused, RRR, no murmur  Abdomen -  Soft, NTND  Extremities - No peripheral edema, No calf tenderness   Neurologic Exam:                    Cognitive - Awake, Alert, AAOx 3     Cranial Nerves - mild right sided lip droop     Motor -  RUE EF 2/5 and EE 1/5     OculoVestibular -  No nystagmus  Psychiatric - Mood stable, Affect WNL      VITALS  67y  Vital Signs Last 24 Hrs  T(C): 36.7 (22 May 2023 08:00), Max: 36.7 (22 May 2023 08:00)  T(F): 98 (22 May 2023 08:00), Max: 98 (22 May 2023 08:00)  HR: 84 (22 May 2023 08:00) (79 - 84)  BP: 154/82 (22 May 2023 08:00) (129/80 - 154/82)  BP(mean): --  RR: 14 (22 May 2023 08:00) (14 - 16)  SpO2: 95% (22 May 2023 08:00) (95% - 100%)    Parameters below as of 22 May 2023 08:00  Patient On (Oxygen Delivery Method): room air      Daily     Daily Weight in k.4 (21 May 2023 23:59)        RECENT LABS:                      CAPILLARY BLOOD GLUCOSE          MEDICATIONS  (STANDING):  amLODIPine   Tablet 10 milliGRAM(s) Oral every 24 hours  dexAMETHasone     Tablet 2 milliGRAM(s) Oral every 12 hours  dexAMETHasone     Tablet   Oral   dextrose 5%. 1000 milliLiter(s) (100 mL/Hr) IV Continuous <Continuous>  dextrose 5%. 1000 milliLiter(s) (50 mL/Hr) IV Continuous <Continuous>  dextrose 50% Injectable 12.5 Gram(s) IV Push once  dextrose 50% Injectable 25 Gram(s) IV Push once  dextrose 50% Injectable 25 Gram(s) IV Push once  enoxaparin Injectable 40 milliGRAM(s) SubCutaneous every 24 hours  glucagon  Injectable 1 milliGRAM(s) IntraMuscular once  levETIRAcetam 750 milliGRAM(s) Oral two times a day  multivitamin 1 Tablet(s) Oral daily  pantoprazole    Tablet 40 milliGRAM(s) Oral before breakfast  polyethylene glycol 3350 17 Gram(s) Oral two times a day  senna 2 Tablet(s) Oral at bedtime  sodium chloride 1 Gram(s) Oral every 12 hours  traZODone 50 milliGRAM(s) Oral at bedtime    MEDICATIONS  (PRN):  acetaminophen     Tablet .. 650 milliGRAM(s) Oral every 6 hours PRN Mild Pain (1 - 3)  benzonatate 100 milliGRAM(s) Oral three times a day PRN Cough  dextrose Oral Gel 15 Gram(s) Oral once PRN Blood Glucose LESS THAN 70 milliGRAM(s)/deciliter           Patient is a 67y old  Male who presents with a chief complaint of GBm sp craniotomy and resection (21 May 2023 14:19)      HPI:  Patient is a 68 y/o male RH dominant PMH of HTN, Osteoarthritis, right total knee replacement 2 months ago, brain mass s/p biopsy 23 @ [facility] with Dr. Tobar, who presented to [facility] for brain tumor resection 23. Per family, patient started to have right hand weakness about a month ago that grew progressively worse, then went to ED where brain mass was diagnosed. He was started on keppra 750 mg BID for seizure prophylaxis. Patient had sterotactic needle biopsy and laser interstitial thermal therapy by Dr. Tobar on 23 which was positive for glioblastoma  s/p craniotomy for resection of L brain tumor 23. Hospital course complicated by hyponatremia and RUE edema. Doppler negative.     Patient was evaluated by PM&R and therapy for functional deficits and gait/ ADL impairments and recommended acute rehabilitation. Patient was medically optimized for discharge to [facility] on 23     (02 May 2023 16:29)      SUBJECTIVE HISTORY:  Patient seen and evaluated at bedside.  He request letter for medical diagnosis and hospitalization dates.  Discussed possible discharge Wednesday after completion of 5 day isolation.  He reports feeling like his mouth on the right is weaker.  Per patient and OT no noted impact on speech or swallowing.  relation to location GBM and addressing through planned RT/chemo discussed    requests letter for airline/canceled flight.     REVIEW OF SYSTEMS:  Denies SOB, cough  +mild facial droop on right       PHYSICAL EXAM  Constitutional - NAD, Comfortable. incision healing well C/D/I  HEENT - EOMI  Chest - good chest expansion, good respiratory effort, CTAB   Cardio - warm and well perfused, RRR, no murmur  Abdomen -  Soft, NTND  Extremities - No peripheral edema, No calf tenderness   Neurologic Exam:                    Cognitive - Awake, Alert, AAOx 3     Cranial Nerves - mild right sided lip droop     Motor -  RUE EF 2/5 and EE 1/5     OculoVestibular -  No nystagmus  Psychiatric - Mood stable, Affect WNL      VITALS  67y  Vital Signs Last 24 Hrs  T(C): 36.7 (22 May 2023 08:00), Max: 36.7 (22 May 2023 08:00)  T(F): 98 (22 May 2023 08:00), Max: 98 (22 May 2023 08:00)  HR: 84 (22 May 2023 08:00) (79 - 84)  BP: 154/82 (22 May 2023 08:00) (129/80 - 154/82)  BP(mean): --  RR: 14 (22 May 2023 08:00) (14 - 16)  SpO2: 95% (22 May 2023 08:00) (95% - 100%)    Parameters below as of 22 May 2023 08:00  Patient On (Oxygen Delivery Method): room air      Daily     Daily Weight in k.4 (21 May 2023 23:59)        RECENT LABS:                      CAPILLARY BLOOD GLUCOSE          MEDICATIONS  (STANDING):  amLODIPine   Tablet 10 milliGRAM(s) Oral every 24 hours  dexAMETHasone     Tablet 2 milliGRAM(s) Oral every 12 hours  dexAMETHasone     Tablet   Oral   dextrose 5%. 1000 milliLiter(s) (100 mL/Hr) IV Continuous <Continuous>  dextrose 5%. 1000 milliLiter(s) (50 mL/Hr) IV Continuous <Continuous>  dextrose 50% Injectable 12.5 Gram(s) IV Push once  dextrose 50% Injectable 25 Gram(s) IV Push once  dextrose 50% Injectable 25 Gram(s) IV Push once  enoxaparin Injectable 40 milliGRAM(s) SubCutaneous every 24 hours  glucagon  Injectable 1 milliGRAM(s) IntraMuscular once  levETIRAcetam 750 milliGRAM(s) Oral two times a day  multivitamin 1 Tablet(s) Oral daily  pantoprazole    Tablet 40 milliGRAM(s) Oral before breakfast  polyethylene glycol 3350 17 Gram(s) Oral two times a day  senna 2 Tablet(s) Oral at bedtime  sodium chloride 1 Gram(s) Oral every 12 hours  traZODone 50 milliGRAM(s) Oral at bedtime    MEDICATIONS  (PRN):  acetaminophen     Tablet .. 650 milliGRAM(s) Oral every 6 hours PRN Mild Pain (1 - 3)  benzonatate 100 milliGRAM(s) Oral three times a day PRN Cough  dextrose Oral Gel 15 Gram(s) Oral once PRN Blood Glucose LESS THAN 70 milliGRAM(s)/deciliter

## 2023-05-22 NOTE — PROGRESS NOTE ADULT - ASSESSMENT
Patient is a 68 y/o male PMH of HTN, OA, right TKA, who presented with recently diagnosed, brain mass, who presented to [facility] for brain tumor resection 4/24/23 Pathology positive for glioblastoma; patient  s/p craniotomy for resection of L brain tumor 4/25/23.    # Glioblastoma s/p craniotomy for resection of L brain tumor 4/25/23 right hemiparesis  - continue decadron 2mg PO BID through 6/3  - keppra 750mg PO BID for seizure ppx  - Odell craniotomy dc'd 5/9  - Continue PT/OT 3 hours daily 5 x week. Neuropsychology and rec therapy  - Planning for future Radiation therapy and chemo. patient has had mapping/mask made already   - Precautions: steroid, SZ, cardiac, fall. contact .airborne precautions    # right shoulder subluxation  - KT tape, FES  - have right elbow supported on pillow    # newly dx COVID + 5/17  - on isolation day #2 5/19  - asymptomatic afebrile O2 sat RA 96%  - reviewed dx, isolation protocol with patient and family 5/18  - SW will f/u with isolation protocol at [facility] as may not require 10 days    # HTN   - amlodipine 10mg PO QD   - (125/72 - 146/83) 5/19  - PCP f/u on dc    # DM2   - glu 154 5/11  - PCP f/u on dc    # Sleep:  - Melatonin PRN  -  trazodone 50 mg qhs    # Pain:  - Tylenol PRN    # GI/Bowel:  - miralax 17g PO BID   - Senna 2 tabs daily  - pantoprazole   - asymptomatic elevated alk phos, resolved    # Diet   - Regular, CCH   - MVI 5/9  - B12 level: 654 (5/10) WNL. Discussed with patient , provided copies of labwork 5/17    # DVT prophylaxis:  - Last doppler 5/1/23 - Neg.  - lovenox 40mg SC QD   - SCDs    # Case discussed in IDT rounds 5/15:  - max assist bADLs , max assist transfers (from prior report), min/mod assist ambulation 40 feet with fernando rail, tolerating reg solid thin liquid diet, SLP goals met  - goals: min assist bADLs, CG/min assist transfers and short distance household ambulation  -Medical forms for provided to for verification for cancelled travel plans due to illness, completed 5/15  - Joann on hold due to newly dx COVID + status 5/17 resulted. Patient and family aware, RT also delayed due to isolation and diagnosis. SW to follow on facility's isolation protocol  - copies of labwork, MRI brain, surgical path report provided to patient 5/19    # LABS  CBC BMP 5/22      --------------------------------------------  Outpatient Follow up:    Juanjose Johnston)  Neurosurgery  [address], [address] [place] [address]  Phone: [phone]  Fax: [phone]  Follow Up Time:     Delmis Causey; MIKHAIL)  Hematology; Internal Medicine; Medical Oncology  [address] [place] [address]  Phone: [phone]  Fax: [phone]  Follow Up Time:   Patient is a 66 y/o male PMH of HTN, OA, right TKA, who presented with recently diagnosed, brain mass, who presented to [facility] for brain tumor resection 4/24/23 Pathology positive for glioblastoma; patient  s/p craniotomy for resection of L brain tumor 4/25/23.    # Glioblastoma s/p craniotomy for resection of L brain tumor 4/25/23 right hemiparesis  - continue decadron 2mg PO BID through 6/3  - keppra 750mg PO BID for seizure ppx  - Odell craniotomy dc'd 5/9  - Continue PT/OT 3 hours daily 5 x week. Neuropsychology and rec therapy  - Planning for future Radiation therapy and chemo. patient has had mapping/mask made already   - Precautions: steroid, SZ, cardiac, fall. contact .airborne precautions    # right shoulder subluxation  - KT tape, FES  - have right elbow supported on pillow    # newly dx COVID + 5/17  - on isolation day #4 of 5 5/22  - asymptomatic afebrile O2 sat RA 96%  - reviewed dx, isolation protocol with patient and family 5/18  - [facility] requires 5 day isolation but requesting negative covid swab    # HTN   - amlodipine 10mg PO QD   - (129/80 - 154/82) 5/20  - PCP f/u on dc    # DM2   - glu 154 5/11  - PCP f/u on dc    # Sleep:  - Melatonin PRN  -  trazodone 50 mg qhs    # Pain:  - Tylenol PRN    # GI/Bowel:  - miralax 17g PO BID   - Senna 2 tabs daily  - pantoprazole   - asymptomatic elevated alk phos, resolved    # Diet   - Regular, CCH   - MVI 5/9  - B12 level: 654 (5/10) WNL. Discussed with patient , provided copies of labwork 5/17    # DVT prophylaxis:  - Last doppler 5/1/23 - Neg.  - lovenox 40mg SC QD   - SCDs    # Case discussed in IDT rounds 5/22:  - min-mod assist bADLs , min assist transfers (from prior report), min/mod assist ambulation 25 feet limited to room due to isolation, tolerating reg solid thin liquid diet, SLP goals met and not following at this time.  - goals: min assist bADLs, CG/min assist transfers and short distance household ambulation  -Medical forms for provided to for verification for cancelled travel plans due to illness, completed 5/15  - [facility] on hold due to newly dx COVID + status 5/17 resulted. Patient and family aware, RT also delayed due to isolation and diagnosis. SW to follow on facility's isolation protocol  - copies of labwork, MRI brain, surgical path report provided to patient 5/19  -Plans for possible [facility] discharge 5/24 pending neg covid swab    # LABS  CBC BMP 5/25      --------------------------------------------  Outpatient Follow up:    Juanjose Johnston)  Neurosurgery  [address], [address] [place] [address]  Phone: [phone]  Fax: [phone]  Follow Up Time:     Delmis Causey; MIKHAIL)  Hematology; Internal Medicine; Medical Oncology  [address] [place] [address]  Phone: [phone]  Fax: [phone]  Follow Up Time:   Patient is a 66 y/o male PMH of HTN, OA, right TKA, who presented with recently diagnosed, brain mass, who presented to [facility] for brain tumor resection 4/24/23 Pathology positive for glioblastoma; patient  s/p craniotomy for resection of L brain tumor 4/25/23.    # Glioblastoma s/p craniotomy for resection of L brain tumor 4/25/23 right hemiparesis  - continue decadron 2mg PO BID through 6/3  - keppra 750mg PO BID for seizure ppx  - Odell craniotomy dc'd 5/9  - Continue PT/OT 3 hours daily 5 x week. Neuropsychology and rec therapy  - Planning for future Radiation therapy and chemo. patient has had mapping/mask made already   - Precautions: steroid, SZ, cardiac, fall. contact .airborne precautions day #4 5/22    # right shoulder subluxation  - KT tape, FES  - have right elbow supported on pillow    # newly dx COVID + 5/17  - on isolation day #4 of 5 5/22  - asymptomatic afebrile O2 sat RA 96%  - reviewed dx, isolation protocol with patient and family 5/18  - [facility] requires 5 day isolation but requesting negative covid swab. sent    # HTN   - amlodipine 10mg PO QD   - PCP f/u on dc    # DM2   - glu 154 5/11  - PCP f/u on dc    # Sleep:  - Melatonin PRN  -  trazodone 50 mg qhs    # Pain:  - Tylenol PRN    # GI/Bowel:  - miralax 17g PO BID   - Senna 2 tabs daily  - pantoprazole   - asymptomatic elevated alk phos, resolved    # Diet   - Regular, CCH   - MVI 5/9  - B12 level: 654 (5/10) WNL. Discussed with patient , provided copies of labwork 5/17    # DVT prophylaxis:  - Last doppler 5/1/23 - Neg.  - lovenox 40mg SC QD   - SCDs    # Case discussed in IDT rounds 5/22:  - min-mod assist bADLs , min assist transfers (from prior report), min/mod assist ambulation 25 feet limited to room due to isolation, tolerating reg solid thin liquid diet, SLP goals met and not following at this time.  - goals: min assist bADLs, CG/min assist transfers and short distance household ambulation  -Medical forms for provided to for verification for cancelled travel plans due to illness, completed 5/15  - [facility] on hold due to newly dx COVID + status 5/17 resulted. Patient and family aware, RT also delayed due to isolation and diagnosis. SW to follow on facility's isolation protocol  - copies of labwork, MRI brain, surgical path report provided to patient 5/19  -Plans for possible [facility] discharge 5/24 pending neg covid swab  - letter to be provided re: hospitalization dates    # LABS  CBC BMP 5/23: labs reviewed, stable  COVID swab      --------------------------------------------  Outpatient Follow up:    Juanjose Johnston)  Neurosurgery  [address], [address] [place] [address]  Phone: [phone]  Fax: [phone]  Follow Up Time:     Delmis Causey; MIKHAIL)  Hematology; Internal Medicine; Medical Oncology  [address] [place] [address]  Phone: [phone]  Fax: [phone]  Follow Up Time:   Patient is a 66 y/o male PMH of HTN, OA, right TKA, who presented with recently diagnosed, brain mass, who presented to [facility] for brain tumor resection 4/24/23 Pathology positive for glioblastoma; patient  s/p craniotomy for resection of L brain tumor 4/25/23.    # Glioblastoma s/p craniotomy for resection of L brain tumor 4/25/23 right hemiparesis  - continue decadron 2mg PO BID through 6/3  - keppra 750mg PO BID for seizure ppx  - Odell craniotomy dc'd 5/9  - Continue PT/OT 3 hours daily 5 x week. Neuropsychology and rec therapy  - Planning for future Radiation therapy and chemo. patient has had mapping/mask made already   - Precautions: steroid, SZ, cardiac, fall. contact .airborne precautions day #4 5/22    # right shoulder subluxation  - KT tape, FES  - have right elbow supported on pillow    # newly dx COVID + 5/17  - on isolation day #4 of 5 5/22  - asymptomatic afebrile O2 sat RA 96%  - reviewed dx, isolation protocol with patient and family 5/18  - [facility] requires 5 day isolation but requesting negative covid swab. sent    # HTN   - amlodipine 10mg PO QD   - PCP f/u on dc    # DM2   - glu 154 5/11  - PCP f/u on dc    # Sleep:  - Melatonin PRN  -  trazodone 50 mg qhs    # Pain:  - Tylenol PRN    # GI/Bowel:  - miralax 17g PO BID   - Senna 2 tabs daily  - pantoprazole   - asymptomatic elevated alk phos, resolved    # Diet   - Regular, CCH   - MVI 5/9  - B12 level: 654 (5/10) WNL. Discussed with patient , provided copies of labwork 5/17    # DVT prophylaxis:  - Last doppler 5/1/23 - Neg.  - lovenox 40mg SC QD   - SCDs    # Case discussed in IDT rounds 5/22:  - min-mod assist bADLs , min assist transfers (from prior report), min/mod assist ambulation 25 feet limited to room due to isolation, tolerating reg solid thin liquid diet, SLP goals met and not following at this time.  - goals: min assist bADLs, CG/min assist transfers and short distance household ambulation  -Medical forms for provided to for verification for cancelled travel plans due to illness, completed 5/15  - [facility] on hold due to newly dx COVID + status 5/17 resulted. Patient and family aware, RT also delayed due to isolation and diagnosis. SW to follow on facility's isolation protocol  - copies of labwork, MRI brain, surgical path report provided to patient 5/19  -Plans for possible [facility] discharge 5/24 pending neg covid swab  - letter to be provided re: hospitalization dates    # LABS  CBC BMP 5/23: labs reviewed, stable  COVID swab      --------------------------------------------  Outpatient Follow up:    Juanjose Johnston)  Neurosurgery  [address], [address] [place] [address]  Phone: [phone]  Fax: [phone]  Follow Up Time:     Delmis Causey; MIKHAIL)  Hematology; Internal Medicine; Medical Oncology  [address] [place] [address]  Phone: [phone]  Fax: [phone]  Follow Up Time:

## 2023-05-22 NOTE — PROGRESS NOTE ADULT - ATTENDING COMMENTS
Progress note amended to include my discussions with patient, resident, hospitalist, RN, SW, PT and my findings    Patient seen in isolation, day #4. no fever, no cough, no chilles or myalgia. He does feel some residual "tightness" in right eye and questions whether right face/mouth droop more pronounced. looks sl more pronounced although no change in speech intelligiblity, volume; denies change in swallow. No change in vision. No sensory deficits LT and tongue mildine. EOMI. has improvement in right UE motor return with right elbow flexion 2/5 tricep 2-/5 synergistic pattern. right sided involvement in relation to GBM location, residual swelling and CA discussed as well as importance of planned RT and chemo. Possible dc Wed due to [facility] isolation policy of only 5 days if asymptomatic COVID discussed, he will also reach out to oncologists to let them know and coordination    continue program. Will provide requested letter. Labs reviewed as below, stable    RECENT LABS    Vital Signs Last 24 Hrs  T(C): 36.7 (22 May 2023 08:00), Max: 36.7 (22 May 2023 08:00)  T(F): 98 (22 May 2023 08:00), Max: 98 (22 May 2023 08:00)  HR: 84 (22 May 2023 08:00) (79 - 84)  BP: 154/82 (22 May 2023 08:00) (129/80 - 154/82)  BP(mean): --  RR: 14 (22 May 2023 08:00) (14 - 16)  SpO2: 95% (22 May 2023 08:00) (95% - 100%)    Parameters below as of 22 May 2023 08:00  Patient On (Oxygen Delivery Method): room air                              14.3   5.03  )-----------( 306      ( 22 May 2023 09:30 )             43.9     05-22    140  |  103  |  17  ----------------------------<  132<H>  4.3   |  29  |  0.86    Ca    9.2      22 May 2023 09:30    TPro  6.6  /  Alb  3.2<L>  /  TBili  0.4  /  DBili  x   /  AST  14  /  ALT  40  /  AlkPhos  102  05-22        CAPILLARY BLOOD GLUCOSE Progress note amended to include my discussions with patient, resident, hospitalist, RN, SW, PT and my findings    Patient seen in isolation, day #4. no fever, no cough, no chilles or myalgia. He does feel some residual "tightness" in right eye and questions whether right face/mouth droop more pronounced. looks sl more pronounced although no change in speech intelligiblity, volume; denies change in swallow. No change in vision. No sensory deficits LT and tongue mildine. EOMI. has improvement in right UE motor return with right elbow flexion 2/5 tricep 2-/5 synergistic pattern. right sided involvement in relation to GBM location, residual swelling and CA discussed as well as importance of planned RT and chemo. Possible dc Wed due to [facility] isolation policy of only 5 days if asymptomatic COVID discussed, he will also reach out to oncologists to let them know and coordination    Case discussed in IDt rounds. continue program. Will provide requested letter. Labs reviewed as below, stable    RECENT LABS    Vital Signs Last 24 Hrs  T(C): 36.7 (22 May 2023 08:00), Max: 36.7 (22 May 2023 08:00)  T(F): 98 (22 May 2023 08:00), Max: 98 (22 May 2023 08:00)  HR: 84 (22 May 2023 08:00) (79 - 84)  BP: 154/82 (22 May 2023 08:00) (129/80 - 154/82)  BP(mean): --  RR: 14 (22 May 2023 08:00) (14 - 16)  SpO2: 95% (22 May 2023 08:00) (95% - 100%)    Parameters below as of 22 May 2023 08:00  Patient On (Oxygen Delivery Method): room air                              14.3   5.03  )-----------( 306      ( 22 May 2023 09:30 )             43.9     05-22    140  |  103  |  17  ----------------------------<  132<H>  4.3   |  29  |  0.86    Ca    9.2      22 May 2023 09:30    TPro  6.6  /  Alb  3.2<L>  /  TBili  0.4  /  DBili  x   /  AST  14  /  ALT  40  /  AlkPhos  102  05-22        CAPILLARY BLOOD GLUCOSE

## 2023-05-23 LAB — SARS-COV-2 RNA SPEC QL NAA+PROBE: DETECTED

## 2023-05-23 PROCEDURE — 99232 SBSQ HOSP IP/OBS MODERATE 35: CPT

## 2023-05-23 RX ADMIN — SODIUM CHLORIDE 1 GRAM(S): 9 INJECTION INTRAMUSCULAR; INTRAVENOUS; SUBCUTANEOUS at 18:10

## 2023-05-23 RX ADMIN — Medication 1 TABLET(S): at 12:59

## 2023-05-23 RX ADMIN — LEVETIRACETAM 750 MILLIGRAM(S): 250 TABLET, FILM COATED ORAL at 18:10

## 2023-05-23 RX ADMIN — SODIUM CHLORIDE 1 GRAM(S): 9 INJECTION INTRAMUSCULAR; INTRAVENOUS; SUBCUTANEOUS at 05:54

## 2023-05-23 RX ADMIN — ENOXAPARIN SODIUM 40 MILLIGRAM(S): 100 INJECTION SUBCUTANEOUS at 05:53

## 2023-05-23 RX ADMIN — LEVETIRACETAM 750 MILLIGRAM(S): 250 TABLET, FILM COATED ORAL at 05:54

## 2023-05-23 RX ADMIN — PANTOPRAZOLE SODIUM 40 MILLIGRAM(S): 20 TABLET, DELAYED RELEASE ORAL at 05:55

## 2023-05-23 RX ADMIN — Medication 2 MILLIGRAM(S): at 05:55

## 2023-05-23 RX ADMIN — AMLODIPINE BESYLATE 10 MILLIGRAM(S): 2.5 TABLET ORAL at 05:54

## 2023-05-23 RX ADMIN — Medication 2 MILLIGRAM(S): at 18:10

## 2023-05-23 RX ADMIN — Medication 50 MILLIGRAM(S): at 21:49

## 2023-05-23 NOTE — PROGRESS NOTE ADULT - ATTENDING COMMENTS
Progress note amended to include my discussions with patient, resident, hospitalist, RN, SW, PT and my findings    Patient seen in room with son. Afebrile, no cough or SOB, no chest discomfort, no chills or myalgia. Lungs clear, no tachycardia. He expresses that he hasn't had any visual change, or difficulty finding words, but he has to focus more on chewing or speaking. He can feel his left side of the tongue well but less on right, as if it's pressing against the teeth, and if he doesn't focus he will bite on it. Oral cavity examined, no bleeding/ulceration/biting noted. has mild buccinator weakness. no worsening lid weakness +stable right facial droop. Discussed with team, will add SLP for evaluation swallow and for oral motor exercises for strengthening.    COVID test returend positive, on day #5 isolation. [facility] will not accept with positive test; discussed with patient that it may remain positive even long after infectious period. Will reattempt swab tomorrow, but if persists, may need to look at other alternatives including potential dc home as patient requires RT/chemo. Will need ramp, family training. [facility], as patient does not live in [facility] Reviewed with patient an dfamily    continue program, dc planning

## 2023-05-23 NOTE — PROGRESS NOTE ADULT - ASSESSMENT
Patient is a 66 y/o male PMH of HTN, OA, right TKA, who presented with recently diagnosed, brain mass, who presented to [facility] for brain tumor resection 4/24/23 Pathology positive for glioblastoma; patient  s/p craniotomy for resection of L brain tumor 4/25/23. Hospital course complicated by hyponatremia. Admitted for multidisciplinary rehab program- pt/ot/dvt ppx    # Glioblastoma s/p craniotomy for resection of L brain tumor 4/25/23 right hemiparesis  - continue decadron  through 6/3  - Keppra seizure ppx  - Planning for future Radiation therapy and chemo. patient has had mapping/mask made already   - Precautions: steroid, SZ, cardiac, fall    # right shoulder subluxation  - KT tape, FES    # HTN   - amlodipine     # DM2   - A1C 6.5  - diet controlled    # DVT prophylaxis:  - Lovenox

## 2023-05-23 NOTE — PROGRESS NOTE ADULT - SUBJECTIVE AND OBJECTIVE BOX
Patient is a 67y old  Male who presents with a chief complaint of GBm sp craniotomy and resection (23 May 2023 08:33)      HPI:  Patient is a 66 y/o male RH dominant PMH of HTN, Osteoarthritis, right total knee replacement 2 months ago, brain mass s/p biopsy 4/7/23 @ [facility] with Dr. Tobar, who presented to [facility] for brain tumor resection 4/24/23. Per family, patient started to have right hand weakness about a month ago that grew progressively worse, then went to ED where brain mass was diagnosed. He was started on keppra 750 mg BID for seizure prophylaxis. Patient had sterotactic needle biopsy and laser interstitial thermal therapy by Dr. Tobar on 4/7/23 which was positive for glioblastoma  s/p craniotomy for resection of L brain tumor 4/25/23. Hospital course complicated by hyponatremia and RUE edema. Doppler negative.     Patient was evaluated by PM&R and therapy for functional deficits and gait/ ADL impairments and recommended acute rehabilitation. Patient was medically optimized for discharge to [facility] on 5/2/23     (02 May 2023 16:29)      SUBJECTIVE HISTORY:  Patient seen and evaluated at bedside.  Discussed discharge plans as today is the last day of isolation.  Explained [facility] requirements and whether discharge tomorrow will be feasible.  Explained how covid testing may continue to show positive despite lack of symptoms and completion of isolation.  He reports right sided facial weakness.  He reports being more aware to avoid biting his tongue when speaking and eating.  We discussed resuming SLP therapies to help provide some muscle strengthening.      REVIEW OF SYSTEMS:  denies SOB, cough  + right facial droop      PHYSICAL EXAM  Constitutional - NAD, Comfortable. incision healing well C/D/I  HEENT - EOMI  Chest - good chest expansion, good respiratory effort, CTAB   Cardio - warm and well perfused, RRR, no murmur  Abdomen -  Soft, NTND  Extremities - No peripheral edema, No calf tenderness   Neurologic Exam:                    Cognitive - Awake, Alert, AAOx 3     Cranial Nerves - mild right sided lip droop     Motor -  RUE EF 2/5 and EE 1/5     OculoVestibular -  No nystagmus  Psychiatric - Mood stable, Affect WNL    VITALS  67y  Vital Signs Last 24 Hrs  T(C): 36.7 (23 May 2023 08:53), Max: 36.8 (22 May 2023 21:45)  T(F): 98 (23 May 2023 08:53), Max: 98.2 (22 May 2023 21:45)  HR: 71 (23 May 2023 08:53) (64 - 74)  BP: 136/83 (23 May 2023 08:53) (119/76 - 136/83)  BP(mean): --  RR: 16 (23 May 2023 08:53) (15 - 16)  SpO2: 96% (23 May 2023 08:53) (96% - 97%)    Parameters below as of 23 May 2023 08:53  Patient On (Oxygen Delivery Method): room air      Daily     Daily         RECENT LABS:                          14.3   5.03  )-----------( 306      ( 22 May 2023 09:30 )             43.9     05-22    140  |  103  |  17  ----------------------------<  132<H>  4.3   |  29  |  0.86    Ca    9.2      22 May 2023 09:30    TPro  6.6  /  Alb  3.2<L>  /  TBili  0.4  /  DBili  x   /  AST  14  /  ALT  40  /  AlkPhos  102  05-22    LIVER FUNCTIONS - ( 22 May 2023 09:30 )  Alb: 3.2 g/dL / Pro: 6.6 g/dL / ALK PHOS: 102 U/L / ALT: 40 U/L / AST: 14 U/L / GGT: x                   CAPILLARY BLOOD GLUCOSE          MEDICATIONS  (STANDING):  amLODIPine   Tablet 10 milliGRAM(s) Oral every 24 hours  dexAMETHasone     Tablet   Oral   dexAMETHasone     Tablet 2 milliGRAM(s) Oral every 12 hours  dextrose 5%. 1000 milliLiter(s) (100 mL/Hr) IV Continuous <Continuous>  dextrose 5%. 1000 milliLiter(s) (50 mL/Hr) IV Continuous <Continuous>  dextrose 50% Injectable 25 Gram(s) IV Push once  dextrose 50% Injectable 12.5 Gram(s) IV Push once  dextrose 50% Injectable 25 Gram(s) IV Push once  enoxaparin Injectable 40 milliGRAM(s) SubCutaneous every 24 hours  glucagon  Injectable 1 milliGRAM(s) IntraMuscular once  levETIRAcetam 750 milliGRAM(s) Oral two times a day  multivitamin 1 Tablet(s) Oral daily  pantoprazole    Tablet 40 milliGRAM(s) Oral before breakfast  polyethylene glycol 3350 17 Gram(s) Oral two times a day  senna 2 Tablet(s) Oral at bedtime  sodium chloride 1 Gram(s) Oral every 12 hours  traZODone 50 milliGRAM(s) Oral at bedtime    MEDICATIONS  (PRN):  acetaminophen     Tablet .. 650 milliGRAM(s) Oral every 6 hours PRN Mild Pain (1 - 3)  benzonatate 100 milliGRAM(s) Oral three times a day PRN Cough  dextrose Oral Gel 15 Gram(s) Oral once PRN Blood Glucose LESS THAN 70 milliGRAM(s)/deciliter           Patient is a 67y old  Male who presents with a chief complaint of GBm sp craniotomy and resection (23 May 2023 08:33)      HPI:  Patient is a 68 y/o male RH dominant PMH of HTN, Osteoarthritis, right total knee replacement 2 months ago, brain mass s/p biopsy 4/7/23 @ [facility] with Dr. Tobar, who presented to [facility] for brain tumor resection 4/24/23. Per family, patient started to have right hand weakness about a month ago that grew progressively worse, then went to ED where brain mass was diagnosed. He was started on keppra 750 mg BID for seizure prophylaxis. Patient had sterotactic needle biopsy and laser interstitial thermal therapy by Dr. Tobar on 4/7/23 which was positive for glioblastoma  s/p craniotomy for resection of L brain tumor 4/25/23. Hospital course complicated by hyponatremia and RUE edema. Doppler negative.     Patient was evaluated by PM&R and therapy for functional deficits and gait/ ADL impairments and recommended acute rehabilitation. Patient was medically optimized for discharge to [facility] on 5/2/23     (02 May 2023 16:29)      SUBJECTIVE HISTORY:  Patient seen and evaluated at bedside.  Discussed discharge plans as today is the last day of isolation.  Explained [facility] requirements and whether discharge tomorrow will be feasible.  Explained how covid testing may continue to show positive despite lack of symptoms and completion of isolation.  He reports right sided facial weakness.  He reports being more aware to avoid biting his tongue when speaking and eating.  We discussed resuming SLP therapies to help provide some muscle strengthening and evaluation swallow although he denies coughing/choking    REVIEW OF SYSTEMS:  denies SOB, cough  + right facial droop      PHYSICAL EXAM  Constitutional - NAD, Comfortable. incision healing well C/D/I  HEENT - EOMI. no ulcreations/swelling/biting tonue or inner cheek  Chest - good chest expansion, good respiratory effort, CTAB   Cardio - warm and well perfused, RRR, no murmur  Abdomen -  Soft, NTND  Extremities - No peripheral edema, No calf tenderness   Neurologic Exam:                    Cognitive - Awake, Alert, AAOx 3     Cranial Nerves - mild right sided lip droop     Motor -  RUE EF 2/5 and EE 1/5     OculoVestibular -  No nystagmus  Psychiatric - Mood stable, Affect WNL    VITALS  67y  Vital Signs Last 24 Hrs  T(C): 36.7 (23 May 2023 08:53), Max: 36.8 (22 May 2023 21:45)  T(F): 98 (23 May 2023 08:53), Max: 98.2 (22 May 2023 21:45)  HR: 71 (23 May 2023 08:53) (64 - 74)  BP: 136/83 (23 May 2023 08:53) (119/76 - 136/83)  BP(mean): --  RR: 16 (23 May 2023 08:53) (15 - 16)  SpO2: 96% (23 May 2023 08:53) (96% - 97%)    Parameters below as of 23 May 2023 08:53  Patient On (Oxygen Delivery Method): room air      Daily     Daily         RECENT LABS:                          14.3   5.03  )-----------( 306      ( 22 May 2023 09:30 )             43.9     05-22    140  |  103  |  17  ----------------------------<  132<H>  4.3   |  29  |  0.86    Ca    9.2      22 May 2023 09:30    TPro  6.6  /  Alb  3.2<L>  /  TBili  0.4  /  DBili  x   /  AST  14  /  ALT  40  /  AlkPhos  102  05-22    LIVER FUNCTIONS - ( 22 May 2023 09:30 )  Alb: 3.2 g/dL / Pro: 6.6 g/dL / ALK PHOS: 102 U/L / ALT: 40 U/L / AST: 14 U/L / GGT: x                   CAPILLARY BLOOD GLUCOSE          MEDICATIONS  (STANDING):  amLODIPine   Tablet 10 milliGRAM(s) Oral every 24 hours  dexAMETHasone     Tablet   Oral   dexAMETHasone     Tablet 2 milliGRAM(s) Oral every 12 hours  dextrose 5%. 1000 milliLiter(s) (100 mL/Hr) IV Continuous <Continuous>  dextrose 5%. 1000 milliLiter(s) (50 mL/Hr) IV Continuous <Continuous>  dextrose 50% Injectable 25 Gram(s) IV Push once  dextrose 50% Injectable 12.5 Gram(s) IV Push once  dextrose 50% Injectable 25 Gram(s) IV Push once  enoxaparin Injectable 40 milliGRAM(s) SubCutaneous every 24 hours  glucagon  Injectable 1 milliGRAM(s) IntraMuscular once  levETIRAcetam 750 milliGRAM(s) Oral two times a day  multivitamin 1 Tablet(s) Oral daily  pantoprazole    Tablet 40 milliGRAM(s) Oral before breakfast  polyethylene glycol 3350 17 Gram(s) Oral two times a day  senna 2 Tablet(s) Oral at bedtime  sodium chloride 1 Gram(s) Oral every 12 hours  traZODone 50 milliGRAM(s) Oral at bedtime    MEDICATIONS  (PRN):  acetaminophen     Tablet .. 650 milliGRAM(s) Oral every 6 hours PRN Mild Pain (1 - 3)  benzonatate 100 milliGRAM(s) Oral three times a day PRN Cough  dextrose Oral Gel 15 Gram(s) Oral once PRN Blood Glucose LESS THAN 70 milliGRAM(s)/deciliter

## 2023-05-23 NOTE — PROGRESS NOTE ADULT - SUBJECTIVE AND OBJECTIVE BOX
Patient is a 67y old  Male who presents with a chief complaint of GBm sp craniotomy and resection (22 May 2023 08:24)      SUBJECTIVE / OVERNIGHT EVENTS:  Pt seen and examined at bedside in the presence of therapist while pt sitting in a wheelchair. No acute events overnight.  Pt denies cp, palpitations, sob, abd pain, N/V, fever, chills.    ROS:  All other review of systems negative    Allergies    No Known Allergies    Intolerances        MEDICATIONS  (STANDING):  amLODIPine   Tablet 10 milliGRAM(s) Oral every 24 hours  dexAMETHasone     Tablet   Oral   dexAMETHasone     Tablet 2 milliGRAM(s) Oral every 12 hours  dextrose 5%. 1000 milliLiter(s) (100 mL/Hr) IV Continuous <Continuous>  dextrose 5%. 1000 milliLiter(s) (50 mL/Hr) IV Continuous <Continuous>  dextrose 50% Injectable 25 Gram(s) IV Push once  dextrose 50% Injectable 12.5 Gram(s) IV Push once  dextrose 50% Injectable 25 Gram(s) IV Push once  enoxaparin Injectable 40 milliGRAM(s) SubCutaneous every 24 hours  glucagon  Injectable 1 milliGRAM(s) IntraMuscular once  levETIRAcetam 750 milliGRAM(s) Oral two times a day  multivitamin 1 Tablet(s) Oral daily  pantoprazole    Tablet 40 milliGRAM(s) Oral before breakfast  polyethylene glycol 3350 17 Gram(s) Oral two times a day  senna 2 Tablet(s) Oral at bedtime  sodium chloride 1 Gram(s) Oral every 12 hours  traZODone 50 milliGRAM(s) Oral at bedtime    MEDICATIONS  (PRN):  acetaminophen     Tablet .. 650 milliGRAM(s) Oral every 6 hours PRN Mild Pain (1 - 3)  benzonatate 100 milliGRAM(s) Oral three times a day PRN Cough  dextrose Oral Gel 15 Gram(s) Oral once PRN Blood Glucose LESS THAN 70 milliGRAM(s)/deciliter      Vital Signs Last 24 Hrs  T(C): 36.8 (22 May 2023 21:45), Max: 36.8 (22 May 2023 21:45)  T(F): 98.2 (22 May 2023 21:45), Max: 98.2 (22 May 2023 21:45)  HR: 64 (23 May 2023 05:50) (64 - 74)  BP: 128/77 (23 May 2023 05:50) (119/76 - 128/77)  BP(mean): --  RR: 15 (22 May 2023 21:45) (15 - 15)  SpO2: 97% (22 May 2023 21:45) (97% - 97%)    Parameters below as of 22 May 2023 21:45  Patient On (Oxygen Delivery Method): room air      CAPILLARY BLOOD GLUCOSE        I&O's Summary      PHYSICAL EXAM:  GENERAL: NAD, well-developed male  HEAD:   Normocephalic, Craniotomy scar  NECK: Supple, No JVD  CHEST/LUNG: Clear to auscultation bilaterally; No wheeze, nonlabored breathing  HEART: Regular rate and rhythm; No murmurs, rubs, or gallops  ABDOMEN: Soft, Nontender, Nondistended; Bowel sounds present  EXTREMITIES: No clubbing, cyanosis. Right foot swelling, RUE in sling  NEUROLOGY: AAOx3, Right sided weakness   PSYCH: calm, appropriate mood    LABS:                        14.3   5.03  )-----------( 306      ( 22 May 2023 09:30 )             43.9     05-22    140  |  103  |  17  ----------------------------<  132<H>  4.3   |  29  |  0.86    Ca    9.2      22 May 2023 09:30    TPro  6.6  /  Alb  3.2<L>  /  TBili  0.4  /  DBili  x   /  AST  14  /  ALT  40  /  AlkPhos  102  05-22              RADIOLOGY & ADDITIONAL TESTS:  Results Reviewed:   Imaging Personally Reviewed:  Electrocardiogram Personally Reviewed:    COORDINATION OF CARE:  Care Discussed with Consultants/Other Providers [Y/N]:  Prior or Outpatient Records Reviewed [Y/N]:

## 2023-05-23 NOTE — PROGRESS NOTE ADULT - ASSESSMENT
Patient is a 68 y/o male PMH of HTN, OA, right TKA, who presented with recently diagnosed, brain mass, who presented to [facility] for brain tumor resection 4/24/23 Pathology positive for glioblastoma; patient  s/p craniotomy for resection of L brain tumor 4/25/23.    # Glioblastoma s/p craniotomy for resection of L brain tumor 4/25/23 right hemiparesis  - continue decadron 2mg PO BID through 6/3  - keppra 750mg PO BID for seizure ppx  - Odell craniotomy dc'd 5/9  - Continue PT/OT 3 hours daily 5 x week. Neuropsychology and rec therapy  - Planning for future Radiation therapy and chemo. patient has had mapping/mask made already   - Precautions: steroid, SZ, cardiac, fall. contact .airborne precautions day #5 5/23    # right shoulder subluxation  - KT tape, FES  - have right elbow supported on pillow    # newly dx COVID + 5/17  - on isolation day #5 of 5 5/23  - asymptomatic afebrile O2 sat RA 96%  - reviewed dx, isolation protocol with patient and family 5/18  - [facility] requires 5 day isolation but requesting negative covid swab. sent    # HTN   - amlodipine 10mg PO QD   - PCP f/u on dc    # DM2   - glu 154 5/11  - PCP f/u on dc    # Sleep:  - Melatonin PRN  -  trazodone 50 mg qhs    # Pain:  - Tylenol PRN    # GI/Bowel:  - miralax 17g PO BID   - Senna 2 tabs daily  - pantoprazole   - asymptomatic elevated alk phos, resolved    # Diet   - Regular, CCH   - MVI 5/9  - B12 level: 654 (5/10) WNL. Discussed with patient , provided copies of labwork 5/17    # DVT prophylaxis:  - Last doppler 5/1/23 - Neg.  - lovenox 40mg SC QD   - SCDs    # Case discussed in IDT rounds 5/22:  - min-mod assist bADLs , min assist transfers (from prior report), min/mod assist ambulation 25 feet limited to room due to isolation, tolerating reg solid thin liquid diet, SLP goals met and not following at this time.  - goals: min assist bADLs, CG/min assist transfers and short distance household ambulation  -Medical forms for provided to for verification for cancelled travel plans due to illness, completed 5/15  - [facility] on hold due to newly dx COVID + status 5/17 resulted. Patient and family aware, RT also delayed due to isolation and diagnosis. SW to follow on facility's isolation protocol  - copies of labwork, MRI brain, surgical path report provided to patient 5/19  -Plans for possible [facility] discharge 5/24 pending neg covid swab  - letter to be provided re: hospitalization dates    # LABS  CBC CMP 5/25 pending dc planning      --------------------------------------------  Outpatient Follow up:    Juanjose Johnston)  Neurosurgery  [address], [address] [place] [address]  Phone: [phone]  Fax: [phone]  Follow Up Time:     Delmis Causey; MIKHAIL)  Hematology; Internal Medicine; Medical Oncology  [address] [place] [address]  Phone: [phone]  Fax: [phone]  Follow Up Time:   Patient is a 66 y/o male PMH of HTN, OA, right TKA, who presented with recently diagnosed, brain mass, who presented to [facility] for brain tumor resection 4/24/23 Pathology positive for glioblastoma; patient  s/p craniotomy for resection of L brain tumor 4/25/23.    # Glioblastoma s/p craniotomy for resection of L brain tumor 4/25/23 right hemiparesis  - continue decadron 2mg PO BID through 6/3  - keppra 750mg PO BID for seizure ppx  - Odell craniotomy dc'd 5/9  - Continue PT/OT. SLP added for evaluation communication and swallow given right facial weakness and tongue biting  - may need AFO if dc home  - Planning for future Radiation therapy and chemo. patient has had mapping/mask made already   - Precautions: steroid, SZ, cardiac, fall. contact .airborne precautions day #5 5/23    # right shoulder subluxation  - KT tape, FES  - have right elbow supported on pillow    # newly dx COVID + 5/17  - on isolation day #5 of 5 5/23  - asymptomatic afebrile O2 sat RA 96%  - reviewed dx, isolation protocol with patient and family 5/18  - [facility] requires 5 day isolation but requesting negative covid swab. sent. Will re-send 5/24 (5/23 positive)    # HTN   - amlodipine 10mg PO QD   - PCP f/u on dc    # DM2   - glu 154 5/11  - PCP f/u on dc    # Sleep:  - Melatonin PRN  -  trazodone 50 mg qhs    # Pain:  - Tylenol PRN    # GI/Bowel:  - miralax 17g PO BID   - Senna 2 tabs daily  - pantoprazole   - asymptomatic elevated alk phos, resolved    # Diet   - Regular, CCH   - MVI 5/9  - B12 level: 654 (5/10) WNL. Discussed with patient , provided copies of labwork 5/17    # DVT prophylaxis:  - Last doppler 5/1/23 - Neg.  - lovenox 40mg SC QD   - SCDs    # Case discussed in IDT rounds 5/22:  - min-mod assist bADLs , min assist transfers (from prior report), min/mod assist ambulation 25 feet limited to room due to isolation, tolerating reg solid thin liquid diet, SLP goals met and not following at this time.  - goals: min assist bADLs, CG/min assist transfers and short distance household ambulation  -Medical forms for provided to for verification for cancelled travel plans due to illness, completed 5/15  - [facility] on hold due to newly dx COVID + status 5/17 resulted. Patient and family aware, RT also delayed due to isolation and diagnosis. SW to follow on facility's isolation protocol  - copies of labwork, MRI brain, surgical path report provided to patient 5/19  - letter to be provided re: hospitalization dates  - COVID swab 5/23 positive, although afebrile and clinically asymptomatic. [facility] requires negative test for acceptance. Will reswab tomorrow. May need to consider alternatives for dc including potential home as patient needs to begin RT/chemo treatment          --------------------------------------------  Outpatient Follow up:    Juanjose Johnston)  Neurosurgery  [address], [address] [place] [address]  Phone: [phone]  Fax: [phone]  Follow Up Time:     Delmis Causey; MIKHAIL)  Hematology; Internal Medicine; Medical Oncology  [address] [place] [address]  Phone: [phone]  Fax: [phone]  Follow Up Time:

## 2023-05-24 LAB — SARS-COV-2 RNA SPEC QL NAA+PROBE: DETECTED

## 2023-05-24 PROCEDURE — 99232 SBSQ HOSP IP/OBS MODERATE 35: CPT

## 2023-05-24 RX ADMIN — Medication 1 TABLET(S): at 12:55

## 2023-05-24 RX ADMIN — ENOXAPARIN SODIUM 40 MILLIGRAM(S): 100 INJECTION SUBCUTANEOUS at 05:40

## 2023-05-24 RX ADMIN — LEVETIRACETAM 750 MILLIGRAM(S): 250 TABLET, FILM COATED ORAL at 05:41

## 2023-05-24 RX ADMIN — PANTOPRAZOLE SODIUM 40 MILLIGRAM(S): 20 TABLET, DELAYED RELEASE ORAL at 05:42

## 2023-05-24 RX ADMIN — SODIUM CHLORIDE 1 GRAM(S): 9 INJECTION INTRAMUSCULAR; INTRAVENOUS; SUBCUTANEOUS at 05:41

## 2023-05-24 RX ADMIN — LEVETIRACETAM 750 MILLIGRAM(S): 250 TABLET, FILM COATED ORAL at 17:49

## 2023-05-24 RX ADMIN — SODIUM CHLORIDE 1 GRAM(S): 9 INJECTION INTRAMUSCULAR; INTRAVENOUS; SUBCUTANEOUS at 17:49

## 2023-05-24 RX ADMIN — Medication 50 MILLIGRAM(S): at 21:55

## 2023-05-24 RX ADMIN — Medication 2 MILLIGRAM(S): at 05:41

## 2023-05-24 RX ADMIN — Medication 2 MILLIGRAM(S): at 17:49

## 2023-05-24 RX ADMIN — AMLODIPINE BESYLATE 10 MILLIGRAM(S): 2.5 TABLET ORAL at 05:42

## 2023-05-24 NOTE — PROGRESS NOTE ADULT - ASSESSMENT
Patient is a 66 y/o male PMH of HTN, OA, right TKA, who presented with recently diagnosed, brain mass, who presented to [facility] for brain tumor resection 4/24/23 Pathology positive for glioblastoma; patient  s/p craniotomy for resection of L brain tumor 4/25/23.    # Glioblastoma s/p craniotomy for resection of L brain tumor 4/25/23 right hemiparesis  - continue decadron 2mg PO BID through 6/3  - keppra 750mg PO BID for seizure ppx  - Odell craniotomy dc'd 5/9  - Continue PT/OT./SLP 3 hours daily 5 x week  - Planning for future Radiation therapy and chemo. patient has had mapping/mask made already   - Precautions: steroid, SZ, cardiac, fall. contact /airborne precautions day #6/10 5/24    # right shoulder subluxation  - KT tape, FES  - have right elbow supported on pillow    # newly dx COVID + 5/17  - afebrile, asymptomatic  - COVID swab 5/24 positive  - continue isolation day #6/10    # HTN   - amlodipine 10mg PO QD   - (118/71 - 148/82) 5/24  - PCP f/u on dc    # DM2   - glu 154 5/11  - PCP f/u on dc    # Sleep:  - Melatonin PRN  -  trazodone 50 mg qhs    # Pain:  - Tylenol PRN    # GI/Bowel:  - miralax 17g PO BID   - Senna 2 tabs daily  - pantoprazole   - asymptomatic elevated alk phos, resolved    # Diet   - Regular, CCH   - MVI 5/9  - B12 level: 654 (5/10) WNL. Discussed with patient , provided copies of labwork 5/17    # DVT prophylaxis:  - Last doppler 5/1/23 - Neg.  - lovenox 40mg SC QD   - SCDs    # Case discussed in IDT rounds 5/22:  - min-mod assist bADLs , min assist transfers (from prior report), min/mod assist ambulation 25 feet limited to room due to isolation, tolerating reg solid thin liquid diet, SLP goals met and not following at this time.  - goals: min assist bADLs, CG/min assist transfers and short distance household ambulation  -Medical forms for provided to for verification for cancelled travel plans due to illness, completed 5/15  - [facility] on hold due to newly dx COVID + status 5/17 resulted. Patient and family aware, RT also delayed due to isolation and diagnosis. SW to follow on facility's isolation protocol. Also investigating second [facility] and possible dc home although ramp and WC accessibility, caregiver training will need to be followed through  - copies of labwork, MRI brain, surgical path report provided to patient 5/19  - letter provided re: hospitalization dates  - handicap permit completed 5/24    # LABs  CBC BMP 5/25      --------------------------------------------  Outpatient Follow up:    Juanjose Johnston)  Neurosurgery  [address], [address] [place] [address]  Phone: [phone]  Fax: [phone]  Follow Up Time:     Delmis Causey; MIKHAIL)  Hematology; Internal Medicine; Medical Oncology  [address] [place] [address]  Phone: [phone]  Fax: [phone]  Follow Up Time:

## 2023-05-24 NOTE — PROGRESS NOTE ADULT - COMMENTS
Patient remains on airborne/contact precautions, hospital policy requires 10 days isolation as confirmed with infection control. Remains afebrile, without cough/SOB, no chills or myalgia. No worsening facial droop or numbness from yesterday, started on SLP.    repeat COVID swab today +. Working on [facility] alternatives, but possibility of home dc with family assist if treatment will be delayed further discussed. Patient is open to idea, states he already has town approval for ramp to home. Physical assist required and necessary DME also discussed

## 2023-05-24 NOTE — PROGRESS NOTE ADULT - SUBJECTIVE AND OBJECTIVE BOX
Patient is a 67y old  Male who presents with a chief complaint of GBm sp craniotomy and resection (23 May 2023 11:59)      HPI:  Patient is a 66 y/o male RH dominant PMH of HTN, Osteoarthritis, right total knee replacement 2 months ago, brain mass s/p biopsy 4/7/23 @ [facility] with Dr. Tobar, who presented to [facility] for brain tumor resection 4/24/23. Per family, patient started to have right hand weakness about a month ago that grew progressively worse, then went to ED where brain mass was diagnosed. He was started on keppra 750 mg BID for seizure prophylaxis. Patient had sterotactic needle biopsy and laser interstitial thermal therapy by Dr. Tobar on 4/7/23 which was positive for glioblastoma  s/p craniotomy for resection of L brain tumor 4/25/23. Hospital course complicated by hyponatremia and RUE edema. Doppler negative.     Patient was evaluated by PM&R and therapy for functional deficits and gait/ ADL impairments and recommended acute rehabilitation. Patient was medically optimized for discharge to [facility] on 5/2/23     (02 May 2023 16:29)      PAST MEDICAL & SURGICAL HISTORY:  Hypertension      Osteoarthritis      Brain mass      S/P total knee replacement, right          MEDICATIONS  (STANDING):  amLODIPine   Tablet 10 milliGRAM(s) Oral every 24 hours  dexAMETHasone     Tablet   Oral   dexAMETHasone     Tablet 2 milliGRAM(s) Oral every 12 hours  dextrose 5%. 1000 milliLiter(s) (100 mL/Hr) IV Continuous <Continuous>  dextrose 5%. 1000 milliLiter(s) (50 mL/Hr) IV Continuous <Continuous>  dextrose 50% Injectable 25 Gram(s) IV Push once  dextrose 50% Injectable 12.5 Gram(s) IV Push once  dextrose 50% Injectable 25 Gram(s) IV Push once  enoxaparin Injectable 40 milliGRAM(s) SubCutaneous every 24 hours  glucagon  Injectable 1 milliGRAM(s) IntraMuscular once  levETIRAcetam 750 milliGRAM(s) Oral two times a day  multivitamin 1 Tablet(s) Oral daily  pantoprazole    Tablet 40 milliGRAM(s) Oral before breakfast  polyethylene glycol 3350 17 Gram(s) Oral two times a day  senna 2 Tablet(s) Oral at bedtime  sodium chloride 1 Gram(s) Oral every 12 hours  traZODone 50 milliGRAM(s) Oral at bedtime    MEDICATIONS  (PRN):  acetaminophen     Tablet .. 650 milliGRAM(s) Oral every 6 hours PRN Mild Pain (1 - 3)  benzonatate 100 milliGRAM(s) Oral three times a day PRN Cough  dextrose Oral Gel 15 Gram(s) Oral once PRN Blood Glucose LESS THAN 70 milliGRAM(s)/deciliter      Allergies    No Known Allergies    Intolerances          VITALS  67y  Vital Signs Last 24 Hrs  T(C): 36.6 (24 May 2023 08:29), Max: 37.1 (23 May 2023 21:45)  T(F): 97.8 (24 May 2023 08:29), Max: 98.7 (23 May 2023 21:45)  HR: 55 (24 May 2023 08:29) (55 - 89)  BP: 148/82 (24 May 2023 08:29) (118/71 - 148/82)  BP(mean): --  RR: 16 (24 May 2023 08:29) (16 - 17)  SpO2: 97% (24 May 2023 08:29) (97% - 97%)    Parameters below as of 24 May 2023 08:29  Patient On (Oxygen Delivery Method): room air      Daily     Daily         RECENT LABS:                      CAPILLARY BLOOD GLUCOSE

## 2023-05-24 NOTE — PROGRESS NOTE ADULT - MOTOR
right shoulder 2/5 elbow flexion 2/5 extension 2-/5 flexor syndergy pattern no pain  no calf swelling or TTP

## 2023-05-25 LAB
ALBUMIN SERPL ELPH-MCNC: 3.4 G/DL — SIGNIFICANT CHANGE UP (ref 3.3–5)
ALP SERPL-CCNC: 123 U/L — HIGH (ref 40–120)
ALT FLD-CCNC: 35 U/L — SIGNIFICANT CHANGE UP (ref 10–45)
ANION GAP SERPL CALC-SCNC: 8 MMOL/L — SIGNIFICANT CHANGE UP (ref 5–17)
AST SERPL-CCNC: 13 U/L — SIGNIFICANT CHANGE UP (ref 10–40)
BILIRUB SERPL-MCNC: 0.2 MG/DL — SIGNIFICANT CHANGE UP (ref 0.2–1.2)
BUN SERPL-MCNC: 22 MG/DL — SIGNIFICANT CHANGE UP (ref 7–23)
CALCIUM SERPL-MCNC: 9.1 MG/DL — SIGNIFICANT CHANGE UP (ref 8.4–10.5)
CHLORIDE SERPL-SCNC: 104 MMOL/L — SIGNIFICANT CHANGE UP (ref 96–108)
CO2 SERPL-SCNC: 29 MMOL/L — SIGNIFICANT CHANGE UP (ref 22–31)
CREAT SERPL-MCNC: 0.69 MG/DL — SIGNIFICANT CHANGE UP (ref 0.5–1.3)
EGFR: 102 ML/MIN/1.73M2 — SIGNIFICANT CHANGE UP
GLUCOSE SERPL-MCNC: 122 MG/DL — HIGH (ref 70–99)
HCT VFR BLD CALC: 43.6 % — SIGNIFICANT CHANGE UP (ref 39–50)
HGB BLD-MCNC: 13.9 G/DL — SIGNIFICANT CHANGE UP (ref 13–17)
MCHC RBC-ENTMCNC: 28.4 PG — SIGNIFICANT CHANGE UP (ref 27–34)
MCHC RBC-ENTMCNC: 31.9 GM/DL — LOW (ref 32–36)
MCV RBC AUTO: 89.2 FL — SIGNIFICANT CHANGE UP (ref 80–100)
NRBC # BLD: 0 /100 WBCS — SIGNIFICANT CHANGE UP (ref 0–0)
PLATELET # BLD AUTO: 309 K/UL — SIGNIFICANT CHANGE UP (ref 150–400)
POTASSIUM SERPL-MCNC: 4.2 MMOL/L — SIGNIFICANT CHANGE UP (ref 3.5–5.3)
POTASSIUM SERPL-SCNC: 4.2 MMOL/L — SIGNIFICANT CHANGE UP (ref 3.5–5.3)
PROT SERPL-MCNC: 6.8 G/DL — SIGNIFICANT CHANGE UP (ref 6–8.3)
RBC # BLD: 4.89 M/UL — SIGNIFICANT CHANGE UP (ref 4.2–5.8)
RBC # FLD: 15.6 % — HIGH (ref 10.3–14.5)
SODIUM SERPL-SCNC: 141 MMOL/L — SIGNIFICANT CHANGE UP (ref 135–145)
WBC # BLD: 6.16 K/UL — SIGNIFICANT CHANGE UP (ref 3.8–10.5)
WBC # FLD AUTO: 6.16 K/UL — SIGNIFICANT CHANGE UP (ref 3.8–10.5)

## 2023-05-25 PROCEDURE — 99232 SBSQ HOSP IP/OBS MODERATE 35: CPT

## 2023-05-25 PROCEDURE — 93971 EXTREMITY STUDY: CPT | Mod: 26,RT

## 2023-05-25 RX ADMIN — SODIUM CHLORIDE 1 GRAM(S): 9 INJECTION INTRAMUSCULAR; INTRAVENOUS; SUBCUTANEOUS at 06:23

## 2023-05-25 RX ADMIN — PANTOPRAZOLE SODIUM 40 MILLIGRAM(S): 20 TABLET, DELAYED RELEASE ORAL at 06:24

## 2023-05-25 RX ADMIN — ENOXAPARIN SODIUM 40 MILLIGRAM(S): 100 INJECTION SUBCUTANEOUS at 06:22

## 2023-05-25 RX ADMIN — SODIUM CHLORIDE 1 GRAM(S): 9 INJECTION INTRAMUSCULAR; INTRAVENOUS; SUBCUTANEOUS at 18:34

## 2023-05-25 RX ADMIN — AMLODIPINE BESYLATE 10 MILLIGRAM(S): 2.5 TABLET ORAL at 06:24

## 2023-05-25 RX ADMIN — Medication 2 MILLIGRAM(S): at 18:34

## 2023-05-25 RX ADMIN — LEVETIRACETAM 750 MILLIGRAM(S): 250 TABLET, FILM COATED ORAL at 18:34

## 2023-05-25 RX ADMIN — Medication 2 MILLIGRAM(S): at 06:24

## 2023-05-25 RX ADMIN — LEVETIRACETAM 750 MILLIGRAM(S): 250 TABLET, FILM COATED ORAL at 06:23

## 2023-05-25 RX ADMIN — Medication 50 MILLIGRAM(S): at 21:33

## 2023-05-25 RX ADMIN — Medication 1 TABLET(S): at 12:40

## 2023-05-25 NOTE — PROGRESS NOTE ADULT - ASSESSMENT
Patient is a 66 y/o male PMH of HTN, OA, right TKA, who presented with recently diagnosed, brain mass, who presented to [facility] for brain tumor resection 4/24/23 Pathology positive for glioblastoma; patient  s/p craniotomy for resection of L brain tumor 4/25/23.    # Glioblastoma s/p craniotomy for resection of L brain tumor 4/25/23 right hemiparesis  - continue decadron 2mg PO BID through 6/3  - keppra 750mg PO BID for seizure ppx  - Odell craniotomy dc'd 5/9  - Continue PT/OT./SLP 3 hours daily 5 x week  - Planning for future Radiation therapy and chemo. patient has had mapping/mask made already   - Precautions: steroid, SZ, cardiac, fall. contact /airborne precautions day #6/10 5/24    # right shoulder subluxation  - KT tape, FES  - have right elbow supported on pillow    # newly dx COVID + 5/17  - afebrile, asymptomatic  - COVID swab 5/24 positive  - continue isolation day #6/10    # HTN   - amlodipine 10mg PO QD   - (118/71 - 148/82) 5/24  - PCP f/u on dc    # DM2   - glu 154 5/11  - PCP f/u on dc    # Sleep:  - Melatonin PRN  -  trazodone 50 mg qhs    # Pain:  - Tylenol PRN    # GI/Bowel:  - miralax 17g PO BID   - Senna 2 tabs daily  - pantoprazole   - asymptomatic elevated alk phos, resolved    # Diet   - Regular, CCH   - MVI 5/9  - B12 level: 654 (5/10) WNL. Discussed with patient , provided copies of labwork 5/17    # DVT prophylaxis:  - Last doppler 5/1/23 - Neg.  - lovenox 40mg SC QD   - SCDs    # Case discussed in IDT rounds 5/22:  - min-mod assist bADLs , min assist transfers (from prior report), min/mod assist ambulation 25 feet limited to room due to isolation, tolerating reg solid thin liquid diet, SLP goals met and not following at this time.  - goals: min assist bADLs, CG/min assist transfers and short distance household ambulation  -Medical forms for provided to for verification for cancelled travel plans due to illness, completed 5/15  - [facility] on hold due to newly dx COVID + status 5/17 resulted. Patient and family aware, RT also delayed due to isolation and diagnosis. SW to follow on facility's isolation protocol. Also investigating second [facility] and possible dc home although ramp and WC accessibility, caregiver training will need to be followed through  - copies of labwork, MRI brain, surgical path report provided to patient 5/19  - letter provided re: hospitalization dates  - handicap permit completed 5/24    # LABs  CBC BMP 5/25      --------------------------------------------  Outpatient Follow up:    Juanjose Johnston)  Neurosurgery  [address], [address] [place] [address]  Phone: [phone]  Fax: [phone]  Follow Up Time:     Delmis Causey; MIKHAIL)  Hematology; Internal Medicine; Medical Oncology  [address] [place] [address]  Phone: [phone]  Fax: [phone]  Follow Up Time:   Patient is a 66 y/o male PMH of HTN, OA, right TKA, who presented with recently diagnosed, brain mass, who presented to [facility] for brain tumor resection 4/24/23 Pathology positive for glioblastoma; patient  s/p craniotomy for resection of L brain tumor 4/25/23.    # Glioblastoma s/p craniotomy for resection of L brain tumor 4/25/23 right hemiparesis  - continue decadron 2mg PO BID through 6/3  - keppra 750mg PO BID for seizure ppx  - Odell craniotomy dc'd 5/9  - Continue PT/OT./SLP 3 hours daily 5 x week  - Planning for future Radiation therapy and chemo. patient has had mapping/mask made already   - Precautions: steroid, SZ, cardiac, fall. contact /airborne precautions day #7/10 5/24    # right shoulder subluxation  - KT tape, FES  - have right elbow supported on pillow    # newly dx COVID + 5/17  - afebrile, asymptomatic  - COVID swab 5/24 positive  - continue isolation day #7/10    # HTN   - amlodipine 10mg PO QD   - (122/77 - 123/76) 5/25  - PCP f/u on dc    # DM2   - glu 154 5/11  - PCP f/u on dc    # Sleep:  - Melatonin PRN  -  trazodone 50 mg qhs    # Pain:  - Tylenol PRN    # GI/Bowel:  - miralax 17g PO BID   - Senna 2 tabs daily  - pantoprazole   - asymptomatic elevated alk phos, resolved    # Diet   - Regular, CCH   - MVI 5/9  - B12 level: 654 (5/10) WNL. Discussed with patient , provided copies of labwork 5/17    # DVT prophylaxis:  - Last doppler RLE 5/25/23 - Neg.  - lovenox 40mg SC QD   - SCDs    # Case discussed in IDT rounds 5/22:  - min-mod assist bADLs , min assist transfers (from prior report), min/mod assist ambulation 25 feet limited to room due to isolation, tolerating reg solid thin liquid diet, SLP goals met and not following at this time.  - goals: min assist bADLs, CG/min assist transfers and short distance household ambulation  -Medical forms for provided to for verification for cancelled travel plans due to illness, completed 5/15  - [place] on hold due to newly dx COVID + status 5/17 resulted. Patient and family aware, RT also delayed due to isolation and diagnosis. SW to follow on facility's isolation protocol. Also investigating second [facility] and possible dc home although ramp and WC accessibility, caregiver training will need to be followed through  - copies of labwork, MRI brain, surgical path report provided to patient 5/19  - letter provided re: hospitalization dates  - handicap permit completed 5/24    # LABs  CBC BMP 5/25 -reviewed      --------------------------------------------  Outpatient Follow up:    Juanjose Johnston)  Neurosurgery  [address], [address] [place] [address]  Phone: [phone]  Fax: [phone]  Follow Up Time:     Delmis Causey; MIKHAIL)  Hematology; Internal Medicine; Medical Oncology  [address] [place] [address]  Phone: [phone]  Fax: [phone]  Follow Up Time:   Patient is a 66 y/o male PMH of HTN, OA, right TKA, who presented with recently diagnosed, brain mass, who presented to [facility] for brain tumor resection 4/24/23 Pathology positive for glioblastoma; patient  s/p craniotomy for resection of L brain tumor 4/25/23.    # Glioblastoma s/p craniotomy for resection of L brain tumor 4/25/23 right hemiparesis  - continue decadron 2mg PO BID through 6/3  - keppra 750mg PO BID for seizure ppx  - Odell craniotomy dc'd 5/9  - Continue PT/OT./SLP 3 hours daily 5 x week--> switched to 90 min PT/90 OT starting 5/26  - Planning for future Radiation therapy and chemo. patient has had mapping/mask made already   - Precautions: steroid, SZ, cardiac, fall. contact /airborne precautions day #7/10 5/25    # right shoulder subluxation  - KT tape, FES  - have right elbow supported on pillow    # newly dx COVID + 5/17  - afebrile, asymptomatic  - COVID swab 5/24 positive  - continue isolation day #7/10    # HTN   - amlodipine 10mg PO QD   - (122/77 - 123/76) 5/25  - PCP f/u on dc    # DM2   - glu 154 5/11  - PCP f/u on dc    # Sleep:  - Melatonin PRN  -  trazodone 50 mg qhs    # Pain:  - Tylenol PRN    # GI/Bowel:  - miralax 17g PO BID   - Senna 2 tabs daily  - pantoprazole   - asymptomatic elevated alk phos, resolved    # Diet   - Regular, CCH   - MVI 5/9  - B12 level: 654 (5/10) WNL. Discussed with patient , provided copies of labwork 5/17    # DVT prophylaxis:  - Last doppler RLE 5/25/23 for swelling - Neg.  - lovenox 40mg SC QD   - SCDs    # Case discussed in IDT rounds 5/22:  - min-mod assist bADLs , min assist transfers (from prior report), min/mod assist ambulation 25 feet limited to room due to isolation, tolerating reg solid thin liquid diet, SLP goals met and not following at this time.  - goals: min assist bADLs, CG/min assist transfers and short distance household ambulation  -Medical forms for provided to for verification for cancelled travel plans due to illness, completed 5/15  - [place] on hold due to newly dx COVID + status 5/17 resulted. Patient and family aware, RT also delayed due to isolation and diagnosis. SW to follow on facility's isolation protocol. Also investigating second [facility] and possible dc home although ramp and WC accessibility, caregiver training will need to be followed through  - copies of labwork, MRI brain, surgical path report provided to patient 5/19  - letter provided re: hospitalization dates  - handicap permit completed 5/24  - family caregiver training     # LABs  CBC BMP 5/25 -reviewed      --------------------------------------------  Outpatient Follow up:    Juanjose Johnston)  Neurosurgery  [address], [address] [place] [address]  Phone: [phone]  Fax: [phone]  Follow Up Time:     Delmis Causey; MIKHAIL)  Hematology; Internal Medicine; Medical Oncology  [address] [place] [address]  Phone: [phone]  Fax: [phone]  Follow Up Time:

## 2023-05-25 NOTE — PROGRESS NOTE ADULT - ATTENDING COMMENTS
Progress note amended to include my discussions with patient, resident, SW, therapy coordinators, dc planners     Plan is now switched to try to get patient home given persistent COVID swab +. He is on day #7/10 isolation and last day, if afebrile/asymptomatic which he has been since diagnosis, will be Sunday. Will plan for terminal clean of room and shower on Monday with dc isolation precautions. He reports he is setting up ramp, but follow through on WC accessibility inside home and DME needs to be done with family in addition to training, tentatively set up for 5/29. Patient has been making gains with PT and OT, especially in transfers and ambualtion, and would like to remain on 90 min each of OT and PT as he states his mild facial weakness is not affecting him functionally. Will also need right sAFO/orthotics eval    Labs reviewed, stable. Continue program    ACC: [id number] EXAM:  US DPLX LWR EXT VEINS LTD RT   ORDERED BY: JENNI CHAMBERS     PROCEDURE DATE:  05/25/2023          INTERPRETATION:  CLINICAL INFORMATION: Right lower extremity swelling.   History of glioblastoma. Covid.    COMPARISON: Bilateral lower extremity venous Doppler 5/1/2023 and   4/26/2023    TECHNIQUE: Duplex sonography of the RIGHT LOWER extremity veins with   color and spectral Doppler, with and without compression.    FINDINGS:    There is normal compressibility of the right common femoral, femoral and   popliteal veins.  The contralateral common femoral vein is patent.  Doppler examination shows normal spontaneous and phasic flow.    No calf vein thrombosis is detected in the visualized calf veins.    IMPRESSION:  No evidence of deep venous thrombosis in the visualized right lower   extremity veins.    --- End of Report ---    PRAVEEN URIBE MD; Attending Radiologist  This document has been electronically signed. May 25 2023  2:23PM

## 2023-05-25 NOTE — PROGRESS NOTE ADULT - SUBJECTIVE AND OBJECTIVE BOX
Patient is a 67y old  Male who presents with a chief complaint of GBm sp craniotomy and resection (24 May 2023 12:56)      HPI:  Patient is a 68 y/o male RH dominant PMH of HTN, Osteoarthritis, right total knee replacement 2 months ago, brain mass s/p biopsy 4/7/23 @ [facility] with Dr. Tobar, who presented to [facility] for brain tumor resection 4/24/23. Per family, patient started to have right hand weakness about a month ago that grew progressively worse, then went to ED where brain mass was diagnosed. He was started on keppra 750 mg BID for seizure prophylaxis. Patient had sterotactic needle biopsy and laser interstitial thermal therapy by Dr. Tobar on 4/7/23 which was positive for glioblastoma  s/p craniotomy for resection of L brain tumor 4/25/23. Hospital course complicated by hyponatremia and RUE edema. Doppler negative.     Patient was evaluated by PM&R and therapy for functional deficits and gait/ ADL impairments and recommended acute rehabilitation. Patient was medically optimized for discharge to [facility] on 5/2/23     (02 May 2023 16:29)      SUBJECTIVE HISTORY:      REVIEW OF SYSTEMS:        PHYSICAL EXAM    VITALS  67y  Vital Signs Last 24 Hrs  T(C): 36.4 (25 May 2023 10:20), Max: 36.9 (24 May 2023 22:21)  T(F): 97.5 (25 May 2023 10:20), Max: 98.4 (24 May 2023 22:21)  HR: 78 (25 May 2023 10:20) (78 - 86)  BP: 123/76 (25 May 2023 10:20) (122/77 - 123/76)  BP(mean): --  RR: 16 (25 May 2023 10:20) (14 - 16)  SpO2: 99% (25 May 2023 10:20) (97% - 99%)    Parameters below as of 25 May 2023 10:20  Patient On (Oxygen Delivery Method): room air      Daily     Daily         RECENT LABS:                          13.9   6.16  )-----------( 309      ( 25 May 2023 05:13 )             43.6     05-25    141  |  104  |  22  ----------------------------<  122<H>  4.2   |  29  |  0.69    Ca    9.1      25 May 2023 05:13    TPro  6.8  /  Alb  3.4  /  TBili  0.2  /  DBili  x   /  AST  13  /  ALT  35  /  AlkPhos  123<H>  05-25    LIVER FUNCTIONS - ( 25 May 2023 05:13 )  Alb: 3.4 g/dL / Pro: 6.8 g/dL / ALK PHOS: 123 U/L / ALT: 35 U/L / AST: 13 U/L / GGT: x                   CAPILLARY BLOOD GLUCOSE          MEDICATIONS  (STANDING):  amLODIPine   Tablet 10 milliGRAM(s) Oral every 24 hours  dexAMETHasone     Tablet   Oral   dexAMETHasone     Tablet 2 milliGRAM(s) Oral every 12 hours  dextrose 5%. 1000 milliLiter(s) (100 mL/Hr) IV Continuous <Continuous>  dextrose 5%. 1000 milliLiter(s) (50 mL/Hr) IV Continuous <Continuous>  dextrose 50% Injectable 25 Gram(s) IV Push once  dextrose 50% Injectable 12.5 Gram(s) IV Push once  dextrose 50% Injectable 25 Gram(s) IV Push once  enoxaparin Injectable 40 milliGRAM(s) SubCutaneous every 24 hours  glucagon  Injectable 1 milliGRAM(s) IntraMuscular once  levETIRAcetam 750 milliGRAM(s) Oral two times a day  multivitamin 1 Tablet(s) Oral daily  pantoprazole    Tablet 40 milliGRAM(s) Oral before breakfast  polyethylene glycol 3350 17 Gram(s) Oral two times a day  senna 2 Tablet(s) Oral at bedtime  sodium chloride 1 Gram(s) Oral every 12 hours  traZODone 50 milliGRAM(s) Oral at bedtime    MEDICATIONS  (PRN):  acetaminophen     Tablet .. 650 milliGRAM(s) Oral every 6 hours PRN Mild Pain (1 - 3)  benzonatate 100 milliGRAM(s) Oral three times a day PRN Cough  dextrose Oral Gel 15 Gram(s) Oral once PRN Blood Glucose LESS THAN 70 milliGRAM(s)/deciliter           Patient is a 67y old  Male who presents with a chief complaint of GBm sp craniotomy and resection (24 May 2023 12:56)      HPI:  Patient is a 66 y/o male RH dominant PMH of HTN, Osteoarthritis, right total knee replacement 2 months ago, brain mass s/p biopsy 4/7/23 @ [facility] with Dr. Tobar, who presented to [facility] for brain tumor resection 4/24/23. Per family, patient started to have right hand weakness about a month ago that grew progressively worse, then went to ED where brain mass was diagnosed. He was started on keppra 750 mg BID for seizure prophylaxis. Patient had sterotactic needle biopsy and laser interstitial thermal therapy by Dr. Tobar on 4/7/23 which was positive for glioblastoma  s/p craniotomy for resection of L brain tumor 4/25/23. Hospital course complicated by hyponatremia and RUE edema. Doppler negative.     Patient was evaluated by PM&R and therapy for functional deficits and gait/ ADL impairments and recommended acute rehabilitation. Patient was medically optimized for discharge to [facility] on 5/2/23     (02 May 2023 16:29)      SUBJECTIVE HISTORY:  Patient seen and evaluated at bedside.  He reports some right foot swelling but without pain.  Notified that handicap car tag forms filled out.  Discussed plans for discharge and family training scheduled for tomorrow.    REVIEW OF SYSTEMS:  denies SOB, cough  +right foot swelling      PHYSICAL EXAM  Constitutional - NAD, Comfortable. incision healing well C/D/I  HEENT - EOMI. no ulcreations/swelling/biting tonue or inner cheek  Chest - good chest expansion, good respiratory effort, CTAB   Cardio - warm and well perfused, RRR, no murmur  Abdomen -  Soft, NTND  Extremities - +right foot swelling, No calf tenderness   Neurologic Exam:                    Cognitive - Awake, Alert, AAOx 3     Cranial Nerves - mild right sided lip droop     Motor -  RUE EF 2/5 and EE 1/5     OculoVestibular -  No nystagmus  Psychiatric - Mood stable, Affect WNL    VITALS  67y  Vital Signs Last 24 Hrs  T(C): 36.4 (25 May 2023 10:20), Max: 36.9 (24 May 2023 22:21)  T(F): 97.5 (25 May 2023 10:20), Max: 98.4 (24 May 2023 22:21)  HR: 78 (25 May 2023 10:20) (78 - 86)  BP: 123/76 (25 May 2023 10:20) (122/77 - 123/76)  BP(mean): --  RR: 16 (25 May 2023 10:20) (14 - 16)  SpO2: 99% (25 May 2023 10:20) (97% - 99%)    Parameters below as of 25 May 2023 10:20  Patient On (Oxygen Delivery Method): room air      Daily     Daily         RECENT LABS:                          13.9   6.16  )-----------( 309      ( 25 May 2023 05:13 )             43.6     05-25    141  |  104  |  22  ----------------------------<  122<H>  4.2   |  29  |  0.69    Ca    9.1      25 May 2023 05:13    TPro  6.8  /  Alb  3.4  /  TBili  0.2  /  DBili  x   /  AST  13  /  ALT  35  /  AlkPhos  123<H>  05-25    LIVER FUNCTIONS - ( 25 May 2023 05:13 )  Alb: 3.4 g/dL / Pro: 6.8 g/dL / ALK PHOS: 123 U/L / ALT: 35 U/L / AST: 13 U/L / GGT: x                   CAPILLARY BLOOD GLUCOSE          MEDICATIONS  (STANDING):  amLODIPine   Tablet 10 milliGRAM(s) Oral every 24 hours  dexAMETHasone     Tablet   Oral   dexAMETHasone     Tablet 2 milliGRAM(s) Oral every 12 hours  dextrose 5%. 1000 milliLiter(s) (100 mL/Hr) IV Continuous <Continuous>  dextrose 5%. 1000 milliLiter(s) (50 mL/Hr) IV Continuous <Continuous>  dextrose 50% Injectable 25 Gram(s) IV Push once  dextrose 50% Injectable 12.5 Gram(s) IV Push once  dextrose 50% Injectable 25 Gram(s) IV Push once  enoxaparin Injectable 40 milliGRAM(s) SubCutaneous every 24 hours  glucagon  Injectable 1 milliGRAM(s) IntraMuscular once  levETIRAcetam 750 milliGRAM(s) Oral two times a day  multivitamin 1 Tablet(s) Oral daily  pantoprazole    Tablet 40 milliGRAM(s) Oral before breakfast  polyethylene glycol 3350 17 Gram(s) Oral two times a day  senna 2 Tablet(s) Oral at bedtime  sodium chloride 1 Gram(s) Oral every 12 hours  traZODone 50 milliGRAM(s) Oral at bedtime    MEDICATIONS  (PRN):  acetaminophen     Tablet .. 650 milliGRAM(s) Oral every 6 hours PRN Mild Pain (1 - 3)  benzonatate 100 milliGRAM(s) Oral three times a day PRN Cough  dextrose Oral Gel 15 Gram(s) Oral once PRN Blood Glucose LESS THAN 70 milliGRAM(s)/deciliter           Patient is a 67y old  Male who presents with a chief complaint of GBm sp craniotomy and resection (24 May 2023 12:56)      HPI:  Patient is a 68 y/o male RH dominant PMH of HTN, Osteoarthritis, right total knee replacement 2 months ago, brain mass s/p biopsy 4/7/23 @ [facility] with Dr. Tobar, who presented to [facility] for brain tumor resection 4/24/23. Per family, patient started to have right hand weakness about a month ago that grew progressively worse, then went to ED where brain mass was diagnosed. He was started on keppra 750 mg BID for seizure prophylaxis. Patient had sterotactic needle biopsy and laser interstitial thermal therapy by Dr. Tobar on 4/7/23 which was positive for glioblastoma  s/p craniotomy for resection of L brain tumor 4/25/23. Hospital course complicated by hyponatremia and RUE edema. Doppler negative.     Patient was evaluated by PM&R and therapy for functional deficits and gait/ ADL impairments and recommended acute rehabilitation. Patient was medically optimized for discharge to [facility] on 5/2/23     (02 May 2023 16:29)      SUBJECTIVE HISTORY:  Patient seen and evaluated at bedside.  He reports some right foot swelling but without pain.  Notified that handicap car tag forms filled out.  Discussed plans for discharge and family training scheduled for tomorrow. with possible change in dc plan to home so patient may begin treatment    He denies recurrent biting on tongue and has modified the way he chews and would like to go back to 90 min Pt and OT and defer SLP for now.    REVIEW OF SYSTEMS:  denies SOB, cough  +right foot swelling      PHYSICAL EXAM  Constitutional - NAD, Comfortable. incision healing well C/D/I  HEENT - EOMI. no ulcerations/swelling/biting tongue or inner cheek  Chest - good chest expansion, good respiratory effort, CTAB   Cardio - warm and well perfused, RRR, no murmur  Abdomen -  Soft, NTND  Extremities - +right foot swelling, No calf tenderness   Neurologic Exam:                    Cognitive - Awake, Alert, AAOx 3     Cranial Nerves - mild right sided lip droop     Motor -  RUE EF 2/5 and EE 1/5     OculoVestibular -  No nystagmus  Psychiatric - Mood stable, Affect WNL    VITALS  67y  Vital Signs Last 24 Hrs  T(C): 36.4 (25 May 2023 10:20), Max: 36.9 (24 May 2023 22:21)  T(F): 97.5 (25 May 2023 10:20), Max: 98.4 (24 May 2023 22:21)  HR: 78 (25 May 2023 10:20) (78 - 86)  BP: 123/76 (25 May 2023 10:20) (122/77 - 123/76)  BP(mean): --  RR: 16 (25 May 2023 10:20) (14 - 16)  SpO2: 99% (25 May 2023 10:20) (97% - 99%)    Parameters below as of 25 May 2023 10:20  Patient On (Oxygen Delivery Method): room air      Daily     Daily         RECENT LABS:                          13.9   6.16  )-----------( 309      ( 25 May 2023 05:13 )             43.6     05-25    141  |  104  |  22  ----------------------------<  122<H>  4.2   |  29  |  0.69    Ca    9.1      25 May 2023 05:13    TPro  6.8  /  Alb  3.4  /  TBili  0.2  /  DBili  x   /  AST  13  /  ALT  35  /  AlkPhos  123<H>  05-25    LIVER FUNCTIONS - ( 25 May 2023 05:13 )  Alb: 3.4 g/dL / Pro: 6.8 g/dL / ALK PHOS: 123 U/L / ALT: 35 U/L / AST: 13 U/L / GGT: x                   CAPILLARY BLOOD GLUCOSE          MEDICATIONS  (STANDING):  amLODIPine   Tablet 10 milliGRAM(s) Oral every 24 hours  dexAMETHasone     Tablet   Oral   dexAMETHasone     Tablet 2 milliGRAM(s) Oral every 12 hours  dextrose 5%. 1000 milliLiter(s) (100 mL/Hr) IV Continuous <Continuous>  dextrose 5%. 1000 milliLiter(s) (50 mL/Hr) IV Continuous <Continuous>  dextrose 50% Injectable 25 Gram(s) IV Push once  dextrose 50% Injectable 12.5 Gram(s) IV Push once  dextrose 50% Injectable 25 Gram(s) IV Push once  enoxaparin Injectable 40 milliGRAM(s) SubCutaneous every 24 hours  glucagon  Injectable 1 milliGRAM(s) IntraMuscular once  levETIRAcetam 750 milliGRAM(s) Oral two times a day  multivitamin 1 Tablet(s) Oral daily  pantoprazole    Tablet 40 milliGRAM(s) Oral before breakfast  polyethylene glycol 3350 17 Gram(s) Oral two times a day  senna 2 Tablet(s) Oral at bedtime  sodium chloride 1 Gram(s) Oral every 12 hours  traZODone 50 milliGRAM(s) Oral at bedtime    MEDICATIONS  (PRN):  acetaminophen     Tablet .. 650 milliGRAM(s) Oral every 6 hours PRN Mild Pain (1 - 3)  benzonatate 100 milliGRAM(s) Oral three times a day PRN Cough  dextrose Oral Gel 15 Gram(s) Oral once PRN Blood Glucose LESS THAN 70 milliGRAM(s)/deciliter

## 2023-05-26 PROCEDURE — 99232 SBSQ HOSP IP/OBS MODERATE 35: CPT

## 2023-05-26 RX ADMIN — SODIUM CHLORIDE 1 GRAM(S): 9 INJECTION INTRAMUSCULAR; INTRAVENOUS; SUBCUTANEOUS at 05:09

## 2023-05-26 RX ADMIN — Medication 2 MILLIGRAM(S): at 17:24

## 2023-05-26 RX ADMIN — Medication 2 MILLIGRAM(S): at 05:12

## 2023-05-26 RX ADMIN — SODIUM CHLORIDE 1 GRAM(S): 9 INJECTION INTRAMUSCULAR; INTRAVENOUS; SUBCUTANEOUS at 17:24

## 2023-05-26 RX ADMIN — LEVETIRACETAM 750 MILLIGRAM(S): 250 TABLET, FILM COATED ORAL at 05:12

## 2023-05-26 RX ADMIN — Medication 1 TABLET(S): at 13:02

## 2023-05-26 RX ADMIN — LEVETIRACETAM 750 MILLIGRAM(S): 250 TABLET, FILM COATED ORAL at 17:24

## 2023-05-26 RX ADMIN — AMLODIPINE BESYLATE 10 MILLIGRAM(S): 2.5 TABLET ORAL at 05:12

## 2023-05-26 RX ADMIN — PANTOPRAZOLE SODIUM 40 MILLIGRAM(S): 20 TABLET, DELAYED RELEASE ORAL at 05:10

## 2023-05-26 RX ADMIN — Medication 50 MILLIGRAM(S): at 21:27

## 2023-05-26 RX ADMIN — ENOXAPARIN SODIUM 40 MILLIGRAM(S): 100 INJECTION SUBCUTANEOUS at 05:11

## 2023-05-26 NOTE — PROGRESS NOTE ADULT - COMMENTS
Patient remains on contact and airborne precautions, day #8/10 isolation. no fever or chils. He is happy to have more time with PT and OT. No CP or SOB, no cough. Results of doppler discussed; he also has accepting bed in [facility] close to home that can accommodate his treatment needs , which he is very happy about    no new complaints

## 2023-05-26 NOTE — PROGRESS NOTE ADULT - ASSESSMENT
Patient is a 66 y/o male PMH of HTN, OA, right TKA, who presented with recently diagnosed, brain mass, who presented to [facility] for brain tumor resection 4/24/23 Pathology positive for glioblastoma; patient  s/p craniotomy for resection of L brain tumor 4/25/23.    # Glioblastoma s/p craniotomy for resection of L brain tumor 4/25/23 right hemiparesis  - continue decadron 2mg PO BID through 6/3  - keppra 750mg PO BID for seizure ppx  - Odell craniotomy dc'd 5/9  - Continue PT/OT. 3 hours daily 5 x week. [facility] in progress, awaiting authorization  - Pending Radiation therapy and chemo. Has mapping/mask made already   - Precautions: steroid, SZ, cardiac, fall. contact /airborne precautions day #8/10 5/26    # newly dx COVID + 5/17  - remains afebrile, asymptomatic  - continue isolation day #8/10  - May be off isolation 5/29 after terminal cleaning room    # HTN   - amlodipine 10mg PO QD   - controlled  - PCP f/u on dc    # DM2   - glu 154 5/11  - PCP f/u on dc    # Sleep:  - Melatonin PRN  - trazodone 50 mg qhs    # Pain:  - Tylenol PRN    # GI/Bowel:  - miralax 17g PO BID   - Senna 2 tabs daily  - pantoprazole     # Diet   - Regular, CCH   - MVI 5/9  - B12 level: 654 (5/10) WNL    # DVT prophylaxis:  - Doppler RLE 5/25/23 for swelling - Neg. Reviewed with patient  - bilateral TEDs OOB, patient agreeable  - lovenox 40mg SC QD   - SCDs    # Case discussed in IDT rounds 5/27:  -  min assist LB dressing, mod assist bathroom transfers, mod assist toiletig, transfers bed to chair min-mod assist, beginning ambulation with min assist, HR and standard AFO  - goals: min assist bADLs, CG/min assist transfers and short distance household ambulation  -Medical forms for provided to for verification for cancelled travel plans due to illness, completed 5/15  - copies of labwork, MRI brain, surgical path report provided to patient 5/19  - letter provided re: hospitalization dates  - handicap permit completed 5/24  - For [facility] transfer for continued Ot PT services, orthotics, in conjunction with skilled nursing care, medical oversight and RT. Has acceptance at [facility], awaiting insurance auth    # LABs  CBC BMP 5/29      --------------------------------------------  Outpatient Follow up:    Juanjose Johnston)  Neurosurgery  [address], [address] [place] [address]  Phone: [phone]  Fax: [phone]  Follow Up Time:     Delmis Causey; MIKHAIL)  Hematology; Internal Medicine; Medical Oncology  [address] [place] [address]  Phone: [phone]  Fax: [phone]  Follow Up Time:   Patient is a 66 y/o male PMH of HTN, OA, right TKA, who presented with recently diagnosed, brain mass, who presented to [facility] for brain tumor resection 4/24/23 Pathology positive for glioblastoma; patient  s/p craniotomy for resection of L brain tumor 4/25/23.    # Glioblastoma s/p craniotomy for resection of L brain tumor 4/25/23 right hemiparesis  - continue decadron 2mg PO BID through 6/3  - keppra 750mg PO BID for seizure ppx  - Odell craniotomy dc'd 5/9  - Continue PT/OT. 3 hours daily 5 x week. [facility] in progress, awaiting authorization  - Pending Radiation therapy and chemo. Has mapping/mask made already   - Precautions: steroid, SZ, cardiac, fall. contact /airborne precautions day #8/10 5/26    # newly dx COVID + 5/17  - remains afebrile, asymptomatic  - continue isolation day #8/10  - May be off isolation 5/29 after terminal cleaning room    # HTN   - amlodipine 10mg PO QD   - controlled  - PCP f/u on dc    # DM2   - glu 154 5/11  - PCP f/u on dc    # Sleep:  - Melatonin PRN  - trazodone 50 mg qhs    # Pain:  - Tylenol PRN    # GI/Bowel:  - miralax 17g PO BID   - Senna 2 tabs daily  - pantoprazole     # Diet   - Regular, CCH   - MVI 5/9  - B12 level: 654 (5/10) WNL    # DVT prophylaxis:  - Doppler RLE 5/25/23 for swelling - Neg. Reviewed with patient  - bilateral TEDs OOB, patient agreeable  - lovenox 40mg SC QD   - SCDs    # Case discussed in IDT rounds 5/26:  -  min assist LB dressing, mod assist bathroom transfers, mod assist toiletig, transfers bed to chair min-mod assist, beginning ambulation with min assist, HR and standard AFO  - goals: min assist bADLs, CG/min assist transfers and short distance household ambulation  -Medical forms for provided to for verification for cancelled travel plans due to illness, completed 5/15  - copies of labwork, MRI brain, surgical path report provided to patient 5/19  - letter provided re: hospitalization dates  - handicap permit completed 5/24  - For [facility] transfer for continued Ot PT services, orthotics, in conjunction with skilled nursing care, medical oversight and RT. Has acceptance at [facility], awaiting insurance auth    # LABs  CBC BMP 5/29      --------------------------------------------  Outpatient Follow up:    Juanjose Johnston)  Neurosurgery  [address], [address] [place] [address]  Phone: [phone]  Fax: [phone]  Follow Up Time:     Delmis Causey; MIKHAIL)  Hematology; Internal Medicine; Medical Oncology  [address] [place] [address]  Phone: [phone]  Fax: [phone]  Follow Up Time:

## 2023-05-26 NOTE — PROGRESS NOTE ADULT - SUBJECTIVE AND OBJECTIVE BOX
Patient is a 67y old  Male who presents with a chief complaint of GBm sp craniotomy and resection (25 May 2023 13:01)      HPI:  Patient is a 66 y/o male RH dominant PMH of HTN, Osteoarthritis, right total knee replacement 2 months ago, brain mass s/p biopsy 4/7/23 @ [facility] with Dr. Tobar, who presented to [facility] for brain tumor resection 4/24/23. Per family, patient started to have right hand weakness about a month ago that grew progressively worse, then went to ED where brain mass was diagnosed. He was started on keppra 750 mg BID for seizure prophylaxis. Patient had sterotactic needle biopsy and laser interstitial thermal therapy by Dr. Tobar on 4/7/23 which was positive for glioblastoma  s/p craniotomy for resection of L brain tumor 4/25/23. Hospital course complicated by hyponatremia and RUE edema. Doppler negative.     Patient was evaluated by PM&R and therapy for functional deficits and gait/ ADL impairments and recommended acute rehabilitation. Patient was medically optimized for discharge to [facility] on 5/2/23     (02 May 2023 16:29)      PAST MEDICAL & SURGICAL HISTORY:  Hypertension      Osteoarthritis      Brain mass      S/P total knee replacement, right          MEDICATIONS  (STANDING):  amLODIPine   Tablet 10 milliGRAM(s) Oral every 24 hours  dexAMETHasone     Tablet   Oral   dexAMETHasone     Tablet 2 milliGRAM(s) Oral every 12 hours  dextrose 5%. 1000 milliLiter(s) (100 mL/Hr) IV Continuous <Continuous>  dextrose 5%. 1000 milliLiter(s) (50 mL/Hr) IV Continuous <Continuous>  dextrose 50% Injectable 25 Gram(s) IV Push once  dextrose 50% Injectable 12.5 Gram(s) IV Push once  dextrose 50% Injectable 25 Gram(s) IV Push once  enoxaparin Injectable 40 milliGRAM(s) SubCutaneous every 24 hours  glucagon  Injectable 1 milliGRAM(s) IntraMuscular once  levETIRAcetam 750 milliGRAM(s) Oral two times a day  multivitamin 1 Tablet(s) Oral daily  pantoprazole    Tablet 40 milliGRAM(s) Oral before breakfast  polyethylene glycol 3350 17 Gram(s) Oral two times a day  senna 2 Tablet(s) Oral at bedtime  sodium chloride 1 Gram(s) Oral every 12 hours  traZODone 50 milliGRAM(s) Oral at bedtime    MEDICATIONS  (PRN):  acetaminophen     Tablet .. 650 milliGRAM(s) Oral every 6 hours PRN Mild Pain (1 - 3)  benzonatate 100 milliGRAM(s) Oral three times a day PRN Cough  dextrose Oral Gel 15 Gram(s) Oral once PRN Blood Glucose LESS THAN 70 milliGRAM(s)/deciliter      Allergies    No Known Allergies    Intolerances          VITALS  67y  Vital Signs Last 24 Hrs  T(C): 36.7 (26 May 2023 10:30), Max: 36.7 (25 May 2023 19:46)  T(F): 98 (26 May 2023 10:30), Max: 98.1 (25 May 2023 19:46)  HR: 79 (26 May 2023 10:30) (77 - 79)  BP: 120/78 (26 May 2023 10:30) (120/78 - 151/84)  BP(mean): --  RR: 17 (26 May 2023 10:30) (16 - 17)  SpO2: 97% (26 May 2023 10:30) (97% - 98%)    Parameters below as of 26 May 2023 10:30  Patient On (Oxygen Delivery Method): room air      Daily     Daily         RECENT LABS:                          13.9   6.16  )-----------( 309      ( 25 May 2023 05:13 )             43.6     05-25    141  |  104  |  22  ----------------------------<  122<H>  4.2   |  29  |  0.69    Ca    9.1      25 May 2023 05:13    TPro  6.8  /  Alb  3.4  /  TBili  0.2  /  DBili  x   /  AST  13  /  ALT  35  /  AlkPhos  123<H>  05-25    LIVER FUNCTIONS - ( 25 May 2023 05:13 )  Alb: 3.4 g/dL / Pro: 6.8 g/dL / ALK PHOS: 123 U/L / ALT: 35 U/L / AST: 13 U/L / GGT: x                   CAPILLARY BLOOD GLUCOSE

## 2023-05-27 PROCEDURE — 99232 SBSQ HOSP IP/OBS MODERATE 35: CPT

## 2023-05-27 RX ADMIN — LEVETIRACETAM 750 MILLIGRAM(S): 250 TABLET, FILM COATED ORAL at 05:49

## 2023-05-27 RX ADMIN — ENOXAPARIN SODIUM 40 MILLIGRAM(S): 100 INJECTION SUBCUTANEOUS at 05:49

## 2023-05-27 RX ADMIN — Medication 2 MILLIGRAM(S): at 17:55

## 2023-05-27 RX ADMIN — AMLODIPINE BESYLATE 10 MILLIGRAM(S): 2.5 TABLET ORAL at 05:52

## 2023-05-27 RX ADMIN — LEVETIRACETAM 750 MILLIGRAM(S): 250 TABLET, FILM COATED ORAL at 17:55

## 2023-05-27 RX ADMIN — Medication 2 MILLIGRAM(S): at 05:49

## 2023-05-27 RX ADMIN — SODIUM CHLORIDE 1 GRAM(S): 9 INJECTION INTRAMUSCULAR; INTRAVENOUS; SUBCUTANEOUS at 05:49

## 2023-05-27 RX ADMIN — PANTOPRAZOLE SODIUM 40 MILLIGRAM(S): 20 TABLET, DELAYED RELEASE ORAL at 06:00

## 2023-05-27 RX ADMIN — Medication 50 MILLIGRAM(S): at 21:35

## 2023-05-27 RX ADMIN — SODIUM CHLORIDE 1 GRAM(S): 9 INJECTION INTRAMUSCULAR; INTRAVENOUS; SUBCUTANEOUS at 17:54

## 2023-05-27 NOTE — PROGRESS NOTE ADULT - COMMENTS
Patient remains on day #9/10 isoltaion. Afebrile, good spirits, some difficulty sleeping but coping with dc issues. Wife is also on the phone, they are aware of trnasportation issues that need to be clarified from [facility] to treatment center. Wife is also coming in for training Monday    no new complaints. Eating well, no new numbness or weakness

## 2023-05-27 NOTE — PROGRESS NOTE ADULT - MOTOR
mild right facial droop. no significant noted dysarthric speech. no ptosis  calves soft noTTP  reduced RLE swelling

## 2023-05-27 NOTE — PROGRESS NOTE ADULT - ASSESSMENT
Patient is a 66 y/o male PMH of HTN, OA, right TKA, who presented with recently diagnosed, brain mass, who presented to [facility] for brain tumor resection 4/24/23 Pathology positive for glioblastoma; patient  s/p craniotomy for resection of L brain tumor 4/25/23.    # Glioblastoma s/p craniotomy for resection of L brain tumor 4/25/23 right hemiparesis  - continue decadron 2mg PO BID through 6/3  - keppra 750mg PO BID for seizure ppx  - Odell craniotomy dc'd 5/9  - Continue PT/OT. 3 hours daily 5 x weeks  - Pending Radiation therapy and chemo. Has mapping/mask made already   - Precautions: steroid, SZ, cardiac, fall. contact /airborne precautions day #9/10 5/27    # newly dx COVID + 5/17  - remains afebrile, asymptomatic  - continue isolation day #9/10  - May be off isolation 5/29 after terminal cleaning room    # HTN   - amlodipine 10mg PO QD   - controlled (124/81 - 144/80) 5/27  - PCP f/u on dc    # DM2   - glu 154 5/11  - PCP f/u on dc    # Sleep:  - Melatonin PRN  - trazodone 50 mg qhs    # Pain:  - Tylenol PRN    # GI/Bowel:  - miralax 17g PO BID   - Senna 2 tabs daily  - pantoprazole     # Diet   - Regular, CCH   - MVI 5/9  - B12 level: 654 (5/10) WNL    # DVT prophylaxis:  - Doppler RLE 5/25/23 for swelling - Neg. Reviewed with patient  - bilateral TEDs OOB, patient agreeable  - lovenox 40mg SC QD   - SCDs    # Case discussed in IDT rounds 5/26:  -  min assist LB dressing, mod assist bathroom transfers, mod assist toiletig, transfers bed to chair min-mod assist, beginning ambulation with min assist, HR and standard AFO  - goals: min assist bADLs, CG/min assist transfers and short distance household ambulation  -Medical forms for provided to for verification for cancelled travel plans due to illness, completed 5/15  - copies of labwork, MRI brain, surgical path report provided to patient 5/19  - letter provided re: hospitalization dates  - handicap permit completed 5/24  - For [facility] transfer for continued Ot PT services, orthotics, in conjunction with skilled nursing care, medical oversight and RT. Has acceptance at [facility], awaiting insurance auth and arrangement transportation between [facility] and treatment center. Patient and wife aware  - wife also coming in for training 5/29    # LABs  CBC BMP 5/29      --------------------------------------------  Outpatient Follow up:    Juanjose Johnston)  Neurosurgery  [address], [address] [place] [address]  Phone: [phone]  Fax: [phone]  Follow Up Time:     Delmis Causey; MIKHAIL)  Hematology; Internal Medicine; Medical Oncology  [address] [place] [address]  Phone: [phone]  Fax: [phone]  Follow Up Time:

## 2023-05-27 NOTE — PROGRESS NOTE ADULT - SUBJECTIVE AND OBJECTIVE BOX
Patient is a 67y old  Male who presents with a chief complaint of GBm sp craniotomy and resection (26 May 2023 14:43)      HPI:  Patient is a 66 y/o male RH dominant PMH of HTN, Osteoarthritis, right total knee replacement 2 months ago, brain mass s/p biopsy 4/7/23 @ [facility] with Dr. Tobar, who presented to [facility] for brain tumor resection 4/24/23. Per family, patient started to have right hand weakness about a month ago that grew progressively worse, then went to ED where brain mass was diagnosed. He was started on keppra 750 mg BID for seizure prophylaxis. Patient had sterotactic needle biopsy and laser interstitial thermal therapy by Dr. Tobar on 4/7/23 which was positive for glioblastoma  s/p craniotomy for resection of L brain tumor 4/25/23. Hospital course complicated by hyponatremia and RUE edema. Doppler negative.     Patient was evaluated by PM&R and therapy for functional deficits and gait/ ADL impairments and recommended acute rehabilitation. Patient was medically optimized for discharge to [facility] on 5/2/23     (02 May 2023 16:29)      PAST MEDICAL & SURGICAL HISTORY:  Hypertension      Osteoarthritis      Brain mass      S/P total knee replacement, right          MEDICATIONS  (STANDING):  amLODIPine   Tablet 10 milliGRAM(s) Oral every 24 hours  dexAMETHasone     Tablet 2 milliGRAM(s) Oral every 12 hours  dexAMETHasone     Tablet   Oral   dextrose 5%. 1000 milliLiter(s) (100 mL/Hr) IV Continuous <Continuous>  dextrose 5%. 1000 milliLiter(s) (50 mL/Hr) IV Continuous <Continuous>  dextrose 50% Injectable 12.5 Gram(s) IV Push once  dextrose 50% Injectable 25 Gram(s) IV Push once  dextrose 50% Injectable 25 Gram(s) IV Push once  enoxaparin Injectable 40 milliGRAM(s) SubCutaneous every 24 hours  glucagon  Injectable 1 milliGRAM(s) IntraMuscular once  levETIRAcetam 750 milliGRAM(s) Oral two times a day  multivitamin 1 Tablet(s) Oral daily  pantoprazole    Tablet 40 milliGRAM(s) Oral before breakfast  polyethylene glycol 3350 17 Gram(s) Oral two times a day  senna 2 Tablet(s) Oral at bedtime  sodium chloride 1 Gram(s) Oral every 12 hours  traZODone 50 milliGRAM(s) Oral at bedtime    MEDICATIONS  (PRN):  acetaminophen     Tablet .. 650 milliGRAM(s) Oral every 6 hours PRN Mild Pain (1 - 3)  benzonatate 100 milliGRAM(s) Oral three times a day PRN Cough  dextrose Oral Gel 15 Gram(s) Oral once PRN Blood Glucose LESS THAN 70 milliGRAM(s)/deciliter      Allergies    No Known Allergies    Intolerances          VITALS  67y  Vital Signs Last 24 Hrs  T(C): 36.7 (27 May 2023 08:54), Max: 36.7 (27 May 2023 08:54)  T(F): 98 (27 May 2023 08:54), Max: 98 (27 May 2023 08:54)  HR: 60 (27 May 2023 08:54) (60 - 72)  BP: 144/80 (27 May 2023 08:54) (124/81 - 144/80)  BP(mean): --  RR: 16 (27 May 2023 08:54) (15 - 16)  SpO2: 95% (27 May 2023 08:54) (95% - 96%)    Parameters below as of 27 May 2023 08:54  Patient On (Oxygen Delivery Method): room air      Daily     Daily         RECENT LABS:                      CAPILLARY BLOOD GLUCOSE

## 2023-05-28 PROCEDURE — 99232 SBSQ HOSP IP/OBS MODERATE 35: CPT

## 2023-05-28 RX ADMIN — SODIUM CHLORIDE 1 GRAM(S): 9 INJECTION INTRAMUSCULAR; INTRAVENOUS; SUBCUTANEOUS at 17:59

## 2023-05-28 RX ADMIN — Medication 1 TABLET(S): at 12:23

## 2023-05-28 RX ADMIN — PANTOPRAZOLE SODIUM 40 MILLIGRAM(S): 20 TABLET, DELAYED RELEASE ORAL at 06:16

## 2023-05-28 RX ADMIN — Medication 50 MILLIGRAM(S): at 22:33

## 2023-05-28 RX ADMIN — Medication 2 MILLIGRAM(S): at 17:59

## 2023-05-28 RX ADMIN — LEVETIRACETAM 750 MILLIGRAM(S): 250 TABLET, FILM COATED ORAL at 17:59

## 2023-05-28 RX ADMIN — LEVETIRACETAM 750 MILLIGRAM(S): 250 TABLET, FILM COATED ORAL at 06:15

## 2023-05-28 RX ADMIN — Medication 2 MILLIGRAM(S): at 06:16

## 2023-05-28 RX ADMIN — ENOXAPARIN SODIUM 40 MILLIGRAM(S): 100 INJECTION SUBCUTANEOUS at 06:15

## 2023-05-28 RX ADMIN — AMLODIPINE BESYLATE 10 MILLIGRAM(S): 2.5 TABLET ORAL at 06:16

## 2023-05-28 RX ADMIN — SODIUM CHLORIDE 1 GRAM(S): 9 INJECTION INTRAMUSCULAR; INTRAVENOUS; SUBCUTANEOUS at 06:16

## 2023-05-28 NOTE — PROGRESS NOTE ADULT - ASSESSMENT
Patient is a 68 y/o male PMH of HTN, OA, right TKA, who presented with recently diagnosed, brain mass, who presented to [facility] for brain tumor resection 4/24/23 Pathology positive for glioblastoma; patient  s/p craniotomy for resection of L brain tumor 4/25/23. Hospital course complicated by hyponatremia. Admitted for multidisciplinary rehab program- pt/ot/dvt ppx    # Glioblastoma s/p craniotomy for resection of L brain tumor 4/25/23 right hemiparesis  - continue decadron  through 6/3  - Keppra seizure ppx  - Planning for future Radiation therapy and chemo  - Precautions: steroid, SZ, cardiac, fall    # HTN   - amlodipine     # DM2   - A1C 6.5  - diet controlled    # DVT prophylaxis:  - Lovenox

## 2023-05-28 NOTE — PROGRESS NOTE ADULT - COMMENTS
Patient stable. Better sleep last night. no cough, no nasal congestion, no SOB. no new weakness, difficulty swallowing or visual issues. Last day of isolation

## 2023-05-28 NOTE — PROGRESS NOTE ADULT - ASSESSMENT
Patient is a 68 y/o male PMH of HTN, OA, right TKA, who presented with recently diagnosed, brain mass, who presented to [facility] for brain tumor resection 4/24/23 Pathology positive for glioblastoma; patient  s/p craniotomy for resection of L brain tumor 4/25/23.    # Glioblastoma s/p craniotomy for resection of L brain tumor 4/25/23 right hemiparesis  - continue decadron 2mg PO BID through 6/3  - keppra 750mg PO BID for seizure ppx  - Odell craniotomy dc'd 5/9  - Continue PT/OT. 3 hours daily 5 x weeks  - Pending Radiation therapy and chemo. Has mapping/mask made already   - Precautions: steroid, SZ, cardiac, fall. contact /airborne precautions day #10/10 5/28    # newly dx COVID + 5/17  - remains afebrile, asymptomatic  - continue isolation day #10/10.   - May be off isolation 5/29 after terminal cleaning room. Reviewed with patient and staff    # HTN   - amlodipine 10mg PO QD   - (123/84 - 151/72) 5/28  - PCP f/u on dc    # DM2   - glu 154 5/11  - PCP f/u on dc    # Sleep:  - Melatonin PRN  - trazodone 50 mg qhs    # Pain:  - Tylenol PRN    # GI/Bowel:  - miralax 17g PO BID   - Senna 2 tabs daily  - pantoprazole     # Diet   - Regular, CCH   - MVI 5/9  - B12 level: 654 (5/10) WNL    # DVT prophylaxis:  - Doppler RLE 5/25/23 for swelling - Neg. Reviewed with patient  - bilateral TEDs OOB, patient agreeable  - lovenox 40mg SC QD   - SCDs    # Case discussed in IDT rounds 5/26:  -  min assist LB dressing, mod assist bathroom transfers, mod assist toiletig, transfers bed to chair min-mod assist, beginning ambulation with min assist, HR and standard AFO  - goals: min assist bADLs, CG/min assist transfers and short distance household ambulation  -Medical forms for provided to for verification for cancelled travel plans due to illness, completed 5/15  - copies of labwork, MRI brain, surgical path report provided to patient 5/19  - letter provided re: hospitalization dates  - handicap permit completed 5/24  - For [facility] transfer for continued Ot PT services, orthotics, in conjunction with skilled nursing care, medical oversight and RT. Has acceptance at [facility], awaiting insurance auth and arrangement transportation between [facility] and treatment center. Patient and wife aware  - wife also coming in for training 5/29    # LABs  CBC BMP 5/29      --------------------------------------------  Outpatient Follow up:    Juanjose Johnston)  Neurosurgery  [address], [address] [place] [address]  Phone: [phone]  Fax: [phone]  Follow Up Time:     Delmis Causey; MIKHAIL)  Hematology; Internal Medicine; Medical Oncology  [address] [place] [address]  Phone: [phone]  Fax: [phone]  Follow Up Time:

## 2023-05-28 NOTE — PROGRESS NOTE ADULT - SUBJECTIVE AND OBJECTIVE BOX
Patient is a 67y old  Male who presents with a chief complaint of GBm sp craniotomy and resection (27 May 2023 12:09)      SUBJECTIVE / OVERNIGHT EVENTS:  Pt seen and examined at bedside. No acute events overnight.  Pt denies cp, palpitations, sob, abd pain, N/V, fever, chills.    ROS:  All other review of systems negative    Allergies    No Known Allergies    Intolerances        MEDICATIONS  (STANDING):  amLODIPine   Tablet 10 milliGRAM(s) Oral every 24 hours  dexAMETHasone     Tablet 2 milliGRAM(s) Oral every 12 hours  dexAMETHasone     Tablet   Oral   dextrose 5%. 1000 milliLiter(s) (100 mL/Hr) IV Continuous <Continuous>  dextrose 5%. 1000 milliLiter(s) (50 mL/Hr) IV Continuous <Continuous>  dextrose 50% Injectable 12.5 Gram(s) IV Push once  dextrose 50% Injectable 25 Gram(s) IV Push once  dextrose 50% Injectable 25 Gram(s) IV Push once  enoxaparin Injectable 40 milliGRAM(s) SubCutaneous every 24 hours  glucagon  Injectable 1 milliGRAM(s) IntraMuscular once  levETIRAcetam 750 milliGRAM(s) Oral two times a day  multivitamin 1 Tablet(s) Oral daily  pantoprazole    Tablet 40 milliGRAM(s) Oral before breakfast  polyethylene glycol 3350 17 Gram(s) Oral two times a day  senna 2 Tablet(s) Oral at bedtime  sodium chloride 1 Gram(s) Oral every 12 hours  traZODone 50 milliGRAM(s) Oral at bedtime    MEDICATIONS  (PRN):  acetaminophen     Tablet .. 650 milliGRAM(s) Oral every 6 hours PRN Mild Pain (1 - 3)  benzonatate 100 milliGRAM(s) Oral three times a day PRN Cough  dextrose Oral Gel 15 Gram(s) Oral once PRN Blood Glucose LESS THAN 70 milliGRAM(s)/deciliter      Vital Signs Last 24 Hrs  T(C): 36.7 (28 May 2023 08:31), Max: 36.7 (27 May 2023 22:00)  T(F): 98 (28 May 2023 08:31), Max: 98.1 (27 May 2023 22:00)  HR: 69 (28 May 2023 08:31) (60 - 76)  BP: 134/86 (28 May 2023 08:31) (123/84 - 151/72)  BP(mean): --  RR: 16 (28 May 2023 08:31) (16 - 16)  SpO2: 96% (28 May 2023 08:31) (96% - 96%)    Parameters below as of 28 May 2023 08:31  Patient On (Oxygen Delivery Method): room air      CAPILLARY BLOOD GLUCOSE        I&O's Summary    27 May 2023 07:01  -  28 May 2023 07:00  --------------------------------------------------------  IN: 0 mL / OUT: 1400 mL / NET: -1400 mL        PHYSICAL EXAM:  GENERAL: NAD, well-developed male  HEAD:   Normocephalic, Craniotomy scar  NECK: Supple, No JVD  CHEST/LUNG: Clear to auscultation bilaterally; No wheeze, nonlabored breathing  HEART: Regular rate and rhythm; No murmurs, rubs, or gallops  ABDOMEN: Soft, Nontender, Nondistended; Bowel sounds present  EXTREMITIES: No clubbing, cyanosis. Right foot swelling, RUE in sling  NEUROLOGY: AAOx3, Right sided weakness   PSYCH: calm, appropriate mood      LABS:                    RADIOLOGY & ADDITIONAL TESTS:  Results Reviewed:   Imaging Personally Reviewed:  Electrocardiogram Personally Reviewed:    COORDINATION OF CARE:  Care Discussed with Consultants/Other Providers [Y/N]:  Prior or Outpatient Records Reviewed [Y/N]:

## 2023-05-28 NOTE — PROGRESS NOTE ADULT - SUBJECTIVE AND OBJECTIVE BOX
Patient is a 67y old  Male who presents with a chief complaint of GBm sp craniotomy and resection (28 May 2023 09:01)      HPI:  Patient is a 68 y/o male RH dominant PMH of HTN, Osteoarthritis, right total knee replacement 2 months ago, brain mass s/p biopsy 4/7/23 @ [facility] with Dr. Tobar, who presented to [facility] for brain tumor resection 4/24/23. Per family, patient started to have right hand weakness about a month ago that grew progressively worse, then went to ED where brain mass was diagnosed. He was started on keppra 750 mg BID for seizure prophylaxis. Patient had sterotactic needle biopsy and laser interstitial thermal therapy by Dr. Tobar on 4/7/23 which was positive for glioblastoma  s/p craniotomy for resection of L brain tumor 4/25/23. Hospital course complicated by hyponatremia and RUE edema. Doppler negative.     Patient was evaluated by PM&R and therapy for functional deficits and gait/ ADL impairments and recommended acute rehabilitation. Patient was medically optimized for discharge to [facility] on 5/2/23     (02 May 2023 16:29)      PAST MEDICAL & SURGICAL HISTORY:  Hypertension      Osteoarthritis      Brain mass      S/P total knee replacement, right          MEDICATIONS  (STANDING):  amLODIPine   Tablet 10 milliGRAM(s) Oral every 24 hours  dexAMETHasone     Tablet   Oral   dexAMETHasone     Tablet 2 milliGRAM(s) Oral every 12 hours  dextrose 5%. 1000 milliLiter(s) (50 mL/Hr) IV Continuous <Continuous>  dextrose 5%. 1000 milliLiter(s) (100 mL/Hr) IV Continuous <Continuous>  dextrose 50% Injectable 12.5 Gram(s) IV Push once  dextrose 50% Injectable 25 Gram(s) IV Push once  dextrose 50% Injectable 25 Gram(s) IV Push once  enoxaparin Injectable 40 milliGRAM(s) SubCutaneous every 24 hours  glucagon  Injectable 1 milliGRAM(s) IntraMuscular once  levETIRAcetam 750 milliGRAM(s) Oral two times a day  multivitamin 1 Tablet(s) Oral daily  pantoprazole    Tablet 40 milliGRAM(s) Oral before breakfast  polyethylene glycol 3350 17 Gram(s) Oral two times a day  senna 2 Tablet(s) Oral at bedtime  sodium chloride 1 Gram(s) Oral every 12 hours  traZODone 50 milliGRAM(s) Oral at bedtime    MEDICATIONS  (PRN):  acetaminophen     Tablet .. 650 milliGRAM(s) Oral every 6 hours PRN Mild Pain (1 - 3)  benzonatate 100 milliGRAM(s) Oral three times a day PRN Cough  dextrose Oral Gel 15 Gram(s) Oral once PRN Blood Glucose LESS THAN 70 milliGRAM(s)/deciliter      Allergies    No Known Allergies    Intolerances          VITALS  67y  Vital Signs Last 24 Hrs  T(C): 36.7 (28 May 2023 08:31), Max: 36.7 (27 May 2023 22:00)  T(F): 98 (28 May 2023 08:31), Max: 98.1 (27 May 2023 22:00)  HR: 69 (28 May 2023 08:31) (60 - 76)  BP: 134/86 (28 May 2023 08:31) (123/84 - 151/72)  BP(mean): --  RR: 16 (28 May 2023 08:31) (16 - 16)  SpO2: 96% (28 May 2023 08:31) (96% - 96%)    Parameters below as of 28 May 2023 08:31  Patient On (Oxygen Delivery Method): room air      Daily     Daily         RECENT LABS:                      CAPILLARY BLOOD GLUCOSE

## 2023-05-29 LAB
ALBUMIN SERPL ELPH-MCNC: 3 G/DL — LOW (ref 3.3–5)
ALP SERPL-CCNC: 95 U/L — SIGNIFICANT CHANGE UP (ref 40–120)
ALT FLD-CCNC: 35 U/L — SIGNIFICANT CHANGE UP (ref 10–45)
ANION GAP SERPL CALC-SCNC: 5 MMOL/L — SIGNIFICANT CHANGE UP (ref 5–17)
AST SERPL-CCNC: 21 U/L — SIGNIFICANT CHANGE UP (ref 10–40)
BILIRUB SERPL-MCNC: 0.3 MG/DL — SIGNIFICANT CHANGE UP (ref 0.2–1.2)
BUN SERPL-MCNC: 18 MG/DL — SIGNIFICANT CHANGE UP (ref 7–23)
CALCIUM SERPL-MCNC: 9.1 MG/DL — SIGNIFICANT CHANGE UP (ref 8.4–10.5)
CHLORIDE SERPL-SCNC: 105 MMOL/L — SIGNIFICANT CHANGE UP (ref 96–108)
CO2 SERPL-SCNC: 29 MMOL/L — SIGNIFICANT CHANGE UP (ref 22–31)
CREAT SERPL-MCNC: 0.51 MG/DL — SIGNIFICANT CHANGE UP (ref 0.5–1.3)
EGFR: 112 ML/MIN/1.73M2 — SIGNIFICANT CHANGE UP
GLUCOSE SERPL-MCNC: 116 MG/DL — HIGH (ref 70–99)
HCT VFR BLD CALC: 40.7 % — SIGNIFICANT CHANGE UP (ref 39–50)
HGB BLD-MCNC: 12.9 G/DL — LOW (ref 13–17)
MCHC RBC-ENTMCNC: 28.4 PG — SIGNIFICANT CHANGE UP (ref 27–34)
MCHC RBC-ENTMCNC: 31.7 GM/DL — LOW (ref 32–36)
MCV RBC AUTO: 89.5 FL — SIGNIFICANT CHANGE UP (ref 80–100)
NRBC # BLD: 0 /100 WBCS — SIGNIFICANT CHANGE UP (ref 0–0)
PLATELET # BLD AUTO: 302 K/UL — SIGNIFICANT CHANGE UP (ref 150–400)
POTASSIUM SERPL-MCNC: 4.1 MMOL/L — SIGNIFICANT CHANGE UP (ref 3.5–5.3)
POTASSIUM SERPL-SCNC: 4.1 MMOL/L — SIGNIFICANT CHANGE UP (ref 3.5–5.3)
PROT SERPL-MCNC: 6.1 G/DL — SIGNIFICANT CHANGE UP (ref 6–8.3)
RBC # BLD: 4.55 M/UL — SIGNIFICANT CHANGE UP (ref 4.2–5.8)
RBC # FLD: 15.2 % — HIGH (ref 10.3–14.5)
SODIUM SERPL-SCNC: 139 MMOL/L — SIGNIFICANT CHANGE UP (ref 135–145)
WBC # BLD: 5.2 K/UL — SIGNIFICANT CHANGE UP (ref 3.8–10.5)
WBC # FLD AUTO: 5.2 K/UL — SIGNIFICANT CHANGE UP (ref 3.8–10.5)

## 2023-05-29 PROCEDURE — 99232 SBSQ HOSP IP/OBS MODERATE 35: CPT

## 2023-05-29 RX ADMIN — LEVETIRACETAM 750 MILLIGRAM(S): 250 TABLET, FILM COATED ORAL at 17:51

## 2023-05-29 RX ADMIN — Medication 2 MILLIGRAM(S): at 05:27

## 2023-05-29 RX ADMIN — LEVETIRACETAM 750 MILLIGRAM(S): 250 TABLET, FILM COATED ORAL at 05:26

## 2023-05-29 RX ADMIN — ENOXAPARIN SODIUM 40 MILLIGRAM(S): 100 INJECTION SUBCUTANEOUS at 05:26

## 2023-05-29 RX ADMIN — AMLODIPINE BESYLATE 10 MILLIGRAM(S): 2.5 TABLET ORAL at 05:27

## 2023-05-29 RX ADMIN — Medication 2 MILLIGRAM(S): at 17:51

## 2023-05-29 RX ADMIN — Medication 1 TABLET(S): at 12:07

## 2023-05-29 RX ADMIN — PANTOPRAZOLE SODIUM 40 MILLIGRAM(S): 20 TABLET, DELAYED RELEASE ORAL at 05:27

## 2023-05-29 RX ADMIN — SODIUM CHLORIDE 1 GRAM(S): 9 INJECTION INTRAMUSCULAR; INTRAVENOUS; SUBCUTANEOUS at 05:27

## 2023-05-29 RX ADMIN — SODIUM CHLORIDE 1 GRAM(S): 9 INJECTION INTRAMUSCULAR; INTRAVENOUS; SUBCUTANEOUS at 17:51

## 2023-05-29 RX ADMIN — Medication 50 MILLIGRAM(S): at 21:06

## 2023-05-29 NOTE — PROGRESS NOTE ADULT - SUBJECTIVE AND OBJECTIVE BOX
Patient is a 67y old  Male who presents with a chief complaint of GBm sp craniotomy and resection (29 May 2023 09:00)      SUBJECTIVE / OVERNIGHT EVENTS:  Pt seen and examined at bedside. No acute events overnight.  Pt denies cp, palpitations, sob, abd pain, N/V, fever, chills.    ROS:  All other review of systems negative    Allergies    No Known Allergies    Intolerances        MEDICATIONS  (STANDING):  amLODIPine   Tablet 10 milliGRAM(s) Oral every 24 hours  dexAMETHasone     Tablet   Oral   dexAMETHasone     Tablet 2 milliGRAM(s) Oral every 12 hours  dextrose 5%. 1000 milliLiter(s) (100 mL/Hr) IV Continuous <Continuous>  dextrose 5%. 1000 milliLiter(s) (50 mL/Hr) IV Continuous <Continuous>  dextrose 50% Injectable 12.5 Gram(s) IV Push once  dextrose 50% Injectable 25 Gram(s) IV Push once  dextrose 50% Injectable 25 Gram(s) IV Push once  enoxaparin Injectable 40 milliGRAM(s) SubCutaneous every 24 hours  glucagon  Injectable 1 milliGRAM(s) IntraMuscular once  levETIRAcetam 750 milliGRAM(s) Oral two times a day  multivitamin 1 Tablet(s) Oral daily  pantoprazole    Tablet 40 milliGRAM(s) Oral before breakfast  polyethylene glycol 3350 17 Gram(s) Oral two times a day  senna 2 Tablet(s) Oral at bedtime  sodium chloride 1 Gram(s) Oral every 12 hours  traZODone 50 milliGRAM(s) Oral at bedtime    MEDICATIONS  (PRN):  acetaminophen     Tablet .. 650 milliGRAM(s) Oral every 6 hours PRN Mild Pain (1 - 3)  benzonatate 100 milliGRAM(s) Oral three times a day PRN Cough  dextrose Oral Gel 15 Gram(s) Oral once PRN Blood Glucose LESS THAN 70 milliGRAM(s)/deciliter      Vital Signs Last 24 Hrs  T(C): 36.3 (29 May 2023 08:44), Max: 36.6 (28 May 2023 19:57)  T(F): 97.4 (29 May 2023 08:44), Max: 97.9 (28 May 2023 19:57)  HR: 58 (29 May 2023 08:44) (58 - 75)  BP: 151/89 (29 May 2023 08:44) (118/- - 151/89)  BP(mean): 70 (28 May 2023 19:57) (70 - 70)  RR: 16 (29 May 2023 08:44) (14 - 16)  SpO2: 97% (29 May 2023 08:44) (97% - 98%)    Parameters below as of 29 May 2023 08:44  Patient On (Oxygen Delivery Method): room air      CAPILLARY BLOOD GLUCOSE        I&O's Summary    28 May 2023 07:01  -  29 May 2023 07:00  --------------------------------------------------------  IN: 0 mL / OUT: 1200 mL / NET: -1200 mL        PHYSICAL EXAM:  GENERAL: NAD, well-developed male  HEAD:   Normocephalic, Craniotomy scar  NECK: Supple, No JVD  CHEST/LUNG: Clear to auscultation bilaterally; No wheeze, nonlabored breathing  HEART: Regular rate and rhythm; No murmurs, rubs, or gallops  ABDOMEN: Soft, Nontender, Nondistended; Bowel sounds present  EXTREMITIES: No clubbing, cyanosis. Right foot swelling, RUE in sling  NEUROLOGY: AAOx3, Right sided weakness   PSYCH: calm, appropriate mood    LABS:                        12.9   5.20  )-----------( 302      ( 29 May 2023 05:14 )             40.7     05-29    139  |  105  |  18  ----------------------------<  116<H>  4.1   |  29  |  0.51    Ca    9.1      29 May 2023 05:14    TPro  6.1  /  Alb  3.0<L>  /  TBili  0.3  /  DBili  x   /  AST  21  /  ALT  35  /  AlkPhos  95  05-29              RADIOLOGY & ADDITIONAL TESTS:  Results Reviewed:   Imaging Personally Reviewed:  Electrocardiogram Personally Reviewed:    COORDINATION OF CARE:  Care Discussed with Consultants/Other Providers [Y/N]:  Prior or Outpatient Records Reviewed [Y/N]:

## 2023-05-29 NOTE — PROGRESS NOTE ADULT - COMMENTS
Patient is in good spirits. Off isolation, had shower. wife visiting for family training today. denies feeling feverish, no cough or SOB. No progressive right sided weakness, no new complaints. Had virtual meeting with [facility] members and feels supported

## 2023-05-29 NOTE — PROGRESS NOTE ADULT - ASSESSMENT
Patient is a 68 y/o male PMH of HTN, OA, right TKA, who presented with recently diagnosed, brain mass, who presented to [facility] for brain tumor resection 4/24/23 Pathology positive for glioblastoma; patient  s/p craniotomy for resection of L brain tumor 4/25/23. Hospital course complicated by hyponatremia. Admitted for multidisciplinary rehab program- pt/ot/dvt ppx    # Glioblastoma s/p craniotomy for resection of L brain tumor 4/25/23 right hemiparesis  - continue decadron  through 6/3  - Keppra seizure ppx  - Planning for future Radiation therapy and chemo  - Precautions: steroid, SZ, cardiac, fall    # HTN   - amlodipine     # DM2   - A1C 6.5  - diet controlled    # DVT prophylaxis:  - Lovenox

## 2023-05-29 NOTE — PROGRESS NOTE ADULT - SUBJECTIVE AND OBJECTIVE BOX
Patient is a 67y old  Male who presents with a chief complaint of GBm sp craniotomy and resection (28 May 2023 11:23)      HPI:  Patient is a 66 y/o male RH dominant PMH of HTN, Osteoarthritis, right total knee replacement 2 months ago, brain mass s/p biopsy 23 @ [facility] with Dr. Tobar, who presented to [facility] for brain tumor resection 23. Per family, patient started to have right hand weakness about a month ago that grew progressively worse, then went to ED where brain mass was diagnosed. He was started on keppra 750 mg BID for seizure prophylaxis. Patient had sterotactic needle biopsy and laser interstitial thermal therapy by Dr. Tobar on 23 which was positive for glioblastoma  s/p craniotomy for resection of L brain tumor 23. Hospital course complicated by hyponatremia and RUE edema. Doppler negative.     Patient was evaluated by PM&R and therapy for functional deficits and gait/ ADL impairments and recommended acute rehabilitation. Patient was medically optimized for discharge to [facility] on 23     (02 May 2023 16:29)      PAST MEDICAL & SURGICAL HISTORY:  Hypertension      Osteoarthritis      Brain mass      S/P total knee replacement, right          MEDICATIONS  (STANDING):  amLODIPine   Tablet 10 milliGRAM(s) Oral every 24 hours  dexAMETHasone     Tablet   Oral   dexAMETHasone     Tablet 2 milliGRAM(s) Oral every 12 hours  dextrose 5%. 1000 milliLiter(s) (100 mL/Hr) IV Continuous <Continuous>  dextrose 5%. 1000 milliLiter(s) (50 mL/Hr) IV Continuous <Continuous>  dextrose 50% Injectable 12.5 Gram(s) IV Push once  dextrose 50% Injectable 25 Gram(s) IV Push once  dextrose 50% Injectable 25 Gram(s) IV Push once  enoxaparin Injectable 40 milliGRAM(s) SubCutaneous every 24 hours  glucagon  Injectable 1 milliGRAM(s) IntraMuscular once  levETIRAcetam 750 milliGRAM(s) Oral two times a day  multivitamin 1 Tablet(s) Oral daily  pantoprazole    Tablet 40 milliGRAM(s) Oral before breakfast  polyethylene glycol 3350 17 Gram(s) Oral two times a day  senna 2 Tablet(s) Oral at bedtime  sodium chloride 1 Gram(s) Oral every 12 hours  traZODone 50 milliGRAM(s) Oral at bedtime    MEDICATIONS  (PRN):  acetaminophen     Tablet .. 650 milliGRAM(s) Oral every 6 hours PRN Mild Pain (1 - 3)  benzonatate 100 milliGRAM(s) Oral three times a day PRN Cough  dextrose Oral Gel 15 Gram(s) Oral once PRN Blood Glucose LESS THAN 70 milliGRAM(s)/deciliter      Allergies    No Known Allergies    Intolerances          VITALS  67y  Vital Signs Last 24 Hrs  T(C): 36.3 (29 May 2023 08:44), Max: 36.6 (28 May 2023 19:57)  T(F): 97.4 (29 May 2023 08:44), Max: 97.9 (28 May 2023 19:57)  HR: 58 (29 May 2023 08:44) (58 - 75)  BP: 151/89 (29 May 2023 08:44) (118/- - 151/89)  BP(mean): 70 (28 May 2023 19:57) (70 - 70)  RR: 16 (29 May 2023 08:44) (14 - 16)  SpO2: 97% (29 May 2023 08:44) (97% - 98%)    Parameters below as of 29 May 2023 08:44  Patient On (Oxygen Delivery Method): room air      Daily     Daily Weight in k.3 (28 May 2023 23:57)        RECENT LABS:                          12.9   5.20  )-----------( 302      ( 29 May 2023 05:14 )             40.7     05-29    139  |  105  |  18  ----------------------------<  116<H>  4.1   |  29  |  0.51    Ca    9.1      29 May 2023 05:14    TPro  6.1  /  Alb  3.0<L>  /  TBili  0.3  /  DBili  x   /  AST  21  /  ALT  35  /  AlkPhos  95  05-29    LIVER FUNCTIONS - ( 29 May 2023 05:14 )  Alb: 3.0 g/dL / Pro: 6.1 g/dL / ALK PHOS: 95 U/L / ALT: 35 U/L / AST: 21 U/L / GGT: x                   CAPILLARY BLOOD GLUCOSE

## 2023-05-29 NOTE — PROGRESS NOTE ADULT - ASSESSMENT
Patient is a 68 y/o male PMH of HTN, OA, right TKA, who presented with recently diagnosed, brain mass, who presented to [facility] for brain tumor resection 4/24/23 Pathology positive for glioblastoma; patient  s/p craniotomy for resection of L brain tumor 4/25/23.    # Glioblastoma s/p craniotomy for resection of L brain tumor 4/25/23 right hemiparesis  - continue decadron 2mg PO BID through 6/3  - keppra 750mg PO BID for seizure ppx  - [facility]ell craniotomy dc'd 5/9  - Continue PT/OT. 3 hours daily 5 x weeks  - Pending Radiation therapy and chemo. Has mapping/mask made already   - Precautions: steroid, SZ, cardiac, fall. OFF isolation, completed 10 days    # newly dx COVID + 5/17  - afebrile, asymptomatic  - completed 10 day isolation period    # HTN   - amlodipine 10mg PO QD   - (118/- - 151/89) 5/29  - PCP f/u on dc    # DM2   - glu 154 5/11  - PCP f/u on dc    # Sleep:  - Melatonin PRN  - trazodone 50 mg qhs    # Pain:  - Tylenol PRN    # GI/Bowel:  - miralax 17g PO BID   - Senna 2 tabs daily  - pantoprazole     # Diet   - Regular, CCH   - MVI 5/9  - B12 level: 654 (5/10) WNL    # DVT prophylaxis:  - Doppler RLE 5/25/23 for swelling - Neg. Reviewed with patient  - bilateral TEDs OOB, patient agreeable  - lovenox 40mg SC QD   - SCDs    # Case discussed in IDT rounds 5/26:  -  min assist LB dressing, mod assist bathroom transfers, mod assist toiletig, transfers bed to chair min-mod assist, beginning ambulation with min assist, HR and standard AFO  - goals: min assist bADLs, CG/min assist transfers and short distance household ambulation  -Medical forms for provided to for verification for cancelled travel plans due to illness, completed 5/15  - copies of labwork, MRI brain, surgical path report provided to patient 5/19  - letter provided re: hospitalization dates  - handicap permit completed 5/24  - For [facility] transfer for continued Ot PT services, orthotics, in conjunction with skilled nursing care, medical oversight and RT. Has acceptance at [place] [facility], awaiting insurance auth and arrangement transportation between [facility] and treatment center. Patient and wife aware  - caregiver training today 5/29    # LABs  CBC BMP 5/29: labs reviewed, stable      --------------------------------------------  Outpatient Follow up:    Juanjose Johnston)  Neurosurgery  [address], [address] [place] [address]  Phone: [phone]  Fax: [phone]  Follow Up Time:     Delmis Causey; MIKHAIL)  Hematology; Internal Medicine; Medical Oncology  [address] [place] [address]  Phone: [phone]  Fax: [phone]  Follow Up Time:

## 2023-05-30 LAB — SARS-COV-2 RNA SPEC QL NAA+PROBE: DETECTED

## 2023-05-30 PROCEDURE — 99232 SBSQ HOSP IP/OBS MODERATE 35: CPT

## 2023-05-30 RX ADMIN — Medication 2 MILLIGRAM(S): at 17:10

## 2023-05-30 RX ADMIN — ENOXAPARIN SODIUM 40 MILLIGRAM(S): 100 INJECTION SUBCUTANEOUS at 05:34

## 2023-05-30 RX ADMIN — Medication 50 MILLIGRAM(S): at 21:37

## 2023-05-30 RX ADMIN — PANTOPRAZOLE SODIUM 40 MILLIGRAM(S): 20 TABLET, DELAYED RELEASE ORAL at 05:33

## 2023-05-30 RX ADMIN — SODIUM CHLORIDE 1 GRAM(S): 9 INJECTION INTRAMUSCULAR; INTRAVENOUS; SUBCUTANEOUS at 17:10

## 2023-05-30 RX ADMIN — Medication 1 TABLET(S): at 12:24

## 2023-05-30 RX ADMIN — Medication 2 MILLIGRAM(S): at 05:34

## 2023-05-30 RX ADMIN — LEVETIRACETAM 750 MILLIGRAM(S): 250 TABLET, FILM COATED ORAL at 17:10

## 2023-05-30 RX ADMIN — LEVETIRACETAM 750 MILLIGRAM(S): 250 TABLET, FILM COATED ORAL at 05:34

## 2023-05-30 RX ADMIN — AMLODIPINE BESYLATE 10 MILLIGRAM(S): 2.5 TABLET ORAL at 05:34

## 2023-05-30 RX ADMIN — SODIUM CHLORIDE 1 GRAM(S): 9 INJECTION INTRAMUSCULAR; INTRAVENOUS; SUBCUTANEOUS at 05:33

## 2023-05-30 NOTE — PROGRESS NOTE ADULT - SUBJECTIVE AND OBJECTIVE BOX
Patient is a 67y old  Male who presents with a chief complaint of GBm sp craniotomy and resection (30 May 2023 11:35)      HPI:  Patient is a 68 y/o male RH dominant PMH of HTN, Osteoarthritis, right total knee replacement 2 months ago, brain mass s/p biopsy 4/7/23 @ [facility] with Dr. Tobar, who presented to [facility] for brain tumor resection 4/24/23. Per family, patient started to have right hand weakness about a month ago that grew progressively worse, then went to ED where brain mass was diagnosed. He was started on keppra 750 mg BID for seizure prophylaxis. Patient had sterotactic needle biopsy and laser interstitial thermal therapy by Dr. Tobar on 4/7/23 which was positive for glioblastoma  s/p craniotomy for resection of L brain tumor 4/25/23. Hospital course complicated by hyponatremia and RUE edema. Doppler negative.     Patient was evaluated by PM&R and therapy for functional deficits and gait/ ADL impairments and recommended acute rehabilitation. Patient was medically optimized for discharge to [facility] on 5/2/23     (02 May 2023 16:29)      SUBJECTIVE HISTORY:  Patient seen and evaluated at bedside.  Discussed discharge planning with.  He prefers discharge to [place] due to proximity to his home.  He reports feeling more of right sided facial droop but denies difficulty with eating or speaking.    REVIEW OF SYSTEMS:  +right facial droop  Denies SOB, difficulty eating and speaking      PHYSICAL EXAM  Constitutional - NAD, Comfortable  Chest - good chest expansion, good respiratory effort, CTAB   Cardio - warm and well perfused, RRR, no murmur  Abdomen -  Soft, NTND  Extremities - improved RLE swelling  Neurologic Exam:                    Cognitive -             Orientation: Awake, Alert, AAO to self, place, date, year, situation     Speech - Fluent, Comprehensible, No dysarthria, No aphasia      Cranial Nerves - + right facial asymmetry  Psychiatric - Mood stable, Affect WNL    VITALS  67y  Vital Signs Last 24 Hrs  T(C): 36.4 (30 May 2023 07:59), Max: 36.6 (29 May 2023 21:07)  T(F): 97.6 (30 May 2023 07:59), Max: 97.9 (29 May 2023 21:07)  HR: 67 (30 May 2023 07:59) (67 - 92)  BP: 124/77 (30 May 2023 07:59) (121/77 - 136/82)  BP(mean): --  RR: 16 (30 May 2023 07:59) (14 - 16)  SpO2: 97% (30 May 2023 07:59) (95% - 97%)    Parameters below as of 30 May 2023 07:59  Patient On (Oxygen Delivery Method): room air      Daily     Daily         RECENT LABS:                          12.9   5.20  )-----------( 302      ( 29 May 2023 05:14 )             40.7     05-29    139  |  105  |  18  ----------------------------<  116<H>  4.1   |  29  |  0.51    Ca    9.1      29 May 2023 05:14    TPro  6.1  /  Alb  3.0<L>  /  TBili  0.3  /  DBili  x   /  AST  21  /  ALT  35  /  AlkPhos  95  05-29    LIVER FUNCTIONS - ( 29 May 2023 05:14 )  Alb: 3.0 g/dL / Pro: 6.1 g/dL / ALK PHOS: 95 U/L / ALT: 35 U/L / AST: 21 U/L / GGT: x                   CAPILLARY BLOOD GLUCOSE          MEDICATIONS  (STANDING):  amLODIPine   Tablet 10 milliGRAM(s) Oral every 24 hours  dexAMETHasone     Tablet   Oral   dexAMETHasone     Tablet 2 milliGRAM(s) Oral every 12 hours  dextrose 5%. 1000 milliLiter(s) (100 mL/Hr) IV Continuous <Continuous>  dextrose 5%. 1000 milliLiter(s) (50 mL/Hr) IV Continuous <Continuous>  dextrose 50% Injectable 12.5 Gram(s) IV Push once  dextrose 50% Injectable 25 Gram(s) IV Push once  dextrose 50% Injectable 25 Gram(s) IV Push once  enoxaparin Injectable 40 milliGRAM(s) SubCutaneous every 24 hours  glucagon  Injectable 1 milliGRAM(s) IntraMuscular once  levETIRAcetam 750 milliGRAM(s) Oral two times a day  multivitamin 1 Tablet(s) Oral daily  pantoprazole    Tablet 40 milliGRAM(s) Oral before breakfast  polyethylene glycol 3350 17 Gram(s) Oral two times a day  senna 2 Tablet(s) Oral at bedtime  sodium chloride 1 Gram(s) Oral every 12 hours  traZODone 50 milliGRAM(s) Oral at bedtime    MEDICATIONS  (PRN):  acetaminophen     Tablet .. 650 milliGRAM(s) Oral every 6 hours PRN Mild Pain (1 - 3)  benzonatate 100 milliGRAM(s) Oral three times a day PRN Cough  dextrose Oral Gel 15 Gram(s) Oral once PRN Blood Glucose LESS THAN 70 milliGRAM(s)/deciliter           Patient is a 67y old  Male who presents with a chief complaint of GBm sp craniotomy and resection (30 May 2023 11:35)      HPI:  Patient is a 68 y/o male RH dominant PMH of HTN, Osteoarthritis, right total knee replacement 2 months ago, brain mass s/p biopsy 4/7/23 @ [facility] with Dr. Tobar, who presented to [facility] for brain tumor resection 4/24/23. Per family, patient started to have right hand weakness about a month ago that grew progressively worse, then went to ED where brain mass was diagnosed. He was started on keppra 750 mg BID for seizure prophylaxis. Patient had sterotactic needle biopsy and laser interstitial thermal therapy by Dr. Tobar on 4/7/23 which was positive for glioblastoma  s/p craniotomy for resection of L brain tumor 4/25/23. Hospital course complicated by hyponatremia and RUE edema. Doppler negative.     Patient was evaluated by PM&R and therapy for functional deficits and gait/ ADL impairments and recommended acute rehabilitation. Patient was medically optimized for discharge to [facility] on 5/2/23     (02 May 2023 16:29)      SUBJECTIVE HISTORY:  Patient seen and evaluated at bedside.  Discussed discharge planning with.  He prefers discharge to [place] due to proximity to his home.  He reports feeling more of right sided facial droop but denies difficulty with eating or speaking. wife present during exam    REVIEW OF SYSTEMS:  +right facial droop  Denies SOB, difficulty eating and speaking      PHYSICAL EXAM  Constitutional - NAD, Comfortable. mild dysarthria, stable right facial droop  Chest - good chest expansion, good respiratory effort, CTAB   Cardio - warm and well perfused, RRR, no murmur  Abdomen -  Soft, NTND  Extremities - improved RLE swelling  Neurologic Exam:                    Cognitive -             Orientation: Awake, Alert, AAO to self, place, date, year, situation     Speech - Fluent, Comprehensible, No dysarthria, No aphasia      Cranial Nerves - + right facial asymmetry  Psychiatric - Mood stable, Affect WNL    VITALS  67y  Vital Signs Last 24 Hrs  T(C): 36.4 (30 May 2023 07:59), Max: 36.6 (29 May 2023 21:07)  T(F): 97.6 (30 May 2023 07:59), Max: 97.9 (29 May 2023 21:07)  HR: 67 (30 May 2023 07:59) (67 - 92)  BP: 124/77 (30 May 2023 07:59) (121/77 - 136/82)  BP(mean): --  RR: 16 (30 May 2023 07:59) (14 - 16)  SpO2: 97% (30 May 2023 07:59) (95% - 97%)    Parameters below as of 30 May 2023 07:59  Patient On (Oxygen Delivery Method): room air      Daily     Daily         RECENT LABS:                          12.9   5.20  )-----------( 302      ( 29 May 2023 05:14 )             40.7     05-29    139  |  105  |  18  ----------------------------<  116<H>  4.1   |  29  |  0.51    Ca    9.1      29 May 2023 05:14    TPro  6.1  /  Alb  3.0<L>  /  TBili  0.3  /  DBili  x   /  AST  21  /  ALT  35  /  AlkPhos  95  05-29    LIVER FUNCTIONS - ( 29 May 2023 05:14 )  Alb: 3.0 g/dL / Pro: 6.1 g/dL / ALK PHOS: 95 U/L / ALT: 35 U/L / AST: 21 U/L / GGT: x                   CAPILLARY BLOOD GLUCOSE          MEDICATIONS  (STANDING):  amLODIPine   Tablet 10 milliGRAM(s) Oral every 24 hours  dexAMETHasone     Tablet   Oral   dexAMETHasone     Tablet 2 milliGRAM(s) Oral every 12 hours  dextrose 5%. 1000 milliLiter(s) (100 mL/Hr) IV Continuous <Continuous>  dextrose 5%. 1000 milliLiter(s) (50 mL/Hr) IV Continuous <Continuous>  dextrose 50% Injectable 12.5 Gram(s) IV Push once  dextrose 50% Injectable 25 Gram(s) IV Push once  dextrose 50% Injectable 25 Gram(s) IV Push once  enoxaparin Injectable 40 milliGRAM(s) SubCutaneous every 24 hours  glucagon  Injectable 1 milliGRAM(s) IntraMuscular once  levETIRAcetam 750 milliGRAM(s) Oral two times a day  multivitamin 1 Tablet(s) Oral daily  pantoprazole    Tablet 40 milliGRAM(s) Oral before breakfast  polyethylene glycol 3350 17 Gram(s) Oral two times a day  senna 2 Tablet(s) Oral at bedtime  sodium chloride 1 Gram(s) Oral every 12 hours  traZODone 50 milliGRAM(s) Oral at bedtime    MEDICATIONS  (PRN):  acetaminophen     Tablet .. 650 milliGRAM(s) Oral every 6 hours PRN Mild Pain (1 - 3)  benzonatate 100 milliGRAM(s) Oral three times a day PRN Cough  dextrose Oral Gel 15 Gram(s) Oral once PRN Blood Glucose LESS THAN 70 milliGRAM(s)/deciliter

## 2023-05-30 NOTE — PROGRESS NOTE ADULT - ASSESSMENT
Patient is a 68 y/o male PMH of HTN, OA, right TKA, who presented with recently diagnosed, brain mass, who presented to [facility] for brain tumor resection 4/24/23 Pathology positive for glioblastoma; patient  s/p craniotomy for resection of L brain tumor 4/25/23.    # Glioblastoma s/p craniotomy for resection of L brain tumor 4/25/23 right hemiparesis  - continue decadron 2mg PO BID through 6/3  - keppra 750mg PO BID for seizure ppx  - [facility] craniotomy dc'd 5/9  - Continue PT/OT. 3 hours daily 5 x weeks  - Pending Radiation therapy and chemo. Has mapping/mask made already   - Precautions: steroid, SZ, cardiac, fall. OFF isolation, completed 10 days    # newly dx COVID + 5/17  - afebrile, asymptomatic  - completed 10 day isolation period    # HTN   - amlodipine 10mg PO QD   - (121/77 - 136/82) 5/30  - PCP f/u on dc    # DM2   - glu 154 5/11  - PCP f/u on dc    # Sleep:  - Melatonin PRN  - trazodone 50 mg qhs    # Pain:  - Tylenol PRN    # GI/Bowel:  - miralax 17g PO BID   - Senna 2 tabs daily  - pantoprazole     # Diet   - Regular, CCH   - MVI 5/9  - B12 level: 654 (5/10) WNL    # DVT prophylaxis:  - Doppler RLE 5/25/23 for swelling - Neg. Reviewed with patient  - bilateral TEDs OOB, patient agreeable  - lovenox 40mg SC QD   - SCDs    # Case discussed in IDT rounds 5/26:  -  min assist LB dressing, mod assist bathroom transfers, mod assist toiletig, transfers bed to chair min-mod assist, beginning ambulation with min assist, HR and standard AFO  - goals: min assist bADLs, CG/min assist transfers and short distance household ambulation  -Medical forms for provided to for verification for cancelled travel plans due to illness, completed 5/15  - copies of labwork, MRI brain, surgical path report provided to patient 5/19  - letter provided re: hospitalization dates  - handicap permit completed 5/24  - For [facility] transfer for continued Ot PT services, orthotics, in conjunction with skilled nursing care, medical oversight and RT. Has acceptance at [facility], awaiting insurance auth and arrangement transportation between [facility] and treatment center. Patient and wife aware  - caregiver training 5/29    # LABs  CBC BMP 6/1      --------------------------------------------  Outpatient Follow up:    Juanjose Johnston)  Neurosurgery  [address], [address] [place] [address]  Phone: [phone]  Fax: [phone]  Follow Up Time:     Delmis Causey; MIKHAIL)  Hematology; Internal Medicine; Medical Oncology  [address] [place] [address]  Phone: [phone]  Fax: [phone]  Follow Up Time:   Patient is a 66 y/o male PMH of HTN, OA, right TKA, who presented with recently diagnosed, brain mass, who presented to [facility] for brain tumor resection 4/24/23 Pathology positive for glioblastoma; patient  s/p craniotomy for resection of L brain tumor 4/25/23.    # Glioblastoma s/p craniotomy for resection of L brain tumor 4/25/23 right hemiparesis  - continue decadron 2mg PO BID through 6/3  - keppra 750mg PO BID for seizure ppx  - [facility] craniotomy dc'd 5/9  - Continue PT/OT. 3 hours daily 5 x weeks  - Pending Radiation therapy and chemo. Has mapping/mask made already   - Precautions: steroid, SZ, cardiac, fall.    # newly dx COVID + 5/17  - afebrile, asymptomatic  - completed 10 day isolation period. Off isolation    # HTN   - amlodipine 10mg PO QD   - (121/77 - 136/82) 5/30  - PCP f/u on dc    # DM2   - glu 154 5/11  - PCP f/u on dc    # Sleep:  - Melatonin PRN  - trazodone 50 mg qhs    # Pain:  - Tylenol PRN    # GI/Bowel:  - miralax 17g PO BID   - Senna 2 tabs daily  - pantoprazole     # Diet   - Regular, CCH   - MVI 5/9  - B12 level: 654 (5/10) WNL    # DVT prophylaxis:  - Doppler RLE 5/25/23 for swelling - Neg. Reviewed with patient  - bilateral TEDs OOB, patient agreeable  - lovenox 40mg SC QD   - SCDs    # Case discussed in IDT rounds 5/26:  -  min assist LB dressing, mod assist bathroom transfers, mod assist toiletig, transfers bed to chair min-mod assist, beginning ambulation with min assist, HR and standard AFO  - goals: min assist bADLs, CG/min assist transfers and short distance household ambulation  -Medical forms for provided to for verification for cancelled travel plans due to illness, completed 5/15  - copies of labwork, MRI brain, surgical path report provided to patient 5/19  - letter provided re: hospitalization dates  - handicap permit completed 5/24  - For [facility] transfer for continued Ot PT services, orthotics, in conjunction with skilled nursing care, medical oversight and RT. Has acceptance at [facility], awaiting insurance auth and arrangement transportation between [facility] and treatment center. Patient and wife aware  - caregiver training 5/29 completed  - repeat COVID swab 5/30 remains positive, completed isolation    # LABs  CBC BMP 6/1      --------------------------------------------  Outpatient Follow up:    Juanjose Johnston)  Neurosurgery  [address] [address]  Phone: [phone]  Fax: [phone]  Follow Up Time:     Delmis Causey; MIKHAIL)  Hematology; Internal Medicine; Medical Oncology  [address] [place] [address]  Phone: [phone]  Fax: [phone]  Follow Up Time:

## 2023-05-30 NOTE — CHART NOTE - NSCHARTNOTESELECT_GEN_ALL_CORE
Event Note
IPOC/Event Note
Nutrition Services

## 2023-05-30 NOTE — CHART NOTE - NSCHARTNOTEFT_GEN_A_CORE
Nutrition Follow Up Note  Source: Medical Record [X] Patient [X] Family [ ]         Diet: Consistent carbohydrate, no snacks, Vegan, no dairy  Pt tolerating diet with typically good PO intake, eating >75% of meals per nursing flow sheets. Pt reports good appetite, feels he is eating well. Denies nausea, vomiting, diarrhea, constipation. Pt reports he is going to be discharged soon and will continue following a healthy diet.    Enteral/Parenteral Nutrition: N/A    Current Weight: 216.7 lbs (5/13)  218.4 lbs (5/9)    Pertinent Medications: MEDICATIONS  (STANDING):  amLODIPine   Tablet 10 milliGRAM(s) Oral every 24 hours  dexAMETHasone     Tablet   Oral   dexAMETHasone     Tablet 2 milliGRAM(s) Oral every 12 hours  dextrose 5%. 1000 milliLiter(s) (100 mL/Hr) IV Continuous <Continuous>  dextrose 5%. 1000 milliLiter(s) (50 mL/Hr) IV Continuous <Continuous>  dextrose 50% Injectable 12.5 Gram(s) IV Push once  dextrose 50% Injectable 25 Gram(s) IV Push once  dextrose 50% Injectable 25 Gram(s) IV Push once  enoxaparin Injectable 40 milliGRAM(s) SubCutaneous every 24 hours  glucagon  Injectable 1 milliGRAM(s) IntraMuscular once  levETIRAcetam 750 milliGRAM(s) Oral two times a day  multivitamin 1 Tablet(s) Oral daily  pantoprazole    Tablet 40 milliGRAM(s) Oral before breakfast  polyethylene glycol 3350 17 Gram(s) Oral two times a day  senna 2 Tablet(s) Oral at bedtime  sodium chloride 1 Gram(s) Oral every 12 hours  traZODone 50 milliGRAM(s) Oral at bedtime    MEDICATIONS  (PRN):  acetaminophen     Tablet .. 650 milliGRAM(s) Oral every 6 hours PRN Mild Pain (1 - 3)  benzonatate 100 milliGRAM(s) Oral three times a day PRN Cough  dextrose Oral Gel 15 Gram(s) Oral once PRN Blood Glucose LESS THAN 70 milliGRAM(s)/deciliter      Pertinent Labs:  05-15 Na140 mmol/L Glu 132 mg/dL<H> K+ 4.8 mmol/L Cr  0.81 mg/dL BUN 14 mg/dL 05-15 Alb 3.2 g/dL<L>        Skin: surgical incision per nursing flow sheets     Edema: No edema per nursing flow sheets     Last BM: on 5/15 Per nursing flowsheets     Estimated Needs:   [X] No Change since Previous Assessment  [ ] Recalculated:     Previous Nutrition Diagnosis:   Moderate malnutrition    Nutrition Diagnosis is [X] Ongoing  - currently on MVI     New Nutrition Diagnosis: [X] Not Applicable  [ ] Inadequate Protein Energy Intake   [ ] Inadequate Oral Intake   [ ] Excessive Energy Intake   [ ] Increased Nutrient Needs   [ ] Obesity   [ ] Altered GI Function   [ ] Unintended Weight Loss   [ ] Food & Nutrition Related Knowledge Deficit  [ ] Limited Adherence to nutrition related recommendations   [ ] Malnutrition      Interventions:   1. Recommend continuing with current plan of care  2. Obtain and honor food preferences as able in order to optimize PO intake.     Monitoring & Evaluation:   [X] Weights   [X] PO Intake   [X] Follow Up (Per Protocol)  [X] Tolerance to Diet Prescription   [X] Other: Labs    RD Remains Available.  Sushila Abraham RD
Nutrition Follow Up Note  Source: Medical Record [X] Patient [X] Family [ ]         Diet: Vegan, consistent carbohydrate/no snacks, no dairy  Pt tolerating diet with fair-good PO intake per nursing flow sheets (eating % of meals). Pt reports good appetite/PO intake and is pleased with the meals. Discussed pt's vegan diet - pt's wife is well-versed on ensuring a well-balanced, nutrient-dense vegan diet and will be cooking his meals once he returns home. No digestive issues reported.     Enteral/Parenteral Nutrition: N/A    Current Weight: 218.2 lbs (5/7)    Pertinent Medications: MEDICATIONS  (STANDING):  amLODIPine   Tablet 10 milliGRAM(s) Oral every 24 hours  dexAMETHasone     Tablet   Oral   dexAMETHasone     Tablet 2 milliGRAM(s) Oral every 12 hours  dextrose 5%. 1000 milliLiter(s) (100 mL/Hr) IV Continuous <Continuous>  dextrose 5%. 1000 milliLiter(s) (50 mL/Hr) IV Continuous <Continuous>  dextrose 50% Injectable 12.5 Gram(s) IV Push once  dextrose 50% Injectable 25 Gram(s) IV Push once  dextrose 50% Injectable 25 Gram(s) IV Push once  enoxaparin Injectable 40 milliGRAM(s) SubCutaneous every 24 hours  glucagon  Injectable 1 milliGRAM(s) IntraMuscular once  levETIRAcetam 750 milliGRAM(s) Oral two times a day  pantoprazole    Tablet 40 milliGRAM(s) Oral before breakfast  polyethylene glycol 3350 17 Gram(s) Oral two times a day  senna 2 Tablet(s) Oral at bedtime  sodium chloride 1 Gram(s) Oral every 12 hours  traZODone 25 milliGRAM(s) Oral at bedtime    MEDICATIONS  (PRN):  acetaminophen     Tablet .. 650 milliGRAM(s) Oral every 6 hours PRN Mild Pain (1 - 3)  benzonatate 100 milliGRAM(s) Oral three times a day PRN Cough  dextrose Oral Gel 15 Gram(s) Oral once PRN Blood Glucose LESS THAN 70 milliGRAM(s)/deciliter      Pertinent Labs:  05-08 Na139 mmol/L Glu 132 mg/dL<H> K+ 4.6 mmol/L Cr  0.86 mg/dL BUN 16 mg/dL 05-08 Alb 2.9 g/dL<L>        Skin: surgical incision per nursing flow sheets     Edema: No edema per nursing flow sheets     Last BM: on 5/8 Per nursing flowsheets     Estimated Needs:   [X] No Change since Previous Assessment  [ ] Recalculated:     Previous Nutrition Diagnosis:   Moderate malnutrition    Nutrition Diagnosis is [X] Ongoing  - food preferences obtained, will honor. Suggest MVI, Vitamin b12, consider vitamin C    New Nutrition Diagnosis: [X] Not Applicable  [ ] Inadequate Protein Energy Intake   [ ] Inadequate Oral Intake   [ ] Excessive Energy Intake   [ ] Increased Nutrient Needs   [ ] Obesity   [ ] Altered GI Function   [ ] Unintended Weight Loss   [ ] Food & Nutrition Related Knowledge Deficit  [ ] Limited Adherence to nutrition related recommendations   [ ] Malnutrition      Interventions:   1. Recommend continuing with current plan of care  2. Encourage PO intake    Monitoring & Evaluation:   [X] Weights   [X] PO Intake   [X] Follow Up (Per Protocol)  [X] Tolerance to Diet Prescription   [X] Other: Labs    RD Remains Available.  Sushila Abraham RD
Nutrition Follow Up Note  Source: Medical Record [X] Patient [X] Family [ ]         Diet: consistent carbohydrate (no snacks), Vegan, no dairy  Pt tolerating diet with good PO intake, eating >75% of meals per nursing flow sheets. Pt reports good appetite/PO intake. Also receives healthy snacks that pt's wife brings. Denies nausea, vomiting, diarrhea, constipation. Pt denies chewing/swallowing difficulties.     Enteral/Parenteral Nutrition: N/A    Current Weight: 218.9 lbs (5/28)  219.1 lbs (5/21)  219.3 lbs (5/19)    Pertinent Medications: MEDICATIONS  (STANDING):  amLODIPine   Tablet 10 milliGRAM(s) Oral every 24 hours  dexAMETHasone     Tablet 2 milliGRAM(s) Oral every 12 hours  dexAMETHasone     Tablet   Oral   dextrose 5%. 1000 milliLiter(s) (100 mL/Hr) IV Continuous <Continuous>  dextrose 5%. 1000 milliLiter(s) (50 mL/Hr) IV Continuous <Continuous>  dextrose 50% Injectable 12.5 Gram(s) IV Push once  dextrose 50% Injectable 25 Gram(s) IV Push once  dextrose 50% Injectable 25 Gram(s) IV Push once  enoxaparin Injectable 40 milliGRAM(s) SubCutaneous every 24 hours  glucagon  Injectable 1 milliGRAM(s) IntraMuscular once  levETIRAcetam 750 milliGRAM(s) Oral two times a day  multivitamin 1 Tablet(s) Oral daily  pantoprazole    Tablet 40 milliGRAM(s) Oral before breakfast  polyethylene glycol 3350 17 Gram(s) Oral two times a day  senna 2 Tablet(s) Oral at bedtime  sodium chloride 1 Gram(s) Oral every 12 hours  traZODone 50 milliGRAM(s) Oral at bedtime    MEDICATIONS  (PRN):  acetaminophen     Tablet .. 650 milliGRAM(s) Oral every 6 hours PRN Mild Pain (1 - 3)  benzonatate 100 milliGRAM(s) Oral three times a day PRN Cough  dextrose Oral Gel 15 Gram(s) Oral once PRN Blood Glucose LESS THAN 70 milliGRAM(s)/deciliter      Pertinent Labs:  05-29 Na139 mmol/L Glu 116 mg/dL<H> K+ 4.1 mmol/L Cr  0.51 mg/dL BUN 18 mg/dL 05-29 Alb 3.0 g/dL<L>        Skin: surgical incision per nursing flow sheets     Edema: No edema per nursing flow sheets     Last BM: on 5/28 Per nursing flowsheets     Estimated Needs:   [X] No Change since Previous Assessment  [ ] Recalculated:     Previous Nutrition Diagnosis:   Moderate malnutrition    Nutrition Diagnosis is [X] Ongoing  - continues on MVI    New Nutrition Diagnosis: [X] Not Applicable  [ ] Inadequate Protein Energy Intake   [ ] Inadequate Oral Intake   [ ] Excessive Energy Intake   [ ] Increased Nutrient Needs   [ ] Obesity   [ ] Altered GI Function   [ ] Unintended Weight Loss   [ ] Food & Nutrition Related Knowledge Deficit  [ ] Limited Adherence to nutrition related recommendations   [ ] Malnutrition      Interventions:   1. Recommend continuing with current plan of care    Monitoring & Evaluation:   [X] Weights   [X] PO Intake   [X] Follow Up (Per Protocol)  [X] Tolerance to Diet Prescription   [X] Other: Labs     RD Remains Available.  Sushila Abraham RD
Nutrition Follow Up Note  Source: Medical Record [X] Patient [X] Family [ ]         Diet: consistent carbohydrate, no snacks, vegan, no dairy  Pt tolerating diet with good PO intake, eating >75% of meals per nursing flow sheets. Pt also receiving additional foods that wife has been bringing. No digestive issues reported.    Enteral/Parenteral Nutrition: N/A    Current Weight: 219.1 lbs (5/21)  216.7 lbs (5/13)    Pertinent Medications: MEDICATIONS  (STANDING):  amLODIPine   Tablet 10 milliGRAM(s) Oral every 24 hours  dexAMETHasone     Tablet   Oral   dexAMETHasone     Tablet 2 milliGRAM(s) Oral every 12 hours  dextrose 5%. 1000 milliLiter(s) (100 mL/Hr) IV Continuous <Continuous>  dextrose 5%. 1000 milliLiter(s) (50 mL/Hr) IV Continuous <Continuous>  dextrose 50% Injectable 25 Gram(s) IV Push once  dextrose 50% Injectable 12.5 Gram(s) IV Push once  dextrose 50% Injectable 25 Gram(s) IV Push once  enoxaparin Injectable 40 milliGRAM(s) SubCutaneous every 24 hours  glucagon  Injectable 1 milliGRAM(s) IntraMuscular once  levETIRAcetam 750 milliGRAM(s) Oral two times a day  multivitamin 1 Tablet(s) Oral daily  pantoprazole    Tablet 40 milliGRAM(s) Oral before breakfast  polyethylene glycol 3350 17 Gram(s) Oral two times a day  senna 2 Tablet(s) Oral at bedtime  sodium chloride 1 Gram(s) Oral every 12 hours  traZODone 50 milliGRAM(s) Oral at bedtime    MEDICATIONS  (PRN):  acetaminophen     Tablet .. 650 milliGRAM(s) Oral every 6 hours PRN Mild Pain (1 - 3)  benzonatate 100 milliGRAM(s) Oral three times a day PRN Cough  dextrose Oral Gel 15 Gram(s) Oral once PRN Blood Glucose LESS THAN 70 milliGRAM(s)/deciliter      Pertinent Labs:  05-22 Na140 mmol/L Glu 132 mg/dL<H> K+ 4.3 mmol/L Cr  0.86 mg/dL BUN 17 mg/dL 05-22 Alb 3.2 g/dL<L>        Skin: surgical incision per nursing flow sheets     Edema: No edema per nursing flow sheets     Last BM: on 5-21 Per nursing flowsheets     Estimated Needs:   [X] No Change since Previous Assessment  [ ] Recalculated:     Previous Nutrition Diagnosis:   Moderate malnutrition    Nutrition Diagnosis is [X] Ongoing  - continues on MVI    New Nutrition Diagnosis: [X] Not Applicable  [ ] Inadequate Protein Energy Intake   [ ] Inadequate Oral Intake   [ ] Excessive Energy Intake   [ ] Increased Nutrient Needs   [ ] Obesity   [ ] Altered GI Function   [ ] Unintended Weight Loss   [ ] Food & Nutrition Related Knowledge Deficit  [ ] Limited Adherence to nutrition related recommendations   [ ] Malnutrition      Interventions:   1. Recommend continuing with current plan of care  2. Encourage PO intake - food preferences on file  3. Ongoing diet education    Monitoring & Evaluation:   [X] Weights   [X] PO Intake   [X] Follow Up (Per Protocol)  [X] Tolerance to Diet Prescription   [X] Other: Labs    RD Remains Available.  Sushila Abraham RD
REHABILITATION DIAGNOSIS:  glioblastoma        COMORBIDITIES/COMPLICATING CONDITIONS IMPACTING REHABILITATION:  HEALTH ISSUES - PROBLEM Dx:    s/p craniotomy and resection  right hemiplegia  right ortiz sensory deficits  right foot dorp  seizure prophylaxis  steroid use    PAST MEDICAL & SURGICAL HISTORY:  Hypertension      Osteoarthritis      Brain mass      S/P total knee replacement, right          Based upon consideration of the patient's impairments, functional status, complicating conditions and any other contributing factors and after information garnered from the assessments of all therapy disciplines involved in treating the patient and other pertinent clinicians:    INTERDISCIPLINARY REHABILITATION INTERVENTIONS:    [ x  ] Transfer Training  [ x  ] Bed Mobility  [  x ] Therapeutic Exercise  [  x ] Balance/Coordination Exercises  [  x ] Locomotion training  [  x ] Stairs    [  x ] Functional transfers  [ x  ] Activities of daily living  [ x  ] Visual Perceptual training    [  x ] Bowel/Bladder program  [   ] Pain Management  [   ] Skin/Wound Care    [ x  ] Speech/Communication Exercise  [   ] Swallowing Exercises    [ x  ] Cognitive Exercises  [ x  ] Cognitive-linguistic Treatment  [ x  ] Behavioral Program    [ x  ] Patient/Family Counseling  [ x  ] Family Training  [   ] Community Re-entry  [ x  ] Orthotic Evaluation/Training  [   ] Prosthetic Eval/Training    MEDICAL PROGNOSIS:  fair    REHAB POTENTIAL:  good-  EXPECTED DAILY THERAPIES:    [x   ] PT  [   x] OT  [ x  ] ST    EXPECTED INTENSITY OF PROGRAM:  3 hours daily    EXPECTED FREQUENCY OF PROGRAM:  5 x week    ESTIMATED LOS:  17 days    ESTIMATED DISPOSITION:  home with caregiver support and home Ptand OT    INTERDISCIPLINARY FUNCTIONAL OUTCOMES/GOALS:         Gait/Mobility: WC level mobility       Transfers: min assist       ADLs: CG/min assist       Functional Transfers: min assist       Medication Management: mod independent       Communication: independent       Cognitive: mod independent       Nutrition: regular solid thin liquids       Bladder: continent       Bowel: continent
Patient tested positive for COVID on discharge screening. Repeat COVID swab confirmed positive result. Denies cough, fever, muscle aches, SOB/dyspnea or congestion. Tolerating therapies well with no acute primary complaints.     Vital Signs Last 24 Hrs  T(C): 37 (17 May 2023 20:19), Max: 37 (17 May 2023 20:19)  T(F): 98.6 (17 May 2023 20:19), Max: 98.6 (17 May 2023 20:19)  HR: 81 (17 May 2023 20:19) (64 - 82)  BP: 124/73 (17 May 2023 20:19) (124/73 - 134/75)  BP(mean): --  RR: 16 (17 May 2023 20:19) (16 - 16)  SpO2: 96% (17 May 2023 20:19) (96% - 98%)    Parameters below as of 17 May 2023 20:19  Patient On (Oxygen Delivery Method): room air      PE:   Con: NAD  Resp: No signs of respiratory distress, CTAB   Cardio: RRR, no m/r/g   GI: soft, NTND abdomen      A/P:  Patient is a 68 y/o male PMH of HTN, OA, right TKA, who presented with recently diagnosed, brain mass, who presented to [facility] for brain tumor resection 4/24/23 Pathology positive for glioblastoma; patient  s/p craniotomy for resection of L brain tumor 4/25/23.    -monitor vitals and oxygen saturation   -airborne/contact isolation precautions       Discussed + COVID result and plan with spouse.

## 2023-05-30 NOTE — PROGRESS NOTE ADULT - ATTENDING COMMENTS
Progress note amended to include my discussions with patient, resident, hospitalist, RN, SW, PT and my findings    Patient seen in room with wife. Stable, he continues to have awareness of right oral weakness but no decline in swallow, speech or other new weakness. No ptosis, buccinator strength stable, tongue remains midline on exam. +right facial droop and degree of dysarthria are stable.    He has completed COVID isolation and remained asymptomatic, no fever or respiratory symptoms or leukocytosis throughout. lungs today are clear, Heart RRR no tachy. He is aware of transportation issue at [facility]; due to proximity to his family , he prefers [place], and will discuss options with SW later for transportation to facilitate. COVID PCR repeated to see if [place] would be an option but remains +; had discussed with patient that tests may remain positive for months after initial diagnosis past infectious period.    Medically stable, awaiting dc to [facility]. continue program, patient and wife agreeable with plan Progress note amended to include my discussions with patient, resident, hospitalist, RN, SW, PT and my findings    Patient seen in room with wife. Stable, he continues to have awareness of right oral weakness but no decline in swallow, speech or other new weakness. No ptosis, buccinator strength stable, tongue remains midline on exam. +right facial droop and degree of dysarthria are stable. Le swelling resolved, TEDs in place along with right stock AFO    He has completed COVID isolation and remained asymptomatic, no fever or respiratory symptoms or leukocytosis throughout. lungs today are clear, Heart RRR no tachy. He is aware of transportation issue at [facility]; due to proximity to his family , he prefers [place], and will discuss options with PATI later for transportation to facilitate. COVID PCR repeated to see if [place] would be an option but remains +; had discussed with patient that tests may remain positive for months after initial diagnosis past infectious period.    Medically stable, awaiting dc to [facility]. continue program, patient and wife agreeable with plan

## 2023-05-31 VITALS
HEART RATE: 71 BPM | OXYGEN SATURATION: 97 % | SYSTOLIC BLOOD PRESSURE: 132 MMHG | RESPIRATION RATE: 16 BRPM | DIASTOLIC BLOOD PRESSURE: 83 MMHG | TEMPERATURE: 98 F

## 2023-05-31 PROCEDURE — 92507 TX SP LANG VOICE COMM INDIV: CPT

## 2023-05-31 PROCEDURE — 85027 COMPLETE CBC AUTOMATED: CPT

## 2023-05-31 PROCEDURE — 97167 OT EVAL HIGH COMPLEX 60 MIN: CPT

## 2023-05-31 PROCEDURE — 97163 PT EVAL HIGH COMPLEX 45 MIN: CPT

## 2023-05-31 PROCEDURE — 36415 COLL VENOUS BLD VENIPUNCTURE: CPT

## 2023-05-31 PROCEDURE — 82962 GLUCOSE BLOOD TEST: CPT

## 2023-05-31 PROCEDURE — 97530 THERAPEUTIC ACTIVITIES: CPT

## 2023-05-31 PROCEDURE — 85025 COMPLETE CBC W/AUTO DIFF WBC: CPT

## 2023-05-31 PROCEDURE — 93971 EXTREMITY STUDY: CPT

## 2023-05-31 PROCEDURE — 80076 HEPATIC FUNCTION PANEL: CPT

## 2023-05-31 PROCEDURE — 97110 THERAPEUTIC EXERCISES: CPT

## 2023-05-31 PROCEDURE — 97116 GAIT TRAINING THERAPY: CPT

## 2023-05-31 PROCEDURE — 97535 SELF CARE MNGMENT TRAINING: CPT

## 2023-05-31 PROCEDURE — 99239 HOSP IP/OBS DSCHRG MGMT >30: CPT

## 2023-05-31 PROCEDURE — 92610 EVALUATE SWALLOWING FUNCTION: CPT

## 2023-05-31 PROCEDURE — 92522 EVALUATE SPEECH PRODUCTION: CPT

## 2023-05-31 PROCEDURE — 82607 VITAMIN B-12: CPT

## 2023-05-31 PROCEDURE — 92523 SPEECH SOUND LANG COMPREHEN: CPT

## 2023-05-31 PROCEDURE — 97112 NEUROMUSCULAR REEDUCATION: CPT

## 2023-05-31 PROCEDURE — 87635 SARS-COV-2 COVID-19 AMP PRB: CPT

## 2023-05-31 PROCEDURE — 99232 SBSQ HOSP IP/OBS MODERATE 35: CPT

## 2023-05-31 PROCEDURE — 80053 COMPREHEN METABOLIC PANEL: CPT

## 2023-05-31 RX ADMIN — ENOXAPARIN SODIUM 40 MILLIGRAM(S): 100 INJECTION SUBCUTANEOUS at 05:28

## 2023-05-31 RX ADMIN — LEVETIRACETAM 750 MILLIGRAM(S): 250 TABLET, FILM COATED ORAL at 05:27

## 2023-05-31 RX ADMIN — PANTOPRAZOLE SODIUM 40 MILLIGRAM(S): 20 TABLET, DELAYED RELEASE ORAL at 05:27

## 2023-05-31 RX ADMIN — Medication 2 MILLIGRAM(S): at 05:27

## 2023-05-31 RX ADMIN — AMLODIPINE BESYLATE 10 MILLIGRAM(S): 2.5 TABLET ORAL at 05:27

## 2023-05-31 RX ADMIN — SODIUM CHLORIDE 1 GRAM(S): 9 INJECTION INTRAMUSCULAR; INTRAVENOUS; SUBCUTANEOUS at 05:27

## 2023-05-31 NOTE — PROGRESS NOTE ADULT - SUBJECTIVE AND OBJECTIVE BOX
Patient is a 67y old  Male who presents with a chief complaint of GBm sp craniotomy and resection (30 May 2023 11:47)      HPI:  Patient is a 68 y/o male RH dominant PMH of HTN, Osteoarthritis, right total knee replacement 2 months ago, brain mass s/p biopsy 4/7/23 @ [facility] with Dr. Tobar, who presented to [facility] for brain tumor resection 4/24/23. Per family, patient started to have right hand weakness about a month ago that grew progressively worse, then went to ED where brain mass was diagnosed. He was started on keppra 750 mg BID for seizure prophylaxis. Patient had sterotactic needle biopsy and laser interstitial thermal therapy by Dr. Tobar on 4/7/23 which was positive for glioblastoma  s/p craniotomy for resection of L brain tumor 4/25/23. Hospital course complicated by hyponatremia and RUE edema. Doppler negative.     Patient was evaluated by PM&R and therapy for functional deficits and gait/ ADL impairments and recommended acute rehabilitation. Patient was medically optimized for discharge to [facility] on 5/2/23     (02 May 2023 16:29)      PAST MEDICAL & SURGICAL HISTORY:  Hypertension      Osteoarthritis      Brain mass      S/P total knee replacement, right          MEDICATIONS  (STANDING):  amLODIPine   Tablet 10 milliGRAM(s) Oral every 24 hours  dexAMETHasone     Tablet   Oral   dexAMETHasone     Tablet 2 milliGRAM(s) Oral every 12 hours  dextrose 5%. 1000 milliLiter(s) (50 mL/Hr) IV Continuous <Continuous>  dextrose 5%. 1000 milliLiter(s) (100 mL/Hr) IV Continuous <Continuous>  dextrose 50% Injectable 25 Gram(s) IV Push once  dextrose 50% Injectable 25 Gram(s) IV Push once  dextrose 50% Injectable 12.5 Gram(s) IV Push once  enoxaparin Injectable 40 milliGRAM(s) SubCutaneous every 24 hours  glucagon  Injectable 1 milliGRAM(s) IntraMuscular once  levETIRAcetam 750 milliGRAM(s) Oral two times a day  multivitamin 1 Tablet(s) Oral daily  pantoprazole    Tablet 40 milliGRAM(s) Oral before breakfast  polyethylene glycol 3350 17 Gram(s) Oral two times a day  senna 2 Tablet(s) Oral at bedtime  sodium chloride 1 Gram(s) Oral every 12 hours  traZODone 50 milliGRAM(s) Oral at bedtime    MEDICATIONS  (PRN):  acetaminophen     Tablet .. 650 milliGRAM(s) Oral every 6 hours PRN Mild Pain (1 - 3)  benzonatate 100 milliGRAM(s) Oral three times a day PRN Cough  dextrose Oral Gel 15 Gram(s) Oral once PRN Blood Glucose LESS THAN 70 milliGRAM(s)/deciliter      Allergies    No Known Allergies    Intolerances          VITALS  67y  Vital Signs Last 24 Hrs  T(C): 36.4 (31 May 2023 08:09), Max: 36.7 (30 May 2023 21:41)  T(F): 97.6 (31 May 2023 08:09), Max: 98.1 (30 May 2023 21:41)  HR: 76 (31 May 2023 08:09) (62 - 85)  BP: 146/92 (31 May 2023 08:09) (136/83 - 146/92)  BP(mean): --  RR: 16 (31 May 2023 08:09) (14 - 16)  SpO2: 97% (31 May 2023 08:09) (96% - 98%)    Parameters below as of 31 May 2023 08:09  Patient On (Oxygen Delivery Method): room air      Daily     Daily         RECENT LABS:                      CAPILLARY BLOOD GLUCOSE

## 2023-05-31 NOTE — PROGRESS NOTE ADULT - COMMENTS
Patient seen with Patient seen with wife. He still feels some numbness in right perioral area but no worsening of speech or swallow. No H/A or dizziness, no visual change, no new motor weakness. He is scheduled for transfer to [facility] today and will begin RT in conjunction with rehab,. Importance of monitoring symptoms as another clinical response to Rx discussed, and to continue communication with his providers. neurological exam today stable

## 2023-05-31 NOTE — PROGRESS NOTE ADULT - PROVIDER SPECIALTY LIST ADULT
Physiatry
Physiatry
Rehab Medicine
Hospitalist
Physiatry
Physiatry
Rehab Medicine
Rehab Medicine
Hospitalist
Neuropsychology
Physiatry
Hospitalist
Physiatry
Physiatry
Hospitalist
Physiatry
Rehab Medicine
Physiatry

## 2023-05-31 NOTE — PROGRESS NOTE ADULT - SUBJECTIVE AND OBJECTIVE BOX
Patient is a 67y old  Male who presents with a chief complaint of GBm sp craniotomy and resection (31 May 2023 11:19)      Patient seen and examined at bedside.  No overnight events  No complaints this morning    ALLERGIES:  No Known Allergies    MEDICATIONS  (STANDING):  amLODIPine   Tablet 10 milliGRAM(s) Oral every 24 hours  dexAMETHasone     Tablet 2 milliGRAM(s) Oral every 12 hours  dexAMETHasone     Tablet   Oral   dextrose 5%. 1000 milliLiter(s) (50 mL/Hr) IV Continuous <Continuous>  dextrose 5%. 1000 milliLiter(s) (100 mL/Hr) IV Continuous <Continuous>  dextrose 50% Injectable 25 Gram(s) IV Push once  dextrose 50% Injectable 25 Gram(s) IV Push once  dextrose 50% Injectable 12.5 Gram(s) IV Push once  enoxaparin Injectable 40 milliGRAM(s) SubCutaneous every 24 hours  glucagon  Injectable 1 milliGRAM(s) IntraMuscular once  levETIRAcetam 750 milliGRAM(s) Oral two times a day  multivitamin 1 Tablet(s) Oral daily  pantoprazole    Tablet 40 milliGRAM(s) Oral before breakfast  polyethylene glycol 3350 17 Gram(s) Oral two times a day  senna 2 Tablet(s) Oral at bedtime  sodium chloride 1 Gram(s) Oral every 12 hours  traZODone 50 milliGRAM(s) Oral at bedtime    MEDICATIONS  (PRN):  acetaminophen     Tablet .. 650 milliGRAM(s) Oral every 6 hours PRN Mild Pain (1 - 3)  benzonatate 100 milliGRAM(s) Oral three times a day PRN Cough  dextrose Oral Gel 15 Gram(s) Oral once PRN Blood Glucose LESS THAN 70 milliGRAM(s)/deciliter    Vital Signs Last 24 Hrs  T(F): 97.5 (31 May 2023 12:26), Max: 98.1 (30 May 2023 21:41)  HR: 71 (31 May 2023 12:26) (62 - 85)  BP: 132/83 (31 May 2023 12:26) (132/83 - 146/92)  RR: 16 (31 May 2023 12:26) (14 - 16)  SpO2: 97% (31 May 2023 12:26) (96% - 98%)  I&O's Summary    30 May 2023 07:01  -  31 May 2023 07:00  --------------------------------------------------------  IN: 0 mL / OUT: 550 mL / NET: -550 mL    PHYSICAL EXAM:  GENERAL: NAD  HENT:  Atraumatic, Normocephalic; No tonsillar erythema, exudates, or enlargement; Moist mucous membranes;   EYES: EOMI, PERRLA, conjunctiva and sclera clear, no lid-lag  NECK: Supple, No JVD, Normal thyroid  CHEST/LUNG: Clear to percussion bilaterally; No rales, rhonchi, wheezing, or rubs; normal respiratory effort, no intercostal retractions  HEART: Regular rate and rhythm; No murmurs, rubs, or gallops; No pitting edema  ABDOMEN: Soft, Nontender, Nondistended; Bowel sounds present; No HSM  MUSCULOSKELETAL/EXTREMITIES:  2+ Peripheral Pulses, No clubbing or digital cyanosis  PSYCH: Appropriate affect, Alert & Awake; Good judgement    LABS:                        12.9   5.20  )-----------( 302      ( 29 May 2023 05:14 )             40.7       05-29    139  |  105  |  18  ----------------------------<  116  4.1   |  29  |  0.51    Ca    9.1      29 May 2023 05:14    TPro  6.1  /  Alb  3.0  /  TBili  0.3  /  DBili  x   /  AST  21  /  ALT  35  /  AlkPhos  95  05-29     COVID-19 PCR: Detected (05-30-23 @ 09:19)  COVID-19 PCR: Detected (05-24-23 @ 05:50)  COVID-19 PCR: Detected (05-23-23 @ 06:00)  COVID-19 PCR: Detected (05-17-23 @ 18:55)  COVID-19 PCR: Detected (05-17-23 @ 12:05)    Care Discussed with Rehab Attending and Other Providers

## 2023-05-31 NOTE — PROGRESS NOTE ADULT - ASSESSMENT
Patient is a 68 y/o male PMH of HTN, OA, right TKA, who presented with recently diagnosed, brain mass, who presented to [facility] for brain tumor resection 4/24/23 Pathology positive for glioblastoma; patient  s/p craniotomy for resection of L brain tumor 4/25/23. Hospital course complicated by hyponatremia. Admitted for multidisciplinary rehab program- pt/ot/dvt ppx    # Glioblastoma s/p craniotomy for resection of L brain tumor 4/25/23 right hemiparesis  - continue decadron  through 6/3  - Keppra seizure ppx  - Planning for future Radiation therapy and chemo  - Precautions: steroid, SZ, cardiac, fall    # HTN   - amlodipine     # DM2   - A1C 6.5  - diet controlled    # DVT prophylaxis:  - Lovenox  Medically stable

## 2023-05-31 NOTE — PROGRESS NOTE ADULT - NUTRITIONAL ASSESSMENT
This patient has been assessed with a concern for Malnutrition and has been determined to have a diagnosis/diagnoses of Moderate protein-calorie malnutrition.    This patient is being managed with:   Diet Consistent Carbohydrate/No Snacks-  Vegan {Accepts Vegetable Products Only}  No Dairy  Entered: May  3 2023 11:16AM  

## 2023-05-31 NOTE — PROGRESS NOTE ADULT - ASSESSMENT
Patient is a 68 y/o male PMH of HTN, OA, right TKA, who presented with recently diagnosed, brain mass, who presented to [facility] for brain tumor resection 4/24/23 Pathology positive for glioblastoma; patient  s/p craniotomy for resection of L brain tumor 4/25/23.    # Glioblastoma s/p craniotomy for resection of L brain tumor 4/25/23 right hemiparesis  - continue decadron 2mg PO BID through 6/3  - keppra 750mg PO BID for seizure ppx  - Odell craniotomy dc'd 5/9  - Continue PT/OT. 3 hours daily 5 x weeks  - Pending Radiation therapy and chemo. Has mapping/mask made already   - Precautions: steroid, SZ, cardiac, fall.    # newly dx COVID + 5/17  - afebrile, asymptomatic  - completed 10 day isolation period. Off isolation    # HTN   - amlodipine 10mg PO QD   - (121/77 - 136/82) 5/30  - PCP f/u on dc    # DM2   - glu 154 5/11  - PCP f/u on dc    # Sleep:  - Melatonin PRN  - trazodone 50 mg qhs    # Pain:  - Tylenol PRN    # GI/Bowel:  - miralax 17g PO BID   - Senna 2 tabs daily  - pantoprazole     # Diet   - Regular, CCH   - MVI 5/9  - B12 level: 654 (5/10) WNL    # DVT prophylaxis:  - Doppler RLE 5/25/23 for swelling - Neg. Reviewed with patient  - bilateral TEDs OOB, patient agreeable  - lovenox 40mg SC QD   - SCDs    # Case discussed in IDT rounds 5/26:  -  min assist LB dressing, mod assist bathroom transfers, mod assist toiletig, transfers bed to chair min-mod assist, beginning ambulation with min assist, HR and standard AFO  - goals: min assist bADLs, CG/min assist transfers and short distance household ambulation  -Medical forms for provided to for verification for cancelled travel plans due to illness, completed 5/15  - copies of labwork, MRI brain, surgical path report provided to patient 5/19  - letter provided re: hospitalization dates  - handicap permit completed 5/24  - For [facility] transfer for continued Ot PT services, orthotics, in conjunction with skilled nursing care, medical oversight and RT. [facility], awaiting insurance auth and arrangement transportation between [facility] and treatment center. Patient and wife aware  - caregiver training 5/29 completed  - repeat COVID swab 5/30 remains positive, completed isolation    # LABs  CBC BMP 6/1      --------------------------------------------  Outpatient Follow up:    Juanjose Johnston)  Neurosurgery  [address] [address]  Phone: [phone]  Fax: [phone]  Follow Up Time:     Delmis Causey; MIKHAIL)  Hematology; Internal Medicine; Medical Oncology  [address] [place] [address]  Phone: [phone]  Fax: [phone]  Follow Up Time:   Patient is a 66 y/o male PMH of HTN, OA, right TKA, who presented with recently diagnosed, brain mass, who presented to [facility] for brain tumor resection 4/24/23 Pathology positive for glioblastoma; patient  s/p craniotomy for resection of L brain tumor 4/25/23.    # Glioblastoma s/p craniotomy for resection of L brain tumor 4/25/23 right hemiparesis  - continue decadron 2mg PO BID through 6/3. Further adjustment dose f/u with oncology, discussed with patient  - continue keppra 750mg PO BID for seizure ppx  - Odell craniotomy dc'd 5/9. Incision healing well, intact  - For dc to [facility] today for additional PT OT SLP, patient and family aware and agreeable  - Pending Radiation therapy and chemo. Has mapping/mask made already   - Precautions: steroid, SZ, cardiac, fall.    # newly dx COVID + 5/17  - afebrile, asymptomatic, O2 sats stable on RA  - completed 10 day isolation period. Off isolation    # HTN   - amlodipine 10mg PO QD   - 136/83 - 146/92) 5/31  - PCP f/u on dc    # DM2   - glu 154 5/11  - PCP f/u on dc    # Sleep:  - Melatonin PRN  - trazodone 50 mg qhs    # Pain:  - Tylenol PRN    # GI/Bowel:  - miralax 17g PO BID   - Senna 2 tabs daily  - pantoprazole     # Diet   - Regular, CCH   - MVI 5/9  - B12 level: 654 (5/10) WNL    # DVT prophylaxis:  - Doppler RLE 5/25/23 for swelling - Neg. Reviewed with patient  - bilateral TEDs OOB, patient agreeable  - lovenox 40mg SC QD   - SCDs    # Case discussed in IDT rounds 5/26:  -  min assist LB dressing, mod assist bathroom transfers, mod assist toiletig, transfers bed to chair min-mod assist, beginning ambulation with min assist, HR and standard AFO  - goals: min assist bADLs, CG/min assist transfers and short distance household ambulation  -Medical forms for provided to for verification for cancelled travel plans due to illness, completed 5/15  - copies of labwork, MRI brain, surgical path report provided to patient 5/19  - letter provided re: hospitalization dates  - handicap permit completed 5/24  - For [facility] transfer, medically cleared. DC plan reviewed with patient including monitoring for S/S progression/edema, Patient inquires about DME, discussed will be evaluated at [facility] as needs/function may change with treatment and therapies. Patient and family aware and agreeable to [facility], transportation established, Time spent for evaluation, education, coordination discharge 45 min          --------------------------------------------  Outpatient Follow up:    Juanjose Johnston)  Neurosurgery  [address], [address] [place] [address]  Phone: [phone]  Fax: [phone]  Follow Up Time:     Delmis Causey; MIKHIAL)  Hematology; Internal Medicine; Medical Oncology  [address] [place] [address]  Phone: [phone]  Fax: [phone]  Follow Up Time:

## 2023-05-31 NOTE — PROGRESS NOTE ADULT - GASTROINTESTINAL
normal/soft/nontender/nondistended/normal active bowel sounds

## 2023-05-31 NOTE — PROGRESS NOTE ADULT - CARDIOVASCULAR
normal/regular rate and rhythm/S1 S2 present/no gallops/no rub/no murmur

## 2023-05-31 NOTE — PROGRESS NOTE ADULT - REASON FOR ADMISSION
GBM s/p craniotomy and resection
GBm sp craniotomy and resection

## 2023-05-31 NOTE — PROGRESS NOTE ADULT - RESPIRATORY
normal/clear to auscultation bilaterally/no wheezes/no rales/no rhonchi

## 2023-06-06 ENCOUNTER — LABORATORY RESULT (OUTPATIENT)
Age: 68
End: 2023-06-06

## 2023-06-07 ENCOUNTER — RESULT REVIEW (OUTPATIENT)
Age: 68
End: 2023-06-07

## 2023-06-07 RX ORDER — LEVETIRACETAM 250 MG/1
1 TABLET, FILM COATED ORAL
Refills: 0 | DISCHARGE

## 2023-06-07 RX ORDER — AMLODIPINE BESYLATE 2.5 MG/1
1 TABLET ORAL
Refills: 0 | DISCHARGE

## 2023-06-08 ENCOUNTER — NON-APPOINTMENT (OUTPATIENT)
Age: 68
End: 2023-06-08

## 2023-06-08 VITALS
DIASTOLIC BLOOD PRESSURE: 76 MMHG | OXYGEN SATURATION: 96 % | HEART RATE: 66 BPM | SYSTOLIC BLOOD PRESSURE: 128 MMHG | RESPIRATION RATE: 16 BRPM | TEMPERATURE: 98 F

## 2023-06-08 NOTE — REVIEW OF SYSTEMS
[Nausea: Grade 0] : Nausea: Grade 0 [Fatigue: Grade 0] : Fatigue: Grade 0 [Tinnitus - Grade 0] : Tinnitus - Grade 0 [Blurred Vision: Grade 0] : Blurred Vision: Grade 0 [Dizziness: Grade 0] : Dizziness: Grade 0  [Headache: Grade 0] : Headache: Grade 0

## 2023-06-08 NOTE — VITALS
[Maximal Pain Intensity: 0/10] : 0/10 [Least Pain Intensity: 0/10] : 0/10 [NoTreatment Scheduled] : no treatment scheduled [50: Requires considerable assistance and frequent medical care.] : 50: Requires considerable assistance and frequent medical care.

## 2023-06-12 ENCOUNTER — LABORATORY RESULT (OUTPATIENT)
Age: 68
End: 2023-06-12

## 2023-06-13 ENCOUNTER — NON-APPOINTMENT (OUTPATIENT)
Age: 68
End: 2023-06-13

## 2023-06-13 VITALS
DIASTOLIC BLOOD PRESSURE: 67 MMHG | OXYGEN SATURATION: 95 % | RESPIRATION RATE: 16 BRPM | HEART RATE: 65 BPM | WEIGHT: 227 LBS | HEIGHT: 70 IN | BODY MASS INDEX: 32.5 KG/M2 | SYSTOLIC BLOOD PRESSURE: 121 MMHG

## 2023-06-13 NOTE — VITALS
[Maximal Pain Intensity: 0/10] : 0/10 [50: Requires considerable assistance and frequent medical care.] : 50: Requires considerable assistance and frequent medical care.

## 2023-06-14 NOTE — HISTORY OF PRESENT ILLNESS
[FreeTextEntry1] : Mr. Quiroga is a 67 year old male with past medical history of HTN, OA of the knee, status post replacement, now with newly diagnosed glioblastoma, status post stereotactic needle biopsy and laser interstitial thermal therapy by Dr. Tobar (4/7/2023). IDH, MGMT and EGFR status pending. \par \par 6/8/2023\par Mr. Quiroga presents today for his OTV, completed 3/30 fractions to the brain to a dose of 600 cGy. He states that the last few days the right sided weakness and facial droop have become more pronounce. He has been off steroids since 5/30/2023 and we will restart him today on Decadron 4 mg BID. He had another MRI today at [facility] ( report not available ) We discussed skin care. HE is otherwise feeling well. \par \par 6/13/2023\par Mr. Quiroga presents today for his OTV.  He completed 6/30 fractions for a total of 1200 cGy to the brain.  The  patient reports  right sided weakness, with slight improvement..  He is continuing on Decadron 4mg BID.  We reviewed his recent MRI.  Patient denies any pain, is eating and drinking well.

## 2023-06-14 NOTE — DISEASE MANAGEMENT
[Clinical] : TNM Stage: c [TTNM] : x [NTNM] : x [MTNM] : x [N/A] : Currently not applicable [de-identified] : Patient completed 6/30 fractions for a total of 1200 cGy to the brain

## 2023-06-14 NOTE — PHYSICAL EXAM
[General Appearance - Well Developed] : well developed [] : no respiratory distress [Skin Color & Pigmentation] : normal skin color and pigmentation [Oriented To Time, Place, And Person] : oriented to person, place, and time [de-identified] : left eye deviated to the right [de-identified] : Patient in a wheelchair

## 2023-06-14 NOTE — REVIEW OF SYSTEMS
[Fatigue: Grade 0] : Fatigue: Grade 0 [Dizziness: Grade 0] : Dizziness: Grade 0  [Headache: Grade 0] : Headache: Grade 0 [Lethargy: Grade 0] : Lethargy: Grade 0 [Dermatitis Radiation: Grade 0] : Dermatitis Radiation: Grade 0

## 2023-06-20 ENCOUNTER — LABORATORY RESULT (OUTPATIENT)
Age: 68
End: 2023-06-20

## 2023-06-20 ENCOUNTER — NON-APPOINTMENT (OUTPATIENT)
Age: 68
End: 2023-06-20

## 2023-06-20 VITALS
DIASTOLIC BLOOD PRESSURE: 87 MMHG | TEMPERATURE: 98 F | OXYGEN SATURATION: 96 % | RESPIRATION RATE: 16 BRPM | SYSTOLIC BLOOD PRESSURE: 151 MMHG | HEART RATE: 66 BPM

## 2023-06-20 PROBLEM — G93.89 OTHER SPECIFIED DISORDERS OF BRAIN: Chronic | Status: ACTIVE | Noted: 2023-04-24

## 2023-06-20 PROBLEM — I10 ESSENTIAL (PRIMARY) HYPERTENSION: Chronic | Status: ACTIVE | Noted: 2023-04-24

## 2023-06-20 PROBLEM — M19.90 UNSPECIFIED OSTEOARTHRITIS, UNSPECIFIED SITE: Chronic | Status: ACTIVE | Noted: 2023-04-24

## 2023-06-20 NOTE — REVIEW OF SYSTEMS
[Fatigue: Grade 1 - Fatigue relieved by rest] : Fatigue: Grade 1 - Fatigue relieved by rest [Dizziness: Grade 0] : Dizziness: Grade 0  [Headache: Grade 0] : Headache: Grade 0

## 2023-06-21 NOTE — HISTORY OF PRESENT ILLNESS
[FreeTextEntry1] : Mr. Quiroga is a 67 year old male with past medical history of HTN, OA of the knee, status post replacement, now with newly diagnosed glioblastoma, status post stereotactic needle biopsy and laser interstitial thermal therapy by Dr. Tobar (4/7/2023). IDH, MGMT and EGFR status pending. \par \par 6/8/2023\par Mr. Quiroga presents today for his OTV, completed 3/30 fractions to the brain to a dose of 600 cGy. He states that the last few days the right sided weakness and facial droop have become more pronounce. He has been off steroids since 5/30/2023 and we will restart him today on Decadron 4 mg BID. He had another MRI today at [facility] ( report not available ) We discussed skin care. HE is otherwise feeling well.

## 2023-06-21 NOTE — REASON FOR VISIT
[Follow-Up Visit] : a follow-up [Home] : at home, [unfilled] , at the time of the visit. [Medical Office: ([facility])___] : at the medical office located in  [Spouse] : spouse

## 2023-06-21 NOTE — PHYSICAL EXAM
[Normal] : oriented to person, place and time, the affect was normal, the mood was normal and not anxious [de-identified] : Muscle power 1 out of 5 on right upper and light lower extremity and 5 out of 5 in left upper and left lower extremity.  No sensory deficits noted

## 2023-06-22 ENCOUNTER — APPOINTMENT (OUTPATIENT)
Dept: HEMATOLOGY ONCOLOGY | Facility: CLINIC | Age: 68
End: 2023-06-22
Payer: COMMERCIAL

## 2023-06-22 VITALS
RESPIRATION RATE: 18 BRPM | HEART RATE: 57 BPM | WEIGHT: 225 LBS | BODY MASS INDEX: 32.21 KG/M2 | HEIGHT: 70 IN | OXYGEN SATURATION: 97 % | SYSTOLIC BLOOD PRESSURE: 128 MMHG | TEMPERATURE: 98.1 F | DIASTOLIC BLOOD PRESSURE: 73 MMHG

## 2023-06-22 PROCEDURE — 99215 OFFICE O/P EST HI 40 MIN: CPT

## 2023-06-22 RX ORDER — NORMAL SALT TABLETS 1 G/G
1 TABLET ORAL TWICE DAILY
Qty: 60 | Refills: 0 | Status: ACTIVE | COMMUNITY
Start: 2023-06-22 | End: 1900-01-01

## 2023-06-23 ENCOUNTER — NON-APPOINTMENT (OUTPATIENT)
Age: 68
End: 2023-06-23

## 2023-06-24 NOTE — ASSESSMENT
[FreeTextEntry1] : 68 year old male with past medical history of HTN, OA of the knee, status post replacement, now with newly diagnosed glioblastoma (MGMT methylated), status post stereotactic needle biopsy and laser interstitial thermal therapy by Dr. Tobar (4/7/2023), then re-operation with  on 4/25/2023 with GTR.\par \par GBM - MGMT methylated, IDH WT, PTEN, TP53, TERT mutant\par On TMZ+RT since 6/8. He needs assistance with his ADLs due to right sided hemiplegia, but o/w he is independent with his assistive devices. He states DME was not provided by [facility]. Will involve SW to help send rx to provide wheelchair, shower side etc.\par \par Unfortunately he has been experiencing worsening right face numbness despite dex 4 mg BID  put him on. I am concerned about POD while on chemoRT. Repeat MRIB stat, c/w TMZ+RT for now. C/w dex 4 mg BID.\par RTC in person on 7/13 at [facility]. Sooner via TH depending on MRIB results.

## 2023-06-24 NOTE — HISTORY OF PRESENT ILLNESS
[Disease: _____________________] : Disease: [unfilled] [Therapy: ___] : Therapy: [unfilled] [70: Cares for self; unalbe to carry on normal activity or do active work.] : 70: Cares for self; unable to carry on normal activity or do active work. [de-identified] : 68 year old male with past medical history of HTN, OA of the knee, status post replacement, now with newly diagnosed glioblastoma (MGMT methylated), status post stereotactic needle biopsy and laser interstitial thermal therapy by Dr. Tobar (4/7/2023) here for f/u after recent admission with re-operation of motor cortex GBM by .\par \par Onc hx:\par End March 2023: He initially presented with right upper extremity weakness that started in his right thumb, he noticed he is not able to hold the pen normally.  A few days later the weakness progressed to his whole right hand.  The weakness started progressing, to his proximal right arm, which is when he presented to [facility] for further evaluation.\par 4/1/2023: CT spine- No evidence of acute fracture or traumatic subluxation to the visualized cervical spine. \par Partial erosion of a left maxillary molar tooth. A follow-up dental exam is recommended. \par Partially visualized paranasal sinus disease. \par MRI Brain ([facility], 4/2/2023):\par 2.2 x 2.1 x 1.9 cm intra-axial mass in the superior and posterior left frontal lobe. There is surrounding vasogenic edema with resulting local regional mass effect. The differential diagnosis includes a metastasis or a primary CNS malignancy. \par \par While hospitalized, a routine CT C/A/P was performed. \par \par CT Chest, Abdomen/Pelvis ([facility], 4/3/2023):\par 1. 4 x 7 mm hypodensity at the pancreatic tail. Incompletely characterized on this single phase study. Recommend dedicated CT or MRI with pancreatic mass protocol for further evaluation as clinically appropriate. \par 2. Gallstones/sludge. \par 3. No adenopathy. \par \par A pancreatic protocol CT scan was performed. \par \par CT Pancreas ([facility], 4/4/2023):\par 1. 0.4 x 0.7 cm hypodensity at the pancreatic tail is again noted with no abnormal enhancement, possibly representing an IPMN vs small focus of fat. \par \par MRI Brain ([facility], 4/4/2023):\par Stable heterogeneously enhancing 2.2 cm left frontal lobe lesion. Surrounding T2/FLAIR hyperintensity extending to the posterior body of the corpus callosum appears stable, likely vasogenic edema. No evidence of shift of midline structures. \par \par He underwent stereotactic needle biopsy and laser interstitial thermal therapy by Dr. Tobar (4/7/2023). Dr. Tobar notes that he opted for needle biopsy followed by HIRA, in lieu of open surgical resection, due to concern for risk of resulting hand paralysis, as well as damage to descending motor fibers of arm and leg. Following completion of the procedure, he notes in his operative report that, as per T1 post-contrast MRI, he achieved a gross total ablation.\par \par Surgical Pathology ([facility], 4/7/2023):\par A, B) Brain, left, tumor (excision):\par Glioblastoma, IDH pending, CNS WHO grade 4. \par Immunopositive for GFAP and p53\par The Ki-67: 80-90%\par \par MRI Brain ([facility], 4/8/2023):\par 1. Interval biopsy and posttreatment changes involving the intra-axial mass epicenter in the left posterior frontal centrum semiovale. The solid enhancing portions within the mass have decreased, T2 bright signal surrounding the mass has slightly increased and associated local regional mass effect has slightly increased. Correlation with MRI/MRCP as clinically warranted. \par 2. 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 \par 3. Gallstones/sludge. \par \par 4/12/2023: He saw Dr. Bolaños ([facility], [facility]). They discussed TMZ with radiation for GBM and observation for IPMN pancreatic tail. \par 4/18/2023: saw [facility] \par 4/25/2023: Re-operation by var: Path with WHO grade IV GBM, MGMT methylated, IDH WT, EGFR 3+\par Tier I: Variants of Strong Clinical Significance\par TERT C228T\par TP53 p.([id number])\par PIK3CA p.([id number])\par PTEN p.([id number])\par RB1 p.([id number])\par Tier III: Variants of Unknown Clinical\par Significance\par ARID1A p.([id number])\par ERBB2 p.([id number])\par GNAS p.([id number])\par GNAS p.([id number])\par POLE p.([id number])\par 6/6/2023: started chemoRT. Was delayed because his dc from [place] was delayed due to testing positive for COVID. [de-identified] : Rupesh grade IV, MGMT methylated, IDH WT [de-identified] : NeuroSx: \par Radonc: \par  [FreeTextEntry1] : TMZ+RT (6/6/2023-) [de-identified] : He is currently on dexamehtasone 4 mg BID. Has been having worsening right face numbness. He is noticing over the last week the numbness has spready to his right lip as well. He was discharged from [facility] yesterday. Has persistent right sided hemiplegia.

## 2023-06-24 NOTE — REVIEW OF SYSTEMS
[Muscle Weakness] : muscle weakness [Difficulty Walking] : difficulty walking [Negative] : Allergic/Immunologic [de-identified] : per HPI

## 2023-06-24 NOTE — PHYSICAL EXAM
[Ambulatory and capable of all self care but unable to carry out any work activities] : Status 2- Ambulatory and capable of all self care but unable to carry out any work activities. Up and about more than 50% of waking hours [Normal] : affect appropriate [de-identified] : Right finger abduction 2/5, R arm flexion and extension 0/5, R foot/knee flexion and extension 0/5, right hip flexion/extension 2/5 [de-identified] : Right face + right lip numbness to touch

## 2023-06-26 ENCOUNTER — RESULT REVIEW (OUTPATIENT)
Age: 68
End: 2023-06-26

## 2023-06-27 ENCOUNTER — LABORATORY RESULT (OUTPATIENT)
Age: 68
End: 2023-06-27

## 2023-06-27 ENCOUNTER — NON-APPOINTMENT (OUTPATIENT)
Age: 68
End: 2023-06-27

## 2023-06-27 VITALS
RESPIRATION RATE: 18 BRPM | WEIGHT: 225 LBS | HEIGHT: 70 IN | DIASTOLIC BLOOD PRESSURE: 79 MMHG | BODY MASS INDEX: 32.21 KG/M2 | SYSTOLIC BLOOD PRESSURE: 124 MMHG | OXYGEN SATURATION: 97 % | HEART RATE: 74 BPM

## 2023-06-28 NOTE — DISEASE MANAGEMENT
[Clinical] : TNM Stage: c [N/A] : Currently not applicable [TTNM] : x [NTNM] : x [MTNM] : x [de-identified] : Patient completed 16/30 fractions for a total of 3200 cGy to the brain

## 2023-06-28 NOTE — HISTORY OF PRESENT ILLNESS
[FreeTextEntry1] : Mr. Quiroga is a 67 year old male with past medical history of HTN, OA of the knee, status post replacement, now with newly diagnosed glioblastoma, status post stereotactic needle biopsy and laser interstitial thermal therapy by Dr. Tobar (4/7/2023). IDH, MGMT and EGFR status pending. \par \par 6/8/2023\par Mr. Quiroga presents today for his OTV, completed 3/30 fractions to the brain to a dose of 600 cGy. He states that the last few days the right sided weakness and facial droop have become more pronounce. He has been off steroids since 5/30/2023 and we will restart him today on Decadron 4 mg BID. He had another MRI today at [facility] ( report not available ) We discussed skin care. HE is otherwise feeling well. \par \par 6/13/2023\par Mr. Quiroga presents today for his OTV.  He completed 6/30 fractions for a total of 1200 cGy to the brain.  The  patient reports  right sided weakness, with slight improvement..  He is continuing on Decadron 4mg BID.  We reviewed his recent MRI.  Patient denies any pain, is eating and drinking well.\par \par 6/20/2023\par Mr. Quiroga presents today for his OTV.  He lzsyplouk00/30 fractions for a total of 2200 cGy to the brain. He is continuing his Decadron 4 mg BID and will start a taper dose. He has a great appetite and has switched to Keto which is helping with his glucose levels and the steroids. He is overall feeling well and denies any headaches, dizziness, or vision changes. \par \par 6/27/2023\par Mr. Quiroga presents today for his OTV.  He completed 16/30 fractions for a total of 3200 cGy to the brain.  He states he is feeling well, denies any headaches, dizziness or visual changes.  He had a recent MRI 6/26/23 at [place].

## 2023-06-28 NOTE — PHYSICAL EXAM
[Normal] : oriented to person, place and time, the affect was normal, the mood was normal and not anxious [de-identified] : Residual weakness on right side of body remains unchanged

## 2023-06-30 ENCOUNTER — NON-APPOINTMENT (OUTPATIENT)
Age: 68
End: 2023-06-30

## 2023-07-03 ENCOUNTER — NON-APPOINTMENT (OUTPATIENT)
Age: 68
End: 2023-07-03

## 2023-07-05 ENCOUNTER — NON-APPOINTMENT (OUTPATIENT)
Age: 68
End: 2023-07-05

## 2023-07-05 ENCOUNTER — LABORATORY RESULT (OUTPATIENT)
Age: 68
End: 2023-07-05

## 2023-07-05 VITALS
BODY MASS INDEX: 33.21 KG/M2 | WEIGHT: 232 LBS | HEART RATE: 73 BPM | OXYGEN SATURATION: 96 % | RESPIRATION RATE: 18 BRPM | HEIGHT: 70 IN | SYSTOLIC BLOOD PRESSURE: 120 MMHG | DIASTOLIC BLOOD PRESSURE: 71 MMHG

## 2023-07-05 NOTE — PHYSICAL EXAM
[Normal] : oriented to person, place and time, the affect was normal, the mood was normal and not anxious [de-identified] : Right-sided weakness stable

## 2023-07-05 NOTE — HISTORY OF PRESENT ILLNESS
[FreeTextEntry1] : Mr. Quiroga is a 67 year old male with past medical history of HTN, OA of the knee, status post replacement, now with newly diagnosed glioblastoma, status post stereotactic needle biopsy and laser interstitial thermal therapy by Dr. Tobar (4/7/2023). IDH, MGMT and EGFR status pending. \par \par 6/8/2023\par Mr. Quiroga presents today for his OTV, completed 3/30 fractions to the brain to a dose of 600 cGy. He states that the last few days the right sided weakness and facial droop have become more pronounce. He has been off steroids since 5/30/2023 and we will restart him today on Decadron 4 mg BID. He had another MRI today at [facility] ( report not available ) We discussed skin care. HE is otherwise feeling well. \par \par 6/13/2023\par Mr. Quiroga presents today for his OTV.  He completed 6/30 fractions for a total of 1200 cGy to the brain.  The  patient reports  right sided weakness, with slight improvement..  He is continuing on Decadron 4mg BID.  We reviewed his recent MRI.  Patient denies any pain, is eating and drinking well.\par \par 6/20/2023\par Mr. Quiroga presents today for his OTV.  He pctmudzww66/30 fractions for a total of 2200 cGy to the brain. He is continuing his Decadron 4 mg BID and will start a taper dose. He has a great appetite and has switched to Keto which is helping with his glucose levels and the steroids. He is overall feeling well and denies any headaches, dizziness, or vision changes. \par \par 6/27/2023\par Mr. Quiroga presents today for his OTV.  He completed 16/30 fractions for a total of 3200 cGy to the brain.  He states he is feeling well, denies any headaches, dizziness or visual changes.  He had a recent MRI 6/26/23 at [facility].  \par \par 7/5/2023\par Mr. Quiroga presents today for his OTV.  He has completed 21/30 fractions for a total of 4200 cGy tot he brain.  He reports he is feeling well.  He denies any visual changes , nausea, vomiting or pain.  The patient is currently taking Decadron 4mg BID and Kepra 750mg BID.

## 2023-07-05 NOTE — DISEASE MANAGEMENT
[Clinical] : TNM Stage: c [N/A] : Currently not applicable [TTNM] : x [NTNM] : x [MTNM] : x [de-identified] : Patient completed 21/30 fractions for a total of 4200  cGy to the brain

## 2023-07-06 ENCOUNTER — APPOINTMENT (OUTPATIENT)
Dept: HEMATOLOGY ONCOLOGY | Facility: CLINIC | Age: 68
End: 2023-07-06
Payer: COMMERCIAL

## 2023-07-06 PROCEDURE — 99214 OFFICE O/P EST MOD 30 MIN: CPT | Mod: 95

## 2023-07-06 NOTE — ASSESSMENT
[FreeTextEntry1] : 68 year old male with past medical history of HTN, OA of the knee, status post replacement, now with newly diagnosed glioblastoma (MGMT methylated), status post stereotactic needle biopsy and laser interstitial thermal therapy by Dr. Tobar (4/7/2023), then re-operation with  on 4/25/2023 with GTR.\par \par GBM - MGMT methylated, IDH WT, PTEN, TP53, TERT mutant\par On TMZ+RT since 6/8. He needs assistance with his ADLs due to right sided hemiplegia, but o/w he is independent with his assistive devices. He states DME was not provided by [facility]. [facility] involved.\par MRIB repeat on 6/28 due to new right face numbness. Looks stable.\par C/w weekly labs while on TMZ+RT.\par I recommend clinical trials evaluation after chemoRT.\par RTC in person on 7/13/2023.\par All questions answered.

## 2023-07-06 NOTE — REVIEW OF SYSTEMS
[Muscle Weakness] : muscle weakness [Difficulty Walking] : difficulty walking [Negative] : Allergic/Immunologic [de-identified] : per HPI

## 2023-07-06 NOTE — REASON FOR VISIT
[Follow-Up Visit] : a follow-up [Home] : at home, [unfilled] , at the time of the visit. [Medical Office: ([facility])___] : at the medical office located in

## 2023-07-06 NOTE — HISTORY OF PRESENT ILLNESS
[Disease: _____________________] : Disease: [unfilled] [Therapy: ___] : Therapy: [unfilled] [70: Cares for self; unalbe to carry on normal activity or do active work.] : 70: Cares for self; unable to carry on normal activity or do active work. [de-identified] : 68 year old male with past medical history of HTN, OA of the knee, status post replacement, now with newly diagnosed glioblastoma (MGMT methylated), status post stereotactic needle biopsy and laser interstitial thermal therapy by Dr. Tobar (4/7/2023) here for f/u after recent admission with re-operation of motor cortex GBM by .\par \par Onc hx:\par End March 2023: He initially presented with right upper extremity weakness that started in his right thumb, he noticed he is not able to hold the pen normally.  A few days later the weakness progressed to his whole right hand.  The weakness started progressing, to his proximal right arm, which is when he presented to [facility] for further evaluation.\par 4/1/2023: CT spine- No evidence of acute fracture or traumatic subluxation to the visualized cervical spine. \par Partial erosion of a left maxillary molar tooth. A follow-up dental exam is recommended. \par Partially visualized paranasal sinus disease. \par MRI Brain ([facility], 4/2/2023):\par 2.2 x 2.1 x 1.9 cm intra-axial mass in the superior and posterior left frontal lobe. There is surrounding vasogenic edema with resulting local regional mass effect. The differential diagnosis includes a metastasis or a primary CNS malignancy. \par \par While hospitalized, a routine CT C/A/P was performed. \par \par CT Chest, Abdomen/Pelvis ([facility], 4/3/2023):\par 1. 4 x 7 mm hypodensity at the pancreatic tail. Incompletely characterized on this single phase study. Recommend dedicated CT or MRI with pancreatic mass protocol for further evaluation as clinically appropriate. \par 2. Gallstones/sludge. \par 3. No adenopathy. \par \par A pancreatic protocol CT scan was performed. \par \par CT Pancreas ([facility], 4/4/2023):\par 1. 0.4 x 0.7 cm hypodensity at the pancreatic tail is again noted with no abnormal enhancement, possibly representing an IPMN vs small focus of fat. \par \par MRI Brain ([facility], 4/4/2023):\par Stable heterogeneously enhancing 2.2 cm left frontal lobe lesion. Surrounding T2/FLAIR hyperintensity extending to the posterior body of the corpus callosum appears stable, likely vasogenic edema. No evidence of shift of midline structures. \par \par He underwent stereotactic needle biopsy and laser interstitial thermal therapy by Dr. Tobar (4/7/2023). Dr. Tobar notes that he opted for needle biopsy followed by HIRA, in lieu of open surgical resection, due to concern for risk of resulting hand paralysis, as well as damage to descending motor fibers of arm and leg. Following completion of the procedure, he notes in his operative report that, as per T1 post-contrast MRI, he achieved a gross total ablation.\par \par Surgical Pathology ([facility], 4/7/2023):\par A, B) Brain, left, tumor (excision):\par Glioblastoma, IDH pending, CNS WHO grade 4. \par Immunopositive for GFAP and p53\par The Ki-67: 80-90%\par \par MRI Brain ([facility], 4/8/2023):\par 1. Interval biopsy and posttreatment changes involving the intra-axial mass epicenter in the left posterior frontal centrum semiovale. The solid enhancing portions within the mass have decreased, T2 bright signal surrounding the mass has slightly increased and associated local regional mass effect has slightly increased. Correlation with MRI/MRCP as clinically warranted. \par 2. 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 \par 3. Gallstones/sludge. \par \par 4/12/2023: He saw Dr. Bolaños ([facility], [facility]). They discussed TMZ with radiation for GBM and observation for IPMN pancreatic tail. \par 4/18/2023: saw [facility] \par 4/25/2023: Re-operation by var: Path with WHO grade IV GBM, MGMT methylated, IDH WT, EGFR 3+\par Tier I: Variants of Strong Clinical Significance\par TERT C228T\par TP53 p.([id number])\par PIK3CA p.([id number])\par PTEN p.([id number])\par RB1 p.([id number])\par Tier III: Variants of Unknown Clinical\par Significance\par ARID1A p.([id number])\par ERBB2 p.(Edc45Afz)\par GNAS p.(Alt013Ooa)\par GNAS p.([id number])\par POLE p.([id number])\par 6/6/2023: started chemoRT. Was delayed because his dc from [place] was delayed due to testing positive for COVID.\par 6/26/2023: MRIB - Impression: Status post  ost left high parietal craniotomy. Interval minimal decrease in size of the\par in the high left posterior frontal mass lesion with mild decrease in surrounding edema/tumoral\par infiltration. No evidence of acute infarction. Empty/partially empty sella that may be the result of increased intracranial pressure, unchanged. [de-identified] : Rupesh grade IV, MGMT methylated, IDH WT [de-identified] : NeuroSx: \par Radonc: \par  [FreeTextEntry1] : TMZ+RT (6/6/2023-) [de-identified] : He is doing well. Right face numbness has improved. Labs all wnl. No constipation.\par Currently on dexamethasone 4 mg BID.

## 2023-07-06 NOTE — PHYSICAL EXAM
[Ambulatory and capable of all self care but unable to carry out any work activities] : Status 2- Ambulatory and capable of all self care but unable to carry out any work activities. Up and about more than 50% of waking hours [Normal] : affect appropriate [de-identified] : Right face + right lip numbness to touch [de-identified] : Right finger abduction 2/5, R arm flexion and extension 0/5, R foot/knee flexion and extension 0/5, right hip flexion/extension 2/5

## 2023-07-10 ENCOUNTER — NON-APPOINTMENT (OUTPATIENT)
Age: 68
End: 2023-07-10

## 2023-07-11 ENCOUNTER — NON-APPOINTMENT (OUTPATIENT)
Age: 68
End: 2023-07-11

## 2023-07-11 VITALS
OXYGEN SATURATION: 94 % | HEART RATE: 80 BPM | RESPIRATION RATE: 18 BRPM | DIASTOLIC BLOOD PRESSURE: 81 MMHG | WEIGHT: 232 LBS | HEIGHT: 70 IN | SYSTOLIC BLOOD PRESSURE: 117 MMHG | BODY MASS INDEX: 33.21 KG/M2

## 2023-07-11 NOTE — DISEASE MANAGEMENT
[Clinical] : TNM Stage: c [N/A] : Currently not applicable [TTNM] : x [NTNM] : x [MTNM] : x [de-identified] : Patient completed 25/30 fractions for a total of 5000 cGy to the brain

## 2023-07-11 NOTE — VITALS
[Maximal Pain Intensity: 0/10] : 0/10 [40: Disabled, requires special care and assistance.] : 40: Disabled, requires special care and assistance.

## 2023-07-11 NOTE — REASON FOR VISIT
[Follow-Up Visit] : a follow-up [Home] : at home, [unfilled] , at the time of the visit. [Medical Office: ([facility])___] : at the medical office located in

## 2023-07-11 NOTE — HISTORY OF PRESENT ILLNESS
[FreeTextEntry1] : Mr. Quiroga is a 67 year old male with past medical history of HTN, OA of the knee, status post replacement, now with newly diagnosed glioblastoma, status post stereotactic needle biopsy and laser interstitial thermal therapy by Dr. Tobar (4/7/2023). IDH, MGMT and EGFR status pending. \par \par 6/8/2023\par Mr. Quiroga presents today for his OTV, completed 3/30 fractions to the brain to a dose of 600 cGy. He states that the last few days the right sided weakness and facial droop have become more pronounce. He has been off steroids since 5/30/2023 and we will restart him today on Decadron 4 mg BID. He had another MRI today at [facility] ( report not available ) We discussed skin care. HE is otherwise feeling well. \par \par 6/13/2023\par Mr. Quiroga presents today for his OTV.  He completed 6/30 fractions for a total of 1200 cGy to the brain.  The  patient reports  right sided weakness, with slight improvement..  He is continuing on Decadron 4mg BID.  We reviewed his recent MRI.  Patient denies any pain, is eating and drinking well.\par \par 6/20/2023\par Mr. Quiroga presents today for his OTV.  He biwzhhpgd19/30 fractions for a total of 2200 cGy to the brain. He is continuing his Decadron 4 mg BID and will start a taper dose. He has a great appetite and has switched to Keto which is helping with his glucose levels and the steroids. He is overall feeling well and denies any headaches, dizziness, or vision changes. \par \par 6/27/2023\par Mr. Quiroga presents today for his OTV.  He completed 16/30 fractions for a total of 3200 cGy to the brain.  He states he is feeling well, denies any headaches, dizziness or visual changes.  He had a recent MRI 6/26/23 at [facility].  \par \par 7/5/2023\par Mr. Quiroga presents today for his OTV.  He has completed 21/30 fractions for a total of 4200 cGy to the brain.  He reports he is feeling well.  He denies any visual changes , nausea, vomiting or pain.  The patient is currently taking Decadron 4mg BID and Kepra 750mg BID.\par \par 7/11/2023\par Mr. Quiroga presents today for his OTV.  He completed 25/30 fractions for a total of 5000 cGy to the brain.  The patient reports he is feeling well.  He denies any pain, headaches, visual changes , nausea or vomiting.  He is doing physical therapy at home, and is waiting to have PT as an out patient.

## 2023-07-11 NOTE — PHYSICAL EXAM
[Normal] : oriented to person, place and time, the affect was normal, the mood was normal and not anxious [de-identified] : Residual right-sided deficit unchanged

## 2023-07-12 ENCOUNTER — LABORATORY RESULT (OUTPATIENT)
Age: 68
End: 2023-07-12

## 2023-07-12 NOTE — PATIENT PROFILE ADULT - MEDICATIONS/VISITS
Quality 226: Preventive Care And Screening: Tobacco Use: Screening And Cessation Intervention: Patient screened for tobacco use and is an ex/non-smoker Quality 47: Advance Care Plan: Advance Care Planning discussed and documented; advance care plan or surrogate decision maker documented in the medical record. Detail Level: Detailed no

## 2023-07-13 ENCOUNTER — APPOINTMENT (OUTPATIENT)
Dept: HEMATOLOGY ONCOLOGY | Facility: CLINIC | Age: 68
End: 2023-07-13
Payer: COMMERCIAL

## 2023-07-13 ENCOUNTER — NON-APPOINTMENT (OUTPATIENT)
Age: 68
End: 2023-07-13

## 2023-07-13 VITALS
OXYGEN SATURATION: 97 % | HEIGHT: 70 IN | BODY MASS INDEX: 32.64 KG/M2 | RESPIRATION RATE: 18 BRPM | WEIGHT: 228 LBS | HEART RATE: 65 BPM | SYSTOLIC BLOOD PRESSURE: 124 MMHG | TEMPERATURE: 97.5 F | DIASTOLIC BLOOD PRESSURE: 73 MMHG

## 2023-07-13 PROCEDURE — 99215 OFFICE O/P EST HI 40 MIN: CPT

## 2023-07-14 ENCOUNTER — NON-APPOINTMENT (OUTPATIENT)
Age: 68
End: 2023-07-14

## 2023-07-14 NOTE — HISTORY OF PRESENT ILLNESS
[Disease: _____________________] : Disease: [unfilled] [Therapy: ___] : Therapy: [unfilled] [70: Cares for self; unalbe to carry on normal activity or do active work.] : 70: Cares for self; unable to carry on normal activity or do active work. [de-identified] : 68 year old male with past medical history of HTN, OA of the knee, status post replacement, now with newly diagnosed glioblastoma (MGMT methylated), status post stereotactic needle biopsy and laser interstitial thermal therapy by Dr. Tobar (4/7/2023) here for f/u after recent admission with re-operation of motor cortex GBM by .\par \par Onc hx:\par End March 2023: He initially presented with right upper extremity weakness that started in his right thumb, he noticed he is not able to hold the pen normally.  A few days later the weakness progressed to his whole right hand.  The weakness started progressing, to his proximal right arm, which is when he presented to [facility] for further evaluation.\par 4/1/2023: CT spine- No evidence of acute fracture or traumatic subluxation to the visualized cervical spine. \par Partial erosion of a left maxillary molar tooth. A follow-up dental exam is recommended. \par Partially visualized paranasal sinus disease. \par MRI Brain ([facility], 4/2/2023):\par 2.2 x 2.1 x 1.9 cm intra-axial mass in the superior and posterior left frontal lobe. There is surrounding vasogenic edema with resulting local regional mass effect. The differential diagnosis includes a metastasis or a primary CNS malignancy. \par \par While hospitalized, a routine CT C/A/P was performed. \par \par CT Chest, Abdomen/Pelvis ([facility], 4/3/2023):\par 1. 4 x 7 mm hypodensity at the pancreatic tail. Incompletely characterized on this single phase study. Recommend dedicated CT or MRI with pancreatic mass protocol for further evaluation as clinically appropriate. \par 2. Gallstones/sludge. \par 3. No adenopathy. \par \par A pancreatic protocol CT scan was performed. \par \par CT Pancreas ([facility], 4/4/2023):\par 1. 0.4 x 0.7 cm hypodensity at the pancreatic tail is again noted with no abnormal enhancement, possibly representing an IPMN vs small focus of fat. \par \par MRI Brain ([facility], 4/4/2023):\par Stable heterogeneously enhancing 2.2 cm left frontal lobe lesion. Surrounding T2/FLAIR hyperintensity extending to the posterior body of the corpus callosum appears stable, likely vasogenic edema. No evidence of shift of midline structures. \par \par He underwent stereotactic needle biopsy and laser interstitial thermal therapy by Dr. Tobar (4/7/2023). Dr. Tobar notes that he opted for needle biopsy followed by HIRA, in lieu of open surgical resection, due to concern for risk of resulting hand paralysis, as well as damage to descending motor fibers of arm and leg. Following completion of the procedure, he notes in his operative report that, as per T1 post-contrast MRI, he achieved a gross total ablation.\par \par Surgical Pathology ([facility], 4/7/2023):\par A, B) Brain, left, tumor (excision):\par Glioblastoma, IDH pending, CNS WHO grade 4. \par Immunopositive for GFAP and p53\par The Ki-67: 80-90%\par \par MRI Brain ([facility], 4/8/2023):\par 1. Interval biopsy and posttreatment changes involving the intra-axial mass epicenter in the left posterior frontal centrum semiovale. The solid enhancing portions within the mass have decreased, T2 bright signal surrounding the mass has slightly increased and associated local regional mass effect has slightly increased. Correlation with MRI/MRCP as clinically warranted. \par 2. 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 \par 3. Gallstones/sludge. \par \par 4/12/2023: He saw Dr. Bolaños ([facility], [facility]). They discussed TMZ with radiation for GBM and observation for IPMN pancreatic tail. \par 4/18/2023: saw [facility] \par 4/25/2023: Re-operation by var: Path with WHO grade IV GBM, MGMT methylated, IDH WT, EGFR 3+\par Tier I: Variants of Strong Clinical Significance\par TERT C228T\par TP53 p.([id number])\par PIK3CA p.(Jtk10Xun)\par PTEN p.([id number])\par RB1 p.([id number])\par Tier III: Variants of Unknown Clinical\par Significance\par ARID1A p.([id number])\par ERBB2 p.(Nlm97Xmb)\par GNAS p.(Dcd412Bzi)\par GNAS p.([id number])\par POLE p.([id number])\par 6/6/2023: started chemoRT. Was delayed because his dc from [place] was delayed due to testing positive for COVID.\par 6/26/2023: MRIB - Impression: Status post  left high parietal craniotomy. Interval minimal decrease in size of the\par in the high left posterior frontal mass lesion with mild decrease in surrounding edema/tumoral\par infiltration. No evidence of acute infarction. Empty/partially empty sella that may be the result of increased intracranial pressure, unchanged. [de-identified] : Urpesh grade IV, MGMT methylated, IDH WT [de-identified] : NeuroSx: \par Radonc: \par  [FreeTextEntry1] : TMZ+RT (6/6/2023-) [de-identified] : He is doing well. Right face numbness has improved. Labs all wnl. No constipation.\par Currently on dexamethasone 4 mg BID. [ECOG Performance Status: 2 - Ambulatory and capable of all self care but unable to carry out any work activities] : Performance Status: 2 - Ambulatory and capable of all self care but unable to carry out any work activities. Up and about more than 50% of waking hours

## 2023-07-14 NOTE — ASSESSMENT
[FreeTextEntry1] : 68 year old male with past medical history of HTN, OA of the knee, status post replacement, now with newly diagnosed glioblastoma (MGMT methylated), status post stereotactic needle biopsy and laser interstitial thermal therapy by Dr. Tobar (4/7/2023), then re-operation with  on 4/25/2023 with GTR.\par \par GBM - MGMT methylated, IDH WT, PTEN, TP53, TERT mutant\par On TMZ+RT since 6/8. He needs assistance with his ADLs due to right sided hemiplegia, but o/w he is independent with his assistive devices. He states DME was not provided by [facility] involved. MRIB repeat on 6/28 due to new right face numbness. Looks stable.\par C/w weekly labs while on TMZ+RT.\par \par KPS is 70, will send him to clinical trials team for screening.\par RTC in person on 8/10/2023.\par Repeat MRIB in 4 wks post chemoRT.\par All questions answered.

## 2023-07-14 NOTE — REVIEW OF SYSTEMS
[Muscle Weakness] : muscle weakness [Difficulty Walking] : difficulty walking [Negative] : Allergic/Immunologic [de-identified] : per HPI

## 2023-07-14 NOTE — PHYSICAL EXAM
[Ambulatory and capable of all self care but unable to carry out any work activities] : Status 2- Ambulatory and capable of all self care but unable to carry out any work activities. Up and about more than 50% of waking hours [Normal] : affect appropriate [de-identified] : Right: 3/5 proximal extremities (shoulder, hip); 1/5 right hand, 0/5 LE; Left: full strength; sensation intact, cranial nerves intact

## 2023-07-17 ENCOUNTER — NON-APPOINTMENT (OUTPATIENT)
Age: 68
End: 2023-07-17

## 2023-07-17 VITALS
OXYGEN SATURATION: 99 % | SYSTOLIC BLOOD PRESSURE: 119 MMHG | RESPIRATION RATE: 16 BRPM | HEART RATE: 74 BPM | WEIGHT: 228 LBS | DIASTOLIC BLOOD PRESSURE: 78 MMHG | BODY MASS INDEX: 32.71 KG/M2

## 2023-07-17 NOTE — DISEASE MANAGEMENT
[Clinical] : TNM Stage: c [N/A] : Currently not applicable [TTNM] : x [NTNM] : x [MTNM] : x [de-identified] : Patient completed 25/30 fractions for a total of 5000 cGy to the brain

## 2023-07-17 NOTE — HISTORY OF PRESENT ILLNESS
[FreeTextEntry1] : Mr. Quiroga is a 67 year old male with past medical history of HTN, OA of the knee, status post replacement, now with newly diagnosed glioblastoma, status post stereotactic needle biopsy and laser interstitial thermal therapy by Dr. Tobar (4/7/2023). IDH, MGMT and EGFR status pending. \par \par 6/8/2023\par Mr. Quiroga presents today for his OTV, completed 3/30 fractions to the brain to a dose of 600 cGy. He states that the last few days the right sided weakness and facial droop have become more pronounce. He has been off steroids since 5/30/2023 and we will restart him today on Decadron 4 mg BID. He had another MRI today at [facility] ( report not available ) We discussed skin care. HE is otherwise feeling well. \par \par 6/13/2023\par Mr. Quiroga presents today for his OTV.  He completed 6/30 fractions for a total of 1200 cGy to the brain.  The  patient reports  right sided weakness, with slight improvement..  He is continuing on Decadron 4mg BID.  We reviewed his recent MRI.  Patient denies any pain, is eating and drinking well.\par \par 6/20/2023\par Mr. Quiroga presents today for his OTV.  He hfakbkiux76/30 fractions for a total of 2200 cGy to the brain. He is continuing his Decadron 4 mg BID and will start a taper dose. He has a great appetite and has switched to Keto which is helping with his glucose levels and the steroids. He is overall feeling well and denies any headaches, dizziness, or vision changes. \par \par 6/27/2023\par Mr. Quiroga presents today for his OTV.  He completed 16/30 fractions for a total of 3200 cGy to the brain.  He states he is feeling well, denies any headaches, dizziness or visual changes.  He had a recent MRI 6/26/23 at [place].  \par \par 7/5/2023\par Mr. Quiroga presents today for his OTV.  He has completed 21/30 fractions for a total of 4200 cGy to the brain.  He reports he is feeling well.  He denies any visual changes , nausea, vomiting or pain.  The patient is currently taking Decadron 4mg BID and Kepra 750mg BID.\par \par 7/11/2023\par Mr. Quiroga presents today for his OTV.  He completed 25/30 fractions for a total of 5000 cGy to the brain.  The patient reports he is feeling well.  He denies any pain, headaches, visual changes , nausea or vomiting.  He is doing physical therapy at home, and is waiting to have PT as an out patient.\par \par 7/17/23\par FX 29 today.\par He states that he is feeling well.  He denies any headache, N/V, vision changes.  He is currently on Dexamethasone 4 mg BID.  He is doing PT at home.  His appetite is good.

## 2023-07-17 NOTE — PHYSICAL EXAM
[General Appearance - Well Developed] : well developed [] : no respiratory distress [Oriented To Time, Place, And Person] : oriented to person, place, and time [de-identified] : right-sided weakness; patient in wheelchair

## 2023-07-17 NOTE — REVIEW OF SYSTEMS
[Fatigue: Grade 0] : Fatigue: Grade 0 [Dizziness: Grade 0] : Dizziness: Grade 0  [Lethargy: Grade 0] : Lethargy: Grade 0 [Somnolence: Grade 0] : Somnolence: Grade 0 [Dermatitis Radiation: Grade 0] : Dermatitis Radiation: Grade 0

## 2023-07-18 ENCOUNTER — LABORATORY RESULT (OUTPATIENT)
Age: 68
End: 2023-07-18

## 2023-07-20 ENCOUNTER — NON-APPOINTMENT (OUTPATIENT)
Age: 68
End: 2023-07-20

## 2023-07-21 ENCOUNTER — NON-APPOINTMENT (OUTPATIENT)
Age: 68
End: 2023-07-21

## 2023-07-26 NOTE — HISTORY OF PRESENT ILLNESS
[FreeTextEntry1] : Mr. Quiroga is a 67 year old male with past medical history of HTN, OA of the knee, status post replacement, now with newly diagnosed glioblastoma, status post stereotactic needle biopsy and laser interstitial thermal therapy by Dr. Tobar (4/7/2023). IDH, MGMT and EGFR status pending. \par \par 6/8/2023\par Mr. Quiroga presents today for his OTV, completed 3/30 fractions to the brain to a dose of 600 cGy. He states that the last few days the right sided weakness and facial droop have become more pronounce. He has been off steroids since 5/30/2023 and we will restart him today on Decadron 4 mg BID. He had another MRI today at [facility] ( report not available ) We discussed skin care. HE is otherwise feeling well. \par \par 6/13/2023\par Mr. Quiroga presents today for his OTV.  He completed 6/30 fractions for a total of 1200 cGy to the brain.  The  patient reports  right sided weakness, with slight improvement..  He is continuing on Decadron 4mg BID.  We reviewed his recent MRI.  Patient denies any pain, is eating and drinking well.\par \par 6/20/2023\par Mr. Quiroga presents today for his OTV.  He kkybjvuag96/30 fractions for a total of 2200 cGy to the brain. He is continuing his Decadron 4 mg BID and will start a taper dose. He has a great appetite and has switched to Keto which is helping with his glucose levels and the steroids. He is overall feeling well and denies any headaches, dizziness, or vision changes.

## 2023-07-26 NOTE — PHYSICAL EXAM
[Normal] : oriented to person, place and time, the affect was normal, the mood was normal and not anxious [de-identified] : Right-sided weakness unchanged

## 2023-07-26 NOTE — DISEASE MANAGEMENT
[Clinical] : TNM Stage: c [N/A] : Currently not applicable [TTNM] : x [NTNM] : x [MTNM] : x [de-identified] : Patient completed 11/30 fractions for a total of 2200 cGy to the brain

## 2023-07-27 ENCOUNTER — APPOINTMENT (OUTPATIENT)
Dept: NEUROSURGERY | Facility: CLINIC | Age: 68
End: 2023-07-27
Payer: COMMERCIAL

## 2023-07-27 PROCEDURE — 99214 OFFICE O/P EST MOD 30 MIN: CPT | Mod: 95

## 2023-07-27 NOTE — ASSESSMENT
[FreeTextEntry1] : My impression is that the patient suffers from glioblastoma. The patient's established problem of right sided weakness is related.  His KPS is 80. I had a long discussion with the patient regarding the role of eligible clinical trials including Mimivax and CAPTEM. The patient was extensively educated about the nature of his disease process. Therapeutic and diagnostic tests include post RT MRI. The patient should continue to see    . I will see the patient back in     . I have explained the alternatives, risks and benefits to the patient and she understands and agrees to proceed. This risk of the procedure including but not limited to pain, infection, seizure, stroke, recurrence, residual disease, neurovascular injury, heart attack, pulmonary embolism, blindness, weakness, paralysis, and death have been carefully explained to the patient who clearly understands and agrees to proceed.\par

## 2023-07-27 NOTE — REASON FOR VISIT
[Follow-Up: _____] : a [unfilled] follow-up visit [Home] : at home, [unfilled] , at the time of the visit. [Medical Office: ([facility])___] : at the medical office located in  [Spouse] : spouse [Patient] : the patient [FreeTextEntry1] : review MRI and discuss clinical trials

## 2023-07-27 NOTE — DATA REVIEWED
[de-identified] : I have reviewed the most recent MRI which shows\par rt             Final\par \par No Documents Attached\par \par \par \par \par   [facility]\par                                          [address]\par                                    [place], [place]  [address]\par                                        Department of Radiology\par                                             [phone]\par \par \par Patient Name:      KAREEM GANNON                Location:       [facility]\par Med Rec #:        [id number]                    Account #:      [id number]\par YOB: 1955                    Ordering:       Steve Gaspar\par Age: 68               Sex:    M                 Attending:      Steve Gaspar\par PCP:        Steve Gaspar\par ______________________________________________________________________________________\par \par Exam Date:      06/26/23\par Exam:         MRI BRAIN [facility] IC\par Order#:       [id number]\par \par \par \par Glioblastoma multiformae.\par \par  Technique: MRI of the brain was performed utilizing sagittal, coronal and axial reformatted T1 MP\par rage, axial T2, axial gradient echo, axial, sagittal and coronal reformatted 3-D FLAIR and diffusion\par imaging. Following the administration of 7.5 cc of Gadavist, sagittal axial and coronal reformatted\par T1 MP rage images were obtained.\par \par  Comparison is made to a prior MRI of the brain performed on 6/8/2023.\par \par  Findings:\par  The patient is status post left high parietal craniotomy with a surgical cavity seen in the high\par left posterior frontal parietal lobe. There is a very tiny fluid collection underlying the\par craniotomy site with high left posterior frontal parietal\par  Dural enhancement, unchanged.\par \par \par  There has been interval very minimal decrease in overall size of the previously seen surgical\par cavity and enhancing lesion within the left frontal posterior lobe measuring approximately 4.4 cm\par cranial caudal by 2.3 cm wide (image #31 series 18) by 5.1 cm cranial caudal by 2.4 cm wide (image\par #121 series 16). There has been interval minimal contraction of the surgical cavity and decrease in\par subacute hemorrhage within the surgical site. There is mild ex vacuo dilation of the left lateral\par ventricle, unchanged.. Medially the enhancing nodular component abuts the posterior superior lateral\par wall of the left lateral ventricle and mildly extending into the left corpus callosum,\par unchanged.There has also been mild decrease in surrounding T2 and FLAIR signal hyperintensity that\par may represent edema and/or tumoral infiltration.\par \par \par  There are patchy foci of T2 and Flair signal hyperintensities within the white matter that are\par nonspecific, unchanged. There is no evidence of mass-effect or midline shift. There is no evidence\par of hydrocephalus. There is no evidence of acute infarction. Evaluation of the intracranial vascular\par flow-voids appear within normal limits.\par \par  Again noted is enlargement the sulci sella with flattening of the pituitary gland consistent with\par an empty/partially empty sella, unchanged. There is dilation of the optic nerve sheaths with mild\par flattening at the optic insertion. These findings may represent increased intracranial pressure.\par \par  The visualized paranasal sinuses and bilateral mastoid air cells are clear.\par \par \par Impression: Status post  ost left high parietal craniotomy. Interval minimal decrease in size of the\par in the high left posterior frontal mass lesion with mild decrease in surrounding edema/tumoral\par infiltration. No evidence of acute infarction.\par \par  Empty/partially empty sella that may be the result of increased intracranial pressure, unchanged.\par \par  --- End of Report ---\par \par ***Electronically Signed ***\par -----------------------------------------------\par Joie Gaspar MD              06/29/23 1205\par \par Dictated on 06/29/23\par \par \par Report cc:  Steve Gaspar PA;\par \par  \par \par  Ordered by: STEVE GASPAR       Collected/Examined: 26Jun2023 10:38AM       \par Verified by: STEVE GASPAR [id number] 07:14AM       \par  Result Communication: No patient communication needed at this time;\par Stage: Final       \par  Performed at: [facility]       Resulted: [id number] 12:05PM       Last Updated: [id number] 07:14AM       Accession: [id number]

## 2023-07-27 NOTE — HISTORY OF PRESENT ILLNESS
[FreeTextEntry1] : 68 y/o male with h/o HTN, Osteoarthritis (s/p right total knee replacement \par 2 months ago), brain mass (s/p biopsy 4/7/23 @ OSH with Dr. Tobar) presented \par for brain tumor resection. Per family, patient started to have right hand \par weakness about a month ago that got progressively worse, then went to ED where \par brain mass was diagnosed. Started keppra 750 mg BID for seizure prophylaxis. \par Described right arm weakness worsening after surgery and some right lower \par extremity weakness. \par \par S/p craniotomy for resection 4/25/23\par PATH: WHO grade IV GBM, IDH wild type, 1p19q non-codeleted, MGMT methylated, EGFR amplified 3+\par \par \par \par TODAY 7/27/23:\par Patient presents to review MRI from 6/26/23 performed at [place]. He has since started chemo/RT and tolerating well. He is currently taking Dexamethasone 4 mg BID and performing in patient home PT.\par \par \par Care Team:\par Rad/Onc: Burton\par

## 2023-08-02 ENCOUNTER — RESULT REVIEW (OUTPATIENT)
Age: 68
End: 2023-08-02

## 2023-08-14 ENCOUNTER — APPOINTMENT (OUTPATIENT)
Dept: HEMATOLOGY ONCOLOGY | Facility: CLINIC | Age: 68
End: 2023-08-14

## 2023-08-14 ENCOUNTER — APPOINTMENT (OUTPATIENT)
Dept: HEMATOLOGY ONCOLOGY | Facility: CLINIC | Age: 68
End: 2023-08-14
Payer: COMMERCIAL

## 2023-08-14 ENCOUNTER — APPOINTMENT (OUTPATIENT)
Dept: NEUROSURGERY | Facility: CLINIC | Age: 68
End: 2023-08-14
Payer: COMMERCIAL

## 2023-08-14 PROCEDURE — 99214 OFFICE O/P EST MOD 30 MIN: CPT | Mod: 95

## 2023-08-14 RX ORDER — ONDANSETRON 8 MG/1
8 TABLET, ORALLY DISINTEGRATING ORAL
Qty: 60 | Refills: 3 | Status: ACTIVE | COMMUNITY
Start: 2023-05-16 | End: 1900-01-01

## 2023-08-14 RX ORDER — TEMOZOLOMIDE 140 MG/1
140 CAPSULE ORAL
Qty: 49 | Refills: 0 | Status: DISCONTINUED | COMMUNITY
Start: 2023-05-16 | End: 2023-08-14

## 2023-08-14 RX ORDER — DEXAMETHASONE 4 MG/1
4 TABLET ORAL
Qty: 60 | Refills: 1 | Status: DISCONTINUED | COMMUNITY
Start: 2023-06-08 | End: 2023-08-14

## 2023-08-14 NOTE — PHYSICAL EXAM
[General Appearance - Alert] : alert [General Appearance - In No Acute Distress] : in no acute distress [Oriented To Time, Place, And Person] : oriented to person, place, and time [Person] : oriented to person [Place] : oriented to place [Time] : oriented to time [Fluency] : fluency intact [Comprehension] : comprehension intact

## 2023-08-14 NOTE — HISTORY OF PRESENT ILLNESS
[FreeTextEntry1] : 66 y/o male with h/o HTN, Osteoarthritis (s/p right total knee replacement  2 months ago), brain mass (s/p biopsy 4/7/23 @ OSH with Dr. Tobar) presented  for brain tumor resection. Per family, patient started to have right hand  weakness about a month ago that got progressively worse, then went to ED where  brain mass was diagnosed. Started keppra 750 mg BID for seizure prophylaxis.  Described right arm weakness worsening after surgery and some right lower  extremity weakness.   S/p craniotomy for resection 4/25/23 PATH: WHO grade IV GBM, IDH wild type, 1p19q non-codeleted, MGMT methylated, EGFR amplified 3+  In brief: 4/7/23: needle biopsy and HIRA by Dr. Tobar 4/25/23: Craniotomy for resection ([place]) 4/29/23: post-op MRI 6/6/23: chemoRT start 7/18/23: chemoRT complete  TODAY 8/14/23: Patient presents to review MRI from 8/3/23 which displays stability.  He is currently taking Dexamethasone 1 mg daily and performing in patient home PT. Patient completed chemoRT on 7/18/23 and is recovering well at home. He continues to work with PT and ambulate with a cane. Patient endorses having thrush for which he will start magic mouthwash for. He is anticipated to start maintenance TMZ tomorrow evening.    Care Team: Rad/Onc: Mick Oncology: Mar[facility]

## 2023-08-14 NOTE — PHYSICAL EXAM
[Ambulatory and capable of all self care but unable to carry out any work activities] : Status 2- Ambulatory and capable of all self care but unable to carry out any work activities. Up and about more than 50% of waking hours [Normal] : affect appropriate [de-identified] : Right: 3/5 proximal extremities (shoulder, hip); 1/5 right hand, 0/5 LE; Left: full strength; sensation intact, cranial nerves intact

## 2023-08-14 NOTE — ASSESSMENT
[FreeTextEntry1] : My impression is that the patient suffers from glioblastoma. MRI brain w/wo from 8/3/23 shows stability. The patient's established problem of right sided weakness is related. His KPS is 80. I had a long discussion with the patient regarding the role of eligible clinical trials including Mimivax vs CAPTEM. The patient was extensively educated about the nature of his disease process. The patient should continue to see Dr Causey. I will see the patient back for screening and consenting. I have explained the alternatives, risks and benefits to the patient and she understands and agrees to proceed. This risk of the procedure including but not limited to pain, infection, seizure, stroke, recurrence, residual disease, neurovascular injury, heart attack, pulmonary embolism, blindness, weakness, paralysis, and death have been carefully explained to the patient who clearly understands and agrees to proceed.

## 2023-08-14 NOTE — DATA REVIEWED
[de-identified] :  	 [facility]                                          [address] [place] [address]                                             [phone]  PATIENT NAME:           KAREEM GANNON                      YOB: 1955 ORDERING MD:           Ritu Ramirez PRIMARY CARE MD:           Kaur Arzola MD                      ATTENDING MD:           Ritu Ramirez MEDICAL REC#:           [id number]                       ACCOUNT#:             [id number] LOCATION:             [facility]                            ORDER DATE/TIME:           08/03/23 PROCEDURE:            MRI BRAIN [facility] IC  ORDER #:              [id number] CC:                                         ;                     ;                     ; End of cc's  PROCEDURE: MRI brain with and without contrast, 8/3/2023  CLINICAL INFORMATION: Status post biopsy and laser interstitial therapy at [facility]. Subsequent frontal craniotomy [facility] 4/25/2023 for resection of WHO grade IV GBM, IDH wild type, 1p19q non-codeleted, MGMT methylated, EGFR amplified 3+. Chemoradiation initiated.  TECHNIQUE: 1.5 Hui magnet. Multiplanar/multisequence imaging with and without contrast.  INTRAVENOUS CONTRAST: 10 mL Gadavist  COMPARISON: Most recent study 6/26/2023  IMAGING FINDINGS: Resection cavity in the posterior left frontal lobe has decreased in size and enhancement. On axial image 73 series 1003, resection cavity measures 15 mm x 12 mm. Equivalent measurements on study 6/26/2023 1 23 mm x 22 mm. As the cavity has decreased in size, ex vacuo dilatation of the left lateral ventricle has increased. Again demonstrated is low signal on susceptibility weighted imaging and the margins of the resection cavity from old hemorrhage.  High signal on T2-weighted and FLAIR imaging around the resection cavity has increased. High signal intensity has also increased in the right sided cerebral white matter. Some of the high signal demonstrates findings consistent with small vessel ischemia/infarction that was present on both sides on the initial outside imaging study 4/2/2023. There is no evidence of recent infarction with high signal on diffusion imaging. Some of the high signal likely represents treatment effect. However, increased tumor infiltration can also result in increasing high signal. High signal is now demonstrated extending into the posterior limb of the left internal capsule slightly into the left cerebral peduncle consistent with developing Wallerian degeneration.  Again demonstrated are dilated perivascular spaces in bilateral ganglionic regions most prominent in the region of the left caudate nucleus and putamen.  No new enhancing lesion is demonstrated.  There is no hydrocephalus.  There is no residual extra-axial collection.  Partially empty sella is again incidentally noted.  No neoplastic replacement of bone marrow is demonstrated.  There is a small retention cyst in the inferior aspect of the left frontal sinus.  Mastoid air cells are unremarkable.  No orbital abnormality is demonstrated.   IMPRESSION: Decrease in size of resection cavity in posterior left frontal lobe. Decreased enhancement associated with the resection cavity. Increased ex vacuo dilatation of the left lateral ventricle. Increase in high signal around the resection cavity likely related to treatment effect, but continued follow-up is recommended as tumor infiltration could result in high signal around the cavity.  --- End of Report ---           Electronically Signed:          ____________________________________________          Coy Helton MD          08/07/23 0930

## 2023-08-14 NOTE — REVIEW OF SYSTEMS
[Arm Weakness] : arm weakness [Leg Weakness] : leg weakness [Difficulty Walking] : difficulty walking [Feeling Poorly] : not feeling poorly [Feeling Tired] : not feeling tired [Confused or Disoriented] : no confusion [Seizures] : no convulsions [Dizziness] : no dizziness [Cluster Headache] : no cluster headache [Eyesight Problems] : no eyesight problems

## 2023-08-15 ENCOUNTER — NON-APPOINTMENT (OUTPATIENT)
Age: 68
End: 2023-08-15

## 2023-08-15 NOTE — REASON FOR VISIT
[Follow-Up Visit] : a follow-up [Home] : at home, [unfilled] , at the time of the visit. [Medical Office: ([facility])___] : at the medical office located in  [Spouse] : spouse

## 2023-08-15 NOTE — ASSESSMENT
[FreeTextEntry1] : 68 year old male with past medical history of HTN, OA of the knee, status post replacement, now with newly diagnosed glioblastoma (MGMT methylated), status post stereotactic needle biopsy and laser interstitial thermal therapy by Dr. Tobar (4/7/2023), then re-operation with  on 4/25/2023 with GTR.  GBM - MGMT methylated, IDH WT, PTEN, TP53, TERT mutant Completed chemoRT, MRI brain from 8/3/2023 with good response to chemotherapy and radiation. He will be screened for clinical trials later today. He is due to start his maintenance temozolomide now, 330 mg total. KPS is 70, will send him to clinical trials team for screening. RTC in person on 9/14/2023. Repeat MRIB in early October. All questions answered.

## 2023-08-15 NOTE — HISTORY OF PRESENT ILLNESS
[Disease: _____________________] : Disease: [unfilled] [Therapy: ___] : Therapy: [unfilled] [70: Cares for self; unalbe to carry on normal activity or do active work.] : 70: Cares for self; unable to carry on normal activity or do active work. [ECOG Performance Status: 2 - Ambulatory and capable of all self care but unable to carry out any work activities] : Performance Status: 2 - Ambulatory and capable of all self care but unable to carry out any work activities. Up and about more than 50% of waking hours [de-identified] : 68 year old male with past medical history of HTN, OA of the knee, status post replacement, now with newly diagnosed glioblastoma (MGMT methylated), status post stereotactic needle biopsy and laser interstitial thermal therapy by Dr. Tobar (4/7/2023) here for f/u after recent admission with re-operation of motor cortex GBM by .  Onc hx: End March 2023: He initially presented with right upper extremity weakness that started in his right thumb, he noticed he is not able to hold the pen normally.  A few days later the weakness progressed to his whole right hand.  The weakness started progressing, to his proximal right arm, which is when he presented to [facility] for further evaluation. 4/1/2023: CT spine- No evidence of acute fracture or traumatic subluxation to the visualized cervical spine.  Partial erosion of a left maxillary molar tooth. A follow-up dental exam is recommended.  Partially visualized paranasal sinus disease.  MRI Brain ([facility], 4/2/2023): 2.2 x 2.1 x 1.9 cm intra-axial mass in the superior and posterior left frontal lobe. There is surrounding vasogenic edema with resulting local regional mass effect. The differential diagnosis includes a metastasis or a primary CNS malignancy.   While hospitalized, a routine CT C/A/P was performed.   CT Chest, Abdomen/Pelvis ([facility], 4/3/2023): 1. 4 x 7 mm hypodensity at the pancreatic tail. Incompletely characterized on this single phase study. Recommend dedicated CT or MRI with pancreatic mass protocol for further evaluation as clinically appropriate.  2. Gallstones/sludge.  3. No adenopathy.   A pancreatic protocol CT scan was performed.   CT Pancreas ([facility], 4/4/2023): 1. 0.4 x 0.7 cm hypodensity at the pancreatic tail is again noted with no abnormal enhancement, possibly representing an IPMN vs small focus of fat.   MRI Brain ([facility], 4/4/2023): Stable heterogeneously enhancing 2.2 cm left frontal lobe lesion. Surrounding T2/FLAIR hyperintensity extending to the posterior body of the corpus callosum appears stable, likely vasogenic edema. No evidence of shift of midline structures.   He underwent stereotactic needle biopsy and laser interstitial thermal therapy by Dr. Tobar (4/7/2023). Dr. Tobar notes that he opted for needle biopsy followed by HIRA, in lieu of open surgical resection, due to concern for risk of resulting hand paralysis, as well as damage to descending motor fibers of arm and leg. Following completion of the procedure, he notes in his operative report that, as per T1 post-contrast MRI, he achieved a gross total ablation.  Surgical Pathology ([facility], 4/7/2023): A, B) Brain, left, tumor (excision): Glioblastoma, IDH pending, CNS WHO grade 4.  Immunopositive for GFAP and p53 The Ki-67: 80-90%  MRI Brain ([facility], 4/8/2023): 1. Interval biopsy and posttreatment changes involving the intra-axial mass epicenter in the left posterior frontal centrum semiovale. The solid enhancing portions within the mass have decreased, T2 bright signal surrounding the mass has slightly increased and associated local regional mass effect has slightly increased. Correlation with MRI/MRCP as clinically warranted.  2. 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  3. Gallstones/sludge.   4/12/2023: He saw Dr. Bolaños ([facility], [facility]). They discussed TMZ with radiation for GBM and observation for IPMN pancreatic tail.  4/18/2023: saw [facility]  4/25/2023: Re-operation by var: Path with WHO grade IV GBM, MGMT methylated, IDH WT, EGFR 3+ Tier I: Variants of Strong Clinical Significance TERT C228T TP53 p.([id number]) PIK3CA p.(Gyh95Jmf) PTEN p.(Gbz128Vco) RB1 p.([id number]) Tier III: Variants of Unknown Clinical Significance ARID1A p.([id number]) ERBB2 p.(Vhe46Ruw) GNAS p.(Dii575Acc) GNAS p.(Qxs122Gxx) POLE p.([id number]) 6/6/2023: started chemoRT. Was delayed because his dc from [place] was delayed due to testing positive for COVID. 6/26/2023: MRIB - Impression: Status post  left high parietal craniotomy. Interval minimal decrease in size of the in the high left posterior frontal mass lesion with mild decrease in surrounding edema/tumoral infiltration. No evidence of acute infarction. Empty/partially empty sella that may be the result of increased intracranial pressure, unchanged. 8/3/2023: MRIB - Decrease in size of resection cavity in posterior left frontal lobe. Decreased enhancement associated with the resection cavity. Increased ex vacuo dilatation of the left lateral ventricle. Increase in high signal around the resection cavity likely related to treatment effect, but continued follow-up is recommended as tumor infiltration could result in high signal around the cavity. [de-identified] : Rupesh grade IV, MGMT methylated, IDH WT [de-identified] : NeuroSx: \par  Radonc: \par   [FreeTextEntry1] : TMZ+RT (6/6/2023-7/18/2023) Due for C1  mg/m2 on 8/15/2023 (total 330 mg daily x 5 days) [de-identified] : He is doing well.  Currently on 2 mg dexamethasone daily.  Recently had MRI brain.

## 2023-08-15 NOTE — REVIEW OF SYSTEMS
[Muscle Weakness] : muscle weakness [Difficulty Walking] : difficulty walking [Negative] : Allergic/Immunologic [de-identified] : per HPI

## 2023-08-17 RX ORDER — TEMOZOLOMIDE 140 MG/1
140 CAPSULE ORAL
Qty: 5 | Refills: 0 | Status: ACTIVE | COMMUNITY
Start: 2023-08-14 | End: 1900-01-01

## 2023-08-17 RX ORDER — TEMOZOLOMIDE 5 MG/1
5 CAPSULE ORAL
Qty: 10 | Refills: 0 | Status: ACTIVE | COMMUNITY
Start: 2023-08-14 | End: 1900-01-01

## 2023-08-17 RX ORDER — TEMOZOLOMIDE 180 MG/1
180 CAPSULE ORAL
Qty: 5 | Refills: 0 | Status: ACTIVE | COMMUNITY
Start: 2023-08-14 | End: 1900-01-01

## 2023-08-29 ENCOUNTER — NON-APPOINTMENT (OUTPATIENT)
Age: 68
End: 2023-08-29

## 2023-08-29 RX ORDER — DIPHENHYDRAMINE HYDROCHLORIDE AND LIDOCAINE HYDROCHLORIDE AND ALUMINUM HYDROXIDE AND MAGNESIUM HYDRO
KIT EVERY 6 HOURS
Qty: 1 | Refills: 1 | Status: ACTIVE | COMMUNITY
Start: 2023-08-29 | End: 1900-01-01

## 2023-08-31 NOTE — REASON FOR VISIT
[Post-Treatment Evaluation] : post-treatment evaluation for [Brain Tumor] : brain tumor [Family Member] : family member

## 2023-09-05 ENCOUNTER — APPOINTMENT (OUTPATIENT)
Dept: RADIATION ONCOLOGY | Facility: CLINIC | Age: 68
End: 2023-09-05
Payer: COMMERCIAL

## 2023-09-05 VITALS
HEIGHT: 70 IN | HEART RATE: 94 BPM | BODY MASS INDEX: 32.64 KG/M2 | RESPIRATION RATE: 16 BRPM | DIASTOLIC BLOOD PRESSURE: 80 MMHG | TEMPERATURE: 98 F | WEIGHT: 228 LBS | OXYGEN SATURATION: 96 % | SYSTOLIC BLOOD PRESSURE: 111 MMHG

## 2023-09-05 PROCEDURE — 99024 POSTOP FOLLOW-UP VISIT: CPT

## 2023-09-05 RX ORDER — DEXAMETHASONE 1 MG/1
1 TABLET ORAL
Qty: 90 | Refills: 0 | Status: DISCONTINUED | COMMUNITY
Start: 2023-07-18 | End: 2023-09-05

## 2023-09-05 NOTE — REVIEW OF SYSTEMS
[Fatigue: Grade 1 - Fatigue relieved by rest] : Fatigue: Grade 1 - Fatigue relieved by rest [Blurred Vision: Grade 0] : Blurred Vision: Grade 0 [Dizziness: Grade 0] : Dizziness: Grade 0  [Headache: Grade 0] : Headache: Grade 0 [Lethargy: Grade 1 - Mild symptoms; reduced alertness and awareness] : Lethargy: Grade 1 - Mild symptoms; reduced alertness and awareness [Depression: Grade 1 - Mild depressive symptoms] : Depression: Grade 1 - Mild depressive symptoms

## 2023-09-05 NOTE — VITALS
[Maximal Pain Intensity: 0/10] : 0/10 [Least Pain Intensity: 0/10] : 0/10 [NoTreatment Scheduled] : no treatment scheduled [60: Requires occasional assistance, but is able to care for most of his/her needs] : 60: Requires occasional assistance, but is able to care for most of his/her needs

## 2023-09-06 ENCOUNTER — NON-APPOINTMENT (OUTPATIENT)
Age: 68
End: 2023-09-06

## 2023-09-06 NOTE — PHYSICAL EXAM
[Sensation] : the sensory exam was normal to light touch and pinprick [Normal] : oriented to person, place and time, the affect was normal, the mood was normal and not anxious [de-identified] : Right-sided hemiparesis unchanged.  No facial weakness noted

## 2023-09-06 NOTE — DISEASE MANAGEMENT
[Clinical] : TNM Stage: c [TTNM] : x [NTNM] : x [MTNM] : x [N/A] : Currently not applicable [de-identified] : completed 30 fractions whole brain to a dose of 6000 cGy on 7/18/2023.

## 2023-09-06 NOTE — HISTORY OF PRESENT ILLNESS
[FreeTextEntry1] :  Mr. Quiroga, 67 year old male with past medical history of HTN, OA of the knee, status post replacement, now with newly diagnosed glioblastoma, status post stereotactic needle biopsy and laser interstitial thermal therapy by Dr. Tobar (4/7/2023).  He initially presented with right upper extremity weakness.  CT Spine ([facility], 4/1/2023) revealed: No evidence of acute fracture or traumatic subluxation to the visualized cervical spine. Partial erosion of a left maxillary molar tooth. A follow-up dental exam is recommended. Partially visualized paranasal sinus disease.  MRI Brain ([facility], 4/2/2023): 2.2 x 2.1 x 1.9 cm intra-axial mass in the superior and posterior left frontal lobe. There is surrounding vasogenic edema with resulting local regional mass effect. The differential diagnosis includes a metastasis or a primary CNS malignancy.  While hospitalized, a routine CT C/A/P was performed.  CT Chest, Abdomen/Pelvis ([facility], 4/3/2023): 1. 4 x 7 mm hypodensity at the pancreatic tail. Incompletely characterized on this single phase study. Recommend dedicated CT or MRI with pancreatic mass protocol for further evaluation as clinically appropriate. 2. Gallstones/sludge. 3. No adenopathy.  A pancreatic protocol CT scan was performed.  CT Pancreas ([facility], 4/4/2023): 1. 0.4 x 0.7 cm hypodensity at the pancreatic tail is again noted with no abnormal enhancement, possibly representing an IPMN vs small focus of fat.  MRI Brain ([facility], 4/4/2023): Stable heterogeneously enhancing 2.2 cm left frontal lobe lesion. Surrounding T2/FLAIR hyperintensity extending to the posterior body of the corpus callosum appears stable, likely vasogenic edema. No evidence of shift of midline structures.  He underwent stereotactic needle biopsy and laser interstitial thermal therapy by Dr. Tobar (4/7/2023). Dr. Tobar notes that he opted for needle biopsy followed by HIRA, in lieu of open surgical resection, due to concern for risk of resulting hand paralysis, as well as damage to descending motor fibers of arm and leg. Following completion of the procedure, he notes in his operative report that, as per T1 post-contrast MRI, he achieved a gross total ablation.  Surgical Pathology ([facility], 4/7/2023): A, B) Brain, left, tumor (excision): Glioblastoma, IDH pending, CNS WHO grade 4. Immunopositive for GFAP and p53 The Ki-67: 80-90%  MRI Brain ([facility], 4/8/2023): 1. Interval biopsy and posttreatment changes involving the intra-axial mass epicenter in the left posterior frontal centrum semiovale. The solid enhancing portions within the mass have decreased, T2 bright signal surrounding the mass has slightly increased and associated local regional mass effect has slightly increased. Correlation with MRI/MRCP as clinically warranted. 2. 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 3. Gallstones/sludge.  He saw Dr. Bolaños ([facility], [facility]) on 4/12/2023. They discussed TMZ with radiation for GBM and observation for IPMN pancreatic tail.  9/5/202 Mr. Quiroga presents today for PTE. He completed 30 fractions whole brain to a dose of 6000 cGy on 7/18/2023. 8/3/2023 MRI Brain ([facility], [facility]) revealed decrease in size of resection cavity in posterior left frontal lobe. Decreased enhancement associated with the resection cavity. Increased exvacuo dilatation of the left lateral ventricle. Increase in high signal around the resection cavity likely related to treatment effect, but continued follow up is recommended.  He saw Dr. Causey on 8/14/2023, he will begin maintenance TMZ and has tapered off the Decadron.  HE saw Dr. Hall on the same day, discussed eligible clinical trials.  Mr. Quiroga states that he feels week and has fatigue. His appetite has declined. He denies any blurred vision or headaches. HE has doubled up his dose of chemotherapy two weeks ago which makes him feel lousy. He was not eligible for clinical trials.

## 2023-09-08 ENCOUNTER — NON-APPOINTMENT (OUTPATIENT)
Age: 68
End: 2023-09-08

## 2023-09-14 ENCOUNTER — APPOINTMENT (OUTPATIENT)
Dept: HEMATOLOGY ONCOLOGY | Facility: CLINIC | Age: 68
End: 2023-09-14
Payer: COMMERCIAL

## 2023-09-14 ENCOUNTER — RESULT REVIEW (OUTPATIENT)
Age: 68
End: 2023-09-14

## 2023-09-14 VITALS
OXYGEN SATURATION: 97 % | RESPIRATION RATE: 16 BRPM | DIASTOLIC BLOOD PRESSURE: 85 MMHG | WEIGHT: 227 LBS | HEIGHT: 70 IN | SYSTOLIC BLOOD PRESSURE: 122 MMHG | BODY MASS INDEX: 32.5 KG/M2 | TEMPERATURE: 98.4 F | HEART RATE: 90 BPM

## 2023-09-14 PROCEDURE — 99215 OFFICE O/P EST HI 40 MIN: CPT

## 2023-09-14 RX ORDER — TEMOZOLOMIDE 100 MG/1
100 CAPSULE ORAL
Qty: 20 | Refills: 6 | Status: ACTIVE | COMMUNITY
Start: 2023-09-14 | End: 1900-01-01

## 2023-09-21 ENCOUNTER — RESULT REVIEW (OUTPATIENT)
Age: 68
End: 2023-09-21

## 2023-10-09 ENCOUNTER — APPOINTMENT (OUTPATIENT)
Dept: HEMATOLOGY ONCOLOGY | Facility: CLINIC | Age: 68
End: 2023-10-09
Payer: COMMERCIAL

## 2023-10-09 PROCEDURE — 99214 OFFICE O/P EST MOD 30 MIN: CPT | Mod: 95

## 2023-10-24 ENCOUNTER — NON-APPOINTMENT (OUTPATIENT)
Age: 68
End: 2023-10-24

## 2023-11-01 RX ORDER — DEXAMETHASONE 2 MG/1
2 TABLET ORAL
Qty: 90 | Refills: 1 | Status: ACTIVE | COMMUNITY
Start: 2023-09-14 | End: 1900-01-01

## 2023-11-06 ENCOUNTER — APPOINTMENT (OUTPATIENT)
Dept: HEMATOLOGY ONCOLOGY | Facility: CLINIC | Age: 68
End: 2023-11-06
Payer: COMMERCIAL

## 2023-11-06 PROCEDURE — 99214 OFFICE O/P EST MOD 30 MIN: CPT | Mod: 95

## 2023-11-20 ENCOUNTER — RESULT REVIEW (OUTPATIENT)
Age: 68
End: 2023-11-20

## 2023-11-27 ENCOUNTER — APPOINTMENT (OUTPATIENT)
Dept: HEMATOLOGY ONCOLOGY | Facility: CLINIC | Age: 68
End: 2023-11-27
Payer: COMMERCIAL

## 2023-11-27 PROCEDURE — 99214 OFFICE O/P EST MOD 30 MIN: CPT | Mod: 95

## 2023-11-27 RX ORDER — DEXAMETHASONE 1 MG/1
1 TABLET ORAL DAILY
Qty: 30 | Refills: 2 | Status: ACTIVE | COMMUNITY
Start: 2023-11-27 | End: 1900-01-01

## 2023-11-27 RX ORDER — SULFAMETHOXAZOLE AND TRIMETHOPRIM 800; 160 MG/1; MG/1
800-160 TABLET ORAL DAILY
Qty: 30 | Refills: 2 | Status: DISCONTINUED | COMMUNITY
Start: 2023-05-16 | End: 2023-11-27

## 2023-12-14 ENCOUNTER — APPOINTMENT (OUTPATIENT)
Dept: RADIATION ONCOLOGY | Facility: CLINIC | Age: 68
End: 2023-12-14
Payer: COMMERCIAL

## 2023-12-14 PROCEDURE — 99213 OFFICE O/P EST LOW 20 MIN: CPT | Mod: 95

## 2023-12-14 NOTE — DISEASE MANAGEMENT
[TTNM] : x [NTNM] : x [MTNM] : x [de-identified] : completed 30 fractions whole brain to a dose of 6000 cGy on 7/18/2023.

## 2023-12-14 NOTE — PHYSICAL EXAM
[de-identified] : Improving strength in right upper and right lower extremity.  No facial weakness noted

## 2023-12-14 NOTE — HISTORY OF PRESENT ILLNESS
[FreeTextEntry1] : Mr. Quiroga, 68-year-old male with past medical history of HTN, OA of the knee, status post replacement, now with newly diagnosed glioblastoma, status post stereotactic needle biopsy and laser interstitial thermal therapy by Dr. Tobar (4/7/2023).  He initially presented with right upper extremity weakness.  CT Spine ([facility], 4/1/2023) revealed: No evidence of acute fracture or traumatic subluxation to the visualized cervical spine. Partial erosion of a left maxillary molar tooth. A follow-up dental exam is recommended. Partially visualized paranasal sinus disease.  MRI Brain ([facility], 4/2/2023): 2.2 x 2.1 x 1.9 cm intra-axial mass in the superior and posterior left frontal lobe. There is surrounding vasogenic edema with resulting local regional mass effect. The differential diagnosis includes a metastasis or a primary CNS malignancy.  While hospitalized, a routine CT C/A/P was performed.  CT Chest, Abdomen/Pelvis ([facility], 4/3/2023): 1. 4 x 7 mm hypodensity at the pancreatic tail. Incompletely characterized on this single-phase study. Recommend dedicated CT or MRI with pancreatic mass protocol for further evaluation as clinically appropriate. 2. Gallstones/sludge. 3. No adenopathy.  A pancreatic protocol CT scan was performed.  CT Pancreas ([facility], 4/4/2023): 1. 0.4 x 0.7 cm hypodensity at the pancreatic tail is again noted with no abnormal enhancement, possibly representing an IPMN vs small focus of fat.  MRI Brain ([facility], 4/4/2023): Stable heterogeneously enhancing 2.2 cm left frontal lobe lesion. Surrounding T2/FLAIR hyperintensity extending to the posterior body of the corpus callosum appears stable, likely vasogenic edema. No evidence of shift of midline structures.  He underwent stereotactic needle biopsy and laser interstitial thermal therapy by Dr. Tobar (4/7/2023). Dr. Tobar notes that he opted for needle biopsy followed by HIRA, in lieu of open surgical resection, due to concern for risk of resulting hand paralysis, as well as damage to descending motor fibers of arm and leg. Following completion of the procedure, he notes in his operative report that, as per T1 post-contrast MRI, he achieved a gross total ablation.  Surgical Pathology ([facility], 4/7/2023): A, B) Brain, left, tumor (excision): Glioblastoma, IDH pending, CNS WHO grade 4. Immunopositive for GFAP and p53 The Ki-67: 80-90%  MRI Brain ([facility], 4/8/2023): 1. Interval biopsy and posttreatment changes involving the intra-axial mass epicenter in the left posterior frontal centrum semiovale. The solid enhancing portions within the mass have decreased, T2 bright signal surrounding the mass has slightly increased and associated local regional mass effect has slightly increased. Correlation with MRI/MRCP as clinically warranted. 2. 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 3. Gallstones/sludge.  He saw Dr. Bolaños ([facility], [facility]) on 4/12/2023. They discussed TMZ with radiation for GBM and observation for IPMN pancreatic tail.  9/5/202 Mr. Quiroga presents today for PTE. He completed 30 fractions whole brain to a dose of 6000 cGy on 7/18/2023. 8/3/2023 MRI Brain ([facility]) revealed decrease in size of resection cavity in posterior left frontal lobe. Decreased enhancement associated with the resection cavity. Increased exvacuo dilatation of the left lateral ventricle. Increase in high signal around the resection cavity likely related to treatment effect, but continued follow up is recommended. He saw Dr. Causey on 8/14/2023, he will begin maintenance TMZ and has tapered off the Decadron. HE saw Dr. Hall on the same day, discussed eligible clinical trials. Mr. Quiroga states that he feels week and has fatigue. His appetite has declined. He denies any blurred vision or headaches. HE has doubled up his dose of chemotherapy two weeks ago which makes him feel lousy. He was not eligible for clinical trials.  12/14/23 Patient denies any history of headache nausea or vomiting.  No history of seizures.  Patient has been getting physical therapy in [place] where he spent 2 months.  He notes that his strength on the right side of the body continues to get better.  He has been able to move around and take care of himself.  He had a repeat MRI of the brain done on 11/20/2023 that shows a stable surgical cavity in the left frontal lobe associated with gliosis.  No new areas of contrast-enhancement or flair changes were noted

## 2023-12-26 ENCOUNTER — APPOINTMENT (OUTPATIENT)
Dept: HEMATOLOGY ONCOLOGY | Facility: CLINIC | Age: 68
End: 2023-12-26
Payer: COMMERCIAL

## 2023-12-26 PROCEDURE — 99214 OFFICE O/P EST MOD 30 MIN: CPT | Mod: 95

## 2023-12-28 NOTE — REVIEW OF SYSTEMS
[Muscle Weakness] : muscle weakness [Difficulty Walking] : difficulty walking [Negative] : Allergic/Immunologic [de-identified] : per HPI

## 2023-12-28 NOTE — HISTORY OF PRESENT ILLNESS
[Home] : at home, [unfilled] , at the time of the visit. [Medical Office: ([facility])___] : at the medical office located in  [Spouse] : spouse [Disease: _____________________] : Disease: [unfilled] [Therapy: ___] : Therapy: [unfilled] [70: Cares for self; unalbe to carry on normal activity or do active work.] : 70: Cares for self; unable to carry on normal activity or do active work. [ECOG Performance Status: 2 - Ambulatory and capable of all self care but unable to carry out any work activities] : Performance Status: 2 - Ambulatory and capable of all self care but unable to carry out any work activities. Up and about more than 50% of waking hours [de-identified] : 68 year old male with past medical history of HTN, OA of the knee, status post replacement, now with newly diagnosed glioblastoma (MGMT methylated), status post stereotactic needle biopsy and laser interstitial thermal therapy by Dr. Tobar (4/7/2023) and re-op by  on 4/25/2023, here for f/u via [facility].  Onc hx: End March 2023: He initially presented with right upper extremity weakness that started in his right thumb, he noticed he is not able to hold the pen normally.  A few days later the weakness progressed to his whole right hand.  The weakness started progressing, to his proximal right arm, which is when he presented to [facility] for further evaluation. 4/1/2023: CT spine- No evidence of acute fracture or traumatic subluxation to the visualized cervical spine.  Partial erosion of a left maxillary molar tooth. A follow-up dental exam is recommended.  Partially visualized paranasal sinus disease.  MRI Brain ([facility], 4/2/2023): 2.2 x 2.1 x 1.9 cm intra-axial mass in the superior and posterior left frontal lobe. There is surrounding vasogenic edema with resulting local regional mass effect. The differential diagnosis includes a metastasis or a primary CNS malignancy.   While hospitalized, a routine CT C/A/P was performed.   CT Chest, Abdomen/Pelvis ([facility], 4/3/2023): 1. 4 x 7 mm hypodensity at the pancreatic tail. Incompletely characterized on this single phase study. Recommend dedicated CT or MRI with pancreatic mass protocol for further evaluation as clinically appropriate.  2. Gallstones/sludge.  3. No adenopathy.   A pancreatic protocol CT scan was performed.   CT Pancreas ([facility], 4/4/2023): 1. 0.4 x 0.7 cm hypodensity at the pancreatic tail is again noted with no abnormal enhancement, possibly representing an IPMN vs small focus of fat.   MRI Brain ([facility], 4/4/2023): Stable heterogeneously enhancing 2.2 cm left frontal lobe lesion. Surrounding T2/FLAIR hyperintensity extending to the posterior body of the corpus callosum appears stable, likely vasogenic edema. No evidence of shift of midline structures.   He underwent stereotactic needle biopsy and laser interstitial thermal therapy by Dr. Tobar (4/7/2023). Dr. Tobar notes that he opted for needle biopsy followed by HIRA, in lieu of open surgical resection, due to concern for risk of resulting hand paralysis, as well as damage to descending motor fibers of arm and leg. Following completion of the procedure, he notes in his operative report that, as per T1 post-contrast MRI, he achieved a gross total ablation.  Surgical Pathology ([facility], 4/7/2023): A, B) Brain, left, tumor (excision): Glioblastoma, IDH pending, CNS WHO grade 4.  Immunopositive for GFAP and p53 The Ki-67: 80-90%  MRI Brain ([facility], 4/8/2023): 1. Interval biopsy and posttreatment changes involving the intra-axial mass epicenter in the left posterior frontal centrum semiovale. The solid enhancing portions within the mass have decreased, T2 bright signal surrounding the mass has slightly increased and associated local regional mass effect has slightly increased. Correlation with MRI/MRCP as clinically warranted.  2. 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  3. Gallstones/sludge.   4/12/2023: He saw Dr. Bolaños ([facility], [facility]). They discussed TMZ with radiation for GBM and observation for IPMN pancreatic tail.  4/18/2023: saw [facility]  4/25/2023: Re-operation by var: Path with WHO grade IV GBM, MGMT methylated, IDH WT, EGFR 3+ Tier I: Variants of Strong Clinical Significance TERT C228T TP53 p.([id number]) PIK3CA p.(Buq67Vox) PTEN p.([id number]) RB1 p.([id number]) Tier III: Variants of Unknown Clinical Significance ARID1A p.([id number]) ERBB2 p.(Aqb16Zhd) GNAS p.([id number]) GNAS p.([id number]) POLE p.([id number]) 6/6/2023: started chemoRT. Was delayed because his dc from [place] was delayed due to testing positive for COVID. 6/26/2023: MRIB - Impression: Status post  left high parietal craniotomy. Interval minimal decrease in size of the in the high left posterior frontal mass lesion with mild decrease in surrounding edema/tumoral infiltration. No evidence of acute infarction. Empty/partially empty sella that may be the result of increased intracranial pressure, unchanged. 8/3/2023: MRIB - Decrease in size of resection cavity in posterior left frontal lobe. Decreased enhancement associated with the resection cavity. Increased ex vacuo dilatation of the left lateral ventricle. Increase in high signal around the resection cavity likely related to treatment effect, but continued follow-up is recommended as tumor infiltration could result in high signal around the cavity. 9/21/23: MRI report: Status post left high parietal craniotomy with stable underlying resection cavity with unchanged enhancement and surrounding edema and/or nonenhancing tumor.  No evidence of hyperperfusion in this region.  There is new increased T2/FLAIR signal in the cortex of the right parietal lobe (series 21 image 22) that was not present on 8/3/2023, nonspecific, may reflect a small chronic cortical infarct that was not present on the prior exam. 11/20/2023: MRIB - Stable surgical occult cavity in the posterior LEFT frontal lobe at the convexity with associated gliosis which extends along the corticospinal tract into the LEFT cerebral peduncle. Minimal stable enhancement is seen anteriorly. FLAIR signal within a posterior RIGHT parietal sulcus is again noted possibly related to underlying proteinaceous content. Old hemorrhagic lacunar infarctions are seen in the LEFT basal ganglia. [de-identified] : Rupesh grade IV, MGMT methylated, IDH WT [de-identified] : NeuroSx: \par  Radonc: \par   [FreeTextEntry1] : TMZ+RT (6/6/2023-7/18/2023) 8/15/2023: C1  mg/m2 (total 330 mg daily x 5 days) 9/25/2023 C2  mg daily x 5 days 11/2/2023 C3  mg daily x 5 days  12/1/2023 C4 TMZ daily x 5 days 12/29/2023 Due for C5 TMZ [de-identified] : He just returned from rehab from [place]. He plans to go to [place] in 3 weeks and stayed there for 6 months total.  He states he.  He gets physical therapy twice a day and is really hurting for him right now. Otherwise he offers no current complaints.

## 2023-12-28 NOTE — ASSESSMENT
[FreeTextEntry1] : 68 year old male with past medical history of HTN, OA of the knee, status post replacement, now with newly diagnosed glioblastoma (MGMT methylated), status post stereotactic needle biopsy and laser interstitial thermal therapy by Dr. Tobar (4/7/2023), then re-operation with  on 4/25/2023 with GTR.  GBM - MGMT methylated, IDH WT, PTEN, TP53, TERT mutant Completed chemoRT, MRI brain from 11/20/2023 with good response to chemotherapy and radiation.  Schedule: Labs D1 and D22 of each cycle TMZ C5 is due 12/29/23 Steroid taper: dex 1.5 mg daily now go to dex 1.5 mg and 1 mg alternating days Due for MRIB end January. He will do it in [place]. He was advised he needs to obtain all old MRI images uploaded into [place].  CVA - no symptoms. Postop? Advised to see a stroke neurologist, patient to see in [place].  RTC after MRIB via  given patient's planned trips. Patient to send MRI report from [place] in 2 months.

## 2024-01-30 ENCOUNTER — APPOINTMENT (OUTPATIENT)
Dept: HEMATOLOGY ONCOLOGY | Facility: CLINIC | Age: 69
End: 2024-01-30
Payer: COMMERCIAL

## 2024-01-30 DIAGNOSIS — C71.9 MALIGNANT NEOPLASM OF BRAIN, UNSPECIFIED: ICD-10-CM

## 2024-01-30 PROCEDURE — 99214 OFFICE O/P EST MOD 30 MIN: CPT | Mod: 95

## 2024-01-30 NOTE — HISTORY OF PRESENT ILLNESS
[Home] : at home, [unfilled] , at the time of the visit. [Medical Office: ([facility])___] : at the medical office located in  [Spouse] : spouse [Disease: _____________________] : Disease: [unfilled] [Therapy: ___] : Therapy: [unfilled] [70: Cares for self; unalbe to carry on normal activity or do active work.] : 70: Cares for self; unable to carry on normal activity or do active work. [ECOG Performance Status: 2 - Ambulatory and capable of all self care but unable to carry out any work activities] : Performance Status: 2 - Ambulatory and capable of all self care but unable to carry out any work activities. Up and about more than 50% of waking hours [Other Location: e.g. School (Enter Location, City,State)___] : at [unfilled], at the time of the visit. [de-identified] : 68 year old male with past medical history of HTN, OA of the knee, status post replacement, now with newly diagnosed glioblastoma (MGMT methylated), status post stereotactic needle biopsy and laser interstitial thermal therapy by Dr. Tobar (4/7/2023) and re-op by  on 4/25/2023, here for f/u via TH.  Onc hx: End March 2023: He initially presented with right upper extremity weakness that started in his right thumb, he noticed he is not able to hold the pen normally.  A few days later the weakness progressed to his whole right hand.  The weakness started progressing, to his proximal right arm, which is when he presented to [facility] for further evaluation. 4/1/2023: CT spine- No evidence of acute fracture or traumatic subluxation to the visualized cervical spine.  Partial erosion of a left maxillary molar tooth. A follow-up dental exam is recommended.  Partially visualized paranasal sinus disease.  MRI Brain ([facility], 4/2/2023): 2.2 x 2.1 x 1.9 cm intra-axial mass in the superior and posterior left frontal lobe. There is surrounding vasogenic edema with resulting local regional mass effect. The differential diagnosis includes a metastasis or a primary CNS malignancy.   While hospitalized, a routine CT C/A/P was performed.   CT Chest, Abdomen/Pelvis ([facility], 4/3/2023): 1. 4 x 7 mm hypodensity at the pancreatic tail. Incompletely characterized on this single phase study. Recommend dedicated CT or MRI with pancreatic mass protocol for further evaluation as clinically appropriate.  2. Gallstones/sludge.  3. No adenopathy.   A pancreatic protocol CT scan was performed.   CT Pancreas ([facility], 4/4/2023): 1. 0.4 x 0.7 cm hypodensity at the pancreatic tail is again noted with no abnormal enhancement, possibly representing an IPMN vs small focus of fat.   MRI Brain ([facility], 4/4/2023): Stable heterogeneously enhancing 2.2 cm left frontal lobe lesion. Surrounding T2/FLAIR hyperintensity extending to the posterior body of the corpus callosum appears stable, likely vasogenic edema. No evidence of shift of midline structures.   He underwent stereotactic needle biopsy and laser interstitial thermal therapy by Dr. Tobar (4/7/2023). Dr. Tobar notes that he opted for needle biopsy followed by HIRA, in lieu of open surgical resection, due to concern for risk of resulting hand paralysis, as well as damage to descending motor fibers of arm and leg. Following completion of the procedure, he notes in his operative report that, as per T1 post-contrast MRI, he achieved a gross total ablation.  Surgical Pathology ([facility], 4/7/2023): A, B) Brain, left, tumor (excision): Glioblastoma, IDH pending, CNS WHO grade 4.  Immunopositive for GFAP and p53 The Ki-67: 80-90%  MRI Brain ([facility], 4/8/2023): 1. Interval biopsy and posttreatment changes involving the intra-axial mass epicenter in the left posterior frontal centrum semiovale. The solid enhancing portions within the mass have decreased, T2 bright signal surrounding the mass has slightly increased and associated local regional mass effect has slightly increased. Correlation with MRI/MRCP as clinically warranted.  2. 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  3. Gallstones/sludge.   4/12/2023: He saw Dr. Bolaños ([facility], [facility]). They discussed TMZ with radiation for GBM and observation for IPMN pancreatic tail.  4/18/2023: saw [facility]  4/25/2023: Re-operation by var: Path with WHO grade IV GBM, MGMT methylated, IDH WT, EGFR 3+ Tier I: Variants of Strong Clinical Significance TERT C228T TP53 p.([id number]) PIK3CA p.([id number]) PTEN p.([id number]) RB1 p.([id number]) Tier III: Variants of Unknown Clinical Significance ARID1A p.([id number]) ERBB2 p.(Yba16Tcd) GNAS p.([id number]) GNAS p.([id number]) POLE p.([id number]) 6/6/2023: started chemoRT. Was delayed because his dc from [place] was delayed due to testing positive for COVID. 6/26/2023: MRIB - Impression: Status post  left high parietal craniotomy. Interval minimal decrease in size of the in the high left posterior frontal mass lesion with mild decrease in surrounding edema/tumoral infiltration. No evidence of acute infarction. Empty/partially empty sella that may be the result of increased intracranial pressure, unchanged. 8/3/2023: MRIB - Decrease in size of resection cavity in posterior left frontal lobe. Decreased enhancement associated with the resection cavity. Increased ex vacuo dilatation of the left lateral ventricle. Increase in high signal around the resection cavity likely related to treatment effect, but continued follow-up is recommended as tumor infiltration could result in high signal around the cavity. 9/21/23: MRI report: Status post left high parietal craniotomy with stable underlying resection cavity with unchanged enhancement and surrounding edema and/or nonenhancing tumor.  No evidence of hyperperfusion in this region.  There is new increased T2/FLAIR signal in the cortex of the right parietal lobe (series 21 image 22) that was not present on 8/3/2023, nonspecific, may reflect a small chronic cortical infarct that was not present on the prior exam. 11/20/2023: MRIB - Stable surgical occult cavity in the posterior LEFT frontal lobe at the convexity with associated gliosis which extends along the corticospinal tract into the LEFT cerebral peduncle. Minimal stable enhancement is seen anteriorly. FLAIR signal within a posterior RIGHT parietal sulcus is again noted possibly related to underlying proteinaceous content. Old hemorrhagic lacunar infarctions are seen in the LEFT basal ganglia. [de-identified] : Rupesh grade IV, MGMT methylated, IDH WT [de-identified] : NeuroSx: \par  Radonc: \par   [FreeTextEntry1] : TMZ+RT (6/6/2023-7/18/2023) 8/15/2023: C1  mg/m2 (total 330 mg daily x 5 days) 9/25/2023 C2  mg daily x 5 days 11/2/2023 C3  mg daily x 5 days  12/1/2023 C4 TMZ daily x 5 days 12/29/2023 C5  mg daily x 5 days 1/30/2024: C6  mg daily x 5 days [de-identified] : He is still in [place] in rehab. He is feeling great. He still hasn't done his MRIB. Currently he is waiting for the CDs of MRI from [place].

## 2024-01-30 NOTE — REVIEW OF SYSTEMS
[Muscle Weakness] : muscle weakness [Difficulty Walking] : difficulty walking [Negative] : Allergic/Immunologic [de-identified] : per HPI

## 2024-01-30 NOTE — ASSESSMENT
[FreeTextEntry1] : 68 year old male with past medical history of HTN, OA of the knee, status post replacement, now with newly diagnosed glioblastoma (MGMT methylated), status post stereotactic needle biopsy and laser interstitial thermal therapy by Dr. Tobar (4/7/2023), then re-operation with  on 4/25/2023 with GTR.  GBM - MGMT methylated, IDH WT, PTEN, TP53, TERT mutant Completed chemoRT, MRI brain from 11/20/2023 with good response to chemotherapy and radiation.  TMZ C6 now, due today 1/30/2024 Steroid taper: dex 0.5 mg EOD now - plan to taper over 2 wks Due for MRIB end January. He will do it in [place]. He was advised he needs to obtain all old MRI images uploaded into [place]. He will mail me the CDs and report once done   CVA - no symptoms. Postop? Advised to see a stroke neurologist, patient to see in [place].  RTC on 2/27 via TH.

## 2024-04-11 ENCOUNTER — RX RENEWAL (OUTPATIENT)
Age: 69
End: 2024-04-11

## 2024-04-11 RX ORDER — PANTOPRAZOLE 40 MG/1
40 TABLET, DELAYED RELEASE ORAL
Qty: 90 | Refills: 1 | Status: ACTIVE | COMMUNITY
Start: 1900-01-01 | End: 1900-01-01

## 2024-04-11 RX ORDER — LEVETIRACETAM 750 MG/1
750 TABLET, FILM COATED ORAL TWICE DAILY
Qty: 180 | Refills: 1 | Status: ACTIVE | COMMUNITY
Start: 2023-08-22 | End: 1900-01-01

## 2024-05-23 NOTE — PRE-ANESTHESIA EVALUATION ADULT - HEIGHT IN INCHES
Had to R/S patient's MRI to Friday, 5-24 at 6:30 AM at [facility] along with right hip xrays. Please call patient with MRI results and any changes in plan of care-  patient is S/P 2 falls now-thank you        11

## 2024-07-16 ENCOUNTER — APPOINTMENT (OUTPATIENT)
Dept: HEMATOLOGY ONCOLOGY | Facility: CLINIC | Age: 69
End: 2024-07-16
Payer: COMMERCIAL

## 2024-07-16 DIAGNOSIS — I63.9 CEREBRAL INFARCTION, UNSPECIFIED: ICD-10-CM

## 2024-07-16 DIAGNOSIS — C71.9 MALIGNANT NEOPLASM OF BRAIN, UNSPECIFIED: ICD-10-CM

## 2024-07-16 DIAGNOSIS — Z79.899 OTHER LONG TERM (CURRENT) DRUG THERAPY: ICD-10-CM

## 2024-07-16 PROCEDURE — G2211 COMPLEX E/M VISIT ADD ON: CPT

## 2024-07-16 PROCEDURE — 99214 OFFICE O/P EST MOD 30 MIN: CPT

## 2024-07-16 RX ORDER — VALPROIC ACID 250 MG/1
250 CAPSULE, LIQUID FILLED ORAL
Qty: 120 | Refills: 0 | Status: ACTIVE | COMMUNITY
Start: 2024-07-16

## 2024-09-19 ENCOUNTER — APPOINTMENT (OUTPATIENT)
Dept: HEMATOLOGY ONCOLOGY | Facility: CLINIC | Age: 69
End: 2024-09-19
Payer: COMMERCIAL

## 2024-09-19 DIAGNOSIS — I63.9 CEREBRAL INFARCTION, UNSPECIFIED: ICD-10-CM

## 2024-09-19 DIAGNOSIS — C71.9 MALIGNANT NEOPLASM OF BRAIN, UNSPECIFIED: ICD-10-CM

## 2024-09-19 PROCEDURE — 99212 OFFICE O/P EST SF 10 MIN: CPT

## 2024-09-19 PROCEDURE — G2211 COMPLEX E/M VISIT ADD ON: CPT

## 2024-09-20 NOTE — REVIEW OF SYSTEMS
[Muscle Weakness] : muscle weakness [Difficulty Walking] : difficulty walking [Negative] : Allergic/Immunologic [de-identified] : per HPI

## 2024-09-20 NOTE — HISTORY OF PRESENT ILLNESS
[Home] : at home, [unfilled] , at the time of the visit. [Medical Office: ([facility])___] : at the medical office located in  [Spouse] : spouse [Verbal consent obtained from patient] : the patient, [unfilled] [Disease: _____________________] : Disease: [unfilled] [Therapy: ___] : Therapy: [unfilled] [70: Cares for self; unalbe to carry on normal activity or do active work.] : 70: Cares for self; unable to carry on normal activity or do active work. [ECOG Performance Status: 2 - Ambulatory and capable of all self care but unable to carry out any work activities] : Performance Status: 2 - Ambulatory and capable of all self care but unable to carry out any work activities. Up and about more than 50% of waking hours [Other Location: e.g. School (Enter Location, City,State)___] : at [unfilled], at the time of the visit. [de-identified] : 69 year old male with past medical history of HTN, OA of the knee, status post replacement, now with newly diagnosed glioblastoma (MGMT methylated), status post stereotactic needle biopsy and laser interstitial thermal therapy by Dr. Tobar (4/7/2023) and re-op by  on 4/25/2023, here for f/u via TH.  Onc hx: End March 2023: He initially presented with right upper extremity weakness that started in his right thumb, he noticed he is not able to hold the pen normally.  A few days later the weakness progressed to his whole right hand.  The weakness started progressing, to his proximal right arm, which is when he presented to [facility] for further evaluation. 4/1/2023: CT spine- No evidence of acute fracture or traumatic subluxation to the visualized cervical spine.  Partial erosion of a left maxillary molar tooth. A follow-up dental exam is recommended.  Partially visualized paranasal sinus disease.  MRI Brain ([facility], 4/2/2023): 2.2 x 2.1 x 1.9 cm intra-axial mass in the superior and posterior left frontal lobe. There is surrounding vasogenic edema with resulting local regional mass effect. The differential diagnosis includes a metastasis or a primary CNS malignancy.   While hospitalized, a routine CT C/A/P was performed.   CT Chest, Abdomen/Pelvis ([facility], 4/3/2023): 1. 4 x 7 mm hypodensity at the pancreatic tail. Incompletely characterized on this single phase study. Recommend dedicated CT or MRI with pancreatic mass protocol for further evaluation as clinically appropriate.  2. Gallstones/sludge.  3. No adenopathy.   A pancreatic protocol CT scan was performed.   CT Pancreas ([facility], 4/4/2023): 1. 0.4 x 0.7 cm hypodensity at the pancreatic tail is again noted with no abnormal enhancement, possibly representing an IPMN vs small focus of fat.   MRI Brain ([facility], 4/4/2023): Stable heterogeneously enhancing 2.2 cm left frontal lobe lesion. Surrounding T2/FLAIR hyperintensity extending to the posterior body of the corpus callosum appears stable, likely vasogenic edema. No evidence of shift of midline structures.   He underwent stereotactic needle biopsy and laser interstitial thermal therapy by Dr. Tobar (4/7/2023). Dr. Tobar notes that he opted for needle biopsy followed by HIRA, in lieu of open surgical resection, due to concern for risk of resulting hand paralysis, as well as damage to descending motor fibers of arm and leg. Following completion of the procedure, he notes in his operative report that, as per T1 post-contrast MRI, he achieved a gross total ablation.  Surgical Pathology ([facility], 4/7/2023): A, B) Brain, left, tumor (excision): Glioblastoma, IDH pending, CNS WHO grade 4.  Immunopositive for GFAP and p53 The Ki-67: 80-90%  MRI Brain ([facility], 4/8/2023): 1. Interval biopsy and posttreatment changes involving the intra-axial mass epicenter in the left posterior frontal centrum semiovale. The solid enhancing portions within the mass have decreased, T2 bright signal surrounding the mass has slightly increased and associated local regional mass effect has slightly increased. Correlation with MRI/MRCP as clinically warranted.  2. 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  3. Gallstones/sludge.   4/12/2023: He saw Dr. Bolaños ([facility], [facility]). They discussed TMZ with radiation for GBM and observation for IPMN pancreatic tail.  4/18/2023: saw [facility]  4/25/2023: Re-operation by var: Path with WHO grade IV GBM, MGMT methylated, IDH WT, EGFR 3+ Tier I: Variants of Strong Clinical Significance TERT C228T TP53 p.([id number]) PIK3CA p.(Itt90Xas) PTEN p.([id number]) RB1 p.([id number]) Tier III: Variants of Unknown Clinical Significance ARID1A p.([id number]) ERBB2 p.(Smj70Zkd) GNAS p.([id number]) GNAS p.([id number]) POLE p.([id number]) 6/6/2023: started chemoRT. Was delayed because his dc from [place] was delayed due to testing positive for COVID. 6/26/2023: MRIB - Impression: Status post  left high parietal craniotomy. Interval minimal decrease in size of the in the high left posterior frontal mass lesion with mild decrease in surrounding edema/tumoral infiltration. No evidence of acute infarction. Empty/partially empty sella that may be the result of increased intracranial pressure, unchanged. 8/3/2023: MRIB - Decrease in size of resection cavity in posterior left frontal lobe. Decreased enhancement associated with the resection cavity. Increased ex vacuo dilatation of the left lateral ventricle. Increase in high signal around the resection cavity likely related to treatment effect, but continued follow-up is recommended as tumor infiltration could result in high signal around the cavity. 9/21/23: MRI report: Status post left high parietal craniotomy with stable underlying resection cavity with unchanged enhancement and surrounding edema and/or nonenhancing tumor.  No evidence of hyperperfusion in this region.  There is new increased T2/FLAIR signal in the cortex of the right parietal lobe (series 21 image 22) that was not present on 8/3/2023, nonspecific, may reflect a small chronic cortical infarct that was not present on the prior exam. 11/20/2023: MRIB - Stable surgical occult cavity in the posterior LEFT frontal lobe at the convexity with associated gliosis which extends along the corticospinal tract into the LEFT cerebral peduncle. Minimal stable enhancement is seen anteriorly. FLAIR signal within a posterior RIGHT parietal sulcus is again noted possibly related to underlying proteinaceous content. Old hemorrhagic lacunar infarctions are seen in the LEFT basal ganglia. 4/16/2024: MRIB - stable per my review 6/25/2024: MRIB - not available. Per report by wife no evidence of disease recurrence. [de-identified] : Rupesh grade IV, MGMT methylated, IDH WT [de-identified] : NeuroSx: \par  Radonc: \par   [FreeTextEntry1] : TMZ+RT (6/6/2023-7/18/2023) 8/15/2023: C1  mg/m2 (total 330 mg daily x 5 days) 9/25/2023 C2  mg daily x 5 days 11/2/2023 C3  mg daily x 5 days  12/1/2023 C4 TMZ daily x 5 days 12/29/2023 C5  mg daily x 5 days 1/30/2024: C6  mg daily x 5 days [de-identified] : He is back in [place] for the next few months for ongoing PT.

## 2024-09-20 NOTE — REVIEW OF SYSTEMS
[Muscle Weakness] : muscle weakness [Difficulty Walking] : difficulty walking [Negative] : Allergic/Immunologic [de-identified] : per HPI

## 2024-09-20 NOTE — HISTORY OF PRESENT ILLNESS
[Home] : at home, [unfilled] , at the time of the visit. [Medical Office: ([facility])___] : at the medical office located in  [Spouse] : spouse [Verbal consent obtained from patient] : the patient, [unfilled] [Disease: _____________________] : Disease: [unfilled] [Therapy: ___] : Therapy: [unfilled] [70: Cares for self; unalbe to carry on normal activity or do active work.] : 70: Cares for self; unable to carry on normal activity or do active work. [ECOG Performance Status: 2 - Ambulatory and capable of all self care but unable to carry out any work activities] : Performance Status: 2 - Ambulatory and capable of all self care but unable to carry out any work activities. Up and about more than 50% of waking hours [Other Location: e.g. School (Enter Location, City,State)___] : at [unfilled], at the time of the visit. [de-identified] : 69 year old male with past medical history of HTN, OA of the knee, status post replacement, now with newly diagnosed glioblastoma (MGMT methylated), status post stereotactic needle biopsy and laser interstitial thermal therapy by Dr. Tobar (4/7/2023) and re-op by  on 4/25/2023, here for f/u via TH.  Onc hx: End March 2023: He initially presented with right upper extremity weakness that started in his right thumb, he noticed he is not able to hold the pen normally.  A few days later the weakness progressed to his whole right hand.  The weakness started progressing, to his proximal right arm, which is when he presented to [facility] for further evaluation. 4/1/2023: CT spine- No evidence of acute fracture or traumatic subluxation to the visualized cervical spine.  Partial erosion of a left maxillary molar tooth. A follow-up dental exam is recommended.  Partially visualized paranasal sinus disease.  MRI Brain ([facility], 4/2/2023): 2.2 x 2.1 x 1.9 cm intra-axial mass in the superior and posterior left frontal lobe. There is surrounding vasogenic edema with resulting local regional mass effect. The differential diagnosis includes a metastasis or a primary CNS malignancy.   While hospitalized, a routine CT C/A/P was performed.   CT Chest, Abdomen/Pelvis ([facility], 4/3/2023): 1. 4 x 7 mm hypodensity at the pancreatic tail. Incompletely characterized on this single phase study. Recommend dedicated CT or MRI with pancreatic mass protocol for further evaluation as clinically appropriate.  2. Gallstones/sludge.  3. No adenopathy.   A pancreatic protocol CT scan was performed.   CT Pancreas ([facility], 4/4/2023): 1. 0.4 x 0.7 cm hypodensity at the pancreatic tail is again noted with no abnormal enhancement, possibly representing an IPMN vs small focus of fat.   MRI Brain ([facility], 4/4/2023): Stable heterogeneously enhancing 2.2 cm left frontal lobe lesion. Surrounding T2/FLAIR hyperintensity extending to the posterior body of the corpus callosum appears stable, likely vasogenic edema. No evidence of shift of midline structures.   He underwent stereotactic needle biopsy and laser interstitial thermal therapy by Dr. Tobar (4/7/2023). Dr. Tobar notes that he opted for needle biopsy followed by HIRA, in lieu of open surgical resection, due to concern for risk of resulting hand paralysis, as well as damage to descending motor fibers of arm and leg. Following completion of the procedure, he notes in his operative report that, as per T1 post-contrast MRI, he achieved a gross total ablation.  Surgical Pathology ([facility], 4/7/2023): A, B) Brain, left, tumor (excision): Glioblastoma, IDH pending, CNS WHO grade 4.  Immunopositive for GFAP and p53 The Ki-67: 80-90%  MRI Brain ([facility], 4/8/2023): 1. Interval biopsy and posttreatment changes involving the intra-axial mass epicenter in the left posterior frontal centrum semiovale. The solid enhancing portions within the mass have decreased, T2 bright signal surrounding the mass has slightly increased and associated local regional mass effect has slightly increased. Correlation with MRI/MRCP as clinically warranted.  2. 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  3. Gallstones/sludge.   4/12/2023: He saw Dr. Bolaños ([facility], [facility]). They discussed TMZ with radiation for GBM and observation for IPMN pancreatic tail.  4/18/2023: saw [facility]  4/25/2023: Re-operation by var: Path with WHO grade IV GBM, MGMT methylated, IDH WT, EGFR 3+ Tier I: Variants of Strong Clinical Significance TERT C228T TP53 p.([id number]) PIK3CA p.(Yws76Aou) PTEN p.(Gfy223Ahh) RB1 p.([id number]) Tier III: Variants of Unknown Clinical Significance ARID1A p.([id number]) ERBB2 p.(Nye73Ecl) GNAS p.(Bhr396Ods) GNAS p.(Hxk751Gpj) POLE p.([id number]) 6/6/2023: started chemoRT. Was delayed because his dc from [place] was delayed due to testing positive for COVID. 6/26/2023: MRIB - Impression: Status post  left high parietal craniotomy. Interval minimal decrease in size of the in the high left posterior frontal mass lesion with mild decrease in surrounding edema/tumoral infiltration. No evidence of acute infarction. Empty/partially empty sella that may be the result of increased intracranial pressure, unchanged. 8/3/2023: MRIB - Decrease in size of resection cavity in posterior left frontal lobe. Decreased enhancement associated with the resection cavity. Increased ex vacuo dilatation of the left lateral ventricle. Increase in high signal around the resection cavity likely related to treatment effect, but continued follow-up is recommended as tumor infiltration could result in high signal around the cavity. 9/21/23: MRI report: Status post left high parietal craniotomy with stable underlying resection cavity with unchanged enhancement and surrounding edema and/or nonenhancing tumor.  No evidence of hyperperfusion in this region.  There is new increased T2/FLAIR signal in the cortex of the right parietal lobe (series 21 image 22) that was not present on 8/3/2023, nonspecific, may reflect a small chronic cortical infarct that was not present on the prior exam. 11/20/2023: MRIB - Stable surgical occult cavity in the posterior LEFT frontal lobe at the convexity with associated gliosis which extends along the corticospinal tract into the LEFT cerebral peduncle. Minimal stable enhancement is seen anteriorly. FLAIR signal within a posterior RIGHT parietal sulcus is again noted possibly related to underlying proteinaceous content. Old hemorrhagic lacunar infarctions are seen in the LEFT basal ganglia. 4/16/2024: MRIB - stable per my review 6/25/2024: MRIB - not available. Per report by wife no evidence of disease recurrence. [de-identified] : Rupesh grade IV, MGMT methylated, IDH WT [FreeTextEntry1] : TMZ+RT (6/6/2023-7/18/2023) 8/15/2023: C1  mg/m2 (total 330 mg daily x 5 days) 9/25/2023 C2  mg daily x 5 days 11/2/2023 C3  mg daily x 5 days  12/1/2023 C4 TMZ daily x 5 days 12/29/2023 C5  mg daily x 5 days 1/30/2024: C6  mg daily x 5 days [de-identified] : NeuroSx: \par  Radonc: \par   [de-identified] : He is back in [place] for the next few months for ongoing PT.

## 2024-09-20 NOTE — ASSESSMENT
[FreeTextEntry1] : 69 year old male with past medical history of HTN, OA of the knee, status post replacement, now with glioblastoma (MGMT methylated), status post stereotactic needle biopsy and laser interstitial thermal therapy by Dr. Tobar (4/7/2023), then re-operation with  on 4/25/2023 with GTR.  GBM - MGMT methylated, IDH WT, PTEN, TP53, TERT mutant Completed chemoRT, MRI brain from 11/20/2023 with good response to chemotherapy and radiation. Off steroids. Reviewed images available done in [place] independantly (no 6/25/24 image uploaded). No evidence of tumor recurrence per my review. KPS is 70. He is walking unassisted. --images from recent and prior MRIB have not been uploaded into PACs yet, therefore unable to review them --tasked coordinator to expedite uploading images --RTC after images upload for review --c/w active surrveilance with MRIB every 2 months, next one due is end of August  CVA - no symptoms. Postop? Saw neurologist. No intervention for CVA. He was started on valproic acid 500 mg BID. Patient reports slepiness. Unclear to me why this was started in a patient without evidence of seizures. --patient to obtain and provide me with progress notes from neurology --if no seizure indication for AED, recommend taper of valproic acid

## 2024-09-20 NOTE — ASSESSMENT
[FreeTextEntry1] : 69 year old male with past medical history of HTN, OA of the knee, status post replacement, now with glioblastoma (MGMT methylated), status post stereotactic needle biopsy and laser interstitial thermal therapy by Dr. Tobar (4/7/2023), then re-operation with  on 4/25/2023 with GTR.  GBM - MGMT methylated, IDH WT, PTEN, TP53, TERT mutant Completed chemoRT, MRI brain from 11/20/2023 with good response to chemotherapy and radiation. Off steroids. Reviewed images available done in [place] independantly (no 6/25/24 image uploaded). No evidence of tumor recurrence per my review. KPS is 70. He is walking unassisted. --images from recent and prior MRIB have not been uploaded into PACs yet, therefore unable to review them --tasked coordinator to expedite uploading images --RTC after images upload for review --c/w active surrveilance with MRIB every 2 months, next one due is end of August  CVA - no symptoms. Postop? Saw neurologist. No intervention for CVA. He was started on valproic acid 500 mg BID. Patient reports slepiness. Unclear to me why this was started in a patient without evidence of seizures. --patient to obtain and provide me with progress notes from neurology --if no seizure indication for AED, recommend taper of valproic acid

## 2024-10-29 DIAGNOSIS — C71.9 MALIGNANT NEOPLASM OF BRAIN, UNSPECIFIED: ICD-10-CM

## 2024-12-13 ENCOUNTER — INPATIENT (INPATIENT)
Facility: HOSPITAL | Age: 69
LOS: 13 days | Discharge: NURSING, REHAB OR LONG-TERM CARE | End: 2024-12-27
Attending: NEUROLOGICAL SURGERY | Admitting: NEUROLOGICAL SURGERY
Payer: COMMERCIAL

## 2024-12-13 ENCOUNTER — NON-APPOINTMENT (OUTPATIENT)
Age: 69
End: 2024-12-13

## 2024-12-13 VITALS
TEMPERATURE: 99 F | HEART RATE: 60 BPM | SYSTOLIC BLOOD PRESSURE: 141 MMHG | WEIGHT: 188.94 LBS | RESPIRATION RATE: 18 BRPM | OXYGEN SATURATION: 97 % | HEIGHT: 71 IN | DIASTOLIC BLOOD PRESSURE: 91 MMHG

## 2024-12-13 DIAGNOSIS — C71.9 MALIGNANT NEOPLASM OF BRAIN, UNSPECIFIED: ICD-10-CM

## 2024-12-13 DIAGNOSIS — Z96.651 PRESENCE OF RIGHT ARTIFICIAL KNEE JOINT: Chronic | ICD-10-CM

## 2024-12-13 LAB
ADD ON TEST-SPECIMEN IN LAB: SIGNIFICANT CHANGE UP
ANION GAP SERPL CALC-SCNC: 12 MMOL/L — SIGNIFICANT CHANGE UP (ref 5–17)
BASOPHILS # BLD AUTO: 0.03 K/UL — SIGNIFICANT CHANGE UP (ref 0–0.2)
BASOPHILS NFR BLD AUTO: 0.7 % — SIGNIFICANT CHANGE UP (ref 0–2)
BUN SERPL-MCNC: 14 MG/DL — SIGNIFICANT CHANGE UP (ref 7–23)
CALCIUM SERPL-MCNC: 8.7 MG/DL — SIGNIFICANT CHANGE UP (ref 8.4–10.5)
CHLORIDE SERPL-SCNC: 102 MMOL/L — SIGNIFICANT CHANGE UP (ref 96–108)
CO2 SERPL-SCNC: 24 MMOL/L — SIGNIFICANT CHANGE UP (ref 22–31)
CREAT SERPL-MCNC: 0.79 MG/DL — SIGNIFICANT CHANGE UP (ref 0.5–1.3)
EGFR: 97 ML/MIN/1.73M2 — SIGNIFICANT CHANGE UP
EOSINOPHIL # BLD AUTO: 0.07 K/UL — SIGNIFICANT CHANGE UP (ref 0–0.5)
EOSINOPHIL NFR BLD AUTO: 1.6 % — SIGNIFICANT CHANGE UP (ref 0–6)
GLUCOSE SERPL-MCNC: 70 MG/DL — SIGNIFICANT CHANGE UP (ref 70–99)
HCT VFR BLD CALC: 46 % — SIGNIFICANT CHANGE UP (ref 39–50)
HGB BLD-MCNC: 14.8 G/DL — SIGNIFICANT CHANGE UP (ref 13–17)
IMM GRANULOCYTES NFR BLD AUTO: 0.2 % — SIGNIFICANT CHANGE UP (ref 0–0.9)
LYMPHOCYTES # BLD AUTO: 1.95 K/UL — SIGNIFICANT CHANGE UP (ref 1–3.3)
LYMPHOCYTES # BLD AUTO: 45.2 % — HIGH (ref 13–44)
MAGNESIUM SERPL-MCNC: 1.9 MG/DL — SIGNIFICANT CHANGE UP (ref 1.6–2.6)
MCHC RBC-ENTMCNC: 28.9 PG — SIGNIFICANT CHANGE UP (ref 27–34)
MCHC RBC-ENTMCNC: 32.2 G/DL — SIGNIFICANT CHANGE UP (ref 32–36)
MCV RBC AUTO: 89.8 FL — SIGNIFICANT CHANGE UP (ref 80–100)
MONOCYTES # BLD AUTO: 0.44 K/UL — SIGNIFICANT CHANGE UP (ref 0–0.9)
MONOCYTES NFR BLD AUTO: 10.2 % — SIGNIFICANT CHANGE UP (ref 2–14)
NEUTROPHILS # BLD AUTO: 1.81 K/UL — SIGNIFICANT CHANGE UP (ref 1.8–7.4)
NEUTROPHILS NFR BLD AUTO: 42.1 % — LOW (ref 43–77)
NRBC # BLD: 0 /100 WBCS — SIGNIFICANT CHANGE UP (ref 0–0)
PLATELET # BLD AUTO: 350 K/UL — SIGNIFICANT CHANGE UP (ref 150–400)
POTASSIUM SERPL-MCNC: 4.5 MMOL/L — SIGNIFICANT CHANGE UP (ref 3.5–5.3)
POTASSIUM SERPL-SCNC: 4.5 MMOL/L — SIGNIFICANT CHANGE UP (ref 3.5–5.3)
RBC # BLD: 5.12 M/UL — SIGNIFICANT CHANGE UP (ref 4.2–5.8)
RBC # FLD: 12.8 % — SIGNIFICANT CHANGE UP (ref 10.3–14.5)
SODIUM SERPL-SCNC: 138 MMOL/L — SIGNIFICANT CHANGE UP (ref 135–145)
WBC # BLD: 4.31 K/UL — SIGNIFICANT CHANGE UP (ref 3.8–10.5)
WBC # FLD AUTO: 4.31 K/UL — SIGNIFICANT CHANGE UP (ref 3.8–10.5)

## 2024-12-13 PROCEDURE — 93010 ELECTROCARDIOGRAM REPORT: CPT

## 2024-12-13 PROCEDURE — 71045 X-RAY EXAM CHEST 1 VIEW: CPT | Mod: 26

## 2024-12-13 PROCEDURE — 70450 CT HEAD/BRAIN W/O DYE: CPT | Mod: 26,MC

## 2024-12-13 PROCEDURE — 99285 EMERGENCY DEPT VISIT HI MDM: CPT

## 2024-12-13 RX ORDER — LEVETIRACETAM 100 MG/ML
1000 SOLUTION ORAL EVERY 12 HOURS
Refills: 0 | Status: DISCONTINUED | OUTPATIENT
Start: 2024-12-13 | End: 2024-12-19

## 2024-12-13 RX ORDER — DEXAMETHASONE SODIUM PHOSPHATE 4 MG/ML
4 VIAL (ML) INJECTION EVERY 6 HOURS
Refills: 0 | Status: DISCONTINUED | OUTPATIENT
Start: 2024-12-13 | End: 2024-12-19

## 2024-12-13 RX ORDER — METFORMIN 850 MG/1
500 TABLET ORAL EVERY 12 HOURS
Refills: 0 | Status: DISCONTINUED | OUTPATIENT
Start: 2024-12-13 | End: 2024-12-14

## 2024-12-13 RX ORDER — ACETAMINOPHEN 80 MG/.8ML
650 SOLUTION/ DROPS ORAL EVERY 6 HOURS
Refills: 0 | Status: DISCONTINUED | OUTPATIENT
Start: 2024-12-13 | End: 2024-12-19

## 2024-12-13 RX ORDER — FAMOTIDINE 20 MG/1
20 TABLET, FILM COATED ORAL DAILY
Refills: 0 | Status: DISCONTINUED | OUTPATIENT
Start: 2024-12-13 | End: 2024-12-14

## 2024-12-13 RX ADMIN — METFORMIN 500 MILLIGRAM(S): 850 TABLET ORAL at 22:39

## 2024-12-13 RX ADMIN — LEVETIRACETAM 1000 MILLIGRAM(S): 100 SOLUTION ORAL at 22:39

## 2024-12-13 NOTE — ED ADULT NURSE NOTE - OBJECTIVE STATEMENT
Pt 69 year old male c/o weakness right sided ; pt has history of glioblastoma surgery april 2023; 12/10 patient had MRI done in [place] that showed swelling of brain and tumor; patient states he has history right sided weakness but family states he has right sided deficit and facial droop for past week. Pt 69 year old male c/o weakness right sided ; pt has history of glioblastoma surgery april 2023; 12/10 patient had MRI done in [place] that showed swelling of brain and tumor; patient states he has history right sided weakness but family states he has right sided deficit and facial droop for past week. Pt has noted right arm deficit uses cane on right side

## 2024-12-13 NOTE — ED ADULT TRIAGE NOTE - CHIEF COMPLAINT QUOTE
Pt arrived to ED for neuro evaluation for brain tumor. Pt hx of brain surgery for glioblastoma april 2023, july 2023 MRI negative for tumors. MRI done in [place] on 12/10 and it showed a recurrence of a tumor and swelling of the brain. Pt family noticed R sided facial droop and drooling x1 week, difficulty walking x2 weeks, intermittent forgetfulness x2 weeks, slurred speech x2 weeks. Pt was told to come to ED for evaluation by neurosurgery and MD Hall. Pt baseline R sided weakness. No other new symptoms.

## 2024-12-13 NOTE — ED ADULT NURSE NOTE - CHIEF COMPLAINT QUOTE
Pt arrived to ED for neuro evaluation for brain tumor. Pt hx of brain surgery for glioblastoma april 2023, july 2023 MRI negative for tumors. MRI done in [place] on 12/10 and it showed a recurrence of a tumor and swelling of the brain. Pt family noticed R sided facial droop and drooling x1 week, difficulty walking x2 weeks, intermittent forgetfulness x2 weeks, slurred speech x2 weeks. Pt was told to come to ED for evaluation by neurosurgery and MD Hall. Pt baseline R sided weakness. No other new symptoms.

## 2024-12-13 NOTE — ED PROVIDER NOTE - CLINICAL SUMMARY MEDICAL DECISION MAKING FREE TEXT BOX
68 yo male with h/o HTN, OA, right  TKA, s/p glioblastoma resection 04/25/2023 , chemo and radiation therapy after, currently on physical therapy and brain MRI every 2 month post craniotomy, present to ER due to reoccurrence of brain tumor on his last MRI. Pt also reports worsening of his right side weakness , forgetfulness, new difficulty with ambulation due to right side weakness for the past 2 weeks. Pt was recommended to go to [facility] ER for further evaluation by his oncologist.  pt afebrile and hemodynamically stable, A&O x 4, notable right side weakness. plan for labs, CXR, head CT. case discussed with neurosurgery PA on call. admission recommended for further management, most likely plan for OR

## 2024-12-13 NOTE — ED PROVIDER NOTE - ATTENDING APP SHARED VISIT CONTRIBUTION OF CARE
70 yo male with h/o HTN, OA, right  TKA, s/p glioblastoma resection 04/25/2023 , chemo and radiation therapy after, currently on physical therapy and brain MRI every 2 month post craniotomy, present to ER due to reoccurrence of brain tumor on his last MRI. Pt also reports worsening of his right side weakness , forgetfulness, new difficulty with ambulation due to right side weakness for the past 2 weeks. Pt was recommended to go to [facility] ER for further evaluation by his oncologist.  pt afebrile and hemodynamically stable, A&O x 4, notable right side weakness. plan for labs, CXR, head CT. case discussed with neurosurgery PA on call. admission recommended for further management, most likely plan for OR

## 2024-12-13 NOTE — ED PROVIDER NOTE - OBJECTIVE STATEMENT
70 yo male with h/o HTN, OA, right  TKA, s/p glioblastoma resection 04/25/2023 , chemo and radiation therapy after, currently on physical therapy and brain MRI every 2 month post craniotomy, present to ER due to reoccurrence of brain tumor on his last MRI. Pt also reports worsening of his right side weakness , forgetfulness, new difficulty with ambulation due to right side weakness for the past 2 weeks. Pt was recommended to go to [facility] ER for further evaluation by his oncologist.

## 2024-12-13 NOTE — ED PROVIDER NOTE - PHYSICAL EXAMINATION
GENERAL: NAD, well-developed  HEAD:  Atraumatic, Normocephalic  EYES: EOMI, PERRLA, conjunctiva and sclera clear  NECK: Supple, No JVD  CHEST/LUNG: Clear to auscultation bilaterally; No wheeze  HEART: Regular rate and rhythm; No murmurs, rubs, or gallops  ABDOMEN: Soft, Nontender, Nondistended; Bowel sounds present  EXTREMITIES:  2+ Peripheral Pulses, No clubbing, cyanosis, or edema  NEUROLOGY: non-focal, residual; right sided upper and lower extremity weakness  SKIN: No rashes or lesions

## 2024-12-14 DIAGNOSIS — Z98.890 OTHER SPECIFIED POSTPROCEDURAL STATES: Chronic | ICD-10-CM

## 2024-12-14 LAB
ANION GAP SERPL CALC-SCNC: 8 MMOL/L — SIGNIFICANT CHANGE UP (ref 5–17)
APTT BLD: 38.6 SEC — HIGH (ref 24.5–35.6)
BLD GP AB SCN SERPL QL: NEGATIVE — SIGNIFICANT CHANGE UP
BUN SERPL-MCNC: 13 MG/DL — SIGNIFICANT CHANGE UP (ref 7–23)
CALCIUM SERPL-MCNC: 9.2 MG/DL — SIGNIFICANT CHANGE UP (ref 8.4–10.5)
CHLORIDE SERPL-SCNC: 104 MMOL/L — SIGNIFICANT CHANGE UP (ref 96–108)
CO2 SERPL-SCNC: 24 MMOL/L — SIGNIFICANT CHANGE UP (ref 22–31)
CREAT SERPL-MCNC: 0.88 MG/DL — SIGNIFICANT CHANGE UP (ref 0.5–1.3)
EGFR: 94 ML/MIN/1.73M2 — SIGNIFICANT CHANGE UP
GLUCOSE SERPL-MCNC: 113 MG/DL — HIGH (ref 70–99)
HCT VFR BLD CALC: 46 % — SIGNIFICANT CHANGE UP (ref 39–50)
HGB BLD-MCNC: 15.1 G/DL — SIGNIFICANT CHANGE UP (ref 13–17)
INR BLD: 1 — SIGNIFICANT CHANGE UP (ref 0.85–1.16)
MAGNESIUM SERPL-MCNC: 2 MG/DL — SIGNIFICANT CHANGE UP (ref 1.6–2.6)
MCHC RBC-ENTMCNC: 30 PG — SIGNIFICANT CHANGE UP (ref 27–34)
MCHC RBC-ENTMCNC: 32.8 G/DL — SIGNIFICANT CHANGE UP (ref 32–36)
MCV RBC AUTO: 91.5 FL — SIGNIFICANT CHANGE UP (ref 80–100)
NRBC # BLD: 0 /100 WBCS — SIGNIFICANT CHANGE UP (ref 0–0)
PHOSPHATE SERPL-MCNC: 3 MG/DL — SIGNIFICANT CHANGE UP (ref 2.5–4.5)
PLATELET # BLD AUTO: 346 K/UL — SIGNIFICANT CHANGE UP (ref 150–400)
POTASSIUM SERPL-MCNC: 4.4 MMOL/L — SIGNIFICANT CHANGE UP (ref 3.5–5.3)
POTASSIUM SERPL-SCNC: 4.4 MMOL/L — SIGNIFICANT CHANGE UP (ref 3.5–5.3)
PROTHROM AB SERPL-ACNC: 11.5 SEC — SIGNIFICANT CHANGE UP (ref 9.9–13.4)
RBC # BLD: 5.03 M/UL — SIGNIFICANT CHANGE UP (ref 4.2–5.8)
RBC # FLD: 12.9 % — SIGNIFICANT CHANGE UP (ref 10.3–14.5)
RH IG SCN BLD-IMP: POSITIVE — SIGNIFICANT CHANGE UP
SODIUM SERPL-SCNC: 136 MMOL/L — SIGNIFICANT CHANGE UP (ref 135–145)
WBC # BLD: 3.26 K/UL — LOW (ref 3.8–10.5)
WBC # FLD AUTO: 3.26 K/UL — LOW (ref 3.8–10.5)

## 2024-12-14 PROCEDURE — 99223 1ST HOSP IP/OBS HIGH 75: CPT

## 2024-12-14 RX ORDER — ENOXAPARIN SODIUM 60 MG/.6ML
40 INJECTION INTRAVENOUS; SUBCUTANEOUS
Refills: 0 | Status: COMPLETED | OUTPATIENT
Start: 2024-12-14 | End: 2024-12-15

## 2024-12-14 RX ORDER — MAGNESIUM SULFATE 500 MG/ML
1 INJECTION, SOLUTION INTRAMUSCULAR; INTRAVENOUS ONCE
Refills: 0 | Status: COMPLETED | OUTPATIENT
Start: 2024-12-14 | End: 2024-12-14

## 2024-12-14 RX ORDER — CHLORHEXIDINE GLUCONATE 1.2 MG/ML
1 RINSE ORAL
Refills: 0 | Status: COMPLETED | OUTPATIENT
Start: 2024-12-14 | End: 2024-12-17

## 2024-12-14 RX ORDER — FAMOTIDINE 20 MG/1
20 TABLET, FILM COATED ORAL EVERY 12 HOURS
Refills: 0 | Status: DISCONTINUED | OUTPATIENT
Start: 2024-12-14 | End: 2024-12-19

## 2024-12-14 RX ORDER — SENNOSIDES 8.6 MG/1
2 TABLET, FILM COATED ORAL AT BEDTIME
Refills: 0 | Status: DISCONTINUED | OUTPATIENT
Start: 2024-12-14 | End: 2024-12-18

## 2024-12-14 RX ORDER — B COMPLEX, C NO.20/FOLIC ACID 1 MG
1 CAPSULE ORAL DAILY
Refills: 0 | Status: DISCONTINUED | OUTPATIENT
Start: 2024-12-14 | End: 2024-12-19

## 2024-12-14 RX ADMIN — Medication 4 MILLIGRAM(S): at 23:04

## 2024-12-14 RX ADMIN — CHLORHEXIDINE GLUCONATE 1 APPLICATION(S): 1.2 RINSE ORAL at 06:45

## 2024-12-14 RX ADMIN — FAMOTIDINE 20 MILLIGRAM(S): 20 TABLET, FILM COATED ORAL at 06:40

## 2024-12-14 RX ADMIN — LEVETIRACETAM 1000 MILLIGRAM(S): 100 SOLUTION ORAL at 06:40

## 2024-12-14 RX ADMIN — MAGNESIUM SULFATE 100 GRAM(S): 500 INJECTION, SOLUTION INTRAMUSCULAR; INTRAVENOUS at 03:48

## 2024-12-14 RX ADMIN — Medication 4 MILLIGRAM(S): at 06:40

## 2024-12-14 RX ADMIN — Medication 4 MILLIGRAM(S): at 17:06

## 2024-12-14 RX ADMIN — Medication 10 MILLIGRAM(S): at 06:40

## 2024-12-14 RX ADMIN — Medication 1 TABLET(S): at 11:52

## 2024-12-14 RX ADMIN — Medication 4 MILLIGRAM(S): at 11:52

## 2024-12-14 RX ADMIN — ENOXAPARIN SODIUM 40 MILLIGRAM(S): 60 INJECTION INTRAVENOUS; SUBCUTANEOUS at 23:04

## 2024-12-14 RX ADMIN — METFORMIN 500 MILLIGRAM(S): 850 TABLET ORAL at 06:40

## 2024-12-14 RX ADMIN — LEVETIRACETAM 1000 MILLIGRAM(S): 100 SOLUTION ORAL at 17:05

## 2024-12-14 RX ADMIN — Medication 4 MILLIGRAM(S): at 00:26

## 2024-12-14 RX ADMIN — FAMOTIDINE 20 MILLIGRAM(S): 20 TABLET, FILM COATED ORAL at 17:06

## 2024-12-14 NOTE — H&P ADULT - IN ACCORDANCE WITH [place] STATE LAW, WE OFFER EVERY PATIENT A HEPATITIS C TEST. WOULD YOU LIKE TO BE TESTED TODAY?
Please come in about  one week prior to your appointment for fasting lab work. Fasting means nothing to eat or drink 12 hours prior to coming in. May have sips of water. Lab opens at 7:45am Monday thru Friday and 8:00am on Saturday's in the  [facility] location. You do not need an appointment.        Call [phone] to schedule mammogram after 4/27/19 Opt out

## 2024-12-14 NOTE — CONSULT NOTE ADULT - TIME BILLING
Time inclusive of chart review, medication ordering, discussion with primary team, clinical documentation, and communication with family/caregiver

## 2024-12-14 NOTE — H&P ADULT - NSHPLABSRESULTS_GEN_ALL_CORE
.  LABS:                         14.8   4.31  )-----------( 350      ( 13 Dec 2024 20:42 )             46.0     12-13    138  |  102  |  14  ----------------------------<  70  4.5   |  24  |  0.79    Ca    8.7      13 Dec 2024 20:42  Mg     1.9     12-13    TPro  7.2  /  Alb  4.3  /  TBili  0.3  /  DBili  0.1  /  AST  19  /  ALT  11  /  AlkPhos  92  12-13      Urinalysis Basic - ( 13 Dec 2024 20:42 )    Color: x / Appearance: x / SG: x / pH: x  Gluc: 70 mg/dL / Ketone: x  / Bili: x / Urobili: x   Blood: x / Protein: x / Nitrite: x   Leuk Esterase: x / RBC: x / WBC x   Sq Epi: x / Non Sq Epi: x / Bacteria: x            RADIOLOGY, EKG & ADDITIONAL TESTS: Reviewed.  < from: CT Head No Cont (12.13.24 @ 21:15) >    Since 9/1/2024 MRI brain, there is new/increased hypoattenuation   involving the left MCA territory. Findings are consistent with increased   vasogenic edema which could be related to recurrent neoplasm versus   posttreatment changes. Recommend follow-up and correlation with brain MRI   including postcontrast images. 4 mm midline shift to the right. No acute   intracranial hemorrhage.    Status post left high parietal craniotomy with encephalomalacic changes   within the superior left frontal lobe.    < end of copied text >

## 2024-12-14 NOTE — H&P ADULT - NSHPPHYSICALEXAM_GEN_ALL_CORE
General: patient seen laying supine in bed in NAD  Neuro: AAOx3 with choices for month and year, +expressive aphasia, +R facial droop, follows commands, opens eyes spontaneously,  strength 5/5 LUE and LLE, RUE deltoid 2/5 biceps/hand  4/5, RLE HF/KF/KE 4+/5 DF/PF 0/5, R shoulder stiffness, sensation intact to light touch throughout  HEENT: PERRL, EOMI  Neck: supple  Cardiac: RRR, S1S2  Pulmonary: chest rise symmetric  Abdomen: soft, nontender, nondistended  Ext: perfusing well  Skin: warm, dry

## 2024-12-14 NOTE — H&P ADULT - HISTORY OF PRESENT ILLNESS
68 yo male with h/o HTN, OA, right  TKA, s/p L craniotomy for glioblastoma resection 04/25/2023, s/p chemo and radiation therapy 2023 with Dr. Causey, currently on physical therapy and brain MRI every 2 month post craniotomy, presents to ER due to reoccurrence of brain tumor seen on his latest MRI completed 4 days ago in [place]. Patient c/o worsened R facial droop, worsening word finding difficulties, and right sided weakness x 2 weeks. Pt was recommended to go to [facility] ER for further evaluation by his oncologist. Patient denies vision changes, extremity numbness, confusion, headache, nausea and vomiting.

## 2024-12-14 NOTE — H&P ADULT - ASSESSMENT
68 yo male with h/o HTN, OA, right  TKA, s/p L craniotomy for glioblastoma resection 04/25/2023, s/p chemo and radiation therapy 2023 with Dr. Causey, currently on physical therapy and brain MRI every 2 month post craniotomy, presents to ER due to reoccurrence of brain tumor seen on his latest MRI completed 4 days ago in [place]. Patient c/o worsened R facial droop, worsening word finding difficulties, and right sided weakness x 2 weeks. Admitted for to [facility] for further workup and treatment.    Plan:  Neuro:  - neuro/vital checks q8  - pain control: tylenol prn  - pending MRI brain w/ w/o with [facility]  - [facility] 12/13: vasogenic edema in left hemisphere  - cerebral edema: decadron 4q6  - GBM: continue metformin 500mg q12  - plan for OR 12/17    Cardiac:  - SBP goal   - HTN: cont home amlodipine 10mg daily    Pulmonary:  - on RA    GI:  - regular diet  - bowel regimen prn  - pepcid BID while on steroids    Renal:  - IVL    Heme:  - SCDs for DVT prophylaxis  - pending heme/onc consult    Endo:  - no issues    ID:  - afebrile    Disposition: tele status, full code, dispo pending    D/w Dr. Cheung

## 2024-12-14 NOTE — H&P ADULT - NSICDXPASTSURGICALHX_GEN_ALL_CORE_FT
"Angie Garcia is a 20 year old female who is being evaluated via a billable telephone visit.      The patient has been notified of following:     \"This telephone visit will be conducted via a call between you and your physician/provider. We have found that certain health care needs can be provided without the need for a physical exam.  This service lets us provide the care you need with a short phone conversation.  If a prescription is necessary we can send it directly to your pharmacy.  If lab work is needed we can place an order for that and you can then stop by our lab to have the test done at a later time.    Telephone visits are billed at different rates depending on your insurance coverage. During this emergency period, for some insurers they may be billed the same as an in-person visit.  Please reach out to your insurance provider with any questions.    If during the course of the call the physician/provider feels a telephone visit is not appropriate, you will not be charged for this service.\"    Patient has given verbal consent for Telephone visit?  Yes    What phone number would you like to be contacted at? [phone]    How would you like to obtain your AVS? Pagehart    Subjective     Angie Garcia is a 20 year old female who presents via phone visit today for the following health issues:    HPI     Depression and Anxiety Follow-Up      How are you doing with your depression since your last visit? Slowly improving    How are you doing with your anxiety since your last visit?  Slowly improving    Are you having other symptoms that might be associated with depression or anxiety? No    Have you had a significant life event? No     Do you have any concerns with your use of alcohol or other drugs? No    Social History     Tobacco Use     Smoking status: Never Smoker     Smokeless tobacco: Never Used     Tobacco comment: smoke free household.   Substance Use Topics     Alcohol use: No     Drug use: No "     PHQ 8/16/2020 10/8/2020 11/19/2020   PHQ-9 Total Score 20 16 15   Q9: Thoughts of better off dead/self-harm past 2 weeks Several days Not at all Not at all   F/U: Thoughts of suicide or self-harm Yes - -   F/U: Self harm-plan Yes - -   F/U: Self-harm action No - -   F/U: Safety concerns No - -     SONAM-7 SCORE 8/16/2020 10/8/2020 11/19/2020   Total Score 21 (severe anxiety) - -   Total Score 21 15 9     Last PHQ-9 1/8/2021   1.  Little interest or pleasure in doing things 0   2.  Feeling down, depressed, or hopeless 0   3.  Trouble falling or staying asleep, or sleeping too much 3   4.  Feeling tired or having little energy 2   5.  Poor appetite or overeating 2   6.  Feeling bad about yourself 3   7.  Trouble concentrating 3   8.  Moving slowly or restless 0   Q9: Thoughts of better off dead/self-harm past 2 weeks 0   PHQ-9 Total Score 13   Difficulty at work, home, or with people Somewhat difficult   In the past two weeks have you had thoughts of suicide or self harm? -   Do you have concerns about your personal safety or the safety of others? -   In the past 2 weeks have you thought about a plan or had intention to harm yourself? -   In the past 2 weeks have you acted on these thoughts in any way? -     SONAM-7  1/8/2021   1. Feeling nervous, anxious, or on edge 2   2. Not being able to stop or control worrying 2   3. Worrying too much about different things 2   4. Trouble relaxing 2   5. Being so restless that it is hard to sit still 1   6. Becoming easily annoyed or irritable 1   7. Feeling afraid, as if something awful might happen 1   SONAM-7 Total Score 11   If you checked any problems, how difficult have they made it for you to do your work, take care of things at home, or get along with other people? Somewhat difficult       Suicide Assessment Five-step Evaluation and Treatment (SAFE-T)      How many servings of fruits and vegetables do you eat daily?  0-1    On average, how many sweetened beverages do you  "drink each day (Examples: soda, juice, sweet tea, etc.  Do NOT count diet or artificially sweetened beverages)?   0    How many days per week do you exercise enough to make your heart beat faster? 3 or less    How many minutes a day do you exercise enough to make your heart beat faster? 30 - 60    How many days per week do you miss taking your medication? 0    Does feel the depression and anxiety are improving.  Therapy doesn't seem to be helping.  Best friend moved in with her family which really helps    Review of Systems   Constitutional, HEENT, cardiovascular, pulmonary, gi and gu systems are negative, except as otherwise noted.       Objective          Vitals:  No vitals were obtained today due to virtual visit.    healthy, alert and no distress  PSYCH: Alert and oriented times 3; coherent speech, normal   rate and volume, able to articulate logical thoughts, able   to abstract reason, no tangential thoughts, no hallucinations   or delusions  Her affect is normal  RESP: No cough, no audible wheezing, able to talk in full sentences  Remainder of exam unable to be completed due to telephone visits            Assessment/Plan:    Assessment & Plan     Major depressive disorder, single episode, moderate (H)  Improving on Sertraline. Encouraged continued self-care habits. Patient plans to defer therapy for now as not helping much. Recommend follow up in 3 months.  - sertraline (ZOLOFT) 100 MG tablet; Take 1 tablet (100 mg) by mouth daily    Anxiety  As above  - sertraline (ZOLOFT) 100 MG tablet; Take 1 tablet (100 mg) by mouth daily     BMI:   Estimated body mass index is 28.93 kg/m  as calculated from the following:    Height as of 9/10/20: 1.555 m (5' 1.24\").    Weight as of 9/10/20: 70 kg (154 lb 5.2 oz).            See Patient Instructions    Return in about 3 months (around 4/8/2021) for Depression, Anxiety.    NNAMDI Combs [facility]    Phone call duration:  6 " minutes                 PAST SURGICAL HISTORY:  H/O craniotomy     S/P total knee replacement, right

## 2024-12-15 PROCEDURE — 99233 SBSQ HOSP IP/OBS HIGH 50: CPT

## 2024-12-15 PROCEDURE — 99232 SBSQ HOSP IP/OBS MODERATE 35: CPT

## 2024-12-15 RX ADMIN — FAMOTIDINE 20 MILLIGRAM(S): 20 TABLET, FILM COATED ORAL at 05:45

## 2024-12-15 RX ADMIN — LEVETIRACETAM 1000 MILLIGRAM(S): 100 SOLUTION ORAL at 17:07

## 2024-12-15 RX ADMIN — Medication 10 MILLIGRAM(S): at 05:45

## 2024-12-15 RX ADMIN — Medication 4 MILLIGRAM(S): at 17:07

## 2024-12-15 RX ADMIN — Medication 4 MILLIGRAM(S): at 05:45

## 2024-12-15 RX ADMIN — FAMOTIDINE 20 MILLIGRAM(S): 20 TABLET, FILM COATED ORAL at 17:07

## 2024-12-15 RX ADMIN — CHLORHEXIDINE GLUCONATE 1 APPLICATION(S): 1.2 RINSE ORAL at 05:51

## 2024-12-15 RX ADMIN — Medication 1 TABLET(S): at 12:19

## 2024-12-15 RX ADMIN — LEVETIRACETAM 1000 MILLIGRAM(S): 100 SOLUTION ORAL at 05:46

## 2024-12-15 RX ADMIN — ENOXAPARIN SODIUM 40 MILLIGRAM(S): 60 INJECTION INTRAVENOUS; SUBCUTANEOUS at 22:01

## 2024-12-15 RX ADMIN — Medication 4 MILLIGRAM(S): at 22:00

## 2024-12-15 RX ADMIN — Medication 4 MILLIGRAM(S): at 12:19

## 2024-12-15 NOTE — PROGRESS NOTE ADULT - SUBJECTIVE AND OBJECTIVE BOX
HPI:  70 yo male with h/o HTN, OA, right  TKA, s/p L craniotomy for glioblastoma resection 04/25/2023, s/p chemo and radiation therapy 2023 with Dr. Causey, currently on physical therapy and brain MRI every 2 month post craniotomy, presents to ER due to reoccurrence of brain tumor seen on his latest MRI completed 4 days ago in [place]. Patient c/o worsened R facial droop, worsening word finding difficulties, and right sided weakness x 2 weeks. Pt was recommended to go to [facility] ER for further evaluation by his oncologist. Patient denies vision changes, extremity numbness, confusion, headache, nausea and vomiting.  (14 Dec 2024 02:25)      Subjective:  KYA overnight.     Hospital course:  12/13: admitted to [facility], decadron 4q6 started  12/14: KYA overnight  12/15: KYA overnight, pending MRI    Vital Signs Last 24 Hrs  T(C): 36.7 (15 Dec 2024 00:09), Max: 36.8 (14 Dec 2024 17:07)  T(F): 98 (15 Dec 2024 00:09), Max: 98.3 (14 Dec 2024 17:07)  HR: 65 (15 Dec 2024 00:09) (62 - 86)  BP: 120/68 (15 Dec 2024 00:09) (104/65 - 123/74)  BP(mean): --  RR: 16 (15 Dec 2024 00:09) (16 - 18)  SpO2: 98% (15 Dec 2024 00:09) (97% - 99%)    Parameters below as of 15 Dec 2024 00:09  Patient On (Oxygen Delivery Method): room air        I&O's Summary    13 Dec 2024 07:01  -  14 Dec 2024 07:00  --------------------------------------------------------  IN: 0 mL / OUT: 500 mL / NET: -500 mL        PHYSICAL EXAM:  General: patient seen laying supine in bed in NAD  Neuro: AAOx3, +mild R facial droop, follows commands, opens eyes spontaneously,  strength 5/5 LUE and LLE, RUE deltoid 2/5 (ROM limited)  biceps/hand  4/5, triceps 0/5, RLE HF/KF/KE 4+/5 DF/PF 0/5, R shoulder stiffness, sensation intact to light touch throughout  HEENT: PERRL, EOMI, dysconjugate gaze  Neck: supple  Cardiac: RRR, S1S2  Pulmonary: chest rise symmetric  Abdomen: soft, nontender, nondistended  Ext: perfusing well  Skin: warm, dry      LABS:                        15.1   3.26  )-----------( 346      ( 14 Dec 2024 05:30 )             46.0     12-14    136  |  104  |  13  ----------------------------<  113[H]  4.4   |  24  |  0.88    Ca    9.2      14 Dec 2024 05:30  Phos  3.0     12-14  Mg     2.0     12-14    TPro  7.2  /  Alb  4.3  /  TBili  0.3  /  DBili  0.1  /  AST  19  /  ALT  11  /  AlkPhos  92  12-13    PT/INR - ( 14 Dec 2024 05:30 )   PT: 11.5 sec;   INR: 1.00          PTT - ( 14 Dec 2024 05:30 )  PTT:38.6 sec  Urinalysis Basic - ( 14 Dec 2024 05:30 )    Color: x / Appearance: x / SG: x / pH: x  Gluc: 113 mg/dL / Ketone: x  / Bili: x / Urobili: x   Blood: x / Protein: x / Nitrite: x   Leuk Esterase: x / RBC: x / WBC x   Sq Epi: x / Non Sq Epi: x / Bacteria: x          CAPILLARY BLOOD GLUCOSE          Drug Levels: [] N/A    CSF Analysis: [] N/A      Allergies    No Known Allergies    Intolerances      MEDICATIONS:  Antibiotics:    Neuro:  acetaminophen     Tablet .. 650 milliGRAM(s) Oral every 6 hours PRN  levETIRAcetam 1000 milliGRAM(s) Oral every 12 hours    Anticoagulation:  enoxaparin Injectable 40 milliGRAM(s) SubCutaneous <User Schedule>    OTHER:  amLODIPine   Tablet 10 milliGRAM(s) Oral every 24 hours  chlorhexidine 2% Cloths 1 Application(s) Topical <User Schedule>  dexAMETHasone     Tablet 4 milliGRAM(s) Oral every 6 hours  famotidine    Tablet 20 milliGRAM(s) Oral every 12 hours  senna 2 Tablet(s) Oral at bedtime PRN    IVF:  multivitamin 1 Tablet(s) Oral daily    CULTURES:    RADIOLOGY & ADDITIONAL TESTS:    BRAIN TUMOR    Handoff    MEWS Score    Hypertension    Osteoarthritis    Brain mass    Glioblastoma    Brain tumor    Brain tumor, recurrent    S/P total knee replacement, right    H/O craniotomy    SURGERY COMP    90+    [email or web address]        ASSESSMENT:  70 yo male with h/o HTN, OA, right  TKA, s/p L craniotomy for glioblastoma resection 04/25/2023, s/p chemo and radiation therapy 2023 with Dr. Causey, currently on physical therapy and brain MRI every 2 month post craniotomy, presents to ER due to reoccurrence of brain tumor seen on his latest MRI completed 4 days ago in [place]. Patient c/o worsened R facial droop, worsening word finding difficulties, and right sided weakness x 2 weeks. Admitted for to [facility] for further workup and treatment.    Plan:  Neuro:  - neuro/vital checks q8  - pain control: tylenol prn  - pending MRI brain w/ w/o with [facility]  - [facility] 12/13: vasogenic edema in left hemisphere  - cerebral edema: decadron 4q6  - GBM: hold metformin 500mg q12  - plan for OR 12/17    Cardiac:  - SBP goal   - HTN: cont home amlodipine 10mg daily    Pulmonary:  - on RA    GI:  - regular diet  - bowel regimen prn  - pepcid BID while on steroids    Renal:  - IVL    Heme:  - SCDs, SQL for DVT prophylaxis  - pending heme/onc consult    Endo:  - no issues    ID:  - afebrile    Disposition: tele status, full code, dispo pending    D/w Dr. Cheung    Assessment:  Present when checked    []  GCS  E   V  M     Heart Failure: []Acute, [] acute on chronic , []chronic  Heart Failure:  [] Diastolic (HFpEF), [] Systolic (HFrEF), []Combined (HFpEF and HFrEF), [] RHF, [] Pulm HTN, [] Other    [] ROMERO, [] ATN, [] AIN, [] other  [] CKD1, [] CKD2, [] CKD 3, [] CKD 4, [] CKD 5, []ESRD    Encephalopathy: [] Metabolic, [] Hepatic, [] toxic, [] Neurological, [] Other    Abnormal Nurtitional Status: [] malnurtition (see nutrition note), [ ]underweight: BMI < 19, [] morbid obesity: BMI >40, [] Cachexia    [] Sepsis  [] hypovolemic shock,[] cardiogenic shock, [] hemorrhagic shock, [] neuogenic shock  [] Acute Respiratory Failure  []Cerebral edema, [] Brain compression/ herniation,   [] Functional quadriplegia  [] Acute blood loss anemia

## 2024-12-15 NOTE — PROGRESS NOTE ADULT - SUBJECTIVE AND OBJECTIVE BOX
Patient is a 69y old  Male who presents with a chief complaint of GBM (15 Dec 2024 02:29)      SUBJECTIVE / OVERNIGHT EVENTS:  Awaiting MRI for better evaluation.    MEDICATIONS  (STANDING):  amLODIPine   Tablet 10 milliGRAM(s) Oral every 24 hours  chlorhexidine 2% Cloths 1 Application(s) Topical <User Schedule>  dexAMETHasone     Tablet 4 milliGRAM(s) Oral every 6 hours  enoxaparin Injectable 40 milliGRAM(s) SubCutaneous <User Schedule>  famotidine    Tablet 20 milliGRAM(s) Oral every 12 hours  levETIRAcetam 1000 milliGRAM(s) Oral every 12 hours  multivitamin 1 Tablet(s) Oral daily    MEDICATIONS  (PRN):  acetaminophen     Tablet .. 650 milliGRAM(s) Oral every 6 hours PRN Mild Pain (1 - 3)  senna 2 Tablet(s) Oral at bedtime PRN Constipation      CAPILLARY BLOOD GLUCOSE        I&O's Summary    14 Dec 2024 07:01  -  15 Dec 2024 07:00  --------------------------------------------------------  IN: 0 mL / OUT: 400 mL / NET: -400 mL    15 Dec 2024 07:01  -  15 Dec 2024 11:22  --------------------------------------------------------  IN: 180 mL / OUT: 400 mL / NET: -220 mL        PHYSICAL EXAM:  Vital Signs Last 24 Hrs  T(C): 36.4 (15 Dec 2024 08:50), Max: 36.8 (14 Dec 2024 17:07)  T(F): 97.5 (15 Dec 2024 08:50), Max: 98.3 (14 Dec 2024 17:07)  HR: 69 (15 Dec 2024 08:50) (52 - 86)  BP: 131/82 (15 Dec 2024 08:50) (104/65 - 131/82)  BP(mean): --  RR: 18 (15 Dec 2024 08:50) (16 - 18)  SpO2: 98% (15 Dec 2024 08:50) (97% - 99%)    Parameters below as of 15 Dec 2024 08:50  Patient On (Oxygen Delivery Method): room air      GENERAL: NAD, well-developed  HEAD:  Atraumatic, Normocephalic  EYES: EOMI, PERRLA, conjunctiva and sclera clear  NECK: Supple, No JVD  CHEST/LUNG: Clear to auscultation bilaterally; No wheeze  HEART: Regular rate and rhythm; No murmurs, rubs, or gallops  ABDOMEN: Soft, Nontender, Nondistended; Bowel sounds present  EXTREMITIES:  2+ Peripheral Pulses, No clubbing, cyanosis, or edema  PSYCH: AAOx3  SKIN: No rashes or lesions    LABS:                        15.1   3.26  )-----------( 346      ( 14 Dec 2024 05:30 )             46.0     12-14    136  |  104  |  13  ----------------------------<  113[H]  4.4   |  24  |  0.88    Ca    9.2      14 Dec 2024 05:30  Phos  3.0     12-14  Mg     2.0     12-14    TPro  7.2  /  Alb  4.3  /  TBili  0.3  /  DBili  0.1  /  AST  19  /  ALT  11  /  AlkPhos  92  12-13    PT/INR - ( 14 Dec 2024 05:30 )   PT: 11.5 sec;   INR: 1.00          PTT - ( 14 Dec 2024 05:30 )  PTT:38.6 sec      Urinalysis Basic - ( 14 Dec 2024 05:30 )    Color: x / Appearance: x / SG: x / pH: x  Gluc: 113 mg/dL / Ketone: x  / Bili: x / Urobili: x   Blood: x / Protein: x / Nitrite: x   Leuk Esterase: x / RBC: x / WBC x   Sq Epi: x / Non Sq Epi: x / Bacteria: x        RADIOLOGY & ADDITIONAL TESTS:    Imaging Personally Reviewed:    Consultant(s) Notes Reviewed:      Care Discussed with Consultants/Other Providers:

## 2024-12-15 NOTE — PROGRESS NOTE ADULT - ASSESSMENT
69 M with HTN, OA, right  TKA, s/p L craniotomy for glioblastoma resection 04/25/2023, s/p chemo and radiation therapy 2023 with Dr. Causey, currently on physical therapy and brain MRI every 2 month post craniotomy, presents to ER due to reoccurrence of brain tumor seen on his latest MRI completed 4 days ago in [place].     1) GBM:  - Admitted to NSX.  - Pending MRI brain with [employer].  - Remains on Decadron 4mg PO Q6 hours.  - On Pepcid for GI protection.  - Keppra.    2) HTN:   - Controlled at the moment.  - Continue with Norvasc 10mg PO daily.  3) DVT proph:  - Lovenox. 69 M with HTN, OA, right  TKA, s/p L craniotomy for glioblastoma resection 04/25/2023, s/p chemo and radiation therapy 2023 with Dr. Causey, currently on physical therapy and brain MRI every 2 month post craniotomy, presents to ER due to reoccurrence of brain tumor seen on his latest MRI completed 4 days ago in [place].     1) GBM:  - Admitted to NSX.  - Pending MRI brain with [place].  - Remains on Decadron 4mg PO Q6 hours.  - On Pepcid for GI protection.  - Keppra.  # Pre-operative Risk Evaluation and Stratification   Patient medically optimized for procedure w acknowledgement of below risk factors  - RCRI : 0 risk factors, 3.9% risk  - METS : greater than or equal to 4 METS at baseline  - EKG : PLEASE OBTAIN EKG today  - STOP BANG: 3: Intermediate Risk  - Periop Anticoagulation: no known AC use     2) HTN:   - Controlled at the moment.  - Continue with Norvasc 10mg PO daily.  3) DVT proph:  - Lovenox.

## 2024-12-16 DIAGNOSIS — C71.9 MALIGNANT NEOPLASM OF BRAIN, UNSPECIFIED: ICD-10-CM

## 2024-12-16 DIAGNOSIS — Z01.818 ENCOUNTER FOR OTHER PREPROCEDURAL EXAMINATION: ICD-10-CM

## 2024-12-16 DIAGNOSIS — I10 ESSENTIAL (PRIMARY) HYPERTENSION: ICD-10-CM

## 2024-12-16 LAB
ALBUMIN SERPL ELPH-MCNC: 3.7 G/DL — SIGNIFICANT CHANGE UP (ref 3.3–5)
ALP SERPL-CCNC: 88 U/L — SIGNIFICANT CHANGE UP (ref 40–120)
ALT FLD-CCNC: 19 U/L — SIGNIFICANT CHANGE UP (ref 10–45)
ANION GAP SERPL CALC-SCNC: 10 MMOL/L — SIGNIFICANT CHANGE UP (ref 5–17)
APTT BLD: 35.4 SEC — SIGNIFICANT CHANGE UP (ref 24.5–35.6)
AST SERPL-CCNC: 16 U/L — SIGNIFICANT CHANGE UP (ref 10–40)
BASOPHILS # BLD AUTO: 0 K/UL — SIGNIFICANT CHANGE UP (ref 0–0.2)
BASOPHILS NFR BLD AUTO: 0 % — SIGNIFICANT CHANGE UP (ref 0–2)
BILIRUB SERPL-MCNC: 0.2 MG/DL — SIGNIFICANT CHANGE UP (ref 0.2–1.2)
BLD GP AB SCN SERPL QL: NEGATIVE — SIGNIFICANT CHANGE UP
BUN SERPL-MCNC: 23 MG/DL — SIGNIFICANT CHANGE UP (ref 7–23)
CALCIUM SERPL-MCNC: 8.8 MG/DL — SIGNIFICANT CHANGE UP (ref 8.4–10.5)
CHLORIDE SERPL-SCNC: 107 MMOL/L — SIGNIFICANT CHANGE UP (ref 96–108)
CO2 SERPL-SCNC: 23 MMOL/L — SIGNIFICANT CHANGE UP (ref 22–31)
CREAT SERPL-MCNC: 0.92 MG/DL — SIGNIFICANT CHANGE UP (ref 0.5–1.3)
EGFR: 91 ML/MIN/1.73M2 — SIGNIFICANT CHANGE UP
EOSINOPHIL # BLD AUTO: 0 K/UL — SIGNIFICANT CHANGE UP (ref 0–0.5)
EOSINOPHIL NFR BLD AUTO: 0 % — SIGNIFICANT CHANGE UP (ref 0–6)
GLUCOSE SERPL-MCNC: 123 MG/DL — HIGH (ref 70–99)
HCT VFR BLD CALC: 42.1 % — SIGNIFICANT CHANGE UP (ref 39–50)
HGB BLD-MCNC: 13.9 G/DL — SIGNIFICANT CHANGE UP (ref 13–17)
IMM GRANULOCYTES NFR BLD AUTO: 0.3 % — SIGNIFICANT CHANGE UP (ref 0–0.9)
INR BLD: 0.96 — SIGNIFICANT CHANGE UP (ref 0.85–1.16)
LYMPHOCYTES # BLD AUTO: 0.98 K/UL — LOW (ref 1–3.3)
LYMPHOCYTES # BLD AUTO: 13.5 % — SIGNIFICANT CHANGE UP (ref 13–44)
MAGNESIUM SERPL-MCNC: 2.1 MG/DL — SIGNIFICANT CHANGE UP (ref 1.6–2.6)
MCHC RBC-ENTMCNC: 29.8 PG — SIGNIFICANT CHANGE UP (ref 27–34)
MCHC RBC-ENTMCNC: 33 G/DL — SIGNIFICANT CHANGE UP (ref 32–36)
MCV RBC AUTO: 90.3 FL — SIGNIFICANT CHANGE UP (ref 80–100)
MONOCYTES # BLD AUTO: 0.27 K/UL — SIGNIFICANT CHANGE UP (ref 0–0.9)
MONOCYTES NFR BLD AUTO: 3.7 % — SIGNIFICANT CHANGE UP (ref 2–14)
NEUTROPHILS # BLD AUTO: 5.97 K/UL — SIGNIFICANT CHANGE UP (ref 1.8–7.4)
NEUTROPHILS NFR BLD AUTO: 82.5 % — HIGH (ref 43–77)
NRBC # BLD: 0 /100 WBCS — SIGNIFICANT CHANGE UP (ref 0–0)
PHOSPHATE SERPL-MCNC: 2.9 MG/DL — SIGNIFICANT CHANGE UP (ref 2.5–4.5)
PLATELET # BLD AUTO: 331 K/UL — SIGNIFICANT CHANGE UP (ref 150–400)
POTASSIUM SERPL-MCNC: 4.4 MMOL/L — SIGNIFICANT CHANGE UP (ref 3.5–5.3)
POTASSIUM SERPL-SCNC: 4.4 MMOL/L — SIGNIFICANT CHANGE UP (ref 3.5–5.3)
PROT SERPL-MCNC: 6.5 G/DL — SIGNIFICANT CHANGE UP (ref 6–8.3)
PROTHROM AB SERPL-ACNC: 11.1 SEC — SIGNIFICANT CHANGE UP (ref 9.9–13.4)
RBC # BLD: 4.66 M/UL — SIGNIFICANT CHANGE UP (ref 4.2–5.8)
RBC # FLD: 12.8 % — SIGNIFICANT CHANGE UP (ref 10.3–14.5)
RH IG SCN BLD-IMP: POSITIVE — SIGNIFICANT CHANGE UP
SODIUM SERPL-SCNC: 140 MMOL/L — SIGNIFICANT CHANGE UP (ref 135–145)
WBC # BLD: 7.24 K/UL — SIGNIFICANT CHANGE UP (ref 3.8–10.5)
WBC # FLD AUTO: 7.24 K/UL — SIGNIFICANT CHANGE UP (ref 3.8–10.5)

## 2024-12-16 PROCEDURE — 99232 SBSQ HOSP IP/OBS MODERATE 35: CPT

## 2024-12-16 PROCEDURE — 70553 MRI BRAIN STEM W/O & W/DYE: CPT | Mod: 26

## 2024-12-16 RX ORDER — INFLUENZA A VIRUS A/WISCONSIN/588/2019 (H1N1) RECOMBINANT HEMAGGLUTININ ANTIGEN, INFLUENZA A VIRUS A/DARWIN/6/2021 (H3N2) RECOMBINANT HEMAGGLUTININ ANTIGEN, INFLUENZA B VIRUS B/AUSTRIA/1359417/2021 RECOMBINANT HEMAGGLUTININ ANTIGEN, AND INFLUENZA B VIRUS B/PHUKET/3073/2013 RECOMBINANT HEMAGGLUTININ ANTIGEN 45; 45; 45; 45 UG/.5ML; UG/.5ML; UG/.5ML; UG/.5ML
0.5 INJECTION INTRAMUSCULAR ONCE
Refills: 0 | Status: DISCONTINUED | OUTPATIENT
Start: 2024-12-16 | End: 2024-12-19

## 2024-12-16 RX ADMIN — Medication 4 MILLIGRAM(S): at 11:19

## 2024-12-16 RX ADMIN — LEVETIRACETAM 1000 MILLIGRAM(S): 100 SOLUTION ORAL at 18:59

## 2024-12-16 RX ADMIN — Medication 4 MILLIGRAM(S): at 18:59

## 2024-12-16 RX ADMIN — CHLORHEXIDINE GLUCONATE 1 APPLICATION(S): 1.2 RINSE ORAL at 07:24

## 2024-12-16 RX ADMIN — FAMOTIDINE 20 MILLIGRAM(S): 20 TABLET, FILM COATED ORAL at 18:59

## 2024-12-16 RX ADMIN — Medication 10 MILLIGRAM(S): at 05:10

## 2024-12-16 RX ADMIN — Medication 4 MILLIGRAM(S): at 05:02

## 2024-12-16 RX ADMIN — Medication 1 TABLET(S): at 11:19

## 2024-12-16 RX ADMIN — LEVETIRACETAM 1000 MILLIGRAM(S): 100 SOLUTION ORAL at 05:02

## 2024-12-16 RX ADMIN — FAMOTIDINE 20 MILLIGRAM(S): 20 TABLET, FILM COATED ORAL at 05:02

## 2024-12-16 NOTE — PHYSICAL THERAPY INITIAL EVALUATION ADULT - PERTINENT HX OF CURRENT PROBLEM, REHAB EVAL
68 yo male with h/o HTN, OA, right  TKA, s/p L craniotomy for glioblastoma resection 04/25/2023, s/p chemo and radiation therapy 2023 with Dr. Causey, currently on physical therapy and brain MRI every 2 month post craniotomy, presents to ER due to reoccurrence of brain tumor seen on his latest MRI completed 4 days ago in [place]. Patient c/o worsened R facial droop, worsening word finding difficulties, and right sided weakness x 2 weeks. Pt was recommended to go to [facility] ER for further evaluation by his oncologist. Patient denies vision changes, extremity numbness, confusion, headache, nausea and vomiting.

## 2024-12-16 NOTE — OCCUPATIONAL THERAPY INITIAL EVALUATION ADULT - PERTINENT HX OF CURRENT PROBLEM, REHAB EVAL
69 M with HTN, OA, right  TKA, s/p L craniotomy for glioblastoma resection 04/25/2023, s/p chemo and radiation therapy 2023 with Dr. Causey, currently on physical therapy and brain MRI every 2 month post craniotomy, presents to ER due to reoccurrence of brain tumor seen on his latest MRI completed 4 days ago in [place].

## 2024-12-16 NOTE — OCCUPATIONAL THERAPY INITIAL EVALUATION ADULT - ADDITIONAL COMMENTS
Pt lives in an apt with his wife +3STE +ramp. Pt was indep PTA with ADLs and functional mobility +quad cane. Pt has a walk in shower and grab bars. Pt lives in an apt with his wife +3STE +ramp. Pt was indep PTA with ADLs and functional mobility +quad cane. Pt has a walk in shower and grab bars. Pt is R handed and wears glasses.

## 2024-12-16 NOTE — PHYSICAL THERAPY INITIAL EVALUATION ADULT - IMPAIRMENTS CONTRIBUTING TO GAIT DEVIATIONS, PT EVAL
impaired balance/impaired motor control/decreased ROM/decreased strength impaired balance/impaired motor control/impaired postural control/decreased ROM/decreased sensation/decreased strength

## 2024-12-16 NOTE — OCCUPATIONAL THERAPY INITIAL EVALUATION ADULT - MODIFIED CLINICAL TEST OF SENSORY INTEGRATION IN BALANCE TEST
Pt ambulated ~50ftx2 with [place] +quad cane, pt with swing through gait throughout, pt required modA during turns.

## 2024-12-16 NOTE — OCCUPATIONAL THERAPY INITIAL EVALUATION ADULT - MODALITIES TREATMENT COMMENTS
Cranial Nerves II - XII: II: PERRLA; visual fields are full to confrontation III, IV, VI: EOMI, no nystagmus appreciated V: pt c/o of decreased sensation on R side of face/arm/leg V1-V3 b/l VII: no ptosis, R facial droop at rest, symmetric eyebrow raise and closure VIII: hearing intact to finger rub b/l  XI: head turning, shoulder shrug decreased on R side b/l XII: tongue protrusion midline. Vision Tracking (H Test): smooth b/l pursuits. Vision Quadrant Test: WFL B/L,

## 2024-12-16 NOTE — PHYSICAL THERAPY INITIAL EVALUATION ADULT - MODALITIES TREATMENT COMMENTS
II: Visual fields are full to confrontation; III, IV, VI: Extraocular movements are intact without nystagmus;  VII: +R facial droop; XI: R trap 2/5, L trap 5/5 MMT;  XII: Tongue protrudes to R

## 2024-12-16 NOTE — PROGRESS NOTE ADULT - ASSESSMENT
69 M with HTN, OA, right  TKA, s/p L craniotomy for glioblastoma resection 04/25/2023, s/p chemo and radiation therapy 2023 with Dr. Causey, currently on physical therapy and brain MRI every 2 month post craniotomy, presents to ER due to reoccurrence of brain tumor seen on his latest MRI completed 4 days ago in [place].     #GBM:  - Admitted to NSX.  - Pending MRI brain with [facility].  - Remains on Decadron 4mg PO Q6 hours.  - On Pepcid for GI protection.  - Keppra.    # Pre-operative Risk Evaluation and Stratification   Patient medically optimized for procedure w acknowledgement of below risk factors  - RCRI : 0 risk factors, 3.9% risk  - METS : greater than or equal to 4 METS at baseline  - EKG : NSR, no acute ST-T changes  - STOP BANG: 3: Intermediate Risk  - Periop Anticoagulation: no known AC use     #HTN:   - Controlled at the moment.  - Continue with Norvasc 10mg PO daily    #DVT proph: Lovenox.

## 2024-12-16 NOTE — PHYSICAL THERAPY INITIAL EVALUATION ADULT - ADDITIONAL COMMENTS
was living in [place]. Patient and patient's wife rented apartment in [place], was ambulating independently and performing stairs independently and going to outpatient PT via car often. In past two weeks, patient dragging RLE, and started using quad cane. Reports 1 fall outside opening gate in past year with INOCENTE was living in [place]. Patient and patient's wife rented apartment in [place], was ambulating independently and performing stairs independently and going to outpatient PT via car often. In past two weeks, patient dragging RLE, and started using quad cane. Reports 1 fall outside opening gate in past year with LUE. Prior to [place], patient living in private home, 3 steps to enter, 1 flight inside with chairlift, has shower chair and grab bars in bathroom was living in [place]. Patient and patient's wife rented apartment in [place], was ambulating independently and performing stairs independently and going to outpatient PT via car often. In past two weeks, patient dragging RLE, and started using quad cane. Reports 1 fall outside opening gate in past year with LUE. Prior to [place], patient living in private home, 3 steps to enter however enters through the back that has a ramp, 1 flight inside with chairlift, has shower chair and grab bars in bathroom was living in [place]. Patient and patient's wife rented apartment in [place], was ambulating independently and performing stairs independently and going to outpatient PT via car often. In past two weeks, patient dragging RLE, and started using quad cane. Reports 1 fall outside opening gate in past year with LUE. Prior to [place], patient living in private home, 3 steps to enter however enters through the back that has a ramp, 1 flight inside with chairlift, has shower chair and grab bars in bathroom, has glasses for reading and is R handed

## 2024-12-16 NOTE — PROGRESS NOTE ADULT - SUBJECTIVE AND OBJECTIVE BOX
HPI:  68 yo male with h/o HTN, OA, right  TKA, s/p L craniotomy for glioblastoma resection 04/25/2023, s/p chemo and radiation therapy 2023 with Dr. Causey, currently on physical therapy and brain MRI every 2 month post craniotomy, presents to ER due to reoccurrence of brain tumor seen on his latest MRI completed 4 days ago in [place]. Patient c/o worsened R facial droop, worsening word finding difficulties, and right sided weakness x 2 weeks. Pt was recommended to go to [facility] ER for further evaluation by his oncologist. Patient denies vision changes, extremity numbness, confusion, headache, nausea and vomiting.  (14 Dec 2024 02:25)      Subjective:  Patient denies any acute complaints.     Hospital course:  12/13: admitted to [facility], decadron 4q6 started  12/14: KYA overnight  12/15: KYA overnight, pending MRI  12/16: KYA overnight    Vital Signs Last 24 Hrs  T(C): 36.4 (16 Dec 2024 00:25), Max: 36.7 (15 Dec 2024 05:40)  T(F): 97.5 (16 Dec 2024 00:25), Max: 98 (15 Dec 2024 05:40)  HR: 46 (16 Dec 2024 00:25) (46 - 80)  BP: 132/71 (16 Dec 2024 00:25) (111/70 - 132/71)  BP(mean): --  RR: 18 (16 Dec 2024 00:25) (16 - 18)  SpO2: 98% (16 Dec 2024 00:25) (95% - 100%)    Parameters below as of 16 Dec 2024 00:25  Patient On (Oxygen Delivery Method): room air        I&O's Summary    14 Dec 2024 07:01  -  15 Dec 2024 07:00  --------------------------------------------------------  IN: 0 mL / OUT: 400 mL / NET: -400 mL    15 Dec 2024 07:01  -  16 Dec 2024 01:11  --------------------------------------------------------  IN: 180 mL / OUT: 700 mL / NET: -520 mL        PHYSICAL EXAM:  General: patient seen laying supine in bed in NAD  Neuro: AAOx3, + R facial droop, follows commands, opens eyes spontaneously,  strength 5/5 LUE and LLE, RUE deltoid 2/5 (ROM limited)  biceps/hand  4/5, triceps 0/5, RLE HF/KF/KE 4+/5 DF/PF 0/5, +decreased sensation to light touch throughout RUE  HEENT: PERRL, EOMI, dysconjugate gaze  Neck: supple  Cardiac: RRR, S1S2  Pulmonary: chest rise symmetric  Abdomen: soft, nontender, nondistended  Ext: perfusing well  Skin: warm, dry      LABS:                        15.1   3.26  )-----------( 346      ( 14 Dec 2024 05:30 )             46.0     12-14    136  |  104  |  13  ----------------------------<  113[H]  4.4   |  24  |  0.88    Ca    9.2      14 Dec 2024 05:30  Phos  3.0     12-14  Mg     2.0     12-14      PT/INR - ( 14 Dec 2024 05:30 )   PT: 11.5 sec;   INR: 1.00          PTT - ( 14 Dec 2024 05:30 )  PTT:38.6 sec  Urinalysis Basic - ( 14 Dec 2024 05:30 )    Color: x / Appearance: x / SG: x / pH: x  Gluc: 113 mg/dL / Ketone: x  / Bili: x / Urobili: x   Blood: x / Protein: x / Nitrite: x   Leuk Esterase: x / RBC: x / WBC x   Sq Epi: x / Non Sq Epi: x / Bacteria: x          CAPILLARY BLOOD GLUCOSE          Drug Levels: [] N/A    CSF Analysis: [] N/A      Allergies    No Known Allergies    Intolerances      MEDICATIONS:  Antibiotics:    Neuro:  acetaminophen     Tablet .. 650 milliGRAM(s) Oral every 6 hours PRN  levETIRAcetam 1000 milliGRAM(s) Oral every 12 hours    Anticoagulation:    OTHER:  amLODIPine   Tablet 10 milliGRAM(s) Oral every 24 hours  chlorhexidine 2% Cloths 1 Application(s) Topical <User Schedule>  dexAMETHasone     Tablet 4 milliGRAM(s) Oral every 6 hours  famotidine    Tablet 20 milliGRAM(s) Oral every 12 hours  senna 2 Tablet(s) Oral at bedtime PRN    IVF:  multivitamin 1 Tablet(s) Oral daily    CULTURES:    RADIOLOGY & ADDITIONAL TESTS:    BRAIN TUMOR    Handoff    MEWS Score    Hypertension    Osteoarthritis    Brain mass    Glioblastoma    Brain tumor    Brain tumor, recurrent    S/P total knee replacement, right    H/O craniotomy    SURGERY COMP    90+    [email or web address]        ASSESSMENT:  68 yo male with h/o HTN, OA, right  TKA, s/p L craniotomy for glioblastoma resection 04/25/2023, s/p chemo and radiation therapy 2023 with Dr. Causey, currently on physical therapy and brain MRI every 2 month post craniotomy, presents to ER due to reoccurrence of brain tumor seen on his latest MRI completed 4 days ago in [place]. Patient c/o worsened R facial droop, worsening word finding difficulties, and right sided weakness x 2 weeks. Admitted for to [facility] for further workup and treatment.    Plan:  Neuro:  - neuro/vital checks q8  - pain control: tylenol prn  - pending MRI brain w/ w/o with [employer]  - [facility] 12/13: vasogenic edema in left hemisphere  - cerebral edema: decadron 4q6  - GBM: hold metformin 500mg q12  - plan for OR 12/17    Cardiac:  - SBP goal   - HTN: cont home amlodipine 10mg daily    Pulmonary:  - on RA    GI:  - regular diet  - bowel regimen prn  - pepcid BID while on steroids    Renal:  - IVL    Heme:  - SCDs, SQL for DVT prophylaxis  - pending heme/onc consult    Endo:  - no issues    ID:  - afebrile    Disposition: tele status, full code, dispo pending    D/w Dr. Cheung    Assessment:  Present when checked    []  GCS  E   V  M     Heart Failure: []Acute, [] acute on chronic , []chronic  Heart Failure:  [] Diastolic (HFpEF), [] Systolic (HFrEF), []Combined (HFpEF and HFrEF), [] RHF, [] Pulm HTN, [] Other    [] ROMERO, [] ATN, [] AIN, [] other  [] CKD1, [] CKD2, [] CKD 3, [] CKD 4, [] CKD 5, []ESRD    Encephalopathy: [] Metabolic, [] Hepatic, [] toxic, [] Neurological, [] Other    Abnormal Nurtitional Status: [] malnurtition (see nutrition note), [ ]underweight: BMI < 19, [] morbid obesity: BMI >40, [] Cachexia    [] Sepsis  [] hypovolemic shock,[] cardiogenic shock, [] hemorrhagic shock, [] neuogenic shock  [] Acute Respiratory Failure  []Cerebral edema, [] Brain compression/ herniation,   [] Functional quadriplegia  [] Acute blood loss anemia

## 2024-12-16 NOTE — PHYSICAL THERAPY INITIAL EVALUATION ADULT - MANUAL MUSCLE TESTING RESULTS, REHAB EVAL
LUE and LLE 5/5 MMT, RLE hip flexion 3-/5 MMT, R hip add 5/5, R hip abd 2+/5, R knee ext 5/5 , R DF/PF 0/5 MMT; R shoulder flex 1/5, R elbow flex/ext 2+/5 , R hand  2+/5 MMT

## 2024-12-16 NOTE — PHYSICAL THERAPY INITIAL EVALUATION ADULT - GAIT DEVIATIONS NOTED, PT EVAL
dec R step length/ R hip flex/increased time in double stance/footdrop/decreased step length/decreased weight-shifting ability dec R step length/ R hip flex , dec R hip rotation and R hip circumduction/increased time in double stance/footdrop/decreased step length/decreased weight-shifting ability

## 2024-12-16 NOTE — PATIENT PROFILE ADULT - FALL HARM RISK - ATTEMPT OOB
Patient calling stating she just spoke with [facility] and they are saying her prescription is .     She requests the prescription be sent over ASAP as she is leaving town tomorrow.   
See other encounter.   
No

## 2024-12-16 NOTE — OCCUPATIONAL THERAPY INITIAL EVALUATION ADULT - GENERAL OBSERVATIONS, REHAB EVAL
Pt received seated EOB +tele +heplock, in NAD and agreeable to OT. PT Allis present t/o. Cleared by LORI Dang to see pt.

## 2024-12-16 NOTE — PROGRESS NOTE ADULT - SUBJECTIVE AND OBJECTIVE BOX
Patient is a 69y old  Male who presents with a chief complaint of GBM (16 Dec 2024 01:11)        SUBJECTIVE:  Patient was seen and examined at bedside, no acute complaints    Overnight Events : NAEON    Last Bowel Movement: 14-Dec-2024 (12-16)  Last Bowel Movement: 14-Dec-2024 (12-15)  Last Bowel Movement: 14-Dec-2024 (12-15)  Last Bowel Movement: 14-Dec-2024 (12-14)  Last Bowel Movement: 14-Dec-2024 (12-14)      Review of systems: 12 point Review of systems negative unless otherwise documented elsewhere in note.     Diet, Regular (12-13-24 @ 23:28) [Active]      MEDICATIONS:  MEDICATIONS  (STANDING):  amLODIPine   Tablet 10 milliGRAM(s) Oral every 24 hours  chlorhexidine 2% Cloths 1 Application(s) Topical <User Schedule>  dexAMETHasone     Tablet 4 milliGRAM(s) Oral every 6 hours  famotidine    Tablet 20 milliGRAM(s) Oral every 12 hours  influenza  Vaccine (HIGH DOSE) 0.5 milliLiter(s) IntraMuscular once  levETIRAcetam 1000 milliGRAM(s) Oral every 12 hours  multivitamin 1 Tablet(s) Oral daily    MEDICATIONS  (PRN):  acetaminophen     Tablet .. 650 milliGRAM(s) Oral every 6 hours PRN Mild Pain (1 - 3)  senna 2 Tablet(s) Oral at bedtime PRN Constipation      Allergies    No Known Allergies    Intolerances        OBJECTIVE:  Vital Signs Last 24 Hrs  T(C): 36.5 (16 Dec 2024 08:26), Max: 36.7 (15 Dec 2024 20:45)  T(F): 97.7 (16 Dec 2024 08:26), Max: 98 (15 Dec 2024 20:45)  HR: 62 (16 Dec 2024 11:18) (46 - 80)  BP: 118/61 (16 Dec 2024 11:18) (111/70 - 132/71)  BP(mean): --  RR: 18 (16 Dec 2024 11:18) (16 - 18)  SpO2: 97% (16 Dec 2024 11:18) (95% - 100%)    Parameters below as of 16 Dec 2024 11:18  Patient On (Oxygen Delivery Method): room air      I&O's Summary    15 Dec 2024 07:01  -  16 Dec 2024 07:00  --------------------------------------------------------  IN: 180 mL / OUT: 700 mL / NET: -520 mL        PHYSICAL EXAM:  Gen: sitting in bed at time of exam, not in distress   CV: RRR, +S1/S2  Pulm: no wheezing , no crackles  no increase in work of breathing  Abd: soft, NTND  Skin: warm and dry, no new rashes   Ext: moving all 4 extremities spontaneously , no edema  ,  Neuro: AOx3, no gross focal neurological deficits  Psych: affect and behavior appropriate, pleasant at time of interview    LABS:                        13.9   7.24  )-----------( 331      ( 16 Dec 2024 05:30 )             42.1     12-16    140  |  107  |  23  ----------------------------<  123[H]  4.4   |  23  |  0.92    Ca    8.8      16 Dec 2024 05:30  Phos  2.9     12-16  Mg     2.1     12-16    TPro  6.5  /  Alb  3.7  /  TBili  0.2  /  DBili  x   /  AST  16  /  ALT  19  /  AlkPhos  88  12-16    LIVER FUNCTIONS - ( 16 Dec 2024 05:30 )  Alb: 3.7 g/dL / Pro: 6.5 g/dL / ALK PHOS: 88 U/L / ALT: 19 U/L / AST: 16 U/L / GGT: x           PT/INR - ( 16 Dec 2024 05:30 )   PT: 11.1 sec;   INR: 0.96          PTT - ( 16 Dec 2024 05:30 )  PTT:35.4 sec  CAPILLARY BLOOD GLUCOSE        Urinalysis Basic - ( 16 Dec 2024 05:30 )    Color: x / Appearance: x / SG: x / pH: x  Gluc: 123 mg/dL / Ketone: x  / Bili: x / Urobili: x   Blood: x / Protein: x / Nitrite: x   Leuk Esterase: x / RBC: x / WBC x   Sq Epi: x / Non Sq Epi: x / Bacteria: x        MICRODATA:      RADIOLOGY/OTHER STUDIES:

## 2024-12-16 NOTE — OCCUPATIONAL THERAPY INITIAL EVALUATION ADULT - DIAGNOSIS, OT EVAL
pt admitted for brain mass. Pt presents with impaired cognition, vision, sensation, decreased strength, ROM, balance, impacting ability to perform ADL and mobility.

## 2024-12-16 NOTE — PHYSICAL THERAPY INITIAL EVALUATION ADULT - ORIENTATION, REHAB EVAL
oriented to person, place, time and situation able to state time and situation with cueing/oriented to person, place, time and situation

## 2024-12-16 NOTE — OCCUPATIONAL THERAPY INITIAL EVALUATION ADULT - NS ASR FOLLOW COMMAND OT EVAL
O-LOG 25/30 (required cues for hospital name, situation, deficits and time), pt with word finding difficulty/100% of the time/able to follow single-step instructions

## 2024-12-17 LAB
ANION GAP SERPL CALC-SCNC: 9 MMOL/L — SIGNIFICANT CHANGE UP (ref 5–17)
BUN SERPL-MCNC: 25 MG/DL — HIGH (ref 7–23)
CALCIUM SERPL-MCNC: 8.9 MG/DL — SIGNIFICANT CHANGE UP (ref 8.4–10.5)
CHLORIDE SERPL-SCNC: 104 MMOL/L — SIGNIFICANT CHANGE UP (ref 96–108)
CO2 SERPL-SCNC: 24 MMOL/L — SIGNIFICANT CHANGE UP (ref 22–31)
CREAT SERPL-MCNC: 0.9 MG/DL — SIGNIFICANT CHANGE UP (ref 0.5–1.3)
EGFR: 93 ML/MIN/1.73M2 — SIGNIFICANT CHANGE UP
GLUCOSE SERPL-MCNC: 131 MG/DL — HIGH (ref 70–99)
HCT VFR BLD CALC: 43.5 % — SIGNIFICANT CHANGE UP (ref 39–50)
HGB BLD-MCNC: 14.2 G/DL — SIGNIFICANT CHANGE UP (ref 13–17)
MAGNESIUM SERPL-MCNC: 2 MG/DL — SIGNIFICANT CHANGE UP (ref 1.6–2.6)
MCHC RBC-ENTMCNC: 29.8 PG — SIGNIFICANT CHANGE UP (ref 27–34)
MCHC RBC-ENTMCNC: 32.6 G/DL — SIGNIFICANT CHANGE UP (ref 32–36)
MCV RBC AUTO: 91.2 FL — SIGNIFICANT CHANGE UP (ref 80–100)
NRBC # BLD: 0 /100 WBCS — SIGNIFICANT CHANGE UP (ref 0–0)
PHOSPHATE SERPL-MCNC: 2.7 MG/DL — SIGNIFICANT CHANGE UP (ref 2.5–4.5)
PLATELET # BLD AUTO: 333 K/UL — SIGNIFICANT CHANGE UP (ref 150–400)
POTASSIUM SERPL-MCNC: 5.1 MMOL/L — SIGNIFICANT CHANGE UP (ref 3.5–5.3)
POTASSIUM SERPL-SCNC: 5.1 MMOL/L — SIGNIFICANT CHANGE UP (ref 3.5–5.3)
RBC # BLD: 4.77 M/UL — SIGNIFICANT CHANGE UP (ref 4.2–5.8)
RBC # FLD: 12.7 % — SIGNIFICANT CHANGE UP (ref 10.3–14.5)
SODIUM SERPL-SCNC: 137 MMOL/L — SIGNIFICANT CHANGE UP (ref 135–145)
WBC # BLD: 7.73 K/UL — SIGNIFICANT CHANGE UP (ref 3.8–10.5)
WBC # FLD AUTO: 7.73 K/UL — SIGNIFICANT CHANGE UP (ref 3.8–10.5)

## 2024-12-17 PROCEDURE — 99221 1ST HOSP IP/OBS SF/LOW 40: CPT

## 2024-12-17 PROCEDURE — 99231 SBSQ HOSP IP/OBS SF/LOW 25: CPT

## 2024-12-17 PROCEDURE — 99232 SBSQ HOSP IP/OBS MODERATE 35: CPT

## 2024-12-17 RX ORDER — SOD PHOS DI, MONO/K PHOS MONO 250 MG
1 TABLET ORAL ONCE
Refills: 0 | Status: COMPLETED | OUTPATIENT
Start: 2024-12-17 | End: 2024-12-17

## 2024-12-17 RX ADMIN — LEVETIRACETAM 1000 MILLIGRAM(S): 100 SOLUTION ORAL at 05:39

## 2024-12-17 RX ADMIN — Medication 1 PACKET(S): at 06:53

## 2024-12-17 RX ADMIN — FAMOTIDINE 20 MILLIGRAM(S): 20 TABLET, FILM COATED ORAL at 05:39

## 2024-12-17 RX ADMIN — Medication 4 MILLIGRAM(S): at 05:39

## 2024-12-17 RX ADMIN — Medication 10 MILLIGRAM(S): at 05:44

## 2024-12-17 RX ADMIN — CHLORHEXIDINE GLUCONATE 1 APPLICATION(S): 1.2 RINSE ORAL at 07:18

## 2024-12-17 RX ADMIN — FAMOTIDINE 20 MILLIGRAM(S): 20 TABLET, FILM COATED ORAL at 17:56

## 2024-12-17 RX ADMIN — LEVETIRACETAM 1000 MILLIGRAM(S): 100 SOLUTION ORAL at 17:56

## 2024-12-17 RX ADMIN — Medication 4 MILLIGRAM(S): at 17:56

## 2024-12-17 RX ADMIN — Medication 1 TABLET(S): at 11:13

## 2024-12-17 RX ADMIN — Medication 4 MILLIGRAM(S): at 11:13

## 2024-12-17 RX ADMIN — Medication 4 MILLIGRAM(S): at 00:30

## 2024-12-17 NOTE — PROGRESS NOTE ADULT - SUBJECTIVE AND OBJECTIVE BOX
Patient is a 69y old  Male who presents with a chief complaint of BRAIN TUMOR     (17 Dec 2024 11:30)        SUBJECTIVE:  Patient was seen and examined at bedside, no acute complaints.    Overnight Events : NAEON    Last Bowel Movement: 14-Dec-2024 (12-17)  Last Bowel Movement: 14-Dec-2024 (12-17)  Last Bowel Movement: 14-Dec-2024 (12-16)  Last Bowel Movement: 14-Dec-2024 (12-15)  Last Bowel Movement: 14-Dec-2024 (12-15)  Last Bowel Movement: 14-Dec-2024 (12-14)  Last Bowel Movement: 14-Dec-2024 (12-14)      Review of systems: 12 point Review of systems negative unless otherwise documented elsewhere in note.     Diet, Regular (12-13-24 @ 23:28) [Active]      MEDICATIONS:  MEDICATIONS  (STANDING):  amLODIPine   Tablet 10 milliGRAM(s) Oral every 24 hours  dexAMETHasone     Tablet 4 milliGRAM(s) Oral every 6 hours  famotidine    Tablet 20 milliGRAM(s) Oral every 12 hours  influenza  Vaccine (HIGH DOSE) 0.5 milliLiter(s) IntraMuscular once  levETIRAcetam 1000 milliGRAM(s) Oral every 12 hours  multivitamin 1 Tablet(s) Oral daily    MEDICATIONS  (PRN):  acetaminophen     Tablet .. 650 milliGRAM(s) Oral every 6 hours PRN Mild Pain (1 - 3)  senna 2 Tablet(s) Oral at bedtime PRN Constipation      Allergies    No Known Allergies    Intolerances        OBJECTIVE:  Vital Signs Last 24 Hrs  T(C): 36.9 (17 Dec 2024 08:21), Max: 36.9 (16 Dec 2024 17:20)  T(F): 98.5 (17 Dec 2024 08:21), Max: 98.5 (16 Dec 2024 17:20)  HR: 48 (17 Dec 2024 08:21) (45 - 86)  BP: 117/67 (17 Dec 2024 08:21) (115/71 - 132/72)  BP(mean): --  RR: 16 (17 Dec 2024 08:21) (16 - 18)  SpO2: 98% (17 Dec 2024 08:21) (96% - 99%)    Parameters below as of 17 Dec 2024 08:21  Patient On (Oxygen Delivery Method): room air      I&O's Summary    16 Dec 2024 07:01  -  17 Dec 2024 07:00  --------------------------------------------------------  IN: 1440 mL / OUT: 625 mL / NET: 815 mL        PHYSICAL EXAM:  Gen: Resting in bed at time of exam, not in distress   CV: RRR, +S1/S2  Pulm: no wheezing , no crackles  no increase in work of breathing  Abd: soft, NTND  Skin: warm and dry, no new rashes   Ext: moving all 4 extremities spontaneously , no edema  ,  Neuro: AOx3, no gross focal neurological deficits  Psych: affect and behavior appropriate, pleasant at time of interview    LABS:                        14.2   7.73  )-----------( 333      ( 17 Dec 2024 05:23 )             43.5     12-17    137  |  104  |  25[H]  ----------------------------<  131[H]  5.1   |  24  |  0.90    Ca    8.9      17 Dec 2024 05:23  Phos  2.7     12-17  Mg     2.0     12-17    TPro  6.5  /  Alb  3.7  /  TBili  0.2  /  DBili  x   /  AST  16  /  ALT  19  /  AlkPhos  88  12-16    LIVER FUNCTIONS - ( 16 Dec 2024 05:30 )  Alb: 3.7 g/dL / Pro: 6.5 g/dL / ALK PHOS: 88 U/L / ALT: 19 U/L / AST: 16 U/L / GGT: x           PT/INR - ( 16 Dec 2024 05:30 )   PT: 11.1 sec;   INR: 0.96          PTT - ( 16 Dec 2024 05:30 )  PTT:35.4 sec  CAPILLARY BLOOD GLUCOSE      POCT Blood Glucose.: 118 mg/dL (17 Dec 2024 06:47)  POCT Blood Glucose.: 123 mg/dL (16 Dec 2024 22:01)    Urinalysis Basic - ( 17 Dec 2024 05:23 )    Color: x / Appearance: x / SG: x / pH: x  Gluc: 131 mg/dL / Ketone: x  / Bili: x / Urobili: x   Blood: x / Protein: x / Nitrite: x   Leuk Esterase: x / RBC: x / WBC x   Sq Epi: x / Non Sq Epi: x / Bacteria: x        MICRODATA:      RADIOLOGY/OTHER STUDIES:

## 2024-12-17 NOTE — DIETITIAN INITIAL EVALUATION ADULT - LAB (SPECIFY)
Lytes, renal indices, LFTs, blood glucose, lipid panel  Electrolytes, renal indices, LFTs, blood glucose, lipid panel

## 2024-12-17 NOTE — PROGRESS NOTE ADULT - SUBJECTIVE AND OBJECTIVE BOX
HPI:  70 yo male with h/o HTN, OA, right  TKA, s/p L craniotomy for glioblastoma resection 04/25/2023, s/p chemo and radiation therapy 2023 with Dr. Causey, currently on physical therapy and brain MRI every 2 month post craniotomy, presents to ER due to reoccurrence of brain tumor seen on his latest MRI completed 4 days ago in [place]. Patient c/o worsened R facial droop, worsening word finding difficulties, and right sided weakness x 2 weeks. Pt was recommended to go to [facility] ER for further evaluation by his oncologist. Patient denies vision changes, extremity numbness, confusion, headache, nausea and vomiting.  (14 Dec 2024 02:25)    OVERNIGHT EVENTS: No complaints.     Hospital Course:   12/13: admitted to [facility], decadron 4q6 started  12/14: KYA overnight  12/15: KYA overnight, pending MRI  12/16: KYA overnight. MRI complete. Plan for OR thursday 12/19/24.     Vital Signs Last 24 Hrs  T(C): 36.4 (16 Dec 2024 22:09), Max: 36.9 (16 Dec 2024 17:20)  T(F): 97.5 (16 Dec 2024 22:09), Max: 98.5 (16 Dec 2024 17:20)  HR: 45 (16 Dec 2024 22:09) (45 - 86)  BP: 122/69 (16 Dec 2024 22:09) (115/71 - 125/71)  BP(mean): --  RR: 16 (16 Dec 2024 22:09) (16 - 18)  SpO2: 98% (16 Dec 2024 22:09) (96% - 98%)    Parameters below as of 16 Dec 2024 22:09  Patient On (Oxygen Delivery Method): room air      I&O's Summary    15 Dec 2024 07:01  -  16 Dec 2024 07:00  --------------------------------------------------------  IN: 180 mL / OUT: 700 mL / NET: -520 mL    16 Dec 2024 07:01  -  17 Dec 2024 01:26  --------------------------------------------------------  IN: 1440 mL / OUT: 625 mL / NET: 815 mL        PHYSICAL EXAM:  Constitutional: NAD, resting comfortably in bed.   HEENT: Pupils equal, round, reactive to light. EOMI. R facial, dysconjugate gaze  Respiratory: No respiratory distress, lungs clear to auscultation bilaterally.   Cardiovascular: RRR, S1, S2.   Gastrointestinal: Abdomen soft, non tender, nondistended.  Neurological:  AAOX3. Opens eyes. Follows commands. Speech clear.  Cranial Nerves: II-XII intact  Motor: LUE and LLE 5/5. RUE delt 2/5, tricep 0/5, bicep and HG 4-/5. RLE HF/KF/KE 4+/5, DF/PF 0/5.   Sensation: RUE decreased sensation  Extremities: Warm, well perfused.  Wounds: none      TUBES/LINES:  [] Shields  [] Lumbar Drain  [] Wound Drains  [] Others    DIET:  [] NPO  [x] Mechanical  [] Tube feeds    LABS:                        13.9   7.24  )-----------( 331      ( 16 Dec 2024 05:30 )             42.1     12-16    140  |  107  |  23  ----------------------------<  123[H]  4.4   |  23  |  0.92    Ca    8.8      16 Dec 2024 05:30  Phos  2.9     12-16  Mg     2.1     12-16    TPro  6.5  /  Alb  3.7  /  TBili  0.2  /  DBili  x   /  AST  16  /  ALT  19  /  AlkPhos  88  12-16    PT/INR - ( 16 Dec 2024 05:30 )   PT: 11.1 sec;   INR: 0.96          PTT - ( 16 Dec 2024 05:30 )  PTT:35.4 sec  Urinalysis Basic - ( 16 Dec 2024 05:30 )    Color: x / Appearance: x / SG: x / pH: x  Gluc: 123 mg/dL / Ketone: x  / Bili: x / Urobili: x   Blood: x / Protein: x / Nitrite: x   Leuk Esterase: x / RBC: x / WBC x   Sq Epi: x / Non Sq Epi: x / Bacteria: x          CAPILLARY BLOOD GLUCOSE      POCT Blood Glucose.: 123 mg/dL (16 Dec 2024 22:01)      Drug Levels: [] N/A    CSF Analysis: [] N/A      Allergies    No Known Allergies    Intolerances      MEDICATIONS:  Antibiotics:    Neuro:  acetaminophen     Tablet .. 650 milliGRAM(s) Oral every 6 hours PRN  levETIRAcetam 1000 milliGRAM(s) Oral every 12 hours    Anticoagulation:    OTHER:  amLODIPine   Tablet 10 milliGRAM(s) Oral every 24 hours  chlorhexidine 2% Cloths 1 Application(s) Topical <User Schedule>  dexAMETHasone     Tablet 4 milliGRAM(s) Oral every 6 hours  famotidine    Tablet 20 milliGRAM(s) Oral every 12 hours  influenza  Vaccine (HIGH DOSE) 0.5 milliLiter(s) IntraMuscular once  senna 2 Tablet(s) Oral at bedtime PRN    IVF:  multivitamin 1 Tablet(s) Oral daily    CULTURES:    RADIOLOGY & ADDITIONAL TESTS:      ASSESSMENT:  70 yo male with h/o HTN, OA, right  TKA, s/p L craniotomy for glioblastoma resection 04/25/2023, s/p chemo and radiation therapy 2023 with Dr. Causey, currently on physical therapy and brain MRI every 2 month post craniotomy, presents to ER due to reoccurrence of brain tumor seen on his latest MRI completed 4 days ago in [place]. Patient c/o worsened R facial droop, worsening word finding difficulties, and right sided weakness x 2 weeks. Admitted for to [facility] for further workup and treatment.    Plan:  Neuro:  - neuro/vital checks q8  - pain control: tylenol prn  - MRI brain w/ w/o with sarah 12/16 with progression of disease  - CT 12/13: vasogenic edema in left hemisphere  - cerebral edema: decadron 4q6  - GBM: hold metformin 500mg q12  - plan for OR 12/19    Cardiac:  - SBP goal   - HTN: cont home amlodipine 10mg daily    Pulmonary:  - on RA    GI:  - regular diet  - bowel regimen prn  - pepcid BID while on steroids    Renal:  - IVL    Heme:  - SCDs, SQL for DVT prophylaxis  - pending heme/onc consult    Endo:  - no issues    ID:  - afebrile    Disposition: tele status, full code, dispo pending

## 2024-12-17 NOTE — PROGRESS NOTE ADULT - ASSESSMENT
69 M with HTN, OA, right  TKA, s/p L craniotomy for glioblastoma resection 04/25/2023, s/p chemo and radiation therapy 2023 with Dr. Causey, currently on physical therapy and brain MRI every 2 month post craniotomy, presents to ER due to reoccurrence of brain tumor seen on his latest MRI completed 4 days ago in [place].     #GBM:  - Admitted to [facility].  - Pending MRI brain with Na.  - Remains on Decadron per nsgy  - On Pepcid for GI protection.  - Keppra.    # Pre-operative Risk Evaluation and Stratification   Patient medically optimized for procedure w acknowledgement of below risk factors  - RCRI : 0 risk factors, 3.9% risk  - METS : greater than or equal to 4 METS at baseline  - EKG : NSR, no acute ST-T changes  - STOP BANG: 3: Intermediate Risk  - Periop Anticoagulation: no known AC use     #HTN:   - Controlled at the moment.  - Continue with Norvasc 10mg PO daily    #DVT proph: Lovenox.

## 2024-12-17 NOTE — DIETITIAN INITIAL EVALUATION ADULT - PERTINENT MEDS FT
MEDICATIONS  (STANDING):  amLODIPine   Tablet 10 milliGRAM(s) Oral every 24 hours  dexAMETHasone     Tablet 4 milliGRAM(s) Oral every 6 hours  famotidine    Tablet 20 milliGRAM(s) Oral every 12 hours  influenza  Vaccine (HIGH DOSE) 0.5 milliLiter(s) IntraMuscular once  levETIRAcetam 1000 milliGRAM(s) Oral every 12 hours  multivitamin 1 Tablet(s) Oral daily    MEDICATIONS  (PRN):  acetaminophen     Tablet .. 650 milliGRAM(s) Oral every 6 hours PRN Mild Pain (1 - 3)  senna 2 Tablet(s) Oral at bedtime PRN Constipation

## 2024-12-17 NOTE — DIETITIAN INITIAL EVALUATION ADULT - PERTINENT LABORATORY DATA
12-17    137  |  104  |  25[H]  ----------------------------<  131[H]  5.1   |  24  |  0.90    Ca    8.9      17 Dec 2024 05:23  Phos  2.7     12-17  Mg     2.0     12-17    TPro  6.5  /  Alb  3.7  /  TBili  0.2  /  DBili  x   /  AST  16  /  ALT  19  /  AlkPhos  88  12-16  POCT Blood Glucose.: 118 mg/dL (12-17-24 @ 06:47)

## 2024-12-17 NOTE — DIETITIAN INITIAL EVALUATION ADULT - ADD RECOMMEND
1. Continue current diet order  2. Encourage and monitor PO intake, honor preferences as able   >> Consistently meet >75% of estimated needs during admission   >> Consider oral nutrition supplement or liberalizing diet should intake decline </= 50%   3. Monitor wt trends, GI function, skin integrity  4. Monitor lytes, renal indices, blood glucose, LFTs    5. Pain and bowel regimen per team 1. Continue Regular diet order. Request provided to kitchen for 2x protein at meals + avocado.   2. Encourage and monitor PO intake, honor preferences as able   >> Consistently meet >75% of estimated needs during admission   >> Consider oral nutrition supplement or liberalizing diet should intake decline </= 50%   3. Monitor wt trends, GI function, skin integrity  4. Monitor lytes, renal indices, blood glucose, LFTs    5. Pain and bowel regimen per team 1. Continue Regular diet order. Request provided to kitchen for 2x protein at meals + avocado.   2. Encourage and monitor PO intake, honor preferences as able   >> Consistently meet >75% of estimated needs during admission   >> Consider oral nutrition supplement or liberalizing diet should intake decline </= 50%   3. Monitor wt trends, GI function, skin integrity  4. Monitor electrolytes, renal indices, blood glucose, LFTs    5. Pain and bowel regimen per team

## 2024-12-17 NOTE — DIETITIAN INITIAL EVALUATION ADULT - OTHER CALCULATIONS
pounds +-10%; %RRT=511  Pt within % IBW thus actual body weight used for all calculations. Needs adjusted for advanced age and malnutrition. Fluid recs per team.   Adjusted for advanced age, upcoming surgery, recent cancer Ideal body weight 172 pounds +-10%; % Ideal body weight=110  Pt within % Ideal body weight thus actual body weight used for all calculations.   Adjusted for advanced age, recent cancer, upcoming surgery

## 2024-12-17 NOTE — DIETITIAN INITIAL EVALUATION ADULT - OTHER INFO
"70 yo male with h/o HTN, OA, right TKA, s/p L craniotomy for glioblastoma resection 04/25/2023, s/p chemo and radiation therapy 2023 with Dr. Causey, currently on physical therapy and brain MRI every 2 month post craniotomy, presents to ER due to reoccurrence of brain tumor seen on his latest MRI completed 4 days ago in [place]. Patient c/o worsened R facial droop, worsening word finding difficulties, and right sided weakness x 2 weeks. Pt was recommended to go to [facility] ER for further evaluation by his oncologist. Patient denies vision changes, extremity numbness, confusion, headache, nausea and vomiting." Plan for OR 12/19 per provider handoff.    Pt seen alert in room on 8WO, wife present. Pt ordered for Regular diet.  Pt reports "excellent" appetite and consumed 100% of breakfast (frittata, homefries, yogurt, avocado toast, fruit) this AM.  PTA pt reports good appetite PTA. Consumes 2 meals/day, no snacks.  similar "up and down" appetite and consumes 1-2 meals/day. Pt has followed keto diet on and off PTA since cancer diagnosis 2023. Pt reports wanting to start keto diet again and requesting 2x protein portion at meals and avocado with all meals. No known food allergies. Does not consume shellfish, pork for cultural, Hoahaoism, or ethical reasons. No issues chewing or swallowing. No current oral nutrition supplement use, reports taking MV, vitamin D, vitamin B12 PTA. Pt reports no recent weight gain/loss and  pounds - /BMI 26.4 (12/13). Labs and medications reviews. Notable labs: BUN 25 <H>, glucose 123-131 x 24 hours. Ordered for: pain regimen, pepcid, MV, senna. Per flowsheets, no pain noted. Skin: Per flowsheets, Antonio 20, no edema, no pressure injuries. GI: Denies nausea/vomiting/diarrhea/constipation - pt reported previous constipation and nausea but resolved due to starting medication; +BM 12/14 in flowsheets. See nutrition recommendations below. "70 yo male with h/o HTN, OA, right TKA, s/p L craniotomy for glioblastoma resection 04/25/2023, s/p chemo and radiation therapy 2023 with Dr. Causey, currently on physical therapy and brain MRI every 2 month post craniotomy, presents to ER due to reoccurrence of brain tumor seen on his latest MRI completed 4 days ago in [place]. Patient c/o worsened R facial droop, worsening word finding difficulties, and right sided weakness x 2 weeks. Pt was recommended to go to [facility] ER for further evaluation by his oncologist. Patient denies vision changes, extremity numbness, confusion, headache, nausea and vomiting." Plan for OR 12/19 per provider handoff.    Pt seen alert in room on 8WO, wife present. Pt ordered for Regular diet.  Pt reports "excellent" appetite and consumed 100% of breakfast (frittata, homefries, yogurt, avocado toast, fruit) this AM.  PTA pt reports good appetite and consumes 2 meals/day, no snacks. Pt has followed keto diet on and off PTA since cancer diagnosis 2023. Pt reports wanting to start keto diet again and requesting 2x protein portion at meals and avocado with all meals. No known food allergies. Does not consume shellfish, pork for cultural, Sikhism, or ethical reasons. No issues chewing or swallowing. No current oral nutrition supplement use, reports taking multivitamin, vitamin D, vitamin B12 PTA. Pt reports no recent weight gain/loss and usual body weight 189 pounds - current body weight 189/BMI 26.4 (12/13). Labs and medications reviews. Notable labs: BUN 25 <H>, glucose 123-131 x 24 hours. Ordered for: pain regimen, pepcid, MV, senna. Per flowsheets, no pain noted. Skin: Per flowsheets, Antonio 20, no edema, no pressure injuries. GI: Denies nausea/vomiting/diarrhea/constipation - pt reported previous constipation and nausea but resolved due to starting medication; +BM 12/14 in flowsheets. See nutrition recommendations below. "68 yo male with h/o HTN, OA, right TKA, s/p L craniotomy for glioblastoma resection 04/25/2023, s/p chemo and radiation therapy 2023 with Dr. Causey, currently on physical therapy and brain MRI every 2 month post craniotomy, presents to ER due to reoccurrence of brain tumor seen on his latest MRI completed 4 days ago in [place]. Patient c/o worsened R facial droop, worsening word finding difficulties, and right sided weakness x 2 weeks. Pt was recommended to go to [facility] ER for further evaluation by his oncologist. Patient denies vision changes, extremity numbness, confusion, headache, nausea and vomiting." Plan for OR 12/19 per provider handoff.    Pt seen alert in room on 8WO, wife present. Pt ordered for Regular diet.  Pt reports "excellent" appetite and consumed 100% of breakfast (frittata, homefries, yogurt, avocado toast, fruit) this AM.  PTA pt reports good appetite and consumes 2 meals/day, no snacks. Pt has followed keto diet on and off PTA since cancer diagnosis 2023. Pt reports wanting to start keto diet again and requesting 2x protein portion at meals and avocado with all meals. No known food allergies. Does not consume shellfish, pork for cultural, Gnosticism, or ethical reasons. No issues chewing or swallowing. No current oral nutrition supplement use, reports taking multivitamin, vitamin D, vitamin B12 PTA. Pt reports no recent weight gain/loss and usual body weight 189 pounds - current body weight 189/BMI 26.4 (12/13). Labs and medications reviews. Notable labs: BUN 25 <H>, glucose 123-131 x 24 hours. Ordered for: pain regimen, pepcid, MV, senna. Per flowsheets, no pain noted. Skin: Per flowsheets, Antonio 20, no edema, no pressure injuries. GI: Denies nausea/vomiting/diarrhea/constipation - pt reported previous constipation and nausea but resolved due to starting medication; +BM 12/14 in flowsheets. Observed pt with no overt signs of muscle or fat wasting. Based on ASPEN guidelines, pt does not meet criteria for malnutrition at this time. See nutrition recommendations below.

## 2024-12-17 NOTE — DIETITIAN INITIAL EVALUATION ADULT - PERSON TAUGHT/METHOD
Encouraged PO intake prior to 12/19 surgery. Discussed importance of PO intake and consuming adequate protein post-op. Encouraged including some carbohydrates in diet and debunked "sugar causes cancer" myth. Pt verbalized understanding./verbal instruction/patient instructed Encouraged PO intake prior to 12/19 surgery. Discussed importance of PO intake and consuming adequate protein post-operatively. Pt verbalized understanding./verbal instruction/patient instructed

## 2024-12-18 LAB
ANION GAP SERPL CALC-SCNC: 8 MMOL/L — SIGNIFICANT CHANGE UP (ref 5–17)
APTT BLD: 30 SEC — SIGNIFICANT CHANGE UP (ref 24.5–35.6)
BLD GP AB SCN SERPL QL: NEGATIVE — SIGNIFICANT CHANGE UP
BUN SERPL-MCNC: 27 MG/DL — HIGH (ref 7–23)
CALCIUM SERPL-MCNC: 8.4 MG/DL — SIGNIFICANT CHANGE UP (ref 8.4–10.5)
CHLORIDE SERPL-SCNC: 102 MMOL/L — SIGNIFICANT CHANGE UP (ref 96–108)
CO2 SERPL-SCNC: 25 MMOL/L — SIGNIFICANT CHANGE UP (ref 22–31)
CREAT SERPL-MCNC: 0.87 MG/DL — SIGNIFICANT CHANGE UP (ref 0.5–1.3)
EGFR: 94 ML/MIN/1.73M2 — SIGNIFICANT CHANGE UP
GLUCOSE SERPL-MCNC: 139 MG/DL — HIGH (ref 70–99)
HCT VFR BLD CALC: 42.8 % — SIGNIFICANT CHANGE UP (ref 39–50)
HGB BLD-MCNC: 14.2 G/DL — SIGNIFICANT CHANGE UP (ref 13–17)
INR BLD: 0.95 — SIGNIFICANT CHANGE UP (ref 0.85–1.16)
MAGNESIUM SERPL-MCNC: 2.1 MG/DL — SIGNIFICANT CHANGE UP (ref 1.6–2.6)
MCHC RBC-ENTMCNC: 29.6 PG — SIGNIFICANT CHANGE UP (ref 27–34)
MCHC RBC-ENTMCNC: 33.2 G/DL — SIGNIFICANT CHANGE UP (ref 32–36)
MCV RBC AUTO: 89.2 FL — SIGNIFICANT CHANGE UP (ref 80–100)
NRBC # BLD: 0 /100 WBCS — SIGNIFICANT CHANGE UP (ref 0–0)
PHOSPHATE SERPL-MCNC: 3 MG/DL — SIGNIFICANT CHANGE UP (ref 2.5–4.5)
PLATELET # BLD AUTO: 315 K/UL — SIGNIFICANT CHANGE UP (ref 150–400)
POTASSIUM SERPL-MCNC: 4.5 MMOL/L — SIGNIFICANT CHANGE UP (ref 3.5–5.3)
POTASSIUM SERPL-SCNC: 4.5 MMOL/L — SIGNIFICANT CHANGE UP (ref 3.5–5.3)
PROTHROM AB SERPL-ACNC: 11.1 SEC — SIGNIFICANT CHANGE UP (ref 9.9–13.4)
RBC # BLD: 4.8 M/UL — SIGNIFICANT CHANGE UP (ref 4.2–5.8)
RBC # FLD: 12.6 % — SIGNIFICANT CHANGE UP (ref 10.3–14.5)
RH IG SCN BLD-IMP: POSITIVE — SIGNIFICANT CHANGE UP
SODIUM SERPL-SCNC: 135 MMOL/L — SIGNIFICANT CHANGE UP (ref 135–145)
WBC # BLD: 6.94 K/UL — SIGNIFICANT CHANGE UP (ref 3.8–10.5)
WBC # FLD AUTO: 6.94 K/UL — SIGNIFICANT CHANGE UP (ref 3.8–10.5)

## 2024-12-18 PROCEDURE — 99231 SBSQ HOSP IP/OBS SF/LOW 25: CPT

## 2024-12-18 PROCEDURE — 99232 SBSQ HOSP IP/OBS MODERATE 35: CPT

## 2024-12-18 RX ORDER — POVIDONE IODINE USP, 10% W/W 10 MG/ML
1 SWAB TOPICAL ONCE
Refills: 0 | Status: COMPLETED | OUTPATIENT
Start: 2024-12-18 | End: 2024-12-19

## 2024-12-18 RX ORDER — SENNOSIDES 8.6 MG/1
2 TABLET, FILM COATED ORAL AT BEDTIME
Refills: 0 | Status: DISCONTINUED | OUTPATIENT
Start: 2024-12-18 | End: 2024-12-19

## 2024-12-18 RX ORDER — APREPITANT 40 MG/1
40 CAPSULE ORAL ONCE
Refills: 0 | Status: COMPLETED | OUTPATIENT
Start: 2024-12-19 | End: 2024-12-19

## 2024-12-18 RX ORDER — POLYETHYLENE GLYCOL 3350 17 G/DOSE
17 POWDER (GRAM) ORAL DAILY
Refills: 0 | Status: DISCONTINUED | OUTPATIENT
Start: 2024-12-18 | End: 2024-12-19

## 2024-12-18 RX ORDER — SODIUM CHLORIDE 9 MG/ML
1000 INJECTION, SOLUTION INTRAMUSCULAR; INTRAVENOUS; SUBCUTANEOUS
Refills: 0 | Status: DISCONTINUED | OUTPATIENT
Start: 2024-12-18 | End: 2024-12-19

## 2024-12-18 RX ORDER — ACETAMINOPHEN 80 MG/.8ML
1000 SOLUTION/ DROPS ORAL ONCE
Refills: 0 | Status: COMPLETED | OUTPATIENT
Start: 2024-12-19 | End: 2024-12-19

## 2024-12-18 RX ORDER — CHLORHEXIDINE GLUCONATE 1.2 MG/ML
1 RINSE ORAL EVERY 12 HOURS
Refills: 0 | Status: COMPLETED | OUTPATIENT
Start: 2024-12-18 | End: 2024-12-19

## 2024-12-18 RX ADMIN — Medication 4 MILLIGRAM(S): at 23:52

## 2024-12-18 RX ADMIN — Medication 1 TABLET(S): at 12:41

## 2024-12-18 RX ADMIN — Medication 4 MILLIGRAM(S): at 17:46

## 2024-12-18 RX ADMIN — Medication 10 MILLIGRAM(S): at 06:00

## 2024-12-18 RX ADMIN — LEVETIRACETAM 1000 MILLIGRAM(S): 100 SOLUTION ORAL at 05:37

## 2024-12-18 RX ADMIN — Medication 17 GRAM(S): at 12:42

## 2024-12-18 RX ADMIN — FAMOTIDINE 20 MILLIGRAM(S): 20 TABLET, FILM COATED ORAL at 05:38

## 2024-12-18 RX ADMIN — FAMOTIDINE 20 MILLIGRAM(S): 20 TABLET, FILM COATED ORAL at 17:45

## 2024-12-18 RX ADMIN — CHLORHEXIDINE GLUCONATE 1 APPLICATION(S): 1.2 RINSE ORAL at 19:04

## 2024-12-18 RX ADMIN — Medication 4 MILLIGRAM(S): at 01:09

## 2024-12-18 RX ADMIN — LEVETIRACETAM 1000 MILLIGRAM(S): 100 SOLUTION ORAL at 17:46

## 2024-12-18 RX ADMIN — Medication 4 MILLIGRAM(S): at 12:42

## 2024-12-18 RX ADMIN — Medication 4 MILLIGRAM(S): at 05:38

## 2024-12-18 NOTE — PROGRESS NOTE ADULT - SUBJECTIVE AND OBJECTIVE BOX
HPI:  70 yo male with h/o HTN, OA, right  TKA, s/p L craniotomy for glioblastoma resection 04/25/2023, s/p chemo and radiation therapy 2023 with Dr. Causey, currently on physical therapy and brain MRI every 2 month post craniotomy, presents to ER due to reoccurrence of brain tumor seen on his latest MRI completed 4 days ago in [place]. Patient c/o worsened R facial droop, worsening word finding difficulties, and right sided weakness x 2 weeks. Pt was recommended to go to [facility] ER for further evaluation by his oncologist. Patient denies vision changes, extremity numbness, confusion, headache, nausea and vomiting.  (14 Dec 2024 02:25)    OVERNIGHT EVENTS: No events overnight.     Hospital Course:   12/13: admitted to [facility], decadron 4q6 started  12/14: KYA overnight  12/15: KYA overnight, pending MRI  12/16: KYA overnight. MRI complete. Plan for OR thursday 12/19/24.   12/17: KYA overnight.     Vital Signs Last 24 Hrs  T(C): 36.8 (17 Dec 2024 20:40), Max: 36.9 (17 Dec 2024 05:02)  T(F): 98.3 (17 Dec 2024 20:40), Max: 98.5 (17 Dec 2024 05:02)  HR: 49 (17 Dec 2024 20:40) (46 - 67)  BP: 111/59 (17 Dec 2024 20:40) (111/59 - 132/72)  BP(mean): --  RR: 17 (17 Dec 2024 20:40) (16 - 18)  SpO2: 95% (17 Dec 2024 20:40) (95% - 99%)    Parameters below as of 17 Dec 2024 20:40  Patient On (Oxygen Delivery Method): room air        I&O's Summary    16 Dec 2024 07:01  -  17 Dec 2024 07:00  --------------------------------------------------------  IN: 1440 mL / OUT: 625 mL / NET: 815 mL        PHYSICAL EXAM:  Constitutional: NAD, resting comfortably in bed.   HEENT: Pupils equal, round, reactive to light. EOMI. R facial, dysconjugate gaze  Respiratory: No respiratory distress, lungs clear to auscultation bilaterally.   Cardiovascular: RRR, S1, S2.   Gastrointestinal: Abdomen soft, non tender, nondistended.  Neurological:  AAOX3. Opens eyes. Follows commands. Speech clear.  Cranial Nerves: II-XII intact  Motor: LUE and LLE 5/5. RUE delt 0/5, tricep 3/5, bicep 3/5, and HG 3/5. RLE HF/KF/KE 4+/5, DF/PF 0/5.   Sensation: RUE decreased sensation  Extremities: Warm, well perfused.  Wounds: none      TUBES/LINES:  [] Shields  [] Lumbar Drain  [] Wound Drains  [] Others    DIET:  [] NPO  [] Mechanical  [] Tube feeds    LABS:                        14.2   7.73  )-----------( 333      ( 17 Dec 2024 05:23 )             43.5     12-17    137  |  104  |  25[H]  ----------------------------<  131[H]  5.1   |  24  |  0.90    Ca    8.9      17 Dec 2024 05:23  Phos  2.7     12-17  Mg     2.0     12-17    TPro  6.5  /  Alb  3.7  /  TBili  0.2  /  DBili  x   /  AST  16  /  ALT  19  /  AlkPhos  88  12-16    PT/INR - ( 16 Dec 2024 05:30 )   PT: 11.1 sec;   INR: 0.96          PTT - ( 16 Dec 2024 05:30 )  PTT:35.4 sec  Urinalysis Basic - ( 17 Dec 2024 05:23 )    Color: x / Appearance: x / SG: x / pH: x  Gluc: 131 mg/dL / Ketone: x  / Bili: x / Urobili: x   Blood: x / Protein: x / Nitrite: x   Leuk Esterase: x / RBC: x / WBC x   Sq Epi: x / Non Sq Epi: x / Bacteria: x          CAPILLARY BLOOD GLUCOSE      POCT Blood Glucose.: 118 mg/dL (17 Dec 2024 06:47)      Drug Levels: [] N/A    CSF Analysis: [] N/A      Allergies    No Known Allergies    Intolerances      MEDICATIONS:  Antibiotics:    Neuro:  acetaminophen     Tablet .. 650 milliGRAM(s) Oral every 6 hours PRN  levETIRAcetam 1000 milliGRAM(s) Oral every 12 hours    Anticoagulation:    OTHER:  amLODIPine   Tablet 10 milliGRAM(s) Oral every 24 hours  dexAMETHasone     Tablet 4 milliGRAM(s) Oral every 6 hours  famotidine    Tablet 20 milliGRAM(s) Oral every 12 hours  influenza  Vaccine (HIGH DOSE) 0.5 milliLiter(s) IntraMuscular once  senna 2 Tablet(s) Oral at bedtime PRN    IVF:  multivitamin 1 Tablet(s) Oral daily    CULTURES:    RADIOLOGY & ADDITIONAL TESTS:      ASSESSMENT:  70 yo male with h/o HTN, OA, right  TKA, s/p L craniotomy for glioblastoma resection 04/25/2023, s/p chemo and radiation therapy 2023 with Dr. Causey, currently on physical therapy and brain MRI every 2 month post craniotomy, presents to ER due to reoccurrence of brain tumor seen on his latest MRI completed 4 days ago in [place]. Patient c/o worsened R facial droop, worsening word finding difficulties, and right sided weakness x 2 weeks. Admitted for to [facility] for further workup and treatment.    Plan:  Neuro:  - neuro/vital checks q8  - pain control: tylenol prn  - MRI brain w/ w/o with sarah 12/16 with progression of disease  - CT 12/13: vasogenic edema in left hemisphere  - cerebral edema: decadron 4q6  - GBM: hold metformin 500mg q12  - plan for OR 12/19    Cardiac:  - SBP goal   - HTN: cont home amlodipine 10mg daily    Pulmonary:  - on RA    GI:  - regular diet  - bowel regimen prn  - pepcid BID while on steroids    Renal:  - IVL    Heme:  - SCDs, SQL for DVT prophylaxis  - pending heme/onc consult    Endo:  - no issues    ID:  - afebrile    Disposition: tele status, full code, dispo pending    D/w Dr. Cheung

## 2024-12-18 NOTE — PROGRESS NOTE ADULT - ASSESSMENT
69 M with HTN, OA, right  TKA, s/p L craniotomy for glioblastoma resection 04/25/2023, s/p chemo and radiation therapy 2023 with Dr. Causey, currently on physical therapy and brain MRI every 2 month post craniotomy, presents to ER due to reoccurrence of brain tumor seen on his latest MRI completed 4 days ago in [place].     #GBM:  - plan for OR thursday  - Admitted to [facility].  - Remains on Decadron per nsgy  - On Pepcid for GI protection.  - Keppra.    # Pre-operative Risk Evaluation and Stratification   Patient medically optimized for procedure w acknowledgement of below risk factors  - RCRI : 0 risk factors, 3.9% risk  - METS : greater than or equal to 4 METS at baseline  - EKG : NSR, no acute ST-T changes  - STOP BANG: 3: Intermediate Risk  - Periop Anticoagulation: no known AC use     #HTN:   - Controlled at the moment.  - Continue with Norvasc 10mg PO daily    #DVT proph: Lovenox.

## 2024-12-18 NOTE — PROGRESS NOTE ADULT - SUBJECTIVE AND OBJECTIVE BOX
Patient is a 69y old  Male who presents with a chief complaint of GBM (18 Dec 2024 02:38)        SUBJECTIVE:  Patient was seen and examined at bedside, no acute complaints    Overnight Events : NAEON    Last Bowel Movement: 14-Dec-2024 (12-18)  Last Bowel Movement: 14-Dec-2024 (12-17)  Last Bowel Movement: 14-Dec-2024 (12-17)  Last Bowel Movement: 14-Dec-2024 (12-17)  Last Bowel Movement: 14-Dec-2024 (12-16)  Last Bowel Movement: 14-Dec-2024 (12-15)  Last Bowel Movement: 14-Dec-2024 (12-15)  Last Bowel Movement: 14-Dec-2024 (12-14)  Last Bowel Movement: 14-Dec-2024 (12-14)      Review of systems: 12 point Review of systems negative unless otherwise documented elsewhere in note.     Diet, Regular (12-13-24 @ 23:28) [Active]      MEDICATIONS:  MEDICATIONS  (STANDING):  amLODIPine   Tablet 10 milliGRAM(s) Oral every 24 hours  dexAMETHasone     Tablet 4 milliGRAM(s) Oral every 6 hours  famotidine    Tablet 20 milliGRAM(s) Oral every 12 hours  influenza  Vaccine (HIGH DOSE) 0.5 milliLiter(s) IntraMuscular once  levETIRAcetam 1000 milliGRAM(s) Oral every 12 hours  multivitamin 1 Tablet(s) Oral daily  polyethylene glycol 3350 17 Gram(s) Oral daily    MEDICATIONS  (PRN):  acetaminophen     Tablet .. 650 milliGRAM(s) Oral every 6 hours PRN Mild Pain (1 - 3)  senna 2 Tablet(s) Oral at bedtime PRN Constipation      Allergies    No Known Allergies    Intolerances        OBJECTIVE:  Vital Signs Last 24 Hrs  T(C): 36.3 (18 Dec 2024 08:41), Max: 36.8 (17 Dec 2024 20:40)  T(F): 97.4 (18 Dec 2024 08:41), Max: 98.3 (17 Dec 2024 20:40)  HR: 58 (18 Dec 2024 08:41) (48 - 67)  BP: 118/73 (18 Dec 2024 08:41) (111/59 - 135/70)  BP(mean): --  RR: 16 (18 Dec 2024 08:41) (16 - 18)  SpO2: 100% (18 Dec 2024 08:41) (95% - 100%)    Parameters below as of 18 Dec 2024 08:41  Patient On (Oxygen Delivery Method): room air      I&O's Summary      PHYSICAL EXAM:  Gen: Resting in bed at time of exam, not in distress   CV: RRR, +S1/S2  Pulm: no wheezing , no crackles  no increase in work of breathing  Abd: soft, NTND  Skin: warm and dry, no new rashes   Ext: moving all 4 extremities spontaneously , no edema  ,  Neuro: no gross focal neurological deficits  Psych: affect and behavior appropriate, pleasant at time of interview    LABS:                        14.2   6.94  )-----------( 315      ( 18 Dec 2024 05:30 )             42.8     12-18    135  |  102  |  27[H]  ----------------------------<  139[H]  4.5   |  25  |  0.87    Ca    8.4      18 Dec 2024 05:30  Phos  3.0     12-18  Mg     2.1     12-18          CAPILLARY BLOOD GLUCOSE      POCT Blood Glucose.: 141 mg/dL (18 Dec 2024 06:59)    Urinalysis Basic - ( 18 Dec 2024 05:30 )    Color: x / Appearance: x / SG: x / pH: x  Gluc: 139 mg/dL / Ketone: x  / Bili: x / Urobili: x   Blood: x / Protein: x / Nitrite: x   Leuk Esterase: x / RBC: x / WBC x   Sq Epi: x / Non Sq Epi: x / Bacteria: x        MICRODATA:      RADIOLOGY/OTHER STUDIES:

## 2024-12-18 NOTE — PROGRESS NOTE ADULT - SUBJECTIVE AND OBJECTIVE BOX
Surgery:   Consent: Signed by patient    Representative Consent: [X] Signed by patient vs HCP      No Known Allergies      OVERNIGHT EVENTS: KYA overnight.     T(C): 36.3 (12-18-24 @ 08:41), Max: 36.8 (12-17-24 @ 20:40)  HR: 58 (12-18-24 @ 08:41) (48 - 67)  BP: 118/73 (12-18-24 @ 08:41) (111/59 - 135/70)  RR: 16 (12-18-24 @ 08:41) (16 - 18)  SpO2: 100% (12-18-24 @ 08:41) (95% - 100%)  Wt(kg): --    EXAM:  Constitutional: NAD, resting comfortably in bed.   HEENT: Pupils equal, round, reactive to light. EOMI. R facial, +dysconjugate gaze  Respiratory: No respiratory distress, symmetric chest rise   Cardiovascular: RRR, S1, S2.   Gastrointestinal: Abdomen soft, non tender, nondistended.  Neurological:  AAOX3. Opens eyes. Follows commands. Speech clear.  Motor: LUE 5/5 and LLE HF/KF/KE 5/5, PF/DF 3/5.  RUE delt 1/5, tricep 4/5, bicep 4/5, and HG 2/5. RLE HF/KF/KE 4/5, DF/PF 0/5.   Sensation: RUE decreased sensation  Extremities: Warm, well perfused.    12-18    135  |  102  |  27[H]  ----------------------------<  139[H]  4.5   |  25  |  0.87    Ca    8.4      18 Dec 2024 05:30  Phos  3.0     12-18  Mg     2.1     12-18      CBC Full  -  ( 18 Dec 2024 05:30 )  WBC Count : 6.94 K/uL  RBC Count : 4.80 M/uL  Hemoglobin : 14.2 g/dL  Hematocrit : 42.8 %  Platelet Count - Automated : 315 K/uL  Mean Cell Volume : 89.2 fl  Mean Cell Hemoglobin : 29.6 pg  Mean Cell Hemoglobin Concentration : 33.2 g/dL    Pregnancy test (serum hcg for any female under 56, must be resulted day before OR): [ ] Negative Result  [ ] Positive Result  [X] N/A : male or female>55 y/o  Type & Screen (in past 72hrs):     2 Type & Screen within 72 hours if anticipate blood need in OR: Ordered for 8pm blood draw      Blood ordered and on hold for OR:   [ ] No need     [ ] 1u pRBC on hold      [ ] 2u pRBC on hold    CXR: 12/13 clear lungs   EKG: Sinus bradycardia   Medical Clearances: Documented 12/17    Last dose of antiplatelet/anticoagulation drug: SQL 40mg 2200 12/17    Implanted Devices (pacemaker, drug pump...etc):  []YES   [X] NO    3M nasal swab ordered?  Yes  Cranial surgery: Order written for hair to be shampooed night before surgery and morning before surgery  [X] yes   []no  Chlorhexidine Wipes ordered for Neck Down?  Yes               Assessment:      Plan:     Surgery: Left craniotomy for resection of brain tumor  Consent: Signed by patient    Representative Consent: [X] Signed by patient vs HCP      No Known Allergies      OVERNIGHT EVENTS: KYA overnight.     T(C): 36.3 (12-18-24 @ 08:41), Max: 36.8 (12-17-24 @ 20:40)  HR: 58 (12-18-24 @ 08:41) (48 - 67)  BP: 118/73 (12-18-24 @ 08:41) (111/59 - 135/70)  RR: 16 (12-18-24 @ 08:41) (16 - 18)  SpO2: 100% (12-18-24 @ 08:41) (95% - 100%)  Wt(kg): --    EXAM:  Constitutional: NAD, resting comfortably in bed.   HEENT: Pupils equal, round, reactive to light. EOMI. R facial, +dysconjugate gaze  Respiratory: No respiratory distress, symmetric chest rise   Cardiovascular: RRR, S1, S2.   Gastrointestinal: Abdomen soft, non tender, nondistended.  Neurological:  AAOX3. Opens eyes. Follows commands. Speech clear.  Motor: LUE 5/5 and LLE HF/KF/KE 5/5, PF/DF 3/5.  RUE delt 1/5, tricep 4/5, bicep 4/5, and HG 2/5. RLE HF/KF/KE 4/5, DF/PF 0/5.   Sensation: RUE decreased sensation  Extremities: Warm, well perfused.    12-18    135  |  102  |  27[H]  ----------------------------<  139[H]  4.5   |  25  |  0.87    Ca    8.4      18 Dec 2024 05:30  Phos  3.0     12-18  Mg     2.1     12-18      CBC Full  -  ( 18 Dec 2024 05:30 )  WBC Count : 6.94 K/uL  RBC Count : 4.80 M/uL  Hemoglobin : 14.2 g/dL  Hematocrit : 42.8 %  Platelet Count - Automated : 315 K/uL  Mean Cell Volume : 89.2 fl  Mean Cell Hemoglobin : 29.6 pg  Mean Cell Hemoglobin Concentration : 33.2 g/dL    Pregnancy test (serum hcg for any female under 56, must be resulted day before OR): [ ] Negative Result  [ ] Positive Result  [X] N/A : male or female>55 y/o  Type & Screen (in past 72hrs):     2 Type & Screen within 72 hours if anticipate blood need in OR: Ordered for 8pm blood draw      Blood ordered and on hold for OR:   [ ] No need     [X] 1u pRBC on hold      [ ] 2u pRBC on hold    CXR: 12/13 clear lungs   EKG: Sinus bradycardia   Medical Clearances: Documented 12/17    Last dose of antiplatelet/anticoagulation drug: SQL 40mg 2200 12/17    Implanted Devices (pacemaker, drug pump...etc):  []YES   [X] NO    3M nasal swab ordered?  Yes  Cranial surgery: Order written for hair to be shampooed night before surgery and morning before surgery  [X] yes   []no  Chlorhexidine Wipes ordered for Neck Down?  Yes               Assessment:  69M PMHx HTN, OA, right  TKA, s/p L craniotomy for GBM resection 04/25/2023, s/p chemo and RT 2023 with Dr. Causey. Presented to ER due to reoccurrence of brain tumor seen on his latest MRI completed 4 days ago in [place]. Patient c/o worsened R facial droop, worsening word finding difficulties, and right sided weakness x 2 weeks. Pre-op for L crani for resection of brain lesion 12/19.     Plan:  - for OR tomorrow  - consents and vendor consents signed and in chart  - cleared by medicine  - 5 ALA at 6am  - Tylenol and Emend ordered for on call to OR  - MR smith completed  - NPO at midnight, IVF while NPO  - shampoo orders  - CHG/3M ordered  - active T&S --> 1 u PRBC on hold  - coags and labs reviewed (fresh coags and T&S for 8pm)  - continue keppra and decadron  - SCDs, SQL held for OR    Discussed with Dr. Johnston

## 2024-12-19 ENCOUNTER — RESULT REVIEW (OUTPATIENT)
Age: 69
End: 2024-12-19

## 2024-12-19 LAB
ANION GAP SERPL CALC-SCNC: 12 MMOL/L — SIGNIFICANT CHANGE UP (ref 5–17)
ANION GAP SERPL CALC-SCNC: 6 MMOL/L — SIGNIFICANT CHANGE UP (ref 5–17)
APTT BLD: 27.4 SEC — SIGNIFICANT CHANGE UP (ref 24.5–35.6)
BUN SERPL-MCNC: 24 MG/DL — HIGH (ref 7–23)
BUN SERPL-MCNC: 27 MG/DL — HIGH (ref 7–23)
CALCIUM SERPL-MCNC: 7.8 MG/DL — LOW (ref 8.4–10.5)
CALCIUM SERPL-MCNC: 9 MG/DL — SIGNIFICANT CHANGE UP (ref 8.4–10.5)
CHLORIDE SERPL-SCNC: 103 MMOL/L — SIGNIFICANT CHANGE UP (ref 96–108)
CHLORIDE SERPL-SCNC: 105 MMOL/L — SIGNIFICANT CHANGE UP (ref 96–108)
CO2 SERPL-SCNC: 21 MMOL/L — LOW (ref 22–31)
CO2 SERPL-SCNC: 28 MMOL/L — SIGNIFICANT CHANGE UP (ref 22–31)
CREAT SERPL-MCNC: 0.83 MG/DL — SIGNIFICANT CHANGE UP (ref 0.5–1.3)
CREAT SERPL-MCNC: 0.93 MG/DL — SIGNIFICANT CHANGE UP (ref 0.5–1.3)
EGFR: 89 ML/MIN/1.73M2 — SIGNIFICANT CHANGE UP
EGFR: 95 ML/MIN/1.73M2 — SIGNIFICANT CHANGE UP
GLUCOSE SERPL-MCNC: 142 MG/DL — HIGH (ref 70–99)
GLUCOSE SERPL-MCNC: 168 MG/DL — HIGH (ref 70–99)
HCT VFR BLD CALC: 42.9 % — SIGNIFICANT CHANGE UP (ref 39–50)
HCT VFR BLD CALC: 46.9 % — SIGNIFICANT CHANGE UP (ref 39–50)
HGB BLD-MCNC: 14.5 G/DL — SIGNIFICANT CHANGE UP (ref 13–17)
HGB BLD-MCNC: 15.2 G/DL — SIGNIFICANT CHANGE UP (ref 13–17)
INR BLD: 1.03 — SIGNIFICANT CHANGE UP (ref 0.85–1.16)
MAGNESIUM SERPL-MCNC: 1.9 MG/DL — SIGNIFICANT CHANGE UP (ref 1.6–2.6)
MAGNESIUM SERPL-MCNC: 2.3 MG/DL — SIGNIFICANT CHANGE UP (ref 1.6–2.6)
MCHC RBC-ENTMCNC: 28.7 PG — SIGNIFICANT CHANGE UP (ref 27–34)
MCHC RBC-ENTMCNC: 30 PG — SIGNIFICANT CHANGE UP (ref 27–34)
MCHC RBC-ENTMCNC: 32.4 G/DL — SIGNIFICANT CHANGE UP (ref 32–36)
MCHC RBC-ENTMCNC: 33.8 G/DL — SIGNIFICANT CHANGE UP (ref 32–36)
MCV RBC AUTO: 88.6 FL — SIGNIFICANT CHANGE UP (ref 80–100)
MCV RBC AUTO: 88.7 FL — SIGNIFICANT CHANGE UP (ref 80–100)
NRBC # BLD: 0 /100 WBCS — SIGNIFICANT CHANGE UP (ref 0–0)
NRBC # BLD: 0 /100 WBCS — SIGNIFICANT CHANGE UP (ref 0–0)
PHOSPHATE SERPL-MCNC: 2.9 MG/DL — SIGNIFICANT CHANGE UP (ref 2.5–4.5)
PHOSPHATE SERPL-MCNC: 3.5 MG/DL — SIGNIFICANT CHANGE UP (ref 2.5–4.5)
PLATELET # BLD AUTO: 363 K/UL — SIGNIFICANT CHANGE UP (ref 150–400)
PLATELET # BLD AUTO: 412 K/UL — HIGH (ref 150–400)
POTASSIUM SERPL-MCNC: 4.1 MMOL/L — SIGNIFICANT CHANGE UP (ref 3.5–5.3)
POTASSIUM SERPL-MCNC: 4.4 MMOL/L — SIGNIFICANT CHANGE UP (ref 3.5–5.3)
POTASSIUM SERPL-SCNC: 4.1 MMOL/L — SIGNIFICANT CHANGE UP (ref 3.5–5.3)
POTASSIUM SERPL-SCNC: 4.4 MMOL/L — SIGNIFICANT CHANGE UP (ref 3.5–5.3)
PROTHROM AB SERPL-ACNC: 11.8 SEC — SIGNIFICANT CHANGE UP (ref 9.9–13.4)
RBC # BLD: 4.84 M/UL — SIGNIFICANT CHANGE UP (ref 4.2–5.8)
RBC # BLD: 5.29 M/UL — SIGNIFICANT CHANGE UP (ref 4.2–5.8)
RBC # FLD: 12.6 % — SIGNIFICANT CHANGE UP (ref 10.3–14.5)
RBC # FLD: 12.9 % — SIGNIFICANT CHANGE UP (ref 10.3–14.5)
SODIUM SERPL-SCNC: 137 MMOL/L — SIGNIFICANT CHANGE UP (ref 135–145)
SODIUM SERPL-SCNC: 138 MMOL/L — SIGNIFICANT CHANGE UP (ref 135–145)
WBC # BLD: 6.95 K/UL — SIGNIFICANT CHANGE UP (ref 3.8–10.5)
WBC # BLD: 7.79 K/UL — SIGNIFICANT CHANGE UP (ref 3.8–10.5)
WBC # FLD AUTO: 6.95 K/UL — SIGNIFICANT CHANGE UP (ref 3.8–10.5)
WBC # FLD AUTO: 7.79 K/UL — SIGNIFICANT CHANGE UP (ref 3.8–10.5)

## 2024-12-19 PROCEDURE — 99232 SBSQ HOSP IP/OBS MODERATE 35: CPT

## 2024-12-19 PROCEDURE — 61781 SCAN PROC CRANIAL INTRA: CPT

## 2024-12-19 PROCEDURE — 88304 TISSUE EXAM BY PATHOLOGIST: CPT | Mod: 26

## 2024-12-19 PROCEDURE — 88300 SURGICAL PATH GROSS: CPT | Mod: 26,59

## 2024-12-19 PROCEDURE — 99291 CRITICAL CARE FIRST HOUR: CPT

## 2024-12-19 PROCEDURE — 88342 IMHCHEM/IMCYTCHM 1ST ANTB: CPT | Mod: 26

## 2024-12-19 PROCEDURE — 77295 3-D RADIOTHERAPY PLAN: CPT | Mod: 26

## 2024-12-19 PROCEDURE — 77470 SPECIAL RADIATION TREATMENT: CPT | Mod: 26

## 2024-12-19 PROCEDURE — 88307 TISSUE EXAM BY PATHOLOGIST: CPT | Mod: 26

## 2024-12-19 PROCEDURE — 77778 APPLY INTERSTIT RADIAT COMPL: CPT | Mod: 26

## 2024-12-19 PROCEDURE — 69990 MICROSURGERY ADD-ON: CPT | Mod: 59

## 2024-12-19 PROCEDURE — 95961 ELECTRODE STIMULATION BRAIN: CPT | Mod: 26,59

## 2024-12-19 PROCEDURE — 61510 CRNEC TREPH EXC BRN TUM STTL: CPT

## 2024-12-19 PROCEDURE — 95962 ELECTRODE STIM BRAIN ADD-ON: CPT | Mod: 26,59

## 2024-12-19 PROCEDURE — 61517 IMPLT BRN INTRCV CHEMOTX AGT: CPT

## 2024-12-19 PROCEDURE — 88331 PATH CONSLTJ SURG 1 BLK 1SPC: CPT | Mod: 26

## 2024-12-19 DEVICE — DVC ADHERUS AUTOSPRAY W/ DURAL SEALANT EXT TIP: Type: IMPLANTABLE DEVICE | Site: LEFT | Status: FUNCTIONAL

## 2024-12-19 DEVICE — IMPLANTABLE DEVICE: Type: IMPLANTABLE DEVICE | Site: LEFT | Status: FUNCTIONAL

## 2024-12-19 DEVICE — PLATE COVER BURRHOLE UN3 W/TAB 10MM: Type: IMPLANTABLE DEVICE | Site: LEFT | Status: FUNCTIONAL

## 2024-12-19 DEVICE — SURGIFLO HEMOSTATIC MATRIX KIT: Type: IMPLANTABLE DEVICE | Site: LEFT | Status: FUNCTIONAL

## 2024-12-19 DEVICE — SCREW UN3 AXS SELF DRILL 1.5X4MM: Type: IMPLANTABLE DEVICE | Site: LEFT | Status: FUNCTIONAL

## 2024-12-19 RX ORDER — DEXTROSE MONOHYDRATE 25 G/50ML
12.5 INJECTION, SOLUTION INTRAVENOUS ONCE
Refills: 0 | Status: DISCONTINUED | OUTPATIENT
Start: 2024-12-19 | End: 2024-12-19

## 2024-12-19 RX ORDER — LABETALOL HCL 300 MG/1
10 TABLET, FILM COATED ORAL
Refills: 0 | Status: DISCONTINUED | OUTPATIENT
Start: 2024-12-19 | End: 2024-12-19

## 2024-12-19 RX ORDER — DEXAMETHASONE SODIUM PHOSPHATE 4 MG/ML
VIAL (ML) INJECTION
Refills: 0 | Status: DISCONTINUED | OUTPATIENT
Start: 2024-12-19 | End: 2024-12-20

## 2024-12-19 RX ORDER — POLYETHYLENE GLYCOL 3350 17 G/DOSE
17 POWDER (GRAM) ORAL DAILY
Refills: 0 | Status: DISCONTINUED | OUTPATIENT
Start: 2024-12-20 | End: 2024-12-20

## 2024-12-19 RX ORDER — OXYCODONE HCL 15 MG
5 TABLET ORAL EVERY 6 HOURS
Refills: 0 | Status: DISCONTINUED | OUTPATIENT
Start: 2024-12-19 | End: 2024-12-21

## 2024-12-19 RX ORDER — DEXTROSE MONOHYDRATE 25 G/50ML
25 INJECTION, SOLUTION INTRAVENOUS ONCE
Refills: 0 | Status: DISCONTINUED | OUTPATIENT
Start: 2024-12-19 | End: 2024-12-19

## 2024-12-19 RX ORDER — CEFAZOLIN SODIUM 1 G
2000 VIAL (EA) INJECTION EVERY 8 HOURS
Refills: 0 | Status: COMPLETED | OUTPATIENT
Start: 2024-12-19 | End: 2024-12-19

## 2024-12-19 RX ORDER — DEXAMETHASONE SODIUM PHOSPHATE 4 MG/ML
4 VIAL (ML) INJECTION EVERY 6 HOURS
Refills: 0 | Status: DISCONTINUED | OUTPATIENT
Start: 2024-12-19 | End: 2024-12-20

## 2024-12-19 RX ORDER — SODIUM CHLORIDE 9 MG/ML
1000 INJECTION, SOLUTION INTRAMUSCULAR; INTRAVENOUS; SUBCUTANEOUS
Refills: 0 | Status: DISCONTINUED | OUTPATIENT
Start: 2024-12-19 | End: 2024-12-20

## 2024-12-19 RX ORDER — DEXTROSE MONOHYDRATE 25 G/50ML
15 INJECTION, SOLUTION INTRAVENOUS ONCE
Refills: 0 | Status: DISCONTINUED | OUTPATIENT
Start: 2024-12-19 | End: 2024-12-19

## 2024-12-19 RX ORDER — INSULIN LISPRO 100/ML
VIAL (ML) SUBCUTANEOUS
Refills: 0 | Status: DISCONTINUED | OUTPATIENT
Start: 2024-12-19 | End: 2024-12-20

## 2024-12-19 RX ORDER — ACETAMINOPHEN 80 MG/.8ML
1000 SOLUTION/ DROPS ORAL EVERY 8 HOURS
Refills: 0 | Status: COMPLETED | OUTPATIENT
Start: 2024-12-19 | End: 2024-12-21

## 2024-12-19 RX ORDER — SENNOSIDES 8.6 MG/1
2 TABLET, FILM COATED ORAL
Refills: 0 | Status: DISCONTINUED | OUTPATIENT
Start: 2024-12-19 | End: 2024-12-25

## 2024-12-19 RX ORDER — B COMPLEX, C NO.20/FOLIC ACID 1 MG
1 CAPSULE ORAL DAILY
Refills: 0 | Status: DISCONTINUED | OUTPATIENT
Start: 2024-12-19 | End: 2024-12-27

## 2024-12-19 RX ORDER — INSULIN LISPRO 100/ML
VIAL (ML) SUBCUTANEOUS
Refills: 0 | Status: DISCONTINUED | OUTPATIENT
Start: 2024-12-19 | End: 2024-12-19

## 2024-12-19 RX ORDER — OXYCODONE HCL 15 MG
10 TABLET ORAL EVERY 6 HOURS
Refills: 0 | Status: DISCONTINUED | OUTPATIENT
Start: 2024-12-19 | End: 2024-12-21

## 2024-12-19 RX ORDER — ONDANSETRON 4 MG/1
4 TABLET ORAL EVERY 6 HOURS
Refills: 0 | Status: COMPLETED | OUTPATIENT
Start: 2024-12-19 | End: 2024-12-21

## 2024-12-19 RX ORDER — DEXAMETHASONE SODIUM PHOSPHATE 4 MG/ML
2 VIAL (ML) INJECTION EVERY 6 HOURS
Refills: 0 | Status: DISCONTINUED | OUTPATIENT
Start: 2024-12-20 | End: 2024-12-20

## 2024-12-19 RX ORDER — SODIUM CHLORIDE 9 MG/ML
1000 INJECTION, SOLUTION INTRAVENOUS
Refills: 0 | Status: DISCONTINUED | OUTPATIENT
Start: 2024-12-19 | End: 2024-12-19

## 2024-12-19 RX ORDER — GLUCAGON INJECTION, SOLUTION 0.5 MG/.1ML
1 INJECTION, SOLUTION SUBCUTANEOUS ONCE
Refills: 0 | Status: DISCONTINUED | OUTPATIENT
Start: 2024-12-19 | End: 2024-12-19

## 2024-12-19 RX ORDER — SODIUM CHLORIDE 9 MG/ML
1000 INJECTION, SOLUTION INTRAMUSCULAR; INTRAVENOUS; SUBCUTANEOUS
Refills: 0 | Status: DISCONTINUED | OUTPATIENT
Start: 2024-12-19 | End: 2024-12-19

## 2024-12-19 RX ORDER — LEVETIRACETAM 100 MG/ML
1000 SOLUTION ORAL EVERY 12 HOURS
Refills: 0 | Status: DISCONTINUED | OUTPATIENT
Start: 2024-12-19 | End: 2024-12-27

## 2024-12-19 RX ORDER — HYDROMORPHONE HCL 4 MG
0.5 TABLET ORAL
Refills: 0 | Status: DISCONTINUED | OUTPATIENT
Start: 2024-12-19 | End: 2024-12-20

## 2024-12-19 RX ORDER — CHLORHEXIDINE GLUCONATE 1.2 MG/ML
1 RINSE ORAL DAILY
Refills: 0 | Status: DISCONTINUED | OUTPATIENT
Start: 2024-12-19 | End: 2024-12-20

## 2024-12-19 RX ORDER — FAMOTIDINE 20 MG/1
20 TABLET, FILM COATED ORAL EVERY 12 HOURS
Refills: 0 | Status: DISCONTINUED | OUTPATIENT
Start: 2024-12-19 | End: 2024-12-27

## 2024-12-19 RX ORDER — AMINOLEVULINIC ACID HYDROCHLORIDE 1500 MG/1
1710 POWDER, FOR SOLUTION ORAL ONCE
Refills: 0 | Status: COMPLETED | OUTPATIENT
Start: 2024-12-19 | End: 2024-12-19

## 2024-12-19 RX ADMIN — ONDANSETRON 4 MILLIGRAM(S): 4 TABLET ORAL at 23:25

## 2024-12-19 RX ADMIN — ACETAMINOPHEN 1000 MILLIGRAM(S): 80 SOLUTION/ DROPS ORAL at 06:08

## 2024-12-19 RX ADMIN — Medication 100 MILLIGRAM(S): at 23:20

## 2024-12-19 RX ADMIN — Medication 2: at 13:33

## 2024-12-19 RX ADMIN — POVIDONE IODINE USP, 10% W/W 1 APPLICATION(S): 10 SWAB TOPICAL at 06:08

## 2024-12-19 RX ADMIN — SENNOSIDES 2 TABLET(S): 8.6 TABLET, FILM COATED ORAL at 17:27

## 2024-12-19 RX ADMIN — Medication 10 MILLIGRAM(S): at 23:20

## 2024-12-19 RX ADMIN — CHLORHEXIDINE GLUCONATE 1 APPLICATION(S): 1.2 RINSE ORAL at 06:00

## 2024-12-19 RX ADMIN — LEVETIRACETAM 1000 MILLIGRAM(S): 100 SOLUTION ORAL at 06:08

## 2024-12-19 RX ADMIN — Medication 2: at 21:51

## 2024-12-19 RX ADMIN — ACETAMINOPHEN 1000 MILLIGRAM(S): 80 SOLUTION/ DROPS ORAL at 15:12

## 2024-12-19 RX ADMIN — Medication 10 MILLIGRAM(S): at 06:09

## 2024-12-19 RX ADMIN — Medication 4 MILLIGRAM(S): at 06:08

## 2024-12-19 RX ADMIN — AMINOLEVULINIC ACID HYDROCHLORIDE 1710 MILLIGRAM(S): 1500 POWDER, FOR SOLUTION ORAL at 06:08

## 2024-12-19 RX ADMIN — LEVETIRACETAM 1000 MILLIGRAM(S): 100 SOLUTION ORAL at 17:27

## 2024-12-19 RX ADMIN — APREPITANT 40 MILLIGRAM(S): 40 CAPSULE ORAL at 06:08

## 2024-12-19 RX ADMIN — SODIUM CHLORIDE 85 MILLILITER(S): 9 INJECTION, SOLUTION INTRAMUSCULAR; INTRAVENOUS; SUBCUTANEOUS at 13:25

## 2024-12-19 RX ADMIN — Medication 4 MILLIGRAM(S): at 17:27

## 2024-12-19 RX ADMIN — ACETAMINOPHEN 1000 MILLIGRAM(S): 80 SOLUTION/ DROPS ORAL at 22:10

## 2024-12-19 RX ADMIN — ACETAMINOPHEN 1000 MILLIGRAM(S): 80 SOLUTION/ DROPS ORAL at 21:54

## 2024-12-19 RX ADMIN — FAMOTIDINE 20 MILLIGRAM(S): 20 TABLET, FILM COATED ORAL at 17:27

## 2024-12-19 RX ADMIN — ACETAMINOPHEN 1000 MILLIGRAM(S): 80 SOLUTION/ DROPS ORAL at 16:27

## 2024-12-19 RX ADMIN — Medication 100 MILLIGRAM(S): at 15:35

## 2024-12-19 RX ADMIN — Medication 4 MILLIGRAM(S): at 23:20

## 2024-12-19 RX ADMIN — FAMOTIDINE 20 MILLIGRAM(S): 20 TABLET, FILM COATED ORAL at 06:08

## 2024-12-19 NOTE — BRIEF OPERATIVE NOTE - OPERATION/FINDINGS
Left craniotomy for neoplasm removal and placement of gamma tile brachytherapy Left frontal craniotomy for neoplasm removal and placement of gamma tile brachytherapy Left frontal craniotomy for neoplasm removal and placement of gamma tile brachytherapy  Frozen: high grade glioma

## 2024-12-19 NOTE — PROGRESS NOTE ADULT - SUBJECTIVE AND OBJECTIVE BOX
=================================  NEUROCRITICAL CARE ATTENDING NOTE  =================================    KAREEM GANNON   MRN-[id number]  Summary:  69y/M  with h/o HTN, OA, right  TKA, s/p L craniotomy for glioblastoma resection 2023, s/p chemo and radiation therapy  with Dr. Causey, currently on physical therapy and brain MRI every 2 month post craniotomy, presents to ER due to reoccurrence of brain tumor seen on his latest MRI completed 4 days ago in [place]. Patient c/o worsened R facial droop, worsening word finding difficulties, and right sided weakness x 2 weeks. Pt was recommended to go to [facility] ER for further evaluation by his oncologist. Patient denies vision changes, extremity numbness, confusion, headache, nausea and vomiting.  (14 Dec 2024 02:25)    COURSE IN THE HOSPITAL:   POD 0 s/p craniotomy with supratentorial neoplasm excision (2024, Juanjose Johnston) - High-grade glioma, gamma tile placed    Past Medical History: Hypertension Osteoarthritis Brain mass Glioblastoma  Allergies:  No Known Allergies  Home meds:   ·	amLODIPine 10 mg oral tablet: 1 tab(s) orally every 24 hours  ·	levETIRAcetam 1000 mg oral tablet: 1 tab(s) orally 2 times a day  ·	metFORMIN 500 mg oral tablet: 1 tab(s) orally 2 times a day  ·	Multiple Vitamins oral tablet: 1 tab(s) orally once a day  ·	vitamin c: once a day  ·	vitamin D: once daily  ·	vitamin K2:     PHYSICAL EXAMINATION  T(C): 36.1 ( @ 14:12), Max: 36.8 ( @ 20:00) HR: 89 ( @ 14:12) (46 - 94) BP: 116/55 ( @ 14:12) (113/62 - 137/75) RR: 21 ( @ 14:12) (13 - 25) SpO2: 98% ( @ 14:12) (97% - 100%)  NEUROLOGIC EXAMINATION:  Patient is awake, alert, fully oriented, pupils 2-3mm equal and briskly reactive to light, EOMs intact, muscle strength 5/5 on all 4s.  GENERAL: not intubated, not in cardiorespiratory distress  EENT:  anicteric  CARDIOVASCULAR: (+) S1 S2, normal rate and regular rhythm  PULMONARY: clear to auscultation bilaterally  ABDOMEN: soft, nontender with normoactive bowel sounds  EXTREMITIES: no edema  SKIN: no rash    LABS:  CAPILLARY BLOOD GLUCOSE 168 130                14.5   6.95  )-----------( 363      ( 19 Dec 2024 12:44 )             42.9         138  |  105  |  24[H]  ----------------------------<  168[H]  4.1   |  21[L]  |  0.83    Ca    7.8[L]      19 Dec 2024 12:44  Phos  3.5       Mg     1.9      @ 07:01  -   @ 14:17  --------------------------------------------------------  IN: 2185 mL / OUT: 550 mL / NET: 1635 mL    Bacteriology:  CSF studies:  EEG:  [id number]	MR Brain	Increased size of left frontoparietal mass, hyperperfusion, stable 0.4 cm rightward shift.  2024	CT Brain	Increased edema in left MCA territory, new 4 mm rightward shift, no acute hemorrhage.  2023	MR Brain	Postsurgical changes in left frontal lobe; no residual tumor; decreased edema.  2023	CT Brain	Post-resection changes in left frontal lobe, no acute hemorrhage/infarct.  2023	MR Brain	2.7 x 3.4 x 3.9 cm mass in left frontal lobe, surrounding edema, images for surgery.    Other imagin2023	US Lwr Ext Rt	No DVT in right lower extremity veins.  2023	US Lwr Ext Bi	No DVT in either lower extremity, resolution of previous DVT sites.  2023	US Upr Ext Rt	No DVT in right upper extremity veins.  2023	US Lwr Ext Bi	Bilateral intramuscular calf DVTs.    MEDICATIONS: 12-    ·	ceFAZolin   IVPB 2000 IV Intermittent every 8 hours  ·	acetaminophen     Tablet .. 1000 Oral every 8 hours  ·	levETIRAcetam 1000 Oral every 12 hours  ·	ondansetron   Disintegrating Tablet 4 Oral every 6 hours  ·	famotidine    Tablet 20 Oral every 12 hours  ·	senna 2 Oral two times a day  ·	dexAMETHasone  Injectable 4 IV Push every 6 hours  ·	insulin lispro (ADMELOG) corrective regimen sliding scale  SubCutaneous three times a day before meals  ·	multivitamin 1 Oral daily  ·	HYDROmorphone  Injectable 0.5 IV Push every 3 hours PRN  ·	labetalol Injectable 10 IV Push every 2 hours PRN    IV FLUIDS:  DRIPS:  DIET:  Lines:  Drains:      Wounds:    CODE STATUS:  Full Code                       GOALS OF CARE:  aggressive                      DISPOSITION:  ICU =================================  NEUROCRITICAL CARE ATTENDING NOTE  =================================    KAREEM GANNON   MRN-[id number]  Summary:  69y/M  with h/o HTN, OA, right  TKA, s/p L craniotomy for glioblastoma resection 2023, s/p chemo and radiation therapy  with Dr. Causey, currently on physical therapy and brain MRI every 2 month post craniotomy, presents to ER due to reoccurrence of brain tumor seen on his latest MRI completed 4 days ago in [place]. Patient c/o worsened R facial droop, worsening word finding difficulties, and right sided weakness x 2 weeks. Pt was recommended to go to [facility] ER for further evaluation by his oncologist. Patient denies vision changes, extremity numbness, confusion, headache, nausea and vomiting.  (14 Dec 2024 02:25)    COURSE IN THE HOSPITAL:   POD 0 s/p craniotomy with supratentorial neoplasm excision (2024, Juanjose Johnston) - High-grade glioma, gamma tile placed    Past Medical History: Hypertension Osteoarthritis Brain mass Glioblastoma  Allergies:  No Known Allergies  Home meds:   ·	amLODIPine 10 mg oral tablet: 1 tab(s) orally every 24 hours  ·	levETIRAcetam 1000 mg oral tablet: 1 tab(s) orally 2 times a day  ·	metFORMIN 500 mg oral tablet: 1 tab(s) orally 2 times a day  ·	Multiple Vitamins oral tablet: 1 tab(s) orally once a day  ·	vitamin c: once a day  ·	vitamin D: once daily  ·	vitamin K2:     PHYSICAL EXAMINATION  T(C): 36.1 ( @ 14:12), Max: 36.8 ( @ 20:00) HR: 89 ( @ 14:12) (46 - 94) BP: 116/55 ( @ 14:12) (113/62 - 137/75) RR: 21 ( @ 14:12) (13 - 25) SpO2: 98% ( @ 14:12) (97% - 100%)  NEUROLOGIC EXAMINATION:  Patient is awake, alert, fully oriented, SHELBI, dysconjugate gaze, R facial, mild dysarthria RUE delt 0/5, RUE bic/tricep 4+/5, RHG 4/5, RLE 5/5 L UE/LE 5/5  GENERAL: not intubated, not in cardiorespiratory distress  EENT:  anicteric  CARDIOVASCULAR: (+) S1 S2, normal rate and regular rhythm  PULMONARY: clear to auscultation bilaterally  ABDOMEN: soft, nontender with normoactive bowel sounds  EXTREMITIES: no edema  SKIN: no rash    LABS:  CAPILLARY BLOOD GLUCOSE 168 130                14.5   6.95  )-----------( 363      ( 19 Dec 2024 12:44 )             42.9         138  |  105  |  24[H]  ----------------------------<  168[H]  4.1   |  21[L]  |  0.83    Ca    7.8[L]      19 Dec 2024 12:44  Phos  3.5       Mg     1.9      @ 07:01  -   @ 14:17  --------------------------------------------------------  IN: 2185 mL / OUT: 550 mL / NET: 1635 mL    Bacteriology:  CSF studies:  EEG:  [id number]	MR Brain	Increased size of left frontoparietal mass, hyperperfusion, stable 0.4 cm rightward shift.  2024	CT Brain	Increased edema in left MCA territory, new 4 mm rightward shift, no acute hemorrhage.  2023	MR Brain	Postsurgical changes in left frontal lobe; no residual tumor; decreased edema.  2023	CT Brain	Post-resection changes in left frontal lobe, no acute hemorrhage/infarct.  2023	MR Brain	2.7 x 3.4 x 3.9 cm mass in left frontal lobe, surrounding edema, images for surgery.    Other imagin2023	US Lwr Ext Rt	No DVT in right lower extremity veins.  2023	US Lwr Ext Bi	No DVT in either lower extremity, resolution of previous DVT sites.  2023	US [facility] Ext Rt	No DVT in right upper extremity veins.  2023	US Lwr Ext Bi	Bilateral intramuscular calf DVTs.    MEDICATIONS: 12-    ·	ceFAZolin   IVPB 2000 IV Intermittent every 8 hours  ·	acetaminophen     Tablet .. 1000 Oral every 8 hours  ·	levETIRAcetam 1000 Oral every 12 hours  ·	ondansetron   Disintegrating Tablet 4 Oral every 6 hours  ·	famotidine    Tablet 20 Oral every 12 hours  ·	senna 2 Oral two times a day  ·	dexAMETHasone  Injectable 4 IV Push every 6 hours  ·	insulin lispro (ADMELOG) corrective regimen sliding scale  SubCutaneous three times a day before meals  ·	multivitamin 1 Oral daily  ·	HYDROmorphone  Injectable 0.5 IV Push every 3 hours PRN  ·	labetalol Injectable 10 IV Push every 2 hours PRN    IV FLUIDS:  DRIPS:  DIET:  Lines:  Drains:      Wounds:    CODE STATUS:  Full Code                       GOALS OF CARE:  aggressive                      DISPOSITION:  ICU

## 2024-12-19 NOTE — PROGRESS NOTE ADULT - SUBJECTIVE AND OBJECTIVE BOX
NSCU ATTENDING -- ADDITIONAL PROGRESS NOTE    Nighttime rounds were performed    68y/o M with h/o HTN, OA, right  TKA, s/p L craniotomy for glioblastoma resection 04/25/2023, s/p chemo and radiation therapy 2023 with Dr. Causey, currently on physical therapy and brain MRI every 2 month post craniotomy, presents to ER due to reoccurrence of brain tumor seen on his latest MRI completed 4 days ago in [place]. Patient c/o worsened R facial droop, worsening word finding difficulties, and right sided weakness x 2 weeks. Pt was recommended to go to [facility] ER for further evaluation by his oncologist. Patient denies vision changes, extremity numbness, confusion, headache, nausea and vomiting.  (14 Dec 2024 02:25)      ICU Vital Signs Last 24 Hrs  T(C): 36.4 (19 Dec 2024 18:32), Max: 36.8 (18 Dec 2024 20:00)  T(F): 97.6 (19 Dec 2024 18:32), Max: 98.2 (18 Dec 2024 20:00)  HR: 81 (19 Dec 2024 18:00) (46 - 94)  BP: 116/55 (19 Dec 2024 14:12) (113/62 - 137/75)  BP(mean): 81 (19 Dec 2024 14:12) (81 - 92)  ABP: 143/66 (19 Dec 2024 18:00) (103/67 - 143/66)  ABP(mean): 91 (19 Dec 2024 18:00) (77 - 101)  RR: 17 (19 Dec 2024 18:00) (13 - 25)  SpO2: 99% (19 Dec 2024 18:00) (97% - 100%)      12-19-24 @ 07:01  -  12-19-24 @ 18:54  --------------------------------------------------------  IN: 2490 mL / OUT: 945 mL / NET: 1545 mL      PHYSICAL EXAMINATION  NEURO:  Patient is awake, alert, fully oriented, pupils 3mm equal and briskly reactive to light, EOMs intact  Power: 5/5 in all extremities  GENERAL: Calm  EENT: Anicteric  CARDIOVASCULAR: S1/S2, RRR  PULMONARY: Clear to auscultation bilaterally  ABDOMEN: Soft, nontender with normoactive bowel sounds  EXTREMITIES: No edema  SKIN: No rash      MEDICATIONS:   acetaminophen     Tablet .. 1000 milliGRAM(s) Oral every 8 hours  ceFAZolin   IVPB 2000 milliGRAM(s) IV Intermittent every 8 hours  chlorhexidine 4% Liquid 1 Application(s) Topical daily  dexAMETHasone  Injectable   IV Push   dexAMETHasone  Injectable 4 milliGRAM(s) IV Push every 6 hours  famotidine    Tablet 20 milliGRAM(s) Oral every 12 hours  HYDROmorphone  Injectable 0.5 milliGRAM(s) IV Push every 3 hours PRN  insulin lispro (ADMELOG) corrective regimen sliding scale   SubCutaneous Before meals and at bedtime  labetalol Injectable 10 milliGRAM(s) IV Push every 2 hours PRN  levETIRAcetam 1000 milliGRAM(s) Oral every 12 hours  multivitamin 1 Tablet(s) Oral daily  ondansetron   Disintegrating Tablet 4 milliGRAM(s) Oral every 6 hours  senna 2 Tablet(s) Oral two times a day    LABS:                       14.5   6.95  )-----------( 363      ( 19 Dec 2024 12:44 )             42.9     12-19    138  |  105  |  24[H]  ----------------------------<  168[H]  4.1   |  21[L]  |  0.83    Ca    7.8[L]      19 Dec 2024 12:44  Phos  3.5     12-19  Mg     1.9     12-19      ASSESSMENT:   68y/o M with:  GBM, brain compression, cerebral edema, L frontoparietal mass s/p craniotomy with supratentorial neoplasm excision (12/19/2024, Juanjose Johnston)  Hypertension  osteoarthritis    PLAN:   NEURO:  Neurochecks Q1h  Analgesia:   MRI brain w w/o in 48 hours  Decadron: Taper per neurosurgery  Seizure prophylaxis:  levetiracetam 1000mg IV BID,  per neurosurgery   Activity: [] mobilize as tolerated [] Bedrest [] PT [] OT [] PMNR    PULM:    Room air, incentive spirometry    CARDIAC:   SBP goal 100-140mm Hg    ENDO:   Blood sugar goals 140-180 mg/dL  Continue insulin sliding scale    GI:   Famotidine for GI prophylaxis while on steroids  Diet: As tolerated  LBM:     RENAL:  IVF until eating well  Replete lytes prn    HEM/ONC:   Monitor H/H, coags  Follow up final histopathology  VTE Prophylaxis: SCDs, no DVT chemoprophylaxis for now as patient is high risk for bleed (fresh post-op)    ID:   Afebrile, no leukocytosis    MISC:    SOCIAL/FAMILY:  [] awaiting [] updated at bedside [] family meeting    CODE STATUS:  [] Full Code [] DNR [] DNI [] Palliative/Comfort Care    DISPOSITION:  [] ICU [] Stroke Unit [] Floor [] EMU [] RCU [] PCU    Time spent: ___ [] critical care minutes               NSCU ATTENDING -- ADDITIONAL PROGRESS NOTE    Nighttime rounds were performed    70y/o M with h/o HTN, OA, right  TKA, s/p L craniotomy for glioblastoma resection 04/25/2023, s/p chemo and radiation therapy 2023 with Dr. Causey, currently on physical therapy and brain MRI every 2 month post craniotomy, presents to ER due to reoccurrence of brain tumor seen on his latest MRI completed 4 days ago in [place]. Patient c/o worsened R facial droop, worsening word finding difficulties, and right sided weakness x 2 weeks. Pt was recommended to go to [facility] ER for further evaluation by his oncologist. Patient denies vision changes, extremity numbness, confusion, headache, nausea and vomiting.  (14 Dec 2024 02:25)      ICU Vital Signs Last 24 Hrs  T(C): 36.4 (19 Dec 2024 18:32), Max: 36.8 (18 Dec 2024 20:00)  T(F): 97.6 (19 Dec 2024 18:32), Max: 98.2 (18 Dec 2024 20:00)  HR: 81 (19 Dec 2024 18:00) (46 - 94)  BP: 116/55 (19 Dec 2024 14:12) (113/62 - 137/75)  BP(mean): 81 (19 Dec 2024 14:12) (81 - 92)  ABP: 143/66 (19 Dec 2024 18:00) (103/67 - 143/66)  ABP(mean): 91 (19 Dec 2024 18:00) (77 - 101)  RR: 17 (19 Dec 2024 18:00) (13 - 25)  SpO2: 99% (19 Dec 2024 18:00) (97% - 100%)      12-19-24 @ 07:01  -  12-19-24 @ 18:54  --------------------------------------------------------  IN: 2490 mL / OUT: 945 mL / NET: 1545 mL      PHYSICAL EXAMINATION  NEURO:  Patient is awake, alert, fully oriented, pupils 3mm equal and briskly reactive to light, EOMs intact, dysconjugate gaze, R facial, mild dysarthria  Power: RUE delt 0/5, RUE bic/tricep 4+/5, RHG 4/5, RLE 5/5  LUE and LLE 5/5  GENERAL: Calm  EENT: Anicteric  CARDIOVASCULAR: S1/S2, RRR  PULMONARY: Clear to auscultation bilaterally  ABDOMEN: Soft, nontender with normoactive bowel sounds  EXTREMITIES: No edema  SKIN: No rash      MEDICATIONS:   acetaminophen     Tablet .. 1000 milliGRAM(s) Oral every 8 hours  ceFAZolin   IVPB 2000 milliGRAM(s) IV Intermittent every 8 hours  chlorhexidine 4% Liquid 1 Application(s) Topical daily  dexAMETHasone  Injectable   IV Push   dexAMETHasone  Injectable 4 milliGRAM(s) IV Push every 6 hours  famotidine    Tablet 20 milliGRAM(s) Oral every 12 hours  HYDROmorphone  Injectable 0.5 milliGRAM(s) IV Push every 3 hours PRN  insulin lispro (ADMELOG) corrective regimen sliding scale   SubCutaneous Before meals and at bedtime  labetalol Injectable 10 milliGRAM(s) IV Push every 2 hours PRN  levETIRAcetam 1000 milliGRAM(s) Oral every 12 hours  multivitamin 1 Tablet(s) Oral daily  ondansetron   Disintegrating Tablet 4 milliGRAM(s) Oral every 6 hours  senna 2 Tablet(s) Oral two times a day    LABS:                       14.5   6.95  )-----------( 363      ( 19 Dec 2024 12:44 )             42.9     12-19    138  |  105  |  24[H]  ----------------------------<  168[H]  4.1   |  21[L]  |  0.83    Ca    7.8[L]      19 Dec 2024 12:44  Phos  3.5     12-19  Mg     1.9     12-19      ASSESSMENT:   70y/o M with:  GBM, brain compression, cerebral edema, L frontoparietal mass s/p craniotomy with supratentorial neoplasm excision (12/19/2024, Juanjose Johnston)  Frozen HGG  Hypertension  Osteoarthritis    PLAN:   NEURO:  Neurochecks Q1h  Analgesia: Cranial ERAS, oxycodone prn  MRI brain w w/o in 48 hours  Decadron: Taper per neurosurgery  Seizure prophylaxis: Levetiracetam 1000mg IV BID (home medication)  Activity: [] mobilize as tolerated [] Bedrest [x] PT [x] OT [] PMNR    PULM:    Room air, incentive spirometry    CARDIAC:   SBP goal 100-140mm Hg  Continue amlodipine per home regimen    ENDO:   Blood sugar goals 140-180 mg/dL  Continue insulin sliding scale    GI:   Famotidine for GI prophylaxis while on steroids  Diet: As tolerated  LBM: 12/14. Bowel regimen    RENAL:  IVF until eating well  Replete lytes prn    HEM/ONC:   Monitor H/H, coags  Follow up final histopathology  VTE Prophylaxis: SCDs, no DVT chemoprophylaxis for now as patient is high risk for bleed (fresh post-op)    ID:   Afebrile, no leukocytosis    MISC:    SOCIAL/FAMILY:  [x] awaiting [] updated at bedside [] family meeting    CODE STATUS:  [x] Full Code [] DNR [] DNI [] Palliative/Comfort Care    DISPOSITION:  [x] ICU [] Stroke Unit [] Floor [] EMU [] RCU [] PCU    Time spent: ___ [] critical care minutes

## 2024-12-19 NOTE — PROGRESS NOTE ADULT - ASSESSMENT
69y/M with  GBM, brain compression, cerebral edema, L frontoparietal mass s/p craniotomy with supratentorial neoplasm excision (12/19/2024, Juanjose Johnston)  Hypertension  osteoarthritis    PLAN:   NEURO: neurochecks q1h, PRN pain meds with acetaminophen, opiates  MRI on stepdown, f/u final histopathology, steroids, taper as per neurosurgery  seizure prophylaxis:  levetiracetam 1000mg IV BID, as per neurosurgery   REHAB:  physical therapy evaluation and management    EARLY MOB:  HOB up    PULM:  Room air, incentive spirometry  CARDIO:  SBP goal 100-140mm Hg  ENDO:  Blood sugar goals 140-180 mg/dL, continue insulin sliding scale  GI:  famotidine for GI prophylaxis while on steroids  DIET: advance once more awake  RENAL:  IVF until eating well  HEM/ONC: check post-op Hb  VTE Prophylaxis: SCDs, no DVT chemoprophylaxis for now as patient is high risk for bleed (fresh post-op)  ID: afebrile, no leukocytosis  Social: will update family    Active issues:  What's keeping patient in the ICU? fresh post-op  What is this patient's dispo plan? stepdown once neuro stable    ATTENDING ATTESTATION:  Patient at high risk for neurological deterioration or death due to:  ICU delirium, aspiration PNA, DVT / PE.  Critical care time:  I have personally provided 60 minutes of critical care time, excluding time spent on separate procedures.      Plan discussed with RN, house staff.

## 2024-12-19 NOTE — PROGRESS NOTE ADULT - SUBJECTIVE AND OBJECTIVE BOX
Patient is a 69y old  Male who presents with a chief complaint of GBM (18 Dec 2024 14:55)        SUBJECTIVE:  deferred, ptn in OR    Overnight Events : NAEON    Last Bowel Movement: 14-Dec-2024 (12-18)  Last Bowel Movement: 14-Dec-2024 (12-18)  Last Bowel Movement: 14-Dec-2024 (12-17)  Last Bowel Movement: 14-Dec-2024 (12-17)  Last Bowel Movement: 14-Dec-2024 (12-17)  Last Bowel Movement: 14-Dec-2024 (12-16)  Last Bowel Movement: 14-Dec-2024 (12-15)  Last Bowel Movement: 14-Dec-2024 (12-15)  Last Bowel Movement: 14-Dec-2024 (12-14)  Last Bowel Movement: 14-Dec-2024 (12-14)      Review of systems: 12 point Review of systems negative unless otherwise documented elsewhere in note.     Diet, Regular (12-19-24 @ 07:05) [Hold]  Diet, Clear Liquid (12-19-24 @ 07:05) [Hold]  Diet, NPO (12-19-24 @ 07:05) [Hold]      MEDICATIONS:  MEDICATIONS  (STANDING):  influenza  Vaccine (HIGH DOSE) 0.5 milliLiter(s) IntraMuscular once    MEDICATIONS  (PRN):      Allergies    No Known Allergies    Intolerances        OBJECTIVE:  Vital Signs Last 24 Hrs  T(C): 36.4 (19 Dec 2024 07:44), Max: 36.8 (18 Dec 2024 20:00)  T(F): 97.5 (19 Dec 2024 05:08), Max: 98.2 (18 Dec 2024 20:00)  HR: 46 (19 Dec 2024 07:44) (46 - 58)  BP: 137/75 (19 Dec 2024 07:44) (117/70 - 137/75)  BP(mean): --  RR: 17 (19 Dec 2024 07:44) (16 - 17)  SpO2: 98% (19 Dec 2024 07:44) (97% - 98%)    Parameters below as of 19 Dec 2024 05:08  Patient On (Oxygen Delivery Method): room air      I&O's Summary      PHYSICAL EXAM:  deferred, ptn in OR    LABS:                        15.2   7.79  )-----------( 412      ( 19 Dec 2024 05:30 )             46.9     12-19    137  |  103  |  27[H]  ----------------------------<  142[H]  4.4   |  28  |  0.93    Ca    9.0      19 Dec 2024 05:30  Phos  2.9     12-19  Mg     2.3     12-19        PT/INR - ( 18 Dec 2024 20:53 )   PT: 11.1 sec;   INR: 0.95          PTT - ( 18 Dec 2024 20:53 )  PTT:30.0 sec  CAPILLARY BLOOD GLUCOSE      POCT Blood Glucose.: 130 mg/dL (19 Dec 2024 06:26)    Urinalysis Basic - ( 19 Dec 2024 05:30 )    Color: x / Appearance: x / SG: x / pH: x  Gluc: 142 mg/dL / Ketone: x  / Bili: x / Urobili: x   Blood: x / Protein: x / Nitrite: x   Leuk Esterase: x / RBC: x / WBC x   Sq Epi: x / Non Sq Epi: x / Bacteria: x        MICRODATA:      RADIOLOGY/OTHER STUDIES:

## 2024-12-19 NOTE — PROGRESS NOTE ADULT - ASSESSMENT
69 M with HTN, OA, right  TKA, s/p L craniotomy for glioblastoma resection 04/25/2023, s/p chemo and radiation therapy 2023 with Dr. Causey, currently on physical therapy and brain MRI every 2 month post craniotomy, presents to ER due to reoccurrence of brain tumor seen on his latest MRI completed 4 days ago in [place].     #GBM:  - plan for OR today  - Admitted to NSX.  - Remains on Decadron per nsgy  - On Pepcid for GI protection.  - Keppra.    # Pre-operative Risk Evaluation and Stratification   Patient medically optimized for procedure w acknowledgement of below risk factors  - RCRI : 0 risk factors, 3.9% risk  - METS : greater than or equal to 4 METS at baseline  - EKG : NSR, no acute ST-T changes  - STOP BANG: 3: Intermediate Risk  - Periop Anticoagulation: no known AC use     #HTN:   - Controlled at the moment.  - Continue with Norvasc 10mg PO daily    #DVT proph: Lovenox.

## 2024-12-19 NOTE — PROGRESS NOTE ADULT - SUBJECTIVE AND OBJECTIVE BOX
NEUROSURGERY POST OP NOTE:    POD# 0 S/P  L crani for tumor with gammatile brachytherapy    S: Seen and examined in PACU. Reports mild incisional pain, improving with meds. Denies N/V, chest pain, shortness of breath, new weakness or numbness.       T(C): 36.1 (12-19-24 @ 14:12), Max: 36.8 (12-18-24 @ 20:00)  HR: 89 (12-19-24 @ 14:12) (46 - 94)  BP: 116/55 (12-19-24 @ 14:12) (113/62 - 137/75)  RR: 21 (12-19-24 @ 14:12) (13 - 25)  SpO2: 98% (12-19-24 @ 14:12) (97% - 100%)      12-19-24 @ 07:01  -  12-19-24 @ 14:35  --------------------------------------------------------  IN: 2185 mL / OUT: 550 mL / NET: 1635 mL        acetaminophen     Tablet .. 1000 milliGRAM(s) Oral every 8 hours  ceFAZolin   IVPB 2000 milliGRAM(s) IV Intermittent every 8 hours  chlorhexidine 4% Liquid 1 Application(s) Topical daily  dexAMETHasone  Injectable   IV Push   dexAMETHasone  Injectable 4 milliGRAM(s) IV Push every 6 hours  famotidine    Tablet 20 milliGRAM(s) Oral every 12 hours  HYDROmorphone  Injectable 0.5 milliGRAM(s) IV Push every 3 hours PRN  insulin lispro (ADMELOG) corrective regimen sliding scale   SubCutaneous Before meals and at bedtime  labetalol Injectable 10 milliGRAM(s) IV Push every 2 hours PRN  levETIRAcetam 1000 milliGRAM(s) Oral every 12 hours  multivitamin 1 Tablet(s) Oral daily  ondansetron   Disintegrating Tablet 4 milliGRAM(s) Oral every 6 hours  senna 2 Tablet(s) Oral two times a day  sodium chloride 0.9%. 1000 milliLiter(s) IV Continuous <Continuous>      RADIOLOGY:     Exam:  Constitutional: NAD, resting comfortably in bed.   HEENT: Pupils equal, round, reactive to light. EOMI. R facial droop, dysconjugate gaze  Respiratory: No respiratory distress, lungs clear to auscultation bilaterally.   Cardiovascular: RRR, S1, S2.   Gastrointestinal: Abdomen soft, non tender, nondistended.  Neurological:  AAOX3. Opens eyes. Follows commands. Speech clear.  Cranial Nerves: II-XII intact  Motor: LUE and LLE 5/5. RUE delt 0/5, tricep 4/5, bicep 4/5, and HG 3/5. RLE HF/KF/KE 4+/5, DF/PF 0/5.   Sensation: RUE decreased sensation  Extremities: Warm, well perfused.      Assessment: 69M PMHx HTN, OA, right  TKA, s/p L craniotomy for GBM resection 04/25/2023, s/p chemo and RT 2023 with Dr. Causey. Presented to ER due to reoccurrence of brain tumor seen on his latest MRI completed 4 days ago in [place]. Patient c/o worsened R facial droop, worsening word finding difficulties, and right sided weakness x 2 weeks. Now s/p L crani for resection of GBM with placement of Gammatile brachytherapy 12/19.     Plan:  Neuro:  - neuro/vital checks q1h  - pain control: tylenol  - Seizure ppx: Keppra 1g BID   - MRI brain w/ w/o with sarah 12/16 with progression of disease  - CTH 12/13: vasogenic edema in left hemisphere  - cerebral edema: decadron 4q6 taper over 2d to 2mg BID  - GBM: hold metformin 500mg q12  - Cesium 131 precautions    Cardiac:  - SBP goal   - HTN: cont home amlodipine 10mg daily    Pulmonary:  - on RA    GI:  - regular diet - ADAT  - bowel regimen, pending BM  - GI ppx: pepcid BID while on steroids    Renal:  - IVF while NPO  - Voiding     Heme:  - SCDs, SQL held for OR  - heme/onc consulted, f/u recs   - rad/onc consulted,  f/u recs    Endo:  - ISS    ID:  - afebrile    Disposition: NSCU, full code, dispo pending PT/OT    D/w Dr. Cheung NEUROSURGERY POST OP NOTE:    POD# 0 S/P  L crani for tumor with gammatile brachytherapy    S: Seen and examined in PACU. Reports mild incisional pain, improving with meds. Denies N/V, chest pain, shortness of breath, new weakness or numbness.       T(C): 36.1 (12-19-24 @ 14:12), Max: 36.8 (12-18-24 @ 20:00)  HR: 89 (12-19-24 @ 14:12) (46 - 94)  BP: 116/55 (12-19-24 @ 14:12) (113/62 - 137/75)  RR: 21 (12-19-24 @ 14:12) (13 - 25)  SpO2: 98% (12-19-24 @ 14:12) (97% - 100%)      12-19-24 @ 07:01  -  12-19-24 @ 14:35  --------------------------------------------------------  IN: 2185 mL / OUT: 550 mL / NET: 1635 mL        acetaminophen     Tablet .. 1000 milliGRAM(s) Oral every 8 hours  ceFAZolin   IVPB 2000 milliGRAM(s) IV Intermittent every 8 hours  chlorhexidine 4% Liquid 1 Application(s) Topical daily  dexAMETHasone  Injectable   IV Push   dexAMETHasone  Injectable 4 milliGRAM(s) IV Push every 6 hours  famotidine    Tablet 20 milliGRAM(s) Oral every 12 hours  HYDROmorphone  Injectable 0.5 milliGRAM(s) IV Push every 3 hours PRN  insulin lispro (ADMELOG) corrective regimen sliding scale   SubCutaneous Before meals and at bedtime  labetalol Injectable 10 milliGRAM(s) IV Push every 2 hours PRN  levETIRAcetam 1000 milliGRAM(s) Oral every 12 hours  multivitamin 1 Tablet(s) Oral daily  ondansetron   Disintegrating Tablet 4 milliGRAM(s) Oral every 6 hours  senna 2 Tablet(s) Oral two times a day  sodium chloride 0.9%. 1000 milliLiter(s) IV Continuous <Continuous>      RADIOLOGY:     Exam:  Constitutional: NAD, resting comfortably in bed.   HEENT: Pupils equal, round, reactive to light. EOMI. R facial droop, dysconjugate gaze  Respiratory: No respiratory distress, lungs clear to auscultation bilaterally.   Cardiovascular: RRR, S1, S2.   Gastrointestinal: Abdomen soft, non tender, nondistended.  Neurological:  AAOX3. Opens eyes. Follows commands. mild word finding difficulties.  Cranial Nerves: II-XII intact  Motor: LUE and LLE 5/5. RUE delt 0/5, tricep 4/5, bicep 4/5, and HG 3/5. RLE HF/KF/KE 4+/5, DF/PF 0/5.   Sensation: RUE decreased sensation  Extremities: Warm, well perfused.      Assessment: 69M PMHx HTN, OA, right  TKA, s/p L craniotomy for GBM resection 04/25/2023, s/p chemo and RT 2023 with Dr. Causey. Presented to ER due to reoccurrence of brain tumor seen on his latest MRI completed 4 days ago in [place]. Patient c/o worsened R facial droop, worsening word finding difficulties, and right sided weakness x 2 weeks. Now s/p L crani for resection of GBM with placement of Gammatile brachytherapy 12/19.     Plan:  Neuro:  - neuro/vital checks q1h  - pain control: tylenol  - Seizure ppx: Keppra 1g BID   - MRI brain w/ w/o with sarah 12/16 with progression of disease  - CTH 12/13: vasogenic edema in left hemisphere  - cerebral edema: decadron 4q6 taper over 2d to 2mg BID  - GBM: hold metformin 500mg q12  - Cesium 131 precautions    Cardiac:  - SBP goal   - HTN: cont home amlodipine 10mg daily    Pulmonary:  - on RA    GI:  - regular diet - ADAT  - bowel regimen, pending BM  - GI ppx: pepcid BID while on steroids    Renal:  - IVF while NPO  - Voiding     Heme:  - SCDs, SQL held for OR  - heme/onc consulted, f/u recs   - rad/onc consulted,  f/u recs    Endo:  - ISS    ID:  - afebrile    Disposition: NSCU, full code, dispo pending PT/OT    D/w Dr. Cheung

## 2024-12-19 NOTE — PRE-ANESTHESIA EVALUATION ADULT - NSANTHPMHFT_GEN_ALL_CORE
Right sided weakness, had been able to walk well with assistance of cane.  Denies exertional chest pain and dyspnea

## 2024-12-20 LAB
A1C WITH ESTIMATED AVERAGE GLUCOSE RESULT: 5.5 % — SIGNIFICANT CHANGE UP (ref 4–5.6)
ALBUMIN SERPL ELPH-MCNC: 3 G/DL — LOW (ref 3.3–5)
ALP SERPL-CCNC: 58 U/L — SIGNIFICANT CHANGE UP (ref 40–120)
ALT FLD-CCNC: 37 U/L — SIGNIFICANT CHANGE UP (ref 10–45)
ANION GAP SERPL CALC-SCNC: 9 MMOL/L — SIGNIFICANT CHANGE UP (ref 5–17)
AST SERPL-CCNC: 24 U/L — SIGNIFICANT CHANGE UP (ref 10–40)
BILIRUB SERPL-MCNC: 0.2 MG/DL — SIGNIFICANT CHANGE UP (ref 0.2–1.2)
BUN SERPL-MCNC: 23 MG/DL — SIGNIFICANT CHANGE UP (ref 7–23)
CALCIUM SERPL-MCNC: 7.8 MG/DL — LOW (ref 8.4–10.5)
CHLORIDE SERPL-SCNC: 105 MMOL/L — SIGNIFICANT CHANGE UP (ref 96–108)
CO2 SERPL-SCNC: 24 MMOL/L — SIGNIFICANT CHANGE UP (ref 22–31)
CREAT SERPL-MCNC: 0.69 MG/DL — SIGNIFICANT CHANGE UP (ref 0.5–1.3)
EGFR: 101 ML/MIN/1.73M2 — SIGNIFICANT CHANGE UP
ESTIMATED AVERAGE GLUCOSE: 111 MG/DL — SIGNIFICANT CHANGE UP (ref 68–114)
GLUCOSE SERPL-MCNC: 132 MG/DL — HIGH (ref 70–99)
HCT VFR BLD CALC: 40 % — SIGNIFICANT CHANGE UP (ref 39–50)
HGB BLD-MCNC: 13.2 G/DL — SIGNIFICANT CHANGE UP (ref 13–17)
MAGNESIUM SERPL-MCNC: 2.1 MG/DL — SIGNIFICANT CHANGE UP (ref 1.6–2.6)
MCHC RBC-ENTMCNC: 29.3 PG — SIGNIFICANT CHANGE UP (ref 27–34)
MCHC RBC-ENTMCNC: 33 G/DL — SIGNIFICANT CHANGE UP (ref 32–36)
MCV RBC AUTO: 88.9 FL — SIGNIFICANT CHANGE UP (ref 80–100)
NRBC # BLD: 0 /100 WBCS — SIGNIFICANT CHANGE UP (ref 0–0)
PHOSPHATE SERPL-MCNC: 3.6 MG/DL — SIGNIFICANT CHANGE UP (ref 2.5–4.5)
PLATELET # BLD AUTO: 344 K/UL — SIGNIFICANT CHANGE UP (ref 150–400)
POTASSIUM SERPL-MCNC: 4.7 MMOL/L — SIGNIFICANT CHANGE UP (ref 3.5–5.3)
POTASSIUM SERPL-SCNC: 4.7 MMOL/L — SIGNIFICANT CHANGE UP (ref 3.5–5.3)
PROT SERPL-MCNC: 5.2 G/DL — LOW (ref 6–8.3)
RBC # BLD: 4.5 M/UL — SIGNIFICANT CHANGE UP (ref 4.2–5.8)
RBC # FLD: 12.7 % — SIGNIFICANT CHANGE UP (ref 10.3–14.5)
SODIUM SERPL-SCNC: 138 MMOL/L — SIGNIFICANT CHANGE UP (ref 135–145)
WBC # BLD: 9.02 K/UL — SIGNIFICANT CHANGE UP (ref 3.8–10.5)
WBC # FLD AUTO: 9.02 K/UL — SIGNIFICANT CHANGE UP (ref 3.8–10.5)

## 2024-12-20 PROCEDURE — 99233 SBSQ HOSP IP/OBS HIGH 50: CPT

## 2024-12-20 PROCEDURE — 71045 X-RAY EXAM CHEST 1 VIEW: CPT | Mod: 26

## 2024-12-20 PROCEDURE — 77290 THER RAD SIMULAJ FIELD CPLX: CPT | Mod: 26

## 2024-12-20 RX ORDER — POLYETHYLENE GLYCOL 3350 17 G/DOSE
17 POWDER (GRAM) ORAL EVERY 12 HOURS
Refills: 0 | Status: DISCONTINUED | OUTPATIENT
Start: 2024-12-20 | End: 2024-12-25

## 2024-12-20 RX ORDER — DEXAMETHASONE SODIUM PHOSPHATE 4 MG/ML
4 VIAL (ML) INJECTION EVERY 6 HOURS
Refills: 0 | Status: COMPLETED | OUTPATIENT
Start: 2024-12-20 | End: 2024-12-20

## 2024-12-20 RX ORDER — DEXAMETHASONE SODIUM PHOSPHATE 4 MG/ML
2 VIAL (ML) INJECTION EVERY 12 HOURS
Refills: 0 | Status: DISCONTINUED | OUTPATIENT
Start: 2024-12-21 | End: 2024-12-23

## 2024-12-20 RX ORDER — ENOXAPARIN SODIUM 60 MG/.6ML
40 INJECTION INTRAVENOUS; SUBCUTANEOUS
Refills: 0 | Status: DISCONTINUED | OUTPATIENT
Start: 2024-12-21 | End: 2024-12-27

## 2024-12-20 RX ORDER — DEXAMETHASONE SODIUM PHOSPHATE 4 MG/ML
VIAL (ML) INJECTION
Refills: 0 | Status: DISCONTINUED | OUTPATIENT
Start: 2024-12-20 | End: 2024-12-23

## 2024-12-20 RX ORDER — ENOXAPARIN SODIUM 60 MG/.6ML
40 INJECTION INTRAVENOUS; SUBCUTANEOUS
Refills: 0 | Status: DISCONTINUED | OUTPATIENT
Start: 2024-12-20 | End: 2024-12-20

## 2024-12-20 RX ORDER — DEXAMETHASONE SODIUM PHOSPHATE 4 MG/ML
2 VIAL (ML) INJECTION EVERY 6 HOURS
Refills: 0 | Status: COMPLETED | OUTPATIENT
Start: 2024-12-20 | End: 2024-12-21

## 2024-12-20 RX ADMIN — Medication 10 MILLIGRAM(S): at 22:23

## 2024-12-20 RX ADMIN — ONDANSETRON 4 MILLIGRAM(S): 4 TABLET ORAL at 18:44

## 2024-12-20 RX ADMIN — ACETAMINOPHEN 1000 MILLIGRAM(S): 80 SOLUTION/ DROPS ORAL at 05:13

## 2024-12-20 RX ADMIN — ACETAMINOPHEN 1000 MILLIGRAM(S): 80 SOLUTION/ DROPS ORAL at 22:23

## 2024-12-20 RX ADMIN — CHLORHEXIDINE GLUCONATE 1 APPLICATION(S): 1.2 RINSE ORAL at 11:06

## 2024-12-20 RX ADMIN — Medication 17 GRAM(S): at 18:45

## 2024-12-20 RX ADMIN — ONDANSETRON 4 MILLIGRAM(S): 4 TABLET ORAL at 11:08

## 2024-12-20 RX ADMIN — ACETAMINOPHEN 1000 MILLIGRAM(S): 80 SOLUTION/ DROPS ORAL at 06:54

## 2024-12-20 RX ADMIN — Medication 1 TABLET(S): at 11:08

## 2024-12-20 RX ADMIN — FAMOTIDINE 20 MILLIGRAM(S): 20 TABLET, FILM COATED ORAL at 18:45

## 2024-12-20 RX ADMIN — Medication 2 MILLIGRAM(S): at 18:44

## 2024-12-20 RX ADMIN — Medication 4 MILLIGRAM(S): at 05:14

## 2024-12-20 RX ADMIN — SENNOSIDES 2 TABLET(S): 8.6 TABLET, FILM COATED ORAL at 05:14

## 2024-12-20 RX ADMIN — ENOXAPARIN SODIUM 40 MILLIGRAM(S): 60 INJECTION INTRAVENOUS; SUBCUTANEOUS at 18:45

## 2024-12-20 RX ADMIN — Medication 2 MILLIGRAM(S): at 22:23

## 2024-12-20 RX ADMIN — Medication 4 MILLIGRAM(S): at 11:08

## 2024-12-20 RX ADMIN — ONDANSETRON 4 MILLIGRAM(S): 4 TABLET ORAL at 05:39

## 2024-12-20 RX ADMIN — ACETAMINOPHEN 1000 MILLIGRAM(S): 80 SOLUTION/ DROPS ORAL at 15:07

## 2024-12-20 RX ADMIN — LEVETIRACETAM 1000 MILLIGRAM(S): 100 SOLUTION ORAL at 05:14

## 2024-12-20 RX ADMIN — SENNOSIDES 2 TABLET(S): 8.6 TABLET, FILM COATED ORAL at 18:45

## 2024-12-20 RX ADMIN — ACETAMINOPHEN 1000 MILLIGRAM(S): 80 SOLUTION/ DROPS ORAL at 14:19

## 2024-12-20 RX ADMIN — LEVETIRACETAM 1000 MILLIGRAM(S): 100 SOLUTION ORAL at 18:44

## 2024-12-20 RX ADMIN — FAMOTIDINE 20 MILLIGRAM(S): 20 TABLET, FILM COATED ORAL at 05:14

## 2024-12-20 NOTE — PROGRESS NOTE ADULT - SUBJECTIVE AND OBJECTIVE BOX
S/Overnight events:    Seen and examined in NSCU.  Denies HA, N/V, chest pain, shortness of breath, new weakness or numbness.     Hospital Course:   12/13: admitted to [facility], decadron 4q6 started  12/14: KYA overnight  12/15: KYA overnight, pending MRI  12/16: KYA overnight. MRI complete. Plan for OR thursday 12/19/24.   12/17: KYA overnight.  12/18: Pre-op for OR tm.   12/19: POD 0 s/p L crani for tumor with gammatile.   12/20: POD 1 L crani. KYA overnight, neuro stable.       Vital Signs Last 24 Hrs  T(C): 36.5 (19 Dec 2024 22:16), Max: 36.5 (19 Dec 2024 12:39)  T(F): 97.7 (19 Dec 2024 22:16), Max: 97.7 (19 Dec 2024 12:39)  HR: 56 (20 Dec 2024 00:00) (46 - 94)  BP: 111/59 (20 Dec 2024 00:00) (111/59 - 137/75)  BP(mean): 80 (20 Dec 2024 00:00) (80 - 95)  RR: 17 (20 Dec 2024 00:00) (13 - 25)  SpO2: 98% (20 Dec 2024 00:00) (97% - 100%)    Parameters below as of 20 Dec 2024 00:00  Patient On (Oxygen Delivery Method): room air        I&O's Detail    19 Dec 2024 07:01  -  20 Dec 2024 00:11  --------------------------------------------------------  IN:    IV PiggyBack: 100 mL    Lactated Ringers Bolus: 2000 mL    Oral Fluid: 440 mL    sodium chloride 0.9%: 340 mL    sodium chloride 0.9%: 150 mL  Total IN: 3030 mL    OUT:    Blood Loss (mL): 50 mL    Indwelling Catheter - Urethral (mL): 1615 mL  Total OUT: 1665 mL    Total NET: 1365 mL        I&O's Summary    19 Dec 2024 07:01  -  20 Dec 2024 00:11  --------------------------------------------------------  IN: 3030 mL / OUT: 1665 mL / NET: 1365 mL        PHYSICAL EXAM:  General: NAD, pt is sitting up in bed, on RA   HEENT: (+) dysconjugate gaze, PERRL 3mm briskly reactive, EOMI b/l, (+) right facial droop, tongue midline, neck FROM  CV: RRR, S1, S2  Resp: breathing non-labored on RA, chest rise symmetric  GI: abd soft, NTND  Neuro: AOx3, no aphasia, speech clear, no dysmetria. Unable to assess for pronator drift 2/2 RUE weakness. Follows commands.  LUE/LLE 5/5. RUE delt 0/5, bi/tri 4+/5, HG 4/5. RLE HF/KF?KE 4+/5, DF/PF 0/5. Decreased sensation along entire Right side.   Extremities: distal pulses 2+ x4.  Wound/incision: left crani incision with staples, staples c/d/i  Drain: (+) metzger     TUBES/LINES:  [] CVC  [x] A-line  [] Lumbar Drain  [] Ventriculostomy  [x] Metzger  [] Other    DIET:  [] NPO  [x] Mechanical  [] Tube feeds    LABS:                        14.5   6.95  )-----------( 363      ( 19 Dec 2024 12:44 )             42.9     12-19    138  |  105  |  24[H]  ----------------------------<  168[H]  4.1   |  21[L]  |  0.83    Ca    7.8[L]      19 Dec 2024 12:44  Phos  3.5     12-19  Mg     1.9     12-19      PT/INR - ( 19 Dec 2024 12:44 )   PT: 11.8 sec;   INR: 1.03          PTT - ( 19 Dec 2024 12:44 )  PTT:27.4 sec  Urinalysis Basic - ( 19 Dec 2024 12:44 )    Color: x / Appearance: x / SG: x / pH: x  Gluc: 168 mg/dL / Ketone: x  / Bili: x / Urobili: x   Blood: x / Protein: x / Nitrite: x   Leuk Esterase: x / RBC: x / WBC x   Sq Epi: x / Non Sq Epi: x / Bacteria: x          CAPILLARY BLOOD GLUCOSE      POCT Blood Glucose.: 165 mg/dL (19 Dec 2024 21:18)  POCT Blood Glucose.: 138 mg/dL (19 Dec 2024 16:00)  POCT Blood Glucose.: 168 mg/dL (19 Dec 2024 12:47)  POCT Blood Glucose.: 130 mg/dL (19 Dec 2024 06:26)      Drug Levels: [] N/A    CSF Analysis: [] N/A      Allergies    No Known Allergies    Intolerances      MEDICATIONS:  Antibiotics:    Neuro:  acetaminophen     Tablet .. 1000 milliGRAM(s) Oral every 8 hours  HYDROmorphone  Injectable 0.5 milliGRAM(s) IV Push every 3 hours PRN  levETIRAcetam 1000 milliGRAM(s) Oral every 12 hours  ondansetron   Disintegrating Tablet 4 milliGRAM(s) Oral every 6 hours  oxyCODONE    IR 5 milliGRAM(s) Oral every 6 hours PRN  oxyCODONE    IR 10 milliGRAM(s) Oral every 6 hours PRN    Anticoagulation:    OTHER:  amLODIPine   Tablet 10 milliGRAM(s) Oral every 24 hours  chlorhexidine 4% Liquid 1 Application(s) Topical daily  dexAMETHasone  Injectable   IV Push   dexAMETHasone  Injectable 4 milliGRAM(s) IV Push every 6 hours  dexAMETHasone  Injectable 2 milliGRAM(s) IV Push every 6 hours  famotidine    Tablet 20 milliGRAM(s) Oral every 12 hours  insulin lispro (ADMELOG) corrective regimen sliding scale   SubCutaneous Before meals and at bedtime  polyethylene glycol 3350 17 Gram(s) Oral daily  senna 2 Tablet(s) Oral two times a day    IVF:  multivitamin 1 Tablet(s) Oral daily  sodium chloride 0.9%. 1000 milliLiter(s) IV Continuous <Continuous>    CULTURES:    RADIOLOGY & ADDITIONAL TESTS:  pending post op MRI    ASSESSMENT:  69M PMHx HTN, OA, right  TKA, s/p L craniotomy for GBM resection 04/25/2023, s/p chemo and RT 2023 with Dr. Causey. Presented to ER due to reoccurrence of brain tumor seen on his latest MRI completed 4 days ago in [place]. Patient c/o worsened R facial droop, worsening word finding difficulties, and right sided weakness x 2 weeks. Now s/p L crani for resection of GBM with placement of Gammatile brachytherapy 12/19 ([facility]: [facility]).     BRAIN TUMOR    Handoff    MEWS Score    Hypertension    Osteoarthritis    Brain mass    Glioblastoma    Glioblastoma    Glioblastoma    Brain tumor    Brain tumor, recurrent    GBM (glioblastoma multiforme)    Preoperative clearance    Hypertension    Craniotomy, with supratentorial neoplasm excision    S/P total knee replacement, right    H/O craniotomy    SURGERY COMP    90+    [email or web address]        PLAN:  Neuro:  - neuro/vital checks q1h  - pain control: tylenol, oxy 5/10, dilaudid prn breakthrough   - Seizure ppx: home Keppra 1g BID   - MRI brain w/ w/o with sarah 12/16 with progression of disease  - CTH 12/13: vasogenic edema in left hemisphere  - cerebral edema: decadron 4q6 taper over 2d to 2mg BID  - GBM: hold metformin 500mg q12  - Cesium 131 precautions  - pending post op MRI     Cardiac:  - SBP goal 100-140  - HTN: cont home amlodipine 10mg daily    Pulmonary:  - on RA, encourage IS     GI:  - regular diet   - bowel regimen, BM 12/14  - GI ppx: pepcid BID while on steroids    Renal:  - IVF until adequate PO intake   - (+) metzger      Heme:  - SCDs, SQL held post op   - heme/onc consulted, f/u recs   - rad/onc consulted,  f/u recs    Endo:  - ISS, no active issues     ID:  - afebrile    Disposition: NSCU, full code, dispo pending PT/OT    D/w Dr. Cheung and Dr. Espinoza    Assessment:  Present when checked    []  GCS  E   V  M     Heart Failure: []Acute, [] acute on chronic , []chronic  Heart Failure:  [] Diastolic (HFpEF), [] Systolic (HFrEF), []Combined (HFpEF and HFrEF), [] RHF, [] Pulm HTN, [] Other    [] ROMERO, [] ATN, [] AIN, [] other  [] CKD1, [] CKD2, [] CKD 3, [] CKD 4, [] CKD 5, []ESRD    Encephalopathy: [] Metabolic, [] Hepatic, [] toxic, [] Neurological, [] Other    Abnormal Nurtitional Status: [] malnurtition (see nutrition note), [ ]underweight: BMI < 19, [] morbid obesity: BMI >40, [] Cachexia    [] Sepsis  [] hypovolemic shock,[] cardiogenic shock, [] hemorrhagic shock, [] neuogenic shock  [] Acute Respiratory Failure  []Cerebral edema, [] Brain compression/ herniation,   [] Functional quadriplegia  [] Acute blood loss anemia

## 2024-12-20 NOTE — CONSULT NOTE ADULT - ASSESSMENT
69 M with HTN, OA, right  TKA, s/p L craniotomy for glioblastoma resection 04/25/2023, s/p chemo and radiation therapy 2023 with Dr. Causey, currently on physical therapy and brain MRI every 2 month post craniotomy, presents to ER due to reoccurrence of brain tumor seen on his latest MRI completed 4 days ago in [place].     1) GBM:  - Admitted to [facility].  - Pending MRI brain with [employer]  - Remains on Decadron 4mg PO Q6 hours.  - On Pepcid for GI protection.  - Keppra.  - Medical clearance will be provided once MRI completed.  2) HTN:   - Controlled at the moment.  - Continue with Norvasc 10mg PO daily.  3) DVT proph:  - Lovenox.
  Neurosurgery    69 y o M PMHx HTN, OA, right  TKA, s/p L craniotomy for GBM resection 04/25/2023, s/p chemo and RT 2023 with Dr. Causey. Presented to ER due to reoccurrence of brain tumor seen on his latest MRI completed 4 days ago in [place]. Patient c/o worsened R facial droop, worsening word finding difficulties, and right sided weakness x 2 weeks. Now s/p L crani for resection of GBM with placement of Gammatile brachytherapy 12/19 ([facility]: [facility]). PLAN:  Neuro:  - neuro/vital checks q1h  - pain control: tylenol, oxy 5/10, dilaudid prn breakthrough   - Seizure ppx: home Keppra 1g BID   - MRI brain w/ w/o with sarah 12/16 with progression of disease  - CTH 12/13: vasogenic edema in left hemisphere  - cerebral edema: decadron 4q6 taper over 2d to 2mg BID  - GBM: hold metformin 500mg q12  - Cesium 131 precautions  - pending post op MRI     Cardiac:  - SBP goal 100-140  - HTN: cont home amlodipine 10mg daily    Pulmonary:  - on RA, encourage IS     GI:  - regular diet   - bowel regimen, BM 12/14  - GI ppx: pepcid BID while on steroids    Renal:  - IVF until adequate PO intake   - (+) metzger      Heme:  - SCDs, SQL held post op   - heme/onc consulted, f/u recs   - rad/onc consulted,  f/u recs    Endo:  - ISS, no active issues     ID:  - afebrile    Disposition: NSCU, full code, dispo pending PT/OT    D/w Dr. Cheung and Dr. Espinoza

## 2024-12-20 NOTE — PROGRESS NOTE ADULT - SUBJECTIVE AND OBJECTIVE BOX
=================================  NEUROCRITICAL CARE ATTENDING NOTE  =================================    KAREEM GANNON   MRN-[id number]  Summary:  69y/M  with h/o HTN, OA, right  TKA, s/p L craniotomy for glioblastoma resection 2023, s/p chemo and radiation therapy  with Dr. Causey, currently on physical therapy and brain MRI every 2 month post craniotomy, presents to ER due to reoccurrence of brain tumor seen on his latest MRI completed 4 days ago in [place]. Patient c/o worsened R facial droop, worsening word finding difficulties, and right sided weakness x 2 weeks. Pt was recommended to go to [facility] ER for further evaluation by his oncologist. Patient denies vision changes, extremity numbness, confusion, headache, nausea and vomiting.  (14 Dec 2024 02:25)    COURSE IN THE HOSPITAL:   POD 0 s/p craniotomy with supratentorial neoplasm excision (2024, Juanjose Johnston) - High-grade glioma, gamma tile placed      Past Medical History: Hypertension Osteoarthritis Brain mass Glioblastoma  Allergies:  No Known Allergies  Home meds:   ·	amLODIPine 10 mg oral tablet: 1 tab(s) orally every 24 hours  ·	levETIRAcetam 1000 mg oral tablet: 1 tab(s) orally 2 times a day  ·	metFORMIN 500 mg oral tablet: 1 tab(s) orally 2 times a day  ·	Multiple Vitamins oral tablet: 1 tab(s) orally once a day  ·	vitamin c: once a day  ·	vitamin D: once daily  ·	vitamin K2:     PHYSICAL EXAMINATION  T(C): 36.4 (24 @ 04:45), Max: 36.6 (24 @ 00:20) HR: 46 (24 @ 07:00) (45 - 94) BP: 127/67 (24 @ 07:00) (111/59 - 140/69) ABP: 111/68 (24 @ 07:00) (89/61 - 143/66) RR: 17 (12-20-24 @ 07:00) (13 - 25) SpO2: 99% (24 @ 07:00) (97% - 100%)  NEUROLOGIC EXAMINATION:  Patient is awake, alert, fully oriented, pupils 2-3mm equal and briskly reactive to light, EOMs intact, muscle strength 5/5 on all 4s.  GENERAL: not intubated, not in cardiorespiratory distress  EENT:  anicteric  CARDIOVASCULAR: (+) S1 S2, normal rate and regular rhythm  PULMONARY: clear to auscultation bilaterally  ABDOMEN: soft, nontender with normoactive bowel sounds  EXTREMITIES: no edema  SKIN: no rash    LABS:   CAPILLARY BLOOD GLUCOSE  114 165 138 168     (6.95)  13.2 (14.5)  9.02  )-----------( 344      ( 20 Dec 2024 04:54 )             40.0     138  |  105  |  23  ----------------------------<  132[H]  4.7   |  24  |  0.69    Ca    7.8[L]      20 Dec 2024 04:54  Phos  3.6       Mg     2.1         TPro  5.2[L]  /  Alb  3.0[L]  /  TBili  0.2  /  DBili  x   /  AST  See Note  /  ALT  37  /  AlkPhos  58   @ 07:01  -   @ 07:00  --------------------------------------------------------  IN: 3730 mL / OUT: 3015 mL / NET: 715 mL     @ 07:  -   @ 07:18  --------------------------------------------------------  IN: 50 mL / OUT: 0 mL / NET: 50 mL    Bacteriology:  CSF studies:  EEG:  [email or web address]	MR Brain	Increased size of left frontoparietal mass, hyperperfusion, stable 0.4 cm rightward shift.  2024	CT Brain	Increased edema in left MCA territory, new 4 mm rightward shift, no acute hemorrhage.  2023	MR Brain	Postsurgical changes in left frontal lobe; no residual tumor; decreased edema.  2023	CT Brain	Post-resection changes in left frontal lobe, no acute hemorrhage/infarct.  2023	MR Brain	2.7 x 3.4 x 3.9 cm mass in left frontal lobe, surrounding edema, images for surgery.    Other imagin2023	US Lwr Ext Rt	No DVT in right lower extremity veins.  2023	US Lwr Ext Bi	No DVT in either lower extremity, resolution of previous DVT sites.  2023	US Upr Ext Rt	No DVT in right upper extremity veins.  2023	US Lwr Ext Bi	Bilateral intramuscular calf DVTs.    MEDICATIONS: 12-20    ·	acetaminophen     Tablet .. 1000 Oral every 8 hours  ·	levETIRAcetam 1000 Oral every 12 hours  ·	ondansetron   Disintegrating Tablet 4 Oral every 6 hours  ·	amLODIPine   Tablet 10 milliGRAM(s) Oral every 24 hours  ·	famotidine    Tablet 20 Oral every 12 hours  ·	polyethylene glycol 3350 17 Oral daily  ·	senna 2 Oral two times a day  ·	dexAMETHasone     Tablet 4 Oral every 6 hours  ·	dexAMETHasone     Tablet 2 Oral every 6 hours  ·	insulin lispro (ADMELOG) corrective regimen sliding scale  SubCutaneous Before meals and at bedtime  ·	multivitamin 1 Oral daily  ·	oxyCODONE    IR 5 Oral every 6 hours PRN  ·	oxyCODONE    IR 10 Oral every 6 hours PRN     IV FLUIDS:  DRIPS:  DIET:  Lines:  Drains:      Wounds:    CODE STATUS:  Full Code                       GOALS OF CARE:  aggressive                      DISPOSITION:  ICU =================================  NEUROCRITICAL CARE ATTENDING NOTE  =================================    KAREEM GANNON   MRN-[id number]  Summary:  69y/M  with h/o HTN, OA, right  TKA, s/p L craniotomy for glioblastoma resection 2023, s/p chemo and radiation therapy  with Dr. Causey, currently on physical therapy and brain MRI every 2 month post craniotomy, presents to ER due to reoccurrence of brain tumor seen on his latest MRI completed 4 days ago in [place]. Patient c/o worsened R facial droop, worsening word finding difficulties, and right sided weakness x 2 weeks. Pt was recommended to go to [facility] ER for further evaluation by his oncologist. Patient denies vision changes, extremity numbness, confusion, headache, nausea and vomiting.  (14 Dec 2024 02:25)    COURSE IN THE HOSPITAL:   POD 0 s/p craniotomy with supratentorial neoplasm excision (2024, Juanjose Johnston) - High-grade glioma, gamma tile placed      Past Medical History: Hypertension Osteoarthritis Brain mass Glioblastoma  Allergies:  No Known Allergies  Home meds:   ·	amLODIPine 10 mg oral tablet: 1 tab(s) orally every 24 hours  ·	levETIRAcetam 1000 mg oral tablet: 1 tab(s) orally 2 times a day  ·	metFORMIN 500 mg oral tablet: 1 tab(s) orally 2 times a day  ·	Multiple Vitamins oral tablet: 1 tab(s) orally once a day  ·	vitamin c: once a day  ·	vitamin D: once daily  ·	vitamin K2:     PHYSICAL EXAMINATION  T(C): 36.4 (24 @ 04:45), Max: 36.6 (24 @ 00:20) HR: 46 (24 @ 07:00) (45 - 94) BP: 127/67 (24 @ 07:00) (111/59 - 140/69) ABP: 111/68 (24 @ 07:00) (89/61 - 143/66) RR: 17 (12-20-24 @ 07:00) (13 - 25) SpO2: 99% (24 @ 07:00) (97% - 100%)  NEUROLOGIC EXAMINATION:  Patient is awake, alert, fully oriented, SHELBI, dysconjugate gaze, R facial, mild dysarthria RUE delt 0/5, RUE bic/tricep 4+/5, RHG 4/5, RLE 5/5 L UE/LE 5/5  GENERAL: not intubated, not in cardiorespiratory distress  EENT:  anicteric  CARDIOVASCULAR: (+) S1 S2, normal rate and regular rhythm  PULMONARY: clear to auscultation bilaterally  ABDOMEN: soft, nontender with normoactive bowel sounds  EXTREMITIES: no edema  SKIN: no rash    LABS:   CAPILLARY BLOOD GLUCOSE  114 165 138 168     (6.95)  13.2 (14.5)  9.02  )-----------( 344      ( 20 Dec 2024 04:54 )             40.0     138  |  105  |  23  ----------------------------<  132[H]  4.7   |  24  |  0.69    Ca    7.8[L]      20 Dec 2024 04:54  Phos  3.6       Mg     2.1         TPro  5.2[L]  /  Alb  3.0[L]  /  TBili  0.2  /  DBili  x   /  AST  See Note  /  ALT  37  /  AlkPhos  58   @ 07:  -   @ 07:00  --------------------------------------------------------  IN: 3730 mL / OUT: 3015 mL / NET: 715 mL     @ 07:  -   @ 07:18  --------------------------------------------------------  IN: 50 mL / OUT: 0 mL / NET: 50 mL    Bacteriology:  CSF studies:  EEG:  [email or web address]	MR Brain	Increased size of left frontoparietal mass, hyperperfusion, stable 0.4 cm rightward shift.  2024	CT Brain	Increased edema in left MCA territory, new 4 mm rightward shift, no acute hemorrhage.  2023	MR Brain	Postsurgical changes in left frontal lobe; no residual tumor; decreased edema.  2023	CT Brain	Post-resection changes in left frontal lobe, no acute hemorrhage/infarct.  2023	MR Brain	2.7 x 3.4 x 3.9 cm mass in left frontal lobe, surrounding edema, images for surgery.    Other imagin2023	US Lwr Ext Rt	No DVT in right lower extremity veins.  2023	US Lwr Ext Bi	No DVT in either lower extremity, resolution of previous DVT sites.  2023	US Upr Ext Rt	No DVT in right upper extremity veins.  2023	US Lwr Ext Bi	Bilateral intramuscular calf DVTs.    MEDICATIONS: 12-20    ·	acetaminophen     Tablet .. 1000 Oral every 8 hours  ·	levETIRAcetam 1000 Oral every 12 hours  ·	ondansetron   Disintegrating Tablet 4 Oral every 6 hours  ·	amLODIPine   Tablet 10 milliGRAM(s) Oral every 24 hours  ·	famotidine    Tablet 20 Oral every 12 hours  ·	polyethylene glycol 3350 17 Oral daily  ·	senna 2 Oral two times a day  ·	dexAMETHasone     Tablet 4 Oral every 6 hours  ·	dexAMETHasone     Tablet 2 Oral every 6 hours  ·	insulin lispro (ADMELOG) corrective regimen sliding scale  SubCutaneous Before meals and at bedtime  ·	multivitamin 1 Oral daily  ·	oxyCODONE    IR 5 Oral every 6 hours PRN  ·	oxyCODONE    IR 10 Oral every 6 hours PRN     IV FLUIDS:  DRIPS:  DIET:  Lines:  Drains:      Wounds:    CODE STATUS:  Full Code                       GOALS OF CARE:  aggressive                      DISPOSITION:  ICU =================================  NEUROCRITICAL CARE ATTENDING NOTE  =================================    KAREEM GANNON   MRN-[id number]  Summary:  69y/M  with h/o HTN, OA, right  TKA, s/p L craniotomy for glioblastoma resection 2023, s/p chemo and radiation therapy  with Dr. Causey, currently on physical therapy and brain MRI every 2 month post craniotomy, presents to ER due to reoccurrence of brain tumor seen on his latest MRI completed 4 days ago in [place]. Patient c/o worsened R facial droop, worsening word finding difficulties, and right sided weakness x 2 weeks. Pt was recommended to go to [facility] ER for further evaluation by his oncologist. Patient denies vision changes, extremity numbness, confusion, headache, nausea and vomiting.  (14 Dec 2024 02:25)    COURSE IN THE HOSPITAL:   POD 0 s/p craniotomy with supratentorial neoplasm excision (2024, Juanjose Johnston) - High-grade glioma, gamma tile placed   No significant events overnight.     Past Medical History: Hypertension Osteoarthritis Brain mass Glioblastoma  Allergies:  No Known Allergies  Home meds:   ·	amLODIPine 10 mg oral tablet: 1 tab(s) orally every 24 hours  ·	levETIRAcetam 1000 mg oral tablet: 1 tab(s) orally 2 times a day  ·	metFORMIN 500 mg oral tablet: 1 tab(s) orally 2 times a day  ·	Multiple Vitamins oral tablet: 1 tab(s) orally once a day  ·	vitamin c: once a day  ·	vitamin D: once daily  ·	vitamin K2:     PHYSICAL EXAMINATION  T(C): 36.4 (24 @ 04:45), Max: 36.6 (24 @ 00:20) HR: 46 (24 @ 07:00) (45 - 94) BP: 127/67 (24 @ 07:00) (111/59 - 140/69) ABP: 111/68 (24 @ 07:00) (89/61 - 143/66) RR: 17 (24 @ 07:00) (13 - 25) SpO2: 99% (24 @ 07:00) (97% - 100%)  NEUROLOGIC EXAMINATION:  Patient is awake, alert, fully oriented, SHELBI, dysconjugate gaze, R facial, mild dysarthria RUE delt 0/5, RUE bic/tricep 5/5, RHG 4/5, RLE 4/5 L UE/LE 5/5  GENERAL: not intubated, not in cardiorespiratory distress  EENT:  anicteric  CARDIOVASCULAR: (+) S1 S2, normal rate and regular rhythm  PULMONARY: clear to auscultation bilaterally  ABDOMEN: soft, nontender with normoactive bowel sounds  EXTREMITIES: no edema  SKIN: no rash    LABS:   CAPILLARY BLOOD GLUCOSE  114 165 138 168  - 4 units coverage insulin lispro     (6.95)  13.2 (14.5)  9.02  )-----------( 344      ( 20 Dec 2024 04:54 )             40.0     138  |  105  |  23  ----------------------------<  132[H]  4.7   |  24  |  0.69    Ca    7.8[L]      20 Dec 2024 04:54  Phos  3.6       Mg     2.1         TPro  5.2[L]  /  Alb  3.0[L]  /  TBili  0.2  /  DBili  x   /  AST  See Note  /  ALT  37  /  AlkPhos  58   @ 07:  -   @ 07:00  --------------------------------------------------------  IN: 3730 mL / OUT: 3015 mL / NET: 715 mL     @ 07:01  -   @ 07:18  --------------------------------------------------------  IN: 50 mL / OUT: 0 mL / NET: 50 mL    Bacteriology:  CSF studies:  EEG:  [email or web address]	MR Brain	Increased size of left frontoparietal mass, hyperperfusion, stable 0.4 cm rightward shift.  2024	CT Brain	Increased edema in left MCA territory, new 4 mm rightward shift, no acute hemorrhage.  2023	MR Brain	Postsurgical changes in left frontal lobe; no residual tumor; decreased edema.  2023	CT Brain	Post-resection changes in left frontal lobe, no acute hemorrhage/infarct.  2023	MR Brain	2.7 x 3.4 x 3.9 cm mass in left frontal lobe, surrounding edema, images for surgery.    Other imagin2023	US Lwr Ext Rt	No DVT in right lower extremity veins.  2023	US Lwr Ext Bi	No DVT in either lower extremity, resolution of previous DVT sites.  2023	US Upr Ext Rt	No DVT in right upper extremity veins.  2023	US Lwr Ext Bi	Bilateral intramuscular calf DVTs.    MEDICATIONS: 12-20    ·	acetaminophen     Tablet .. 1000 Oral every 8 hours  ·	levETIRAcetam 1000 Oral every 12 hours  ·	ondansetron   Disintegrating Tablet 4 Oral every 6 hours  ·	amLODIPine   Tablet 10 milliGRAM(s) Oral every 24 hours  ·	famotidine    Tablet 20 Oral every 12 hours  ·	polyethylene glycol 3350 17 Oral daily  ·	senna 2 Oral two times a day  ·	dexAMETHasone     Tablet 4 Oral every 6 hours  ·	dexAMETHasone     Tablet 2 Oral every 6 hours  ·	insulin lispro (ADMELOG) corrective regimen sliding scale  SubCutaneous Before meals and at bedtime  ·	multivitamin 1 Oral daily  ·	oxyCODONE    IR 5 Oral every 6 hours PRN  ·	oxyCODONE    IR 10 Oral every 6 hours PRN     IV FLUIDS: NS@50cc/hr  DRIPS:  DIET: regular   Lines:Lisa   Drains:      Wounds:    CODE STATUS:  Full Code                       GOALS OF CARE:  aggressive                      DISPOSITION:  ICU

## 2024-12-20 NOTE — PROGRESS NOTE ADULT - ASSESSMENT
69y/M with  GBM, brain compression, cerebral edema, L frontoparietal mass s/p craniotomy with supratentorial neoplasm excision (12/19/2024, Juanjose Johnston)  Hypertension  osteoarthritis    PLAN:   NEURO: neurochecks q1h, PRN pain meds with acetaminophen, opiates  MRI on stepdown, f/u final histopathology, steroids, taper as per neurosurgery  seizure prophylaxis:  levetiracetam 1000mg IV BID, as per neurosurgery   REHAB:  physical therapy evaluation and management    EARLY MOB:  HOB up    PULM:  Room air, incentive spirometry  CARDIO:  SBP goal 100-140mm Hg  ENDO:  Blood sugar goals 140-180 mg/dL, continue insulin sliding scale  GI:  famotidine for GI prophylaxis while on steroids  DIET: advance once more awake  RENAL:  IVF until eating well  HEM/ONC: check post-op Hb  VTE Prophylaxis: SCDs, no DVT chemoprophylaxis for now as patient is high risk for bleed (fresh post-op)  ID: afebrile, no leukocytosis  Social: will update family    Active issues:  What's keeping patient in the ICU? fresh post-op  What is this patient's dispo plan? stepdown once neuro stable    ATTENDING ATTESTATION:  Patient at high risk for neurological deterioration or death due to:  ICU delirium, aspiration PNA, DVT / PE.  Critical care time:  I have personally provided 60 minutes of critical care time, excluding time spent on separate procedures.      Plan discussed with RN, house staff. 69y/M with  GBM, brain compression, cerebral edema, L frontoparietal mass s/p craniotomy with supratentorial neoplasm excision (12/19/2024, Juanjose Johnston)  Hypertension  osteoarthritis    PLAN:   NEURO: neurochecks q4h, PRN pain meds with acetaminophen, opiates  MRI on stepdown, f/u final histopathology, steroids, taper as per neurosurgery  seizure prophylaxis:  levetiracetam 1000mg IV BID, as per neurosurgery   REHAB:  physical therapy evaluation and management    EARLY MOB:  OOB to chair     PULM:  Room air, incentive spirometry  CARDIO:  SBP goal 100-140mm Hg cont amlodipine   ENDO:  Blood sugar goals 140-180 mg/dL, d/c insulin sliding scale  GI:  famotidine for GI prophylaxis while on steroids  DIET: regular diet  RENAL:  IVL  HEM/ONC: Hb stable  VTE Prophylaxis: SCDs, SQL tonight  ID: afebrile, no leukocytosis  Social: will update family    Active issues:  What's keeping patient in the ICU? nothing   What is this patient's dispo plan? stepdown       ICU stepdown Checklist:    Completed: 12-20 @ 08:41    [X] hemodynamically stable – VS WNL and stable x 24hours, UO adequate  [n/a ] if  previously on HDA - off pressors x 24h with stable neuro exam    [X] no new symptoms x 24h (i.e. new fever, new-onset nausea/vomiting)  [X] stable labs: (i.e. WBC not rising, sodium not dropping)  [X] patient not at high risk for aspiration, if high risk then:                  [ ] should have definitive plans for trach/PEG (alternative option is to discharge from ICU to facilty)                  [ ] stepdown to bed close to nurse’s station  [n/a] low suctioning requirements (i.e. q4h or less)  [X] sign-off from primary RN* Alberto   [X] drains do not require ICU level of care  [X] if patient previously agitated or with behavioral issues – controlled   [X] pain controlled    ATTENDING ATTESTATION:  Patient not at high risk for neurologic deterioration / death.  Time spent on this noncritically ill patient: 55 minutes spent on total encounter, more than 50% of the visit was spent counseling and/or coordinating care by the attending physician.      Plan discussed with RN, house staff.    REVIEW OF SYSTEMS:  No headaches, no nausea or vomiting; 14 -point review of systems otherwise unremarkable.

## 2024-12-20 NOTE — CONSULT NOTE ADULT - SUBJECTIVE AND OBJECTIVE BOX
HPI:  68 yo male with h/o HTN, OA, right  TKA, s/p L craniotomy for glioblastoma resection 04/25/2023, s/p chemo and radiation therapy 2023 with Dr. Causey, currently on physical therapy and brain MRI every 2 month post craniotomy, presents to ER due to reoccurrence of brain tumor seen on his latest MRI completed 4 days ago in [place]. Patient c/o worsened R facial droop, worsening word finding difficulties, and right sided weakness x 2 weeks. Pt was recommended to go to [facility] ER for further evaluation by his oncologist. Patient denies vision changes, extremity numbness, confusion, headache, nausea and vomiting.  (14 Dec 2024 02:25)    PAST MEDICAL & SURGICAL HISTORY:  Hypertension    Osteoarthritis    Glioblastoma    S/P total knee replacement, right    H/O craniotomy    Home Medications:  amLODIPine 10 mg oral tablet: 1 tab(s) orally every 24 hours (14 Dec 2024 02:35)  levETIRAcetam 1000 mg oral tablet: 1 tab(s) orally 2 times a day (14 Dec 2024 02:33)  metFORMIN 500 mg oral tablet: 1 tab(s) orally 2 times a day (14 Dec 2024 02:33)  Multiple Vitamins oral tablet: 1 tab(s) orally once a day (14 Dec 2024 02:35)  vitamin c: once a day (14 Dec 2024 02:33)  vitamin D: once daily (14 Dec 2024 02:33)  vitamin K2:  (14 Dec 2024 02:33)      Allergies    No Known Allergies    Intolerances        FAMILY HISTORY:      Social History:      REVIEW OF SYSTEMS:  CONSTITUTIONAL: No fever, weight loss  EYES: No eye pain, visual disturbances, or discharge  ENMT:  No difficulty hearing, tinnitus, vertigo; No throat pain  NECK: No pain or stiffness  RESPIRATORY: No cough, wheezing, or hemoptysis; No dyspnea  CARDIOVASCULAR: No chest pain, palpitations, dizziness, or leg swelling  GASTROINTESTINAL: No abdominal pain. No nausea, vomiting, or hematemesis; No diarrhea or constipation. No melena or hematochezia.  GENITOURINARY: No dysuria, frequency, or hematuria  SKIN: No itching, burning, rashes, or lesions   LYMPH NODES: No enlarged glands  ENDOCRINE: No heat or cold intolerance;  MUSCULOSKELETAL: No joint pain or swelling;   PSYCHIATRIC: No mood swings, or difficulty sleeping  HEME/LYMPH: No easy bruising, or bleeding gums  ALLERGY AND IMMUNOLOGIC: No hives or eczema    CURRENT MEDICATIONS:   acetaminophen     Tablet .. 650 milliGRAM(s) Oral every 6 hours PRN  amLODIPine   Tablet 10 milliGRAM(s) Oral every 24 hours  chlorhexidine 2% Cloths 1 Application(s) Topical <User Schedule>  dexAMETHasone     Tablet 4 milliGRAM(s) Oral every 6 hours  enoxaparin Injectable 40 milliGRAM(s) SubCutaneous <User Schedule>  famotidine    Tablet 20 milliGRAM(s) Oral every 12 hours  levETIRAcetam 1000 milliGRAM(s) Oral every 12 hours  multivitamin 1 Tablet(s) Oral daily  senna 2 Tablet(s) Oral at bedtime PRN      VITAL SIGNS, INS/OUTS (last 24 hours):  Vital Signs Last 24 Hrs  T(C): 36.4 (14 Dec 2024 08:53), Max: 37 (13 Dec 2024 19:59)  T(F): 97.5 (14 Dec 2024 08:53), Max: 98.6 (13 Dec 2024 19:59)  HR: 72 (14 Dec 2024 08:53) (56 - 80)  BP: 120/72 (14 Dec 2024 08:53) (120/72 - 141/91)  BP(mean): --  RR: 18 (14 Dec 2024 08:53) (16 - 18)  SpO2: 98% (14 Dec 2024 08:53) (97% - 100%)    Parameters below as of 14 Dec 2024 08:53  Patient On (Oxygen Delivery Method): room air      I&O's Summary    13 Dec 2024 07:01  -  14 Dec 2024 07:00  --------------------------------------------------------  IN: 0 mL / OUT: 500 mL / NET: -500 mL        PHYSICAL EXAM:  Gen: Reclining in bed at time of exam, appears stated age  HEENT: NCAT, MMM, clear OP  Neck: supple, trachea at midline  CV: RRR, +S1/S2  Pulm: adequate respiratory effort, no increase in work of breathing  Abd: soft, NTND  Skin: warm and dry, no new rashes vs prior report  Ext: WWP, no LE edema  Psych: affect and behavior appropriate, pleasant at time of interview    BASIC LABS:                        15.1   3.26  )-----------( 346      ( 14 Dec 2024 05:30 )             46.0     12-14    136  |  104  |  13  ----------------------------<  113[H]  4.4   |  24  |  0.88    Ca    9.2      14 Dec 2024 05:30  Phos  3.0     12-14  Mg     2.0     12-14    TPro  7.2  /  Alb  4.3  /  TBili  0.3  /  DBili  0.1  /  AST  19  /  ALT  11  /  AlkPhos  92  12-13    PT/INR - ( 14 Dec 2024 05:30 )   PT: 11.5 sec;   INR: 1.00          PTT - ( 14 Dec 2024 05:30 )  PTT:38.6 sec  Urinalysis Basic - ( 14 Dec 2024 05:30 )    Color: x / Appearance: x / SG: x / pH: x  Gluc: 113 mg/dL / Ketone: x  / Bili: x / Urobili: x   Blood: x / Protein: x / Nitrite: x   Leuk Esterase: x / RBC: x / WBC x   Sq Epi: x / Non Sq Epi: x / Bacteria: x      CAPILLARY BLOOD GLUCOSE          OTHER LABS:        MICRODATA:      IMAGING:    EKG:    #Diet - Diet, Regular (12-13-24 @ 23:28) [Active]        #DVT PPx -  #Dispo - 
Radiation Oncology Consult Note    HPI:  68 yo male with h/o HTN, OA, right  TKA, s/p L craniotomy for glioblastoma resection 04/25/2023, s/p chemo and radiation therapy 2023 with Dr. Causey, currently on physical therapy and brain MRI every 2 month post craniotomy. He presented to ER on 12/13/24 due to reoccurrence of brain tumor seen on his latest MRI completed in [place]. Patient c/o worsened R facial droop, worsening word finding difficulties, and right sided weakness x 2 weeks. Pt was recommended to go to [facility] ER for further evaluation by his oncologist. Plan is for OR 12/19/24 with Dr. Johntson.     12/16/24 MRI Brain  FINDINGS:  Left parietal craniotomy for prior lesion resection/biopsy. Deep to this,   there is is increase in size and enhancement of the mass involving the   left frontotemporal lobe with extensive surrounding FLAIR hyperintense   signal including extending across the callosum. There is associated   hyperperfusion of the enhancing components.    There is 0.4 cm rightward midline shift, stable since comparison CT.    No acute infarction or intracranial hemorrhage.    No hydrocephalus. No extra-axial fluid collections. The skull base flow   voids are present.    The visualized intraorbital contents are unremarkable. The imaged   portions of the paranasal sinuses are clear. The mastoid air cells are   clear. The remaining visualized osseous structures, soft tissues and   partially visualized parotid glands appear normal.    IMPRESSION:    Since prior outside MRI, there is interval increase in size of enhancing   mass in the left frontoparietal lobe with associated hyperperfusion and   extensive surrounding FLAIR signal abnormality, favoring progression of   disease. Admixture of treatment changes cannot be entirely ruled out.    Stable 0.4 cm rightward midline shift.    12/13/24 CT Head  FINDINGS:    Patient is status post left high frontoparietal craniotomy. There is an   encephalomalacic changes within the superior left frontal lobe.    There is mild effacement of the left lateral ventricle. No hydrocephalus.    There is new/increased hypoattenuation involving the left MCA territory   compared to MRI from 9/1/2024 consistent with increased vasogenic edema.   There is widening of the left convexity sulci with possible subtle gyral   hyperattenuation (2:21). No acute intracranial hemorrhage. No extra-axial   collection. There is new 4 mm shift to the right. No evidence of   herniation.    Hypodensities within the left basal ganglia consistent with chronic   lacunar infarcts.    The calvarium is intact. The visualized paranasal sinuses are clear. The   mastoids are well aerated.    Empty sella noted.    IMPRESSION:  Since 9/1/2024 MRI brain, there is new/increased hypoattenuation   involving the left MCA territory. Findings are consistent with increased   vasogenic edema which could be related to recurrent neoplasm versus   posttreatment changes. Recommend follow-up and correlation with brain MRI   including postcontrast images. 4 mm midline shift to the right. No acute   intracranial hemorrhage.    Status post left high parietal craniotomy with encephalomalacic changes   within the superior left frontal lobe.        Allergies  No Known Allergies      MEDICATIONS  (STANDING):  amLODIPine   Tablet 10 milliGRAM(s) Oral every 24 hours  dexAMETHasone     Tablet 4 milliGRAM(s) Oral every 6 hours  famotidine    Tablet 20 milliGRAM(s) Oral every 12 hours  influenza  Vaccine (HIGH DOSE) 0.5 milliLiter(s) IntraMuscular once  levETIRAcetam 1000 milliGRAM(s) Oral every 12 hours  multivitamin 1 Tablet(s) Oral daily    MEDICATIONS  (PRN):  acetaminophen     Tablet .. 650 milliGRAM(s) Oral every 6 hours PRN Mild Pain (1 - 3)  senna 2 Tablet(s) Oral at bedtime PRN Constipation      PAST MEDICAL & SURGICAL HISTORY:  Hypertension  Osteoarthritis  Glioblastoma  S/P total knee replacement, right  H/O craniotomy    SOCIAL HISTORY: No EtOH, no tobacco    REVIEW OF SYSTEMS:    CONSTITUTIONAL: No weakness, fevers or chills  EYES/ENT: No visual changes;  No vertigo or throat pain   NECK: No pain or stiffness  RESPIRATORY: No cough, wheezing, hemoptysis; No shortness of breath  CARDIOVASCULAR: No chest pain or palpitations  GASTROINTESTINAL: No abdominal or epigastric pain. No nausea, vomiting, or hematemesis; No diarrhea or constipation. No melena or hematochezia.  GENITOURINARY: No dysuria, frequency or hematuria  NEUROLOGICAL: No numbness or weakness  SKIN: No itching, burning, rashes, or lesions   All other review of systems is negative unless indicated above.      T(F): 98.5 (12-17-24 @ 05:02), Max: 98.5 (12-16-24 @ 17:20)  HR: 46 (12-17-24 @ 05:02)  BP: 132/72 (12-17-24 @ 05:02)  RR: 16 (12-17-24 @ 05:02)  SpO2: 99% (12-17-24 @ 05:02)  Wt(kg): --    GENERAL: NAD, well-developed  HEAD:  Atraumatic, Normocephalic  EYES: EOMI, PERRLA, conjunctiva and sclera clear  NECK: Supple, No JVD  CHEST/LUNG: Clear to auscultation bilaterally; No wheeze  HEART: Regular rate and rhythm; No murmurs, rubs, or gallops  ABDOMEN: Soft, Nontender, Nondistended; Bowel sounds present  EXTREMITIES:  2+ Peripheral Pulses, No clubbing, cyanosis, or edema  NEUROLOGY: non-focal  SKIN: No rashes or lesions          
  Patient is a 69y old  Male who presents with a chief complaint of GBM (20 Dec 2024 00:11)      HPI:  68 yo male with h/o HTN, OA, right  TKA, s/p L craniotomy for glioblastoma resection 04/25/2023, s/p chemo and radiation therapy 2023 with Dr. Causey, currently on physical therapy and brain MRI every 2 month post craniotomy, presents to ER due to reoccurrence of brain tumor seen on his latest MRI completed 4 days ago in [place]. Patient c/o worsened R facial droop, worsening word finding difficulties, and right sided weakness x 2 weeks. Pt was recommended to go to [facility] ER for further evaluation by his oncologist. Patient denies vision changes, extremity numbness, confusion, headache, nausea and vomiting.  (14 Dec 2024 02:25)    PAST MEDICAL & SURGICAL HISTORY:  Hypertension      Osteoarthritis      Glioblastoma      S/P total knee replacement, right      H/O craniotomy        MEDICATIONS  (STANDING):  acetaminophen     Tablet .. 1000 milliGRAM(s) Oral every 8 hours  amLODIPine   Tablet 10 milliGRAM(s) Oral every 24 hours  chlorhexidine 4% Liquid 1 Application(s) Topical daily  dexAMETHasone     Tablet 4 milliGRAM(s) Oral every 6 hours  dexAMETHasone     Tablet 2 milliGRAM(s) Oral every 6 hours  dexAMETHasone     Tablet   Oral   famotidine    Tablet 20 milliGRAM(s) Oral every 12 hours  insulin lispro (ADMELOG) corrective regimen sliding scale   SubCutaneous Before meals and at bedtime  levETIRAcetam 1000 milliGRAM(s) Oral every 12 hours  multivitamin 1 Tablet(s) Oral daily  ondansetron   Disintegrating Tablet 4 milliGRAM(s) Oral every 6 hours  polyethylene glycol 3350 17 Gram(s) Oral daily  senna 2 Tablet(s) Oral two times a day  sodium chloride 0.9%. 1000 milliLiter(s) (50 mL/Hr) IV Continuous <Continuous>    MEDICATIONS  (PRN):  oxyCODONE    IR 5 milliGRAM(s) Oral every 6 hours PRN Moderate Pain (4 - 6)  oxyCODONE    IR 10 milliGRAM(s) Oral every 6 hours PRN Severe Pain (7 - 10)          Home Living Status :  lives with his wife in an elevator accessible apartment building ,   3 steps to enter          -  Home Services :  none        Baseline Functional Level Prior to Admission :             - ADL's/ IADL's :  independent          - Ambulatory status prior to admission :   walked with a quad cane          FAMILY HISTORY:      CBC Full  -  ( 20 Dec 2024 04:54 )  WBC Count : 9.02 K/uL  RBC Count : 4.50 M/uL  Hemoglobin : 13.2 g/dL  Hematocrit : 40.0 %  Platelet Count - Automated : 344 K/uL  Mean Cell Volume : 88.9 fl  Mean Cell Hemoglobin : 29.3 pg  Mean Cell Hemoglobin Concentration : 33.0 g/dL  Auto Neutrophil # : x  Auto Lymphocyte # : x  Auto Monocyte # : x  Auto Eosinophil # : x  Auto Basophil # : x  Auto Neutrophil % : x  Auto Lymphocyte % : x  Auto Monocyte % : x  Auto Eosinophil % : x  Auto Basophil % : x      12-20    138  |  105  |  23  ----------------------------<  132[H]  4.7   |  24  |  0.69    Ca    7.8[L]      20 Dec 2024 04:54  Phos  3.6     12-20  Mg     2.1     12-20    TPro  5.2[L]  /  Alb  3.0[L]  /  TBili  0.2  /  DBili  x   /  AST  See Note  /  ALT  37  /  AlkPhos  58  12-20      Urinalysis Basic - ( 20 Dec 2024 04:54 )    Color: x / Appearance: x / SG: x / pH: x  Gluc: 132 mg/dL / Ketone: x  / Bili: x / Urobili: x   Blood: x / Protein: x / Nitrite: x   Leuk Esterase: x / RBC: x / WBC x   Sq Epi: x / Non Sq Epi: x / Bacteria: x        Radiology :     < from: MR Head w/wo IV Cont (12.16.24 @ 11:03) >  ACC: [id number] EXAM:  MR BRAIN WAW IC   ORDERED BY: SON SHARPE     PROCEDURE DATE:  12/16/2024          INTERPRETATION:  CLINICAL INDICATION: Glioblastoma.    TECHNIQUE: Multi-planar multi-sequential MR imaging of the brain was   performed before and after the intravenous administration of contrast.   Gadavist IV contrast dose: 7.5 cc administered. DSC perfusion was also   performed and manually post processed using Tony software.    COMPARISON: CT head 12/13/2024. Outside MRI brain 9/1/2024.    FINDINGS:  Left parietal craniotomy for prior lesion resection/biopsy. Deep to this,   there is is increase in size and enhancement of the mass involving the   left frontotemporal lobe with extensive surrounding FLAIR hyperintense   signal including extending across the callosum. There is associated   hyperperfusion of the enhancing components.    There is 0.4 cm rightward midline shift, stable since comparison CT.    No acute infarction or intracranial hemorrhage.    No hydrocephalus. No extra-axial fluid collections. The skull base flow   voids are present.    The visualized intraorbital contents are unremarkable. The imaged   portions of the paranasal sinuses are clear. The mastoid air cells are   clear. The remaining visualized osseous structures, soft tissues and   partially visualized parotid glands appear normal.    IMPRESSION:    Since prior outside MRI, there is interval increase in size of enhancing   mass in the left frontoparietal lobe with associated hyperperfusion and   extensive surrounding FLAIR signal abnormality, favoring progression of   disease. Admixture of treatment changes cannot be entirely ruled out.    Stable 0.4 cm rightward midline shift.          Review of Systems : per HPI         Vital Signs Last 24 Hrs  T(C): 36.4 (20 Dec 2024 04:45), Max: 36.6 (20 Dec 2024 00:20)  T(F): 97.5 (20 Dec 2024 04:45), Max: 97.8 (20 Dec 2024 00:20)  HR: 46 (20 Dec 2024 07:00) (45 - 94)  BP: 127/67 (20 Dec 2024 07:00) (111/59 - 140/69)  BP(mean): 91 (20 Dec 2024 07:00) (80 - 98)  RR: 17 (20 Dec 2024 07:00) (13 - 25)  SpO2: 99% (20 Dec 2024 07:00) (97% - 100%)    Parameters below as of 20 Dec 2024 07:00  Patient On (Oxygen Delivery Method): room air            Physical Exam:   in NSICU , on R isolation,  lying comfortably in semi Hawthorne's position , awake , alert , no acute complaints , feels tired     Head: normocephalic ,  left crani incision with staples, staples c/d/i    Eyes: PERRLA ,pos dysconj gaze , no nystagmus , sclera anicteric    ENT / FACE: R droop , neg nasal discharge , uvula midline , no oropharyngeal erythema / exudate    Neck: supple , negative JVD , negative carotid bruits , no thyromegaly    Chest: CTA bilaterally      Cardiovascular: regular rate and rhythm , neg murmurs / rubs / gallops    Abdomen: soft , non distended , no tenderness to palpation in all 4 quadrants ,  normal bowel sounds     Extremities: WWP , neg cyanosis /clubbing / edema      Neurologic Exam:     somnolent, oriented x 3 , speech w/ dysarthria , follows commands    Cranial Nerves:           II:                         pupils equal , round and reactive to light , visual fields intact         III/ IV/VI:             extraocular movements intact , neg nystagmus , neg ptosis        V:                        facial sensation intact , V1-3 normal        VII:                       R droop , normal eye closure and smile        VIII:                     hearing intact to finger rub bilaterally        IX and X:             no hoarseness , gag intact , palate/ uvula rise symmetrically        XI:                       SCM / trapezius strength intact bilateral        XII:                      no tongue deviation    Motor Exam:                  Left  UE/LE > 4/5                   Right UE prox 2/5, distally > 3/5                    LE: > 3/5    Sensation:         intact to light touch x 4 extremities                                 DTR:           biceps/brachioradialis: equal                            patella/ankle: equal    Initial PT/OT assessment    Previous Level of Function:     · Ambulation Skills	needed assist; needs device; quad cane  · Transfer Skills	needed assist; needs device; quad cane  · ADL Skills	needed assist; needs device; quad cane  · Work/Leisure Activity	needed assist; needs device; quad cane  · Additional Comments	was living in [place]. Patient and patient's wife rented apartment in [place], was ambulating independently and performing stairs independently and going to outpatient PT via car often. In past two weeks, patient dragging RLE, and started using quad cane. Reports 1 fall outside opening gate in past year with LUE. Prior to [place], patient living in private home, 3 steps to enter however enters through the back that has a ramp, 1 flight inside with chairlift, has shower chair and grab bars in bathroom, has glasses for reading and is R handed  	    Cognitive Status Examination:   · Orientation	oriented to person, place, time and situation; able to state time and situation with cueing  · Level of Consciousness	alert  · Follows Commands and Answers Questions	100% of the time; aphasia; unable to follow multi-step instructions  · Type of aphasia	expressive; mild expressive aphasia  · Personal Safety and Judgment	intact  · Short Term Memory	intact  · Long Term Memory	intact    Manual Muscle Testing:   · Manual Muscle Testing Results	LUE and LLE 5/5 MMT, RLE hip flexion 3-/5 MMT, R hip add 5/5, R hip abd 2+/5, R knee ext 5/5 , R DF/PF 0/5 MMT; R shoulder flex 1/5, R elbow flex/ext 2+/5 , R hand  2+/5 MMT    Transfer: Sit to Stand:     · Level of [place]	contact guard  · Assistive Device	quad cane    Transfer: Stand to Sit:     · Level of [place]	contact guard  · Assistive Device	quad cane    Sit/Stand Transfer Safety Analysis:     · Transfer Safety Concerns Noted	decreased balance during turns  · Impairments Contributing to Impaired Transfers	impaired balance; impaired postural control; decreased strength; impaired coordination; impaired motor control    Gait Skills:     · Level of [place]	contact guard  · Assistive Device	quad cane  · Gait Distance	20'; 75 feet    Gait Analysis:     · Gait Pattern Used	swing-to gait  · Gait Deviations Noted	footdrop; decreased step length; decreased weight-shifting ability; increased time in double stance; dec R step length/ R hip flex , dec R hip rotation and R hip circumduction  · Impairments Contributing to Gait Deviations	impaired balance; decreased ROM; decreased strength; impaired motor control; impaired postural control; decreased sensation    Balance Skills Assessment:     · Sitting Balance: Static	good balance  · Sitting Balance: Dynamic	good balance  · Sit-to-Stand Balance	fair minus  · Standing Balance: Static	fair minus  · Standing Balance: Dynamic	poor plus  · Systems Impairment Contributing to Balance Disturbance	musculoskeletal; neuromuscular  · Identified Impairments Contributing to Balance Disturbance	impaired motor control; impaired postural control; decreased strength    Sensory Examination:   Sensory Examination:    Light Touch Sensation:   · Left UE	mild impairment  dec R side ~90% of L side  · Right UE	mild impairment  dec R side ~90% of L side  · Left LE	mild impairment  dec R side ~90% of L side  · Right LE	mild impairment  dec R side ~90% of L side  · Head/Neck	mild impairment  dec R side ~70% of L side      Fine Motor Coordination:   Fine Motor Coordination Examination:    Grossly Intact:   · Grossly Intact	Left UE      Fine Motor Coordination:   · Left Hand, Finger to Nose	normal performance  · Right Hand, Finger to Nose	unable to perform  · Left Hand Thumb/Finger Opposition Skills	normal performance  · Right Hand Thumb/Finger Opposition Skills	unable to perform    Treatment Location:   · Comments	II: Visual fields are full to confrontation; III, IV, VI: Extraocular movements are intact without nystagmus;  VII: +R facial droop; XI: R trap 2/5, L trap 5/5 MMT;  XII: Tongue protrudes to R    Clinical Impressions:   · Criteria for Skilled Therapeutic Interventions	impairments found; functional limitations in following categories; risk reduction/prevention; rehab potential; therapy frequency; anticipated discharge recommendation  · Impairments Found (describe specific impairments)	aerobic capacity/endurance; gait, locomotion, and balance; muscle strength  · Functional Limitations in Following Categories (describe specific limitations)	self-care; school; home management; community/leisure; work  · Risk Reduction/Prevention (Describe Specific Areas of risk reduction/prevention)	risk factors  · Risk Areas	fall        PM&R Impression: admitted for c/o worsened R facial droop, worsening word finding difficulties, and right sided weakness x 2 weeks    - h/o GBM     -  s/p L crani for resection of GBM with placement of Gammatile brachytherapy 12/19     Recommendations / Plan:       1) Physical / Occupational therapy focusing on therapeutic exercises , equipment evaluation , bed mobility/transfer out of bed evaluation , progressive ambulation with assistive devices prn .    2) Current disposition plan recommendation:   acute rehab placement

## 2024-12-21 LAB
ANION GAP SERPL CALC-SCNC: 8 MMOL/L — SIGNIFICANT CHANGE UP (ref 5–17)
BUN SERPL-MCNC: 28 MG/DL — HIGH (ref 7–23)
CALCIUM SERPL-MCNC: 8.5 MG/DL — SIGNIFICANT CHANGE UP (ref 8.4–10.5)
CHLORIDE SERPL-SCNC: 103 MMOL/L — SIGNIFICANT CHANGE UP (ref 96–108)
CO2 SERPL-SCNC: 27 MMOL/L — SIGNIFICANT CHANGE UP (ref 22–31)
CREAT SERPL-MCNC: 0.84 MG/DL — SIGNIFICANT CHANGE UP (ref 0.5–1.3)
EGFR: 95 ML/MIN/1.73M2 — SIGNIFICANT CHANGE UP
GLUCOSE SERPL-MCNC: 136 MG/DL — HIGH (ref 70–99)
HCT VFR BLD CALC: 42.1 % — SIGNIFICANT CHANGE UP (ref 39–50)
HGB BLD-MCNC: 13.7 G/DL — SIGNIFICANT CHANGE UP (ref 13–17)
MAGNESIUM SERPL-MCNC: 2.2 MG/DL — SIGNIFICANT CHANGE UP (ref 1.6–2.6)
MCHC RBC-ENTMCNC: 29 PG — SIGNIFICANT CHANGE UP (ref 27–34)
MCHC RBC-ENTMCNC: 32.5 G/DL — SIGNIFICANT CHANGE UP (ref 32–36)
MCV RBC AUTO: 89 FL — SIGNIFICANT CHANGE UP (ref 80–100)
NRBC # BLD: 0 /100 WBCS — SIGNIFICANT CHANGE UP (ref 0–0)
PHOSPHATE SERPL-MCNC: 3 MG/DL — SIGNIFICANT CHANGE UP (ref 2.5–4.5)
PLATELET # BLD AUTO: 322 K/UL — SIGNIFICANT CHANGE UP (ref 150–400)
POTASSIUM SERPL-MCNC: 4.7 MMOL/L — SIGNIFICANT CHANGE UP (ref 3.5–5.3)
POTASSIUM SERPL-SCNC: 4.7 MMOL/L — SIGNIFICANT CHANGE UP (ref 3.5–5.3)
RBC # BLD: 4.73 M/UL — SIGNIFICANT CHANGE UP (ref 4.2–5.8)
RBC # FLD: 12.8 % — SIGNIFICANT CHANGE UP (ref 10.3–14.5)
SODIUM SERPL-SCNC: 138 MMOL/L — SIGNIFICANT CHANGE UP (ref 135–145)
WBC # BLD: 8.15 K/UL — SIGNIFICANT CHANGE UP (ref 3.8–10.5)
WBC # FLD AUTO: 8.15 K/UL — SIGNIFICANT CHANGE UP (ref 3.8–10.5)

## 2024-12-21 PROCEDURE — 99232 SBSQ HOSP IP/OBS MODERATE 35: CPT

## 2024-12-21 RX ADMIN — LEVETIRACETAM 1000 MILLIGRAM(S): 100 SOLUTION ORAL at 18:03

## 2024-12-21 RX ADMIN — ACETAMINOPHEN 1000 MILLIGRAM(S): 80 SOLUTION/ DROPS ORAL at 06:04

## 2024-12-21 RX ADMIN — Medication 2 MILLIGRAM(S): at 22:26

## 2024-12-21 RX ADMIN — Medication 1 TABLET(S): at 12:15

## 2024-12-21 RX ADMIN — ONDANSETRON 4 MILLIGRAM(S): 4 TABLET ORAL at 12:15

## 2024-12-21 RX ADMIN — FAMOTIDINE 20 MILLIGRAM(S): 20 TABLET, FILM COATED ORAL at 06:04

## 2024-12-21 RX ADMIN — ONDANSETRON 4 MILLIGRAM(S): 4 TABLET ORAL at 06:04

## 2024-12-21 RX ADMIN — FAMOTIDINE 20 MILLIGRAM(S): 20 TABLET, FILM COATED ORAL at 18:03

## 2024-12-21 RX ADMIN — ENOXAPARIN SODIUM 40 MILLIGRAM(S): 60 INJECTION INTRAVENOUS; SUBCUTANEOUS at 22:26

## 2024-12-21 RX ADMIN — SENNOSIDES 2 TABLET(S): 8.6 TABLET, FILM COATED ORAL at 06:04

## 2024-12-21 RX ADMIN — Medication 2 MILLIGRAM(S): at 06:04

## 2024-12-21 RX ADMIN — LEVETIRACETAM 1000 MILLIGRAM(S): 100 SOLUTION ORAL at 06:03

## 2024-12-21 RX ADMIN — Medication 2 MILLIGRAM(S): at 12:15

## 2024-12-21 RX ADMIN — Medication 17 GRAM(S): at 06:04

## 2024-12-21 NOTE — PROGRESS NOTE ADULT - ASSESSMENT
69 M with HTN, OA, right  TKA, s/p L craniotomy for glioblastoma resection 04/25/2023, s/p chemo and radiation therapy 2023 with Dr. Causey, currently on physical therapy and brain MRI every 2 month post craniotomy, presents to ER due to reoccurrence of brain tumor seen on his latest MRI completed 4 days ago in [place].     #GBM:  - s/p left frontal craniotomy w/ GBM removal and placement of gamma tile brachytherapy on 12/19  - Remains on Decadron per nsgy  - On Pepcid for GI protection.  - Keppra.    #HTN:   - Controlled at the moment.  - Continue with Norvasc 10mg PO daily    #DVT proph: Lovenox.

## 2024-12-21 NOTE — PROGRESS NOTE ADULT - SUBJECTIVE AND OBJECTIVE BOX
HPI:  68 yo male with h/o HTN, OA, right  TKA, s/p L craniotomy for glioblastoma resection 04/25/2023, s/p chemo and radiation therapy 2023 with Dr. Causey, currently on physical therapy and brain MRI every 2 month post craniotomy, presents to ER due to reoccurrence of brain tumor seen on his latest MRI completed 4 days ago in [place]. Patient c/o worsened R facial droop, worsening word finding difficulties, and right sided weakness x 2 weeks. Pt was recommended to go to [facility] ER for further evaluation by his oncologist. Patient denies vision changes, extremity numbness, confusion, headache, nausea and vomiting.  (14 Dec 2024 02:25)    HOSPITAL COURSE:  12/13: admitted to [facility], decadron 4q6 started  12/14: KYA overnight  12/15: KYA overnight, pending MRI  12/16: KYA overnight. MRI complete. Plan for OR thursday 12/19/24.   12/17: KYA overnight.  12/18: Pre-op for OR tm.   12/19: POD 0 s/p L crani for tumor with gammatile.   12/20: POD 1 L crani. KYA overnight, neuro stable. +BM.     OVERNIGHT EVENTS: POD 2 L crani. KYA overnight.     Vital Signs Last 24 Hrs  T(C): 36.4 (20 Dec 2024 20:09), Max: 36.8 (20 Dec 2024 15:46)  T(F): 97.6 (20 Dec 2024 20:09), Max: 98.2 (20 Dec 2024 15:46)  HR: 53 (20 Dec 2024 20:09) (45 - 58)  BP: 125/64 (20 Dec 2024 22:10) (102/57 - 141/77)  BP(mean): 74 (20 Dec 2024 14:00) (74 - 104)  RR: 14 (20 Dec 2024 20:09) (14 - 20)  SpO2: 99% (20 Dec 2024 20:09) (96% - 100%)    Parameters below as of 20 Dec 2024 20:09  Patient On (Oxygen Delivery Method): room air    I&O's Summary    19 Dec 2024 07:01  -  20 Dec 2024 07:00  --------------------------------------------------------  IN: 3730 mL / OUT: 3015 mL / NET: 715 mL    20 Dec 2024 07:01  -  21 Dec 2024 00:57  --------------------------------------------------------  IN: 50 mL / OUT: 300 mL / NET: -250 mL    PHYSICAL EXAM:  General: NAD, pt is sitting up in bed, on RA   HEENT: (+) dysconjugate gaze, PERRL 3mm briskly reactive, EOMI b/l, (+) right facial droop  CV: RRR, S1, S2  Resp: breathing non-labored on RA, chest rise symmetric  GI: abd soft, NTND  Neuro: AOx3, no aphasia, speech clear, no dysmetria. Unable to assess for pronator drift 2/2 RUE weakness. Follows commands.  LUE/LLE 5/5. RUE delt 3/5, bi/tri 4+/5, HG 4/5. RLE HF/KF/KE 4+/5, DF/PF 0/5. Decreased sensation along entire Right side.   Extremities: distal pulses 2+ x4.  Wound/incision: left crani incision with staples, staples c/d/i    DIET:  [] NPO  [X] Mechanical  [] Tube feeds    LABS:                        13.2   9.02  )-----------( 344      ( 20 Dec 2024 04:54 )             40.0     12-20    138  |  105  |  23  ----------------------------<  132[H]  4.7   |  24  |  0.69    Ca    7.8[L]      20 Dec 2024 04:54  Phos  3.6     12-20  Mg     2.1     12-20    TPro  5.2[L]  /  Alb  3.0[L]  /  TBili  0.2  /  DBili  x   /  AST  See Note  /  ALT  37  /  AlkPhos  58  12-20    PT/INR - ( 19 Dec 2024 12:44 )   PT: 11.8 sec;   INR: 1.03          PTT - ( 19 Dec 2024 12:44 )  PTT:27.4 sec  Urinalysis Basic - ( 20 Dec 2024 04:54 )    Color: x / Appearance: x / SG: x / pH: x  Gluc: 132 mg/dL / Ketone: x  / Bili: x / Urobili: x   Blood: x / Protein: x / Nitrite: x   Leuk Esterase: x / RBC: x / WBC x   Sq Epi: x / Non Sq Epi: x / Bacteria: x      CAPILLARY BLOOD GLUCOSE      POCT Blood Glucose.: 114 mg/dL (20 Dec 2024 05:44)    Allergies    No Known Allergies    Intolerances      MEDICATIONS:  Antibiotics:    Neuro:  acetaminophen     Tablet .. 1000 milliGRAM(s) Oral every 8 hours  levETIRAcetam 1000 milliGRAM(s) Oral every 12 hours  ondansetron   Disintegrating Tablet 4 milliGRAM(s) Oral every 6 hours    Anticoagulation:  enoxaparin Injectable 40 milliGRAM(s) SubCutaneous <User Schedule>    OTHER:  amLODIPine   Tablet 10 milliGRAM(s) Oral every 24 hours  dexAMETHasone     Tablet 2 milliGRAM(s) Oral every 6 hours  dexAMETHasone     Tablet 2 milliGRAM(s) Oral every 12 hours  dexAMETHasone     Tablet   Oral   famotidine    Tablet 20 milliGRAM(s) Oral every 12 hours  polyethylene glycol 3350 17 Gram(s) Oral every 12 hours  senna 2 Tablet(s) Oral two times a day    IVF:  multivitamin 1 Tablet(s) Oral daily    ASSESSMENT:  69M PMHx HTN, OA, right  TKA, s/p L craniotomy for GBM resection 04/25/2023, s/p chemo and RT 2023 with Dr. Causey. Presented to ER due to reoccurrence of brain tumor seen on his latest MRI completed 4 days ago in [place]. Patient c/o worsened R facial droop, worsening word finding difficulties, and right sided weakness x 2 weeks. Now s/p L crani for resection of GBM with placement of Gammatile brachytherapy 12/19 ([facility]: [facility]).       BRAIN TUMOR    Handoff    MEWS Score    Hypertension    Osteoarthritis    Brain mass    Glioblastoma    Glioblastoma    Glioblastoma    Brain tumor    Brain tumor, recurrent    GBM (glioblastoma multiforme)    Preoperative clearance    Hypertension    Craniotomy, with supratentorial neoplasm excision    S/P total knee replacement, right    H/O craniotomy    SURGERY COMP    90+    [email or web address]      PLAN:  Neuro:  - neuro/vital checks q4hr  - pain control: tylenol   - Seizure ppx: home Keppra 1g BID   - MRI brain w/ w/o with sarah 12/16 with progression of disease  - CTH 12/13: vasogenic edema in left hemisphere  - cerebral edema: decadron 4q6 taper over 2d to 2mg BID  - GBM: hold metformin 500mg q12  - Cesium 131 precautions  - pending post op MRI     Cardiac:  - SBP goal 100-140  - HTN: cont home amlodipine 10mg daily    Pulmonary:  - on RA, encourage IS     GI:  - regular diet   - bowel regimen, BM 12/20  - GI ppx: pepcid BID while on steroids    Renal:  - IVL   - voiding     Endo:  - ISS, A1c 5.5%, no active issues     Heme:  - DVT ppx: SCDs, SQL   - heme/onc consulted, f/u recs   - rad/onc consulted,  f/u recs    ID:  - afebrile    Disposition: NSCU, full code, recc'ed AR     D/w Dr. Cheung

## 2024-12-21 NOTE — PROGRESS NOTE ADULT - SUBJECTIVE AND OBJECTIVE BOX
INTERVAL EVENTS: No o/n events. Denies CP, dyspnea, palpitations, presyncope, syncope, f/c/n/v.     REVIEW OF SYSTEMS:  Constitutional:     [X] negative [ ] fevers [ ] chills [ ] weight loss [ ] weight gain  HEENT:                  [X] negative [ ] dry eyes [ ] eye irritation [ ] postnasal drip [ ] nasal congestion  CV:                         [X] negative  [ ] chest pain [ ] orthopnea [ ] palpitations [ ] murmur  Resp:                     [X] negative [ ] cough [ ] shortness of breath [ ] wheezing [ ] sputum [ ] hemoptysis  GI:                          [X] negative [ ] nausea [ ] vomiting [ ] diarrhea [ ] constipation [ ] abd pain [ ] dysphagia   :                        [X] negative [ ] dysuria [ ] nocturia [ ] hematuria [ ] increased urinary frequency  MSK:                      [X] negative [ ] back pain [ ] myalgias [ ] arthralgias [ ] fracture  Skin:                       [X] negative [ ] rash [ ] itch  Neuro:                   [X] negative [ ] headache [ ] dizziness [ ] syncope [ ] weakness [ ] numbness  Psych:                    [X] negative [ ] anxiety [ ] depression  Endo:                     [X] negative [ ] diabetes [ ] thyroid problem  Heme/Lymph:      [X] negative [ ] anemia [ ] bleeding problem  Allergic/Immune: [X] negative [ ] itchy eyes [ ] nasal discharge [ ] hives [ ] angioedema    [X] All other systems negative or otherwise described above.  [ ] Unable to assess ROS due to ________.    PAST MEDICAL & SURGICAL HISTORY:  Hypertension    Osteoarthritis    Brain mass    Glioblastoma    S/P total knee replacement, right    H/O craniotomy      MEDICATIONS  (STANDING):  amLODIPine   Tablet 10 milliGRAM(s) Oral every 24 hours  dexAMETHasone     Tablet 2 milliGRAM(s) Oral every 6 hours  dexAMETHasone     Tablet 2 milliGRAM(s) Oral every 12 hours  dexAMETHasone     Tablet   Oral   enoxaparin Injectable 40 milliGRAM(s) SubCutaneous <User Schedule>  famotidine    Tablet 20 milliGRAM(s) Oral every 12 hours  levETIRAcetam 1000 milliGRAM(s) Oral every 12 hours  multivitamin 1 Tablet(s) Oral daily  ondansetron   Disintegrating Tablet 4 milliGRAM(s) Oral every 6 hours  polyethylene glycol 3350 17 Gram(s) Oral every 12 hours  senna 2 Tablet(s) Oral two times a day    MEDICATIONS  (PRN):    ICU Vital Signs Last 24 Hrs  T(C): 36.6 (21 Dec 2024 09:59), Max: 36.8 (20 Dec 2024 15:46)  T(F): 97.8 (21 Dec 2024 09:59), Max: 98.2 (20 Dec 2024 15:46)  HR: 46 (21 Dec 2024 09:59) (46 - 62)  BP: 120/66 (21 Dec 2024 09:59) (102/57 - 130/65)  BP(mean): 74 (20 Dec 2024 14:00) (74 - 90)  ABP: --  ABP(mean): --  RR: 18 (21 Dec 2024 09:59) (14 - 18)  SpO2: 96% (21 Dec 2024 09:59) (96% - 100%)    O2 Parameters below as of 21 Dec 2024 09:59  Patient On (Oxygen Delivery Method): room air          Orthostatic VS    Daily     Daily   I&O's Summary    20 Dec 2024 07:01  -  21 Dec 2024 07:00  --------------------------------------------------------  IN: 50 mL / OUT: 800 mL / NET: -750 mL        PHYSICAL EXAM:  Gen: Resting in bed at time of exam, not in distress   CV: RRR, +S1/S2  Pulm: no wheezing , no crackles  no increase in work of breathing  Abd: soft, NTND  Skin: warm and dry, no new rashes   Ext: moving all 4 extremities spontaneously , no edema  ,  Neuro: no gross focal neurological deficits  Psych: affect and behavior appropriate, pleasant at time of interview      LABS:                        13.7   8.15  )-----------( 322      ( 21 Dec 2024 08:11 )             42.1     PT/INR - ( 19 Dec 2024 12:44 )   PT: 11.8 sec;   INR: 1.03          PTT - ( 19 Dec 2024 12:44 )  PTT:27.4 sec  12-21    138  |  103  |  28[H]  ----------------------------<  136[H]  4.7   |  27  |  0.84    Ca    8.5      21 Dec 2024 08:11  Phos  3.0     12-21  Mg     2.2     12-21    TPro  5.2[L]  /  Alb  3.0[L]  /  TBili  0.2  /  DBili  x   /  AST  See Note  /  ALT  37  /  AlkPhos  58  12-20          Urinalysis Basic - ( 21 Dec 2024 08:11 )    Color: x / Appearance: x / SG: x / pH: x  Gluc: 136 mg/dL / Ketone: x  / Bili: x / Urobili: x   Blood: x / Protein: x / Nitrite: x   Leuk Esterase: x / RBC: x / WBC x   Sq Epi: x / Non Sq Epi: x / Bacteria: x          RADIOLOGY & ADDITIONAL STUDIES: Reviewed

## 2024-12-22 LAB
ANION GAP SERPL CALC-SCNC: 6 MMOL/L — SIGNIFICANT CHANGE UP (ref 5–17)
BUN SERPL-MCNC: 30 MG/DL — HIGH (ref 7–23)
CALCIUM SERPL-MCNC: 8.5 MG/DL — SIGNIFICANT CHANGE UP (ref 8.4–10.5)
CHLORIDE SERPL-SCNC: 99 MMOL/L — SIGNIFICANT CHANGE UP (ref 96–108)
CO2 SERPL-SCNC: 31 MMOL/L — SIGNIFICANT CHANGE UP (ref 22–31)
CREAT SERPL-MCNC: 0.84 MG/DL — SIGNIFICANT CHANGE UP (ref 0.5–1.3)
EGFR: 95 ML/MIN/1.73M2 — SIGNIFICANT CHANGE UP
GLUCOSE SERPL-MCNC: 99 MG/DL — SIGNIFICANT CHANGE UP (ref 70–99)
HCT VFR BLD CALC: 41.9 % — SIGNIFICANT CHANGE UP (ref 39–50)
HGB BLD-MCNC: 13.8 G/DL — SIGNIFICANT CHANGE UP (ref 13–17)
MAGNESIUM SERPL-MCNC: 2.2 MG/DL — SIGNIFICANT CHANGE UP (ref 1.6–2.6)
MCHC RBC-ENTMCNC: 29.7 PG — SIGNIFICANT CHANGE UP (ref 27–34)
MCHC RBC-ENTMCNC: 32.9 G/DL — SIGNIFICANT CHANGE UP (ref 32–36)
MCV RBC AUTO: 90.3 FL — SIGNIFICANT CHANGE UP (ref 80–100)
NRBC # BLD: 0 /100 WBCS — SIGNIFICANT CHANGE UP (ref 0–0)
PHOSPHATE SERPL-MCNC: 2.8 MG/DL — SIGNIFICANT CHANGE UP (ref 2.5–4.5)
PLATELET # BLD AUTO: 310 K/UL — SIGNIFICANT CHANGE UP (ref 150–400)
POTASSIUM SERPL-MCNC: 4.6 MMOL/L — SIGNIFICANT CHANGE UP (ref 3.5–5.3)
POTASSIUM SERPL-SCNC: 4.6 MMOL/L — SIGNIFICANT CHANGE UP (ref 3.5–5.3)
RBC # BLD: 4.64 M/UL — SIGNIFICANT CHANGE UP (ref 4.2–5.8)
RBC # FLD: 13.2 % — SIGNIFICANT CHANGE UP (ref 10.3–14.5)
SODIUM SERPL-SCNC: 136 MMOL/L — SIGNIFICANT CHANGE UP (ref 135–145)
WBC # BLD: 7.38 K/UL — SIGNIFICANT CHANGE UP (ref 3.8–10.5)
WBC # FLD AUTO: 7.38 K/UL — SIGNIFICANT CHANGE UP (ref 3.8–10.5)

## 2024-12-22 PROCEDURE — 99232 SBSQ HOSP IP/OBS MODERATE 35: CPT

## 2024-12-22 PROCEDURE — 99024 POSTOP FOLLOW-UP VISIT: CPT

## 2024-12-22 RX ORDER — SOD PHOS DI, MONO/K PHOS MONO 250 MG
1 TABLET ORAL ONCE
Refills: 0 | Status: COMPLETED | OUTPATIENT
Start: 2024-12-22 | End: 2024-12-22

## 2024-12-22 RX ORDER — ACETAMINOPHEN 80 MG/.8ML
650 SOLUTION/ DROPS ORAL EVERY 6 HOURS
Refills: 0 | Status: DISCONTINUED | OUTPATIENT
Start: 2024-12-22 | End: 2024-12-27

## 2024-12-22 RX ADMIN — Medication 10 MILLIGRAM(S): at 22:10

## 2024-12-22 RX ADMIN — Medication 17 GRAM(S): at 05:19

## 2024-12-22 RX ADMIN — Medication 1 TABLET(S): at 12:55

## 2024-12-22 RX ADMIN — Medication 1 PACKET(S): at 12:55

## 2024-12-22 RX ADMIN — FAMOTIDINE 20 MILLIGRAM(S): 20 TABLET, FILM COATED ORAL at 19:01

## 2024-12-22 RX ADMIN — LEVETIRACETAM 1000 MILLIGRAM(S): 100 SOLUTION ORAL at 19:01

## 2024-12-22 RX ADMIN — LEVETIRACETAM 1000 MILLIGRAM(S): 100 SOLUTION ORAL at 05:19

## 2024-12-22 RX ADMIN — SENNOSIDES 2 TABLET(S): 8.6 TABLET, FILM COATED ORAL at 19:01

## 2024-12-22 RX ADMIN — FAMOTIDINE 20 MILLIGRAM(S): 20 TABLET, FILM COATED ORAL at 05:19

## 2024-12-22 RX ADMIN — Medication 2 MILLIGRAM(S): at 22:11

## 2024-12-22 RX ADMIN — Medication 2 MILLIGRAM(S): at 12:55

## 2024-12-22 RX ADMIN — SENNOSIDES 2 TABLET(S): 8.6 TABLET, FILM COATED ORAL at 05:19

## 2024-12-22 RX ADMIN — Medication 17 GRAM(S): at 19:02

## 2024-12-22 RX ADMIN — ENOXAPARIN SODIUM 40 MILLIGRAM(S): 60 INJECTION INTRAVENOUS; SUBCUTANEOUS at 21:55

## 2024-12-22 NOTE — PROGRESS NOTE ADULT - ASSESSMENT
69 M with HTN, OA, right  TKA, s/p L craniotomy for glioblastoma resection 04/25/2023, s/p chemo and radiation therapy 2023 with Dr. Causey, currently on physical therapy and brain MRI every 2 month post craniotomy, presents to ER due to reoccurrence of brain tumor seen on his latest MRI completed 4 days ago in [place].     #GBM:  - s/p left frontal craniotomy w/ GBM removal and placement of gamma tile brachytherapy on 12/19  - Remains on Decadron per nsgy  - On Pepcid for GI protection.  - Keppra.    #HTN:   - Controlled at the moment.  - Continue with Norvasc 10mg PO daily    #DVT proph: Lovenox.

## 2024-12-22 NOTE — PROGRESS NOTE ADULT - ASSESSMENT
Nsgy    69 y o M PMHx HTN, OA, right  TKA, s/p L craniotomy for GBM resection 04/25/2023, s/p chemo and RT 2023 with Dr. Causey. Presented to ER due to reoccurrence of brain tumor seen on his latest MRI completed 4 days ago in [place]. Patient c/o worsened R facial droop, worsening word finding difficulties, and right sided weakness x 2 weeks. Now s/p L crani for resection of GBM with placement of Gammatile brachytherapy 12/19 ([facility]: [facility]). PLAN:  Neuro:  - neuro/vital checks q4hr  - pain control: tylenol   - Seizure ppx: home Keppra 1g BID   - CTH 12/13: vasogenic edema in left hemisphere  - cerebral edema: decadron taper to 2mg bid   - GBM: hold metformin 500mg q12  - Cesium 131 precautions  - pending post op MRI     Cardiac:  - SBP goal 100-140  - HTN: cont home amlodipine 10mg daily    Pulmonary:  - on RA, encourage IS     GI:  - regular diet   - bowel regimen, BM 12/20  - GI ppx: pepcid BID while on steroids    Renal:  - IVL   - voiding     Endo:  - ISS, A1c 5.5%, no active issues     Heme:  - DVT ppx: SCDs, SQL   - heme/onc consulted, f/u recs   - rad/onc consulted,  f/u recs    ID:  - afebrile    Disposition: SDU, full code, recc'ed AR     D/w Dr. Cheung

## 2024-12-22 NOTE — PROGRESS NOTE ADULT - SUBJECTIVE AND OBJECTIVE BOX
Physical Medicine and Rehabilitation Progress Note :       Patient is a 69y old  Male who presents with a chief complaint of GBM (22 Dec 2024 00:54)      HPI:  68 yo male with h/o HTN, OA, right  TKA, s/p L craniotomy for glioblastoma resection 04/25/2023, s/p chemo and radiation therapy 2023 with Dr. Causey, currently on physical therapy and brain MRI every 2 month post craniotomy, presents to ER due to reoccurrence of brain tumor seen on his latest MRI completed 4 days ago in [place]. Patient c/o worsened R facial droop, worsening word finding difficulties, and right sided weakness x 2 weeks. Pt was recommended to go to [facility] ER for further evaluation by his oncologist. Patient denies vision changes, extremity numbness, confusion, headache, nausea and vomiting.  (14 Dec 2024 02:25)                            13.7   8.15  )-----------( 322      ( 21 Dec 2024 08:11 )             42.1       12-21    138  |  103  |  28[H]  ----------------------------<  136[H]  4.7   |  27  |  0.84    Ca    8.5      21 Dec 2024 08:11  Phos  3.0     12-21  Mg     2.2     12-21      Vital Signs Last 24 Hrs  T(C): 36.7 (22 Dec 2024 05:02), Max: 36.7 (22 Dec 2024 05:02)  T(F): 98.1 (22 Dec 2024 05:02), Max: 98.1 (22 Dec 2024 05:02)  HR: 50 (22 Dec 2024 05:02) (46 - 64)  BP: 121/66 (22 Dec 2024 05:02) (107/69 - 121/66)  BP(mean): --  RR: 16 (22 Dec 2024 05:02) (16 - 18)  SpO2: 98% (22 Dec 2024 05:02) (96% - 99%)    Parameters below as of 22 Dec 2024 05:02  Patient On (Oxygen Delivery Method): room air        MEDICATIONS  (STANDING):  amLODIPine   Tablet 10 milliGRAM(s) Oral every 24 hours  dexAMETHasone     Tablet 2 milliGRAM(s) Oral every 12 hours  dexAMETHasone     Tablet   Oral   enoxaparin Injectable 40 milliGRAM(s) SubCutaneous <User Schedule>  famotidine    Tablet 20 milliGRAM(s) Oral every 12 hours  levETIRAcetam 1000 milliGRAM(s) Oral every 12 hours  multivitamin 1 Tablet(s) Oral daily  polyethylene glycol 3350 17 Gram(s) Oral every 12 hours  senna 2 Tablet(s) Oral two times a day    MEDICATIONS  (PRN):      T(C): 36.7 (12-22-24 @ 05:02), Max: 36.7 (12-22-24 @ 05:02)  HR: 50 (12-22-24 @ 05:02) (46 - 64)  BP: 121/66 (12-22-24 @ 05:02) (107/69 - 121/66)  RR: 16 (12-22-24 @ 05:02) (16 - 18)  SpO2: 98% (12-22-24 @ 05:02) (96% - 99%)    Physical Exam: 69 y o man lying comfortably in semi Hawthorne's position , awake , NAD     Head: normocephalic ,  left crani incision with staples, staples c/d/i    Eyes: PERRLA ,pos dysconj gaze , no nystagmus , sclera anicteric    ENT / FACE: R droop , neg nasal discharge , uvula midline , no oropharyngeal erythema / exudate    Neck: supple , negative JVD , negative carotid bruits , no thyromegaly    Chest: CTA bilaterally      Cardiovascular: regular rate and rhythm , neg murmurs / rubs / gallops    Abdomen: soft , non distended , no tenderness to palpation in all 4 quadrants ,  normal bowel sounds     Extremities: WWP , neg cyanosis /clubbing / edema      Neurologic Exam:     somnolent, oriented x 3 , speech w/ dysarthria , follows commands    Cranial Nerves:           II:                         pupils equal , round and reactive to light , visual fields intact         III/ IV/VI:             extraocular movements intact , neg nystagmus , neg ptosis        V:                        facial sensation intact , V1-3 normal        VII:                       R droop , normal eye closure and smile        VIII:                     hearing intact to finger rub bilaterally        IX and X:             no hoarseness , gag intact , palate/ uvula rise symmetrically        XI:                       dec R shrug        XII:                      no tongue deviation    Motor Exam:                  Left  UE/LE > 4/5                   Right UE prox 2/5, distally > 3/5                    LE: > 3/5 prox, 1/5 distallly    Sensation:         intact to light touch x 4 extremities                                 DTR:           biceps/brachioradialis: equal                            patella/ankle: equal    12/20/2024 Functional Status Assessment :       Pain Assessment/Number Scale (0-10) Adult  Presence of Pain: denies pain/discomfort (Rating = 0)  Pain Rating (0-10): Rest: 0 (no pain/absence of nonverbal indicators of pain)  Pain Rating (0-10): Activity: 0 (no pain/absence of nonverbal indicators of pain)    Safety      AM-PAC Functional Assessment: Basic Mobility  Type of Assessment: Daily assessment  Turning from your back to your side while in a flat bed without using bedrails?: 3 = A little assistance  Moving from lying on your back to sitting on the flat side of a flat bed without using bedrails?: 3 = A little assistance  Moving to and from a bed to a chair (including a wheelchair)?: 2 = A lot of assistance  Standing up from a chair using your arms (e.g. wheelchair or bedside chair)?: 3 = A little assistance  Walking in hospital room?: 2 = A lot of assistance  Climbing 3-5 steps with a railing?: 2-calculated by average   Score: 15   Row Comment: Ask the patient "How much help from another person do you currently need? (If the patient hasn't done an activity recently, how much help from another person do you think he/she needs if he/she tried?)    Cognitive/Neuro      Cognitive/Neuro/Behavioral  Level of Consciousness: alert  Arousal Level: arouses to voice  Orientation: oriented x 4  Speech: clear;  mild expressive aphasia ;  spontaneous  Mood/Behavior: calm;  cooperative    Language Assistance  Preferred Language to Address Healthcare Preferred Language to Address Healthcare: English    Therapeutic Interventions      Bed Mobility  Bed Mobility Training Rolling/Turning: contact guard;  1 person assist;  verbal cues;  bed rails  Bed Mobility Training Scooting: minimum assist (75% patient effort);  1 person assist  Bed Mobility Training Supine-to-Sit: minimum assist (75% patient effort);  1 person assist;  verbal cues;  bed rails  Bed Mobility Training Limitations: decreased ability to use arms for pushing/pulling;  decreased ability to use legs for bridging/pushing;  decreased strength;  impaired balance;  impaired postural control;  dec aerobic capacity/endurance     Bed-Chair Transfer Training  Transfer Training Bed-to-Chair Transfer: moderate assist (50% patient effort);  1 person assist;  nonverbal cues (demo/gestures);  verbal cues;  full weight-bearing   quad cane;  +assist at trunk   Bed-to-Chair Transfer Training Transfer Safety Analysis: decreased balance;  decreased strength;  impaired balance;  impaired postural control;  dec aerobic capacity/endurance ;  quad cane    Sit-Stand Transfer Training  Transfer Training Sit-to-Stand Transfer: minimum assist (75% patient effort);  moderate assist (50% patient effort);  1 person assist;  verbal cues;  full weight-bearing   quad cane  Transfer Training Stand-to-Sit Transfer: minimum assist (75% patient effort);  moderate assist (50% patient effort);  1 person assist;  verbal cues;  full weight-bearing   quad cane  Sit-to-Stand Transfer Training Transfer Safety Analysis: decreased balance;  decreased strength;  impaired balance;  impaired postural control;  dec aerobic capacity/endurance ;  quad cane    Gait Training  Gait Training: moderate assist (50% patient effort);  1 person assist;  verbal cues;  full weight-bearing   quad cane;  bed to chair;  a few steps from bed to chair; further ambulation deferred due to Pt unsteadiness   Gait Analysis: R knee locked in hyperextension in stance, R hip hike for clearance of R foot, mod unsteadiness, L trunk lean in standing;  decreased step length;  decreased velocity of limb motion;  decreased hip/knee flexion;  decreased weight-shifting ability;  decreased toe-to-floor clearance;  decreased strength;  impaired balance;  impaired postural control;  dec aerobic capacity/endurance ;  Bed to Chair;  further ambulation deferred due to Pt unsteadiness ;  quad cane    Therapeutic Exercise  Therapeutic Exercise Detail: MMT: L UE/LE 5/5 throughout for all major pivots; R LE: Hip flexion: 3-/5, Knee extension: 3-/5, Knee flexion: 2/5*co-contraction with knee extension, Ankle dorsiflexion: 0/5, Ankle plantarflexion: 2+/5, Toe extension: trace splaying of toes with attempted extension; R UE: R shoulder extension 3/5, R shoulder flexion: 2+/5, R shoulder ER: 2+/5, R shoulder IR: 3-/5, R elbow flexion: 2-/5, R elbow extension: 1/5; R wrist flexion/extension: 0/5, R composite : 2/5.+Increased tone in the R elbow flexors     Neuromuscular Re-education  Neuromuscular Re-education Detail: Cranial Nerve Assessment: CN II: Visual Fields full to confrontation; CN III/IV/VI: +Strabismus; Extraocular movements smooth and intact to all planes, no nystagmus observed, Pt unable to converge R eye; CN V: Facial sensation intact to light touch VI-VIII; CN VII: Decreased R smile all else intact throughout, R facial droop observed; CN VIII: Hearing intact to finger rub b/l; CN XI: Cervical rotation WFL b/l; Shoulder shrug intact b/l, but decreased on the R side; CN XII: Tongue protrudes to midline, lateralization intact b/l.Coordination Assessment: Finger to Nose: L: Intact; R: unable to due to strength deficits. Finger Opposition: L: Intact; R: unable due to strength deficits.       AM-PAC Functional Assessment: Daily Activity  Type of Assessment: Daily assessment  Putting on and taking off regular lower body clothing?: 2 = A lot of assistance  Bathing (including washing, rinsing, drying)?: 2 = A lot of assistance  Toileting, which includes using toilet, bedpan or urinal?: 3 = A little assistance  Putting on and taking off regular upper body clothing?: 3 = A little assistance  Take care of personal grooming such as brushing teeth?: 3 = A little assistance  Eating meals?: 3 = A little assistance  Score: 16   Row Comment: Ask the patient "How much help from another person do you currently need? (If the patient hasn't done an activity recently, how much help from another person do you think he/she needs if he/she tried?)    Cognitive/Neuro      Cognitive/Neuro/Behavioral  Level of Consciousness: alert  Arousal Level: opens eyes spontaneously  Orientation: oriented x 4  Speech: intermittent word finding difficulty  Mood/Behavior: calm    Language Assistance  Preferred Language to Address Healthcare Preferred Language to Address Healthcare: English    Therapeutic Interventions      Bed Mobility  Bed Mobility Training Rolling/Turning: contact guard  Bed Mobility Training Scooting: minimum assist (75% patient effort);  1 person assist  Bed Mobility Training Supine-to-Sit: minimum assist (75% patient effort);  1 person assist  Bed Mobility Training Limitations: decreased ability to use arms for pushing/pulling;  decreased ability to use legs for bridging/pushing    Bed-Chair Transfer Training  Transfer Training Bed-to-Chair Transfer: moderate assist (50% patient effort);  1 person assist;  stand step transfer ;  quad cane  Bed-to-Chair Transfer Training Transfer Safety Analysis: decreased balance    Sit-Stand Transfer Training  Transfer Training Sit-to-Stand Transfer: minimum assist (75% patient effort);  1 person assist;  quad cane  Transfer Training Stand-to-Sit Transfer: minimum assist (75% patient effort);  1 person assist;  quad cane  Sit-to-Stand Transfer Training Transfer Safety Analysis: decreased balance    Therapeutic Exercise  Therapeutic Exercise Detail: Cranial Nerves II - XII: II: PERRLA; visual fields are full to confrontation III, IV, VI: EOMI, no nystagmus appreciated V: facial sensation intact to light touch V1-V3 b/l VII: no ptosis, R facial droop, symmetric eyebrow raise and closure VIII: hearing intact to finger rub b/l  XI: head turning intact +decreased R shoulder shrug XII: tongue protrusion midline MMT: L UE/LE 5/5 throughout for all major pivots; R LE: Hip flexion: 3-/5, Knee extension: 3-/5, Knee flexion: 2/5*co-contraction with knee extension, Ankle dorsiflexion: 0/5, Ankle plantarflexion: 2+/5, Toe extension: trace splaying of toes with attempted extension; R UE: R shoulder extension 3/5, R shoulder flexion: 2+/5, R shoulder ER: 2+/5, R shoulder IR: 3-/5, R elbow flexion: 2-/5, R elbow extension: 1/5; R wrist flexion/extension: 0/5, R composite : 2/5. Increased tone to R elbow flexors             PM&R Impression : as above    Current disposition plan recommendation :    acute rehab placement

## 2024-12-22 NOTE — PROGRESS NOTE ADULT - SUBJECTIVE AND OBJECTIVE BOX
INTERVAL EVENTS: No o/n events. Denies CP, dyspnea, palpitations, presyncope, syncope, f/c/n/v.     REVIEW OF SYSTEMS:  Constitutional:     [X] negative [ ] fevers [ ] chills [ ] weight loss [ ] weight gain  HEENT:                  [X] negative [ ] dry eyes [ ] eye irritation [ ] postnasal drip [ ] nasal congestion  CV:                         [X] negative  [ ] chest pain [ ] orthopnea [ ] palpitations [ ] murmur  Resp:                     [X] negative [ ] cough [ ] shortness of breath [ ] wheezing [ ] sputum [ ] hemoptysis  GI:                          [X] negative [ ] nausea [ ] vomiting [ ] diarrhea [ ] constipation [ ] abd pain [ ] dysphagia   :                        [X] negative [ ] dysuria [ ] nocturia [ ] hematuria [ ] increased urinary frequency  MSK:                      [X] negative [ ] back pain [ ] myalgias [ ] arthralgias [ ] fracture  Skin:                       [X] negative [ ] rash [ ] itch  Neuro:                   [X] negative [ ] headache [ ] dizziness [ ] syncope [ ] weakness [ ] numbness  Psych:                    [X] negative [ ] anxiety [ ] depression  Endo:                     [X] negative [ ] diabetes [ ] thyroid problem  Heme/Lymph:      [X] negative [ ] anemia [ ] bleeding problem  Allergic/Immune: [X] negative [ ] itchy eyes [ ] nasal discharge [ ] hives [ ] angioedema    [X] All other systems negative or otherwise described above.  [ ] Unable to assess ROS due to ________.    PAST MEDICAL & SURGICAL HISTORY:  Hypertension    Osteoarthritis    Brain mass    Glioblastoma    S/P total knee replacement, right    H/O craniotomy      MEDICATIONS  (STANDING):  amLODIPine   Tablet 10 milliGRAM(s) Oral every 24 hours  dexAMETHasone     Tablet 2 milliGRAM(s) Oral every 12 hours  dexAMETHasone     Tablet   Oral   enoxaparin Injectable 40 milliGRAM(s) SubCutaneous <User Schedule>  famotidine    Tablet 20 milliGRAM(s) Oral every 12 hours  levETIRAcetam 1000 milliGRAM(s) Oral every 12 hours  multivitamin 1 Tablet(s) Oral daily  polyethylene glycol 3350 17 Gram(s) Oral every 12 hours  potassium phosphate / sodium phosphate Powder (PHOS-NaK) 1 Packet(s) Oral once  senna 2 Tablet(s) Oral two times a day    MEDICATIONS  (PRN):    ICU Vital Signs Last 24 Hrs  T(C): 36.4 (22 Dec 2024 09:34), Max: 36.7 (22 Dec 2024 05:02)  T(F): 97.5 (22 Dec 2024 09:34), Max: 98.1 (22 Dec 2024 05:02)  HR: 58 (22 Dec 2024 09:34) (50 - 64)  BP: 114/66 (22 Dec 2024 09:34) (107/69 - 121/66)  BP(mean): --  ABP: --  ABP(mean): --  RR: 16 (22 Dec 2024 09:34) (16 - 17)  SpO2: 97% (22 Dec 2024 09:34) (96% - 99%)    O2 Parameters below as of 22 Dec 2024 09:34  Patient On (Oxygen Delivery Method): room air          Orthostatic VS    Daily     Daily   I&O's Summary    21 Dec 2024 07:01  -  22 Dec 2024 07:00  --------------------------------------------------------  IN: 480 mL / OUT: 1100 mL / NET: -620 mL        PHYSICAL EXAM:  Gen: Resting in bed at time of exam, not in distress   CV: RRR, +S1/S2  Pulm: no wheezing , no crackles  no increase in work of breathing  Abd: soft, NTND  Skin: warm and dry, no new rashes   Ext: moving all 4 extremities spontaneously , no edema  ,  Neuro: no gross focal neurological deficits  Psych: affect and behavior appropriate, pleasant at time of interview    LABS:                        13.8   7.38  )-----------( 310      ( 22 Dec 2024 08:41 )             41.9       12-22    136  |  99  |  30[H]  ----------------------------<  99  4.6   |  31  |  0.84    Ca    8.5      22 Dec 2024 08:41  Phos  2.8     12-22  Mg     2.2     12-22            Urinalysis Basic - ( 22 Dec 2024 08:41 )    Color: x / Appearance: x / SG: x / pH: x  Gluc: 99 mg/dL / Ketone: x  / Bili: x / Urobili: x   Blood: x / Protein: x / Nitrite: x   Leuk Esterase: x / RBC: x / WBC x   Sq Epi: x / Non Sq Epi: x / Bacteria: x          RADIOLOGY & ADDITIONAL STUDIES: Reviewed     INTERVAL EVENTS: No o/n events. Reports improved headache. Denies CP, dyspnea, palpitations, presyncope, syncope, f/c/n/v.     REVIEW OF SYSTEMS:  Constitutional:     [X] negative [ ] fevers [ ] chills [ ] weight loss [ ] weight gain  HEENT:                  [X] negative [ ] dry eyes [ ] eye irritation [ ] postnasal drip [ ] nasal congestion  CV:                         [X] negative  [ ] chest pain [ ] orthopnea [ ] palpitations [ ] murmur  Resp:                     [X] negative [ ] cough [ ] shortness of breath [ ] wheezing [ ] sputum [ ] hemoptysis  GI:                          [X] negative [ ] nausea [ ] vomiting [ ] diarrhea [ ] constipation [ ] abd pain [ ] dysphagia   :                        [X] negative [ ] dysuria [ ] nocturia [ ] hematuria [ ] increased urinary frequency  MSK:                      [X] negative [ ] back pain [ ] myalgias [ ] arthralgias [ ] fracture  Skin:                       [X] negative [ ] rash [ ] itch  Neuro:                   [X] negative [ ] headache [ ] dizziness [ ] syncope [ ] weakness [ ] numbness  Psych:                    [X] negative [ ] anxiety [ ] depression  Endo:                     [X] negative [ ] diabetes [ ] thyroid problem  Heme/Lymph:      [X] negative [ ] anemia [ ] bleeding problem  Allergic/Immune: [X] negative [ ] itchy eyes [ ] nasal discharge [ ] hives [ ] angioedema    [X] All other systems negative or otherwise described above.  [ ] Unable to assess ROS due to ________.    PAST MEDICAL & SURGICAL HISTORY:  Hypertension    Osteoarthritis    Brain mass    Glioblastoma    S/P total knee replacement, right    H/O craniotomy      MEDICATIONS  (STANDING):  amLODIPine   Tablet 10 milliGRAM(s) Oral every 24 hours  dexAMETHasone     Tablet 2 milliGRAM(s) Oral every 12 hours  dexAMETHasone     Tablet   Oral   enoxaparin Injectable 40 milliGRAM(s) SubCutaneous <User Schedule>  famotidine    Tablet 20 milliGRAM(s) Oral every 12 hours  levETIRAcetam 1000 milliGRAM(s) Oral every 12 hours  multivitamin 1 Tablet(s) Oral daily  polyethylene glycol 3350 17 Gram(s) Oral every 12 hours  potassium phosphate / sodium phosphate Powder (PHOS-NaK) 1 Packet(s) Oral once  senna 2 Tablet(s) Oral two times a day    MEDICATIONS  (PRN):    ICU Vital Signs Last 24 Hrs  T(C): 36.4 (22 Dec 2024 09:34), Max: 36.7 (22 Dec 2024 05:02)  T(F): 97.5 (22 Dec 2024 09:34), Max: 98.1 (22 Dec 2024 05:02)  HR: 58 (22 Dec 2024 09:34) (50 - 64)  BP: 114/66 (22 Dec 2024 09:34) (107/69 - 121/66)  BP(mean): --  ABP: --  ABP(mean): --  RR: 16 (22 Dec 2024 09:34) (16 - 17)  SpO2: 97% (22 Dec 2024 09:34) (96% - 99%)    O2 Parameters below as of 22 Dec 2024 09:34  Patient On (Oxygen Delivery Method): room air          Orthostatic VS    Daily     Daily   I&O's Summary    21 Dec 2024 07:01  -  22 Dec 2024 07:00  --------------------------------------------------------  IN: 480 mL / OUT: 1100 mL / NET: -620 mL        PHYSICAL EXAM:  Gen: Resting in bed at time of exam, not in distress   CV: RRR, +S1/S2  Pulm: no wheezing , no crackles  no increase in work of breathing  Abd: soft, NTND  Skin: warm and dry, no new rashes   Ext: moving all 4 extremities spontaneously , no edema  ,  Neuro: no gross focal neurological deficits  Psych: affect and behavior appropriate, pleasant at time of interview    LABS:                        13.8   7.38  )-----------( 310      ( 22 Dec 2024 08:41 )             41.9       12-22    136  |  99  |  30[H]  ----------------------------<  99  4.6   |  31  |  0.84    Ca    8.5      22 Dec 2024 08:41  Phos  2.8     12-22  Mg     2.2     12-22            Urinalysis Basic - ( 22 Dec 2024 08:41 )    Color: x / Appearance: x / SG: x / pH: x  Gluc: 99 mg/dL / Ketone: x  / Bili: x / Urobili: x   Blood: x / Protein: x / Nitrite: x   Leuk Esterase: x / RBC: x / WBC x   Sq Epi: x / Non Sq Epi: x / Bacteria: x          RADIOLOGY & ADDITIONAL STUDIES: Reviewed

## 2024-12-22 NOTE — PROGRESS NOTE ADULT - SUBJECTIVE AND OBJECTIVE BOX
HPI:  68 yo male with h/o HTN, OA, right  TKA, s/p L craniotomy for glioblastoma resection 04/25/2023, s/p chemo and radiation therapy 2023 with Dr. Causey, currently on physical therapy and brain MRI every 2 month post craniotomy, presents to ER due to reoccurrence of brain tumor seen on his latest MRI completed 4 days ago in [place]. Patient c/o worsened R facial droop, worsening word finding difficulties, and right sided weakness x 2 weeks. Pt was recommended to go to [facility] ER for further evaluation by his oncologist. Patient denies vision changes, extremity numbness, confusion, headache, nausea and vomiting.  (14 Dec 2024 02:25)    OVERNIGHT EVENTS: KYA    Hospital Course:   12/13: admitted to [facility], decadron 4q6 started  12/14: KYA overnight  12/15: KYA overnight, pending MRI  12/16: KYA overnight. MRI complete. Plan for OR thursday 12/19/24.   12/17: KYA overnight.  12/18: Pre-op for OR tm.   12/19: POD 0 s/p L crani for tumor with gammatile.   12/20: POD 1 L crani. KYA overnight, neuro stable. +BM.   12/21: POD 2 L crani. KYA overnight.   12/22: POD3 L crani. KYA ovn    Vital Signs Last 24 Hrs  T(C): 36.6 (21 Dec 2024 21:38), Max: 36.7 (21 Dec 2024 05:49)  T(F): 97.8 (21 Dec 2024 21:38), Max: 98 (21 Dec 2024 05:49)  HR: 64 (21 Dec 2024 21:38) (46 - 64)  BP: 107/69 (21 Dec 2024 21:38) (107/69 - 120/66)  BP(mean): --  RR: 16 (21 Dec 2024 21:38) (16 - 18)  SpO2: 99% (21 Dec 2024 21:38) (96% - 99%)    Parameters below as of 21 Dec 2024 21:38  Patient On (Oxygen Delivery Method): room air        I&O's Summary    20 Dec 2024 07:01  -  21 Dec 2024 07:00  --------------------------------------------------------  IN: 50 mL / OUT: 800 mL / NET: -750 mL        PHYSICAL EXAM:  GEN: laying in bed, NAD  NEURO: AOx3. FC, OE spont, speech intact, R facial. disconjugate gaze. PERRL, EOMI. No pronator drift. LUE/LLE 5/5. RUE delt 3/5, bicep/tricep/HG 4-/5. RLE proximally 4/5, DF/PF 0/5. decreased sensation right side  CV: RRR +S1/S2  PULM: CTAB  GI: Abd soft, NT/ND  EXT: ext warm, dry, nontender  WOUND: L crani site c/d/i    TUBES/LINES:  [] Shields  [] Lumbar Drain  [] Wound Drains  [] Others      DIET:  [] NPO  [x] Mechanical  [] Tube feeds    LABS:                        13.7   8.15  )-----------( 322      ( 21 Dec 2024 08:11 )             42.1     12-21    138  |  103  |  28[H]  ----------------------------<  136[H]  4.7   |  27  |  0.84    Ca    8.5      21 Dec 2024 08:11  Phos  3.0     12-21  Mg     2.2     12-21    TPro  5.2[L]  /  Alb  3.0[L]  /  TBili  0.2  /  DBili  x   /  AST  See Note  /  ALT  37  /  AlkPhos  58  12-20      Urinalysis Basic - ( 21 Dec 2024 08:11 )    Color: x / Appearance: x / SG: x / pH: x  Gluc: 136 mg/dL / Ketone: x  / Bili: x / Urobili: x   Blood: x / Protein: x / Nitrite: x   Leuk Esterase: x / RBC: x / WBC x   Sq Epi: x / Non Sq Epi: x / Bacteria: x          CAPILLARY BLOOD GLUCOSE          Drug Levels: [] N/A    CSF Analysis: [] N/A      Allergies    No Known Allergies    Intolerances      MEDICATIONS:  Antibiotics:    Neuro:  levETIRAcetam 1000 milliGRAM(s) Oral every 12 hours    Anticoagulation:  enoxaparin Injectable 40 milliGRAM(s) SubCutaneous <User Schedule>    OTHER:  amLODIPine   Tablet 10 milliGRAM(s) Oral every 24 hours  dexAMETHasone     Tablet 2 milliGRAM(s) Oral every 12 hours  dexAMETHasone     Tablet   Oral   famotidine    Tablet 20 milliGRAM(s) Oral every 12 hours  polyethylene glycol 3350 17 Gram(s) Oral every 12 hours  senna 2 Tablet(s) Oral two times a day    IVF:  multivitamin 1 Tablet(s) Oral daily    CULTURES:    RADIOLOGY & ADDITIONAL TESTS:      ASSESSMENT:  69M PMHx HTN, OA, right  TKA, s/p L craniotomy for GBM resection 04/25/2023, s/p chemo and RT 2023 with Dr. Causey. Presented to ER due to reoccurrence of brain tumor seen on his latest MRI completed 4 days ago in [place]. Patient c/o worsened R facial droop, worsening word finding difficulties, and right sided weakness x 2 weeks. Now s/p L crani for resection of GBM with placement of Gammatile brachytherapy 12/19 ([facility]zen: [facility]).     BRAIN TUMOR    Handoff    MEWS Score    Hypertension    Osteoarthritis    Brain mass    Glioblastoma    Glioblastoma    Glioblastoma    Brain tumor    Brain tumor, recurrent    GBM (glioblastoma multiforme)    Preoperative clearance    Hypertension    Craniotomy, with supratentorial neoplasm excision    S/P total knee replacement, right    H/O craniotomy    SURGERY COMP    90+    Room Service Assist    [email or web address]        PLAN:  Neuro:  - neuro/vital checks q4hr  - pain control: tylenol   - Seizure ppx: home Keppra 1g BID   - CTH 12/13: vasogenic edema in left hemisphere  - cerebral edema: decadron taper to 2mg bid   - GBM: hold metformin 500mg q12  - Cesium 131 precautions  - pending post op MRI     Cardiac:  - SBP goal 100-140  - HTN: cont home amlodipine 10mg daily    Pulmonary:  - on RA, encourage IS     GI:  - regular diet   - bowel regimen,  12/20  - GI ppx: pepcid BID while on steroids    Renal:  - IVL   - voiding     Endo:  - ISS, A1c 5.5%, no active issues     Heme:  - DVT ppx: SCDs, SQL   - heme/onc consulted, f/u recs   - rad/onc consulted,  f/u recs    ID:  - afebrile    Disposition: SDU, full code, recc'ed AR     D/w Dr. Cheung     Assessment:  Present when checked    []  GCS  E   V  M     Heart Failure: []Acute, [] acute on chronic , []chronic  Heart Failure:  [] Diastolic (HFpEF), [] Systolic (HFrEF), []Combined (HFpEF and HFrEF), [] RHF, [] Pulm HTN, [] Other    [] ROMERO, [] ATN, [] AIN, [] other  [] CKD1, [] CKD2, [] CKD 3, [] CKD 4, [] CKD 5, []ESRD    Encephalopathy: [] Metabolic, [] Hepatic, [] toxic, [] Neurological, [] Other    Abnormal Nurtitional Status: [] malnurtition (see nutrition note), [ ]underweight: BMI < 19, [] morbid obesity: BMI >40, [] Cachexia    [] Sepsis  [] hypovolemic shock,[] cardiogenic shock, [] hemorrhagic shock, [] neuogenic shock  [] Acute Respiratory Failure  []Cerebral edema, [] Brain compression/ herniation,   [] Functional quadriplegia  [] Acute blood loss anemia

## 2024-12-23 LAB
ANION GAP SERPL CALC-SCNC: 6 MMOL/L — SIGNIFICANT CHANGE UP (ref 5–17)
BUN SERPL-MCNC: 29 MG/DL — HIGH (ref 7–23)
CALCIUM SERPL-MCNC: 8.4 MG/DL — SIGNIFICANT CHANGE UP (ref 8.4–10.5)
CHLORIDE SERPL-SCNC: 99 MMOL/L — SIGNIFICANT CHANGE UP (ref 96–108)
CO2 SERPL-SCNC: 28 MMOL/L — SIGNIFICANT CHANGE UP (ref 22–31)
CREAT SERPL-MCNC: 0.81 MG/DL — SIGNIFICANT CHANGE UP (ref 0.5–1.3)
EGFR: 96 ML/MIN/1.73M2 — SIGNIFICANT CHANGE UP
GLUCOSE SERPL-MCNC: 103 MG/DL — HIGH (ref 70–99)
HCT VFR BLD CALC: 40.6 % — SIGNIFICANT CHANGE UP (ref 39–50)
HGB BLD-MCNC: 13.6 G/DL — SIGNIFICANT CHANGE UP (ref 13–17)
MAGNESIUM SERPL-MCNC: 2 MG/DL — SIGNIFICANT CHANGE UP (ref 1.6–2.6)
MCHC RBC-ENTMCNC: 29.9 PG — SIGNIFICANT CHANGE UP (ref 27–34)
MCHC RBC-ENTMCNC: 33.5 G/DL — SIGNIFICANT CHANGE UP (ref 32–36)
MCV RBC AUTO: 89.2 FL — SIGNIFICANT CHANGE UP (ref 80–100)
NRBC # BLD: 0 /100 WBCS — SIGNIFICANT CHANGE UP (ref 0–0)
PHOSPHATE SERPL-MCNC: 3.5 MG/DL — SIGNIFICANT CHANGE UP (ref 2.5–4.5)
PLATELET # BLD AUTO: 322 K/UL — SIGNIFICANT CHANGE UP (ref 150–400)
POTASSIUM SERPL-MCNC: 4.4 MMOL/L — SIGNIFICANT CHANGE UP (ref 3.5–5.3)
POTASSIUM SERPL-SCNC: 4.4 MMOL/L — SIGNIFICANT CHANGE UP (ref 3.5–5.3)
RBC # BLD: 4.55 M/UL — SIGNIFICANT CHANGE UP (ref 4.2–5.8)
RBC # FLD: 13.1 % — SIGNIFICANT CHANGE UP (ref 10.3–14.5)
SODIUM SERPL-SCNC: 133 MMOL/L — LOW (ref 135–145)
WBC # BLD: 5.2 K/UL — SIGNIFICANT CHANGE UP (ref 3.8–10.5)
WBC # FLD AUTO: 5.2 K/UL — SIGNIFICANT CHANGE UP (ref 3.8–10.5)

## 2024-12-23 RX ORDER — DEXAMETHASONE SODIUM PHOSPHATE 4 MG/ML
2 VIAL (ML) INJECTION
Refills: 0 | Status: DISCONTINUED | OUTPATIENT
Start: 2024-12-23 | End: 2024-12-27

## 2024-12-23 RX ORDER — SODIUM CHLORIDE 9 MG/ML
1 INJECTION, SOLUTION INTRAMUSCULAR; INTRAVENOUS; SUBCUTANEOUS EVERY 8 HOURS
Refills: 0 | Status: DISCONTINUED | OUTPATIENT
Start: 2024-12-23 | End: 2024-12-27

## 2024-12-23 RX ORDER — DEXAMETHASONE SODIUM PHOSPHATE 4 MG/ML
4 VIAL (ML) INJECTION
Refills: 0 | Status: DISCONTINUED | OUTPATIENT
Start: 2024-12-23 | End: 2024-12-27

## 2024-12-23 RX ADMIN — FAMOTIDINE 20 MILLIGRAM(S): 20 TABLET, FILM COATED ORAL at 05:30

## 2024-12-23 RX ADMIN — ENOXAPARIN SODIUM 40 MILLIGRAM(S): 60 INJECTION INTRAVENOUS; SUBCUTANEOUS at 22:01

## 2024-12-23 RX ADMIN — Medication 17 GRAM(S): at 17:04

## 2024-12-23 RX ADMIN — Medication 4 MILLIGRAM(S): at 13:23

## 2024-12-23 RX ADMIN — LEVETIRACETAM 1000 MILLIGRAM(S): 100 SOLUTION ORAL at 05:30

## 2024-12-23 RX ADMIN — Medication 1 TABLET(S): at 12:26

## 2024-12-23 RX ADMIN — FAMOTIDINE 20 MILLIGRAM(S): 20 TABLET, FILM COATED ORAL at 17:05

## 2024-12-23 RX ADMIN — SENNOSIDES 2 TABLET(S): 8.6 TABLET, FILM COATED ORAL at 05:30

## 2024-12-23 RX ADMIN — Medication 17 GRAM(S): at 05:30

## 2024-12-23 RX ADMIN — SODIUM CHLORIDE 1 GRAM(S): 9 INJECTION, SOLUTION INTRAMUSCULAR; INTRAVENOUS; SUBCUTANEOUS at 13:22

## 2024-12-23 RX ADMIN — Medication 2 MILLIGRAM(S): at 15:36

## 2024-12-23 RX ADMIN — SENNOSIDES 2 TABLET(S): 8.6 TABLET, FILM COATED ORAL at 17:05

## 2024-12-23 RX ADMIN — LEVETIRACETAM 1000 MILLIGRAM(S): 100 SOLUTION ORAL at 17:05

## 2024-12-23 RX ADMIN — SODIUM CHLORIDE 1 GRAM(S): 9 INJECTION, SOLUTION INTRAMUSCULAR; INTRAVENOUS; SUBCUTANEOUS at 22:01

## 2024-12-23 RX ADMIN — Medication 2 MILLIGRAM(S): at 12:26

## 2024-12-23 NOTE — SPEECH LANGUAGE PATHOLOGY EVALUATION - SLP PERTINENT HISTORY OF CURRENT PROBLEM
68 yo male with h/o HTN, OA, right  TKA, s/p L craniotomy for glioblastoma resection 04/25/2023, s/p chemo and radiation therapy 2023, s/p craniotomy. He presented to the ER due to a reoccurrence of brain tumor seen on his latest MRI completed 4 days ago in [place]. Patient c/o worsened R facial droop, worsening word finding difficulties, and right sided weakness x 2 weeks. Now s/p L crani.

## 2024-12-23 NOTE — SPEECH LANGUAGE PATHOLOGY EVALUATION - CONFRONTATIONAL NAMING
Object Naming (Western Aphasia Battery (WAB)): 20/20. Although word-retrieval deficits were not appreciated during structured tasks, they were rarely heard during conversational speech. For example, when attempting to ask his wife for a pen, the patient asked for his glasses./intact

## 2024-12-23 NOTE — SPEECH LANGUAGE PATHOLOGY EVALUATION - SLP DIAGNOSIS
The patient presents with mild expressive aphasia. Language deficits most notably include rare semantic paraphasias, intermittent speech disfluencies (word/phrase repetitions and revisions), and central alexia with functor word substitutions, omissions, and additions at the paragraph level. Findings are more pronounced during spontaneous speech rather than structured tasks. Presentation is consistent with the location of the tumor and postoperative edema. Improvement in presentation depends on ongoing post-operative recovery, treatment plan/response, and speech/language intervention to maximize functional outcomes.

## 2024-12-23 NOTE — SPEECH LANGUAGE PATHOLOGY EVALUATION - SLP ORAL READING ABILITY
Oral reading at the single word level was assessed using target words from the [place] Battery for Reading. Pt read 15/15 words correctly./impaired/sentences/paragraphs

## 2024-12-23 NOTE — SPEECH LANGUAGE PATHOLOGY EVALUATION - COMMENTS
Reading is relatively preserved for single words. However, frequent function word substitutions, omissions, and additions were appreciated at the paragraph level (Grandfather Passage).   E.g. "Often"/of the, "in"/to, "he"/in, etc. Unable to assess due to Unable to assess due to right sided hemiparesis impacting ability to use dominant hand Possible beneficial treatment approaches may include Promoting Aphasics' Communicative Effectiveness (PACE) (PACE) and Multiple Oral Re-Reading (MOR)  Short term goals:  1. Patient will demonstrate improved fluency in reading a familiar passage, reducing the number of hesitations by 50% within 3 sessions    2. When encountering a word retrieval difficulty during conversation, the patient will use a circumlocution strategy (describing the word) to maintain communication flow in 8/10 attempts over three consecutive sessions.   Additional goals to be added based on the patient's progress

## 2024-12-23 NOTE — PROGRESS NOTE ADULT - SUBJECTIVE AND OBJECTIVE BOX
HPI:  68 yo male with h/o HTN, OA, right  TKA, s/p L craniotomy for glioblastoma resection 04/25/2023, s/p chemo and radiation therapy 2023 with Dr. Causey, currently on physical therapy and brain MRI every 2 month post craniotomy, presents to ER due to reoccurrence of brain tumor seen on his latest MRI completed 4 days ago in [place]. Patient c/o worsened R facial droop, worsening word finding difficulties, and right sided weakness x 2 weeks. Pt was recommended to go to [facility] ER for further evaluation by his oncologist. Patient denies vision changes, extremity numbness, confusion, headache, nausea and vomiting.  (14 Dec 2024 02:25)    OVERNIGHT EVENTS: KYA      Hospital Course:   12/13: admitted to [facility], decadron 4q6 started  12/14: KYA overnight  12/15: KYA overnight, pending MRI  12/16: KYA overnight. MRI complete. Plan for OR thursday 12/19/24.   12/17: KYA overnight.  12/18: Pre-op for OR tm.   12/19: POD 0 s/p L crani for tumor with gammatile.   12/20: POD 1 L crani. KYA overnight, neuro stable. +BM.   12/21: POD 2 L crani. KYA overnight.   12/22: POD3 L crani. KYA ovn. Speech consulted for new dyslexia.   12/23: POD4, KYA overnight, neuro stable.        Vital Signs Last 24 Hrs  T(C): 36.8 (22 Dec 2024 20:11), Max: 36.8 (22 Dec 2024 20:11)  T(F): 98.3 (22 Dec 2024 20:11), Max: 98.3 (22 Dec 2024 20:11)  HR: 55 (22 Dec 2024 20:11) (50 - 58)  BP: 117/69 (22 Dec 2024 20:11) (104/62 - 121/66)  BP(mean): --  RR: 16 (22 Dec 2024 20:11) (16 - 17)  SpO2: 98% (22 Dec 2024 20:11) (96% - 98%)    Parameters below as of 22 Dec 2024 20:11  Patient On (Oxygen Delivery Method): room air        I&O's Detail    21 Dec 2024 07:01  -  22 Dec 2024 07:00  --------------------------------------------------------  IN:    Oral Fluid: 480 mL  Total IN: 480 mL    OUT:    Voided (mL): 1100 mL  Total OUT: 1100 mL    Total NET: -620 mL        I&O's Summary    21 Dec 2024 07:01  -  22 Dec 2024 07:00  --------------------------------------------------------  IN: 480 mL / OUT: 1100 mL / NET: -620 mL        PHYSICAL EXAM:  GEN: laying in bed, NAD  NEURO: AOx3. FC, OE spont, speech intact, R facial. dysconjugate gaze. PERRL, EOMI. No pronator drift. LUE/LLE 5/5. RUE delt 3/5, bicep/tricep/HG 4-/5. RLE proximally 4/5, DF/PF 0/5.   CV: RRR +S1/S2  PULM: CTAB  GI: Abd soft, NT/ND  EXT: ext warm, dry, nontender  WOUND: L crani site c/d/i    TUBES/LINES:  [] CVC  [] A-line  [] Lumbar Drain  [] Ventriculostomy  [] Other    DIET:  [] NPO  [x] Mechanical  [] Tube feeds    LABS:    Urinalysis Basic - ( 22 Dec 2024 08:41 )    Color: x / Appearance: x / SG: x / pH: x  Gluc: 99 mg/dL / Ketone: x  / Bili: x / Urobili: x   Blood: x / Protein: x / Nitrite: x   Leuk Esterase: x / RBC: x / WBC x   Sq Epi: x / Non Sq Epi: x / Bacteria: x          CAPILLARY BLOOD GLUCOSE          Drug Levels: [] N/A    CSF Analysis: [] N/A      Allergies    No Known Allergies    Intolerances      MEDICATIONS:  Antibiotics:    Neuro:  acetaminophen     Tablet .. 650 milliGRAM(s) Oral every 6 hours PRN  levETIRAcetam 1000 milliGRAM(s) Oral every 12 hours    Anticoagulation:  enoxaparin Injectable 40 milliGRAM(s) SubCutaneous <User Schedule>    OTHER:  amLODIPine   Tablet 10 milliGRAM(s) Oral every 24 hours  dexAMETHasone     Tablet 2 milliGRAM(s) Oral every 12 hours  dexAMETHasone     Tablet   Oral   famotidine    Tablet 20 milliGRAM(s) Oral every 12 hours  polyethylene glycol 3350 17 Gram(s) Oral every 12 hours  senna 2 Tablet(s) Oral two times a day    IVF:  multivitamin 1 Tablet(s) Oral daily    CULTURES:    RADIOLOGY & ADDITIONAL TESTS:      ASSESSMENT:  69M PMHx HTN, OA, right  TKA, s/p L craniotomy for GBM resection 04/25/2023, s/p chemo and RT 2023 with Dr. Causey. Presented to ER due to reoccurrence of brain tumor seen on his latest MRI completed 4 days ago in [place]. Patient c/o worsened R facial droop, worsening word finding difficulties, and right sided weakness x 2 weeks. Now s/p L crani for resection of GBM with placement of Gammatile brachytherapy 12/19 ([facility]: [facility]).     PLAN:  Neuro:  - neuro/vital checks q4hr  - pain control: tylenol   - Seizure ppx: home Keppra 1g BID   - CTH 12/13: vasogenic edema in left hemisphere  - cerebral edema: decadron taper to 2mg bid   - GBM: hold metformin 500mg q12  - Cesium 131 precautions  - pending post op MRI     Cardiac:  - SBP goal 100-140  - HTN: cont home amlodipine 10mg daily    Pulmonary:  - on RA, encourage IS     GI:  - regular diet   - bowel regimen,  12/20  - GI ppx: pepcid BID while on steroids    Renal:  - IVL   - voiding     Endo:  - ISS, A1c 5.5%, no active issues     Heme:  - DVT ppx: SCDs, SQL   - heme/onc consulted, f/u recs   - rad/onc consulted,  f/u recs    ID:  - afebrile    Disposition: SDU, full code, recc'ed AR     D/w Dr. Cheung

## 2024-12-23 NOTE — SPEECH LANGUAGE PATHOLOGY EVALUATION - SLP GENERAL OBSERVATIONS
Awake, sitting upright in chair, A&O, engaged, wife at bedside. Pt's wife endorsed changes in oral reading skills prior to operation. Specifically, she explained that the patient was "making up words" while reading the bible.

## 2024-12-24 LAB
ANION GAP SERPL CALC-SCNC: 3 MMOL/L — LOW (ref 5–17)
BUN SERPL-MCNC: 35 MG/DL — HIGH (ref 7–23)
CALCIUM SERPL-MCNC: 8.4 MG/DL — SIGNIFICANT CHANGE UP (ref 8.4–10.5)
CHLORIDE SERPL-SCNC: 105 MMOL/L — SIGNIFICANT CHANGE UP (ref 96–108)
CO2 SERPL-SCNC: 27 MMOL/L — SIGNIFICANT CHANGE UP (ref 22–31)
CREAT SERPL-MCNC: 0.87 MG/DL — SIGNIFICANT CHANGE UP (ref 0.5–1.3)
EGFR: 94 ML/MIN/1.73M2 — SIGNIFICANT CHANGE UP
GLUCOSE SERPL-MCNC: 115 MG/DL — HIGH (ref 70–99)
MAGNESIUM SERPL-MCNC: 2.1 MG/DL — SIGNIFICANT CHANGE UP (ref 1.6–2.6)
PHOSPHATE SERPL-MCNC: 3.2 MG/DL — SIGNIFICANT CHANGE UP (ref 2.5–4.5)
POTASSIUM SERPL-MCNC: 4.6 MMOL/L — SIGNIFICANT CHANGE UP (ref 3.5–5.3)
POTASSIUM SERPL-SCNC: 4.6 MMOL/L — SIGNIFICANT CHANGE UP (ref 3.5–5.3)
SODIUM SERPL-SCNC: 135 MMOL/L — SIGNIFICANT CHANGE UP (ref 135–145)

## 2024-12-24 PROCEDURE — 70553 MRI BRAIN STEM W/O & W/DYE: CPT | Mod: 26

## 2024-12-24 PROCEDURE — 99232 SBSQ HOSP IP/OBS MODERATE 35: CPT

## 2024-12-24 RX ADMIN — LEVETIRACETAM 1000 MILLIGRAM(S): 100 SOLUTION ORAL at 05:30

## 2024-12-24 RX ADMIN — SODIUM CHLORIDE 1 GRAM(S): 9 INJECTION, SOLUTION INTRAMUSCULAR; INTRAVENOUS; SUBCUTANEOUS at 05:30

## 2024-12-24 RX ADMIN — FAMOTIDINE 20 MILLIGRAM(S): 20 TABLET, FILM COATED ORAL at 05:31

## 2024-12-24 RX ADMIN — SODIUM CHLORIDE 1 GRAM(S): 9 INJECTION, SOLUTION INTRAMUSCULAR; INTRAVENOUS; SUBCUTANEOUS at 14:06

## 2024-12-24 RX ADMIN — ENOXAPARIN SODIUM 40 MILLIGRAM(S): 60 INJECTION INTRAVENOUS; SUBCUTANEOUS at 21:01

## 2024-12-24 RX ADMIN — Medication 1 TABLET(S): at 14:06

## 2024-12-24 RX ADMIN — SENNOSIDES 2 TABLET(S): 8.6 TABLET, FILM COATED ORAL at 17:34

## 2024-12-24 RX ADMIN — LEVETIRACETAM 1000 MILLIGRAM(S): 100 SOLUTION ORAL at 17:34

## 2024-12-24 RX ADMIN — FAMOTIDINE 20 MILLIGRAM(S): 20 TABLET, FILM COATED ORAL at 17:34

## 2024-12-24 RX ADMIN — SODIUM CHLORIDE 1 GRAM(S): 9 INJECTION, SOLUTION INTRAMUSCULAR; INTRAVENOUS; SUBCUTANEOUS at 21:01

## 2024-12-24 RX ADMIN — Medication 10 MILLIGRAM(S): at 21:01

## 2024-12-24 RX ADMIN — Medication 4 MILLIGRAM(S): at 05:31

## 2024-12-24 RX ADMIN — Medication 2 MILLIGRAM(S): at 14:05

## 2024-12-24 NOTE — PROGRESS NOTE ADULT - SUBJECTIVE AND OBJECTIVE BOX
Physical Medicine and Rehabilitation Progress Note :       Patient is a 69y old  Male who presents with a chief complaint of GBM (24 Dec 2024 03:19)      HPI:  70 yo male with h/o HTN, OA, right  TKA, s/p L craniotomy for glioblastoma resection 04/25/2023, s/p chemo and radiation therapy 2023 with Dr. Causey, currently on physical therapy and brain MRI every 2 month post craniotomy, presents to ER due to reoccurrence of brain tumor seen on his latest MRI completed 4 days ago in [place]. Patient c/o worsened R facial droop, worsening word finding difficulties, and right sided weakness x 2 weeks. Pt was recommended to go to [facility] ER for further evaluation by his oncologist. Patient denies vision changes, extremity numbness, confusion, headache, nausea and vomiting.  (14 Dec 2024 02:25)                            13.6   5.20  )-----------( 322      ( 23 Dec 2024 05:30 )             40.6       12-24    135  |  105  |  35[H]  ----------------------------<  115[H]  4.6   |  27  |  0.87    Ca    8.4      24 Dec 2024 05:30  Phos  3.2     12-24  Mg     2.1     12-24      Vital Signs Last 24 Hrs  T(C): 36.4 (24 Dec 2024 08:24), Max: 36.8 (23 Dec 2024 19:54)  T(F): 97.6 (24 Dec 2024 08:24), Max: 98.3 (24 Dec 2024 05:27)  HR: 62 (24 Dec 2024 08:24) (60 - 80)  BP: 129/82 (24 Dec 2024 08:24) (98/64 - 129/82)  BP(mean): --  RR: 16 (24 Dec 2024 08:24) (15 - 17)  SpO2: 98% (24 Dec 2024 08:24) (96% - 98%)    Parameters below as of 24 Dec 2024 08:24  Patient On (Oxygen Delivery Method): room air        MEDICATIONS  (STANDING):  amLODIPine   Tablet 10 milliGRAM(s) Oral every 24 hours  dexAMETHasone     Tablet 4 milliGRAM(s) Oral <User Schedule>  dexAMETHasone     Tablet 2 milliGRAM(s) Oral <User Schedule>  enoxaparin Injectable 40 milliGRAM(s) SubCutaneous <User Schedule>  famotidine    Tablet 20 milliGRAM(s) Oral every 12 hours  levETIRAcetam 1000 milliGRAM(s) Oral every 12 hours  multivitamin 1 Tablet(s) Oral daily  polyethylene glycol 3350 17 Gram(s) Oral every 12 hours  senna 2 Tablet(s) Oral two times a day  sodium chloride 1 Gram(s) Oral every 8 hours    MEDICATIONS  (PRN):  acetaminophen     Tablet .. 650 milliGRAM(s) Oral every 6 hours PRN Mild Pain (1 - 3)      T(C): 36.4 (12-24-24 @ 08:24), Max: 36.8 (12-23-24 @ 19:54)  HR: 62 (12-24-24 @ 08:24) (60 - 80)  BP: 129/82 (12-24-24 @ 08:24) (98/64 - 129/82)  RR: 16 (12-24-24 @ 08:24) (15 - 17)  SpO2: 98% (12-24-24 @ 08:24) (96% - 98%)    Physical Exam:   69 y o man lying comfortably in semi Hawthorne's position , awake , NAD     Head: normocephalic ,  left crani incision with staples, staples c/d/i    Eyes: PERRLA ,pos dysconj gaze , no nystagmus , sclera anicteric    ENT / FACE: R droop , neg nasal discharge , uvula midline , no oropharyngeal erythema / exudate    Neck: supple , negative JVD , negative carotid bruits , no thyromegaly    Chest: CTA bilaterally      Cardiovascular: regular rate and rhythm , neg murmurs / rubs / gallops    Abdomen: soft , non distended , no tenderness to palpation in all 4 quadrants ,  normal bowel sounds     Extremities: WWP , neg cyanosis /clubbing / edema      Neurologic Exam:     somnolent, oriented x 3 , speech w/ dysarthria , follows commands    Cranial Nerves:           II:                         pupils equal , round and reactive to light , visual fields intact         III/ IV/VI:             extraocular movements intact , neg nystagmus , neg ptosis        V:                        facial sensation intact , V1-3 normal        VII:                       R droop , normal eye closure and smile        VIII:                     hearing intact to finger rub bilaterally        IX and X:             no hoarseness , gag intact , palate/ uvula rise symmetrically        XI:                       dec R shrug        XII:                      no tongue deviation    Motor Exam:                  Left  UE/LE > 4/5                   Right UE prox 2/5, distally > 3/5                    LE: > 3/5 prox, 1/5 distally    Sensation:         intact to light touch x 4 extremities                                 DTR:           biceps/brachioradialis: equal                            patella/ankle: equal      12/23/2024 Functional Status Assessment :       Pain Assessment/Number Scale (0-10) Adult  Presence of Pain: denies pain/discomfort (Rating = 0)  Pain Rating (0-10): Rest: 0 (no pain/absence of nonverbal indicators of pain)  Pain Rating (0-10): Activity: 0 (no pain/absence of nonverbal indicators of pain)    Safety      AM-PAC Functional Assessment: Basic Mobility  Type of Assessment: Daily assessment  Turning from your back to your side while in a flat bed without using bedrails?: 3 = A little assistance  Moving from lying on your back to sitting on the flat side of a flat bed without using bedrails?: 3 = A little assistance  Moving to and from a bed to a chair (including a wheelchair)?: 3 = A little assistance  Standing up from a chair using your arms (e.g. wheelchair or bedside chair)?: 3 = A little assistance  Walking in hospital room?: 3 = A little assistance  Climbing 3-5 steps with a railing?: 2 = A lot of assistance  Score: 17   Row Comment: Ask the patient "How much help from another person do you currently need? (If the patient hasn't done an activity recently, how much help from another person do you think he/she needs if he/she tried?)    Cognitive/Neuro      Cognitive/Neuro/Behavioral  Cognitive/Neuro/Behavioral [WDL Definition: Alert; opens eyes spontaneously; arouses to voice or touch; oriented x 4; follows commands; speech spontaneous, logical; purposeful motor response; behavior appropriate to situation]: WDL    Language Assistance  Preferred Language to Address Healthcare Preferred Language to Address Healthcare: English    Therapeutic Interventions      Bed Mobility  Bed Mobility Training Charges: Not assessed, pt. received/returned seated in the chair     Sit-Stand Transfer Training  Transfer Training Sit-to-Stand Transfer: contact guard;  verbal cues;  1 person assist;  weight-bearing as tolerated   L hemiwalker   Transfer Training Stand-to-Sit Transfer: contact guard;  verbal cues;  1 person assist;  weight-bearing as tolerated   L hemiwalker   Sit-to-Stand Transfer Training Transfer Safety Analysis: decreased balance;  decreased sequencing ability;  decreased step length;  decreased weight-shifting ability;  decreased strength;  impaired balance;  impaired coordination;  impaired motor control;  impaired postural control;  decreased ROM;  decreased aerobic capacity/endurance ;  L otriz walker     Gait Training  Gait Training: contact guard;  intermittent Haroldo to prevent LOB ;  verbal cues;  1 person assist;  weight-bearing as tolerated   L hemiwalker ;  30 feet + 15 feet   Gait Analysis: 3-point gait   decreased che;  increased time in double stance;  decreased step length;  decreased stride length;  R drop foot, R knee hyperextension, RLE circumduction, min unsteadiness ;  abnormal muscle tone;  decreased flexibility;  decreased ROM;  decreased strength;  impaired balance;  impaired postural control;  decreased aerobic capacity/endurance ;  30 feet + 15 feet ;  L hemiwalker     Therapeutic Exercise  Therapeutic Exercise Detail: R ankle DF 0/5, R ankle PF 1/5, R knee ext 3-/5, R knee flex 2+/5, R hip flex 3-/5         AM-PAC Functional Assessment: Daily Activity  Type of Assessment: Daily assessment  Putting on and taking off regular lower body clothing?: 2 = A lot of assistance  Bathing (including washing, rinsing, drying)?: 2 = A lot of assistance  Toileting, which includes using toilet, bedpan or urinal?: 2 = A lot of assistance  Putting on and taking off regular upper body clothing?: 2 = A lot of assistance  Take care of personal grooming such as brushing teeth?: 3 = A little assistance  Eating meals?: 4 = No assist / stand by assistance  Score: 15   Row Comment: Ask the patient "How much help from another person do you currently need? (If the patient hasn't done an activity recently, how much help from another person do you think he/she needs if he/she tried?)    Cognitive/Neuro      Cognitive/Neuro/Behavioral  Cognitive/Neuro/Behavioral [WDL Definition: Alert; opens eyes spontaneously; arouses to voice or touch; oriented x 4; follows commands; speech spontaneous, logical; purposeful motor response; behavior appropriate to situation]: WDL  Level of Consciousness: alert  Arousal Level: arouses to voice  Orientation: oriented x 4  Speech: clear;  spontaneous  Mood/Behavior: calm;  cooperative    Language Assistance  Preferred Language to Address Healthcare Preferred Language to Address Healthcare: English    Therapeutic Interventions      Sit-Stand Transfer Training  Transfer Training Sit-to-Stand Transfer: contact guard;  1 person assist;  nonverbal cues (demo/gestures);  verbal cues;  ortiz walker  Transfer Training Stand-to-Sit Transfer: contact guard;  1 person assist;  nonverbal cues (demo/gestures);  verbal cues;  ortiz walker  Sit-to-Stand Transfer Training Transfer Safety Analysis: decreased balance;  decreased weight-shifting ability;  impaired balance    Therapeutic Exercise  Therapeutic Exercise Detail: Functional mobilty training performed with pt able to ambulate ~30', 15' with 1 seated rest break with min-CGA using RW, verbal cues to correct positioning of RLE. MMT: LUE 5/5 at all major pivot points. R shoulder flexion 2-/5, R shoulder extension 3+/5, R external rotation 3-/5, R internal rotation 3-/5, R  3/5     Upper Body Dressing Training  Upper Body Dressing Training Assistance: moderate assist (50% patient effort);  1 person assist;  nonverbal cues (demo/gestures);  verbal cues;  don gown while seated in chair using one handed dressing techniques;  decreased strength;  decreased flexibility;  abnormal muscle tone    OT Cognitive Treatment  OT Cognitive Treatment Treatment Detail: 27/30 on the o-log cues to correct date and day of the week. Pt able to follow 100% of single step commands, decreased command following and attention when provided with 2-3 step instructions.             PM&R Impression : as above    Current disposition plan recommendation :    acute rehab placement

## 2024-12-24 NOTE — PROGRESS NOTE ADULT - ASSESSMENT
Nsgy    69 y o M PMHx HTN, OA, right  TKA, s/p L craniotomy for GBM resection 04/25/2023, s/p chemo and RT 2023 with Dr. Causey. Presented to ER due to reoccurrence of brain tumor seen on his latest MRI completed 4 days ago in [place]. Patient c/o worsened R facial droop, worsening word finding difficulties, and right sided weakness x 2 weeks. Now s/p L crani for resection of GBM with placement of Gammatile brachytherapy 12/19 ([facility]: [facility]).     Neuro:  - neuro/vital checks q4hr  - pain control: tylenol   - Seizure ppx: home Keppra 1g BID   - CTH 12/13: vasogenic edema in left hemisphere  - cerebral edema: decadron 4mg qAM, 2mg at 3pm  - GBM: hold metformin 500mg q12  - Cesium 131 precautions  - pending post op MRI     Cardiac:  - SBP goal 100-140  - HTN: cont home amlodipine 10mg daily    Pulmonary:  - on RA, encourage IS     GI:  - regular diet   - bowel regimen, BM 12/20  - GI ppx: pepcid BID while on steroids    Renal:  - IVL   - voiding   - hyponatremia: salt tabs 1q8    Endo:  - ISS, A1c 5.5%, no active issues     Heme:  - DVT ppx: SCDs, SQL   - heme/onc consulted, f/u recs   - rad/onc consulted,  f/u recs    ID:  - afebrile    Disposition: SDU, full code, recc'ed AR     D/w Dr. Cheung

## 2024-12-24 NOTE — PROGRESS NOTE ADULT - SUBJECTIVE AND OBJECTIVE BOX
HPI:  68 yo male with h/o HTN, OA, right  TKA, s/p L craniotomy for glioblastoma resection 04/25/2023, s/p chemo and radiation therapy 2023 with Dr. Causey, currently on physical therapy and brain MRI every 2 month post craniotomy, presents to ER due to reoccurrence of brain tumor seen on his latest MRI completed 4 days ago in [place]. Patient c/o worsened R facial droop, worsening word finding difficulties, and right sided weakness x 2 weeks. Pt was recommended to go to [facility] ER for further evaluation by his oncologist. Patient denies vision changes, extremity numbness, confusion, headache, nausea and vomiting.  (14 Dec 2024 02:25)    OVERNIGHT EVENTS: KYA      Hospital Course:   12/13: admitted to [facility], decadron 4q6 started  12/14: KYA overnight  12/15: KYA overnight, pending MRI  12/16: KYA overnight. MRI complete. Plan for OR thursday 12/19/24.   12/17: KYA overnight.  12/18: Pre-op for OR tm.   12/19: POD 0 s/p L crani for tumor with gammatile.   12/20: POD 1 L crani. KYA overnight, neuro stable. +BM.   12/21: POD 2 L crani. KYA overnight.   12/22: POD3 L crani. KYA ovn. Speech consulted for new dyslexia.   12/23: POD4, KYA overnight, neuro stable. Na 133, started salt tabs 1q8. decadron increased to 4mg in AM, 2mg in PM  12/24: POD5, KYA overnight, neuro stable.       Vital Signs Last 24 Hrs  T(C): 36.8 (23 Dec 2024 19:54), Max: 36.8 (23 Dec 2024 08:36)  T(F): 98.2 (23 Dec 2024 19:54), Max: 98.3 (23 Dec 2024 08:36)  HR: 60 (23 Dec 2024 22:30) (53 - 80)  BP: 105/62 (23 Dec 2024 22:30) (98/60 - 106/66)  BP(mean): --  RR: 15 (23 Dec 2024 19:54) (15 - 17)  SpO2: 97% (23 Dec 2024 19:54) (95% - 97%)    Parameters below as of 23 Dec 2024 19:54  Patient On (Oxygen Delivery Method): room air        I&O's Detail    22 Dec 2024 07:01  -  23 Dec 2024 07:00  --------------------------------------------------------  IN:    Oral Fluid: 480 mL  Total IN: 480 mL    OUT:    Voided (mL): 1000 mL  Total OUT: 1000 mL    Total NET: -520 mL      23 Dec 2024 07:01  -  24 Dec 2024 03:19  --------------------------------------------------------  IN:    Oral Fluid: 360 mL  Total IN: 360 mL    OUT:  Total OUT: 0 mL    Total NET: 360 mL        I&O's Summary    22 Dec 2024 07:01  -  23 Dec 2024 07:00  --------------------------------------------------------  IN: 480 mL / OUT: 1000 mL / NET: -520 mL    23 Dec 2024 07:01  -  24 Dec 2024 03:19  --------------------------------------------------------  IN: 360 mL / OUT: 0 mL / NET: 360 mL        PHYSICAL EXAM:  GEN: laying in bed, NAD  NEURO: AOx3. FC, OE spont, speech intact, R facial. dysconjugate gaze. PERRL, EOMI. No pronator drift. LUE/LLE 5/5. RUE delt 3/5, bicep/tricep/HG 4-/5. RLE proximally 4/5, DF/PF 0/5.   CV: RRR +S1/S2  PULM: CTAB  GI: Abd soft, NT/ND  EXT: ext warm, dry, nontender  WOUND: bi-coronal crani site c/d/i    TUBES/LINES:  [] CVC  [] A-line  [] Lumbar Drain  [] Ventriculostomy  [] Other    DIET:  [] NPO  [x] Mechanical  [] Tube feeds    LABS:    Urinalysis Basic - ( 23 Dec 2024 05:30 )    Color: x / Appearance: x / SG: x / pH: x  Gluc: 103 mg/dL / Ketone: x  / Bili: x / Urobili: x   Blood: x / Protein: x / Nitrite: x   Leuk Esterase: x / RBC: x / WBC x   Sq Epi: x / Non Sq Epi: x / Bacteria: x          CAPILLARY BLOOD GLUCOSE          Drug Levels: [] N/A    CSF Analysis: [] N/A      Allergies    No Known Allergies    Intolerances      MEDICATIONS:  Antibiotics:    Neuro:  acetaminophen     Tablet .. 650 milliGRAM(s) Oral every 6 hours PRN  levETIRAcetam 1000 milliGRAM(s) Oral every 12 hours    Anticoagulation:  enoxaparin Injectable 40 milliGRAM(s) SubCutaneous <User Schedule>    OTHER:  amLODIPine   Tablet 10 milliGRAM(s) Oral every 24 hours  dexAMETHasone     Tablet 4 milliGRAM(s) Oral <User Schedule>  dexAMETHasone     Tablet 2 milliGRAM(s) Oral <User Schedule>  famotidine    Tablet 20 milliGRAM(s) Oral every 12 hours  polyethylene glycol 3350 17 Gram(s) Oral every 12 hours  senna 2 Tablet(s) Oral two times a day    IVF:  multivitamin 1 Tablet(s) Oral daily  sodium chloride 1 Gram(s) Oral every 8 hours    CULTURES:    RADIOLOGY & ADDITIONAL TESTS:      ASSESSMENT:  69M PMHx HTN, OA, right  TKA, s/p L craniotomy for GBM resection 04/25/2023, s/p chemo and RT 2023 with Dr. Causey. Presented to ER due to reoccurrence of brain tumor seen on his latest MRI completed 4 days ago in [place]. Patient c/o worsened R facial droop, worsening word finding difficulties, and right sided weakness x 2 weeks. Now s/p L crani for resection of GBM with placement of Gammatile brachytherapy 12/19 ([facility]: [facility]).     Neuro:  - neuro/vital checks q4hr  - pain control: tylenol   - Seizure ppx: home Keppra 1g BID   - CTH 12/13: vasogenic edema in left hemisphere  - cerebral edema: decadron 4mg qAM, 2mg at 3pm  - GBM: hold metformin 500mg q12  - Cesium 131 precautions  - pending post op MRI     Cardiac:  - SBP goal 100-140  - HTN: cont home amlodipine 10mg daily    Pulmonary:  - on RA, encourage IS     GI:  - regular diet   - bowel regimen, BM 12/20  - GI ppx: pepcid BID while on steroids    Renal:  - IVL   - voiding   - hyponatremia: salt tabs 1q8    Endo:  - ISS, A1c 5.5%, no active issues     Heme:  - DVT ppx: SCDs, SQL   - heme/onc consulted, f/u recs   - rad/onc consulted,  f/u recs    ID:  - afebrile    Disposition: SDU, full code, recc'ed AR     D/w Dr. Cheung

## 2024-12-24 NOTE — PROGRESS NOTE ADULT - SUBJECTIVE AND OBJECTIVE BOX
Patient is a 69y old  Male who presents with a chief complaint of GBM (24 Dec 2024 10:19)        SUBJECTIVE:  Patient was seen and examined at bedside.    Overnight Events : Sitting in a chair , denies any complaints , Eager to go home     Last Bowel Movement: 23-Dec-2024 (12-24)  Last Bowel Movement: 23-Dec-2024 (12-23)        Review of systems: 12 point Review of systems negative unless otherwise documented elsewhere in note.     Diet, Regular (12-19-24 @ 14:51) [Active]      MEDICATIONS:  MEDICATIONS  (STANDING):  amLODIPine   Tablet 10 milliGRAM(s) Oral every 24 hours  dexAMETHasone     Tablet 4 milliGRAM(s) Oral <User Schedule>  dexAMETHasone     Tablet 2 milliGRAM(s) Oral <User Schedule>  enoxaparin Injectable 40 milliGRAM(s) SubCutaneous <User Schedule>  famotidine    Tablet 20 milliGRAM(s) Oral every 12 hours  levETIRAcetam 1000 milliGRAM(s) Oral every 12 hours  multivitamin 1 Tablet(s) Oral daily  polyethylene glycol 3350 17 Gram(s) Oral every 12 hours  senna 2 Tablet(s) Oral two times a day  sodium chloride 1 Gram(s) Oral every 8 hours    MEDICATIONS  (PRN):  acetaminophen     Tablet .. 650 milliGRAM(s) Oral every 6 hours PRN Mild Pain (1 - 3)      Allergies    No Known Allergies    Intolerances        OBJECTIVE:  Vital Signs Last 24 Hrs  T(C): 36.4 (24 Dec 2024 08:24), Max: 36.8 (23 Dec 2024 19:54)  T(F): 97.6 (24 Dec 2024 08:24), Max: 98.3 (24 Dec 2024 05:27)  HR: 62 (24 Dec 2024 08:24) (60 - 80)  BP: 129/82 (24 Dec 2024 08:24) (98/64 - 129/82)  BP(mean): --  RR: 16 (24 Dec 2024 08:24) (15 - 17)  SpO2: 98% (24 Dec 2024 08:24) (96% - 98%)    Parameters below as of 24 Dec 2024 08:24  Patient On (Oxygen Delivery Method): room air      I&O's Summary    23 Dec 2024 07:01  -  24 Dec 2024 07:00  --------------------------------------------------------  IN: 360 mL / OUT: 0 mL / NET: 360 mL        PHYSICAL EXAM:  Gen: Resting in bed at time of exam, not in distress   HEENT: moist mucosa, no lesions   Neck: supple, trachea at midline  CV: RRR, +S1/S2  Pulm: no wheezing , no crackles  no increase in work of breathing  Abd: soft, NTND  Skin: warm and dry, no new rashes   Ext: moving all 4 extremities spontaneously , no edema  ,  Neuro: AOx3, no gross focal neurological deficits  Psych: affect and behavior appropriate, pleasant at time of interview    LABS:                        13.6   5.20  )-----------( 322      ( 23 Dec 2024 05:30 )             40.6     12-24    135  |  105  |  35[H]  ----------------------------<  115[H]  4.6   |  27  |  0.87    Ca    8.4      24 Dec 2024 05:30  Phos  3.2     12-24  Mg     2.1     12-24          CAPILLARY BLOOD GLUCOSE        Urinalysis Basic - ( 24 Dec 2024 05:30 )    Color: x / Appearance: x / SG: x / pH: x  Gluc: 115 mg/dL / Ketone: x  / Bili: x / Urobili: x   Blood: x / Protein: x / Nitrite: x   Leuk Esterase: x / RBC: x / WBC x   Sq Epi: x / Non Sq Epi: x / Bacteria: x        MICRODATA:      RADIOLOGY/OTHER STUDIES:

## 2024-12-24 NOTE — CHART NOTE - NSCHARTNOTEFT_GEN_A_CORE
Admitting Diagnosis:   Patient is a 69y old  Male who presents with a chief complaint of GBM (24 Dec 2024 10:19)      PAST MEDICAL & SURGICAL HISTORY:  Hypertension  Osteoarthritis  Glioblastoma  S/P total knee replacement, right  H/O craniotomy    Current Nutrition Order: regular       PO Intake: Good (%) [  x ]  Fair (50-75%) [   ] Poor (<25%) [   ]    GI Issues: last BM 12/23 per chart    Pain: none documented    Skin Integrity: no pressure injuries/edema documented      12-23-24 @ 07:01  -  12-24-24 @ 07:00  --------------------------------------------------------  IN: 360 mL / OUT: 0 mL / NET: 360 mL      Labs:   12-24    135  |  105  |  35[H]  ----------------------------<  115[H]  4.6   |  27  |  0.87    Ca    8.4      24 Dec 2024 05:30  Phos  3.2     12-24  Mg     2.1     12-24      CAPILLARY BLOOD GLUCOSE      Medications:  MEDICATIONS  (STANDING):  amLODIPine   Tablet 10 milliGRAM(s) Oral every 24 hours  dexAMETHasone     Tablet 4 milliGRAM(s) Oral <User Schedule>  dexAMETHasone     Tablet 2 milliGRAM(s) Oral <User Schedule>  enoxaparin Injectable 40 milliGRAM(s) SubCutaneous <User Schedule>  famotidine    Tablet 20 milliGRAM(s) Oral every 12 hours  levETIRAcetam 1000 milliGRAM(s) Oral every 12 hours  multivitamin 1 Tablet(s) Oral daily  polyethylene glycol 3350 17 Gram(s) Oral every 12 hours  senna 2 Tablet(s) Oral two times a day  sodium chloride 1 Gram(s) Oral every 8 hours    MEDICATIONS  (PRN):  acetaminophen     Tablet .. 650 milliGRAM(s) Oral every 6 hours PRN Mild Pain (1 - 3)      Weight:  Height for BMI (FEET)	5 Feet  Height for BMI (INCHES)	11 Inch(s)  Height for BMI (CENTIMETERS)	180.34 Centimeter(s)  Weight for BMI (lbs)	189 lb  Weight for BMI (kg)	85.7 kg  Body Mass Index	26.4    Weight Change: no new weights to assess    Estimated energy needs:   Estimated Energy Needs Weight (lbs)	189 lb  Estimated Energy Needs Weight (kg)	85.7 kg  Estimated Energy Needs From (radha/kg)	25  Estimated Energy Needs To (radha/kg)	30  Estimated Energy Needs Calculated From (radha/kg)	2142  Estimated Energy Needs Calculated To (radha/kg)	2571    Estimated Protein Needs Weight (lbs)	189 lb  Estimated Protein Needs Weight (kg)	85.7 kg  Estimated Protein Needs From (g/kg)	1.1  Estimated Protein Needs To (g/kg)	1.3  Estimated Protein Needs Calculated From (g/kg)	94.27  Estimated Protein Needs Calculated To (g/kg)	111.41    Estimated Fluid Needs Weight (lbs)	189 lb  Estimated Fluid Needs Weight (kg)	85.7 kg  Estimated Fluid Needs From (ml/kg)	30  Estimated Fluid Needs To (ml/kg)	35  Estimated Fluid Needs Calculated From (ml/kg)	2571  Estimated Fluid Needs Calculated To (ml/kg)	2999  Other Calculations	Ideal body weight 172 pounds +-10%; % Ideal body weight=110  Pt within % Ideal body weight thus actual body weight used for all calculations.   Adjusted for advanced age, recent cancer, upcoming surgery    Subjective: "68 yo male with h/o HTN, OA, right TKA, s/p L craniotomy for glioblastoma resection 04/25/2023, s/p chemo and radiation therapy 2023 with Dr. Causey, currently on physical therapy and brain MRI every 2 month post craniotomy, presents to ER due to reoccurrence of brain tumor seen on his latest MRI completed 4 days ago in [place]. Patient c/o worsened R facial droop, worsening word finding difficulties, and right sided weakness x 2 weeks. Pt was recommended to go to [facility] ER for further evaluation by his oncologist. Patient denies vision changes, extremity numbness, confusion, headache, nausea and vomiting." Plan for OR 12/19 per provider handoff.    Patient seen at bedside for follow up assessment. Current diet order: regular. RN reports patient is eating well % of meals. Last BM charted as 12/23. Labs: BUN 35. Meds: sodium chloride, bowel regimen, pepcid, MVI. RD to f/u prn.    Previous Nutrition Diagnosis: Increased nutrient needs (protein/calories) related to increased demands as evidenced by upcoming surgery 12/19.    Active [   ]  Resolved [  x ]    If resolved, new PES: Increased nutrient needs (protein/calories) related to increased physiologic demand for nutrients as evidenced by post-op healing.    Goal: Patient to meet at least 75% of nutritional needs consistently     Recommendations:  1. Continue with regular diet  2. Encourage pt to meet nutritional needs as able   3. Monitor labs: electrolytes, cbc  4. Monitor weights   5. Pain and bowel regimen per team   6. Will continue to assess/honor food preferences as able    Risk Level: High [   ] Moderate [ x  ] Low [   ]

## 2024-12-24 NOTE — PROGRESS NOTE ADULT - ASSESSMENT
69 M with HTN, OA, right  TKA, s/p L craniotomy for glioblastoma resection 04/25/2023, s/p chemo and radiation therapy 2023 with Dr. Causey, currently on physical therapy and brain MRI every 2 month post craniotomy, presents to ER due to reoccurrence of brain tumor seen on his latest MRI completed 4 days ago in [place].     #GBM:  - s/p left frontal craniotomy w/ GBM removal and placement of gamma tile brachytherapy on 12/19  - Remains on Decadron per nsgy  - On Pepcid for GI protection.  - Keppra.    #HTN:   - Controlled at the moment.  - Continue with Norvasc 10mg PO daily    #DVT prophylaxis : Lovenox.

## 2024-12-25 LAB
ANION GAP SERPL CALC-SCNC: 12 MMOL/L — SIGNIFICANT CHANGE UP (ref 5–17)
BUN SERPL-MCNC: 34 MG/DL — HIGH (ref 7–23)
CALCIUM SERPL-MCNC: 8.5 MG/DL — SIGNIFICANT CHANGE UP (ref 8.4–10.5)
CHLORIDE SERPL-SCNC: 105 MMOL/L — SIGNIFICANT CHANGE UP (ref 96–108)
CO2 SERPL-SCNC: 23 MMOL/L — SIGNIFICANT CHANGE UP (ref 22–31)
CREAT SERPL-MCNC: 0.86 MG/DL — SIGNIFICANT CHANGE UP (ref 0.5–1.3)
EGFR: 94 ML/MIN/1.73M2 — SIGNIFICANT CHANGE UP
GLUCOSE SERPL-MCNC: 114 MG/DL — HIGH (ref 70–99)
HCT VFR BLD CALC: 46.2 % — SIGNIFICANT CHANGE UP (ref 39–50)
HGB BLD-MCNC: 15 G/DL — SIGNIFICANT CHANGE UP (ref 13–17)
MAGNESIUM SERPL-MCNC: 2 MG/DL — SIGNIFICANT CHANGE UP (ref 1.6–2.6)
MCHC RBC-ENTMCNC: 28.8 PG — SIGNIFICANT CHANGE UP (ref 27–34)
MCHC RBC-ENTMCNC: 32.5 G/DL — SIGNIFICANT CHANGE UP (ref 32–36)
MCV RBC AUTO: 88.8 FL — SIGNIFICANT CHANGE UP (ref 80–100)
NRBC # BLD: 0 /100 WBCS — SIGNIFICANT CHANGE UP (ref 0–0)
PHOSPHATE SERPL-MCNC: 2.9 MG/DL — SIGNIFICANT CHANGE UP (ref 2.5–4.5)
PLATELET # BLD AUTO: 356 K/UL — SIGNIFICANT CHANGE UP (ref 150–400)
POTASSIUM SERPL-MCNC: 4.5 MMOL/L — SIGNIFICANT CHANGE UP (ref 3.5–5.3)
POTASSIUM SERPL-SCNC: 4.5 MMOL/L — SIGNIFICANT CHANGE UP (ref 3.5–5.3)
RBC # BLD: 5.2 M/UL — SIGNIFICANT CHANGE UP (ref 4.2–5.8)
RBC # FLD: 13.2 % — SIGNIFICANT CHANGE UP (ref 10.3–14.5)
SODIUM SERPL-SCNC: 140 MMOL/L — SIGNIFICANT CHANGE UP (ref 135–145)
WBC # BLD: 9.52 K/UL — SIGNIFICANT CHANGE UP (ref 3.8–10.5)
WBC # FLD AUTO: 9.52 K/UL — SIGNIFICANT CHANGE UP (ref 3.8–10.5)

## 2024-12-25 PROCEDURE — 99024 POSTOP FOLLOW-UP VISIT: CPT

## 2024-12-25 PROCEDURE — 99232 SBSQ HOSP IP/OBS MODERATE 35: CPT

## 2024-12-25 RX ORDER — SODIUM CHLORIDE 9 MG/ML
1000 INJECTION, SOLUTION INTRAMUSCULAR; INTRAVENOUS; SUBCUTANEOUS ONCE
Refills: 0 | Status: COMPLETED | OUTPATIENT
Start: 2024-12-25 | End: 2024-12-25

## 2024-12-25 RX ORDER — ONDANSETRON 4 MG/1
4 TABLET ORAL ONCE
Refills: 0 | Status: COMPLETED | OUTPATIENT
Start: 2024-12-25 | End: 2024-12-25

## 2024-12-25 RX ORDER — SODIUM CHLORIDE 9 MG/ML
1000 INJECTION, SOLUTION INTRAMUSCULAR; INTRAVENOUS; SUBCUTANEOUS
Refills: 0 | Status: DISCONTINUED | OUTPATIENT
Start: 2024-12-25 | End: 2024-12-26

## 2024-12-25 RX ADMIN — FAMOTIDINE 20 MILLIGRAM(S): 20 TABLET, FILM COATED ORAL at 05:48

## 2024-12-25 RX ADMIN — ONDANSETRON 4 MILLIGRAM(S): 4 TABLET ORAL at 05:30

## 2024-12-25 RX ADMIN — ENOXAPARIN SODIUM 40 MILLIGRAM(S): 60 INJECTION INTRAVENOUS; SUBCUTANEOUS at 21:30

## 2024-12-25 RX ADMIN — Medication 1 TABLET(S): at 12:24

## 2024-12-25 RX ADMIN — SODIUM CHLORIDE 1000 MILLILITER(S): 9 INJECTION, SOLUTION INTRAMUSCULAR; INTRAVENOUS; SUBCUTANEOUS at 12:24

## 2024-12-25 RX ADMIN — LEVETIRACETAM 1000 MILLIGRAM(S): 100 SOLUTION ORAL at 18:32

## 2024-12-25 RX ADMIN — LEVETIRACETAM 1000 MILLIGRAM(S): 100 SOLUTION ORAL at 05:48

## 2024-12-25 RX ADMIN — SODIUM CHLORIDE 1 GRAM(S): 9 INJECTION, SOLUTION INTRAMUSCULAR; INTRAVENOUS; SUBCUTANEOUS at 14:10

## 2024-12-25 RX ADMIN — Medication 4 MILLIGRAM(S): at 05:48

## 2024-12-25 RX ADMIN — SODIUM CHLORIDE 1 GRAM(S): 9 INJECTION, SOLUTION INTRAMUSCULAR; INTRAVENOUS; SUBCUTANEOUS at 21:30

## 2024-12-25 RX ADMIN — Medication 2 MILLIGRAM(S): at 14:10

## 2024-12-25 RX ADMIN — SODIUM CHLORIDE 1 GRAM(S): 9 INJECTION, SOLUTION INTRAMUSCULAR; INTRAVENOUS; SUBCUTANEOUS at 05:47

## 2024-12-25 RX ADMIN — SODIUM CHLORIDE 500 MILLILITER(S): 9 INJECTION, SOLUTION INTRAMUSCULAR; INTRAVENOUS; SUBCUTANEOUS at 06:12

## 2024-12-25 RX ADMIN — FAMOTIDINE 20 MILLIGRAM(S): 20 TABLET, FILM COATED ORAL at 18:32

## 2024-12-25 NOTE — DISCHARGE NOTE PROVIDER - NSDCCPCAREPLAN_GEN_ALL_CORE_FT
PRINCIPAL DISCHARGE DIAGNOSIS  Diagnosis: Brain tumor, recurrent  Assessment and Plan of Treatment: s/p Left craniotomy for resection of GBM with placement of gammatile brachytherapy      SECONDARY DISCHARGE DIAGNOSES  Diagnosis: GBM (glioblastoma multiforme)  Assessment and Plan of Treatment: please continue your keppra 1g every 12 hours   please continue your dexamethasone 4mg in the AM and 2mg at 3PM    Diagnosis: Hypertension  Assessment and Plan of Treatment: please continue your amlodipine 10mg every day

## 2024-12-25 NOTE — PROGRESS NOTE ADULT - ASSESSMENT
69 M with HTN, OA, right  TKA, s/p L craniotomy for glioblastoma resection 04/25/2023, s/p chemo and radiation therapy 2023 with Dr. Causey, currently on physical therapy and brain MRI every 2 month post craniotomy, presents to ER due to reoccurrence of brain tumor seen on his latest MRI completed 4 days ago in [place].     #GBM:  - s/p left frontal craniotomy w/ GBM removal and placement of gamma tile brachytherapy on 12/19  - Remains on Decadron per nsgy  - On Pepcid for GI protection.  - Keppra.    #HTN:   - Some hypotension noted this morning in light of diarrhea.  - S/p IVF with resolution.  - HOLD Norvasc 10mg PO daily tonight and reassess tomorrow.    # Diarrhea:  - Likely in the setting of stool softeners.   - Hold Miralax and Senna.  - Afebrile, no leukocytosis.  - Monitor.   - If continues, stool O and P, cultures.     #DVT prophylaxis : Lovenox.

## 2024-12-25 NOTE — DISCHARGE NOTE PROVIDER - CARE PROVIDER_API CALL
Juanjose Johnston  Neurosurgery  [address] [place] [address]  Phone: [phone]  Fax: [phone]  Follow Up Time:     Wernicke, A. Gabriella A  Radiation Oncology  [address] [address]  Phone: [phone]  Fax: [phone]  Follow Up Time:     Valery Benitez  Physical/Rehab Medicine  [address], [address]  [place] [address]  Phone: [phone]  Fax: [phone]  Follow Up Time:    Juanjose Johnston  Neurosurgery  [address] [place] [address]  Phone: [phone]  Fax: [phone]  Follow Up Time:     Wernicke, A. Gabriella A  Radiation Oncology  [address] [address]  Phone: [phone]  Fax: [phone]  Follow Up Time:     Carl Last  Physical/Rehab Medicine  [address]  [place] [address]  Phone: [phone]  Fax: [phone]  Follow Up Time:

## 2024-12-25 NOTE — DISCHARGE NOTE PROVIDER - NSDCMRMEDTOKEN_GEN_ALL_CORE_FT
amLODIPine 10 mg oral tablet: 1 tab(s) orally every 24 hours  levETIRAcetam 1000 mg oral tablet: 1 tab(s) orally 2 times a day  metFORMIN 500 mg oral tablet: 1 tab(s) orally 2 times a day  Multiple Vitamins oral tablet: 1 tab(s) orally once a day  vitamin c: once a day  vitamin D: once daily  vitamin K2:    acetaminophen 325 mg oral tablet: 2 tab(s) orally every 6 hours As needed Mild Pain (1 - 3)  amLODIPine 10 mg oral tablet: 1 tab(s) orally every 24 hours  dexAMETHasone 2 mg oral tablet: 1 tab(s) orally once a day at 3pm  dexAMETHasone 4 mg oral tablet: 1 tab(s) orally once a day With morning meds  enoxaparin: 40 milligram(s) subcutaneously once a day (at bedtime) Can be DC&#x27;d once patient more mobile at rehab  famotidine 20 mg oral tablet: 1 tab(s) orally every 12 hours  levETIRAcetam 1000 mg oral tablet: 1 tab(s) orally every 12 hours  Multiple Vitamins oral tablet: 1 tab(s) orally once a day  sodium chloride 1 g oral tablet: 1 tab(s) orally every 8 hours

## 2024-12-25 NOTE — DISCHARGE NOTE PROVIDER - NSDCFUADDAPPT_GEN_ALL_CORE_FT
Please follow up with Dr. Johnston. Please call [phone] to confirm your appointment date and time.  Please follow up with Dr. Johnston. Please call [phone] to confirm your appointment date and time.    Please follow up with Dr. Wernicke. Please call [phone] to confirm your appointment date and time.     You may follow up with Dr. Benitez of PMR as needed.    Please follow up with your primary care provider.   Please follow up with Dr. Johnston. Please call [phone] to confirm your appointment date and time. You will have a phone call follow up with DELFINA Hicks 12/30 @ 10:30am.     Please follow up with Dr. Wernicke. Please call [phone] to confirm your appointment date and time.     You may follow up with Dr. Last of PMR as needed.    Please follow up with your primary care provider.

## 2024-12-25 NOTE — DISCHARGE NOTE PROVIDER - CARE PROVIDERS DIRECT ADDRESSES
,[email or web address] ,[email or web address]_Unknown

## 2024-12-25 NOTE — DISCHARGE NOTE PROVIDER - NSDCCPTREATMENT_GEN_ALL_CORE_FT
PRINCIPAL PROCEDURE  Procedure: Craniotomy, with supratentorial neoplasm excision  Findings and Treatment: s/p left craniotomy for resection of GBM with placement of gammatile brachytherapy (12/19)

## 2024-12-25 NOTE — DISCHARGE NOTE PROVIDER - PROVIDER TOKENS
PROVIDER:[TOKEN:[[id number]]],PROVIDER:[TOKEN:[[id number]]],PROVIDER:[TOKEN:[[id number]]] PROVIDER:[TOKEN:[[id number]]],PROVIDER:[TOKEN:[[id number]]],PROVIDER:[TOKEN:[[id number]]]

## 2024-12-25 NOTE — PROGRESS NOTE ADULT - SUBJECTIVE AND OBJECTIVE BOX
HPI:  68 yo male with h/o HTN, OA, right  TKA, s/p L craniotomy for glioblastoma resection 04/25/2023, s/p chemo and radiation therapy 2023 with Dr. Causey, currently on physical therapy and brain MRI every 2 month post craniotomy, presents to ER due to reoccurrence of brain tumor seen on his latest MRI completed 4 days ago in [place]. Patient c/o worsened R facial droop, worsening word finding difficulties, and right sided weakness x 2 weeks. Pt was recommended to go to [facility] ER for further evaluation by his oncologist. Patient denies vision changes, extremity numbness, confusion, headache, nausea and vomiting.  (14 Dec 2024 02:25)    OVERNIGHT EVENTS: sg    Hospital Course:   12/13: admitted to [facility], decadron 4q6 started  12/14: SG overnight  12/15: SG overnight, pending MRI  12/16: SG overnight. MRI complete. Plan for OR thursday 12/19/24.   12/17: SG overnight.  12/18: Pre-op for OR tm.   12/19: POD 0 s/p L crani for tumor with gammatile.   12/20: POD 1 L crani. SG overnight, neuro stable. +BM.   12/21: POD 2 L crani. SG overnight.   12/22: POD3 L crani. SG ovn. Speech consulted for new dyslexia.   12/23: POD4, SG overnight, neuro stable. Na 133, started salt tabs 1q8. decadron increased to 4mg in AM, 2mg in PM  12/24: POD5, SG overnight, neuro stable. Postop MRI completed, follow up read.  12/25: POD6. vomited o/n, given zofran.    Vital Signs Last 24 Hrs  T(C): 36.8 (24 Dec 2024 20:17), Max: 36.8 (24 Dec 2024 05:27)  T(F): 98.2 (24 Dec 2024 20:17), Max: 98.3 (24 Dec 2024 05:27)  HR: 61 (24 Dec 2024 20:17) (61 - 78)  BP: 128/69 (24 Dec 2024 20:17) (122/74 - 129/82)  BP(mean): --  RR: 14 (24 Dec 2024 20:17) (14 - 17)  SpO2: 99% (24 Dec 2024 20:17) (97% - 99%)    Parameters below as of 24 Dec 2024 20:17  Patient On (Oxygen Delivery Method): room air        I&O's Summary    23 Dec 2024 07:01  -  24 Dec 2024 07:00  --------------------------------------------------------  IN: 360 mL / OUT: 0 mL / NET: 360 mL        PHYSICAL EXAM:  GEN: laying in bed, NAD  NEURO: AOx3. FC, OE spont, speech intact, R facial. PERRL, disconjugate gaze, EOMI. LUE/LLE 5/5. RUE delt 3/5, bicep 4/5, tricep 3/5, HG 4/5. RLE proximally 5/5, DF/PF 0/5.  CV: RRR +S1/S2  PULM: CTAB  GI: Abd soft, NT/ND  EXT: ext warm, dry, nontender  WOUND: L crani site c/d/i    TUBES/LINES:  [] Shields  [] Lumbar Drain  [] Wound Drains  [] Others      DIET:  [] NPO  [x] Mechanical  [] Tube feeds    LABS:                        13.6   5.20  )-----------( 322      ( 23 Dec 2024 05:30 )             40.6     12-24    135  |  105  |  35[H]  ----------------------------<  115[H]  4.6   |  27  |  0.87    Ca    8.4      24 Dec 2024 05:30  Phos  3.2     12-24  Mg     2.1     12-24        Urinalysis Basic - ( 24 Dec 2024 05:30 )    Color: x / Appearance: x / SG: x / pH: x  Gluc: 115 mg/dL / Ketone: x  / Bili: x / Urobili: x   Blood: x / Protein: x / Nitrite: x   Leuk Esterase: x / RBC: x / WBC x   Sq Epi: x / Non Sq Epi: x / Bacteria: x          CAPILLARY BLOOD GLUCOSE          Drug Levels: [] N/A    CSF Analysis: [] N/A      Allergies    No Known Allergies    Intolerances      MEDICATIONS:  Antibiotics:    Neuro:  acetaminophen     Tablet .. 650 milliGRAM(s) Oral every 6 hours PRN  levETIRAcetam 1000 milliGRAM(s) Oral every 12 hours  ondansetron Injectable 4 milliGRAM(s) IV Push once    Anticoagulation:  enoxaparin Injectable 40 milliGRAM(s) SubCutaneous <User Schedule>    OTHER:  amLODIPine   Tablet 10 milliGRAM(s) Oral every 24 hours  dexAMETHasone     Tablet 4 milliGRAM(s) Oral <User Schedule>  dexAMETHasone     Tablet 2 milliGRAM(s) Oral <User Schedule>  famotidine    Tablet 20 milliGRAM(s) Oral every 12 hours  polyethylene glycol 3350 17 Gram(s) Oral every 12 hours  senna 2 Tablet(s) Oral two times a day    IVF:  multivitamin 1 Tablet(s) Oral daily  sodium chloride 1 Gram(s) Oral every 8 hours    CULTURES:    RADIOLOGY & ADDITIONAL TESTS:      ASSESSMENT:  69M PMHx HTN, OA, right  TKA, s/p L craniotomy for GBM resection 04/25/2023, s/p chemo and RT 2023 with Dr. Causey. Presented to ER due to reoccurrence of brain tumor seen on his latest MRI completed 4 days ago in [place]. Patient c/o worsened R facial droop, worsening word finding difficulties, and right sided weakness x 2 weeks. Now s/p L crani for resection of GBM with placement of Gammatile brachytherapy 12/19 ([place]rozen: [facility]).     BRAIN TUMOR    Handoff    MEWS Score    Hypertension    Osteoarthritis    Brain mass    Glioblastoma    Glioblastoma    Glioblastoma    Brain tumor    Brain tumor, recurrent    GBM (glioblastoma multiforme)    Preoperative clearance    Hypertension    Craniotomy, with supratentorial neoplasm excision    S/P total knee replacement, right    H/O craniotomy    SURGERY COMP    90+    Room Service Assist    [email or web address]        PLAN:  Neuro:  - neuro/vital checks q4hr  - pain control: tylenol   - Seizure ppx: home Keppra 1g BID   - CTH 12/13: vasogenic edema in left hemisphere  - cerebral edema: decadron 4mg qAM, 2mg at 3pm  - GBM: hold metformin 500mg q12  - Cesium 131 precautions  - post op MRI completed 12/24: fu read    Cardiac:  - SBP goal 100-140  - HTN: cont home amlodipine 10mg daily    Pulmonary:  - on RA, encourage IS     GI:  - regular diet   - bowel regimen, BM 12/23  - GI ppx: pepcid BID while on steroids    Renal:  - IVL   - voiding   - hyponatremia: salt tabs 1q8    Endo:  - ISS, A1c 5.5%, no active issues     Heme:  - DVT ppx: SCDs, SQL   - heme/onc consulted, f/u recs   - rad/onc consulted,  f/u recs    ID:  - afebrile    Disposition: SDU, full code, recc'ed AR     D/w Dr. Cheung     Assessment:  Present when checked    []  GCS  E   V  M     Heart Failure: []Acute, [] acute on chronic , []chronic  Heart Failure:  [] Diastolic (HFpEF), [] Systolic (HFrEF), []Combined (HFpEF and HFrEF), [] RHF, [] Pulm HTN, [] Other    [] ROMERO, [] ATN, [] AIN, [] other  [] CKD1, [] CKD2, [] CKD 3, [] CKD 4, [] CKD 5, []ESRD    Encephalopathy: [] Metabolic, [] Hepatic, [] toxic, [] Neurological, [] Other    Abnormal Nurtitional Status: [] malnurtition (see nutrition note), [ ]underweight: BMI < 19, [] morbid obesity: BMI >40, [] Cachexia    [] Sepsis  [] hypovolemic shock,[] cardiogenic shock, [] hemorrhagic shock, [] neuogenic shock  [] Acute Respiratory Failure  []Cerebral edema, [] Brain compression/ herniation,   [] Functional quadriplegia  [] Acute blood loss anemia

## 2024-12-25 NOTE — PROGRESS NOTE ADULT - SUBJECTIVE AND OBJECTIVE BOX
Patient is a 69y old  Male who presents with a chief complaint of GBM (25 Dec 2024 00:41)      SUBJECTIVE / OVERNIGHT EVENTS:  Continues to have loose bowel movements.  Stool softeners discontinued.  Had some hypotension this morning, receiving IVF.    MEDICATIONS  (STANDING):  amLODIPine   Tablet 10 milliGRAM(s) Oral every 24 hours  dexAMETHasone     Tablet 4 milliGRAM(s) Oral <User Schedule>  dexAMETHasone     Tablet 2 milliGRAM(s) Oral <User Schedule>  enoxaparin Injectable 40 milliGRAM(s) SubCutaneous <User Schedule>  famotidine    Tablet 20 milliGRAM(s) Oral every 12 hours  levETIRAcetam 1000 milliGRAM(s) Oral every 12 hours  multivitamin 1 Tablet(s) Oral daily  sodium chloride 1 Gram(s) Oral every 8 hours    MEDICATIONS  (PRN):  acetaminophen     Tablet .. 650 milliGRAM(s) Oral every 6 hours PRN Mild Pain (1 - 3)      CAPILLARY BLOOD GLUCOSE        I&O's Summary      PHYSICAL EXAM:  Vital Signs Last 24 Hrs  T(C): 37.4 (25 Dec 2024 09:10), Max: 37.4 (25 Dec 2024 09:10)  T(F): 99.4 (25 Dec 2024 09:10), Max: 99.4 (25 Dec 2024 09:10)  HR: 72 (25 Dec 2024 09:10) (61 - 78)  BP: 108/67 (25 Dec 2024 09:10) (105/66 - 128/69)  BP(mean): 81 (25 Dec 2024 09:10) (81 - 81)  RR: 16 (25 Dec 2024 09:10) (14 - 17)  SpO2: 96% (25 Dec 2024 09:10) (96% - 99%)    Parameters below as of 25 Dec 2024 09:10  Patient On (Oxygen Delivery Method): room air      GENERAL: NAD, well-developed  HEAD:  Atraumatic, Normocephalic  EYES: EOMI, PERRLA, conjunctiva and sclera clear  NECK: Supple, No JVD  CHEST/LUNG: Clear to auscultation bilaterally; No wheeze  HEART: Regular rate and rhythm; No murmurs, rubs, or gallops  ABDOMEN: Soft, Nontender, Nondistended; Bowel sounds present  EXTREMITIES:  2+ Peripheral Pulses, No clubbing, cyanosis, or edema  PSYCH: AAOx3  NEUROLOGY: non-focal  SKIN: No rashes or lesions    LABS:                        15.0   9.52  )-----------( 356      ( 25 Dec 2024 06:40 )             46.2     12-25    140  |  105  |  34[H]  ----------------------------<  114[H]  4.5   |  23  |  0.86    Ca    8.5      25 Dec 2024 06:40  Phos  2.9     12-25  Mg     2.0     12-25            Urinalysis Basic - ( 25 Dec 2024 06:40 )    Color: x / Appearance: x / SG: x / pH: x  Gluc: 114 mg/dL / Ketone: x  / Bili: x / Urobili: x   Blood: x / Protein: x / Nitrite: x   Leuk Esterase: x / RBC: x / WBC x   Sq Epi: x / Non Sq Epi: x / Bacteria: x        RADIOLOGY & ADDITIONAL TESTS:    Imaging Personally Reviewed:    Consultant(s) Notes Reviewed:      Care Discussed with Consultants/Other Providers:

## 2024-12-25 NOTE — DISCHARGE NOTE PROVIDER - NSDCFUSCHEDAPPT_GEN_ALL_CORE_FT
Fabiola Mayen  [facility] Physician [facility]  NEUROSURG [address]  Scheduled Appointment: 12/30/2024

## 2024-12-25 NOTE — DISCHARGE NOTE PROVIDER - NSDCFUADDINST_GEN_ALL_CORE_FT
Neurosurgery follow up appointment date/time: ________  - you have staples in place, these will come out at ____________  - please call the office to confirm appointment: [phone]    Wound Care:  - you may shower  - please use a gentle shampoo and let the warm soapy water run over your incision; please pat incision dry with a clean, dry towel  - no picking at incision  - wears glasses?     Devices:  - does patient need collar or brace or helmet?   - does collar/brace need to be worn at all times or just when OOB?  - RW or cane for ambulation?    Activity:  - fatigue is common after surgery, rest if you feel tired   - no bending, lifting, twisting or heavy lifting   - walking is recommended, ambulate as tolerated  - you may shower when you get home, keep your incision dry  - no soaking in a tub/pool/hot tub   - no driving within 24 hours of anesthesia or while taking prescription pain medications   - keep hydrated, drink plenty of water     Inpatient consults:  - Radiation-Oncology  - Physical Medicine and Rehabilitation    Please also follow up with your primary care doctor.     Pain Expectations:  - pain after surgery is expected  - please take pain meds as prescribed     Medications:  - home meds: please continue your home amlodipine 10mg daily for your history of high blood pressure; please follow up with your primary care provider; please continue your home keppra 1g every 12 hours for seizure prophylaxis   - please continue your steroids that were started in-patient (new med)- dexamethasone 4mg in the AM and 2mg at 3PM. steroids can upset your stomach, please continue famotidine 20mg every 12 hours while taking steroids   - continue salt tabs? ____ continue metformin??   - you may take acetaminophen 650mg every 6-8 hours as needed for pain; please do not take more than 4g in 24 hours   - pain medications can cause constipation, you should eat a high fiber diet and may take a stool softener while on pain meds   - Avoid taking Advil (ibuprofen), Motrin (naproxen), or Aspirin for pain as they can cause bleeding     Call the office or come to ED if:  - wound has drainage or bleeding, increased redness or pain at incision site, neurological change, fever (>101), chills, night sweats, syncope, nausea/vomiting, chest pain, shortness of breath      Playback:  - please see the [facility] for a copy of your discharge paperwork     WITHIN 24 HOURS OF DISCHARGE, PLEASE CONTACT NEURO PA  WITH ANY QUESTIONS OR CONCERNS: [phone]   OTHERWISE, PLEASE CALL THE OFFICE WITH ANY QUESTIONS OR CONCERNS: [phone] Neurosurgery follow up appointment date/time: ________  - you have staples in place, these will come out at ____________  - please call the office to confirm appointment: [phone]    Wound Care:  - you may shower  - please use a gentle shampoo and let the warm soapy water run over your incision; please pat incision dry with a clean, dry towel  - no picking at incision    Activity:  - fatigue is common after surgery, rest if you feel tired   - no bending, lifting, twisting or heavy lifting   - walking is recommended, ambulate as tolerated  - you may shower when you get home, keep your incision dry  - no soaking in a tub/pool/hot tub   - no driving within 24 hours of anesthesia or while taking prescription pain medications   - keep hydrated, drink plenty of water     Inpatient consults:  - Radiation-Oncology  - Physical Medicine and Rehabilitation    Please also follow up with your primary care doctor.     Pain Expectations:  - pain after surgery is expected  - please take pain meds as prescribed     Medications:  - home meds: please continue your home amlodipine 10mg daily for your history of high blood pressure; please follow up with your primary care provider; please continue your home keppra 1g every 12 hours for seizure prophylaxis   - please continue your steroids that were started in-patient (new med)- dexamethasone 4mg in the AM and 2mg at 3PM. steroids can upset your stomach, please continue famotidine 20mg every 12 hours while taking steroids   - continue salt tabs? ____ continue metformin??   - you may take acetaminophen 650mg every 6-8 hours as needed for pain; please do not take more than 4g in 24 hours   - pain medications can cause constipation, you should eat a high fiber diet and may take a stool softener while on pain meds   - Avoid taking Advil (ibuprofen), Motrin (naproxen), or Aspirin for pain as they can cause bleeding     Call the office or come to ED if:  - wound has drainage or bleeding, increased redness or pain at incision site, neurological change, fever (>101), chills, night sweats, syncope, nausea/vomiting, chest pain, shortness of breath      Playback:  - please see the [facility] for a copy of your discharge paperwork     WITHIN 24 HOURS OF DISCHARGE, PLEASE CONTACT NEURO PA  WITH ANY QUESTIONS OR CONCERNS: [phone]   OTHERWISE, PLEASE CALL THE OFFICE WITH ANY QUESTIONS OR CONCERNS: [phone] Neurosurgery follow up appointment date/time: Phone call with DELFINA Hicks 12/30 @ 10:30am   - you have staples in place, these will come out at POD   - please call the office to confirm appointment: [phone]    Wound Care:  - you may shower  - please use a gentle shampoo and let the warm soapy water run over your incision; please pat incision dry with a clean, dry towel  - no picking at incision    Activity:  - fatigue is common after surgery, rest if you feel tired   - no bending, lifting, twisting or heavy lifting   - walking is recommended, ambulate as tolerated  - you may shower when you get home, keep your incision dry  - no soaking in a tub/pool/hot tub   - no driving within 24 hours of anesthesia or while taking prescription pain medications   - keep hydrated, drink plenty of water     Inpatient consults:  - Follow up with Radiation-Oncology outpatient   - Physical Medicine and Rehabilitation, please follow up with Dr. Last outpatient     Please also follow up with your primary care doctor.     Pain Expectations:  - pain after surgery is expected  - please take pain meds as prescribed     Medications:  - home meds: please continue your home amlodipine 10mg daily for your history of high blood pressure; please follow up with your primary care provider; please continue your home keppra 1g every 12 hours for seizure prevention  - please continue your steroids that were started in-patient (new med)- dexamethasone 4mg in the AM and 2mg at 3PM. steroids can upset your stomach, please continue famotidine 20mg every 12 hours while taking steroids   - continue salt tabs 1g every 8 hours, these can be weaned off at rehab if sodium levels remain normal   - Please follow up with outpatient office/ radiation-oncology when to resume Metformin   - you may take acetaminophen 650mg every 6-8 hours as needed for pain; please do not take more than 4g in 24 hours   - pain medications can cause constipation, you should eat a high fiber diet and may take a stool softener while on pain meds   - Avoid taking Advil (ibuprofen), Motrin (naproxen), or Aspirin for pain as they can cause bleeding     Call the office or come to ED if:  - wound has drainage or bleeding, increased redness or pain at incision site, neurological change, fever (>101), chills, night sweats, syncope, nausea/vomiting, chest pain, shortness of breath      Playback:  - please see the [facility] for a copy of your discharge paperwork     WITHIN 24 HOURS OF DISCHARGE, PLEASE CONTACT NEURO PA  WITH ANY QUESTIONS OR CONCERNS: [phone]   OTHERWISE, PLEASE CALL THE OFFICE WITH ANY QUESTIONS OR CONCERNS: [phone] Neurosurgery follow up appointment date/time: Phone call with DELFINA Hicks 12/30 @ 10:30am   - you have staples in place, these will come out at POD 14. These staples can be removed at rehab by their medical personnel.   - please call the office to confirm appointment: [phone]    Wound Care:  - you may shower  - please use a gentle shampoo and let the warm soapy water run over your incision; please pat incision dry with a clean, dry towel  - no picking at incision    Activity:  - fatigue is common after surgery, rest if you feel tired   - no bending, lifting, twisting or heavy lifting   - walking is recommended, ambulate as tolerated  - you may shower when you get home, keep your incision dry  - no soaking in a tub/pool/hot tub   - no driving within 24 hours of anesthesia or while taking prescription pain medications   - keep hydrated, drink plenty of water     Inpatient consults:  - Follow up with Radiation-Oncology outpatient   - Physical Medicine and Rehabilitation, please follow up with Dr. Last outpatient     Please also follow up with your primary care doctor.     Pain Expectations:  - pain after surgery is expected  - please take pain meds as prescribed     Medications:  - home meds: please continue your home amlodipine 10mg daily for your history of high blood pressure; please follow up with your primary care provider; please continue your home keppra 1g every 12 hours for seizure prevention  - please continue your steroids that were started in-patient (new med)- dexamethasone 4mg in the AM and 2mg at 3PM. steroids can upset your stomach, please continue famotidine 20mg every 12 hours while taking steroids   - continue salt tabs 1g every 8 hours, these can be weaned off at rehab if sodium levels remain normal   - Please follow up with outpatient office/radiation-oncology when to resume Metformin   - you may take acetaminophen 650mg every 6-8 hours as needed for pain; please do not take more than 4g in 24 hours   - pain medications can cause constipation, you should eat a high fiber diet and may take a stool softener while on pain meds   - Avoid taking Advil (ibuprofen), Motrin (naproxen), or Aspirin for pain as they can cause bleeding     Call the office or come to ED if:  - wound has drainage or bleeding, increased redness or pain at incision site, neurological change, fever (>101), chills, night sweats, syncope, nausea/vomiting, chest pain, shortness of breath      Playback:  - please see the [facility] for a copy of your discharge paperwork     WITHIN 24 HOURS OF DISCHARGE, PLEASE CONTACT NEURO PA  WITH ANY QUESTIONS OR CONCERNS: [phone]   OTHERWISE, PLEASE CALL THE OFFICE WITH ANY QUESTIONS OR CONCERNS: [phone]

## 2024-12-25 NOTE — DISCHARGE NOTE PROVIDER - HOSPITAL COURSE
HPI:  69M PMHx HTN, OA, right  TKA, s/p L craniotomy for GBM resection 04/25/2023, s/p chemo and RT 2023 with Dr. Causey. Presented to ER due to reoccurrence of brain tumor seen on his latest MRI completed 4 days ago in [place]. Patient c/o worsened R facial droop, worsening word finding difficulties, and right sided weakness x 2 weeks. Now s/p L crani for resection of GBM with placement of Gammatile brachytherapy 12/19 ([facility]: [facility]).     Hospital Course:  12/13: admitted to [facility], decadron 4q6 started  12/14: KYA overnight  12/15: KYA overnight, pending MRI  12/16: KYA overnight. MRI complete. Plan for OR thursday 12/19/24.   12/17: KYA overnight.  12/18: Pre-op for OR tm.   12/19: POD 0 s/p L crani for tumor with gammatile.   12/20: POD 1 L crani. KYA overnight, neuro stable. +BM.   12/21: POD 2 L crani. KYA overnight.   12/22: POD3 L crani. KYA ovn. Speech consulted for new dyslexia.   12/23: POD4, KYA overnight, neuro stable. Na 133, started salt tabs 1q8. decadron increased to 4mg in AM, 2mg in PM  12/24: POD5, KYA overnight, neuro stable. Postop MRI completed, follow up read.  12/25: POD6. vomited o/n x2, given zofran. 1L NS bolus ordered. Diarrhea x2, hypotenisve to 80s systolic. Given 1L NS. Holding PM amlodipine.     The patient was seen and evaluated by PT/OT and is going to Acute Rehab.     Hospital course c/b: hyponatremia, resolved w salt tabs    Exam on day of discharge:  GEN: laying in bed, NAD  NEURO: AOx3. FC, OE spont, speech intact, R facial. PERRL, disconjugate gaze, EOMI. LUE/LLE 5/5. RUE delt 3/5, bicep 4/5, tricep 3/5, HG 4/5. RLE proximally 5/5, DF/PF 0/5.  CV: RRR +S1/S2  PULM: CTAB  GI: Abd soft, NT/ND  EXT: ext warm, dry, nontender  WOUND: L crani site c/d/i    Checklist:   - Obtained follow up appointment from NP  - Reviewed final recommendations of inpatient consults  - review discharge planning on provider handoff  - post op imaging completed  - Neurologically stable for discharge  - Vitals stable for discharge   - Afebrile for discharge  - WBC is stable  - Sodium level is normal  - Pain is adequately controlled  - Pt has PICC/walker/brace/collar   - LACE score (10 or > needs PCP apt)   - Needs PCP Follow up?   - stroke patient? Discharge NIHSS score     The patient is neurologically and hemodynamically stable for discharge.    Given the ongoing treatment plan, please note that the patient has a high potential for further admissions to deliver ongoing treatment and/or procedures as part of the normal course of neurosurgical care    HPI:  69M PMHx HTN, OA, right  TKA, s/p L craniotomy for GBM resection 04/25/2023, s/p chemo and RT 2023 with Dr. Causey. Presented to ER due to reoccurrence of brain tumor seen on his latest MRI completed 4 days ago in [place]. Patient c/o worsened R facial droop, worsening word finding difficulties, and right sided weakness x 2 weeks. Now s/p L crani for resection of GBM with placement of Gammatile brachytherapy 12/19 ([facility]: [facility]).     Hospital Course:  12/13: admitted to [facility], decadron 4q6 started  12/14: YKA overnight  12/15: KYA overnight, pending MRI  12/16: KYA overnight. MRI complete. Plan for OR thursday 12/19/24.   12/17: KYA overnight.  12/18: Pre-op for OR tm.   12/19: POD 0 s/p L crani for tumor with gammatile.   12/20: POD 1 L crani. KYA overnight, neuro stable. +BM.   12/21: POD 2 L crani. KYA overnight.   12/22: POD3 L crani. KYA ovn. Speech consulted for new dyslexia.   12/23: POD4, KYA overnight, neuro stable. Na 133, started salt tabs 1q8. decadron increased to 4mg in AM, 2mg in PM  12/24: POD5, KYA overnight, neuro stable. Postop MRI completed, follow up read.  12/25: POD6. vomited o/n x2, given zofran. 1L NS bolus ordered. Diarrhea x2, hypotenisve to 80s systolic. Given 1L NS. Holding PM amlodipine.   12/26: POD7. KYA ovn. Seen by PT/OT.   12/27: POD8, KYA overnight, neuro stable, given 1L NS for diarrhea     The patient was seen and evaluated by PT/OT and is going to Acute Rehab.     Hospital course c/b: hyponatremia, resolved w salt tabs    Exam on day of discharge:  GEN: laying in bed, NAD  NEURO: AOx3. FC, OE spont, speech intact, R facial. PERRL, disconjugate gaze, EOMI. LUE/LLE 5/5. RUE delt 3/5, bicep 4/5, tricep 3/5, HG 4/5. RLE proximally 5/5, DF/PF 0/5.  CV: RRR +S1/S2  PULM: CTAB  GI: Abd soft, NT/ND  EXT: ext warm, dry, nontender  WOUND: L crani site c/d/i    Checklist:   - Obtained follow up appointment from NP  - Reviewed final recommendations of inpatient consults  - review discharge planning on provider handoff  - post op imaging completed  - Neurologically stable for discharge  - Vitals stable for discharge   - Afebrile for discharge  - WBC is stable  - Sodium level is normal  - Pain is adequately controlled  - Pt has PICC/walker/brace/collar   - LACE score (10 or > needs PCP apt)   - Needs PCP Follow up?   - stroke patient? Discharge NIHSS score     The patient is neurologically and hemodynamically stable for discharge.    Given the ongoing treatment plan, please note that the patient has a high potential for further admissions to deliver ongoing treatment and/or procedures as part of the normal course of neurosurgical care    HPI:  69M PMHx HTN, OA, right  TKA, s/p L craniotomy for GBM resection 04/25/2023, s/p chemo and RT 2023 with Dr. Causey. Presented to ER due to reoccurrence of brain tumor seen on his latest MRI completed 4 days ago in [place]. Patient c/o worsened R facial droop, worsening word finding difficulties, and right sided weakness x 2 weeks. Now s/p L crani for resection of GBM with placement of Gammatile brachytherapy 12/19 ([facility]: [facility]).     Hospital Course:  12/13: admitted to [facility], decadron 4q6 started  12/14: KYA overnight  12/15: KYA overnight, pending MRI  12/16: KYA overnight. MRI complete. Plan for OR thursday 12/19/24.   12/17: KYA overnight.  12/18: Pre-op for OR tm.   12/19: POD 0 s/p L crani for tumor with gammatile.   12/20: POD 1 L crani. KYA overnight, neuro stable. +BM.   12/21: POD 2 L crani. KYA overnight.   12/22: POD3 L crani. KYA ovn. Speech consulted for new dyslexia.   12/23: POD4, KYA overnight, neuro stable. Na 133, started salt tabs 1q8. decadron increased to 4mg in AM, 2mg in PM  12/24: POD5, KYA overnight, neuro stable. Postop MRI completed, follow up read.  12/25: POD6. vomited o/n x2, given zofran. 1L NS bolus ordered. Diarrhea x2, hypotenisve to 80s systolic. Given 1L NS. Holding PM amlodipine.   12/26: POD7. KYA ovn. Seen by PT/OT.   12/27: POD8, KYA overnight, neuro stable, given 1L NS for diarrhea     The patient was seen and evaluated by PT/OT and is going to Acute Rehab.     Hospital course c/b: hyponatremia, resolved w salt tabs; diarrhea, supportive management with IV fluids and testing for GI infection     Exam on day of discharge:  GEN: laying in bed, NAD  NEURO: AOx3. FC, OE spont, speech intact, R facial. PERRL, disconjugate gaze, EOMI. LUE/LLE 5/5. RUE delt 3/5, bicep 4/5, tricep 3/5, HG 4/5. RLE proximally 5/5, DF/PF 0/5.  CV: RRR +S1/S2  PULM: nonlabored breathing, symmetric chest rise   GI: Abd soft, NT/ND  EXT: ext warm, dry, nontender  WOUND: L crani site c/d/i with staples in place     Checklist:   - Reviewed final recommendations of inpatient consults  - review discharge planning on provider handoff  - post op imaging completed  - Neurologically stable for discharge  - Vitals stable for discharge   - Afebrile for discharge  - WBC is stable  - Sodium level is normal  - Pain is adequately controlled  - Pt has PICC/walker/brace/collar   - LACE score (10 or > needs PCP apt)   - Needs PCP Follow up?   - stroke patient? Discharge NIHSS score     The patient is neurologically and hemodynamically stable for discharge.    Given the ongoing treatment plan, please note that the patient has a high potential for further admissions to deliver ongoing treatment and/or procedures as part of the normal course of neurosurgical care    HPI:  69M PMHx HTN, OA, right  TKA, s/p L craniotomy for GBM resection 04/25/2023, s/p chemo and RT 2023 with Dr. Causey. Presented to ER due to reoccurrence of brain tumor seen on his latest MRI completed 4 days ago in [place]. Patient c/o worsened R facial droop, worsening word finding difficulties, and right sided weakness x 2 weeks. Now s/p L crani for resection of GBM with placement of Gammatile brachytherapy 12/19 ([facility]: [facility]).     Hospital Course:  12/13: admitted to [facility], decadron 4q6 started  12/14: KYA overnight  12/15: KYA overnight, pending MRI  12/16: KYA overnight. MRI complete. Plan for OR thursday 12/19/24.   12/17: KYA overnight.  12/18: Pre-op for OR tm.   12/19: POD 0 s/p L crani for tumor with gammatile.   12/20: POD 1 L crani. KYA overnight, neuro stable. +BM.   12/21: POD 2 L crani. KYA overnight.   12/22: POD3 L crani. KYA ovn. Speech consulted for new dyslexia.   12/23: POD4, KYA overnight, neuro stable. Na 133, started salt tabs 1q8. decadron increased to 4mg in AM, 2mg in PM  12/24: POD5, KYA overnight, neuro stable. Postop MRI completed, follow up read.  12/25: POD6. vomited o/n x2, given zofran. 1L NS bolus ordered. Diarrhea x2, hypotenisve to 80s systolic. Given 1L NS. Holding PM amlodipine.   12/26: POD7. KYA ovn. Seen by PT/OT.   12/27: POD8, KYA overnight, neuro stable, given 1L NS for diarrhea     The patient was seen and evaluated by PT/OT and is going to Acute Rehab at [facility] course c/b: hyponatremia, resolved w salt tabs; diarrhea, supportive management with IV fluids and testing for GI infection     Exam on day of discharge:  GEN: laying in bed, NAD  NEURO: AOx3. FC, OE spont, speech intact, R facial. PERRL, disconjugate gaze, EOMI. LUE/LLE 5/5. RUE delt 3/5, bicep 4/5, tricep 3/5, HG 4/5. RLE proximally 5/5, DF/PF 0/5.  CV: RRR +S1/S2  PULM: nonlabored breathing, symmetric chest rise   GI: Abd soft, NT/ND  EXT: ext warm, dry, nontender  WOUND: L crani site c/d/i with staples in place     The patient is neurologically and hemodynamically stable for discharge.    Given the ongoing treatment plan, please note that the patient has a high potential for further admissions to deliver ongoing treatment and/or procedures as part of the normal course of neurosurgical care

## 2024-12-26 LAB
ANION GAP SERPL CALC-SCNC: 8 MMOL/L — SIGNIFICANT CHANGE UP (ref 5–17)
BUN SERPL-MCNC: 16 MG/DL — SIGNIFICANT CHANGE UP (ref 7–23)
CALCIUM SERPL-MCNC: 8.2 MG/DL — LOW (ref 8.4–10.5)
CHLORIDE SERPL-SCNC: 104 MMOL/L — SIGNIFICANT CHANGE UP (ref 96–108)
CO2 SERPL-SCNC: 23 MMOL/L — SIGNIFICANT CHANGE UP (ref 22–31)
CREAT SERPL-MCNC: 0.93 MG/DL — SIGNIFICANT CHANGE UP (ref 0.5–1.3)
EGFR: 89 ML/MIN/1.73M2 — SIGNIFICANT CHANGE UP
GLUCOSE SERPL-MCNC: 90 MG/DL — SIGNIFICANT CHANGE UP (ref 70–99)
HCT VFR BLD CALC: 43.8 % — SIGNIFICANT CHANGE UP (ref 39–50)
HGB BLD-MCNC: 14 G/DL — SIGNIFICANT CHANGE UP (ref 13–17)
MAGNESIUM SERPL-MCNC: 2 MG/DL — SIGNIFICANT CHANGE UP (ref 1.6–2.6)
MCHC RBC-ENTMCNC: 29.5 PG — SIGNIFICANT CHANGE UP (ref 27–34)
MCHC RBC-ENTMCNC: 32 G/DL — SIGNIFICANT CHANGE UP (ref 32–36)
MCV RBC AUTO: 92.4 FL — SIGNIFICANT CHANGE UP (ref 80–100)
NRBC # BLD: 0 /100 WBCS — SIGNIFICANT CHANGE UP (ref 0–0)
PHOSPHATE SERPL-MCNC: 2.9 MG/DL — SIGNIFICANT CHANGE UP (ref 2.5–4.5)
PLATELET # BLD AUTO: 311 K/UL — SIGNIFICANT CHANGE UP (ref 150–400)
POTASSIUM SERPL-MCNC: 5.1 MMOL/L — SIGNIFICANT CHANGE UP (ref 3.5–5.3)
POTASSIUM SERPL-SCNC: 5.1 MMOL/L — SIGNIFICANT CHANGE UP (ref 3.5–5.3)
RBC # BLD: 4.74 M/UL — SIGNIFICANT CHANGE UP (ref 4.2–5.8)
RBC # FLD: 13.6 % — SIGNIFICANT CHANGE UP (ref 10.3–14.5)
SARS-COV-2 RNA SPEC QL NAA+PROBE: SIGNIFICANT CHANGE UP
SODIUM SERPL-SCNC: 135 MMOL/L — SIGNIFICANT CHANGE UP (ref 135–145)
WBC # BLD: 6.15 K/UL — SIGNIFICANT CHANGE UP (ref 3.8–10.5)
WBC # FLD AUTO: 6.15 K/UL — SIGNIFICANT CHANGE UP (ref 3.8–10.5)

## 2024-12-26 PROCEDURE — 99024 POSTOP FOLLOW-UP VISIT: CPT

## 2024-12-26 PROCEDURE — 99232 SBSQ HOSP IP/OBS MODERATE 35: CPT

## 2024-12-26 RX ADMIN — SODIUM CHLORIDE 85 MILLILITER(S): 9 INJECTION, SOLUTION INTRAMUSCULAR; INTRAVENOUS; SUBCUTANEOUS at 11:56

## 2024-12-26 RX ADMIN — LEVETIRACETAM 1000 MILLIGRAM(S): 100 SOLUTION ORAL at 17:37

## 2024-12-26 RX ADMIN — ENOXAPARIN SODIUM 40 MILLIGRAM(S): 60 INJECTION INTRAVENOUS; SUBCUTANEOUS at 22:06

## 2024-12-26 RX ADMIN — Medication 2 MILLIGRAM(S): at 14:50

## 2024-12-26 RX ADMIN — Medication 1 TABLET(S): at 11:56

## 2024-12-26 RX ADMIN — SODIUM CHLORIDE 1 GRAM(S): 9 INJECTION, SOLUTION INTRAMUSCULAR; INTRAVENOUS; SUBCUTANEOUS at 05:09

## 2024-12-26 RX ADMIN — FAMOTIDINE 20 MILLIGRAM(S): 20 TABLET, FILM COATED ORAL at 17:38

## 2024-12-26 RX ADMIN — SODIUM CHLORIDE 1 GRAM(S): 9 INJECTION, SOLUTION INTRAMUSCULAR; INTRAVENOUS; SUBCUTANEOUS at 13:08

## 2024-12-26 RX ADMIN — LEVETIRACETAM 1000 MILLIGRAM(S): 100 SOLUTION ORAL at 05:08

## 2024-12-26 RX ADMIN — FAMOTIDINE 20 MILLIGRAM(S): 20 TABLET, FILM COATED ORAL at 05:08

## 2024-12-26 RX ADMIN — Medication 4 MILLIGRAM(S): at 05:08

## 2024-12-26 RX ADMIN — SODIUM CHLORIDE 1 GRAM(S): 9 INJECTION, SOLUTION INTRAMUSCULAR; INTRAVENOUS; SUBCUTANEOUS at 22:06

## 2024-12-26 NOTE — PROGRESS NOTE ADULT - ASSESSMENT
69 M with HTN, OA, right  TKA, s/p L craniotomy for glioblastoma resection 04/25/2023, s/p chemo and radiation therapy 2023 with Dr. Causey, currently on physical therapy and brain MRI every 2 month post craniotomy, presents to ER due to reoccurrence of brain tumor seen on his latest MRI completed 4 days ago in [place].     #GBM:  - s/p left frontal craniotomy w/ GBM removal and placement of gamma tile brachytherapy on 12/19  - Remains on Decadron per nsgy  - On Pepcid for GI protection.  - Keppra.    #HTN:   - Some hypotension noted this morning in light of diarrhea.  - S/p IVF with resolution.  - HOLD Norvasc 10mg PO daily tonight and reassess tomorrow.    # Diarrhea - Resolved   - Likely in the setting of stool softeners.   - Hold Miralax and Senna.  - Afebrile, no leukocytosis.      #DVT prophylaxis : Lovenox.

## 2024-12-26 NOTE — PROGRESS NOTE ADULT - SUBJECTIVE AND OBJECTIVE BOX
HPI:  68 yo male with h/o HTN, OA, right  TKA, s/p L craniotomy for glioblastoma resection 04/25/2023, s/p chemo and radiation therapy 2023 with Dr. Causey, currently on physical therapy and brain MRI every 2 month post craniotomy, presents to ER due to reoccurrence of brain tumor seen on his latest MRI completed 4 days ago in [place]. Patient c/o worsened R facial droop, worsening word finding difficulties, and right sided weakness x 2 weeks. Pt was recommended to go to [facility] ER for further evaluation by his oncologist. Patient denies vision changes, extremity numbness, confusion, headache, nausea and vomiting.  (14 Dec 2024 02:25)    OVERNIGHT EVENTS: KYA    Hospital Course:   12/13: admitted to [facility], decadron 4q6 started  12/14: KYA overnight  12/15: KYA overnight, pending MRI  12/16: KYA overnight. MRI complete. Plan for OR thursday 12/19/24.   12/17: KYA overnight.  12/18: Pre-op for OR tm.   12/19: POD 0 s/p L crani for tumor with gammatile.   12/20: POD 1 L crani. KYA overnight, neuro stable. +BM.   12/21: POD 2 L crani. KYA overnight.   12/22: POD3 L crani. KYA ovn. Speech consulted for new dyslexia.   12/23: POD4, KYA overnight, neuro stable. Na 133, started salt tabs 1q8. decadron increased to 4mg in AM, 2mg in PM  12/24: POD5, KYA overnight, neuro stable. Postop MRI completed, follow up read.  12/25: POD6. vomited o/n x2, given zofran. 1L NS bolus ordered. Diarrhea x2, hypotenisve to 80s systolic. Given 1L NS. Holding PM amlodipine.   12/26: POD7. KYA ovn    Vital Signs Last 24 Hrs  T(C): 37.1 (25 Dec 2024 20:00), Max: 37.4 (25 Dec 2024 09:10)  T(F): 98.8 (25 Dec 2024 20:00), Max: 99.4 (25 Dec 2024 09:10)  HR: 65 (25 Dec 2024 20:00) (65 - 80)  BP: 104/65 (25 Dec 2024 20:00) (88/47 - 112/70)  BP(mean): 83 (25 Dec 2024 10:21) (61 - 83)  RR: 14 (25 Dec 2024 20:00) (14 - 17)  SpO2: 96% (25 Dec 2024 20:00) (96% - 98%)    Parameters below as of 25 Dec 2024 20:00  Patient On (Oxygen Delivery Method): room air        I&O's Summary    25 Dec 2024 07:01  -  26 Dec 2024 00:47  --------------------------------------------------------  IN: 500 mL / OUT: 400 mL / NET: 100 mL        PHYSICAL EXAM:  GEN: laying in bed, NAD  NEURO: AOx3. FC, OE spont, speech intact, R facial. PERRL, disconjugate gaze, EOMI. LUE/LLE 5/5. RUE delt 3/5, bicep 4/5, tricep 3/5, HG 4/5. RLE proximally 5/5, DF/PF 0/5.  CV: RRR +S1/S2  PULM: CTAB  GI: Abd soft, NT/ND  EXT: ext warm, dry, nontender  WOUND: L crani site c/d/i    TUBES/LINES:  [] Shields  [] Lumbar Drain  [] Wound Drains  [] Others      DIET:  [] NPO  [x] Mechanical  [] Tube feeds    LABS:                        15.0   9.52  )-----------( 356      ( 25 Dec 2024 06:40 )             46.2     12-25    140  |  105  |  34[H]  ----------------------------<  114[H]  4.5   |  23  |  0.86    Ca    8.5      25 Dec 2024 06:40  Phos  2.9     12-25  Mg     2.0     12-25        Urinalysis Basic - ( 25 Dec 2024 06:40 )    Color: x / Appearance: x / SG: x / pH: x  Gluc: 114 mg/dL / Ketone: x  / Bili: x / Urobili: x   Blood: x / Protein: x / Nitrite: x   Leuk Esterase: x / RBC: x / WBC x   Sq Epi: x / Non Sq Epi: x / Bacteria: x          CAPILLARY BLOOD GLUCOSE          Drug Levels: [] N/A    CSF Analysis: [] N/A      Allergies    No Known Allergies    Intolerances      MEDICATIONS:  Antibiotics:    Neuro:  acetaminophen     Tablet .. 650 milliGRAM(s) Oral every 6 hours PRN  levETIRAcetam 1000 milliGRAM(s) Oral every 12 hours    Anticoagulation:  enoxaparin Injectable 40 milliGRAM(s) SubCutaneous <User Schedule>    OTHER:  amLODIPine   Tablet 10 milliGRAM(s) Oral every 24 hours  dexAMETHasone     Tablet 4 milliGRAM(s) Oral <User Schedule>  dexAMETHasone     Tablet 2 milliGRAM(s) Oral <User Schedule>  famotidine    Tablet 20 milliGRAM(s) Oral every 12 hours    IVF:  multivitamin 1 Tablet(s) Oral daily  sodium chloride 1 Gram(s) Oral every 8 hours  sodium chloride 0.9%. 1000 milliLiter(s) IV Continuous <Continuous>    CULTURES:    RADIOLOGY & ADDITIONAL TESTS:      ASSESSMENT:  69M PMHx HTN, OA, right  TKA, s/p L craniotomy for GBM resection 04/25/2023, s/p chemo and RT 2023 with Dr. Causey. Presented to ER due to reoccurrence of brain tumor seen on his latest MRI completed 4 days ago in [place]. Patient c/o worsened R facial droop, worsening word finding difficulties, and right sided weakness x 2 weeks. Now s/p L crani for resection of GBM with placement of Gammatile brachytherapy 12/19 ([facility]: [facility]).     BRAIN TUMOR    Handoff    MEWS Score    Hypertension    Osteoarthritis    Brain mass    Glioblastoma    Glioblastoma    Glioblastoma    Craniotomy, with supratentorial neoplasm excision    Brain tumor    Brain tumor, recurrent    GBM (glioblastoma multiforme)    Preoperative clearance    Hypertension    Craniotomy, with supratentorial neoplasm excision    S/P total knee replacement, right    H/O craniotomy    SURGERY COMP    90+    Room Service Assist    Room Service Assist    GBM (glioblastoma multiforme)    Hypertension    SysAdmin_VisitLink        PLAN:  Neuro:  - neuro/vital checks q4hr  - pain control: tylenol   - Seizure ppx: home Keppra 1g BID   - CTH 12/13: vasogenic edema in left hemisphere  - cerebral edema: decadron 4mg qAM, 2mg at 3pm  - GBM: hold metformin 500mg q12  - Cesium 131 precautions  - post op MRI completed 12/24: fu read    Cardiac:  - SBP goal 100-140  - HTN: cont home amlodipine 10mg daily    Pulmonary:  - on RA, encourage IS     GI:  - regular diet   - bowel regimen held iso diarrhea  - LBM 12/25  - GI ppx: pepcid BID while on steroids    Renal:  - IVF  - voiding   - hyponatremia: salt tabs 1q8    Endo:  - A1c 5.5%, no active issues     Heme:  - DVT ppx: SCDs, SQL   - heme/onc consulted, f/u recs   - rad/onc consulted,  f/u recs    ID:  - afebrile    Disposition: SDU, full code, recc'ed AR     D/w Dr. Cheung     Assessment:  Present when checked    []  GCS  E   V  M     Heart Failure: []Acute, [] acute on chronic , []chronic  Heart Failure:  [] Diastolic (HFpEF), [] Systolic (HFrEF), []Combined (HFpEF and HFrEF), [] RHF, [] Pulm HTN, [] Other    [] ROMERO, [] ATN, [] AIN, [] other  [] CKD1, [] CKD2, [] CKD 3, [] CKD 4, [] CKD 5, []ESRD    Encephalopathy: [] Metabolic, [] Hepatic, [] toxic, [] Neurological, [] Other    Abnormal Nurtitional Status: [] malnurtition (see nutrition note), [ ]underweight: BMI < 19, [] morbid obesity: BMI >40, [] Cachexia    [] Sepsis  [] hypovolemic shock,[] cardiogenic shock, [] hemorrhagic shock, [] neuogenic shock  [] Acute Respiratory Failure  []Cerebral edema, [] Brain compression/ herniation,   [] Functional quadriplegia  [] Acute blood loss anemia

## 2024-12-26 NOTE — PROGRESS NOTE ADULT - SUBJECTIVE AND OBJECTIVE BOX
Patient is a 69y old  Male who presents with a chief complaint of GBM (25 Dec 2024 00:41)      SUBJECTIVE / OVERNIGHT EVENTS:  no further abd pain , diarrhea , nausea or vomiting   feeling much better and eager to to AR     MEDICATIONS  (STANDING):  amLODIPine   Tablet 10 milliGRAM(s) Oral every 24 hours  dexAMETHasone     Tablet 4 milliGRAM(s) Oral <User Schedule>  dexAMETHasone     Tablet 2 milliGRAM(s) Oral <User Schedule>  enoxaparin Injectable 40 milliGRAM(s) SubCutaneous <User Schedule>  famotidine    Tablet 20 milliGRAM(s) Oral every 12 hours  levETIRAcetam 1000 milliGRAM(s) Oral every 12 hours  multivitamin 1 Tablet(s) Oral daily  sodium chloride 1 Gram(s) Oral every 8 hours  sodium chloride 0.9%. 1000 milliLiter(s) (85 mL/Hr) IV Continuous <Continuous>    MEDICATIONS  (PRN):  acetaminophen     Tablet .. 650 milliGRAM(s) Oral every 6 hours PRN Mild Pain (1 - 3)        CAPILLARY BLOOD GLUCOSE        I&O's Summary      PHYSICAL EXAM:  Vital Signs Last 24 Hrs  T(C): 36.6 (26 Dec 2024 08:45), Max: 37.1 (25 Dec 2024 20:00)  T(F): 97.8 (26 Dec 2024 08:45), Max: 98.8 (25 Dec 2024 20:00)  HR: 52 (26 Dec 2024 15:25) (52 - 68)  BP: 122/72 (26 Dec 2024 15:25) (99/64 - 133/74)  BP(mean): 75 (26 Dec 2024 08:45) (75 - 75)  RR: 17 (26 Dec 2024 08:45) (14 - 17)  SpO2: 99% (26 Dec 2024 15:25) (96% - 99%)    Parameters below as of 26 Dec 2024 15:25  Patient On (Oxygen Delivery Method): room air          GENERAL: NAD, well-developed  HEAD:  Atraumatic, Normocephalic  EYES: EOMI, PERRLA, conjunctiva and sclera clear  NECK: Supple, No JVD  CHEST/LUNG: Clear to auscultation bilaterally; No wheeze  HEART: Regular rate and rhythm; No murmurs, rubs, or gallops  ABDOMEN: Soft, Nontender, Nondistended; Bowel sounds present  EXTREMITIES:  2+ Peripheral Pulses, No clubbing, cyanosis, or edema  PSYCH: AAOx3  NEUROLOGY: non-focal  SKIN: No rashes or lesions    LABS:                                   14.0   6.15  )-----------( 311      ( 26 Dec 2024 05:30 )             43.8     12-26    135  |  104  |  16  ----------------------------<  90  5.1   |  23  |  0.93    Ca    8.2[L]      26 Dec 2024 05:30  Phos  2.9     12-26  Mg     2.0     12-26          RADIOLOGY & ADDITIONAL TESTS:    Imaging Personally Reviewed:    Consultant(s) Notes Reviewed:      Care Discussed with Consultants/Other Providers:

## 2024-12-27 ENCOUNTER — TRANSCRIPTION ENCOUNTER (OUTPATIENT)
Age: 69
End: 2024-12-27

## 2024-12-27 ENCOUNTER — INPATIENT (INPATIENT)
Facility: HOSPITAL | Age: 69
LOS: 20 days | Discharge: HOME | DRG: 55 | End: 2025-01-17
Attending: STUDENT IN AN ORGANIZED HEALTH CARE EDUCATION/TRAINING PROGRAM | Admitting: STUDENT IN AN ORGANIZED HEALTH CARE EDUCATION/TRAINING PROGRAM
Payer: COMMERCIAL

## 2024-12-27 VITALS
SYSTOLIC BLOOD PRESSURE: 106 MMHG | RESPIRATION RATE: 17 BRPM | DIASTOLIC BLOOD PRESSURE: 65 MMHG | HEART RATE: 54 BPM | TEMPERATURE: 98 F | OXYGEN SATURATION: 99 %

## 2024-12-27 VITALS
TEMPERATURE: 98 F | WEIGHT: 184.97 LBS | DIASTOLIC BLOOD PRESSURE: 69 MMHG | HEIGHT: 71 IN | OXYGEN SATURATION: 99 % | SYSTOLIC BLOOD PRESSURE: 105 MMHG | HEART RATE: 50 BPM | RESPIRATION RATE: 16 BRPM

## 2024-12-27 DIAGNOSIS — C71.9 MALIGNANT NEOPLASM OF BRAIN, UNSPECIFIED: ICD-10-CM

## 2024-12-27 DIAGNOSIS — Z98.890 OTHER SPECIFIED POSTPROCEDURAL STATES: Chronic | ICD-10-CM

## 2024-12-27 DIAGNOSIS — Z96.651 PRESENCE OF RIGHT ARTIFICIAL KNEE JOINT: Chronic | ICD-10-CM

## 2024-12-27 LAB
ANION GAP SERPL CALC-SCNC: 12 MMOL/L — SIGNIFICANT CHANGE UP (ref 5–17)
BUN SERPL-MCNC: 21 MG/DL — SIGNIFICANT CHANGE UP (ref 7–23)
CALCIUM SERPL-MCNC: 8.1 MG/DL — LOW (ref 8.4–10.5)
CHLORIDE SERPL-SCNC: 108 MMOL/L — SIGNIFICANT CHANGE UP (ref 96–108)
CO2 SERPL-SCNC: 14 MMOL/L — LOW (ref 22–31)
CREAT SERPL-MCNC: 0.81 MG/DL — SIGNIFICANT CHANGE UP (ref 0.5–1.3)
EGFR: 96 ML/MIN/1.73M2 — SIGNIFICANT CHANGE UP
GLUCOSE SERPL-MCNC: 68 MG/DL — LOW (ref 70–99)
HCT VFR BLD CALC: 46.2 % — SIGNIFICANT CHANGE UP (ref 39–50)
HGB BLD-MCNC: 14.4 G/DL — SIGNIFICANT CHANGE UP (ref 13–17)
MAGNESIUM SERPL-MCNC: 2.2 MG/DL — SIGNIFICANT CHANGE UP (ref 1.6–2.6)
MCHC RBC-ENTMCNC: 29.9 PG — SIGNIFICANT CHANGE UP (ref 27–34)
MCHC RBC-ENTMCNC: 31.2 G/DL — LOW (ref 32–36)
MCV RBC AUTO: 95.9 FL — SIGNIFICANT CHANGE UP (ref 80–100)
NRBC # BLD: 0 /100 WBCS — SIGNIFICANT CHANGE UP (ref 0–0)
PHOSPHATE SERPL-MCNC: 2.3 MG/DL — LOW (ref 2.5–4.5)
PLATELET # BLD AUTO: 230 K/UL — SIGNIFICANT CHANGE UP (ref 150–400)
POTASSIUM SERPL-MCNC: 5 MMOL/L — SIGNIFICANT CHANGE UP (ref 3.5–5.3)
POTASSIUM SERPL-SCNC: 5 MMOL/L — SIGNIFICANT CHANGE UP (ref 3.5–5.3)
RBC # BLD: 4.82 M/UL — SIGNIFICANT CHANGE UP (ref 4.2–5.8)
RBC # FLD: 13.7 % — SIGNIFICANT CHANGE UP (ref 10.3–14.5)
SARS-COV-2 RNA SPEC QL NAA+PROBE: SIGNIFICANT CHANGE UP
SODIUM SERPL-SCNC: 134 MMOL/L — LOW (ref 135–145)
WBC # BLD: 4.38 K/UL — SIGNIFICANT CHANGE UP (ref 3.8–10.5)
WBC # FLD AUTO: 4.38 K/UL — SIGNIFICANT CHANGE UP (ref 3.8–10.5)

## 2024-12-27 PROCEDURE — 77470 SPECIAL RADIATION TREATMENT: CPT

## 2024-12-27 PROCEDURE — 88342 IMHCHEM/IMCYTCHM 1ST ANTB: CPT

## 2024-12-27 PROCEDURE — 97168 OT RE-EVAL EST PLAN CARE: CPT

## 2024-12-27 PROCEDURE — 85610 PROTHROMBIN TIME: CPT

## 2024-12-27 PROCEDURE — 70553 MRI BRAIN STEM W/O & W/DYE: CPT | Mod: MC

## 2024-12-27 PROCEDURE — 86850 RBC ANTIBODY SCREEN: CPT

## 2024-12-27 PROCEDURE — 83735 ASSAY OF MAGNESIUM: CPT

## 2024-12-27 PROCEDURE — 99024 POSTOP FOLLOW-UP VISIT: CPT

## 2024-12-27 PROCEDURE — 71045 X-RAY EXAM CHEST 1 VIEW: CPT

## 2024-12-27 PROCEDURE — 97116 GAIT TRAINING THERAPY: CPT

## 2024-12-27 PROCEDURE — 88331 PATH CONSLTJ SURG 1 BLK 1SPC: CPT

## 2024-12-27 PROCEDURE — 86900 BLOOD TYPING SEROLOGIC ABO: CPT

## 2024-12-27 PROCEDURE — 99285 EMERGENCY DEPT VISIT HI MDM: CPT | Mod: 25

## 2024-12-27 PROCEDURE — C1889: CPT

## 2024-12-27 PROCEDURE — 97530 THERAPEUTIC ACTIVITIES: CPT

## 2024-12-27 PROCEDURE — 70450 CT HEAD/BRAIN W/O DYE: CPT | Mod: MC

## 2024-12-27 PROCEDURE — 88307 TISSUE EXAM BY PATHOLOGIST: CPT

## 2024-12-27 PROCEDURE — C1713: CPT

## 2024-12-27 PROCEDURE — 82962 GLUCOSE BLOOD TEST: CPT

## 2024-12-27 PROCEDURE — 93005 ELECTROCARDIOGRAM TRACING: CPT

## 2024-12-27 PROCEDURE — 99223 1ST HOSP IP/OBS HIGH 75: CPT | Mod: GC

## 2024-12-27 PROCEDURE — 85730 THROMBOPLASTIN TIME PARTIAL: CPT

## 2024-12-27 PROCEDURE — 88300 SURGICAL PATH GROSS: CPT

## 2024-12-27 PROCEDURE — 77295 3-D RADIOTHERAPY PLAN: CPT

## 2024-12-27 PROCEDURE — 85027 COMPLETE CBC AUTOMATED: CPT

## 2024-12-27 PROCEDURE — 80053 COMPREHEN METABOLIC PANEL: CPT

## 2024-12-27 PROCEDURE — 77370 RADIATION PHYSICS CONSULT: CPT

## 2024-12-27 PROCEDURE — 77290 THER RAD SIMULAJ FIELD CPLX: CPT

## 2024-12-27 PROCEDURE — A9585: CPT

## 2024-12-27 PROCEDURE — 97110 THERAPEUTIC EXERCISES: CPT

## 2024-12-27 PROCEDURE — C2642: CPT

## 2024-12-27 PROCEDURE — 97165 OT EVAL LOW COMPLEX 30 MIN: CPT

## 2024-12-27 PROCEDURE — 77332 RADIATION TREATMENT AID(S): CPT

## 2024-12-27 PROCEDURE — 80048 BASIC METABOLIC PNL TOTAL CA: CPT

## 2024-12-27 PROCEDURE — 85025 COMPLETE CBC W/AUTO DIFF WBC: CPT

## 2024-12-27 PROCEDURE — 83036 HEMOGLOBIN GLYCOSYLATED A1C: CPT

## 2024-12-27 PROCEDURE — 77778 APPLY INTERSTIT RADIAT COMPL: CPT

## 2024-12-27 PROCEDURE — 88304 TISSUE EXAM BY PATHOLOGIST: CPT

## 2024-12-27 PROCEDURE — C2643: CPT

## 2024-12-27 PROCEDURE — 99232 SBSQ HOSP IP/OBS MODERATE 35: CPT

## 2024-12-27 PROCEDURE — 80076 HEPATIC FUNCTION PANEL: CPT

## 2024-12-27 PROCEDURE — 97164 PT RE-EVAL EST PLAN CARE: CPT

## 2024-12-27 PROCEDURE — 88360 TUMOR IMMUNOHISTOCHEM/MANUAL: CPT

## 2024-12-27 PROCEDURE — 36415 COLL VENOUS BLD VENIPUNCTURE: CPT

## 2024-12-27 PROCEDURE — 97163 PT EVAL HIGH COMPLEX 45 MIN: CPT

## 2024-12-27 PROCEDURE — 87635 SARS-COV-2 COVID-19 AMP PRB: CPT

## 2024-12-27 PROCEDURE — 86901 BLOOD TYPING SEROLOGIC RH(D): CPT

## 2024-12-27 PROCEDURE — 84100 ASSAY OF PHOSPHORUS: CPT

## 2024-12-27 RX ORDER — SODIUM CHLORIDE 9 MG/ML
1000 INJECTION, SOLUTION INTRAMUSCULAR; INTRAVENOUS; SUBCUTANEOUS ONCE
Refills: 0 | Status: COMPLETED | OUTPATIENT
Start: 2024-12-27 | End: 2024-12-27

## 2024-12-27 RX ORDER — ENOXAPARIN SODIUM 60 MG/.6ML
40 INJECTION INTRAVENOUS; SUBCUTANEOUS
Refills: 0 | Status: DISCONTINUED | OUTPATIENT
Start: 2024-12-27 | End: 2025-01-17

## 2024-12-27 RX ORDER — METHYLDOPA/HYDROCHLOROTHIAZIDE 250MG-15MG
0 TABLET ORAL
Refills: 0 | DISCHARGE

## 2024-12-27 RX ORDER — DEXAMETHASONE SODIUM PHOSPHATE 4 MG/ML
1 VIAL (ML) INJECTION
Qty: 0 | Refills: 0 | DISCHARGE
Start: 2024-12-27

## 2024-12-27 RX ORDER — B COMPLEX, C NO.20/FOLIC ACID 1 MG
1 CAPSULE ORAL
Qty: 0 | Refills: 0 | DISCHARGE
Start: 2024-12-27

## 2024-12-27 RX ORDER — DEXAMETHASONE SODIUM PHOSPHATE 4 MG/ML
4 VIAL (ML) INJECTION
Refills: 0 | Status: DISCONTINUED | OUTPATIENT
Start: 2024-12-27 | End: 2025-01-17

## 2024-12-27 RX ORDER — ACETAMINOPHEN 80 MG/.8ML
650 SOLUTION/ DROPS ORAL EVERY 6 HOURS
Refills: 0 | Status: DISCONTINUED | OUTPATIENT
Start: 2024-12-27 | End: 2025-01-17

## 2024-12-27 RX ORDER — ERGOCALCIFEROL 1.25 MG/1
0 CAPSULE ORAL
Refills: 0 | DISCHARGE

## 2024-12-27 RX ORDER — LEVETIRACETAM 100 MG/ML
1 SOLUTION ORAL
Qty: 0 | Refills: 0 | DISCHARGE
Start: 2024-12-27

## 2024-12-27 RX ORDER — ASCORBIC ACID 1000 MG
0 TABLET ORAL
Refills: 0 | DISCHARGE

## 2024-12-27 RX ORDER — LEVETIRACETAM 100 MG/ML
1 SOLUTION ORAL
Refills: 0 | DISCHARGE

## 2024-12-27 RX ORDER — SOD PHOS DI, MONO/K PHOS MONO 250 MG
1 TABLET ORAL ONCE
Refills: 0 | Status: COMPLETED | OUTPATIENT
Start: 2024-12-27 | End: 2024-12-27

## 2024-12-27 RX ORDER — SENNOSIDES 8.6 MG/1
2 TABLET, FILM COATED ORAL AT BEDTIME
Refills: 0 | Status: DISCONTINUED | OUTPATIENT
Start: 2024-12-27 | End: 2025-01-17

## 2024-12-27 RX ORDER — LEVETIRACETAM 100 MG/ML
1000 SOLUTION ORAL EVERY 12 HOURS
Refills: 0 | Status: DISCONTINUED | OUTPATIENT
Start: 2024-12-27 | End: 2025-01-17

## 2024-12-27 RX ORDER — ACETAMINOPHEN 80 MG/.8ML
2 SOLUTION/ DROPS ORAL
Qty: 0 | Refills: 0 | DISCHARGE
Start: 2024-12-27

## 2024-12-27 RX ORDER — FAMOTIDINE 20 MG/1
20 TABLET, FILM COATED ORAL EVERY 12 HOURS
Refills: 0 | Status: DISCONTINUED | OUTPATIENT
Start: 2024-12-27 | End: 2025-01-17

## 2024-12-27 RX ORDER — INFLUENZA A VIRUS A/WISCONSIN/588/2019 (H1N1) RECOMBINANT HEMAGGLUTININ ANTIGEN, INFLUENZA A VIRUS A/DARWIN/6/2021 (H3N2) RECOMBINANT HEMAGGLUTININ ANTIGEN, INFLUENZA B VIRUS B/AUSTRIA/1359417/2021 RECOMBINANT HEMAGGLUTININ ANTIGEN, AND INFLUENZA B VIRUS B/PHUKET/3073/2013 RECOMBINANT HEMAGGLUTININ ANTIGEN 45; 45; 45; 45 UG/.5ML; UG/.5ML; UG/.5ML; UG/.5ML
0.5 INJECTION INTRAMUSCULAR ONCE
Refills: 0 | Status: DISCONTINUED | OUTPATIENT
Start: 2024-12-27 | End: 2025-01-17

## 2024-12-27 RX ORDER — FAMOTIDINE 20 MG/1
1 TABLET, FILM COATED ORAL
Qty: 0 | Refills: 0 | DISCHARGE
Start: 2024-12-27

## 2024-12-27 RX ORDER — ENOXAPARIN SODIUM 60 MG/.6ML
40 INJECTION INTRAVENOUS; SUBCUTANEOUS
Qty: 0 | Refills: 0 | DISCHARGE
Start: 2024-12-27

## 2024-12-27 RX ORDER — SODIUM CHLORIDE 9 MG/ML
1 INJECTION, SOLUTION INTRAMUSCULAR; INTRAVENOUS; SUBCUTANEOUS
Qty: 0 | Refills: 0 | DISCHARGE
Start: 2024-12-27

## 2024-12-27 RX ORDER — DEXAMETHASONE SODIUM PHOSPHATE 4 MG/ML
2 VIAL (ML) INJECTION
Refills: 0 | Status: DISCONTINUED | OUTPATIENT
Start: 2024-12-27 | End: 2025-01-17

## 2024-12-27 RX ORDER — SODIUM CHLORIDE 9 MG/ML
1 INJECTION, SOLUTION INTRAMUSCULAR; INTRAVENOUS; SUBCUTANEOUS EVERY 8 HOURS
Refills: 0 | Status: DISCONTINUED | OUTPATIENT
Start: 2024-12-27 | End: 2024-12-30

## 2024-12-27 RX ORDER — METFORMIN 850 MG/1
1 TABLET ORAL
Refills: 0 | DISCHARGE

## 2024-12-27 RX ORDER — B COMPLEX, C NO.20/FOLIC ACID 1 MG
1 CAPSULE ORAL DAILY
Refills: 0 | Status: DISCONTINUED | OUTPATIENT
Start: 2024-12-28 | End: 2025-01-17

## 2024-12-27 RX ADMIN — Medication 4 MILLIGRAM(S): at 07:11

## 2024-12-27 RX ADMIN — SODIUM CHLORIDE 1000 MILLILITER(S): 9 INJECTION, SOLUTION INTRAMUSCULAR; INTRAVENOUS; SUBCUTANEOUS at 05:37

## 2024-12-27 RX ADMIN — SODIUM CHLORIDE 1 GRAM(S): 9 INJECTION, SOLUTION INTRAMUSCULAR; INTRAVENOUS; SUBCUTANEOUS at 07:11

## 2024-12-27 RX ADMIN — LEVETIRACETAM 1000 MILLIGRAM(S): 100 SOLUTION ORAL at 07:10

## 2024-12-27 RX ADMIN — Medication 1 PACKET(S): at 12:01

## 2024-12-27 RX ADMIN — SODIUM CHLORIDE 1 GRAM(S): 9 INJECTION, SOLUTION INTRAMUSCULAR; INTRAVENOUS; SUBCUTANEOUS at 22:14

## 2024-12-27 RX ADMIN — Medication 2 MILLIGRAM(S): at 18:13

## 2024-12-27 RX ADMIN — ENOXAPARIN SODIUM 40 MILLIGRAM(S): 60 INJECTION INTRAVENOUS; SUBCUTANEOUS at 22:14

## 2024-12-27 RX ADMIN — Medication 1 TABLET(S): at 13:09

## 2024-12-27 RX ADMIN — SODIUM CHLORIDE 1 GRAM(S): 9 INJECTION, SOLUTION INTRAMUSCULAR; INTRAVENOUS; SUBCUTANEOUS at 16:31

## 2024-12-27 RX ADMIN — LEVETIRACETAM 1000 MILLIGRAM(S): 100 SOLUTION ORAL at 18:13

## 2024-12-27 RX ADMIN — FAMOTIDINE 20 MILLIGRAM(S): 20 TABLET, FILM COATED ORAL at 18:13

## 2024-12-27 RX ADMIN — FAMOTIDINE 20 MILLIGRAM(S): 20 TABLET, FILM COATED ORAL at 07:11

## 2024-12-27 RX ADMIN — SODIUM CHLORIDE 1 GRAM(S): 9 INJECTION, SOLUTION INTRAMUSCULAR; INTRAVENOUS; SUBCUTANEOUS at 13:10

## 2024-12-27 RX ADMIN — Medication 10 MILLIGRAM(S): at 07:11

## 2024-12-27 NOTE — H&P ADULT - ASSESSMENT
ASSESSMENT/PLAN  69 year old male with PMH of HTN, OA, right TKA, s/p L craniotomy for GBM resection 04/25/2023, s/p chemo and RT 2023 with Dr. Causey, who presented to the ED at [facility] on 12/13 due to reoccurrence of brain tumor seen on his latest MRI completed 4 days prior in [place]. Patient c/o worsened R facial droop, worsening word finding difficulties, and right sided weakness x 2 weeks. He was admitted and placed on decadron and MRI done for OR planning. He underwent another left craniotomy  for resection of GBM with placement of Gammatile brachytherapy on 12/19. He was found to have new dyslexia on 12/22 and speech was consulted. He was also started on salt tabs for hyponatremia which resolved. He also had a few episodes of diarrhea coupled with hypotension and received IV fluids. Bowel regimen removed and diarrhea resolved. He was evaluated for admission to Acute Rehab and admitted to [facility] On 12/27/24.       #GBM now with right sided weakness, word finding difficulties, dyslexia, disconjugate gaze, Gait Instability, ADL impairments and Functional impairments  - Start Comprehensive Rehab Program of PT/OT/SLP  -Continue Keppra 1000mg BID  -Continue Decadron 4mg at 6am and 2mg at 3pm   -Monitor incision - staples in place that may be removed at rehab on POD#14 (1/2)  -Follow up as outpatient for Radiation treatment   -Phone call follow up with DELFINA Hicks 12/30 @ 10:30am.   -May shower    #HTN  -Continue Norvasc 10mg daily  -Monitor vitals (episodes of hypotension at [facility])    #Hyponatremia  -NA is 134 on 12/27  -Continue salt tabs 1G q8h   -Monitor labs     #Pain control  - Tylenol PRN    #GI/Bowel Mgmt   - Continue Senna at bedtime   - Miralax   -Pepcid 20mg BID for GI prophylaxis     #Bladder management  -Monitor UO    #DVT prophylaxis   - Lovenox  - TEDs     #Skin:  -***    FEN   - Diet - Regular diet  - Dysphagia  SLP - evaluation and treatment    Precautions / PROPHYLAXIS:   - Falls,  Seizure   - ortho: Weight bearing status: WBAT   - Lungs: Aspiration  - Pressure injury/Skin: OOB to Chair, PT/OT      Juanjose Johnston  Neurosurgery  [address] [place] [address]  Phone: [phone]  Fax: [phone]  Follow Up Time:     Wernicke, A. Gabriella A  Radiation Oncology  [address] [address]  Phone: [phone]  Fax: [phone]  Follow Up Time:       MEDICAL PROGNOSIS: GOOD              REHAB POTENTIAL: GOOD              ESTIMATED DISPOSITION: HOME WITH HOME CARE              ELOS: 10-14 Days   EXPECTED THERAPY:     P.T. 1hr/day       O.T. 1hr/day      S.L.P. 1hr/day       P&O Unnecessary       EXP FREQUENCY: 5 days per 7 day period     PRESCREEN COMPARISON:   I have reviewed the prescreen information and I have found no relevant changes between the preadmission screening and my post admission evaluation     RATIONALE FOR INPATIENT ADMISSION - Patient demonstrates the following: (check all that apply)  [X] Medically appropriate for rehabilitation admission  [X] Has attainable rehab goals with an appropriate initial discharge plan  [X] Has rehabilitation potential (expected to make a significant improvement within a reasonable period of time)   [X] Requires close medical management by a rehab physician, rehab nursing care, Hospitalist and comprehensive interdisciplinary team (including PT, OT, & or SLP, Prosthetics and Orthotics)   ASSESSMENT/PLAN  69 year old male with PMH of HTN, OA, right TKA, s/p L craniotomy for GBM resection 04/25/2023, s/p chemo and RT 2023 with Dr. Causey, who presented to the ED at [facility] on 12/13 due to reoccurrence of brain tumor seen on his latest MRI completed 4 days prior in [place]. Patient c/o worsened R facial droop, worsening word finding difficulties, and right sided weakness x 2 weeks. He was admitted and placed on decadron and MRI done for OR planning. He underwent another left craniotomy  for resection of GBM with placement of Gammatile brachytherapy on 12/19. He was found to have new dyslexia on 12/22 and speech was consulted. He was also started on salt tabs for hyponatremia which resolved. He also had a few episodes of diarrhea coupled with hypotension and received IV fluids. Bowel regimen removed and diarrhea resolved. He was evaluated for admission to Acute Rehab and admitted to [facility] On 12/27/24.     #GBM  - s/p craniotomy for resection  - Start Comprehensive Rehab Program of PT/OT/SLP  -Continue Keppra 1000mg BID  -Continue Decadron 4mg at 6am and 2mg at 3pm   -Monitor incision - staples in place that may be removed at rehab on POD#14 (1/2)  -Follow up as outpatient for Radiation treatment   -Phone call follow up with DELFINA Hicks 12/30 @ 10:30am.   -May shower    #HTN  -Continue Norvasc 10mg daily  -Monitor vitals (episodes of hypotension at [facility])    #Hyponatremia  -NA is 134 on 12/27  -Continue salt tabs 1G q8h   -Monitor labs     #Pain control  - Tylenol PRN    #GI/Bowel Mgmt   - Continue Senna at bedtime   - Miralax   -Pepcid 20mg BID for GI prophylaxis     #Bladder management  -Monitor UO    #DVT prophylaxis   - Lovenox  - TEDs     #Skin: intact    FEN   - Diet - Regular diet  - Dysphagia  SLP - evaluation and treatment    Precautions / PROPHYLAXIS:   - Falls,  Seizure   - ortho: Weight bearing status: WBAT   - Lungs: Aspiration  - Pressure injury/Skin: OOB to Chair, PT/OT      Juanjose Johnston  Neurosurgery  [address] [place] [address]  Phone: [phone]  Fax: [phone]  Follow Up Time:     Wernicke, A. Gabriella A  Radiation Oncology  [address] [address]  Phone: [phone]  Fax: [phone]  Follow Up Time:     MEDICAL PROGNOSIS: GOOD              REHAB POTENTIAL: GOOD              ESTIMATED DISPOSITION: HOME WITH HOME CARE              ELOS: 10-14 Days   EXPECTED THERAPY:     P.T. 1hr/day       O.T. 1hr/day      S.L.P. 1hr/day       P&O Unnecessary       EXP FREQUENCY: 5 days per 7 day period     PRESCREEN COMPARISON:   I have reviewed the prescreen information and I have found no relevant changes between the preadmission screening and my post admission evaluation     RATIONALE FOR INPATIENT ADMISSION - Patient demonstrates the following: (check all that apply)  [X] Medically appropriate for rehabilitation admission  [X] Has attainable rehab goals with an appropriate initial discharge plan  [X] Has rehabilitation potential (expected to make a significant improvement within a reasonable period of time)   [X] Requires close medical management by a rehab physician, rehab nursing care, Hospitalist and comprehensive interdisciplinary team (including PT, OT, & or SLP, Prosthetics and Orthotics)

## 2024-12-27 NOTE — DISCHARGE NOTE NURSING/CASE MANAGEMENT/SOCIAL WORK - PATIENT PORTAL LINK FT
You can access the FollowMyHealth Patient Portal offered by [facility] by registering at the following website: [email or web address]. By joining [facility]’s FollowMyHealth portal, you will also be able to view your health information using other applications (apps) compatible with our system.

## 2024-12-27 NOTE — H&P ADULT - NSHPLABSRESULTS_GEN_ALL_CORE
LABS:                          14.4   4.38  )-----------( 230      ( 27 Dec 2024 05:30 )             46.2     12-27    134[L]  |  108  |  21  ----------------------------<  68[L]  5.0   |  14[L]  |  0.81    Ca    8.1[L]      27 Dec 2024 05:30  Phos  2.3     12-27  Mg     2.2     12-27

## 2024-12-27 NOTE — H&P ADULT - NSHPPHYSICALEXAM_GEN_ALL_CORE
PHYSICAL EXAM  Constitutional - NAD, Comfortable  HEENT- s/p craniotomy, staples in place, incision without any drainage   Chest - Breathing comfortably, No wheezing  Cardiovascular - S1S2   Abdomen - Soft   Extremities - No calf tenderness   Neurologic Exam -                    Cognitive - AAO to self, place, year, situation     Communication +dysarthria, No aphasia-able to repeat, follow 2 steps commands, name objects and function     Cranial Nerves - right facial droop, right facial weakness, right shoulder shrug impaired      Motor - right hemiparesis                     LEFT    UE - ShAB 5/5, EF 5/5, EE 5/5, WE 5/5,  5/5                    RIGHT UE - ShAB 2/5, EF 2/5, EE 3/5, WE 2/5,  3/5                    LEFT    LE - HF 5/5, KE 5/5, DF 5/5, PF 5/5                    RIGHT LE - HF 3/5, KE 3/5, DF 1/5, PF 3/5       Tone- MAS 1+ right pec, right biceps, right finger flexors   Psychiatric - Mood stable, Affect WNL PHYSICAL EXAM  Constitutional - NAD, Comfortable  HEENT- s/p craniotomy, staples in place, incision without any drainage   Chest - Breathing comfortably, No wheezing  Cardiovascular - S1S2   Abdomen - Soft   Extremities - No calf tenderness   Neurologic Exam -                    Cognitive - AAO to self, place, year, situation     Communication +dysarthria, No aphasia-able to repeat, follow 2 steps commands, name objects and function     Cranial Nerves - right facial droop, right facial weakness, right shoulder shrug impaired      Motor - right hemiparesis                     LEFT    UE - ShAB 5/5, EF 5/5, EE 5/5, WE 5/5,  5/5                    RIGHT UE - ShAB 2/5, EF 2/5, EE 2/5, WE 2/5,  3/5                    LEFT    LE - HF 5/5, KE 5/5, DF 5/5, PF 5/5                    RIGHT LE - HF 3/5, KE 3/5, DF 1/5, PF 3/5       Tone- MAS 1+ right pec, right biceps, right finger flexors   Psychiatric - Mood stable, Affect WNL

## 2024-12-27 NOTE — PATIENT PROFILE ADULT - FUNCTIONAL SCREEN CURRENT LEVEL: SWALLOWING (IF SCORE 2 OR MORE FOR ANY ITEM, CONSULT REHAB SERVICES), MLM)
CC:  Bravo Lima is here today for URI and cough    Medications: medications verified and updated  Refills needed today?  NO  denies Latex allergy or sensitivity  Tobacco history: verified  Health Maintenance Due   Topic Date Due   • Shingles Vaccine (2 of 3) 09/27/2014   • Diabetes Eye Exam  05/25/2019   • Diabetes Foot Exam  01/04/2020   • DM/CKD Microalbumin  01/14/2020   • Medicare Wellness 65+  01/16/2020   • Diabetes A1C  02/12/2020     Patient is due for topics as listed above but is not proceeding with Immunization(s) Shingles and MWV (Medicare Wellness Visit) at this time.   0 = swallows foods/liquids without difficulty

## 2024-12-27 NOTE — PROGRESS NOTE ADULT - ASSESSMENT
Nsgy    69 y o M PMHx HTN, OA, right  TKA, s/p L craniotomy for GBM resection 04/25/2023, s/p chemo and RT 2023 with Dr. Causey. Presented to ER due to reoccurrence of brain tumor seen on his latest MRI completed 4 days ago in [place]. Patient c/o worsened R facial droop, worsening word finding difficulties, and right sided weakness x 2 weeks. Now s/p L crani for resection of GBM with placement of Gammatile brachytherapy 12/19 ([facility]: [facility]).     Neuro:  - neuro/vital checks q4hr  - pain control: tylenol   - Seizure ppx: home Keppra 1g BID   - CTH 12/13: vasogenic edema in left hemisphere  - cerebral edema: decadron 4mg qAM, 2mg at 3pm  - GBM: hold metformin 500mg q12  - Cesium 131 precautions  - post op MRI completed 12/24: peripheral enhancement in surgical cavity tumor vs post-op changes     Cardiac:  - SBP goal 100-140  - HTN: cont home amlodipine 10mg daily    Pulmonary:  - on RA, encourage IS     GI:  - regular diet   - bowel regimen held iso diarrhea, LBM 12/26  - GI ppx: pepcid BID while on steroids    Renal:  - IVL  - voiding   - hyponatremia: salt tabs 1q8    Endo:  - A1c 5.5%, no active issues     Heme:  - DVT ppx: SCDs, SQL   - heme/onc consulted, f/u recs   - rad/onc consulted,  f/u recs    ID:  - afebrile    Disposition: SDU, full code, recc'ed AR     D/w Dr. Cheung

## 2024-12-27 NOTE — PROGRESS NOTE ADULT - SUBJECTIVE AND OBJECTIVE BOX
Physical Medicine and Rehabilitation Progress Note :       Patient is a 69y old  Male who presents with a chief complaint of GBM (27 Dec 2024 00:29)      HPI:  70 yo male with h/o HTN, OA, right  TKA, s/p L craniotomy for glioblastoma resection 04/25/2023, s/p chemo and radiation therapy 2023 with Dr. Causey, currently on physical therapy and brain MRI every 2 month post craniotomy, presents to ER due to reoccurrence of brain tumor seen on his latest MRI completed 4 days ago in [place]. Patient c/o worsened R facial droop, worsening word finding difficulties, and right sided weakness x 2 weeks. Pt was recommended to go to [facility] ER for further evaluation by his oncologist. Patient denies vision changes, extremity numbness, confusion, headache, nausea and vomiting.  (14 Dec 2024 02:25)                            14.4   4.38  )-----------( 230      ( 27 Dec 2024 05:30 )             46.2       12-26    135  |  104  |  16  ----------------------------<  90  5.1   |  23  |  0.93    Ca    8.2[L]      26 Dec 2024 05:30  Phos  2.9     12-26  Mg     2.0     12-26      Vital Signs Last 24 Hrs  T(C): 36.5 (27 Dec 2024 05:18), Max: 36.8 (26 Dec 2024 16:02)  T(F): 97.7 (27 Dec 2024 05:18), Max: 98.3 (26 Dec 2024 16:02)  HR: 69 (27 Dec 2024 07:02) (52 - 69)  BP: 127/70 (27 Dec 2024 07:02) (99/64 - 133/74)  BP(mean): 75 (26 Dec 2024 08:45) (75 - 75)  RR: 16 (27 Dec 2024 05:18) (16 - 18)  SpO2: 100% (27 Dec 2024 05:18) (97% - 100%)    Parameters below as of 27 Dec 2024 05:18  Patient On (Oxygen Delivery Method): room air        MEDICATIONS  (STANDING):  amLODIPine   Tablet 10 milliGRAM(s) Oral every 24 hours  dexAMETHasone     Tablet 4 milliGRAM(s) Oral <User Schedule>  dexAMETHasone     Tablet 2 milliGRAM(s) Oral <User Schedule>  enoxaparin Injectable 40 milliGRAM(s) SubCutaneous <User Schedule>  famotidine    Tablet 20 milliGRAM(s) Oral every 12 hours  levETIRAcetam 1000 milliGRAM(s) Oral every 12 hours  multivitamin 1 Tablet(s) Oral daily  sodium chloride 1 Gram(s) Oral every 8 hours    MEDICATIONS  (PRN):  acetaminophen     Tablet .. 650 milliGRAM(s) Oral every 6 hours PRN Mild Pain (1 - 3)      T(C): 36.5 (12-27-24 @ 05:18), Max: 36.8 (12-26-24 @ 16:02)  HR: 69 (12-27-24 @ 07:02) (52 - 69)  BP: 127/70 (12-27-24 @ 07:02) (99/64 - 133/74)  RR: 16 (12-27-24 @ 05:18) (16 - 18)  SpO2: 100% (12-27-24 @ 05:18) (97% - 100%)    Physical Exam:   69 y o man lying comfortably in semi Hawthorne's position , awake , NAD     Head: normocephalic ,  left crani incision with staples, staples c/d/i    Eyes: PERRLA ,pos dysconj gaze , no nystagmus , sclera anicteric    ENT / FACE: R droop , neg nasal discharge , uvula midline , no oropharyngeal erythema / exudate    Neck: supple , negative JVD , negative carotid bruits , no thyromegaly    Chest: CTA bilaterally      Cardiovascular: regular rate and rhythm , neg murmurs / rubs / gallops    Abdomen: soft , non distended , no tenderness to palpation in all 4 quadrants ,  normal bowel sounds     Extremities: WWP , neg cyanosis /clubbing / edema      Neurologic Exam:     somnolent, oriented x 3 , speech w/ dysarthria , follows commands    Cranial Nerves:           II:                         pupils equal , round and reactive to light , visual fields intact         III/ IV/VI:             dysconjugate gaze  , neg nystagmus , neg ptosis        V:                        facial sensation intact , V1-3 normal        VII:                       R droop , normal eye closure and smile        VIII:                     hearing intact to finger rub bilaterally        IX and X:             no hoarseness , gag intact , palate/ uvula rise symmetrically        XI:                       dec R shrug        XII:                      no tongue deviation    Motor Exam:                  Left  UE/LE > 4/5                   Right UE prox 3/5, distally > 3+/5                    LE: > 3+/5 prox, 1/5 distally    Sensation:         intact to light touch x 4 extremities                                 DTR:           biceps/brachioradialis: equal                            patella/ankle: equal      12/26/2024 Functional Status Assessment :       Pain Assessment/Number Scale (0-10) Adult  Presence of Pain: denies pain/discomfort (Rating = 0)  Pain Rating (0-10): Rest: 0 (no pain/absence of nonverbal indicators of pain)  Pain Rating (0-10): Activity: 0 (no pain/absence of nonverbal indicators of pain)    Safety      AM-PAC Functional Assessment: Basic Mobility  Type of Assessment: Daily assessment  Turning from your back to your side while in a flat bed without using bedrails?: 3 = A little assistance  Moving from lying on your back to sitting on the flat side of a flat bed without using bedrails?: 3 = A little assistance  Moving to and from a bed to a chair (including a wheelchair)?: 2 = A lot of assistance  Standing up from a chair using your arms (e.g. wheelchair or bedside chair)?: 2 = A lot of assistance  Walking in hospital room?: 2 = A lot of assistance  Climbing 3-5 steps with a railing?: 2-calculated by average   Score: 14   Row Comment: Ask the patient "How much help from another person do you currently need? (If the patient hasn't done an activity recently, how much help from another person do you think he/she needs if he/she tried?)    Cognitive/Neuro      Cognitive/Neuro/Behavioral  Level of Consciousness: alert  Arousal Level: arouses to voice  Orientation: oriented x 4  Speech: clear;  spontaneous  Mood/Behavior: calm;  cooperative    Language Assistance  Preferred Language to Address Healthcare Preferred Language to Address Healthcare: English    Therapeutic Interventions      Sit-Stand Transfer Training  Transfer Training Sit-to-Stand Transfer: minimum assist (75% patient effort);  verbal cues;  nonverbal cues (demo/gestures);  1 person assist;  weight-bearing as tolerated   ortiz walker  Transfer Training Stand-to-Sit Transfer: minimum assist (75% patient effort);  verbal cues;  nonverbal cues (demo/gestures);  1 person assist;  weight-bearing as tolerated   ortiz walker  Sit-to-Stand Transfer Training Transfer Safety Analysis: decreased balance;  decreased weight-shifting ability;  decreased strength;  impaired balance;  impaired postural control;  impaired motor control;  ortiz walker    Gait Training  Gait Training: minimum assist (75% patient effort);  stand-by assist;  verbal cues;  nonverbal cues (demo/gestures);  1 person assist;  weight-bearing as tolerated   ortiz walker;  15ft +10ft +25ft  Gait Analysis: swing-to gait   ataxic;  decreased che;  crouch;  decreased hip/knee flexion;  decreased step length;  decreased stride length;  increased stride width;  decreased toe clearance;  decreased toe-to-floor clearance;  RLE cirumductrion ;  decreased strength;  impaired balance;  impaired motor control;  impaired postural control;  15ft+10ft+25ft ;  ortiz walker  Gait Number of Times:: x 3  Type of Rest Type of Rest: sitting  Duration of Rest Duration of Rest: 3min     Neuromuscular Re-education  Neuromuscular Re-education Detail: MMT performed: LUE and LLE 5/5 for all major pivots;(R)hip flexion 3-/5, (R)knee extension 3-/5, (R)knee flexion 2+/5, (L)ankle DF 0/5, (L)shaji PF 1/5       AM-PAC Functional Assessment: Daily Activity  Type of Assessment: Daily assessment  Putting on and taking off regular lower body clothing?: 2 = A lot of assistance  Bathing (including washing, rinsing, drying)?: 2 = A lot of assistance  Toileting, which includes using toilet, bedpan or urinal?: 2 = A lot of assistance  Putting on and taking off regular upper body clothing?: 2 = A lot of assistance  Take care of personal grooming such as brushing teeth?: 3 = A little assistance  Eating meals?: 3 = A little assistance  Score: 14   Row Comment: Ask the patient "How much help from another person do you currently need? (If the patient hasn't done an activity recently, how much help from another person do you think he/she needs if he/she tried?)    Cognitive/Neuro      Cognitive/Neuro/Behavioral  Level of Consciousness: alert  Arousal Level: arouses to voice  Orientation: oriented x 4  Speech: spontaneous;  logical  Mood/Behavior: cooperative;  calm    Language Assistance  Preferred Language to Address Healthcare Preferred Language to Address Healthcare: English    Therapeutic Interventions      Sit-Stand Transfer Training  Transfer Training Sit-to-Stand Transfer: minimum assist (75% patient effort);  1 person assist;  nonverbal cues (demo/gestures);  verbal cues;  Hand Held Assist ;  quad cane  Transfer Training Stand-to-Sit Transfer: minimum assist (75% patient effort);  1 person assist;  verbal cues;  nonverbal cues (demo/gestures);  quad cane  Sit-to-Stand Transfer Training Transfer Safety Analysis: decreased balance;  decreased weight-shifting ability;  impaired postural control;  impaired balance;  decreased ROM    Therapeutic Exercise  Therapeutic Exercise Detail: Functional mobility training performed with pt able to ambulate ~15' with min A using quad cane. RUE resistive exercises including shoulder flex/ext x5 reps and elbow flex/ext x5 reps. Seated BUE rows x5 reps with min resistance. Sit<>stand training x3 reps with min A, no AD.     Upper Body Dressing Training  Upper Body Dressing Training Assistance: moderate assist (50% patient effort);  nonverbal cues (demo/gestures);  1 person assist;  verbal cues;  don gown using one handed dressing technique;  decreased flexibility;  decreased ROM;  decreased strength    OT Cognitive Treatment  OT Cognitive Treatment Treatment Detail: 29/30 on o-log cues to correct name of hospital. Able to follow 100% of simple commands, difficulty with recall of 2-3 step commands             PM&R Impression : as above    Current disposition plan recommendation :    acute rehab placement

## 2024-12-27 NOTE — DISCHARGE NOTE NURSING/CASE MANAGEMENT/SOCIAL WORK - NSDCFUADDAPPT_GEN_ALL_CORE_FT
Please follow up with Dr. Johnston. Please call [phone] to confirm your appointment date and time. You will have a phone call follow up with DELFINA Hicks 12/30 @ 10:30am.     Please follow up with Dr. Wernicke. Please call [phone] to confirm your appointment date and time.     You may follow up with Dr. Last of PMR as needed.    Please follow up with your primary care provider.   none...

## 2024-12-27 NOTE — H&P ADULT - NS ATTEND AMEND GEN_ALL_CORE FT
69 year old male with PMH of HTN, OA, right TKA, s/p L craniotomy for GBM resection 04/25/2023, s/p chemo and RT 2023 with Dr. Causey, who presented to the ED at [facility] on 12/13 due to reoccurrence of brain tumor seen on his latest MRI completed 4 days prior in [place]. Patient c/o worsened R facial droop, worsening word finding difficulties, and right sided weakness x 2 weeks. He was admitted and placed on decadron and MRI done for OR planning. He underwent another left craniotomy  for resection of GBM with placement of Gammatile brachytherapy on 12/19. He was found to have new dyslexia on 12/22 and speech was consulted. He was also started on salt tabs for hyponatremia which resolved. He also had a few episodes of diarrhea coupled with hypotension and received IV fluids. Bowel regimen removed and diarrhea resolved. He was evaluated for admission to Acute Rehab and admitted to [facility] On 12/27/24.     Patient seen at bedside with wife present. Patient denies pain, headaches. Reports couple episodes of diarrhea at [facility], which has since resolved. Reports plan after AR is to go to house in [facility]-- ramp to enter, chair lift to bedroom.       PHYSICAL EXAM  Constitutional - NAD, Comfortable  HEENT- s/p craniotomy, staples in place, incision without any drainage   Chest - Breathing comfortably, No wheezing  Cardiovascular - S1S2   Abdomen - Soft   Extremities - No calf tenderness   Neurologic Exam -                    Cognitive - AAO to self, place, year, situation     Communication +dysarthria, No aphasia-able to repeat, follow 2 steps commands, name objects and function     Cranial Nerves - right facial droop, right facial weakness, right shoulder shrug impaired      Motor - right hemiparesis                     LEFT    UE - ShAB 5/5, EF 5/5, EE 5/5, WE 5/5,  5/5                    RIGHT UE - ShAB 2/5, EF 2/5, EE 3/5, WE 2/5,  3/5                    LEFT    LE - HF 5/5, KE 5/5, DF 5/5, PF 5/5                    RIGHT LE - HF 3/5, KE 3/5, DF 1/5, PF 3/5       Tone- MAS 1+ right pec, right biceps, right finger flexors   Psychiatric - Mood stable, Affect WNL      Admission labs ordered for tomorrow AM  c/w annie ruffin  On Nacl tabs for hyponatremia--monitor with labs and taper accordingly  reviewed goals of AR, Length of stay with patient and his wife at bedside  Total time 80 mins-face to face time encounter and counseling the patient, care coordination, reviewing chart and data-including but not limited to labs and imaging 69 year old male with PMH of HTN, OA, right TKA, s/p L craniotomy for GBM resection 04/25/2023, s/p chemo and RT 2023 with Dr. Causey, who presented to the ED at [facility] on 12/13 due to reoccurrence of brain tumor seen on his latest MRI completed 4 days prior in [place]. Patient c/o worsened R facial droop, worsening word finding difficulties, and right sided weakness x 2 weeks. He was admitted and placed on decadron and MRI done for OR planning. He underwent another left craniotomy  for resection of GBM with placement of Gammatile brachytherapy on 12/19. He was found to have new dyslexia on 12/22 and speech was consulted. He was also started on salt tabs for hyponatremia which resolved. He also had a few episodes of diarrhea coupled with hypotension and received IV fluids. Bowel regimen removed and diarrhea resolved. He was evaluated for admission to Acute Rehab and admitted to [facility] On 12/27/24.     Patient seen at bedside with wife present. Patient denies pain, headaches. Reports couple episodes of diarrhea at [facility], which has since resolved. Reports plan after AR is to go to house in [facility]-- ramp to enter, chair lift to bedroom.       PHYSICAL EXAM  Constitutional - NAD, Comfortable  HEENT- s/p craniotomy, staples in place, incision without any drainage   Chest - Breathing comfortably, No wheezing  Cardiovascular - S1S2   Abdomen - Soft   Extremities - No calf tenderness   Neurologic Exam -                    Cognitive - AAO to self, place, year, situation     Communication +dysarthria, No aphasia-able to repeat, follow 2 steps commands, name objects and function     Cranial Nerves - right facial droop, right facial weakness, right shoulder shrug impaired      Motor - right hemiparesis                     LEFT    UE - ShAB 5/5, EF 5/5, EE 5/5, WE 5/5,  5/5                    RIGHT UE - ShAB 2/5, EF 2/5, EE 2/5, WE 2/5,  3/5                    LEFT    LE - HF 5/5, KE 5/5, DF 5/5, PF 5/5                    RIGHT LE - HF 3/5, KE 3/5, DF 1/5, PF 3/5       Tone- MAS 1+ right pec, right biceps, right finger flexors   Psychiatric - Mood stable, Affect WNL      Admission labs ordered for tomorrow AM  c/w annie ruffin  On Nacl tabs for hyponatremia--monitor with labs and taper accordingly  reviewed goals of AR, Length of stay with patient and his wife at bedside  Total time 80 mins-face to face time encounter and counseling the patient, care coordination, reviewing chart and data-including but not limited to labs and imaging

## 2024-12-27 NOTE — H&P ADULT - NSHPREVIEWOFSYSTEMS_GEN_ALL_CORE
REVIEW OF SYSTEMS  Constitutional: No fever, No Chills, + fatigue  HEENT: No eye pain, No visual disturbances, No difficulty hearing  Pulm: No cough,  No shortness of breath  Cardio: No chest pain, No palpitations  GI:  No abdominal pain, No nausea, No vomiting, No diarrhea, No constipation  : No dysuria, No frequency, No hematuria  Neuro: No headaches, No memory loss, + loss of strength, No numbness, No tremors  Skin: No itching, No rashes, No lesions   Endo: No temperature intolerance  MSK: No joint pain, No joint swelling, No muscle pain  Psych:  No depression, No anxiety

## 2024-12-27 NOTE — H&P ADULT - NSHPSOCIALHISTORY_GEN_ALL_CORE
SOCIAL HISTORY  Smoking - Denied  EtOH - Denied   Drugs - Denied    FUNCTIONAL HISTORY  was living in [place]. Patient and patient's wife rented apartment in [place], was ambulating independently and performing stairs independently and going to outpatient PT via car often. Prior to [place], patient living in private home, 3 steps to enter however enters through the back that has a ramp, 1 flight inside with chairlift, has shower chair and grab bars in bathroom, has glasses for reading and is R handed      CURRENT FUNCTIONAL STATUS  - Bed Mobility: CG  - Transfers: CG  - Gait: CG with cane   - ADLs: CG-min

## 2024-12-27 NOTE — PROGRESS NOTE ADULT - SUBJECTIVE AND OBJECTIVE BOX
HPI:  68 yo male with h/o HTN, OA, right  TKA, s/p L craniotomy for glioblastoma resection 04/25/2023, s/p chemo and radiation therapy 2023 with Dr. Causey, currently on physical therapy and brain MRI every 2 month post craniotomy, presents to ER due to reoccurrence of brain tumor seen on his latest MRI completed 4 days ago in [place]. Patient c/o worsened R facial droop, worsening word finding difficulties, and right sided weakness x 2 weeks. Pt was recommended to go to [facility] ER for further evaluation by his oncologist. Patient denies vision changes, extremity numbness, confusion, headache, nausea and vomiting.  (14 Dec 2024 02:25)      HOSPITAL COURSE:  12/13: admitted to [facility], decadron 4q6 started  12/14: KYA overnight  12/15: KYA overnight, pending MRI  12/16: KYA overnight. MRI complete. Plan for OR thursday 12/19/24.   12/17: KYA overnight.  12/18: Pre-op for OR tm.   12/19: POD 0 s/p L crani for tumor with gammatile.   12/20: POD 1 L crani. KYA overnight, neuro stable. +BM.   12/21: POD 2 L crani. KYA overnight.   12/22: POD3 L crani. KYA ovn. Speech consulted for new dyslexia.   12/23: POD4, KYA overnight, neuro stable. Na 133, started salt tabs 1q8. decadron increased to 4mg in AM, 2mg in PM  12/24: POD5, KYA overnight, neuro stable. Postop MRI completed, follow up read.  12/25: POD6. vomited o/n x2, given zofran. 1L NS bolus ordered. Diarrhea x2, hypotenisve to 80s systolic. Given 1L NS. Holding PM amlodipine.   12/26: POD7. KYA ovn. Seen by PT/OT.   12/27: POD8, KYA overnight, neuro stable     OVERNIGHT EVENTS: Pt seen and examined in bed, denies any acute complaints at this time.     Vital Signs Last 24 Hrs  T(C): 36.7 (26 Dec 2024 20:40), Max: 36.9 (26 Dec 2024 05:02)  T(F): 98.1 (26 Dec 2024 20:40), Max: 98.4 (26 Dec 2024 05:02)  HR: 54 (26 Dec 2024 20:40) (52 - 58)  BP: 118/74 (26 Dec 2024 20:40) (99/64 - 133/74)  BP(mean): 75 (26 Dec 2024 08:45) (75 - 75)  RR: 18 (26 Dec 2024 16:02) (16 - 18)  SpO2: 97% (26 Dec 2024 20:40) (96% - 99%)    Parameters below as of 26 Dec 2024 20:40  Patient On (Oxygen Delivery Method): room air        I&O's Summary    25 Dec 2024 07:01  -  26 Dec 2024 07:00  --------------------------------------------------------  IN: 500 mL / OUT: 400 mL / NET: 100 mL    26 Dec 2024 07:01  -  27 Dec 2024 00:30  --------------------------------------------------------  IN: 360 mL / OUT: 0 mL / NET: 360 mL        PHYSICAL EXAM:  Constitutional: Pt found in bed in NAD   ENT: PERRL, EOMi, disconjugate gaze   Respiratory: breathing non-labored, symmetrical chest wall movement  Cardiovascuar: RRR, no murmurs  Gastrointestinal: abdomen soft, non tender  Neurological:  AAOX3. Verbal function intact  Cranial Nerves: II-XII intact, R facial   Motor: 5/5 power in LUE and LLE, RUE deltoid 3/5 bicep 4/5 tricep 3/5 HG 4/5, RLE HF 5/5 KE/KF 5/5 DF/PF 0/5  Sensation: intact to light touch in all extremities  Pronator Drift: negative   Wounds/Drains: crani incision closed with staples, c/d/i    TUBES/LINES:  [] Shields  [] Lumbar Drain  [] Wound Drains  [] Others      DIET:  [] NPO  [x] Mechanical  [] Tube feeds    LABS:                        14.0   6.15  )-----------( 311      ( 26 Dec 2024 05:30 )             43.8     12-26    135  |  104  |  16  ----------------------------<  90  5.1   |  23  |  0.93    Ca    8.2[L]      26 Dec 2024 05:30  Phos  2.9     12-26  Mg     2.0     12-26        Urinalysis Basic - ( 26 Dec 2024 05:30 )    Color: x / Appearance: x / SG: x / pH: x  Gluc: 90 mg/dL / Ketone: x  / Bili: x / Urobili: x   Blood: x / Protein: x / Nitrite: x   Leuk Esterase: x / RBC: x / WBC x   Sq Epi: x / Non Sq Epi: x / Bacteria: x          CAPILLARY BLOOD GLUCOSE          Drug Levels: [] N/A    CSF Analysis: [] N/A      Allergies    No Known Allergies    Intolerances      MEDICATIONS:  Antibiotics:    Neuro:  acetaminophen     Tablet .. 650 milliGRAM(s) Oral every 6 hours PRN  levETIRAcetam 1000 milliGRAM(s) Oral every 12 hours    Anticoagulation:  enoxaparin Injectable 40 milliGRAM(s) SubCutaneous <User Schedule>    OTHER:  amLODIPine   Tablet 10 milliGRAM(s) Oral every 24 hours  dexAMETHasone     Tablet 4 milliGRAM(s) Oral <User Schedule>  dexAMETHasone     Tablet 2 milliGRAM(s) Oral <User Schedule>  famotidine    Tablet 20 milliGRAM(s) Oral every 12 hours    IVF:  multivitamin 1 Tablet(s) Oral daily  sodium chloride 1 Gram(s) Oral every 8 hours    CULTURES:    RADIOLOGY & ADDITIONAL TESTS:      ASSESSMENT:  69M PMHx HTN, OA, right  TKA, s/p L craniotomy for GBM resection 04/25/2023, s/p chemo and RT 2023 with Dr. Causey. Presented to ER due to reoccurrence of brain tumor seen on his latest MRI completed 4 days ago in [place]. Patient c/o worsened R facial droop, worsening word finding difficulties, and right sided weakness x 2 weeks. Now s/p L crani for resection of GBM with placement of Gammatile brachytherapy 12/19 ([place]zen: [facility]).     Neuro:  - neuro/vital checks q4hr  - pain control: tylenol   - Seizure ppx: home Keppra 1g BID   - CTH 12/13: vasogenic edema in left hemisphere  - cerebral edema: decadron 4mg qAM, 2mg at 3pm  - GBM: hold metformin 500mg q12  - Cesium 131 precautions  - post op MRI completed 12/24: peripheral enhancement in surgical cavity tumor vs post-op changes     Cardiac:  - SBP goal 100-140  - HTN: cont home amlodipine 10mg daily    Pulmonary:  - on RA, encourage IS     GI:  - regular diet   - bowel regimen held iso diarrhea, LBM 12/26  - GI ppx: pepcid BID while on steroids    Renal:  - IVL  - voiding   - hyponatremia: salt tabs 1q8    Endo:  - A1c 5.5%, no active issues     Heme:  - DVT ppx: SCDs, SQL   - heme/onc consulted, f/u recs   - rad/onc consulted,  f/u recs    ID:  - afebrile    Disposition: SDU, full code, recc'ed AR     D/w Dr. Cheung

## 2024-12-27 NOTE — PROGRESS NOTE ADULT - REASON FOR ADMISSION
GBM

## 2024-12-27 NOTE — H&P ADULT - HISTORY OF PRESENT ILLNESS
69 year old male with PMH of HTN, OA, right TKA, s/p L craniotomy for GBM resection 04/25/2023, s/p chemo and RT 2023 with Dr. Causey, who presented to the ED at [facility] on 12/13 due to reoccurrence of brain tumor seen on his latest MRI completed 4 days prior in [place]. Patient c/o worsened R facial droop, worsening word finding difficulties, and right sided weakness x 2 weeks. He was admitted and placed on decadron and MRI done for OR planning. He underwent another left craniotomy  for resection of GBM with placement of Gammatile brachytherapy on 12/19. He was found to have new dyslexia on 12/22 and speech was consulted. He was also started on salt tabs for hyponatremia which resolved. He also had a few episodes of diarrhea coupled with hypotension and received IV fluids. Bowel regimen removed and diarrhea resolved. He was evaluated for admission to Acute Rehab and admitted to [facility] On 12/27/24.

## 2024-12-27 NOTE — PATIENT PROFILE ADULT - FALL HARM RISK - RISK INTERVENTIONS

## 2024-12-27 NOTE — PROGRESS NOTE ADULT - ASSESSMENT
69 M with HTN, OA, right  TKA, s/p L craniotomy for glioblastoma resection 04/25/2023, s/p chemo and radiation therapy 2023 with Dr. Causey, currently on physical therapy and brain MRI every 2 month post craniotomy, presents to ER due to reoccurrence of brain tumor seen on his latest MRI completed 4 days ago in [place].     #GBM:  - s/p left frontal craniotomy w/ GBM removal and placement of gamma tile brachytherapy on 12/19  - Remains on Decadron per nsgy  - On Pepcid for GI protection.  - Keppra.    #HTN:   - Continue Amlodipine     # Diarrhea - Resolved   - Likely in the setting of stool softeners.   - Hold Miralax and Senna.  - Afebrile, no leukocytosis.      #DVT prophylaxis : Lovenox.

## 2024-12-27 NOTE — PROGRESS NOTE ADULT - TIME BILLING
Bedside exam and interview    Review of hospital course, labs, vitals, imaging ,  medical records.   Reviewing outside records   Discharge planning on Interdisciplinary rounds   Discussion with consultants and Primary team   Documenting the encounter.
Bedside exam and interview   Reviewed vitals, labs   Discussed patient's plan of care with primary  Documentation of encounter  Excludes teaching and separately reported services
Review of chart and imaging. Evaluation of the patient. Discussion with the care team members. Documentation.
Bedside exam and interview   Reviewed vitals, labs   Discussed patient's plan of care with primary  Documentation of encounter  Excludes teaching and separately reported services
Time inclusive of chart review, medication ordering, discussion with primary team, clinical documentation, and communication with family/caregiver

## 2024-12-27 NOTE — PROGRESS NOTE ADULT - PROVIDER SPECIALTY LIST ADULT
Hospitalist
Neurosurgery
Hospitalist
Neurosurgery
NSICU
Neurosurgery
Rehab Medicine
Rehab Medicine
Hospitalist
Neurosurgery
Rehab Medicine
Hospitalist

## 2024-12-28 LAB
ALBUMIN SERPL ELPH-MCNC: 2.7 G/DL — LOW (ref 3.3–5)
ALP SERPL-CCNC: 83 U/L — SIGNIFICANT CHANGE UP (ref 40–120)
ALT FLD-CCNC: 27 U/L — SIGNIFICANT CHANGE UP (ref 10–45)
ANION GAP SERPL CALC-SCNC: 8 MMOL/L — SIGNIFICANT CHANGE UP (ref 5–17)
AST SERPL-CCNC: 12 U/L — SIGNIFICANT CHANGE UP (ref 10–40)
BASOPHILS # BLD AUTO: 0 K/UL — SIGNIFICANT CHANGE UP (ref 0–0.2)
BASOPHILS NFR BLD AUTO: 0 % — SIGNIFICANT CHANGE UP (ref 0–2)
BILIRUB SERPL-MCNC: 0.4 MG/DL — SIGNIFICANT CHANGE UP (ref 0.2–1.2)
BUN SERPL-MCNC: 18 MG/DL — SIGNIFICANT CHANGE UP (ref 7–23)
CALCIUM SERPL-MCNC: 8.5 MG/DL — SIGNIFICANT CHANGE UP (ref 8.4–10.5)
CHLORIDE SERPL-SCNC: 109 MMOL/L — HIGH (ref 96–108)
CO2 SERPL-SCNC: 23 MMOL/L — SIGNIFICANT CHANGE UP (ref 22–31)
CREAT SERPL-MCNC: 0.97 MG/DL — SIGNIFICANT CHANGE UP (ref 0.5–1.3)
EGFR: 85 ML/MIN/1.73M2 — SIGNIFICANT CHANGE UP
EOSINOPHIL # BLD AUTO: 0.01 K/UL — SIGNIFICANT CHANGE UP (ref 0–0.5)
EOSINOPHIL NFR BLD AUTO: 0.2 % — SIGNIFICANT CHANGE UP (ref 0–6)
GLUCOSE SERPL-MCNC: 88 MG/DL — SIGNIFICANT CHANGE UP (ref 70–99)
HCT VFR BLD CALC: 41.1 % — SIGNIFICANT CHANGE UP (ref 39–50)
HGB BLD-MCNC: 13.9 G/DL — SIGNIFICANT CHANGE UP (ref 13–17)
IMM GRANULOCYTES NFR BLD AUTO: 0.7 % — SIGNIFICANT CHANGE UP (ref 0–0.9)
LYMPHOCYTES # BLD AUTO: 1.43 K/UL — SIGNIFICANT CHANGE UP (ref 1–3.3)
LYMPHOCYTES # BLD AUTO: 35 % — SIGNIFICANT CHANGE UP (ref 13–44)
MCHC RBC-ENTMCNC: 29.4 PG — SIGNIFICANT CHANGE UP (ref 27–34)
MCHC RBC-ENTMCNC: 33.8 G/DL — SIGNIFICANT CHANGE UP (ref 32–36)
MCV RBC AUTO: 87.1 FL — SIGNIFICANT CHANGE UP (ref 80–100)
MONOCYTES # BLD AUTO: 0.41 K/UL — SIGNIFICANT CHANGE UP (ref 0–0.9)
MONOCYTES NFR BLD AUTO: 10 % — SIGNIFICANT CHANGE UP (ref 2–14)
NEUTROPHILS # BLD AUTO: 2.2 K/UL — SIGNIFICANT CHANGE UP (ref 1.8–7.4)
NEUTROPHILS NFR BLD AUTO: 54.1 % — SIGNIFICANT CHANGE UP (ref 43–77)
NRBC # BLD: 0 /100 WBCS — SIGNIFICANT CHANGE UP (ref 0–0)
PLATELET # BLD AUTO: 337 K/UL — SIGNIFICANT CHANGE UP (ref 150–400)
POTASSIUM SERPL-MCNC: 4.2 MMOL/L — SIGNIFICANT CHANGE UP (ref 3.5–5.3)
POTASSIUM SERPL-SCNC: 4.2 MMOL/L — SIGNIFICANT CHANGE UP (ref 3.5–5.3)
PROT SERPL-MCNC: 6.1 G/DL — SIGNIFICANT CHANGE UP (ref 6–8.3)
RBC # BLD: 4.72 M/UL — SIGNIFICANT CHANGE UP (ref 4.2–5.8)
RBC # FLD: 12.9 % — SIGNIFICANT CHANGE UP (ref 10.3–14.5)
SODIUM SERPL-SCNC: 140 MMOL/L — SIGNIFICANT CHANGE UP (ref 135–145)
WBC # BLD: 4 K/UL — SIGNIFICANT CHANGE UP (ref 3.8–10.5)
WBC # FLD AUTO: 4 K/UL — SIGNIFICANT CHANGE UP (ref 3.8–10.5)

## 2024-12-28 PROCEDURE — 99223 1ST HOSP IP/OBS HIGH 75: CPT

## 2024-12-28 PROCEDURE — 99232 SBSQ HOSP IP/OBS MODERATE 35: CPT | Mod: GC

## 2024-12-28 RX ADMIN — Medication 2 MILLIGRAM(S): at 15:06

## 2024-12-28 RX ADMIN — SODIUM CHLORIDE 1 GRAM(S): 9 INJECTION, SOLUTION INTRAMUSCULAR; INTRAVENOUS; SUBCUTANEOUS at 13:10

## 2024-12-28 RX ADMIN — Medication 10 MILLIGRAM(S): at 05:26

## 2024-12-28 RX ADMIN — FAMOTIDINE 20 MILLIGRAM(S): 20 TABLET, FILM COATED ORAL at 05:26

## 2024-12-28 RX ADMIN — SODIUM CHLORIDE 1 GRAM(S): 9 INJECTION, SOLUTION INTRAMUSCULAR; INTRAVENOUS; SUBCUTANEOUS at 05:27

## 2024-12-28 RX ADMIN — Medication 4 MILLIGRAM(S): at 05:25

## 2024-12-28 RX ADMIN — FAMOTIDINE 20 MILLIGRAM(S): 20 TABLET, FILM COATED ORAL at 17:28

## 2024-12-28 RX ADMIN — LEVETIRACETAM 1000 MILLIGRAM(S): 100 SOLUTION ORAL at 05:23

## 2024-12-28 RX ADMIN — LEVETIRACETAM 1000 MILLIGRAM(S): 100 SOLUTION ORAL at 17:28

## 2024-12-28 RX ADMIN — ENOXAPARIN SODIUM 40 MILLIGRAM(S): 60 INJECTION INTRAVENOUS; SUBCUTANEOUS at 21:46

## 2024-12-28 RX ADMIN — SODIUM CHLORIDE 1 GRAM(S): 9 INJECTION, SOLUTION INTRAMUSCULAR; INTRAVENOUS; SUBCUTANEOUS at 21:46

## 2024-12-28 RX ADMIN — Medication 1 TABLET(S): at 12:49

## 2024-12-28 NOTE — PROGRESS NOTE ADULT - ASSESSMENT
69 year old male with PMH of HTN, OA, right TKA, s/p L craniotomy for GBM resection 04/25/2023, s/p chemo and RT 2023 with Dr. Causey, who presented to the ED at [facility] on 12/13 due to reoccurrence of brain tumor seen on his latest MRI completed 4 days prior in [place]. Patient c/o worsened R facial droop, worsening word finding difficulties, and right sided weakness x 2 weeks. He was admitted and placed on decadron and MRI done for OR planning. He underwent another left craniotomy  for resection of GBM with placement of Gammatile brachytherapy on 12/19. He was found to have new dyslexia on 12/22 and speech was consulted. He was also started on salt tabs for hyponatremia which resolved. He also had a few episodes of diarrhea coupled with hypotension and received IV fluids. Bowel regimen removed and diarrhea resolved. He was evaluated for admission to Acute Rehab and admitted to [facility] On 12/27/24.     #GBM  - s/p craniotomy for resection  - Start Comprehensive Rehab Program of PT/OT/SLP  -Continue Keppra 1000mg BID  -Continue Decadron 4mg at 6am and 2mg at 3pm   -Monitor incision - staples in place that may be removed at rehab on POD#14 (1/2)  -Follow up as outpatient for Radiation treatment   -Phone call follow up with DELFINA Hicks 12/30 @ 10:30am.   -May shower    #HTN  -Continue Norvasc 10mg daily  -Monitor vitals (episodes of hypotension at [facility])    #Hyponatremia  -NA is 134 on 12/27 - 140 on 12/28  -Continue salt tabs 1G q8h - wean as necessary  -Monitor labs     #Pain control  - Tylenol PRN    #GI/Bowel Mgmt   - Continue Senna at bedtime   - Miralax   -Pepcid 20mg BID for GI prophylaxis     #Bladder management  -Monitor UO    #DVT prophylaxis   - Lovenox  - TEDs     #Skin: intact    FEN   - Diet - Regular diet  - Dysphagia  SLP - evaluation and treatment    Precautions / PROPHYLAXIS:   - Falls,  Seizure   - ortho: Weight bearing status: WBAT   - Lungs: Aspiration  - Pressure injury/Skin: OOB to Chair, PT/OT      ------------------------------------  - Chart reviewed  - Continue comprehensive rehabilitation program. Patient requires active and ongoing therapeutic interventions of multiple disciplines and can tolerate 3h/d of therapies. Can actively participate and benefit from an intensive rehabilitation program. Requires supervision of a rehabilitation physician and a coordinated interdisciplinary approach to providing rehabilitation.   - Continue current medications  - Medical issues: as above. Stable  - Admission labs reviewed and all within normal limits. Hyponatremia improved on salt tabs. Continue to monitor on routine lab work and wean as necessary.   - Discussed with resident on call and interdisciplinary team

## 2024-12-28 NOTE — CONSULT NOTE ADULT - SUBJECTIVE AND OBJECTIVE BOX
HPI:   69 year old male with PMH of HTN, OA, right TKA, s/p L craniotomy for GBM resection 04/25/2023, s/p chemo and RT 2023 with Dr. Causey, who presented to the ED at [facility] on 12/13 due to reoccurrence of brain tumor seen on his latest MRI completed 4 days prior in [place]. Patient c/o worsened R facial droop, worsening word finding difficulties, and right sided weakness x 2 weeks. He was admitted and placed on decadron and MRI done for OR planning. He underwent another left craniotomy  for resection of GBM with placement of Gammatile brachytherapy on 12/19. He was found to have new dyslexia on 12/22 and speech was consulted. He was also started on salt tabs for hyponatremia which resolved. He also had a few episodes of diarrhea coupled with hypotension and received IV fluids. Bowel regimen removed and diarrhea resolved. He was evaluated for admission to Acute Rehab and admitted to [facility] On 12/27/24.     PAST MEDICAL & SURGICAL HISTORY:  Hypertension      Osteoarthritis      Glioblastoma      S/P total knee replacement, right      H/O craniotomy          Review of Systems:   CONSTITUTIONAL: No fever, weight loss, or fatigue  EYES: No eye pain, visual disturbances, or discharge  ENMT:  No difficulty hearing, tinnitus, vertigo; No sinus or throat pain  NECK: No pain or stiffness  RESPIRATORY: No cough, wheezing, chills or hemoptysis; No shortness of breath  CARDIOVASCULAR: No chest pain, palpitations, dizziness, or leg swelling  GASTROINTESTINAL: No abdominal or epigastric pain. No nausea, vomiting, or hematemesis; No diarrhea or constipation. No melena or hematochezia.  GENITOURINARY: No dysuria, frequency, hematuria, or incontinence  NEUROLOGICAL: +weakness  SKIN: No itching, burning, rashes, or lesions   LYMPH NODES: No enlarged glands  ENDOCRINE: No heat or cold intolerance; No hair loss  MUSCULOSKELETAL: No joint pain or swelling; No muscle, back, or extremity pain  HEME/LYMPH: No easy bruising, or bleeding gums  ALLERY AND IMMUNOLOGIC: No hives or eczema    Allergies    No Known Allergies    Intolerances        Social History:     FAMILY HISTORY:      MEDICATIONS  (STANDING):  amLODIPine   Tablet 10 milliGRAM(s) Oral every 24 hours  dexAMETHasone     Tablet 4 milliGRAM(s) Oral <User Schedule>  dexAMETHasone     Tablet 2 milliGRAM(s) Oral <User Schedule>  enoxaparin Injectable 40 milliGRAM(s) SubCutaneous <User Schedule>  famotidine    Tablet 20 milliGRAM(s) Oral every 12 hours  influenza  Vaccine (HIGH DOSE) 0.5 milliLiter(s) IntraMuscular once  levETIRAcetam 1000 milliGRAM(s) Oral every 12 hours  multivitamin 1 Tablet(s) Oral daily  sodium chloride 1 Gram(s) Oral every 8 hours    MEDICATIONS  (PRN):  acetaminophen     Tablet .. 650 milliGRAM(s) Oral every 6 hours PRN Mild Pain (1 - 3)  senna 2 Tablet(s) Oral at bedtime PRN Constipation      Vital Signs Last 24 Hrs  T(C): 36.7 (28 Dec 2024 08:45), Max: 36.9 (27 Dec 2024 20:15)  T(F): 98.1 (28 Dec 2024 08:45), Max: 98.5 (27 Dec 2024 20:15)  HR: 51 (28 Dec 2024 08:45) (51 - 55)  BP: 133/68 (28 Dec 2024 08:45) (110/62 - 133/68)  BP(mean): --  RR: 16 (28 Dec 2024 08:45) (16 - 16)  SpO2: 98% (28 Dec 2024 08:45) (97% - 98%)    Parameters below as of 28 Dec 2024 08:45  Patient On (Oxygen Delivery Method): room air      CAPILLARY BLOOD GLUCOSE            PHYSICAL EXAM:  GENERAL: NAD  HEAD: s/p craniotomy. stables clean and dry  EYES: EOMI, conjunctiva and sclera clear  NECK: Supple, No JVD  CHEST/LUNG: Clear to auscultation bilaterally; No wheeze  HEART: Regular rate and rhythm; No murmurs, rubs, or gallops  ABDOMEN: Soft, Nontender, Nondistended; Bowel sounds present  EXTREMITIES:  2+ Peripheral Pulses, No clubbing, cyanosis, or edema  NEUROLOGY: right facial droop. right body 2-3/5 diffusely, normal strength on left side  SKIN: No rashes or lesions    LABS:                        13.9   4.00  )-----------( 337      ( 28 Dec 2024 07:30 )             41.1     12-28    140  |  109[H]  |  18  ----------------------------<  88  4.2   |  23  |  0.97    Ca    8.5      28 Dec 2024 07:30  Phos  2.3     12-27  Mg     2.2     12-27    TPro  6.1  /  Alb  2.7[L]  /  TBili  0.4  /  DBili  x   /  AST  12  /  ALT  27  /  AlkPhos  83  12-28          Urinalysis Basic - ( 28 Dec 2024 07:30 )    Color: x / Appearance: x / SG: x / pH: x  Gluc: 88 mg/dL / Ketone: x  / Bili: x / Urobili: x   Blood: x / Protein: x / Nitrite: x   Leuk Esterase: x / RBC: x / WBC x   Sq Epi: x / Non Sq Epi: x / Bacteria: x        EKG(personally reviewed):    RADIOLOGY & ADDITIONAL TESTS:    Imaging Personally Reviewed:    Consultant(s) Notes Reviewed:      Care Discussed with Consultants/Other Providers:

## 2024-12-28 NOTE — CONSULT NOTE ADULT - ASSESSMENT
69 year old male with PMH of HTN, OA, right TKA, s/p L craniotomy for GBM resection 04/25/2023, s/p chemo and RT 2023 with Dr. Causey, who presented to the ED at [facility] on 12/13 due to reoccurrence of brain tumor seen on his latest MRI completed 4 days prior in [place]. Patient c/o worsened R facial droop, worsening word finding difficulties, and right sided weakness x 2 weeks. He was admitted and placed on decadron and MRI done for OR planning. He underwent another left craniotomy  for resection of GBM with placement of Gammatile brachytherapy on 12/19. He was found to have new dyslexia on 12/22 and speech was consulted. He was also started on salt tabs for hyponatremia which resolved. He also had a few episodes of diarrhea coupled with hypotension and received IV fluids. Bowel regimen removed and diarrhea resolved. He was evaluated for admission to Acute Rehab and admitted to [facility] On 12/27/24.     #GBM  - s/p craniotomy for resection  - Start Comprehensive Rehab Program of PT/OT/SLP  -Continue Keppra 1000mg BID  -Continue Decadron 4mg at 6am and 2mg at 3pm   -stable removal POD 14  -May shower    #HTN  -Continue Norvasc 10mg daily  -monitor for hypotension    #Hyponatremia  -NA is 134 on 12/27  -Na 140 on 12/28  -Continue salt tabs 1G q8h   -Monitor labs, deescalate salt tabs as tolerated    #DVT ppx:  -lovenox

## 2024-12-28 NOTE — PROGRESS NOTE ADULT - SUBJECTIVE AND OBJECTIVE BOX
Patient is a 69y old  Male who presents with a chief complaint of GBM s/p craniotomy for resection (27 Dec 2024 13:01)    ---------------------------------------------------------------------  SUBJECTIVE:  Seen and evaluated at bedside this AM. No acute issues overnight. Up eating breakfast without issues.     Other ROS:  Denies: headache, CP, SOB, pain, bowel or bladder issues  ----------------------------------------------------------------------  PHYSICAL EXAM:    Vital Signs Last 24 Hrs  T(C): 36.7 (28 Dec 2024 08:45), Max: 36.9 (27 Dec 2024 20:15)  T(F): 98.1 (28 Dec 2024 08:45), Max: 98.5 (27 Dec 2024 20:15)  HR: 51 (28 Dec 2024 08:45) (50 - 55)  BP: 133/68 (28 Dec 2024 08:45) (105/69 - 133/68)  BP(mean): --  RR: 16 (28 Dec 2024 08:45) (16 - 17)  SpO2: 98% (28 Dec 2024 08:45) (97% - 99%)    Parameters below as of 28 Dec 2024 08:45  Patient On (Oxygen Delivery Method): room air      Daily Height in cm: 180.34 (27 Dec 2024 15:25)    Daily     PHYSICAL EXAM:    General - NAD, alert, follows commands  Heart- pulse regular  Lungs- breathing comfortably without respiratory distress  Abd- no visible abdominal distension   Ext- No calf pain, extremities well perfused  Neuro- alert and oriented x3, right hemiparesis, right facial droop, right eye palsy.   ----------------------------------------------------------------------  RECENT LABS:                        13.9   4.00  )-----------( 337      ( 28 Dec 2024 07:30 )             41.1     12-28    140  |  109[H]  |  18  ----------------------------<  88  4.2   |  23  |  0.97    Ca    8.5      28 Dec 2024 07:30  Phos  2.3     12-27  Mg     2.2     12-27    TPro  6.1  /  Alb  2.7[L]  /  TBili  0.4  /  DBili  x   /  AST  12  /  ALT  27  /  AlkPhos  83  12-28    LIVER FUNCTIONS - ( 28 Dec 2024 07:30 )  Alb: 2.7 g/dL / Pro: 6.1 g/dL / ALK PHOS: 83 U/L / ALT: 27 U/L / AST: 12 U/L / GGT: x             Urinalysis Basic - ( 28 Dec 2024 07:30 )    Color: x / Appearance: x / SG: x / pH: x  Gluc: 88 mg/dL / Ketone: x  / Bili: x / Urobili: x   Blood: x / Protein: x / Nitrite: x   Leuk Esterase: x / RBC: x / WBC x   Sq Epi: x / Non Sq Epi: x / Bacteria: x      CAPILLARY BLOOD GLUCOSE        ----------------------------------------------------------------------  RECENT IMAGING:    ***  ----------------------------------------------------------------------  MEDICATIONS:  MEDICATIONS  (STANDING):  amLODIPine   Tablet 10 milliGRAM(s) Oral every 24 hours  dexAMETHasone     Tablet 4 milliGRAM(s) Oral <User Schedule>  dexAMETHasone     Tablet 2 milliGRAM(s) Oral <User Schedule>  enoxaparin Injectable 40 milliGRAM(s) SubCutaneous <User Schedule>  famotidine    Tablet 20 milliGRAM(s) Oral every 12 hours  influenza  Vaccine (HIGH DOSE) 0.5 milliLiter(s) IntraMuscular once  levETIRAcetam 1000 milliGRAM(s) Oral every 12 hours  multivitamin 1 Tablet(s) Oral daily  sodium chloride 1 Gram(s) Oral every 8 hours    MEDICATIONS  (PRN):  acetaminophen     Tablet .. 650 milliGRAM(s) Oral every 6 hours PRN Mild Pain (1 - 3)  senna 2 Tablet(s) Oral at bedtime PRN Constipation    ----------------------------------------------------------------------

## 2024-12-29 PROCEDURE — 99232 SBSQ HOSP IP/OBS MODERATE 35: CPT | Mod: GC

## 2024-12-29 PROCEDURE — 93010 ELECTROCARDIOGRAM REPORT: CPT

## 2024-12-29 PROCEDURE — 99232 SBSQ HOSP IP/OBS MODERATE 35: CPT

## 2024-12-29 RX ADMIN — FAMOTIDINE 20 MILLIGRAM(S): 20 TABLET, FILM COATED ORAL at 17:27

## 2024-12-29 RX ADMIN — LEVETIRACETAM 1000 MILLIGRAM(S): 100 SOLUTION ORAL at 17:27

## 2024-12-29 RX ADMIN — ENOXAPARIN SODIUM 40 MILLIGRAM(S): 60 INJECTION INTRAVENOUS; SUBCUTANEOUS at 22:10

## 2024-12-29 RX ADMIN — SENNOSIDES 2 TABLET(S): 8.6 TABLET, FILM COATED ORAL at 22:10

## 2024-12-29 RX ADMIN — Medication 10 MILLIGRAM(S): at 05:25

## 2024-12-29 RX ADMIN — Medication 1 TABLET(S): at 12:24

## 2024-12-29 RX ADMIN — Medication 4 MILLIGRAM(S): at 05:25

## 2024-12-29 RX ADMIN — LEVETIRACETAM 1000 MILLIGRAM(S): 100 SOLUTION ORAL at 05:25

## 2024-12-29 RX ADMIN — SODIUM CHLORIDE 1 GRAM(S): 9 INJECTION, SOLUTION INTRAMUSCULAR; INTRAVENOUS; SUBCUTANEOUS at 13:03

## 2024-12-29 RX ADMIN — Medication 2 MILLIGRAM(S): at 14:59

## 2024-12-29 RX ADMIN — SODIUM CHLORIDE 1 GRAM(S): 9 INJECTION, SOLUTION INTRAMUSCULAR; INTRAVENOUS; SUBCUTANEOUS at 22:10

## 2024-12-29 RX ADMIN — FAMOTIDINE 20 MILLIGRAM(S): 20 TABLET, FILM COATED ORAL at 05:25

## 2024-12-29 RX ADMIN — SODIUM CHLORIDE 1 GRAM(S): 9 INJECTION, SOLUTION INTRAMUSCULAR; INTRAVENOUS; SUBCUTANEOUS at 05:24

## 2024-12-29 NOTE — DIETITIAN INITIAL EVALUATION ADULT - PERTINENT LABORATORY DATA
12-28    140  |  109[H]  |  18  ----------------------------<  88  4.2   |  23  |  0.97    Ca    8.5      28 Dec 2024 07:30    TPro  6.1  /  Alb  2.7[L]  /  TBili  0.4  /  DBili  x   /  AST  12  /  ALT  27  /  AlkPhos  83  12-28  A1C with Estimated Average Glucose Result: 5.5 % (12-20-24 @ 04:54)

## 2024-12-29 NOTE — PROGRESS NOTE ADULT - ASSESSMENT
69 year old male with PMH of HTN, OA, right TKA, s/p L craniotomy for GBM resection 04/25/2023, s/p chemo and RT 2023 with Dr. Causey, who presented to the ED at [facility] on 12/13 due to reoccurrence of brain tumor seen on his latest MRI completed 4 days prior in [place]. Patient c/o worsened R facial droop, worsening word finding difficulties, and right sided weakness x 2 weeks. He was admitted and placed on decadron and MRI done for OR planning. He underwent another left craniotomy  for resection of GBM with placement of Gammatile brachytherapy on 12/19. He was found to have new dyslexia on 12/22 and speech was consulted. He was also started on salt tabs for hyponatremia which resolved. He also had a few episodes of diarrhea coupled with hypotension and received IV fluids. Bowel regimen removed and diarrhea resolved. He was evaluated for admission to Acute Rehab and admitted to [facility] On 12/27/24.     #GBM  - s/p craniotomy for resection  - Comprehensive Rehab Program  - Continue Keppra 1000mg BID  - Continue Decadron 4mg at 6am and 2mg at 3pm   - stable removal POD 14  - fall precuations    #HTN  -Continue Norvasc 10mg daily  -monitor for hypotension    #bradycardia  -asymptomatic  -baseline EKG ordered    #Hyponatremia  -NA is 134 on 12/27  -Na 140 on 12/28  -Continue salt tabs 1G q8h   -Monitor labs, deescalate salt tabs as tolerated    #DVT ppx:  -lovenox

## 2024-12-29 NOTE — PROGRESS NOTE ADULT - SUBJECTIVE AND OBJECTIVE BOX
Patient is a 69y old  Male who presents with a chief complaint of GBM s/p craniotomy for resection (28 Dec 2024 17:12)    ---------------------------------------------------------------------  SUBJECTIVE:  Seen and evaluated at bedside this AM. No acute issues overnight. Tolerated therapy evaluations yesterday.    Other ROS:  Denies: headache, CP, SOB, pain, bowel or bladder issues  ----------------------------------------------------------------------  PHYSICAL EXAM:    Vital Signs Last 24 Hrs  T(C): 36.8 (28 Dec 2024 19:15), Max: 36.8 (28 Dec 2024 19:15)  T(F): 98.3 (28 Dec 2024 19:15), Max: 98.3 (28 Dec 2024 19:15)  HR: 55 (29 Dec 2024 05:23) (53 - 55)  BP: 136/72 (29 Dec 2024 05:23) (123/66 - 136/72)  BP(mean): --  RR: 16 (28 Dec 2024 19:15) (16 - 16)  SpO2: 99% (28 Dec 2024 19:15) (99% - 99%)    Parameters below as of 28 Dec 2024 19:15  Patient On (Oxygen Delivery Method): room air      Daily     Daily     PHYSICAL EXAM  Constitutional - NAD, Comfortable  HEENT- s/p craniotomy, staples in place, incision without any drainage   Chest - Breathing comfortably, No wheezing  Cardiovascular - S1S2   Abdomen - Soft   Extremities - No calf tenderness   Neurologic Exam -                    Cognitive - AAO to self, place, year, situation     Communication +dysarthria, No aphasia-able to repeat, follow 2 steps commands, name objects and function     Cranial Nerves - right facial droop, right facial weakness, right shoulder shrug impaired      Motor - right hemiparesis                     LEFT    UE - ShAB 5/5, EF 5/5, EE 5/5, WE 5/5,  5/5                    RIGHT UE - ShAB 2/5, EF 2/5, EE 2/5, WE 2/5,  3/5                    LEFT    LE - HF 5/5, KE 5/5, DF 5/5, PF 5/5                    RIGHT LE - HF 3/5, KE 3/5, DF 1/5, PF 3/5       Tone- MAS 1+ right pec, right biceps, right finger flexors   Psychiatric - Mood stable, Affect WNL  ----------------------------------------------------------------------  RECENT LABS:                        13.9   4.00  )-----------( 337      ( 28 Dec 2024 07:30 )             41.1     12-28    140  |  109[H]  |  18  ----------------------------<  88  4.2   |  23  |  0.97    Ca    8.5      28 Dec 2024 07:30    TPro  6.1  /  Alb  2.7[L]  /  TBili  0.4  /  DBili  x   /  AST  12  /  ALT  27  /  AlkPhos  83  12-28    LIVER FUNCTIONS - ( 28 Dec 2024 07:30 )  Alb: 2.7 g/dL / Pro: 6.1 g/dL / ALK PHOS: 83 U/L / ALT: 27 U/L / AST: 12 U/L / GGT: x             Urinalysis Basic - ( 28 Dec 2024 07:30 )    Color: x / Appearance: x / SG: x / pH: x  Gluc: 88 mg/dL / Ketone: x  / Bili: x / Urobili: x   Blood: x / Protein: x / Nitrite: x   Leuk Esterase: x / RBC: x / WBC x   Sq Epi: x / Non Sq Epi: x / Bacteria: x      CAPILLARY BLOOD GLUCOSE        ----------------------------------------------------------------------  RECENT IMAGING:    ***  ----------------------------------------------------------------------  MEDICATIONS:  MEDICATIONS  (STANDING):  amLODIPine   Tablet 10 milliGRAM(s) Oral every 24 hours  dexAMETHasone     Tablet 4 milliGRAM(s) Oral <User Schedule>  dexAMETHasone     Tablet 2 milliGRAM(s) Oral <User Schedule>  enoxaparin Injectable 40 milliGRAM(s) SubCutaneous <User Schedule>  famotidine    Tablet 20 milliGRAM(s) Oral every 12 hours  influenza  Vaccine (HIGH DOSE) 0.5 milliLiter(s) IntraMuscular once  levETIRAcetam 1000 milliGRAM(s) Oral every 12 hours  multivitamin 1 Tablet(s) Oral daily  sodium chloride 1 Gram(s) Oral every 8 hours    MEDICATIONS  (PRN):  acetaminophen     Tablet .. 650 milliGRAM(s) Oral every 6 hours PRN Mild Pain (1 - 3)  senna 2 Tablet(s) Oral at bedtime PRN Constipation    ----------------------------------------------------------------------

## 2024-12-29 NOTE — DIETITIAN INITIAL EVALUATION ADULT - ORAL INTAKE PTA/DIET HISTORY
Pt reports good appetite, denies recent changes in po intake or unintentional weight loss PTA. Pt states he follows a ketogenic diet and does not consume pork, shellfish or coffee for [employer] purposes. Food preferences documented and communicated with diet staff. LBM 12/27 per pt reports, encouraged adequate fiber and fluid intake, pt verbalized understanding.

## 2024-12-29 NOTE — DIETITIAN INITIAL EVALUATION ADULT - PERTINENT MEDS FT
MEDICATIONS  (STANDING):  amLODIPine   Tablet 10 milliGRAM(s) Oral every 24 hours  dexAMETHasone     Tablet 4 milliGRAM(s) Oral <User Schedule>  dexAMETHasone     Tablet 2 milliGRAM(s) Oral <User Schedule>  enoxaparin Injectable 40 milliGRAM(s) SubCutaneous <User Schedule>  famotidine    Tablet 20 milliGRAM(s) Oral every 12 hours  influenza  Vaccine (HIGH DOSE) 0.5 milliLiter(s) IntraMuscular once  levETIRAcetam 1000 milliGRAM(s) Oral every 12 hours  multivitamin 1 Tablet(s) Oral daily  sodium chloride 1 Gram(s) Oral every 8 hours    MEDICATIONS  (PRN):  acetaminophen     Tablet .. 650 milliGRAM(s) Oral every 6 hours PRN Mild Pain (1 - 3)  senna 2 Tablet(s) Oral at bedtime PRN Constipation

## 2024-12-29 NOTE — PROGRESS NOTE ADULT - ASSESSMENT
ASSESSMENT/PLAN  69 year old male with PMH of HTN, OA, right TKA, s/p L craniotomy for GBM resection 04/25/2023, s/p chemo and RT 2023 with Dr. Causey, who presented to the ED at [facility] on 12/13 due to reoccurrence of brain tumor seen on his latest MRI completed 4 days prior in [place]. Patient c/o worsened R facial droop, worsening word finding difficulties, and right sided weakness x 2 weeks. He was admitted and placed on decadron and MRI done for OR planning. He underwent another left craniotomy  for resection of GBM with placement of Gammatile brachytherapy on 12/19. He was found to have new dyslexia on 12/22 and speech was consulted. He was also started on salt tabs for hyponatremia which resolved. He also had a few episodes of diarrhea coupled with hypotension and received IV fluids. Bowel regimen removed and diarrhea resolved. He was evaluated for admission to Acute Rehab and admitted to [facility] On 12/27/24.     #GBM  - s/p craniotomy for resection  - Start Comprehensive Rehab Program of PT/OT/SLP  -Continue Keppra 1000mg BID  -Continue Decadron 4mg at 6am and 2mg at 3pm   -Monitor incision - staples in place that may be removed at rehab on POD#14 (1/2)  -Follow up as outpatient for Radiation treatment   -Phone call follow up with DELFINA Hicks 12/30 @ 10:30am.   -May shower    #HTN  -Continue Norvasc 10mg daily  -Monitor vitals (episodes of hypotension at [facility]) - stable    #Hyponatremia  -NA is 134 on 12/27  -Continue salt tabs 1G q8h   -Monitor labs     #Pain control  - Tylenol PRN    #GI/Bowel Mgmt   - Continue Senna at bedtime   - Miralax   -Pepcid 20mg BID for GI prophylaxis     #Bladder management  -Monitor UO    #DVT prophylaxis   - Lovenox  - TEDs     #Skin: intact    FEN   - Diet - Regular diet  - Dysphagia  SLP - evaluation and treatment    Precautions / PROPHYLAXIS:   - Falls,  Seizure   - ortho: Weight bearing status: WBAT   - Lungs: Aspiration  - Pressure injury/Skin: OOB to Chair, PT/OT      ------------------------------------  - Chart reviewed  - Continue comprehensive rehabilitation program. Patient requires active and ongoing therapeutic interventions of multiple disciplines and can tolerate 3h/d of therapies. Can actively participate and benefit from an intensive rehabilitation program. Requires supervision of a rehabilitation physician and a coordinated interdisciplinary approach to providing rehabilitation.   - Continue current medications  - Medical issues: as above. Stable  - Discussed with resident on call and interdisciplinary team

## 2024-12-29 NOTE — PROGRESS NOTE ADULT - SUBJECTIVE AND OBJECTIVE BOX
Patient seen and examined at bedside. no complaints.      ALLERGIES:  No Known Allergies    MEDICATIONS  (STANDING):  amLODIPine   Tablet 10 milliGRAM(s) Oral every 24 hours  dexAMETHasone     Tablet 4 milliGRAM(s) Oral <User Schedule>  dexAMETHasone     Tablet 2 milliGRAM(s) Oral <User Schedule>  enoxaparin Injectable 40 milliGRAM(s) SubCutaneous <User Schedule>  famotidine    Tablet 20 milliGRAM(s) Oral every 12 hours  influenza  Vaccine (HIGH DOSE) 0.5 milliLiter(s) IntraMuscular once  levETIRAcetam 1000 milliGRAM(s) Oral every 12 hours  multivitamin 1 Tablet(s) Oral daily  sodium chloride 1 Gram(s) Oral every 8 hours    MEDICATIONS  (PRN):  acetaminophen     Tablet .. 650 milliGRAM(s) Oral every 6 hours PRN Mild Pain (1 - 3)  senna 2 Tablet(s) Oral at bedtime PRN Constipation    Vital Signs Last 24 Hrs  T(F): 97.9 (29 Dec 2024 09:16), Max: 98.3 (28 Dec 2024 19:15)  HR: 54 (29 Dec 2024 09:16) (53 - 55)  BP: 129/73 (29 Dec 2024 09:16) (123/66 - 136/72)  RR: 16 (29 Dec 2024 09:16) (16 - 16)  SpO2: 98% (29 Dec 2024 09:16) (98% - 99%)  I&O's Summary    28 Dec 2024 07:01  -  29 Dec 2024 07:00  --------------------------------------------------------  IN: 0 mL / OUT: 1100 mL / NET: -1100 mL      BMI (kg/m2): 25.8 (12-27-24 @ 15:25)  PHYSICAL EXAM:    Gen: nad, resting in bed  Neuro: aaox3  Heent: no jvd, no oral exudates  Pulm: cta b/l, no w/r/r  CV: +s1s2, no m/r/g  Ab: soft, nt/nd, normoactive bs x 4  Extrem: no edema, pulses intact and equal  Skin: no rashes  Psych: normal    LABS:                        13.9   4.00  )-----------( 337      ( 28 Dec 2024 07:30 )             41.1       12-28    140  |  109  |  18  ----------------------------<  88  4.2   |  23  |  0.97    Ca    8.5      28 Dec 2024 07:30  Phos  2.3     12-27  Mg     2.2     12-27    TPro  6.1  /  Alb  2.7  /  TBili  0.4  /  DBili  x   /  AST  12  /  ALT  27  /  AlkPhos  83  12-28              Urinalysis Basic - ( 28 Dec 2024 07:30 )    Color: x / Appearance: x / SG: x / pH: x  Gluc: 88 mg/dL / Ketone: x  / Bili: x / Urobili: x   Blood: x / Protein: x / Nitrite: x   Leuk Esterase: x / RBC: x / WBC x   Sq Epi: x / Non Sq Epi: x / Bacteria: x        COVID-19 PCR: NotDetec (12-27-24 @ 16:30)  COVID-19 PCR: NotDetec (12-26-24 @ 16:25)      RADIOLOGY & ADDITIONAL TESTS:    Care Discussed with Consultants/Other Providers:

## 2024-12-29 NOTE — DIETITIAN INITIAL EVALUATION ADULT - OTHER INFO
Reason for Admission: GBM s/p craniotomy for resection  History of Present Illness:   69 year old male with PMH of HTN, OA, right TKA, s/p L craniotomy for GBM resection 04/25/2023, s/p chemo and RT 2023 with Dr. Causey, who presented to the ED at [facility] on 12/13 due to reoccurrence of brain tumor seen on his latest MRI completed 4 days prior in [place]. Patient c/o worsened R facial droop, worsening word finding difficulties, and right sided weakness x 2 weeks. He was admitted and placed on decadron and MRI done for OR planning. He underwent another left craniotomy  for resection of GBM with placement of Gammatile brachytherapy on 12/19. He was found to have new dyslexia on 12/22 and speech was consulted. He was also started on salt tabs for hyponatremia which resolved. He also had a few episodes of diarrhea coupled with hypotension and received IV fluids. Bowel regimen removed and diarrhea resolved. He was evaluated for admission to Acute Rehab and admitted to [facility] On 12/27/24.

## 2024-12-30 ENCOUNTER — APPOINTMENT (OUTPATIENT)
Age: 69
End: 2024-12-30

## 2024-12-30 LAB
ALBUMIN SERPL ELPH-MCNC: 2.9 G/DL — LOW (ref 3.3–5)
ALP SERPL-CCNC: 105 U/L — SIGNIFICANT CHANGE UP (ref 40–120)
ALT FLD-CCNC: 50 U/L — HIGH (ref 10–45)
ANION GAP SERPL CALC-SCNC: 10 MMOL/L — SIGNIFICANT CHANGE UP (ref 5–17)
AST SERPL-CCNC: 26 U/L — SIGNIFICANT CHANGE UP (ref 10–40)
BASOPHILS # BLD AUTO: 0 K/UL — SIGNIFICANT CHANGE UP (ref 0–0.2)
BASOPHILS NFR BLD AUTO: 0 % — SIGNIFICANT CHANGE UP (ref 0–2)
BILIRUB SERPL-MCNC: 0.3 MG/DL — SIGNIFICANT CHANGE UP (ref 0.2–1.2)
BUN SERPL-MCNC: 27 MG/DL — HIGH (ref 7–23)
CALCIUM SERPL-MCNC: 8.5 MG/DL — SIGNIFICANT CHANGE UP (ref 8.4–10.5)
CHLORIDE SERPL-SCNC: 109 MMOL/L — HIGH (ref 96–108)
CO2 SERPL-SCNC: 22 MMOL/L — SIGNIFICANT CHANGE UP (ref 22–31)
CREAT SERPL-MCNC: 0.82 MG/DL — SIGNIFICANT CHANGE UP (ref 0.5–1.3)
EGFR: 96 ML/MIN/1.73M2 — SIGNIFICANT CHANGE UP
EOSINOPHIL # BLD AUTO: 0.01 K/UL — SIGNIFICANT CHANGE UP (ref 0–0.5)
EOSINOPHIL NFR BLD AUTO: 0.2 % — SIGNIFICANT CHANGE UP (ref 0–6)
GLUCOSE SERPL-MCNC: 88 MG/DL — SIGNIFICANT CHANGE UP (ref 70–99)
HCT VFR BLD CALC: 42.7 % — SIGNIFICANT CHANGE UP (ref 39–50)
HGB BLD-MCNC: 14.6 G/DL — SIGNIFICANT CHANGE UP (ref 13–17)
IMM GRANULOCYTES NFR BLD AUTO: 0.4 % — SIGNIFICANT CHANGE UP (ref 0–0.9)
LYMPHOCYTES # BLD AUTO: 1.7 K/UL — SIGNIFICANT CHANGE UP (ref 1–3.3)
LYMPHOCYTES # BLD AUTO: 37.2 % — SIGNIFICANT CHANGE UP (ref 13–44)
MCHC RBC-ENTMCNC: 29.6 PG — SIGNIFICANT CHANGE UP (ref 27–34)
MCHC RBC-ENTMCNC: 34.2 G/DL — SIGNIFICANT CHANGE UP (ref 32–36)
MCV RBC AUTO: 86.6 FL — SIGNIFICANT CHANGE UP (ref 80–100)
MONOCYTES # BLD AUTO: 0.46 K/UL — SIGNIFICANT CHANGE UP (ref 0–0.9)
MONOCYTES NFR BLD AUTO: 10.1 % — SIGNIFICANT CHANGE UP (ref 2–14)
NEUTROPHILS # BLD AUTO: 2.38 K/UL — SIGNIFICANT CHANGE UP (ref 1.8–7.4)
NEUTROPHILS NFR BLD AUTO: 52.1 % — SIGNIFICANT CHANGE UP (ref 43–77)
NRBC # BLD: 0 /100 WBCS — SIGNIFICANT CHANGE UP (ref 0–0)
PLATELET # BLD AUTO: 372 K/UL — SIGNIFICANT CHANGE UP (ref 150–400)
POTASSIUM SERPL-MCNC: 4.1 MMOL/L — SIGNIFICANT CHANGE UP (ref 3.5–5.3)
POTASSIUM SERPL-SCNC: 4.1 MMOL/L — SIGNIFICANT CHANGE UP (ref 3.5–5.3)
PROT SERPL-MCNC: 6.3 G/DL — SIGNIFICANT CHANGE UP (ref 6–8.3)
RBC # BLD: 4.93 M/UL — SIGNIFICANT CHANGE UP (ref 4.2–5.8)
RBC # FLD: 12.9 % — SIGNIFICANT CHANGE UP (ref 10.3–14.5)
SODIUM SERPL-SCNC: 141 MMOL/L — SIGNIFICANT CHANGE UP (ref 135–145)
WBC # BLD: 4.57 K/UL — SIGNIFICANT CHANGE UP (ref 3.8–10.5)
WBC # FLD AUTO: 4.57 K/UL — SIGNIFICANT CHANGE UP (ref 3.8–10.5)

## 2024-12-30 PROCEDURE — 99232 SBSQ HOSP IP/OBS MODERATE 35: CPT

## 2024-12-30 PROCEDURE — 99233 SBSQ HOSP IP/OBS HIGH 50: CPT | Mod: GC

## 2024-12-30 RX ORDER — SODIUM CHLORIDE 9 MG/ML
1 INJECTION, SOLUTION INTRAMUSCULAR; INTRAVENOUS; SUBCUTANEOUS
Refills: 0 | Status: DISCONTINUED | OUTPATIENT
Start: 2024-12-30 | End: 2025-01-02

## 2024-12-30 RX ADMIN — SODIUM CHLORIDE 1 GRAM(S): 9 INJECTION, SOLUTION INTRAMUSCULAR; INTRAVENOUS; SUBCUTANEOUS at 06:41

## 2024-12-30 RX ADMIN — FAMOTIDINE 20 MILLIGRAM(S): 20 TABLET, FILM COATED ORAL at 06:41

## 2024-12-30 RX ADMIN — LEVETIRACETAM 1000 MILLIGRAM(S): 100 SOLUTION ORAL at 18:00

## 2024-12-30 RX ADMIN — Medication 4 MILLIGRAM(S): at 06:41

## 2024-12-30 RX ADMIN — FAMOTIDINE 20 MILLIGRAM(S): 20 TABLET, FILM COATED ORAL at 18:00

## 2024-12-30 RX ADMIN — LEVETIRACETAM 1000 MILLIGRAM(S): 100 SOLUTION ORAL at 06:41

## 2024-12-30 RX ADMIN — Medication 10 MILLIGRAM(S): at 06:41

## 2024-12-30 RX ADMIN — ENOXAPARIN SODIUM 40 MILLIGRAM(S): 60 INJECTION INTRAVENOUS; SUBCUTANEOUS at 21:41

## 2024-12-30 RX ADMIN — Medication 1 TABLET(S): at 14:26

## 2024-12-30 RX ADMIN — SODIUM CHLORIDE 1 GRAM(S): 9 INJECTION, SOLUTION INTRAMUSCULAR; INTRAVENOUS; SUBCUTANEOUS at 18:01

## 2024-12-30 RX ADMIN — Medication 2 MILLIGRAM(S): at 14:20

## 2024-12-30 NOTE — PROGRESS NOTE ADULT - SUBJECTIVE AND OBJECTIVE BOX
Patient is a 69y old  Male who presents with a chief complaint of Malignant neoplasm of brain     (29 Dec 2024 12:41)    HPI:  69 year old male with PMH of HTN, OA, right TKA, s/p L craniotomy for GBM resection 04/25/2023, s/p chemo and RT 2023 with Dr. Causey, who presented to the ED at [facility] on 12/13 due to reoccurrence of brain tumor seen on his latest MRI completed 4 days prior in [place]. Patient c/o worsened R facial droop, worsening word finding difficulties, and right sided weakness x 2 weeks. He was admitted and placed on decadron and MRI done for OR planning. He underwent another left craniotomy  for resection of GBM with placement of Gammatile brachytherapy on 12/19. He was found to have new dyslexia on 12/22 and speech was consulted. He was also started on salt tabs for hyponatremia which resolved. He also had a few episodes of diarrhea coupled with hypotension and received IV fluids. Bowel regimen removed and diarrhea resolved. He was evaluated for admission to Acute Rehab and admitted to [facility] On 12/27/24.     SUBJECTIVE/OBJECTIVE: Patient seen while sitting up in WC, wife also at bedside > brought in food from home (keto diet).  Uneventful weekend.  Overall feeling well.  States he had a BM this morning.    REVIEW OF SYSTEMS:  Constitutional: No fever or chills  Pulm: No cough, No shortness of breath  Cardio: No chest pain or palpitations  GI/:  No abdominal pain, nausea, or vomiting; No dysuria; LBM today  Neuro: No headaches or dizziness; +Right sided weakness  MSK: No joint/muscle pain    PHYSICAL EXAM  69y  Vital Signs Last 24 Hrs  T(C): 36.6 (30 Dec 2024 09:16), Max: 36.6 (30 Dec 2024 09:16)  T(F): 97.9 (30 Dec 2024 09:16), Max: 97.9 (30 Dec 2024 09:16)  HR: 55 (30 Dec 2024 09:16) (48 - 55)  BP: 106/69 (30 Dec 2024 09:16) (106/69 - 131/75)  RR: 14 (30 Dec 2024 09:16) (14 - 16)  SpO2: 98% (30 Dec 2024 09:16) (98% - 99%)    Constitutional - NAD, Comfortable  HEENT- s/p craniotomy w/ staples, incision healing well C/D/I, +Left strabismus (baseline)  Pulm: resp nonlabored  Cardiovascular - warm and well perfused; no cyanosis or pedal edema  Abdomen - Soft, NT/ND  Extremities - No peripheral edema or calf tenderness  MSK - right shoulder sublux, Right lower arm in custom splint   Neurologic Exam -                    Cognitive: AAO to x 4; able to follow commands and answer questions appropriately     Communication: +dysarthria, No aphasia, able to repeat, name obj/func     Cranial Nerves - right lip droop, right facial weakness, right shoulder shrug impaired      Motor - right hemiparesis                     LEFT    UE - ShAB 5/5, EF 5/5, EE 5/5, WE 5/5,  5/5                    RIGHT UE - ShAB 1/5, EF 1/5, EE 2/5, WE 1/5, FF 3/5,  2/5                    LEFT    LE - HF 5/5, KE 5/5, DF 5/5, PF 5/5                    RIGHT LE - HF 3/5, KE 3/5, DF 1/5, PF 3/5       Tone- MAS 1+ right pec, right biceps  Psychiatric - Mood stable    RECENT LABS:                      14.6   4.57  )-----------( 372      ( 30 Dec 2024 05:40 )             42.7     12-30    141  |  109[H]  |  27[H]  ----------------------------<  88  4.1   |  22  |  0.82    Ca    8.5      30 Dec 2024 05:40    TPro  6.3  /  Alb  2.9[L]  /  TBili  0.3  /  DBili  x   /  AST  26  /  ALT  50[H]  /  AlkPhos  105  12-30    LIVER FUNCTIONS - ( 30 Dec 2024 05:40 )  Alb: 2.9 g/dL / Pro: 6.3 g/dL / ALK PHOS: 105 U/L / ALT: 50 U/L / AST: 26 U/L / GGT: x           Allergies  No Known Allergies  Intolerances    MEDICATIONS  (STANDING):  amLODIPine   Tablet 10 milliGRAM(s) Oral every 24 hours  dexAMETHasone     Tablet 4 milliGRAM(s) Oral <User Schedule>  dexAMETHasone     Tablet 2 milliGRAM(s) Oral <User Schedule>  enoxaparin Injectable 40 milliGRAM(s) SubCutaneous <User Schedule>  famotidine    Tablet 20 milliGRAM(s) Oral every 12 hours  influenza  Vaccine (HIGH DOSE) 0.5 milliLiter(s) IntraMuscular once  levETIRAcetam 1000 milliGRAM(s) Oral every 12 hours  multivitamin 1 Tablet(s) Oral daily  sodium chloride 1 Gram(s) Oral every 8 hours    MEDICATIONS  (PRN):  acetaminophen     Tablet .. 650 milliGRAM(s) Oral every 6 hours PRN Mild Pain (1 - 3)  senna 2 Tablet(s) Oral at bedtime PRN Constipation

## 2024-12-30 NOTE — PROGRESS NOTE ADULT - ASSESSMENT
69 year old male with PMH of HTN, OA, right TKA, s/p L craniotomy for GBM resection 04/25/2023, s/p chemo and RT 2023 with Dr. Causey, who presented to the ED at [facility] on 12/13 due to reoccurrence of brain tumor seen on his latest MRI completed 4 days prior in [place]. Patient c/o worsened R facial droop, worsening word finding difficulties, and right sided weakness x 2 weeks. He was admitted and placed on decadron and MRI done for OR planning. He underwent another left craniotomy  for resection of GBM with placement of Gammatile brachytherapy on 12/19. He was found to have new dyslexia on 12/22 and speech was consulted. He was also started on salt tabs for hyponatremia which resolved. He also had a few episodes of diarrhea coupled with hypotension and received IV fluids. Bowel regimen removed and diarrhea resolved. He was evaluated for admission to Acute Rehab and admitted to [facility] On 12/27/24.     #GBM  - s/p craniotomy for resection  - Start Comprehensive Rehab Program of PT/OT/SLP  -Continue Keppra 1000mg BID  -Continue Decadron 4mg at 6am and 2mg at 3pm   -Monitor incision - staples in place that may be removed at rehab on POD#14 (1/2)  -Follow up as outpatient for Radiation treatment   -Phone call follow up with DELFINA Hicks 12/30 @ 10:30am****?  -May shower    #HTN  -Continue Norvasc 10mg daily  -Monitor for OH    #Hyponatremia  -Na is 134 on 12/27  -Continue salt tabs 1G TID > BID (12/30)  -Na 140-141 > wean NaCl    #Pain control  - Tylenol PRN    #GI/Bowel Mgmt   - Continue Senna at bedtime   -Pepcid 20mg BID for GI prophylaxis     #Bladder management  -Monitor UO    #DVT prophylaxis   - Lovenox  - TEDs     #Skin: intact    FEN   - Diet - Regular diet  - Dysphagia  SLP - evaluation and treatment    Precautions / PROPHYLAXIS:   - Falls,  Seizure   - ortho: Weight bearing status: WBAT   - Lungs: Aspiration  - Pressure injury/Skin: OOB to Chair, PT/OT      Juanjose Johnston  Neurosurgery  [address] [place] [address]  Phone: [phone]    Wernicke, A. Gabriella A  Radiation Oncology  [address] [address]  Phone: [phone]

## 2024-12-30 NOTE — PROGRESS NOTE ADULT - ASSESSMENT
69 year old male with PMH of HTN, OA, right TKA, s/p L craniotomy for GBM resection 04/25/2023, s/p chemo and RT 2023 with Dr. Causey, who presented to the ED at [facility] on 12/13 due to reoccurrence of brain tumor seen on his latest MRI completed 4 days prior in [place]. Patient c/o worsened R facial droop, worsening word finding difficulties, and right sided weakness x 2 weeks. He was admitted and placed on decadron and MRI done for OR planning. He underwent another left craniotomy  for resection of GBM with placement of Gammatile brachytherapy on 12/19. He was found to have new dyslexia on 12/22 and speech was consulted. He was also started on salt tabs for hyponatremia which resolved. He also had a few episodes of diarrhea coupled with hypotension and received IV fluids. Bowel regimen removed and diarrhea resolved. He was evaluated for admission to Acute Rehab and admitted to [facility] On 12/27/24.     #GBM  - s/p craniotomy for resection  - Comprehensive Rehab Program  - Continue Keppra 1000mg BID  - Continue Decadron 4mg at 6am and 2mg at 3pm   - GI ppx for the above  - stable removal POD 14  - fall precuations    #HTN  -Continue Norvasc 10mg daily  -monitor for hypotension    #bradycardia  - asymptomatic    #Hyponatremia, improved  - sodium today 141  -Continue salt tabs 1G q8h   -Monitor labs, deescalate salt tabs as tolerated    #DVT ppx:  -lovenox

## 2024-12-30 NOTE — PROGRESS NOTE ADULT - SUBJECTIVE AND OBJECTIVE BOX
no acute events overnight      PHYSICAL EXAM:    Vital Signs Last 24 Hrs  T(F): 97.9 (30 Dec 2024 09:16), Max: 97.9 (30 Dec 2024 09:16)  HR: 55 (30 Dec 2024 09:16) (48 - 55)  BP: 106/69 (30 Dec 2024 09:16) (106/69 - 131/75)  RR: 14 (30 Dec 2024 09:16) (14 - 16)  SpO2: 98% (30 Dec 2024 09:16) (98% - 99%)  I&O's Summary    30 Dec 2024 07:01  -  30 Dec 2024 14:13  --------------------------------------------------------  IN: 480 mL / OUT: 0 mL / NET: 480 mL        GENERAL: NAD  HEENT: NCAT  CHEST/LUNG: No increased WOB, Clear to percussion bilaterally; No rales, rhonchi, wheezing  HEART: Regular rate and rhythm; No murmurs  ABDOMEN: Soft, Nontender, Nondistended; Bowel sounds present  MUSCULOSKELETAL/EXTREMITIES:  2+ Peripheral Pulses, No LE edema  PSYCH: Appropriate affect, Alert & Oriented    LABS:                        14.6   4.57  )-----------( 372      ( 30 Dec 2024 05:40 )             42.7       12-30    141  |  109  |  27  ----------------------------<  88  4.1   |  22  |  0.82    Ca    8.5      30 Dec 2024 05:40    TPro  6.3  /  Alb  2.9  /  TBili  0.3  /  DBili  x   /  AST  26  /  ALT  50  /  AlkPhos  105  12-30                                  Urinalysis Basic - ( 30 Dec 2024 05:40 )    Color: x / Appearance: x / SG: x / pH: x  Gluc: 88 mg/dL / Ketone: x  / Bili: x / Urobili: x   Blood: x / Protein: x / Nitrite: x   Leuk Esterase: x / RBC: x / WBC x   Sq Epi: x / Non Sq Epi: x / Bacteria: x        COVID-19 PCR: NotDetec (12-27-24 @ 16:30)  COVID-19 PCR: NotDetec (12-26-24 @ 16:25)      MEDICATIONS  (STANDING):  amLODIPine   Tablet 10 milliGRAM(s) Oral every 24 hours  dexAMETHasone     Tablet 4 milliGRAM(s) Oral <User Schedule>  dexAMETHasone     Tablet 2 milliGRAM(s) Oral <User Schedule>  enoxaparin Injectable 40 milliGRAM(s) SubCutaneous <User Schedule>  famotidine    Tablet 20 milliGRAM(s) Oral every 12 hours  influenza  Vaccine (HIGH DOSE) 0.5 milliLiter(s) IntraMuscular once  levETIRAcetam 1000 milliGRAM(s) Oral every 12 hours  multivitamin 1 Tablet(s) Oral daily  sodium chloride 1 Gram(s) Oral two times a day    MEDICATIONS  (PRN):  acetaminophen     Tablet .. 650 milliGRAM(s) Oral every 6 hours PRN Mild Pain (1 - 3)  senna 2 Tablet(s) Oral at bedtime PRN Constipation      Care Discussed with Consultants/Other Providers: Yes

## 2024-12-31 PROCEDURE — 99233 SBSQ HOSP IP/OBS HIGH 50: CPT | Mod: GC

## 2024-12-31 PROCEDURE — 99232 SBSQ HOSP IP/OBS MODERATE 35: CPT

## 2024-12-31 RX ADMIN — SODIUM CHLORIDE 1 GRAM(S): 9 INJECTION, SOLUTION INTRAMUSCULAR; INTRAVENOUS; SUBCUTANEOUS at 17:33

## 2024-12-31 RX ADMIN — Medication 1 TABLET(S): at 11:11

## 2024-12-31 RX ADMIN — FAMOTIDINE 20 MILLIGRAM(S): 20 TABLET, FILM COATED ORAL at 06:26

## 2024-12-31 RX ADMIN — LEVETIRACETAM 1000 MILLIGRAM(S): 100 SOLUTION ORAL at 17:31

## 2024-12-31 RX ADMIN — Medication 4 MILLIGRAM(S): at 06:26

## 2024-12-31 RX ADMIN — LEVETIRACETAM 1000 MILLIGRAM(S): 100 SOLUTION ORAL at 06:26

## 2024-12-31 RX ADMIN — SODIUM CHLORIDE 1 GRAM(S): 9 INJECTION, SOLUTION INTRAMUSCULAR; INTRAVENOUS; SUBCUTANEOUS at 06:26

## 2024-12-31 RX ADMIN — ENOXAPARIN SODIUM 40 MILLIGRAM(S): 60 INJECTION INTRAVENOUS; SUBCUTANEOUS at 22:08

## 2024-12-31 RX ADMIN — FAMOTIDINE 20 MILLIGRAM(S): 20 TABLET, FILM COATED ORAL at 17:33

## 2024-12-31 RX ADMIN — Medication 2 MILLIGRAM(S): at 15:20

## 2024-12-31 RX ADMIN — Medication 10 MILLIGRAM(S): at 06:27

## 2024-12-31 NOTE — PROGRESS NOTE ADULT - SUBJECTIVE AND OBJECTIVE BOX
Patient is a 69y old  Male who presents with a chief complaint of Malignant neoplasm of brain       SUBJECTIVE/OBJECTIVE: Patient seen while sitting up in WC, wife also at bedside. Patient states he slept, denies headaches or pain.      REVIEW OF SYSTEMS:  Constitutional: No fever or chills  Pulm: No cough, No shortness of breath  Cardio: No chest pain or palpitations  GI/:  No abdominal pain, nausea, or vomiting; No dysuria  Neuro: +Right sided weakness    PHYSICAL EXAM  69y  T(C): 36.4 (12-31-24 @ 08:23)  T(F): 97.6 (12-31-24 @ 08:23), Max: 97.9 (12-30-24 @ 19:30)  HR: 60 (12-31-24 @ 08:23) (50 - 60)  BP: 120/74 (12-31-24 @ 08:23) (113/64 - 133/77)  RR:  (14 - 16)  SpO2:  (97% - 99%)    Constitutional - NAD, Comfortable  HEENT- s/p craniotomy w/ staples, incision healing well C/D/I, +Left strabismus (baseline)  Pulm: resp nonlabored  Cardiovascular - warm and well perfused; no cyanosis or pedal edema  Abdomen - Soft, NT/ND  Extremities - No peripheral edema or calf tenderness  MSK - right shoulder sublux, Right lower arm in custom splint   Neurologic Exam -                    Cognitive: AAO to x 4; able to follow commands and answer questions appropriately     Communication: +dysarthria, No aphasia, able to repeat, name obj/func     Cranial Nerves - right lip droop, right facial weakness, right shoulder shrug impaired      Motor - right hemiparesis                     LEFT    UE - ShAB 5/5, EF 5/5, EE 5/5, WE 5/5,  5/5                    RIGHT UE - ShAB 1/5, EF 1/5, EE 2/5, WE 1/5, FF 3/5,  2/5                    LEFT    LE - HF 5/5, KE 5/5, DF 5/5, PF 5/5                    RIGHT LE - HF 3/5, KE 3/5, DF 1/5, PF 3/5       Tone- MAS 1+ right pec, right biceps  Psychiatric - Mood stable    RECENT LABS:                      14.6   4.57  )-----------( 372      ( 30 Dec 2024 05:40 )             42.7     12-30    141  |  109[H]  |  27[H]  ----------------------------<  88  4.1   |  22  |  0.82    Ca    8.5      30 Dec 2024 05:40    TPro  6.3  /  Alb  2.9[L]  /  TBili  0.3  /  DBili  x   /  AST  26  /  ALT  50[H]  /  AlkPhos  105  12-30    LIVER FUNCTIONS - ( 30 Dec 2024 05:40 )  Alb: 2.9 g/dL / Pro: 6.3 g/dL / ALK PHOS: 105 U/L / ALT: 50 U/L / AST: 26 U/L / GGT: x           Allergies  No Known Allergies  Intolerances    MEDICATIONS  (STANDING):  amLODIPine   Tablet 10 milliGRAM(s) Oral every 24 hours  dexAMETHasone     Tablet 4 milliGRAM(s) Oral <User Schedule>  dexAMETHasone     Tablet 2 milliGRAM(s) Oral <User Schedule>  enoxaparin Injectable 40 milliGRAM(s) SubCutaneous <User Schedule>  famotidine    Tablet 20 milliGRAM(s) Oral every 12 hours  influenza  Vaccine (HIGH DOSE) 0.5 milliLiter(s) IntraMuscular once  levETIRAcetam 1000 milliGRAM(s) Oral every 12 hours  multivitamin 1 Tablet(s) Oral daily  sodium chloride 1 Gram(s) Oral every 8 hours    MEDICATIONS  (PRN):  acetaminophen     Tablet .. 650 milliGRAM(s) Oral every 6 hours PRN Mild Pain (1 - 3)  senna 2 Tablet(s) Oral at bedtime PRN Constipation

## 2024-12-31 NOTE — PROGRESS NOTE ADULT - ASSESSMENT
69 year old male with PMH of HTN, OA, right TKA, s/p L craniotomy for GBM resection 04/25/2023, s/p chemo and RT 2023 with Dr. Causey, who presented to the ED at [facility] on 12/13 due to reoccurrence of brain tumor seen on his latest MRI completed 4 days prior in [place]. Patient c/o worsened R facial droop, worsening word finding difficulties, and right sided weakness x 2 weeks. He was admitted and placed on decadron and MRI done for OR planning. He underwent another left craniotomy  for resection of GBM with placement of Gammatile brachytherapy on 12/19. He was found to have new dyslexia on 12/22 and speech was consulted. He was also started on salt tabs for hyponatremia which resolved. He also had a few episodes of diarrhea coupled with hypotension and received IV fluids. Bowel regimen removed and diarrhea resolved. He was evaluated for admission to Acute Rehab and admitted to [facility] On 12/27/24.     #GBM  - s/p craniotomy for resection  - Start Comprehensive Rehab Program of PT/OT/SLP  -Continue Keppra 1000mg BID  -Continue Decadron 4mg at 6am and 2mg at 3pm   -Monitor incision - staples in place that may be removed at rehab on POD#14 (1/2)  -telehealth NSGY appt today.   -Follow up as outpatient for Radiation treatment   -May shower    #HTN  -Continue Norvasc 10mg daily  -Monitor for OH  - /64- 133/77 (12/30-12/31)    #Hyponatremia  -Na is 134 on 12/27  -Continue salt tabs 1G TID > BID (12/30)  -Na 141 > wean NaCl    #Pain control  - Tylenol PRN    #GI/Bowel Mgmt   - Continue Senna at bedtime   - Pepcid 20mg BID for GI prophylaxis     #Bladder management  -Monitor UO-- voiding, dc'd bladder scans    #DVT prophylaxis   - Lovenox  - TEDs     #Skin: intact    FEN   - Diet - Regular diet  - Dysphagia  SLP - evaluation and treatment    Precautions / PROPHYLAXIS:   - Falls,  Seizure   - ortho: Weight bearing status: WBAT   - Lungs: Aspiration  - Pressure injury/Skin: OOB to Chair, PT/OT      Juanjose Johnston  Neurosurgery  [address] [place] [address]  Phone: [phone]    Wernicke, A. Gabriella A  Radiation Oncology  [address] [address]  Phone: [phone]    IDT 12/30  NSG: tex B/B  SW: lives with wife in [place], 2 [address], 7 steps inside. DME: RW/cane  SLP: reg/thin, functional exp/rec language, mild word finding diff, reduced visual acuity impacting reading  OT: min A groom, SV UBD, mod A LBD/min A footwear, mod A stand pivot transfer. Goal: SV  PT: min A bed mob/transfer/amb 50' wide base quad can, mod A 4 steps 1 HR.  Goal: close SV  Team goals: use word finding strategies in discourse w/ min A; amb + toilet with SV  Barriers: RUE weakness  TDD: 1/17 home.  will need family training      reviewed IDT meeting with patient and wife at bedside and discussed TTD of 1/17 home.

## 2024-12-31 NOTE — PROGRESS NOTE ADULT - ASSESSMENT
69 year old male with PMH of HTN, OA, right TKA, s/p L craniotomy for GBM resection 04/25/2023, s/p chemo and RT 2023 with Dr. Causey, who presented to the ED at [facility] on 12/13 due to reoccurrence of brain tumor seen on his latest MRI completed 4 days prior in [place]. Patient c/o worsened R facial droop, worsening word finding difficulties, and right sided weakness x 2 weeks. He was admitted and placed on decadron and MRI done for OR planning. He underwent another left craniotomy  for resection of GBM with placement of Gammatile brachytherapy on 12/19. He was found to have new dyslexia on 12/22 and speech was consulted. He was also started on salt tabs for hyponatremia which resolved. He also had a few episodes of diarrhea coupled with hypotension and received IV fluids. Bowel regimen removed and diarrhea resolved. He was evaluated for admission to Acute Rehab and admitted to [facility] On 12/27/24.     #GBM s/p craniotomy for resection  - Comprehensive Rehab Program  - Continue Keppra 1000mg BID  - Continue Decadron 4mg at 6am and 2mg at 3pm   - GI ppx for the above  - stable removal POD 14 (1/2)  - fall precuations    #HTN  -Continue Norvasc 10mg daily  -monitor for hypotension    #bradycardia  - asymptomatic    #Hyponatremia, improved  - sodium today 141  -Continue salt tabs 1G BID -- may change to 1g daily if routine labs sodium remains within normal range  -Monitor labs, deescalate salt tabs as tolerated    #DVT ppx:  -lovenox

## 2024-12-31 NOTE — PROGRESS NOTE ADULT - SUBJECTIVE AND OBJECTIVE BOX
no acute events overnight  no complaints    PHYSICAL EXAM:    Vital Signs Last 24 Hrs  T(F): 97.6 (31 Dec 2024 08:23), Max: 97.9 (30 Dec 2024 19:30)  HR: 60 (31 Dec 2024 08:23) (50 - 60)  BP: 120/74 (31 Dec 2024 08:23) (113/64 - 133/77)  RR: 14 (31 Dec 2024 08:23) (14 - 16)  SpO2: 99% (31 Dec 2024 08:23) (97% - 99%)  I&O's Summary    30 Dec 2024 07:01  -  31 Dec 2024 07:00  --------------------------------------------------------  IN: 480 mL / OUT: 0 mL / NET: 480 mL        GENERAL: NAD  HEENT: +craniotomy with staples, C/D/I; LEFT strabismus  CHEST/LUNG: No increased WOB, Clear to percussion bilaterally; No rales, rhonchi, wheezing  HEART: Regular rate and rhythm; No murmurs  ABDOMEN: Soft, Nontender, Nondistended; Bowel sounds present  MUSCULOSKELETAL/EXTREMITIES: +dysarthria  2+ Peripheral Pulses, No LE edema  PSYCH: Appropriate affect, Alert & Oriented    LABS:                        14.6   4.57  )-----------( 372      ( 30 Dec 2024 05:40 )             42.7       12-30    141  |  109  |  27  ----------------------------<  88  4.1   |  22  |  0.82    Ca    8.5      30 Dec 2024 05:40    TPro  6.3  /  Alb  2.9  /  TBili  0.3  /  DBili  x   /  AST  26  /  ALT  50  /  AlkPhos  105  12-30                                  Urinalysis Basic - ( 30 Dec 2024 05:40 )    Color: x / Appearance: x / SG: x / pH: x  Gluc: 88 mg/dL / Ketone: x  / Bili: x / Urobili: x   Blood: x / Protein: x / Nitrite: x   Leuk Esterase: x / RBC: x / WBC x   Sq Epi: x / Non Sq Epi: x / Bacteria: x        COVID-19 PCR: NotDetec (12-27-24 @ 16:30)  COVID-19 PCR: NotDetec (12-26-24 @ 16:25)      MEDICATIONS  (STANDING):  amLODIPine   Tablet 10 milliGRAM(s) Oral every 24 hours  dexAMETHasone     Tablet 4 milliGRAM(s) Oral <User Schedule>  dexAMETHasone     Tablet 2 milliGRAM(s) Oral <User Schedule>  enoxaparin Injectable 40 milliGRAM(s) SubCutaneous <User Schedule>  famotidine    Tablet 20 milliGRAM(s) Oral every 12 hours  influenza  Vaccine (HIGH DOSE) 0.5 milliLiter(s) IntraMuscular once  levETIRAcetam 1000 milliGRAM(s) Oral every 12 hours  multivitamin 1 Tablet(s) Oral daily  sodium chloride 1 Gram(s) Oral two times a day    MEDICATIONS  (PRN):  acetaminophen     Tablet .. 650 milliGRAM(s) Oral every 6 hours PRN Mild Pain (1 - 3)  senna 2 Tablet(s) Oral at bedtime PRN Constipation      Care Discussed with Consultants/Other Providers: Yes

## 2025-01-01 PROCEDURE — 99232 SBSQ HOSP IP/OBS MODERATE 35: CPT

## 2025-01-01 RX ADMIN — FAMOTIDINE 20 MILLIGRAM(S): 20 TABLET, FILM COATED ORAL at 17:03

## 2025-01-01 RX ADMIN — SODIUM CHLORIDE 1 GRAM(S): 9 INJECTION, SOLUTION INTRAMUSCULAR; INTRAVENOUS; SUBCUTANEOUS at 05:57

## 2025-01-01 RX ADMIN — LEVETIRACETAM 1000 MILLIGRAM(S): 100 SOLUTION ORAL at 17:03

## 2025-01-01 RX ADMIN — SODIUM CHLORIDE 1 GRAM(S): 9 INJECTION, SOLUTION INTRAMUSCULAR; INTRAVENOUS; SUBCUTANEOUS at 17:03

## 2025-01-01 RX ADMIN — Medication 2 MILLIGRAM(S): at 15:28

## 2025-01-01 RX ADMIN — Medication 10 MILLIGRAM(S): at 06:00

## 2025-01-01 RX ADMIN — Medication 1 TABLET(S): at 11:18

## 2025-01-01 RX ADMIN — FAMOTIDINE 20 MILLIGRAM(S): 20 TABLET, FILM COATED ORAL at 05:58

## 2025-01-01 RX ADMIN — LEVETIRACETAM 1000 MILLIGRAM(S): 100 SOLUTION ORAL at 05:57

## 2025-01-01 RX ADMIN — ENOXAPARIN SODIUM 40 MILLIGRAM(S): 60 INJECTION INTRAVENOUS; SUBCUTANEOUS at 21:22

## 2025-01-01 RX ADMIN — Medication 4 MILLIGRAM(S): at 05:58

## 2025-01-01 NOTE — PROGRESS NOTE ADULT - SUBJECTIVE AND OBJECTIVE BOX
CC: Patient being seen for rehabilitation follow up.  Feels well.  Motivated.   Slept well.  Pain is controlled.   No other new ROS.  Has been tolerating rehabilitation program.    VITALS  T(C): 36.9 (12-31-24 @ 19:49), Max: 36.9 (12-31-24 @ 19:49)  HR: 58 (01-01-25 @ 05:56) (57 - 60)  BP: 120/69 (01-01-25 @ 05:56) (120/69 - 126/74)  RR: 15 (12-31-24 @ 19:49) (14 - 15)  SpO2: 99% (12-31-24 @ 19:49) (99% - 99%)  Wt(kg): --     MEDICATIONS   acetaminophen     Tablet .. 650 milliGRAM(s) every 6 hours PRN  amLODIPine   Tablet 10 milliGRAM(s) every 24 hours  dexAMETHasone     Tablet 4 milliGRAM(s) <User Schedule>  dexAMETHasone     Tablet 2 milliGRAM(s) <User Schedule>  enoxaparin Injectable 40 milliGRAM(s) <User Schedule>  famotidine    Tablet 20 milliGRAM(s) every 12 hours  influenza  Vaccine (HIGH DOSE) 0.5 milliLiter(s) once  levETIRAcetam 1000 milliGRAM(s) every 12 hours  multivitamin 1 Tablet(s) daily  senna 2 Tablet(s) at bedtime PRN  sodium chloride 1 Gram(s) two times a day      RECENT LABS/IMAGING                     ------------------------------------------  PHYSICAL EXAM  Constitutional - NAD, Comfortable  Pulm - Breathing comfortably, No wheezing  Abd - Nondistended  Extremities - No edema  Neurologic Exam - AAOx self, situation  Psychiatric - Mood WNL     ASSESSMENT/PLAN  69y Male with impairments in mobility and ADLs   - Continue current rehabilitation program 3hrs a day   - Continue current medications, patient is medically stable - vitals normal  - DVT prophylaxis  - Skin - OOB and mobilization daily

## 2025-01-01 NOTE — PROGRESS NOTE ADULT - SUBJECTIVE AND OBJECTIVE BOX
Patient seen and examined at bedside. no complaints.      ALLERGIES:  No Known Allergies    MEDICATIONS  (STANDING):  amLODIPine   Tablet 10 milliGRAM(s) Oral every 24 hours  dexAMETHasone     Tablet 4 milliGRAM(s) Oral <User Schedule>  dexAMETHasone     Tablet 2 milliGRAM(s) Oral <User Schedule>  enoxaparin Injectable 40 milliGRAM(s) SubCutaneous <User Schedule>  famotidine    Tablet 20 milliGRAM(s) Oral every 12 hours  influenza  Vaccine (HIGH DOSE) 0.5 milliLiter(s) IntraMuscular once  levETIRAcetam 1000 milliGRAM(s) Oral every 12 hours  multivitamin 1 Tablet(s) Oral daily  sodium chloride 1 Gram(s) Oral two times a day    MEDICATIONS  (PRN):  acetaminophen     Tablet .. 650 milliGRAM(s) Oral every 6 hours PRN Mild Pain (1 - 3)  senna 2 Tablet(s) Oral at bedtime PRN Constipation    Vital Signs Last 24 Hrs  T(F): 98.4 (01 Jan 2025 08:47), Max: 98.4 (31 Dec 2024 19:49)  HR: 63 (01 Jan 2025 08:47) (57 - 63)  BP: 116/75 (01 Jan 2025 08:47) (116/75 - 126/74)  RR: 14 (01 Jan 2025 08:47) (14 - 15)  SpO2: 97% (01 Jan 2025 08:47) (97% - 99%)  I&O's Summary    BMI (kg/m2): 25.8 (12-27-24 @ 15:25)  PHYSICAL EXAM:    Gen: nad, eating breakfast  Neuro: aaox3  Heent: scalp staples intact, dry, no jvd, no oral exudates  Pulm: cta b/l, no w/r/r  CV: +s1s2, no m/r/g  Ab: soft, nt/nd, normoactive bs x 4  Extrem: no edema, pulses intact and equal  Skin: no rashes  Psych: normal    LABS:                        14.6   4.57  )-----------( 372      ( 30 Dec 2024 05:40 )             42.7       12-30    141  |  109  |  27  ----------------------------<  88  4.1   |  22  |  0.82    Ca    8.5      30 Dec 2024 05:40    TPro  6.3  /  Alb  2.9  /  TBili  0.3  /  DBili  x   /  AST  26  /  ALT  50  /  AlkPhos  105  12-30              Urinalysis Basic - ( 30 Dec 2024 05:40 )    Color: x / Appearance: x / SG: x / pH: x  Gluc: 88 mg/dL / Ketone: x  / Bili: x / Urobili: x   Blood: x / Protein: x / Nitrite: x   Leuk Esterase: x / RBC: x / WBC x   Sq Epi: x / Non Sq Epi: x / Bacteria: x        COVID-19 PCR: NotDetec (12-27-24 @ 16:30)  COVID-19 PCR: NotDetec (12-26-24 @ 16:25)      RADIOLOGY & ADDITIONAL TESTS:    Care Discussed with Consultants/Other Providers:

## 2025-01-01 NOTE — PROGRESS NOTE ADULT - ASSESSMENT
69 year old male with PMH of HTN, OA, right TKA, s/p L craniotomy for GBM resection 04/25/2023, s/p chemo and RT 2023 with Dr. Causey, who presented to the ED at [facility] on 12/13 due to reoccurrence of brain tumor seen on his latest MRI completed 4 days prior in [place]. Patient c/o worsened R facial droop, worsening word finding difficulties, and right sided weakness x 2 weeks. He was admitted and placed on decadron and MRI done for OR planning. He underwent another left craniotomy  for resection of GBM with placement of Gammatile brachytherapy on 12/19. He was found to have new dyslexia on 12/22 and speech was consulted. He was also started on salt tabs for hyponatremia which resolved. He also had a few episodes of diarrhea coupled with hypotension and received IV fluids. Bowel regimen removed and diarrhea resolved. He was evaluated for admission to Acute Rehab and admitted to [facility] On 12/27/24.     #GBM s/p craniotomy for resection  - Comprehensive Rehab Program  - Continue Keppra 1000mg BID  - Continue Decadron 4mg at 6am and 2mg at 3pm   - GI ppx for the above  - stable removal POD 14 (1/2)  - fall precuations    #HTN  -Continue Norvasc 10mg daily  -monitor for hypotension    #bradycardia  - asymptomatic    #Hyponatremia, improved  - sodium today 141  -Continue salt tabs 1G BID -- may change to 1g daily if routine labs sodium remains within normal range  -Monitor labs, deescalate salt tabs as tolerated    #DVT ppx:  -lovenox

## 2025-01-02 LAB
ALBUMIN SERPL ELPH-MCNC: 2.7 G/DL — LOW (ref 3.3–5)
ALP SERPL-CCNC: 119 U/L — SIGNIFICANT CHANGE UP (ref 40–120)
ALT FLD-CCNC: 60 U/L — HIGH (ref 10–45)
ANION GAP SERPL CALC-SCNC: 8 MMOL/L — SIGNIFICANT CHANGE UP (ref 5–17)
AST SERPL-CCNC: 20 U/L — SIGNIFICANT CHANGE UP (ref 10–40)
BASOPHILS # BLD AUTO: 0.02 K/UL — SIGNIFICANT CHANGE UP (ref 0–0.2)
BASOPHILS NFR BLD AUTO: 0.3 % — SIGNIFICANT CHANGE UP (ref 0–2)
BILIRUB SERPL-MCNC: 0.2 MG/DL — SIGNIFICANT CHANGE UP (ref 0.2–1.2)
BUN SERPL-MCNC: 21 MG/DL — SIGNIFICANT CHANGE UP (ref 7–23)
CALCIUM SERPL-MCNC: 8.5 MG/DL — SIGNIFICANT CHANGE UP (ref 8.4–10.5)
CHLORIDE SERPL-SCNC: 107 MMOL/L — SIGNIFICANT CHANGE UP (ref 96–108)
CO2 SERPL-SCNC: 26 MMOL/L — SIGNIFICANT CHANGE UP (ref 22–31)
CREAT SERPL-MCNC: 0.71 MG/DL — SIGNIFICANT CHANGE UP (ref 0.5–1.3)
EGFR: 100 ML/MIN/1.73M2 — SIGNIFICANT CHANGE UP
EOSINOPHIL # BLD AUTO: 0.02 K/UL — SIGNIFICANT CHANGE UP (ref 0–0.5)
EOSINOPHIL NFR BLD AUTO: 0.3 % — SIGNIFICANT CHANGE UP (ref 0–6)
GLUCOSE SERPL-MCNC: 87 MG/DL — SIGNIFICANT CHANGE UP (ref 70–99)
HCT VFR BLD CALC: 38.8 % — LOW (ref 39–50)
HGB BLD-MCNC: 13.1 G/DL — SIGNIFICANT CHANGE UP (ref 13–17)
IMM GRANULOCYTES NFR BLD AUTO: 0.7 % — SIGNIFICANT CHANGE UP (ref 0–0.9)
LYMPHOCYTES # BLD AUTO: 2.05 K/UL — SIGNIFICANT CHANGE UP (ref 1–3.3)
LYMPHOCYTES # BLD AUTO: 30.5 % — SIGNIFICANT CHANGE UP (ref 13–44)
MCHC RBC-ENTMCNC: 29.9 PG — SIGNIFICANT CHANGE UP (ref 27–34)
MCHC RBC-ENTMCNC: 33.8 G/DL — SIGNIFICANT CHANGE UP (ref 32–36)
MCV RBC AUTO: 88.6 FL — SIGNIFICANT CHANGE UP (ref 80–100)
MONOCYTES # BLD AUTO: 0.43 K/UL — SIGNIFICANT CHANGE UP (ref 0–0.9)
MONOCYTES NFR BLD AUTO: 6.4 % — SIGNIFICANT CHANGE UP (ref 2–14)
NEUTROPHILS # BLD AUTO: 4.16 K/UL — SIGNIFICANT CHANGE UP (ref 1.8–7.4)
NEUTROPHILS NFR BLD AUTO: 61.8 % — SIGNIFICANT CHANGE UP (ref 43–77)
NRBC # BLD: 0 /100 WBCS — SIGNIFICANT CHANGE UP (ref 0–0)
PLATELET # BLD AUTO: 337 K/UL — SIGNIFICANT CHANGE UP (ref 150–400)
POTASSIUM SERPL-MCNC: 3.9 MMOL/L — SIGNIFICANT CHANGE UP (ref 3.5–5.3)
POTASSIUM SERPL-SCNC: 3.9 MMOL/L — SIGNIFICANT CHANGE UP (ref 3.5–5.3)
PROT SERPL-MCNC: 5.8 G/DL — LOW (ref 6–8.3)
RBC # BLD: 4.38 M/UL — SIGNIFICANT CHANGE UP (ref 4.2–5.8)
RBC # FLD: 13.1 % — SIGNIFICANT CHANGE UP (ref 10.3–14.5)
SODIUM SERPL-SCNC: 141 MMOL/L — SIGNIFICANT CHANGE UP (ref 135–145)
WBC # BLD: 6.73 K/UL — SIGNIFICANT CHANGE UP (ref 3.8–10.5)
WBC # FLD AUTO: 6.73 K/UL — SIGNIFICANT CHANGE UP (ref 3.8–10.5)

## 2025-01-02 PROCEDURE — 99232 SBSQ HOSP IP/OBS MODERATE 35: CPT

## 2025-01-02 RX ORDER — GINKGO BILOBA 40 MG
3 CAPSULE ORAL
Refills: 0 | Status: DISCONTINUED | OUTPATIENT
Start: 2025-01-02 | End: 2025-01-06

## 2025-01-02 RX ORDER — SODIUM CHLORIDE 9 MG/ML
1 INJECTION, SOLUTION INTRAMUSCULAR; INTRAVENOUS; SUBCUTANEOUS DAILY
Refills: 0 | Status: DISCONTINUED | OUTPATIENT
Start: 2025-01-03 | End: 2025-01-06

## 2025-01-02 RX ADMIN — SODIUM CHLORIDE 1 GRAM(S): 9 INJECTION, SOLUTION INTRAMUSCULAR; INTRAVENOUS; SUBCUTANEOUS at 05:42

## 2025-01-02 RX ADMIN — LEVETIRACETAM 1000 MILLIGRAM(S): 100 SOLUTION ORAL at 17:49

## 2025-01-02 RX ADMIN — ENOXAPARIN SODIUM 40 MILLIGRAM(S): 60 INJECTION INTRAVENOUS; SUBCUTANEOUS at 22:12

## 2025-01-02 RX ADMIN — FAMOTIDINE 20 MILLIGRAM(S): 20 TABLET, FILM COATED ORAL at 05:42

## 2025-01-02 RX ADMIN — Medication 3 MILLIGRAM(S): at 22:12

## 2025-01-02 RX ADMIN — LEVETIRACETAM 1000 MILLIGRAM(S): 100 SOLUTION ORAL at 05:42

## 2025-01-02 RX ADMIN — Medication 1 TABLET(S): at 11:19

## 2025-01-02 RX ADMIN — FAMOTIDINE 20 MILLIGRAM(S): 20 TABLET, FILM COATED ORAL at 17:49

## 2025-01-02 RX ADMIN — Medication 2 MILLIGRAM(S): at 14:49

## 2025-01-02 RX ADMIN — Medication 10 MILLIGRAM(S): at 05:42

## 2025-01-02 RX ADMIN — Medication 4 MILLIGRAM(S): at 05:42

## 2025-01-02 NOTE — PROGRESS NOTE ADULT - SUBJECTIVE AND OBJECTIVE BOX
Patient is a 69y old  Male who presents with a chief complaint of Malignant neoplasm of brain    SUBJECTIVE/OBJECTIVE: Patient seen and evaluated at bedside with wife, then again during PT.  Patient feeling great > able to ambulate using ortiz quad cane.  Educated on use of RUE splint > 2 hours on and off during day and on overnight.    REVIEW OF SYSTEMS:  Constitutional: No fever or chills  Pulm: No cough, No shortness of breath  Cardio: No chest pain or palpitations  GI/:  No abdominal pain, nausea, or vomiting; No dysuria  Neuro: +Right sided weakness    PHYSICAL EXAM  69y  Vital Signs Last 24 Hrs  T(C): 36.8 (01-02-25 @ 09:45), Max: 36.8 (01-01-25 @ 21:27)  T(F): 98.2 (01-02-25 @ 09:45), Max: 98.2 (01-01-25 @ 21:27)  HR: 51 (01-02-25 @ 09:45) (51 - 59)  BP: 128/74 (01-02-25 @ 09:45) (127/76 - 144/72)  RR: 14 (01-02-25 @ 09:45) (14 - 15)  SpO2: 97% (01-02-25 @ 09:45) (97% - 97%)    Constitutional - NAD, Comfortable  HEENT- s/p craniotomy w/ staples, incision healing well C/D/I, +Left strabismus (baseline)  Pulm: resp nonlabored  Cardiovascular - warm and well perfused; no cyanosis or pedal edema  Abdomen - Soft, NT/ND  Extremities - No peripheral edema or calf tenderness  MSK - right shoulder sublux, Right lower arm in custom splint   Neurologic Exam -                    Cognitive: AAO to x 4; able to follow commands and answer questions appropriately     Communication: +dysarthria, No aphasia, able to repeat, name obj/func     Cranial Nerves - right lip droop, right facial weakness, right shoulder shrug impaired      Motor - right hemiparesis                     LEFT    UE - SF 5/5, EF 5/5, EE 5/5, WE 5/5,  5/5                    RIGHT UE - ShAB 1/5, EF 2/5, EE 1/5, WE 2/5, FF 3/5,  2/5                    LEFT    LE - HF 5/5, KE 5/5, DF 5/5, PF 5/5                    RIGHT LE - HF 3/5, KE 3/5, DF 1/5, PF 3/5       Tone- MAS 1+ right pec, right biceps  Psychiatric - Mood stable    RECENT LABS:                                   13.1   6.73  )-----------( 337      ( 02 Jan 2025 05:55 )             38.8     01-02    141  |  107  |  21  ----------------------------<  87  3.9   |  26  |  0.71    Ca    8.5      02 Jan 2025 05:55    TPro  5.8[L]  /  Alb  2.7[L]  /  TBili  0.2  /  DBili  x   /  AST  20  /  ALT  60[H]  /  AlkPhos  119  01-02    LIVER FUNCTIONS - ( 02 Jan 2025 05:55 )  Alb: 2.7 g/dL / Pro: 5.8 g/dL / ALK PHOS: 119 U/L / ALT: 60 U/L / AST: 20 U/L / GGT: x           Allergies  No Known Allergies  Intolerances    MEDICATIONS  (STANDING):  amLODIPine   Tablet 10 milliGRAM(s) Oral every 24 hours  dexAMETHasone     Tablet 4 milliGRAM(s) Oral <User Schedule>  dexAMETHasone     Tablet 2 milliGRAM(s) Oral <User Schedule>  enoxaparin Injectable 40 milliGRAM(s) SubCutaneous <User Schedule>  famotidine    Tablet 20 milliGRAM(s) Oral every 12 hours  influenza  Vaccine (HIGH DOSE) 0.5 milliLiter(s) IntraMuscular once  levETIRAcetam 1000 milliGRAM(s) Oral every 12 hours  multivitamin 1 Tablet(s) Oral daily  sodium chloride 1 Gram(s) Oral two times a day    MEDICATIONS  (PRN):  acetaminophen     Tablet .. 650 milliGRAM(s) Oral every 6 hours PRN Mild Pain (1 - 3)  senna 2 Tablet(s) Oral at bedtime PRN Constipation   Patient is a 69y old  Male who presents with a chief complaint of Malignant neoplasm of brain    SUBJECTIVE/OBJECTIVE: Patient seen and evaluated at bedside with wife, then again during PT.  Patient feeling great > able to ambulate using ortiz quad cane.  Educated on use of RUE splint > 2 hours on and off during day and on overnight.  Patient and wife report that he gets interrupted sleep at night.  No pain or anything specific is interfering.     REVIEW OF SYSTEMS:  Constitutional: No fever or chills  Pulm: No cough, No shortness of breath  Cardio: No chest pain or palpitations  GI/:  No abdominal pain, nausea, or vomiting; No dysuria  Neuro: +Right sided weakness    PHYSICAL EXAM  69y  Vital Signs Last 24 Hrs  T(C): 36.8 (01-02-25 @ 09:45), Max: 36.8 (01-01-25 @ 21:27)  T(F): 98.2 (01-02-25 @ 09:45), Max: 98.2 (01-01-25 @ 21:27)  HR: 51 (01-02-25 @ 09:45) (51 - 59)  BP: 128/74 (01-02-25 @ 09:45) (127/76 - 144/72)  RR: 14 (01-02-25 @ 09:45) (14 - 15)  SpO2: 97% (01-02-25 @ 09:45) (97% - 97%)    Constitutional - NAD, Comfortable  HEENT- s/p craniotomy w/ staples, incision healing well C/D/I, +Left strabismus (baseline)  Pulm: resp nonlabored  Cardiovascular - warm and well perfused; no cyanosis or pedal edema  Abdomen - Soft, NT/ND  Extremities - No peripheral edema or calf tenderness  MSK - right shoulder sublux, Right lower arm in custom splint   Neurologic Exam -                    Cognitive: AAO to x 4; able to follow commands and answer questions appropriately     Communication: +dysarthria, No aphasia, able to repeat, name obj/func     Cranial Nerves - right lip droop, right facial weakness, right shoulder shrug impaired      Motor - right hemiparesis                     LEFT    UE - SF 5/5, EF 5/5, EE 5/5, WE 5/5,  5/5                    RIGHT UE - ShAB 1/5, EF 2/5, EE 1/5, WE 2/5, FF 3/5,  2/5                    LEFT    LE - HF 5/5, KE 5/5, DF 5/5, PF 5/5                    RIGHT LE - HF 3/5, KE 3/5, DF 1/5, PF 3/5       Tone- MAS 1+ right pec, right biceps  Psychiatric - Mood stable    RECENT LABS:                                   13.1   6.73  )-----------( 337      ( 02 Jan 2025 05:55 )             38.8     01-02    141  |  107  |  21  ----------------------------<  87  3.9   |  26  |  0.71    Ca    8.5      02 Jan 2025 05:55    TPro  5.8[L]  /  Alb  2.7[L]  /  TBili  0.2  /  DBili  x   /  AST  20  /  ALT  60[H]  /  AlkPhos  119  01-02    LIVER FUNCTIONS - ( 02 Jan 2025 05:55 )  Alb: 2.7 g/dL / Pro: 5.8 g/dL / ALK PHOS: 119 U/L / ALT: 60 U/L / AST: 20 U/L / GGT: x           Allergies  No Known Allergies  Intolerances    MEDICATIONS  (STANDING):  amLODIPine   Tablet 10 milliGRAM(s) Oral every 24 hours  dexAMETHasone     Tablet 4 milliGRAM(s) Oral <User Schedule>  dexAMETHasone     Tablet 2 milliGRAM(s) Oral <User Schedule>  enoxaparin Injectable 40 milliGRAM(s) SubCutaneous <User Schedule>  famotidine    Tablet 20 milliGRAM(s) Oral every 12 hours  influenza  Vaccine (HIGH DOSE) 0.5 milliLiter(s) IntraMuscular once  levETIRAcetam 1000 milliGRAM(s) Oral every 12 hours  multivitamin 1 Tablet(s) Oral daily  sodium chloride 1 Gram(s) Oral two times a day    MEDICATIONS  (PRN):  acetaminophen     Tablet .. 650 milliGRAM(s) Oral every 6 hours PRN Mild Pain (1 - 3)  senna 2 Tablet(s) Oral at bedtime PRN Constipation

## 2025-01-02 NOTE — PROGRESS NOTE ADULT - ASSESSMENT
69 year old male with PMH of HTN, OA, right TKA, s/p L craniotomy for GBM resection 04/25/2023, s/p chemo and RT 2023 with Dr. Causey, who presented to the ED at [facility] on 12/13 due to reoccurrence of brain tumor seen on his latest MRI completed 4 days prior in [place]. Patient c/o worsened R facial droop, worsening word finding difficulties, and right sided weakness x 2 weeks. He was admitted and placed on decadron and MRI done for OR planning. He underwent another left craniotomy  for resection of GBM with placement of Gammatile brachytherapy on 12/19. He was found to have new dyslexia on 12/22 and speech was consulted. He was also started on salt tabs for hyponatremia which resolved. He also had a few episodes of diarrhea coupled with hypotension and received IV fluids. Bowel regimen removed and diarrhea resolved. He was evaluated for admission to Acute Rehab and admitted to [facility] On 12/27/24.     #GBM  - s/p craniotomy for resection  - Start Comprehensive Rehab Program of PT/OT/SLP  -Continue Keppra 1000mg BID  -Continue Decadron 4mg at 6am and 2mg at 3pm   -Monitor incision - staples in place that may be removed at rehab on POD#14 (1/2) - tolerated removal   -wife facilitated telehealth NSGY (12/31)  -Follow up as outpatient for Radiation treatment   -May shower    #HTN  -Continue Norvasc 10mg daily  -Monitor for OH  - BP pre med 144/72, post med 128/74 (1/2/2025)    #Hyponatremia  -Na is 134 on 12/27  -Continue salt tabs 1G TID > BID (12/30)  -Na 141 (1/2/25)     #Pain control  - Tylenol PRN    #GI/Bowel Mgmt   - Continue Senna at bedtime   - Pepcid 20mg BID for GI prophylaxis     #Bladder management  -Monitor UO-- voiding, dc'd bladder scans  - voiding without issues    #DVT prophylaxis   - Lovenox  - TEDs     #Skin: intact    #Diet  - Regular diet    Precautions / PROPHYLAXIS:   - Falls,  Seizure   - ortho: Weight bearing status: WBAT   - Lungs: Aspiration  - Pressure injury/Skin: OOB to Chair, PT/OT      Juanjose Johnston  Neurosurgery  [address] [place] [address]  Phone: [phone]    Wernicke, A. Gabriella A  Radiation Oncology  [address] [address]  Phone: [phone]    IDT 12/30  NSG: tex B/B  SW: lives with wife in [place], 2 [place], 7 steps inside. DME: RW/cane  SLP: reg/thin, functional exp/rec language, mild word finding diff, reduced visual acuity impacting reading  OT: min A groom, SV UBD, mod A LBD/min A footwear, mod A stand pivot transfer. Goal: SV  PT: min A bed mob/transfer/amb 50' wide base quad can, mod A 4 steps 1 HR.  Goal: close SV  Team goals: use word finding strategies in discourse w/ min A; amb + toilet with SV  Barriers: RUE weakness  TDD: 1/17 home.  will need family training 69 year old male with PMH of HTN, OA, right TKA, s/p L craniotomy for GBM resection 04/25/2023, s/p chemo and RT 2023 with Dr. Causey, who presented to the ED at [facility] on 12/13 due to reoccurrence of brain tumor seen on his latest MRI completed 4 days prior in [place]. Patient c/o worsened R facial droop, worsening word finding difficulties, and right sided weakness x 2 weeks. He was admitted and placed on decadron and MRI done for OR planning. He underwent another left craniotomy  for resection of GBM with placement of Gammatile brachytherapy on 12/19. He was found to have new dyslexia on 12/22 and speech was consulted. He was also started on salt tabs for hyponatremia which resolved. He also had a few episodes of diarrhea coupled with hypotension and received IV fluids. Bowel regimen removed and diarrhea resolved. He was evaluated for admission to Acute Rehab and admitted to [facility] On 12/27/24.     #GBM  - s/p craniotomy for resection  - Start Comprehensive Rehab Program of PT/OT/SLP  -Continue Keppra 1000mg BID  -Continue Decadron 4mg at 6am and 2mg at 3pm   -Monitor incision - staples in place that may be removed at rehab on POD#14 (1/2) - tolerated removal   -wife facilitated telehealth NSGY (12/31)  -Follow up as outpatient for Radiation treatment   -May shower    #HTN  -Continue Norvasc 10mg daily  -Monitor for OH  - BP pre med 144/72, post med 128/74 (1/2/2025)    #Hyponatremia  -Na is 134 on 12/27  -Na 141 (1/2/25)   --taper  salt tabs to 1G  daily    Sleep  --trial melatonin 3mg at 8pm    #Pain control  - Tylenol PRN    #GI/Bowel Mgmt   - Continue Senna at bedtime   - Pepcid 20mg BID for GI prophylaxis     #Bladder management  -Monitor UO-- voiding, dc'd bladder scans  - voiding without issues    #DVT prophylaxis   - Lovenox  - TEDs     #Skin: intact    #Diet  - Regular diet    Precautions / PROPHYLAXIS:   - Falls,  Seizure   - Pressure injury/Skin: OOB to Chair, PT/OT      Juanjose Johnston  Neurosurgery  [address] [place] [address]  Phone: [phone]    Wernicke, A. Gabriella A  Radiation Oncology  [address] [address]  Phone: [phone]    IDT 12/30  NSG: tex B/B  SW: lives with wife in [place], 2 [address], 7 steps inside. DME: RW/cane  SLP: reg/thin, functional exp/rec language, mild word finding diff, reduced visual acuity impacting reading  OT: min A groom, SV UBD, mod A LBD/min A footwear, mod A stand pivot transfer. Goal: SV  PT: min A bed mob/transfer/amb 50' wide base quad can, mod A 4 steps 1 HR.  Goal: close SV  Team goals: use word finding strategies in discourse w/ min A; amb + toilet with SV  Barriers: RUE weakness  TDD: 1/17 home.  will need family training

## 2025-01-03 PROCEDURE — 99232 SBSQ HOSP IP/OBS MODERATE 35: CPT

## 2025-01-03 RX ORDER — GINKGO BILOBA 40 MG
3 CAPSULE ORAL
Refills: 0 | Status: DISCONTINUED | OUTPATIENT
Start: 2025-01-03 | End: 2025-01-06

## 2025-01-03 RX ADMIN — LEVETIRACETAM 1000 MILLIGRAM(S): 100 SOLUTION ORAL at 17:48

## 2025-01-03 RX ADMIN — LEVETIRACETAM 1000 MILLIGRAM(S): 100 SOLUTION ORAL at 06:07

## 2025-01-03 RX ADMIN — Medication 3 MILLIGRAM(S): at 21:10

## 2025-01-03 RX ADMIN — ENOXAPARIN SODIUM 40 MILLIGRAM(S): 60 INJECTION INTRAVENOUS; SUBCUTANEOUS at 21:10

## 2025-01-03 RX ADMIN — FAMOTIDINE 20 MILLIGRAM(S): 20 TABLET, FILM COATED ORAL at 17:48

## 2025-01-03 RX ADMIN — Medication 4 MILLIGRAM(S): at 06:06

## 2025-01-03 RX ADMIN — FAMOTIDINE 20 MILLIGRAM(S): 20 TABLET, FILM COATED ORAL at 06:06

## 2025-01-03 RX ADMIN — Medication 1 TABLET(S): at 11:50

## 2025-01-03 RX ADMIN — Medication 2 MILLIGRAM(S): at 15:14

## 2025-01-03 NOTE — CHART NOTE - NSCHARTNOTEFT_GEN_A_CORE
Nutrition Follow Up Note  Hospital Course   (Per Electronic Medical Record)    Source:  Patient [X]  Family Member [X] wife and sister  Nursing Staff [X]   Medical Record [X]      Diet: Diet, Regular (12-27-24 @ 13:20) [Active]    At this time patient tolerating diet w/ adequate appetite/intake consuming % of meals. Patient follow a Ketogenic meal pattern. Reviewed preferences and menu alternatives, food preferences obtained and noted on patients file with nutrition office. Does not eat shellfish. No issues w/ dentition or chewing and swallowing current diet texture. Denies N/V/C/D, last BM 1/1 per nursing flowsheets.         Current Weight: 184.9lb on 12/27      Pertinent Medications: MEDICATIONS  (STANDING):  amLODIPine   Tablet 10 milliGRAM(s) Oral every 24 hours  dexAMETHasone     Tablet 4 milliGRAM(s) Oral <User Schedule>  dexAMETHasone     Tablet 2 milliGRAM(s) Oral <User Schedule>  enoxaparin Injectable 40 milliGRAM(s) SubCutaneous <User Schedule>  famotidine    Tablet 20 milliGRAM(s) Oral every 12 hours  influenza  Vaccine (HIGH DOSE) 0.5 milliLiter(s) IntraMuscular once  levETIRAcetam 1000 milliGRAM(s) Oral every 12 hours  melatonin 3 milliGRAM(s) Oral <User Schedule>  multivitamin 1 Tablet(s) Oral daily  sodium chloride 1 Gram(s) Oral daily    MEDICATIONS  (PRN):  acetaminophen     Tablet .. 650 milliGRAM(s) Oral every 6 hours PRN Mild Pain (1 - 3)  melatonin 3 milliGRAM(s) Oral <User Schedule> PRN Insomnia  senna 2 Tablet(s) Oral at bedtime PRN Constipation      Pertinent Labs:  01-02 Na141 mmol/L Glu 87 mg/dL K+ 3.9 mmol/L Cr  0.71 mg/dL BUN 21 mg/dL 01-02 Alb 2.7 g/dL[L]        Skin: No pressure injury per nursing flowsheets. Surgical Incision, Site craniotomy     Edema: No edema noted per nursing flowsheet    Last Bowel Movement: on 1/1 Per nursing flowsheets     Estimated Needs:   [X] No Change Since Previous Assessment    Previous Nutrition Diagnosis:   No Active Nutrition Dx @ This Present Time    New Nutrition Diagnosis: [X] Not Applicable    Interventions:   1. Recommend continuing with current plan of care, diet consistency per SLP  2. [place] Ketogenic food preferences as able  3. Ongoing diet education     Monitoring & Evaluation:   [X] Weights   [X] PO Intake   [X] Skin Integrity   [X] Follow Up (Per Protocol)  [X] Tolerance to Diet Prescription   [X] Other: Labs     Registered Dietitian/Nutritionist Remains Available.  Teodora Pace RD    Phone# [phone]

## 2025-01-03 NOTE — PROGRESS NOTE ADULT - SUBJECTIVE AND OBJECTIVE BOX
Patient is a 69y old  Male who presents with a chief complaint of Malignant neoplasm of brain    SUBJECTIVE/OBJECTIVE: Patient seen and evaluated while sitting up in WC, independently having breakfast and writing using LUE with pen and paper.  Wife also present.  Patient and wife express concern of incision after staple removal (which took place yesterday).  Mid incision doesn't look well approximated, but without drainage, bleeding, or signs of infection.  Picture was taken and sent to neurosurgeon.  Steri strips were then applied.  Patient denies any issues at this time > no pain, HA, dizziness, cough, CP.  Slept better with melatonin, but discussed having melatonin 3mg standing and 3mg PRN     REVIEW OF SYSTEMS:  Constitutional: No fever or chills  Pulm: No cough, No shortness of breath  Cardio: No chest pain or palpitations  GI/:  No abdominal pain, nausea, or vomiting; No dysuria  Neuro: +Right sided weakness; no HA or dizziness    PHYSICAL EXAM  69y  Vital Signs Last 24 Hrs  T(C): 36.7 (01-02-25 @ 20:28), Max: 36.7 (01-02-25 @ 20:28)  T(F): 98.1 (01-02-25 @ 20:28), Max: 98.1 (01-02-25 @ 20:28)  HR: 55 (01-02-25 @ 20:28) (55 - 55)  BP: 143/73 (01-03-25 @ 06:04) (131/72 - 143/73)  RR: 15 (01-02-25 @ 20:28) (15 - 15)  SpO2: 97% (01-02-25 @ 20:28) (97% - 97%)    Constitutional - NAD, Comfortable  HEENT- s/p craniotomy > mid incision parting/skin not well approximated, C/D/I; +Left strabismus (baseline)  Pulm: resp nonlabored  Cardiovascular - warm and well perfused; no cyanosis or pedal edema  Abdomen - Soft, NT/ND  Extremities - No peripheral edema or calf tenderness  MSK - right shoulder sublux, Right lower arm in custom splint   Neurologic Exam -                    Cognitive: AAO to x 4; able to follow commands and answer questions appropriately     Communication: +dysarthria (mild), comprehensible, No aphasia, able to repeat     Cranial Nerves - right lip droop, right facial weakness, right shoulder shrug depressed compared to left     Motor - right hemiparesis                     LEFT    UE - SF 5/5, EF 5/5, EE 5/5, WE 5/5,  5/5                    RIGHT UE - ShAB 1/5, EF 2/5, EE 1/5 (limited by biceps tone), WE 2/5, FF 3/5,  2/5                    LEFT    LE - HF 5/5, KE 5/5, DF 5/5, PF 5/5                    RIGHT LE - HF 3/5, KE 3/5, DF 1/5, PF 3/5       Tone- MAS 1+ right pec, right biceps (better after PROM)  Psychiatric - Mood stable    RECENT LABS:                               13.1   6.73  )-----------( 337      ( 02 Jan 2025 05:55 )             38.8     01-02    141  |  107  |  21  ----------------------------<  87  3.9   |  26  |  0.71    Ca    8.5      02 Jan 2025 05:55    TPro  5.8[L]  /  Alb  2.7[L]  /  TBili  0.2  /  DBili  x   /  AST  20  /  ALT  60[H]  /  AlkPhos  119  01-02    LIVER FUNCTIONS - ( 02 Jan 2025 05:55 )  Alb: 2.7 g/dL / Pro: 5.8 g/dL / ALK PHOS: 119 U/L / ALT: 60 U/L / AST: 20 U/L / GGT: x           Allergies  No Known Allergies  Intolerances    MEDICATIONS  (STANDING):  amLODIPine   Tablet 10 milliGRAM(s) Oral every 24 hours  dexAMETHasone     Tablet 4 milliGRAM(s) Oral <User Schedule>  dexAMETHasone     Tablet 2 milliGRAM(s) Oral <User Schedule>  enoxaparin Injectable 40 milliGRAM(s) SubCutaneous <User Schedule>  famotidine    Tablet 20 milliGRAM(s) Oral every 12 hours  influenza  Vaccine (HIGH DOSE) 0.5 milliLiter(s) IntraMuscular once  levETIRAcetam 1000 milliGRAM(s) Oral every 12 hours  melatonin 3 milliGRAM(s) Oral <User Schedule>  multivitamin 1 Tablet(s) Oral daily  sodium chloride 1 Gram(s) Oral daily    MEDICATIONS  (PRN):  acetaminophen     Tablet .. 650 milliGRAM(s) Oral every 6 hours PRN Mild Pain (1 - 3)  melatonin 3 milliGRAM(s) Oral <User Schedule> PRN Insomnia  senna 2 Tablet(s) Oral at bedtime PRN Constipation

## 2025-01-03 NOTE — PROGRESS NOTE ADULT - ASSESSMENT
69 year old male with PMH of HTN, OA, right TKA, s/p L craniotomy for GBM resection 04/25/2023, s/p chemo and RT 2023 with Dr. Causey, who presented to the ED at [facility] on 12/13 due to reoccurrence of brain tumor seen on his latest MRI completed 4 days prior in [place]. Patient c/o worsened R facial droop, worsening word finding difficulties, and right sided weakness x 2 weeks. He was admitted and placed on decadron and MRI done for OR planning. He underwent another left craniotomy  for resection of GBM with placement of Gammatile brachytherapy on 12/19. He was found to have new dyslexia on 12/22 and speech was consulted. He was also started on salt tabs for hyponatremia which resolved. He also had a few episodes of diarrhea coupled with hypotension and received IV fluids. Bowel regimen removed and diarrhea resolved. He was evaluated for admission to Acute Rehab and admitted to [facility] On 12/27/24.     #GBM  - s/p craniotomy for resection  - Start Comprehensive Rehab Program of PT/OT/SLP  -Continue Keppra 1000mg BID  -Continue Decadron 4mg at 6am and 2mg at 3pm   -Monitor incision - staples removed POD#14 (1/2) - 1/3 incision assessment: mid incision separation/skin not well approximated, but C/D/I.  Steri strips applied. Images of incision sent via teams to Dr. Hall > not concerned  - Question of brachytherapy > contacted Dr. Wernicke via teams and office # in regards to protocol/precaution/duration.  Awaiting for response  -Follow up as outpatient for Radiation treatment   -May shower; hold washing head at this time    #HTN  - Norvasc 10mg daily  - /72 - 143/73 > RN noted BP to be at 110 at times, pt asymptomatic.  Norvasc parameter adjusted    #Hyponatremia  -Na is 134 on 12/27  -Na 141 (1/2/25)   --taper  salt tabs to 1G  daily - next lab 1/6    #Sleep  --melatonin 3mg at 8pm - better; Will have 3mg standing + 3mg PRN    #Pain control  - Tylenol PRN    #GI/Bowel Mgmt   - Continue Senna at bedtime   - Pepcid 20mg BID for GI prophylaxis     #Bladder management  -Monitor UO-- voiding, dc'd bladder scans  - voiding without issues    #DVT prophylaxis   - Lovenox  - TEDs     #Skin: intact    #Diet  - Regular diet    Precautions / PROPHYLAXIS:   - Falls,  Seizure   - Pressure injury/Skin: OOB to Chair, PT/OT      Juanjose Johnston  Neurosurgery  [address] [place] [address]  Phone: [phone]    Wernicke, A. Gabriella A  Radiation Oncology  [address] [address]  Phone: [phone]    IDT 12/30  NSG: tex B/B  SW: lives with wife in [place], 2 [address], 7 steps inside. DME: RW/cane  SLP: reg/thin, functional exp/rec language, mild word finding diff, reduced visual acuity impacting reading  OT: min A groom, SV UBD, mod A LBD/min A footwear, mod A stand pivot transfer. Goal: SV  PT: min A bed mob/transfer/amb 50' wide base quad can, mod A 4 steps 1 HR.  Goal: close SV  Team goals: use word finding strategies in discourse w/ min A; amb + toilet with SV  Barriers: RUE weakness  TDD: 1/17 home.  will need family training

## 2025-01-04 PROCEDURE — 99232 SBSQ HOSP IP/OBS MODERATE 35: CPT

## 2025-01-04 RX ADMIN — LEVETIRACETAM 1000 MILLIGRAM(S): 100 SOLUTION ORAL at 18:24

## 2025-01-04 RX ADMIN — Medication 10 MILLIGRAM(S): at 05:53

## 2025-01-04 RX ADMIN — ENOXAPARIN SODIUM 40 MILLIGRAM(S): 60 INJECTION INTRAVENOUS; SUBCUTANEOUS at 21:34

## 2025-01-04 RX ADMIN — Medication 3 MILLIGRAM(S): at 21:34

## 2025-01-04 RX ADMIN — Medication 1 TABLET(S): at 11:45

## 2025-01-04 RX ADMIN — FAMOTIDINE 20 MILLIGRAM(S): 20 TABLET, FILM COATED ORAL at 18:25

## 2025-01-04 RX ADMIN — FAMOTIDINE 20 MILLIGRAM(S): 20 TABLET, FILM COATED ORAL at 05:53

## 2025-01-04 RX ADMIN — Medication 4 MILLIGRAM(S): at 05:55

## 2025-01-04 RX ADMIN — LEVETIRACETAM 1000 MILLIGRAM(S): 100 SOLUTION ORAL at 05:53

## 2025-01-04 RX ADMIN — Medication 2 MILLIGRAM(S): at 14:56

## 2025-01-04 NOTE — PROGRESS NOTE ADULT - ASSESSMENT
69M w/ HTN, OA, right TKA, HO craniotomy for GBM resection 04/25/2023, s/p chemo and RT 2023 with Dr. Causey,   Admit at [facility] on Dec13 for reoccurrence of GBM   Sp lt craniotomy  for resection of GBM with placement of Gammatile brachytherapy on 12/19.   Hospital course complicated by hyponatremia, diarrhea   admitted to [facility] On 12/27/24.     #GBM s/p craniotomy for resection  - Comprehensive Rehab Program  - Continue Keppra 1000mg BID  - Continue Decadron 4mg at 6am and 2mg at 3pm   - GI ppx for the above  - stable removal POD 14 (1/2)  - fall precuations    #HTN  -Continue Norvasc 10mg daily      #bradycardia  - asymptomatic    #Hyponatremia, improved  -Continue salt tabs 1G BID -- may change to 1g daily if routine labs sodium remains within normal range  -will follow    #DVT ppx:  -lovenox

## 2025-01-04 NOTE — PROGRESS NOTE ADULT - SUBJECTIVE AND OBJECTIVE BOX
|------------------------------------------|                    Primary issue  |------------------------------------------|          |-------------------------------------------|                       Subjective   |-------------------------------------------|    No events overnight    |------------------------------------------|                       Objective  |------------------------------------------|    Vital Signs Last 24 Hrs  T(F): 97.9 (04 Jan 2025 10:15), Max: 97.9 (04 Jan 2025 10:15)  HR: 89 (04 Jan 2025 10:15) (53 - 89)  BP: 126/72 (04 Jan 2025 10:15) (120/68 - 131/73)  RR: 16 (04 Jan 2025 10:15) (16 - 16)  SpO2: 97% (04 Jan 2025 10:15) (97% - 98%)  I&O's Summary    03 Jan 2025 07:01  -  04 Jan 2025 07:00  --------------------------------------------------------  IN: 480 mL / OUT: 0 mL / NET: 480 mL        Examination:   General: NAD, Comfortable  Pulm: CTA BL No Rhonchi Rales or wheezes  Neck: Supple, No JVD  CVS: RRR No rubs murmurs or gallops  Abdomen: Soft, Nontender, Nondistended; No masses or organomegally  Extremities: No calf tenderness, No pitting edema    Labs:                        13.1   6.73  )-----------( 337      ( 02 Jan 2025 05:55 )             38.8       01-02    141  |  107  |  21  ----------------------------<  87  3.9   |  26  |  0.71    Ca    8.5      02 Jan 2025 05:55    TPro  5.8  /  Alb  2.7  /  TBili  0.2  /  DBili  x   /  AST  20  /  ALT  60  /  AlkPhos  119  01-02

## 2025-01-04 NOTE — PROGRESS NOTE ADULT - ASSESSMENT
69 year old male with PMH of HTN, OA, right TKA, s/p L craniotomy for GBM resection 04/25/2023, s/p chemo and RT 2023 presented to the ED at [facility] on 12/13 due to reoccurrence of brain tumor, increased R facial droop, worsening word finding difficulties, and right sided weakness x 2 weeks. s/p left craniotomy  for resection of GBM with placement of Gammatile brachytherapy on 12/19. Hospital course significant for hyponatremia, diarrhea coupled with hypotension    # recurrent GBM  - s/p craniotomy for resection  - Continue Keppra 1000mg BID  - Continue Decadron 4mg at 6am and 2mg at 3pm   - Monitor incision - staples removed POD#14 (1/2) - 1/4 incision well approximated with steri strips, discussed wiht patient and wife  - continue Comprehensive Rehab Program of PT/OT/SLP  - Question of brachytherapy precautions: limit visitors, avoid contact with pregnant caregivers, private room  - Follow up as outpatient for Radiation treatment   - TDD: 1/17 home.  will need family training    # HTN  - Norvasc 10mg daily  - /68 - 131/73) 1/4    # Hyponatremia  - Na 141 (1/2/25)   - taper  salt tabs to 1G  daily   - next lab 1/6    # Sleep  - melatonin 3mg at 8pm and 3 mg PRN  - declines medication for interrupted sleep 1/4    # Pain control  - Tylenol PRN    # GI/Bowel Mgmt   - Continue Senna at bedtime   - Pepcid 20mg BID    # Diet  - Regular diet    # DVT prophylaxis   - Lovenox    Juanjose Johnston  Neurosurgery  [address] [place] [address]  Phone: [phone]    Wernicke, A. Gabriella A  Radiation Oncology  [address] [address]  Phone: [phone]

## 2025-01-04 NOTE — PROGRESS NOTE ADULT - SUBJECTIVE AND OBJECTIVE BOX
Patient is a 69y old  Male who presents with a chief complaint of GBM s/p craniotomy for resection (03 Jan 2025 11:12)      HPI:  69 year old male with PMH of HTN, OA, right TKA, s/p L craniotomy for GBM resection 04/25/2023, s/p chemo and RT 2023 with Dr. Causey, who presented to the ED at [facility] on 12/13 due to reoccurrence of brain tumor seen on his latest MRI completed 4 days prior in [place]. Patient c/o worsened R facial droop, worsening word finding difficulties, and right sided weakness x 2 weeks. He was admitted and placed on decadron and MRI done for OR planning. He underwent another left craniotomy  for resection of GBM with placement of Gammatile brachytherapy on 12/19. He was found to have new dyslexia on 12/22 and speech was consulted. He was also started on salt tabs for hyponatremia which resolved. He also had a few episodes of diarrhea coupled with hypotension and received IV fluids. Bowel regimen removed and diarrhea resolved. He was evaluated for admission to Acute Rehab and admitted to [facility] On 12/27/24.      (27 Dec 2024 13:01)      PAST MEDICAL & SURGICAL HISTORY:  Hypertension      Osteoarthritis      Glioblastoma      S/P total knee replacement, right      H/O craniotomy          MEDICATIONS  (STANDING):  amLODIPine   Tablet 10 milliGRAM(s) Oral every 24 hours  dexAMETHasone     Tablet 4 milliGRAM(s) Oral <User Schedule>  dexAMETHasone     Tablet 2 milliGRAM(s) Oral <User Schedule>  enoxaparin Injectable 40 milliGRAM(s) SubCutaneous <User Schedule>  famotidine    Tablet 20 milliGRAM(s) Oral every 12 hours  influenza  Vaccine (HIGH DOSE) 0.5 milliLiter(s) IntraMuscular once  levETIRAcetam 1000 milliGRAM(s) Oral every 12 hours  melatonin 3 milliGRAM(s) Oral <User Schedule>  multivitamin 1 Tablet(s) Oral daily  sodium chloride 1 Gram(s) Oral daily    MEDICATIONS  (PRN):  acetaminophen     Tablet .. 650 milliGRAM(s) Oral every 6 hours PRN Mild Pain (1 - 3)  melatonin 3 milliGRAM(s) Oral <User Schedule> PRN Insomnia  senna 2 Tablet(s) Oral at bedtime PRN Constipation      Allergies    No Known Allergies    Intolerances          VITALS  69y  Vital Signs Last 24 Hrs  T(C): 36.6 (03 Jan 2025 19:45), Max: 36.6 (03 Jan 2025 19:45)  T(F): 97.8 (03 Jan 2025 19:45), Max: 97.8 (03 Jan 2025 19:45)  HR: 54 (04 Jan 2025 05:52) (53 - 54)  BP: 131/73 (04 Jan 2025 05:52) (120/68 - 131/73)  BP(mean): --  RR: 16 (03 Jan 2025 19:45) (16 - 16)  SpO2: 98% (03 Jan 2025 19:45) (98% - 98%)    Parameters below as of 03 Jan 2025 19:45  Patient On (Oxygen Delivery Method): room air      Daily     Daily         RECENT LABS:                      CAPILLARY BLOOD GLUCOSE

## 2025-01-05 PROCEDURE — 99232 SBSQ HOSP IP/OBS MODERATE 35: CPT

## 2025-01-05 RX ADMIN — Medication 3 MILLIGRAM(S): at 21:29

## 2025-01-05 RX ADMIN — FAMOTIDINE 20 MILLIGRAM(S): 20 TABLET, FILM COATED ORAL at 17:24

## 2025-01-05 RX ADMIN — Medication 2 MILLIGRAM(S): at 15:14

## 2025-01-05 RX ADMIN — FAMOTIDINE 20 MILLIGRAM(S): 20 TABLET, FILM COATED ORAL at 06:00

## 2025-01-05 RX ADMIN — LEVETIRACETAM 1000 MILLIGRAM(S): 100 SOLUTION ORAL at 06:00

## 2025-01-05 RX ADMIN — LEVETIRACETAM 1000 MILLIGRAM(S): 100 SOLUTION ORAL at 17:23

## 2025-01-05 RX ADMIN — Medication 1 TABLET(S): at 11:38

## 2025-01-05 RX ADMIN — ENOXAPARIN SODIUM 40 MILLIGRAM(S): 60 INJECTION INTRAVENOUS; SUBCUTANEOUS at 21:30

## 2025-01-05 RX ADMIN — Medication 4 MILLIGRAM(S): at 06:00

## 2025-01-05 RX ADMIN — Medication 10 MILLIGRAM(S): at 06:00

## 2025-01-05 NOTE — PROGRESS NOTE ADULT - ASSESSMENT
69 year old male with PMH of HTN, OA, right TKA, s/p L craniotomy for GBM resection 04/25/2023, s/p chemo and RT 2023 presented to the ED at [facility] on 12/13 due to reoccurrence of brain tumor, increased R facial droop, worsening word finding difficulties, and right sided weakness x 2 weeks. s/p left craniotomy  for resection of GBM with placement of Gammatile brachytherapy on 12/19. Hospital course significant for hyponatremia, diarrhea coupled with hypotension    # recurrent GBM  - s/p craniotomy for resection  - Continue Keppra 1000mg BID  - Continue Decadron 4mg at 6am and 2mg at 3pm   - Monitor incision - staples removed POD#14 (1/2) - incision well approximated with steri strips, discussed with patient and wife/ Hold off washing head until incision further healed given concern of dehisence 1/2, reviewed with patient and family 1/5  - continue Comprehensive Rehab Program of PT/OT/SLP. Patient would like increased PT time, to relay to primary team to review schedule  - Question of brachytherapy precautions: limit visitors, avoid contact with pregnant caregivers, private room  - right foot drop: AFO  - Follow up as outpatient for Radiation treatment   - TDD: 1/17 home.  will need family training    # HTN  - Norvasc 10mg daily  - BP (120/72 - 128/72) 1/5    # Hyponatremia  - Na 141 (1/2/25)   - taper  salt tabs to 1G  daily   - next lab 1/6    # Sleep  - melatonin 3mg at 8pm and 3 mg PRN  - declines medication for interrupted sleep 1/4. Improved 1/5    # Pain control  - Tylenol PRN    # GI/Bowel Mgmt   - Continue Senna at bedtime   - Pepcid 20mg BID    # Diet  - Regular diet    # DVT prophylaxis   - Lovenox    Juanjose Johnston  Neurosurgery  [address] [place] [address]  Phone: [phone]    Wernicke, A. Gabriella A  Radiation Oncology  [address] [address]  Phone: [phone]

## 2025-01-05 NOTE — PROGRESS NOTE ADULT - ASSESSMENT
69M w/ HTN, OA, right TKA, HO craniotomy for GBM resection 04/25/2023, s/p chemo and RT 2023 with Dr. Causey,   Admit at [facility] on Dec13 for reoccurrence of GBM   Sp lt craniotomy  for resection of GBM with placement of Gammatile brachytherapy on 12/19.   Hospital course complicated by hyponatremia, diarrhea   admitted to [facility] On 12/27/24.     #GBM s/p craniotomy for resection  - Comprehensive Rehab Program  - Continue Keppra 1000mg BID  - Continue Decadron 4mg at 6am and 2mg at 3pm   - GI ppx for the above  - stable removal POD 14 (1/2)  - fall precuations    #HTN  -Continue Norvasc 10mg daily  -cont care      #bradycardia  - asymptomatic    #Hyponatremia, improved  -Continue salt tabs 1G BID -- may change to 1g daily if routine labs sodium remains within normal range  -will follow    #DVT ppx:  -lovenox

## 2025-01-05 NOTE — PROGRESS NOTE ADULT - SUBJECTIVE AND OBJECTIVE BOX
Patient is a 69y old  Male who presents with a chief complaint of GBM s/p craniotomy for resection (04 Jan 2025 13:12)      HPI:  69 year old male with PMH of HTN, OA, right TKA, s/p L craniotomy for GBM resection 04/25/2023, s/p chemo and RT 2023 with Dr. Causey, who presented to the ED at [facility] on 12/13 due to reoccurrence of brain tumor seen on his latest MRI completed 4 days prior in [place]. Patient c/o worsened R facial droop, worsening word finding difficulties, and right sided weakness x 2 weeks. He was admitted and placed on decadron and MRI done for OR planning. He underwent another left craniotomy  for resection of GBM with placement of Gammatile brachytherapy on 12/19. He was found to have new dyslexia on 12/22 and speech was consulted. He was also started on salt tabs for hyponatremia which resolved. He also had a few episodes of diarrhea coupled with hypotension and received IV fluids. Bowel regimen removed and diarrhea resolved. He was evaluated for admission to Acute Rehab and admitted to [facility] On 12/27/24.      (27 Dec 2024 13:01)      PAST MEDICAL & SURGICAL HISTORY:  Hypertension      Osteoarthritis      Glioblastoma      S/P total knee replacement, right      H/O craniotomy          MEDICATIONS  (STANDING):  amLODIPine   Tablet 10 milliGRAM(s) Oral every 24 hours  dexAMETHasone     Tablet 4 milliGRAM(s) Oral <User Schedule>  dexAMETHasone     Tablet 2 milliGRAM(s) Oral <User Schedule>  enoxaparin Injectable 40 milliGRAM(s) SubCutaneous <User Schedule>  famotidine    Tablet 20 milliGRAM(s) Oral every 12 hours  influenza  Vaccine (HIGH DOSE) 0.5 milliLiter(s) IntraMuscular once  levETIRAcetam 1000 milliGRAM(s) Oral every 12 hours  melatonin 3 milliGRAM(s) Oral <User Schedule>  multivitamin 1 Tablet(s) Oral daily  sodium chloride 1 Gram(s) Oral daily    MEDICATIONS  (PRN):  acetaminophen     Tablet .. 650 milliGRAM(s) Oral every 6 hours PRN Mild Pain (1 - 3)  melatonin 3 milliGRAM(s) Oral <User Schedule> PRN Insomnia  senna 2 Tablet(s) Oral at bedtime PRN Constipation      Allergies    No Known Allergies    Intolerances          VITALS  69y  Vital Signs Last 24 Hrs  T(C): 36.4 (04 Jan 2025 21:30), Max: 36.6 (04 Jan 2025 10:15)  T(F): 97.6 (04 Jan 2025 21:30), Max: 97.9 (04 Jan 2025 10:15)  HR: 55 (05 Jan 2025 05:56) (54 - 89)  BP: 120/72 (05 Jan 2025 05:56) (120/72 - 128/72)  BP(mean): --  RR: 16 (04 Jan 2025 21:30) (16 - 16)  SpO2: 96% (04 Jan 2025 21:30) (96% - 97%)    Parameters below as of 04 Jan 2025 21:30  Patient On (Oxygen Delivery Method): room air      Daily     Daily         RECENT LABS:                      CAPILLARY BLOOD GLUCOSE

## 2025-01-05 NOTE — PROGRESS NOTE ADULT - SUBJECTIVE AND OBJECTIVE BOX
|------------------------------------------|                    Primary issue  |------------------------------------------|    fu gbm    |-------------------------------------------|                       Subjective   |-------------------------------------------|    No events overnight  No new complaints to offer me    |------------------------------------------|                       Objective  |------------------------------------------|    Vital Signs Last 24 Hrs  T(F): 98.1 (05 Jan 2025 08:37), Max: 98.1 (05 Jan 2025 08:37)  HR: 65 (05 Jan 2025 08:37) (54 - 65)  BP: 106/67 (05 Jan 2025 08:37) (106/67 - 128/72)  RR: 14 (05 Jan 2025 08:37) (14 - 16)  SpO2: 96% (05 Jan 2025 08:37) (96% - 96%)  I&O's Summary      Examination:   General: NAD, Comfortable  Pulm: CTA BL No Rhonchi Rales or wheezes  Neck: Supple, No JVD  CVS: RRR No rubs murmurs or gallops  Abdomen: Soft, Nontender, Nondistended; No masses or organomegally  Extremities: No calf tenderness, No pitting edema    Labs:

## 2025-01-06 ENCOUNTER — NON-APPOINTMENT (OUTPATIENT)
Age: 70
End: 2025-01-06

## 2025-01-06 DIAGNOSIS — R48.0 DYSLEXIA AND ALEXIA: ICD-10-CM

## 2025-01-06 DIAGNOSIS — C71.1 MALIGNANT NEOPLASM OF FRONTAL LOBE: ICD-10-CM

## 2025-01-06 DIAGNOSIS — Z92.3 PERSONAL HISTORY OF IRRADIATION: ICD-10-CM

## 2025-01-06 DIAGNOSIS — G81.91 HEMIPLEGIA, UNSPECIFIED AFFECTING RIGHT DOMINANT SIDE: ICD-10-CM

## 2025-01-06 DIAGNOSIS — R19.7 DIARRHEA, UNSPECIFIED: ICD-10-CM

## 2025-01-06 DIAGNOSIS — I95.9 HYPOTENSION, UNSPECIFIED: ICD-10-CM

## 2025-01-06 DIAGNOSIS — R47.1 DYSARTHRIA AND ANARTHRIA: ICD-10-CM

## 2025-01-06 DIAGNOSIS — R29.810 FACIAL WEAKNESS: ICD-10-CM

## 2025-01-06 DIAGNOSIS — G93.5 COMPRESSION OF BRAIN: ICD-10-CM

## 2025-01-06 DIAGNOSIS — Z96.651 PRESENCE OF RIGHT ARTIFICIAL KNEE JOINT: ICD-10-CM

## 2025-01-06 DIAGNOSIS — I10 ESSENTIAL (PRIMARY) HYPERTENSION: ICD-10-CM

## 2025-01-06 DIAGNOSIS — R47.01 APHASIA: ICD-10-CM

## 2025-01-06 DIAGNOSIS — G93.6 CEREBRAL EDEMA: ICD-10-CM

## 2025-01-06 DIAGNOSIS — Z92.21 PERSONAL HISTORY OF ANTINEOPLASTIC CHEMOTHERAPY: ICD-10-CM

## 2025-01-06 DIAGNOSIS — E87.1 HYPO-OSMOLALITY AND HYPONATREMIA: ICD-10-CM

## 2025-01-06 LAB
ALBUMIN SERPL ELPH-MCNC: 2.7 G/DL — LOW (ref 3.3–5)
ALP SERPL-CCNC: 102 U/L — SIGNIFICANT CHANGE UP (ref 40–120)
ALT FLD-CCNC: 45 U/L — SIGNIFICANT CHANGE UP (ref 10–45)
ANION GAP SERPL CALC-SCNC: 4 MMOL/L — LOW (ref 5–17)
AST SERPL-CCNC: 16 U/L — SIGNIFICANT CHANGE UP (ref 10–40)
BASOPHILS # BLD AUTO: 0.01 K/UL — SIGNIFICANT CHANGE UP (ref 0–0.2)
BASOPHILS NFR BLD AUTO: 0.2 % — SIGNIFICANT CHANGE UP (ref 0–2)
BILIRUB SERPL-MCNC: 0.3 MG/DL — SIGNIFICANT CHANGE UP (ref 0.2–1.2)
BUN SERPL-MCNC: 25 MG/DL — HIGH (ref 7–23)
CALCIUM SERPL-MCNC: 8.7 MG/DL — SIGNIFICANT CHANGE UP (ref 8.4–10.5)
CHLORIDE SERPL-SCNC: 106 MMOL/L — SIGNIFICANT CHANGE UP (ref 96–108)
CO2 SERPL-SCNC: 30 MMOL/L — SIGNIFICANT CHANGE UP (ref 22–31)
CREAT SERPL-MCNC: 1.09 MG/DL — SIGNIFICANT CHANGE UP (ref 0.5–1.3)
EGFR: 74 ML/MIN/1.73M2 — SIGNIFICANT CHANGE UP
EOSINOPHIL # BLD AUTO: 0.02 K/UL — SIGNIFICANT CHANGE UP (ref 0–0.5)
EOSINOPHIL NFR BLD AUTO: 0.3 % — SIGNIFICANT CHANGE UP (ref 0–6)
GLUCOSE SERPL-MCNC: 107 MG/DL — HIGH (ref 70–99)
HCT VFR BLD CALC: 38.8 % — LOW (ref 39–50)
HGB BLD-MCNC: 12.6 G/DL — LOW (ref 13–17)
IMM GRANULOCYTES NFR BLD AUTO: 0.3 % — SIGNIFICANT CHANGE UP (ref 0–0.9)
LYMPHOCYTES # BLD AUTO: 2.16 K/UL — SIGNIFICANT CHANGE UP (ref 1–3.3)
LYMPHOCYTES # BLD AUTO: 34.4 % — SIGNIFICANT CHANGE UP (ref 13–44)
MCHC RBC-ENTMCNC: 28.9 PG — SIGNIFICANT CHANGE UP (ref 27–34)
MCHC RBC-ENTMCNC: 32.5 G/DL — SIGNIFICANT CHANGE UP (ref 32–36)
MCV RBC AUTO: 89 FL — SIGNIFICANT CHANGE UP (ref 80–100)
MONOCYTES # BLD AUTO: 0.59 K/UL — SIGNIFICANT CHANGE UP (ref 0–0.9)
MONOCYTES NFR BLD AUTO: 9.4 % — SIGNIFICANT CHANGE UP (ref 2–14)
NEUTROPHILS # BLD AUTO: 3.48 K/UL — SIGNIFICANT CHANGE UP (ref 1.8–7.4)
NEUTROPHILS NFR BLD AUTO: 55.4 % — SIGNIFICANT CHANGE UP (ref 43–77)
NRBC # BLD: 0 /100 WBCS — SIGNIFICANT CHANGE UP (ref 0–0)
PLATELET # BLD AUTO: 322 K/UL — SIGNIFICANT CHANGE UP (ref 150–400)
POTASSIUM SERPL-MCNC: 4.4 MMOL/L — SIGNIFICANT CHANGE UP (ref 3.5–5.3)
POTASSIUM SERPL-SCNC: 4.4 MMOL/L — SIGNIFICANT CHANGE UP (ref 3.5–5.3)
PROT SERPL-MCNC: 5.9 G/DL — LOW (ref 6–8.3)
RBC # BLD: 4.36 M/UL — SIGNIFICANT CHANGE UP (ref 4.2–5.8)
RBC # FLD: 13.6 % — SIGNIFICANT CHANGE UP (ref 10.3–14.5)
SODIUM SERPL-SCNC: 140 MMOL/L — SIGNIFICANT CHANGE UP (ref 135–145)
WBC # BLD: 6.28 K/UL — SIGNIFICANT CHANGE UP (ref 3.8–10.5)
WBC # FLD AUTO: 6.28 K/UL — SIGNIFICANT CHANGE UP (ref 3.8–10.5)

## 2025-01-06 PROCEDURE — 99233 SBSQ HOSP IP/OBS HIGH 50: CPT | Mod: GC

## 2025-01-06 RX ORDER — GINKGO BILOBA 40 MG
6 CAPSULE ORAL
Refills: 0 | Status: DISCONTINUED | OUTPATIENT
Start: 2025-01-06 | End: 2025-01-07

## 2025-01-06 RX ADMIN — ENOXAPARIN SODIUM 40 MILLIGRAM(S): 60 INJECTION INTRAVENOUS; SUBCUTANEOUS at 21:38

## 2025-01-06 RX ADMIN — FAMOTIDINE 20 MILLIGRAM(S): 20 TABLET, FILM COATED ORAL at 17:42

## 2025-01-06 RX ADMIN — Medication 2 MILLIGRAM(S): at 15:56

## 2025-01-06 RX ADMIN — LEVETIRACETAM 1000 MILLIGRAM(S): 100 SOLUTION ORAL at 06:06

## 2025-01-06 RX ADMIN — Medication 6 MILLIGRAM(S): at 21:38

## 2025-01-06 RX ADMIN — Medication 1 TABLET(S): at 11:03

## 2025-01-06 RX ADMIN — LEVETIRACETAM 1000 MILLIGRAM(S): 100 SOLUTION ORAL at 17:41

## 2025-01-06 RX ADMIN — Medication 4 MILLIGRAM(S): at 06:05

## 2025-01-06 RX ADMIN — FAMOTIDINE 20 MILLIGRAM(S): 20 TABLET, FILM COATED ORAL at 06:06

## 2025-01-06 RX ADMIN — Medication 10 MILLIGRAM(S): at 06:05

## 2025-01-06 NOTE — PROGRESS NOTE ADULT - SUBJECTIVE AND OBJECTIVE BOX
Patient is a 69y old  Male who presents with a chief complaint of Malignant neoplasm of brain    SUBJECTIVE/OBJECTIVE: Patient seen and evaluated while resting in bed.  Weekend was uneventful.  Endorses that sleep is better but is getting up at 1qp,/12am.  Agreeable to increasing melatonin dose    REVIEW OF SYSTEMS:  Constitutional: No fever or chills  Pulm: No cough, No shortness of breath  Cardio: No chest pain or palpitations  GI/:  No abdominal pain, nausea, or vomiting; No dysuria  Neuro: +Right sided weakness; no HA or dizziness  MSK: no joint or muscle pain    PHYSICAL EXAM  69y  Vital Signs Last 24 Hrs  T(C): 36.7 (01-06-25 @ 08:57), Max: 36.8 (01-06-25 @ 06:02)  T(F): 98 (01-06-25 @ 08:57), Max: 98.2 (01-06-25 @ 06:02)  HR: 67 (01-06-25 @ 08:57) (60 - 71)  BP: 110/70 (01-06-25 @ 08:57) (110/70 - 137/74)  RR: 14 (01-06-25 @ 08:57) (14 - 16)  SpO2: 96% (01-06-25 @ 08:57) (96% - 99%)    Constitutional - NAD, Comfortable  HEENT- s/p craniotomy > skin of incision better approximated with steri strip; +Left strabismus (baseline)  Pulm: resp nonlabored  Cardiovascular - warm and well perfused; no cyanosis or pedal edema  Abdomen - Soft, NT/ND  Extremities - No peripheral edema or calf tenderness  MSK - right shoulder sublux, Right lower arm in custom splint   Neurologic Exam -                    Cognitive: AAO to x 4; able to follow commands and answer questions appropriately     Communication: +dysarthria (mild), comprehensible, No aphasia, able to repeat     Cranial Nerves - right lip droop, right facial weakness, right shoulder shrug depressed compared to left     Motor - right hemiparesis                     LEFT    UE - SF 5/5, EF 5/5, EE 5/5, WE 5/5,  5/5                    RIGHT UE - ShAB 1/5, EF 2/5, EE 1/5, WE 2/5, FF 3/5,  2/5                    LEFT    LE - HF 5/5, KE 5/5, DF 5/5, PF 5/5                    RIGHT LE - HF 3/5, KE 3/5, DF 1/5, PF 3/5       Tone- MAS 1+ right pec, right biceps   Psychiatric - Mood stable    RECENT LABS:                                       12.6   6.28  )-----------( 322      ( 06 Jan 2025 06:41 )             38.8     01-06    140  |  106  |  25[H]  ----------------------------<  107[H]  4.4   |  30  |  1.09    Ca    8.7      06 Jan 2025 06:41    TPro  5.9[L]  /  Alb  2.7[L]  /  TBili  0.3  /  DBili  x   /  AST  16  /  ALT  45  /  AlkPhos  102  01-06    LIVER FUNCTIONS - ( 06 Jan 2025 06:41 )  Alb: 2.7 g/dL / Pro: 5.9 g/dL / ALK PHOS: 102 U/L / ALT: 45 U/L / AST: 16 U/L / GGT: x              Allergies  No Known Allergies  Intolerances    MEDICATIONS  (STANDING):  amLODIPine   Tablet 10 milliGRAM(s) Oral every 24 hours  dexAMETHasone     Tablet 4 milliGRAM(s) Oral <User Schedule>  dexAMETHasone     Tablet 2 milliGRAM(s) Oral <User Schedule>  enoxaparin Injectable 40 milliGRAM(s) SubCutaneous <User Schedule>  famotidine    Tablet 20 milliGRAM(s) Oral every 12 hours  influenza  Vaccine (HIGH DOSE) 0.5 milliLiter(s) IntraMuscular once  levETIRAcetam 1000 milliGRAM(s) Oral every 12 hours  melatonin 6 milliGRAM(s) Oral <User Schedule>  multivitamin 1 Tablet(s) Oral daily  sodium chloride 1 Gram(s) Oral daily    MEDICATIONS  (PRN):  acetaminophen     Tablet .. 650 milliGRAM(s) Oral every 6 hours PRN Mild Pain (1 - 3)  senna 2 Tablet(s) Oral at bedtime PRN Constipation   Patient is a 69y old  Male who presents with a chief complaint of Malignant neoplasm of brain    SUBJECTIVE/OBJECTIVE: Patient seen and evaluated while resting in bed.  Weekend was uneventful.  Endorses that sleep is better but is getting up at 10/12am.  Agreeable to increasing melatonin dose    REVIEW OF SYSTEMS:  Constitutional: No fever or chills  Pulm: No cough, No shortness of breath  Cardio: No chest pain or palpitations  GI/:  No abdominal pain, nausea, or vomiting; No dysuria  Neuro: +Right sided weakness; no HA or dizziness  MSK: no joint or muscle pain    PHYSICAL EXAM  69y  Vital Signs Last 24 Hrs  T(C): 36.7 (01-06-25 @ 08:57), Max: 36.8 (01-06-25 @ 06:02)  T(F): 98 (01-06-25 @ 08:57), Max: 98.2 (01-06-25 @ 06:02)  HR: 67 (01-06-25 @ 08:57) (60 - 71)  BP: 110/70 (01-06-25 @ 08:57) (110/70 - 137/74)  RR: 14 (01-06-25 @ 08:57) (14 - 16)  SpO2: 96% (01-06-25 @ 08:57) (96% - 99%)    Constitutional - NAD, Comfortable  HEENT- s/p craniotomy > skin of incision better approximated with steri strip; +Left strabismus (baseline)  Pulm: resp nonlabored  Cardiovascular - warm and well perfused; no cyanosis or pedal edema  Abdomen - Soft, NT/ND  Extremities - No peripheral edema or calf tenderness  MSK - right shoulder sublux, Right lower arm in custom splint   Neurologic Exam -                    Cognitive: AAO to x 4; able to follow commands and answer questions appropriately     Communication: +dysarthria (mild), comprehensible, No aphasia, able to repeat     Cranial Nerves - right lip droop, right facial weakness, right shoulder shrug depressed compared to left     Motor - right hemiparesis                     LEFT    UE - SF 5/5, EF 5/5, EE 5/5, WE 5/5,  5/5                    RIGHT UE - ShAB 1/5, EF 2/5, EE 1/5, WE 2/5, FF 3/5,  2/5                    LEFT    LE - HF 5/5, KE 5/5, DF 5/5, PF 5/5                    RIGHT LE - HF 3/5, KE 3/5, DF 1/5, PF 3/5       Tone- MAS 1+ right pec, right biceps   Psychiatric - Mood stable    RECENT LABS:                                       12.6   6.28  )-----------( 322      ( 06 Jan 2025 06:41 )             38.8     01-06    140  |  106  |  25[H]  ----------------------------<  107[H]  4.4   |  30  |  1.09    Ca    8.7      06 Jan 2025 06:41    TPro  5.9[L]  /  Alb  2.7[L]  /  TBili  0.3  /  DBili  x   /  AST  16  /  ALT  45  /  AlkPhos  102  01-06    LIVER FUNCTIONS - ( 06 Jan 2025 06:41 )  Alb: 2.7 g/dL / Pro: 5.9 g/dL / ALK PHOS: 102 U/L / ALT: 45 U/L / AST: 16 U/L / GGT: x              Allergies  No Known Allergies  Intolerances    MEDICATIONS  (STANDING):  amLODIPine   Tablet 10 milliGRAM(s) Oral every 24 hours  dexAMETHasone     Tablet 4 milliGRAM(s) Oral <User Schedule>  dexAMETHasone     Tablet 2 milliGRAM(s) Oral <User Schedule>  enoxaparin Injectable 40 milliGRAM(s) SubCutaneous <User Schedule>  famotidine    Tablet 20 milliGRAM(s) Oral every 12 hours  influenza  Vaccine (HIGH DOSE) 0.5 milliLiter(s) IntraMuscular once  levETIRAcetam 1000 milliGRAM(s) Oral every 12 hours  melatonin 6 milliGRAM(s) Oral <User Schedule>  multivitamin 1 Tablet(s) Oral daily  sodium chloride 1 Gram(s) Oral daily    MEDICATIONS  (PRN):  acetaminophen     Tablet .. 650 milliGRAM(s) Oral every 6 hours PRN Mild Pain (1 - 3)  senna 2 Tablet(s) Oral at bedtime PRN Constipation

## 2025-01-06 NOTE — PROGRESS NOTE ADULT - ASSESSMENT
69 year old male with PMH of HTN, OA, right TKA, s/p L craniotomy for GBM resection 04/25/2023, s/p chemo and RT 2023 with Dr. Causey, who presented to the ED at [facility] on 12/13 due to reoccurrence of brain tumor seen on his latest MRI completed 4 days prior in [place]. Patient c/o worsened R facial droop, worsening word finding difficulties, and right sided weakness x 2 weeks. He was admitted and placed on decadron and MRI done for OR planning. He underwent another left craniotomy  for resection of GBM with placement of Gammatile brachytherapy on 12/19. He was found to have new dyslexia on 12/22 and speech was consulted. He was also started on salt tabs for hyponatremia which resolved. He also had a few episodes of diarrhea coupled with hypotension and received IV fluids. Bowel regimen removed and diarrhea resolved. He was evaluated for admission to Acute Rehab and admitted to [facility] On 12/27/24.     #GBM  - s/p craniotomy for resection  - Start Comprehensive Rehab Program of PT/OT/SLP  -Continue Keppra 1000mg BID  -Continue Decadron 4mg at 6am and 2mg at 3pm   -Monitor incision - staples removed POD#14 (1/2) - (1/3) incision assessment: mid incision separation/skin not well approximated, but C/D/I.  Steri strips applied. Images of incision sent via teams to Dr. Hall > not concerned; (1/6) incision well approximated with steri strips  - Question of brachytherapy > contacted Dr. Wernicke via teams and office # in regards to protocol/precaution/duration.  Awaiting for response  -Follow up as outpatient for Radiation treatment   -May shower; hold washing head at this time    #HTN  - Norvasc 10mg daily  - /74 pre med > post med 110/7 (asymptomatic) 1/6    #Hyponatremia  -Na is 134 on 12/27  -Na 141 (1/2/25)   --taper  salt tabs to 1G  daily - Na 140 > dc NaCl    #Sleep  --melatonin 3mg at 8pm - > inc to 6mg @8p    #Pain control  - Tylenol PRN    #GI/Bowel Mgmt   - Continue Senna at bedtime   - Pepcid 20mg BID for GI prophylaxis     #Bladder management  -Monitor UO-- voiding, dc'd bladder scans  - voiding without issues    #DVT prophylaxis   - Lovenox  - TEDs     #Skin: intact    #Diet  - Regular diet    Precautions / PROPHYLAXIS:   - Falls,  Seizure   - Pressure injury/Skin: OOB to Chair, PT/OT      Juanjose Johnston  Neurosurgery  [address] [place] [address]  Phone: [phone]    Wernicke, A. Gabriella A  Radiation Oncology  [address] [address]  Phone: [phone]    IDT 12/30  NSG: tex B/B  SW: lives with wife in [place], 2 [address], 7 steps inside. DME: RW/cane  SLP: reg/thin, functional exp/rec language, mild word finding diff, reduced visual acuity impacting reading  OT: min A groom, SV UBD, mod A LBD/min A footwear, mod A stand pivot transfer. Goal: SV  PT: min A bed mob/transfer/amb 50' wide base quad can, mod A 4 steps 1 HR.  Goal: close SV  Team goals: use word finding strategies in discourse w/ min A; amb + toilet with SV  Barriers: RUE weakness  TDD: 1/17 home.  will need family training 69 year old male with PMH of HTN, OA, right TKA, s/p L craniotomy for GBM resection 04/25/2023, s/p chemo and RT 2023 with Dr. Causey, who presented to the ED at [facility] on 12/13 due to reoccurrence of brain tumor seen on his latest MRI completed 4 days prior in [place]. Patient c/o worsened R facial droop, worsening word finding difficulties, and right sided weakness x 2 weeks. He was admitted and placed on decadron and MRI done for OR planning. He underwent another left craniotomy  for resection of GBM with placement of Gammatile brachytherapy on 12/19. He was found to have new dyslexia on 12/22 and speech was consulted. He was also started on salt tabs for hyponatremia which resolved. He also had a few episodes of diarrhea coupled with hypotension and received IV fluids. Bowel regimen removed and diarrhea resolved. He was evaluated for admission to Acute Rehab and admitted to [facility] On 12/27/24.     #GBM  - s/p craniotomy for resection  - Start Comprehensive Rehab Program of PT/OT/SLP  -Continue Keppra 1000mg BID  -Continue Decadron 4mg at 6am and 2mg at 3pm   -Monitor incision - staples removed POD#14 (1/2) - (1/3) incision assessment: mid incision separation/skin not well approximated, but C/D/I.  Steri strips applied. Images of incision sent via teams to Dr. Hall > not concerned; (1/6) incision well approximated with steri strips  - Question of brachytherapy > contacted Dr. Wernicke via teams and office # in regards to protocol/precaution/duration.  Awaiting for response  -Follow up as outpatient for Radiation treatment   -May shower; hold washing head at this time    #HTN  - Norvasc 10mg daily  - /74 pre med > post med 110/7 (asymptomatic) 1/6    #Hyponatremia  -Na is 134 on 12/27  -Na 141 (1/2/25)   --taper  salt tabs to 1G  daily - Na 140 > dc NaCl    #Sleep  --melatonin 3mg at 8pm - > inc to 6mg @8p    #Pain control  - Tylenol PRN    #GI/Bowel Mgmt   - Continue Senna at bedtime   - Pepcid 20mg BID for GI prophylaxis     #Bladder management  -Monitor UO-- voiding, dc'd bladder scans  - voiding without issues    #DVT prophylaxis   - Lovenox  - TEDs     #Skin: intact    #Diet  - Regular diet    Precautions / PROPHYLAXIS:   - Falls,  Seizure   - Pressure injury/Skin: OOB to Chair, PT/OT      Juanjose Johnston  Neurosurgery  [address] [place] [address]  Phone: [phone]    Wernicke, A. Gabriella A  Radiation Oncology  [address] [address]  Phone: [phone]

## 2025-01-07 PROCEDURE — 99232 SBSQ HOSP IP/OBS MODERATE 35: CPT

## 2025-01-07 PROCEDURE — 99233 SBSQ HOSP IP/OBS HIGH 50: CPT | Mod: GC

## 2025-01-07 RX ORDER — RAMELTEON 8 MG/1
8 TABLET, FILM COATED ORAL
Refills: 0 | Status: DISCONTINUED | OUTPATIENT
Start: 2025-01-07 | End: 2025-01-08

## 2025-01-07 RX ADMIN — ENOXAPARIN SODIUM 40 MILLIGRAM(S): 60 INJECTION INTRAVENOUS; SUBCUTANEOUS at 21:01

## 2025-01-07 RX ADMIN — FAMOTIDINE 20 MILLIGRAM(S): 20 TABLET, FILM COATED ORAL at 17:29

## 2025-01-07 RX ADMIN — Medication 4 MILLIGRAM(S): at 05:34

## 2025-01-07 RX ADMIN — Medication 2 MILLIGRAM(S): at 14:09

## 2025-01-07 RX ADMIN — RAMELTEON 8 MILLIGRAM(S): 8 TABLET, FILM COATED ORAL at 21:01

## 2025-01-07 RX ADMIN — LEVETIRACETAM 1000 MILLIGRAM(S): 100 SOLUTION ORAL at 17:28

## 2025-01-07 RX ADMIN — FAMOTIDINE 20 MILLIGRAM(S): 20 TABLET, FILM COATED ORAL at 05:34

## 2025-01-07 RX ADMIN — Medication 10 MILLIGRAM(S): at 05:33

## 2025-01-07 RX ADMIN — Medication 1 TABLET(S): at 11:22

## 2025-01-07 RX ADMIN — LEVETIRACETAM 1000 MILLIGRAM(S): 100 SOLUTION ORAL at 05:34

## 2025-01-07 NOTE — PROGRESS NOTE ADULT - ASSESSMENT
69M w/ HTN, OA, right TKA, HO craniotomy for GBM resection 04/25/2023, s/p chemo and RT 2023 with Dr. Causey, Admit at [facility] on Dec13 for reoccurrence of GBM, Sp lt craniotomy  for resection of GBM with placement of Gammatile brachytherapy on 12/19. Hospital course complicated by hyponatremia, diarrhea,  admitted to [facility] On 12/27/24.     #GBM s/p craniotomy for resection  - Continue Keppra  - Continue Decadron taper  - GI ppx for the above  - pt/ot     #HTN  -BP noted 106- 120, will decrease Norvasc to 5 qd  -cont care    #bradycardia  - asymptomatic  -monitor     #Hyponatremia- resolved   - monitor       #DVT ppx:  Lovenox    will follow  d/w rehab team

## 2025-01-07 NOTE — PROGRESS NOTE ADULT - SUBJECTIVE AND OBJECTIVE BOX
69M w/ HTN, OA, right TKA, HO craniotomy for GBM resection 04/25/2023, s/p chemo and RT 2023 with Dr. Causey, Admit at [facility] on Dec13 for reoccurrence of GBM, Sp lt craniotomy  for resection of GBM with placement of Gammatile brachytherapy on 12/19. Hospital course complicated by hyponatremia, diarrhea    seen at the bedside, appears comfortable, sitting up n chair, says couldnot sleep well overnight, no n/v, no pain, no headache or dizziness this am     Vital Signs Last 24 Hrs  T(F): 98.1 (05 Jan 2025 08:37), Max: 98.1 (05 Jan 2025 08:37)  HR: 65 (05 Jan 2025 08:37) (54 - 65)  BP: 106/67 (05 Jan 2025 08:37) (106/67 - 128/72)  RR: 14 (05 Jan 2025 08:37) (14 - 16)  SpO2: 96% (05 Jan 2025 08:37) (96% - 96%)      GENERAL- NAD  EAR/NOSE/MOUTH/THROAT -  MMM  EYES- SHANTELL, conjunctiva and Sclera clear  NECK- supple  RESPIRATORY-  clear to auscultation bilaterally  CARDIOVASCULAR - SIS2, RRR  GI - soft NT BS present  EXTREMITIES- no pedal edema  NEUROLOGY- RSW  PSYCHIATRY- AAO X 3  SKIN- scalp incision intact                    12.6                 140  | 30   | 25           6.28  >-----------< 322     ------------------------< 107                   38.8                 4.4  | 106  | 1.09                                         Ca 8.7   Mg x     Ph x          MEDICATIONS  (STANDING):  amLODIPine   Tablet 10 milliGRAM(s) Oral every 24 hours  dexAMETHasone     Tablet 4 milliGRAM(s) Oral <User Schedule>  dexAMETHasone     Tablet 2 milliGRAM(s) Oral <User Schedule>  enoxaparin Injectable 40 milliGRAM(s) SubCutaneous <User Schedule>  famotidine    Tablet 20 milliGRAM(s) Oral every 12 hours  influenza  Vaccine (HIGH DOSE) 0.5 milliLiter(s) IntraMuscular once  levETIRAcetam 1000 milliGRAM(s) Oral every 12 hours  multivitamin 1 Tablet(s) Oral daily  ramelteon 8 milliGRAM(s) Oral <User Schedule>    MEDICATIONS  (PRN):  acetaminophen     Tablet .. 650 milliGRAM(s) Oral every 6 hours PRN Mild Pain (1 - 3)  senna 2 Tablet(s) Oral at bedtime PRN Constipation

## 2025-01-07 NOTE — PROGRESS NOTE ADULT - ASSESSMENT
69 year old male with PMH of HTN, OA, right TKA, s/p L craniotomy for GBM resection 04/25/2023, s/p chemo and RT 2023 with Dr. Causey, who presented to the ED at [facility] on 12/13 due to reoccurrence of brain tumor seen on his latest MRI completed 4 days prior in [place]. Patient c/o worsened R facial droop, worsening word finding difficulties, and right sided weakness x 2 weeks. He was admitted and placed on decadron and MRI done for OR planning. He underwent another left craniotomy  for resection of GBM with placement of Gammatile brachytherapy on 12/19. He was found to have new dyslexia on 12/22 and speech was consulted. He was also started on salt tabs for hyponatremia which resolved. He also had a few episodes of diarrhea coupled with hypotension and received IV fluids. Bowel regimen removed and diarrhea resolved. He was evaluated for admission to Acute Rehab and admitted to [facility] On 12/27/24.     #GBM  - s/p craniotomy for resection  - Start Comprehensive Rehab Program of PT/OT/SLP  -Continue Keppra 1000mg BID  -Continue Decadron 4mg at 6am and 2mg at 3pm   -Monitor incision - staples removed POD#14 (1/2) - (1/3) incision assessment: mid incision separation/skin not well approximated, but C/D/I.  Steri strips applied. Images of incision sent via teams to Dr. Hall > not concerned; (1/6) incision well approximated with steri strips  - Question of brachytherapy > contacted Dr. Wernicke via teams and office # in regards to protocol/precaution/duration.  Awaiting for response > another message left with office (1/7)  -Follow up as outpatient for Radiation treatment   -May shower; hold washing head at this time    #HTN  - Norvasc 10mg daily  - BP premed 127/70, post med 107/65 (1/7)    #Hyponatremia - resolved  -Na is 134 on 12/27  -Na 141 (1/2/25)   --taper  salt tabs to 1G  daily - Na 140 (1/6) > dc NaCl    #Sleep  --melatonin 3mg at 8pm - > inc to 6mg @8p > switch to Rozerem 8mg    #Pain control  - Tylenol PRN    #GI/Bowel Mgmt   - Continue Senna at bedtime   - Pepcid 20mg BID for GI prophylaxis while on steroid    #Bladder management  -Monitor UO-- voiding, dc'd bladder scans  - voiding without issues    #DVT prophylaxis   - Lovenox  - TEDs     #Skin: intact    #Diet  - Regular diet    Precautions / PROPHYLAXIS:   - Falls,  Seizure   - Pressure injury/Skin: OOB to Chair, PT/OT      Juanjose Johnston  Neurosurgery  [address] [place] [address]  Phone: [phone]    Wernicke, A. Gabriella A  Radiation Oncology  [address] [address]  Phone: [phone]   69 year old male with PMH of HTN, OA, right TKA, s/p L craniotomy for GBM resection 04/25/2023, s/p chemo and RT 2023 with Dr. Causey, who presented to the ED at [facility] on 12/13 due to reoccurrence of brain tumor seen on his latest MRI completed 4 days prior in [place]. Patient c/o worsened R facial droop, worsening word finding difficulties, and right sided weakness x 2 weeks. He was admitted and placed on decadron and MRI done for OR planning. He underwent another left craniotomy  for resection of GBM with placement of Gammatile brachytherapy on 12/19. He was found to have new dyslexia on 12/22 and speech was consulted. He was also started on salt tabs for hyponatremia which resolved. He also had a few episodes of diarrhea coupled with hypotension and received IV fluids. Bowel regimen removed and diarrhea resolved. He was evaluated for admission to Acute Rehab and admitted to [facility] On 12/27/24.     #GBM  - s/p craniotomy for resection  - Start Comprehensive Rehab Program of PT/OT/SLP  -Continue Keppra 1000mg BID  -Continue Decadron 4mg at 6am and 2mg at 3pm   -Monitor incision - staples removed POD#14 (1/2) - (1/3) incision assessment: mid incision separation/skin not well approximated, but C/D/I.  Steri strips applied. Images of incision sent via teams to Dr. Hall > not concerned; (1/6) incision well approximated with steri strips  - Question of brachytherapy > contacted Dr. Wernicke via teams and office # in regards to protocol/precaution/duration.  Awaiting for response > another message left with office (1/7)  -Follow up as outpatient for Radiation treatment   -May shower; hold washing head at this time    #HTN  - Norvasc 10mg daily > 5mg QD (1/7)  - BP premed 127/70, post med 107/65 (1/7)asymptomatic, will reduce Norvasc to 5mg    #Hyponatremia - resolved  -Na is 134 on 12/27  -Na 141 (1/2/25)   --taper  salt tabs to 1G  daily - Na 140 (1/6) > dc NaCl    #Sleep  --melatonin 3mg at 8pm - > inc to 6mg @8p > switch to Rozerem 8mg    #Pain control  - Tylenol PRN    #GI/Bowel Mgmt   - Continue Senna at bedtime   - Pepcid 20mg BID for GI prophylaxis while on steroid    #Bladder management  -Monitor UO-- voiding, dc'd bladder scans  - voiding without issues    #DVT prophylaxis   - Lovenox  - TEDs     #Skin: intact    #Diet  - Regular diet    Precautions / PROPHYLAXIS:   - Falls,  Seizure   - Pressure injury/Skin: OOB to Chair, PT/OT      Juanjose Johnston  Neurosurgery  [address] [place] [address]  Phone: [phone]    Wernicke, A. Gabriella A  Radiation Oncology  [address] [address]  Phone: [phone]   69 year old male with PMH of HTN, OA, right TKA, s/p L craniotomy for GBM resection 04/25/2023, s/p chemo and RT 2023 with Dr. Causey, who presented to the ED at [facility] on 12/13 due to reoccurrence of brain tumor seen on his latest MRI completed 4 days prior in [place]. Patient c/o worsened R facial droop, worsening word finding difficulties, and right sided weakness x 2 weeks. He was admitted and placed on decadron and MRI done for OR planning. He underwent another left craniotomy  for resection of GBM with placement of Gammatile brachytherapy on 12/19. He was found to have new dyslexia on 12/22 and speech was consulted. He was also started on salt tabs for hyponatremia which resolved. He also had a few episodes of diarrhea coupled with hypotension and received IV fluids. Bowel regimen removed and diarrhea resolved. He was evaluated for admission to Acute Rehab and admitted to [facility] On 12/27/24.     #GBM  - s/p craniotomy for resection  - Start Comprehensive Rehab Program of PT/OT/SLP  -Continue Keppra 1000mg BID  -Continue Decadron 4mg at 6am and 2mg at 3pm   -Monitor incision - staples removed POD#14 (1/2) - (1/3) incision assessment: mid incision separation/skin not well approximated, but C/D/I.  Steri strips applied. Images of incision sent via teams to Dr. Hall > not concerned; (1/6) incision well approximated with steri strips  - Question of brachytherapy > contacted Dr. Wernicke via teams and office # in regards to protocol/precaution/duration.  Awaiting for response > another message left with office (1/7)-- call back from office 1/7--- no precautions needed.   -Follow up as outpatient for Radiation treatment   -May shower; hold washing head at this time    #HTN  - Norvasc 10mg daily > 5mg QD (1/7)  - BP premed 127/70, post med 107/65 (1/7)asymptomatic, will reduce Norvasc to 5mg    #Hyponatremia - resolved  -Na is 134 on 12/27  -Na 141 (1/2/25)   --taper  salt tabs to 1G  daily - Na 140 (1/6) > dc NaCl    #Sleep  --melatonin 3mg at 8pm - > inc to 6mg @8p > switch to Rozerem 8mg    #Pain control  - Tylenol PRN    #GI/Bowel Mgmt   - Continue Senna at bedtime   - Pepcid 20mg BID for GI prophylaxis while on steroid    #Bladder management  -Monitor UO-- voiding, dc'd bladder scans  - voiding without issues    #DVT prophylaxis   - Lovenox  - TEDs     #Skin: intact    #Diet  - Regular diet    Precautions / PROPHYLAXIS:   - Falls,  Seizure   - Pressure injury/Skin: OOB to Chair, PT/OT      Juanjose Johnston  Neurosurgery  [address] [place] [address]  Phone: [phone]    Wernicke, A. Gabriella A  Radiation Oncology  [address] [address]  Phone: [phone]

## 2025-01-07 NOTE — PROGRESS NOTE ADULT - SUBJECTIVE AND OBJECTIVE BOX
Patient is a 69y old  Male who presents with a chief complaint of Malignant neoplasm of brain    SUBJECTIVE/OBJECTIVE: Patient seen and evaluated while resting in bed.  Sleep slightly better but unable to sleep through the night.  Per wife, patient is a night owl at home.  Discussed trying Rozerem.    REVIEW OF SYSTEMS:  Constitutional: No fever or chills  Pulm: No cough, No shortness of breath  Cardio: No chest pain or palpitations  GI/:  No abdominal pain, nausea, or vomiting; No dysuria  Neuro: +Right sided weakness; no HA or dizziness  MSK: no joint or muscle pain    PHYSICAL EXAM  69y  Vital Signs Last 24 Hrs  T(C): 36.6 (01-07-25 @ 09:00), Max: 37 (01-06-25 @ 20:18)  T(F): 97.9 (01-07-25 @ 09:00), Max: 98.6 (01-06-25 @ 20:18)  HR: 55 (01-07-25 @ 09:00) (55 - 63)  BP: 107/65 (01-07-25 @ 09:00) (107/65 - 127/70)  RR: 14 (01-07-25 @ 09:00) (14 - 16)  SpO2: 98% (01-07-25 @ 09:00) (96% - 98%)    Constitutional - NAD, Comfortable  HEENT- s/p craniotomy > skin of incision better approximated with steri strip; +Left strabismus (baseline)  Pulm: resp nonlabored  Cardiovascular - warm and well perfused; no cyanosis or pedal edema  Abdomen - Soft, NT/ND  Extremities - No peripheral edema or calf tenderness  MSK - right shoulder sublux  Skin - RUE assessed since splint is off, no edema or impaired skin integrity   Neurologic Exam -                    Cognitive: AAO to x 4; able to follow commands and answer questions appropriately     Communication: +dysarthria (mild), comprehensible, No aphasia, able to repeat     Cranial Nerves - right lip droop, right facial weakness, right shoulder shrug depressed compared to left     Motor - right hemiparesis                     LEFT    UE - SF 5/5, EF 5/5, EE 5/5, WE 5/5,  5/5                    RIGHT UE - ShAB 1/5, EF 2/5, EE 1/5, WE 2/5, FF 3/5,  2/5                    LEFT    LE - HF 5/5, KE 5/5, DF 5/5, PF 5/5                    RIGHT LE - HF 3/5, KE 3/5, DF 1/5, PF 3/5       Tone- MAS 1+ right pec, right biceps   Psychiatric - Mood stable; pleasant and in good spirit    RECENT LABS:                                       12.6   6.28  )-----------( 322      ( 06 Jan 2025 06:41 )             38.8     01-06    140  |  106  |  25[H]  ----------------------------<  107[H]  4.4   |  30  |  1.09    Ca    8.7      06 Jan 2025 06:41    TPro  5.9[L]  /  Alb  2.7[L]  /  TBili  0.3  /  DBili  x   /  AST  16  /  ALT  45  /  AlkPhos  102  01-06    LIVER FUNCTIONS - ( 06 Jan 2025 06:41 )  Alb: 2.7 g/dL / Pro: 5.9 g/dL / ALK PHOS: 102 U/L / ALT: 45 U/L / AST: 16 U/L / GGT: x              Allergies  No Known Allergies  Intolerances    MEDICATIONS  (STANDING):  amLODIPine   Tablet 10 milliGRAM(s) Oral every 24 hours  dexAMETHasone     Tablet 4 milliGRAM(s) Oral <User Schedule>  dexAMETHasone     Tablet 2 milliGRAM(s) Oral <User Schedule>  enoxaparin Injectable 40 milliGRAM(s) SubCutaneous <User Schedule>  famotidine    Tablet 20 milliGRAM(s) Oral every 12 hours  influenza  Vaccine (HIGH DOSE) 0.5 milliLiter(s) IntraMuscular once  levETIRAcetam 1000 milliGRAM(s) Oral every 12 hours  multivitamin 1 Tablet(s) Oral daily  ramelteon 8 milliGRAM(s) Oral <User Schedule>    MEDICATIONS  (PRN):  acetaminophen     Tablet .. 650 milliGRAM(s) Oral every 6 hours PRN Mild Pain (1 - 3)  senna 2 Tablet(s) Oral at bedtime PRN Constipation

## 2025-01-08 PROCEDURE — 99233 SBSQ HOSP IP/OBS HIGH 50: CPT | Mod: GC

## 2025-01-08 RX ORDER — TRAZODONE HYDROCHLORIDE 150 MG/1
25 TABLET ORAL
Refills: 0 | Status: DISCONTINUED | OUTPATIENT
Start: 2025-01-08 | End: 2025-01-11

## 2025-01-08 RX ADMIN — Medication 1 TABLET(S): at 11:08

## 2025-01-08 RX ADMIN — ENOXAPARIN SODIUM 40 MILLIGRAM(S): 60 INJECTION INTRAVENOUS; SUBCUTANEOUS at 21:07

## 2025-01-08 RX ADMIN — FAMOTIDINE 20 MILLIGRAM(S): 20 TABLET, FILM COATED ORAL at 18:30

## 2025-01-08 RX ADMIN — Medication 2 MILLIGRAM(S): at 14:49

## 2025-01-08 RX ADMIN — LEVETIRACETAM 1000 MILLIGRAM(S): 100 SOLUTION ORAL at 05:55

## 2025-01-08 RX ADMIN — TRAZODONE HYDROCHLORIDE 25 MILLIGRAM(S): 150 TABLET ORAL at 21:08

## 2025-01-08 RX ADMIN — LEVETIRACETAM 1000 MILLIGRAM(S): 100 SOLUTION ORAL at 18:30

## 2025-01-08 RX ADMIN — FAMOTIDINE 20 MILLIGRAM(S): 20 TABLET, FILM COATED ORAL at 05:54

## 2025-01-08 RX ADMIN — Medication 4 MILLIGRAM(S): at 05:54

## 2025-01-08 NOTE — PROGRESS NOTE ADULT - ASSESSMENT
69 year old male with PMH of HTN, OA, right TKA, s/p L craniotomy for GBM resection 04/25/2023, s/p chemo and RT 2023 with Dr. Causey, who presented to the ED at [facility] on 12/13 due to reoccurrence of brain tumor seen on his latest MRI completed 4 days prior in [place]. Patient c/o worsened R facial droop, worsening word finding difficulties, and right sided weakness x 2 weeks. He was admitted and placed on decadron and MRI done for OR planning. He underwent another left craniotomy  for resection of GBM with placement of Gammatile brachytherapy on 12/19. He was found to have new dyslexia on 12/22 and speech was consulted. He was also started on salt tabs for hyponatremia which resolved. He also had a few episodes of diarrhea coupled with hypotension and received IV fluids. Bowel regimen removed and diarrhea resolved. He was evaluated for admission to Acute Rehab and admitted to [facility] On 12/27/24.     #GBM  - s/p craniotomy for resection  - Start Comprehensive Rehab Program of PT/OT/SLP  -Continue Keppra 1000mg BID  -Continue Decadron 4mg at 6am and 2mg at 3pm   -Monitor incision - staples removed POD#14 (1/2) - (1/3) incision assessment: mid incision separation/skin not well approximated, but C/D/I.  Steri strips applied. Images of incision sent via teams to Dr. Hall > not concerned; (1/6) incision well approximated with steri strips  - Question of brachytherapy > contacted Dr. Wernicke via teams and office # in regards to protocol/precaution/duration.  Awaiting for response > another message left with office (1/7)-- call back from office 1/7--- no precautions needed.   -Follow up as outpatient for Radiation treatment   -May shower; hold washing head at this time    #HTN  - Norvasc 10mg daily > 5mg QD (1/7)  - /70 this AM    #Hyponatremia - resolved  -Na is 134 on 12/27  -Na 141 (1/2/25)   --taper  salt tabs to 1G  daily - Na 140 (1/6) > dc NaCl-- Continue to monitor with lab tomorrow    #Sleep  --melatonin 3mg at 8pm - > inc to 6mg @8p > switched to Rozerem 8mg (1/7)    #Pain control  - Tylenol PRN    #GI/Bowel Mgmt   - Continue Senna at bedtime   - Pepcid 20mg BID for GI prophylaxis while on steroid    #Bladder management  -Monitor UO-- voiding, dc'd bladder scans  - voiding without issues    #DVT prophylaxis   - Lovenox  - TEDs     #Skin: intact    #Diet  - Regular diet    Precautions / PROPHYLAXIS:   - Falls,  Seizure   - Pressure injury/Skin: OOB to Chair, PT/OT      Juanjose Johnston  Neurosurgery  [address] [place] [address]  Phone: [phone]    Wernicke, A. Gabriella A  Radiation Oncology  [address] [address]  Phone: [phone]   69 year old male with PMH of HTN, OA, right TKA, s/p L craniotomy for GBM resection 04/25/2023, s/p chemo and RT 2023 with Dr. Causey, who presented to the ED at [facility] on 12/13 due to reoccurrence of brain tumor seen on his latest MRI completed 4 days prior in [place]. Patient c/o worsened R facial droop, worsening word finding difficulties, and right sided weakness x 2 weeks. He was admitted and placed on decadron and MRI done for OR planning. He underwent another left craniotomy  for resection of GBM with placement of Gammatile brachytherapy on 12/19. He was found to have new dyslexia on 12/22 and speech was consulted. He was also started on salt tabs for hyponatremia which resolved. He also had a few episodes of diarrhea coupled with hypotension and received IV fluids. Bowel regimen removed and diarrhea resolved. He was evaluated for admission to Acute Rehab and admitted to [facility] On 12/27/24.     #GBM  - s/p craniotomy for resection  - Start Comprehensive Rehab Program of PT/OT/SLP  -Continue Keppra 1000mg BID  -Continue Decadron 4mg at 6am and 2mg at 3pm   -Monitor incision - staples removed POD#14 (1/2) - (1/3) incision assessment: mid incision separation/skin not well approximated, but C/D/I.  Steri strips applied. Images of incision sent via teams to Dr. Hall > not concerned; (1/6) incision well approximated with steri strips  - Question of brachytherapy > contacted Dr. Wernicke via teams and office # in regards to protocol/precaution/duration.  Awaiting for response > another message left with office (1/7)-- call back from office 1/7--- no precautions needed.   -Follow up as outpatient for Radiation treatment   -May shower; hold washing head at this time  - E stim RUE    #HTN  - Norvasc 10mg daily > 5mg QD (1/7)  - /70 this AM    #Hyponatremia - resolved  -Na is 134 on 12/27  -Na 141 (1/2/25)   --taper salt tabs to 1G  daily - Na 140 (1/6) > dc NaCl-- Continue to monitor with lab tomorrow    #Sleep  --melatonin 3mg at 8pm - > inc to 6mg @8p > switched to Rozerem 8mg (1/7)---didn't help-- trial of trazodone 25 mg (1/8)    #Pain control  - Tylenol PRN    #GI/Bowel Mgmt   - Continue Senna at bedtime   - Pepcid 20mg BID for GI prophylaxis while on steroid    #Bladder management  -Monitor UO-- voiding, dc'd bladder scans  - voiding without issues    #DVT prophylaxis   - Lovenox  - TEDs     #Skin: intact    #Diet  - Regular diet    Precautions / PROPHYLAXIS:   - Falls,  Seizure   - Pressure injury/Skin: OOB to Chair, PT/OT      Juanjose Johnston  Neurosurgery  [address] [place] [address]  Phone: [phone]    Wernicke, A. Gabriella A  Radiation Oncology  [address] [address]  Phone: [phone]    Interdisciplinary team meeting today with speech therapist, occupational therapist, physical therapist, social work and nursing where medical updates provided, progress with therapies and goals discussed. Plan of care reviewed. Team conference note documented on anya.  Total time 50 mins-face to face time encounter and counseling the patient, meeting with hospitalist, nursing staff, therapists and social work to discuss medical updates and management, care coordination, reviewing chart and data, team meeting

## 2025-01-08 NOTE — PROGRESS NOTE ADULT - SUBJECTIVE AND OBJECTIVE BOX
Patient is a 69y old  Male who presents with a chief complaint of GBM s/p craniotomy for resection (07 Jan 2025 10:45)      HPI:  69 year old male with PMH of HTN, OA, right TKA, s/p L craniotomy for GBM resection 04/25/2023, s/p chemo and RT 2023 with Dr. Causey, who presented to the ED at [facility] on 12/13 due to reoccurrence of brain tumor seen on his latest MRI completed 4 days prior in [place]. Patient c/o worsened R facial droop, worsening word finding difficulties, and right sided weakness x 2 weeks. He was admitted and placed on decadron and MRI done for OR planning. He underwent another left craniotomy  for resection of GBM with placement of Gammatile brachytherapy on 12/19. He was found to have new dyslexia on 12/22 and speech was consulted. He was also started on salt tabs for hyponatremia which resolved. He also had a few episodes of diarrhea coupled with hypotension and received IV fluids. Bowel regimen removed and diarrhea resolved. He was evaluated for admission to Acute Rehab and admitted to [facility] On 12/27/24.      (27 Dec 2024 13:01)        SUBJECTIVE: Patient seen and examined. No acute overnight events, slept well.   No other complaints.       REVIEW OF SYSTEMS  Constitutional: No fever, + fatigue  Cardio: No chest pain, No palpitations  Resp: No SOB, no respiratory distress   Neuro: No headaches +weakness      VITALS  69y  Vital Signs Last 24 Hrs  T(C): 36.9 (07 Jan 2025 19:31), Max: 36.9 (07 Jan 2025 19:31)  T(F): 98.5 (07 Jan 2025 19:31), Max: 98.5 (07 Jan 2025 19:31)  HR: 70 (08 Jan 2025 05:55) (55 - 72)  BP: 116/70 (08 Jan 2025 05:55) (107/65 - 128/77)  BP(mean): --  RR: 14 (07 Jan 2025 19:31) (14 - 14)  SpO2: 98% (07 Jan 2025 19:31) (98% - 98%)    Parameters below as of 07 Jan 2025 19:31  Patient On (Oxygen Delivery Method): room air      Daily     Daily         PHYSICAL EXAM:     Constitutional - NAD, Comfortable  HEENT- s/p craniotomy > skin of incision better approximated with steri strip; +Left strabismus (baseline)  Pulm: resp nonlabored  Cardiovascular - warm and well perfused; no cyanosis or pedal edema  Abdomen - Soft, NT/ND  Extremities - No peripheral edema or calf tenderness  MSK - right shoulder sublux  Skin - RUE assessed since splint is off, no edema or impaired skin integrity   Neurologic Exam -                    Cognitive: AAO to x 4; able to follow commands and answer questions appropriately     Communication: +dysarthria (mild), comprehensible, No aphasia, able to repeat     Cranial Nerves - right lip droop, right facial weakness, right shoulder shrug depressed compared to left     Motor - right hemiparesis                     LEFT    UE - SF 5/5, EF 5/5, EE 5/5, WE 5/5,  5/5                    RIGHT UE - ShAB 1/5, EF 2/5, EE 1/5, WE 2/5, FF 3/5,  2/5                    LEFT    LE - HF 5/5, KE 5/5, DF 5/5, PF 5/5                    RIGHT LE - HF 3/5, KE 3/5, DF 1/5, PF 3/5       Tone- MAS 1+ right pec, right biceps   Psychiatric - Mood stable; pleasant and in good spirit        MEDICATIONS:  MEDICATIONS  (STANDING):  amLODIPine   Tablet 5 milliGRAM(s) Oral every 24 hours  dexAMETHasone     Tablet 4 milliGRAM(s) Oral <User Schedule>  dexAMETHasone     Tablet 2 milliGRAM(s) Oral <User Schedule>  enoxaparin Injectable 40 milliGRAM(s) SubCutaneous <User Schedule>  famotidine    Tablet 20 milliGRAM(s) Oral every 12 hours  influenza  Vaccine (HIGH DOSE) 0.5 milliLiter(s) IntraMuscular once  levETIRAcetam 1000 milliGRAM(s) Oral every 12 hours  multivitamin 1 Tablet(s) Oral daily  ramelteon 8 milliGRAM(s) Oral <User Schedule>    MEDICATIONS  (PRN):  acetaminophen     Tablet .. 650 milliGRAM(s) Oral every 6 hours PRN Mild Pain (1 - 3)  senna 2 Tablet(s) Oral at bedtime PRN Constipation     Patient is a 69y old  Male who presents with a chief complaint of GBM s/p craniotomy for resection (07 Jan 2025 10:45)      HPI:  69 year old male with PMH of HTN, OA, right TKA, s/p L craniotomy for GBM resection 04/25/2023, s/p chemo and RT 2023 with Dr. Causey, who presented to the ED at [facility] on 12/13 due to reoccurrence of brain tumor seen on his latest MRI completed 4 days prior in [place]. Patient c/o worsened R facial droop, worsening word finding difficulties, and right sided weakness x 2 weeks. He was admitted and placed on decadron and MRI done for OR planning. He underwent another left craniotomy  for resection of GBM with placement of Gammatile brachytherapy on 12/19. He was found to have new dyslexia on 12/22 and speech was consulted. He was also started on salt tabs for hyponatremia which resolved. He also had a few episodes of diarrhea coupled with hypotension and received IV fluids. Bowel regimen removed and diarrhea resolved. He was evaluated for admission to Acute Rehab and admitted to [facility] On 12/27/24.      (27 Dec 2024 13:01)        SUBJECTIVE: Patient seen and examined. No acute overnight events, slept well.   No other complaints.       REVIEW OF SYSTEMS  Constitutional: No fever, + fatigue  Cardio: No chest pain, No palpitations  Resp: No SOB, no respiratory distress   Neuro: No headaches +weakness      VITALS  69y  Vital Signs Last 24 Hrs  T(C): 36.9 (01-07-25 @ 19:31), Max: 36.9 (01-07-25 @ 19:31)  T(F): 98.5 (01-07-25 @ 19:31), Max: 98.5 (01-07-25 @ 19:31)  HR: 70 (01-08-25 @ 05:55) (70 - 72)  BP: 116/70 (01-08-25 @ 05:55) (116/70 - 128/77)  BP(mean): --  RR: 14 (01-07-25 @ 19:31) (14 - 14)  SpO2: 98% (01-07-25 @ 19:31) (98% - 98%)    Daily     Daily         PHYSICAL EXAM:     Constitutional - NAD, Comfortable  HEENT- s/p craniotomy > skin of incision better approximated with steri strip; +Left strabismus (baseline)  Pulm: resp nonlabored  Cardiovascular - warm and well perfused; no cyanosis or pedal edema  Abdomen - Soft, NT/ND  Extremities - No peripheral edema or calf tenderness  MSK - right shoulder sublux  Skin - RUE assessed since splint is off, no edema or impaired skin integrity   Neurologic Exam -                    Cognitive: AAO to x 4; able to follow commands and answer questions appropriately     Communication: +dysarthria (mild), comprehensible, No aphasia, able to repeat     Cranial Nerves - right lip droop, right facial weakness, right shoulder shrug depressed compared to left     Motor - right hemiparesis                     LEFT    UE - SF 5/5, EF 5/5, EE 5/5, WE 5/5,  5/5                    RIGHT UE - ShAB 1/5, EF 2/5, EE 1/5, WE 2/5, FF 3/5,  2/5                    LEFT    LE - HF 5/5, KE 5/5, DF 5/5, PF 5/5                    RIGHT LE - HF 3/5, KE 3/5, DF 1/5, PF 3/5       Tone- MAS 1+ right pec, right biceps   Psychiatric - Mood stable; pleasant and in good spirit        MEDICATIONS:  MEDICATIONS  (STANDING):  amLODIPine   Tablet 5 milliGRAM(s) Oral every 24 hours  dexAMETHasone     Tablet 4 milliGRAM(s) Oral <User Schedule>  dexAMETHasone     Tablet 2 milliGRAM(s) Oral <User Schedule>  enoxaparin Injectable 40 milliGRAM(s) SubCutaneous <User Schedule>  famotidine    Tablet 20 milliGRAM(s) Oral every 12 hours  influenza  Vaccine (HIGH DOSE) 0.5 milliLiter(s) IntraMuscular once  levETIRAcetam 1000 milliGRAM(s) Oral every 12 hours  multivitamin 1 Tablet(s) Oral daily  ramelteon 8 milliGRAM(s) Oral <User Schedule>    MEDICATIONS  (PRN):  acetaminophen     Tablet .. 650 milliGRAM(s) Oral every 6 hours PRN Mild Pain (1 - 3)  senna 2 Tablet(s) Oral at bedtime PRN Constipation     Patient is a 69y old  Male who presents with a chief complaint of GBM s/p craniotomy for resection (07 Jan 2025 10:45)      HPI:  69 year old male with PMH of HTN, OA, right TKA, s/p L craniotomy for GBM resection 04/25/2023, s/p chemo and RT 2023 with Dr. Causey, who presented to the ED at [facility] on 12/13 due to reoccurrence of brain tumor seen on his latest MRI completed 4 days prior in [place]. Patient c/o worsened R facial droop, worsening word finding difficulties, and right sided weakness x 2 weeks. He was admitted and placed on decadron and MRI done for OR planning. He underwent another left craniotomy  for resection of GBM with placement of Gammatile brachytherapy on 12/19. He was found to have new dyslexia on 12/22 and speech was consulted. He was also started on salt tabs for hyponatremia which resolved. He also had a few episodes of diarrhea coupled with hypotension and received IV fluids. Bowel regimen removed and diarrhea resolved. He was evaluated for admission to Acute Rehab and admitted to [facility] On 12/27/24.      (27 Dec 2024 13:01)        SUBJECTIVE: Patient seen and examined. No acute overnight events, however, still did not sleep.       REVIEW OF SYSTEMS  Constitutional: No fever, + fatigue  Cardio: No chest pain, No palpitations  Resp: No SOB, no respiratory distress   Neuro: No headaches +weakness      VITALS  Vital Signs Last 24 Hrs  T(C): 36.5 (08 Jan 2025 08:21), Max: 36.9 (07 Jan 2025 19:31)  T(F): 97.7 (08 Jan 2025 08:21), Max: 98.5 (07 Jan 2025 19:31)  HR: 53 (08 Jan 2025 08:21) (53 - 72)  BP: 111/65 (08 Jan 2025 08:21) (111/65 - 128/77)  BP(mean): --  RR: 16 (08 Jan 2025 08:21) (14 - 16)  SpO2: 98% (08 Jan 2025 08:21) (98% - 98%)    Parameters below as of 08 Jan 2025 08:21  Patient On (Oxygen Delivery Method): room air          PHYSICAL EXAM:   Constitutional - NAD, Comfortable  HEENT- s/p craniotomy > skin of incision better approximated with steri strip; +Left strabismus (baseline)  Pulm: resp nonlabored  Cardiovascular - warm and well perfused; no cyanosis or pedal edema  Abdomen - Soft, NT/ND  Extremities - No peripheral edema or calf tenderness  MSK - right shoulder sublux  Skin - RUE assessed since splint is off, no edema or impaired skin integrity   Neurologic Exam -                    Cognitive: AAO to x 4; able to follow commands and answer questions appropriately     Communication: +dysarthria (mild), comprehensible, No aphasia, able to repeat     Cranial Nerves - right lip droop, right facial weakness, right shoulder shrug depressed compared to left     Motor - right hemiparesis                     LEFT    UE - SF 5/5, EF 5/5, EE 5/5, WE 5/5,  5/5                    RIGHT UE - ShAB 1/5, EF 2/5, EE 1/5, WE 2/5, FF 3/5,  2/5                    LEFT    LE - HF 5/5, KE 5/5, DF 5/5, PF 5/5                    RIGHT LE - HF 3/5, KE 3/5, DF 1/5, PF 3/5       Tone- MAS 1+ right pec, right biceps   Psychiatric - Mood stable; pleasant and in good spirit        MEDICATIONS:  MEDICATIONS  (STANDING):  amLODIPine   Tablet 5 milliGRAM(s) Oral every 24 hours  dexAMETHasone     Tablet 4 milliGRAM(s) Oral <User Schedule>  dexAMETHasone     Tablet 2 milliGRAM(s) Oral <User Schedule>  enoxaparin Injectable 40 milliGRAM(s) SubCutaneous <User Schedule>  famotidine    Tablet 20 milliGRAM(s) Oral every 12 hours  influenza  Vaccine (HIGH DOSE) 0.5 milliLiter(s) IntraMuscular once  levETIRAcetam 1000 milliGRAM(s) Oral every 12 hours  multivitamin 1 Tablet(s) Oral daily  ramelteon 8 milliGRAM(s) Oral <User Schedule>    MEDICATIONS  (PRN):  acetaminophen     Tablet .. 650 milliGRAM(s) Oral every 6 hours PRN Mild Pain (1 - 3)  senna 2 Tablet(s) Oral at bedtime PRN Constipation

## 2025-01-09 LAB
ALBUMIN SERPL ELPH-MCNC: 2.9 G/DL — LOW (ref 3.3–5)
ALP SERPL-CCNC: 101 U/L — SIGNIFICANT CHANGE UP (ref 40–120)
ALT FLD-CCNC: 36 U/L — SIGNIFICANT CHANGE UP (ref 10–45)
ANION GAP SERPL CALC-SCNC: 6 MMOL/L — SIGNIFICANT CHANGE UP (ref 5–17)
AST SERPL-CCNC: 11 U/L — SIGNIFICANT CHANGE UP (ref 10–40)
BASOPHILS # BLD AUTO: 0.01 K/UL — SIGNIFICANT CHANGE UP (ref 0–0.2)
BASOPHILS NFR BLD AUTO: 0.2 % — SIGNIFICANT CHANGE UP (ref 0–2)
BILIRUB SERPL-MCNC: 0.3 MG/DL — SIGNIFICANT CHANGE UP (ref 0.2–1.2)
BUN SERPL-MCNC: 20 MG/DL — SIGNIFICANT CHANGE UP (ref 7–23)
CALCIUM SERPL-MCNC: 9 MG/DL — SIGNIFICANT CHANGE UP (ref 8.4–10.5)
CHLORIDE SERPL-SCNC: 103 MMOL/L — SIGNIFICANT CHANGE UP (ref 96–108)
CO2 SERPL-SCNC: 30 MMOL/L — SIGNIFICANT CHANGE UP (ref 22–31)
CREAT SERPL-MCNC: 0.88 MG/DL — SIGNIFICANT CHANGE UP (ref 0.5–1.3)
EGFR: 94 ML/MIN/1.73M2 — SIGNIFICANT CHANGE UP
EOSINOPHIL # BLD AUTO: 0.02 K/UL — SIGNIFICANT CHANGE UP (ref 0–0.5)
EOSINOPHIL NFR BLD AUTO: 0.4 % — SIGNIFICANT CHANGE UP (ref 0–6)
GLUCOSE SERPL-MCNC: 92 MG/DL — SIGNIFICANT CHANGE UP (ref 70–99)
HCT VFR BLD CALC: 39.5 % — SIGNIFICANT CHANGE UP (ref 39–50)
HGB BLD-MCNC: 12.8 G/DL — LOW (ref 13–17)
IMM GRANULOCYTES NFR BLD AUTO: 0.6 % — SIGNIFICANT CHANGE UP (ref 0–0.9)
LYMPHOCYTES # BLD AUTO: 2.19 K/UL — SIGNIFICANT CHANGE UP (ref 1–3.3)
LYMPHOCYTES # BLD AUTO: 43.9 % — SIGNIFICANT CHANGE UP (ref 13–44)
MCHC RBC-ENTMCNC: 28.8 PG — SIGNIFICANT CHANGE UP (ref 27–34)
MCHC RBC-ENTMCNC: 32.4 G/DL — SIGNIFICANT CHANGE UP (ref 32–36)
MCV RBC AUTO: 88.8 FL — SIGNIFICANT CHANGE UP (ref 80–100)
MONOCYTES # BLD AUTO: 0.42 K/UL — SIGNIFICANT CHANGE UP (ref 0–0.9)
MONOCYTES NFR BLD AUTO: 8.4 % — SIGNIFICANT CHANGE UP (ref 2–14)
NEUTROPHILS # BLD AUTO: 2.32 K/UL — SIGNIFICANT CHANGE UP (ref 1.8–7.4)
NEUTROPHILS NFR BLD AUTO: 46.5 % — SIGNIFICANT CHANGE UP (ref 43–77)
NRBC # BLD: 0 /100 WBCS — SIGNIFICANT CHANGE UP (ref 0–0)
PLATELET # BLD AUTO: 299 K/UL — SIGNIFICANT CHANGE UP (ref 150–400)
POTASSIUM SERPL-MCNC: 3.9 MMOL/L — SIGNIFICANT CHANGE UP (ref 3.5–5.3)
POTASSIUM SERPL-SCNC: 3.9 MMOL/L — SIGNIFICANT CHANGE UP (ref 3.5–5.3)
PROT SERPL-MCNC: 6.1 G/DL — SIGNIFICANT CHANGE UP (ref 6–8.3)
RBC # BLD: 4.45 M/UL — SIGNIFICANT CHANGE UP (ref 4.2–5.8)
RBC # FLD: 13.6 % — SIGNIFICANT CHANGE UP (ref 10.3–14.5)
SODIUM SERPL-SCNC: 139 MMOL/L — SIGNIFICANT CHANGE UP (ref 135–145)
WBC # BLD: 4.99 K/UL — SIGNIFICANT CHANGE UP (ref 3.8–10.5)
WBC # FLD AUTO: 4.99 K/UL — SIGNIFICANT CHANGE UP (ref 3.8–10.5)

## 2025-01-09 PROCEDURE — 99232 SBSQ HOSP IP/OBS MODERATE 35: CPT

## 2025-01-09 PROCEDURE — 99232 SBSQ HOSP IP/OBS MODERATE 35: CPT | Mod: GC

## 2025-01-09 RX ADMIN — Medication 2 MILLIGRAM(S): at 14:39

## 2025-01-09 RX ADMIN — FAMOTIDINE 20 MILLIGRAM(S): 20 TABLET, FILM COATED ORAL at 17:21

## 2025-01-09 RX ADMIN — Medication 1 TABLET(S): at 12:02

## 2025-01-09 RX ADMIN — ENOXAPARIN SODIUM 40 MILLIGRAM(S): 60 INJECTION INTRAVENOUS; SUBCUTANEOUS at 21:26

## 2025-01-09 RX ADMIN — LEVETIRACETAM 1000 MILLIGRAM(S): 100 SOLUTION ORAL at 05:56

## 2025-01-09 RX ADMIN — TRAZODONE HYDROCHLORIDE 25 MILLIGRAM(S): 150 TABLET ORAL at 21:26

## 2025-01-09 RX ADMIN — LEVETIRACETAM 1000 MILLIGRAM(S): 100 SOLUTION ORAL at 17:21

## 2025-01-09 RX ADMIN — Medication 4 MILLIGRAM(S): at 05:55

## 2025-01-09 RX ADMIN — FAMOTIDINE 20 MILLIGRAM(S): 20 TABLET, FILM COATED ORAL at 05:56

## 2025-01-09 NOTE — PROGRESS NOTE ADULT - ASSESSMENT
69M w/ HTN, OA, right TKA, HO craniotomy for GBM resection 04/25/2023, s/p chemo and RT 2023 with Dr. Causey, Admit at [facility] on Dec13 for reoccurrence of GBM, Sp lt craniotomy  for resection of GBM with placement of Gammatile brachytherapy on 12/19. Hospital course complicated by hyponatremia, diarrhea,  admitted to [facility] On 12/27/24.     #GBM s/p craniotomy for resection  - Continue Keppra  - Continue Decadron taper    #HTN  - c/w Norvasc to 5 qd    #bradycardia  - asymptomatic  -monitor     #Hyponatremia- resolved   - monitor       DVT ppx:Lovenox

## 2025-01-09 NOTE — PROGRESS NOTE ADULT - SUBJECTIVE AND OBJECTIVE BOX
Patient is a 69y old  Male who presents with a chief complaint of GBM s/p craniotomy for resection (09 Jan 2025 09:33)      HPI:  69 year old male with PMH of HTN, OA, right TKA, s/p L craniotomy for GBM resection 04/25/2023, s/p chemo and RT 2023 with Dr. Causey, who presented to the ED at [facility] on 12/13 due to reoccurrence of brain tumor seen on his latest MRI completed 4 days prior in [place]. Patient c/o worsened R facial droop, worsening word finding difficulties, and right sided weakness x 2 weeks. He was admitted and placed on decadron and MRI done for OR planning. He underwent another left craniotomy  for resection of GBM with placement of Gammatile brachytherapy on 12/19. He was found to have new dyslexia on 12/22 and speech was consulted. He was also started on salt tabs for hyponatremia which resolved. He also had a few episodes of diarrhea coupled with hypotension and received IV fluids. Bowel regimen removed and diarrhea resolved. He was evaluated for admission to Acute Rehab and admitted to [facility] On 12/27/24.      (27 Dec 2024 13:01)      SUBJECTIVE: Patient seen and examined. No acute overnight events, slept better with trazodone    REVIEW OF SYSTEMS  Constitutional: No fever, + fatigue  Cardio: No chest pain, No palpitations  Resp: No SOB, no respiratory distress   Neuro: No headaches +weakness      VITALS  69y  Vital Signs Last 24 Hrs  T(C): 36.6 (09 Jan 2025 07:45), Max: 36.7 (08 Jan 2025 19:27)  T(F): 97.8 (09 Jan 2025 07:45), Max: 98 (08 Jan 2025 19:27)  HR: 52 (09 Jan 2025 07:45) (52 - 62)  BP: 112/79 (09 Jan 2025 07:45) (112/79 - 124/75)  BP(mean): --  RR: 16 (09 Jan 2025 07:45) (14 - 16)  SpO2: 97% (09 Jan 2025 07:45) (94% - 97%)    Parameters below as of 09 Jan 2025 07:45  Patient On (Oxygen Delivery Method): room air      PHYSICAL EXAM:   Constitutional - NAD, Comfortable  HEENT- s/p craniotomy > skin of incision better approximated with steri strip; +Left strabismus (baseline)  Pulm: resp nonlabored  Cardiovascular - warm and well perfused; no cyanosis or pedal edema  Abdomen - Soft, NT/ND  Extremities - No peripheral edema or calf tenderness  MSK - right shoulder sublux  Skin - RUE assessed since splint is off, no edema or impaired skin integrity   Neurologic Exam -                    Cognitive: AAO to x 4; able to follow commands and answer questions appropriately     Communication: +dysarthria (mild), comprehensible, No aphasia, able to repeat     Cranial Nerves - right lip droop, right facial weakness, right shoulder shrug depressed compared to left     Motor - right hemiparesis                     LEFT    UE - SF 5/5, EF 5/5, EE 5/5, WE 5/5,  5/5                    RIGHT UE - ShAB 1/5, EF 2/5, EE 1/5, WE 2/5, FF 3/5,  2/5                    LEFT    LE - HF 5/5, KE 5/5, DF 5/5, PF 5/5                    RIGHT LE - HF 3/5, KE 3/5, DF 1/5, PF 3/5       Tone- MAS 1+ right pec, right biceps   Psychiatric - Mood stable; pleasant and in good spirit      RECENT LABS:                        12.8   4.99  )-----------( 299      ( 09 Jan 2025 06:08 )             39.5     01-09    139  |  103  |  20  ----------------------------<  92  3.9   |  30  |  0.88    Ca    9.0      09 Jan 2025 06:08    TPro  6.1  /  Alb  2.9[L]  /  TBili  0.3  /  DBili  x   /  AST  11  /  ALT  36  /  AlkPhos  101  01-09    LIVER FUNCTIONS - ( 09 Jan 2025 06:08 )  Alb: 2.9 g/dL / Pro: 6.1 g/dL / ALK PHOS: 101 U/L / ALT: 36 U/L / AST: 11 U/L / GGT: x             Urinalysis Basic - ( 09 Jan 2025 06:08 )    Color: x / Appearance: x / SG: x / pH: x  Gluc: 92 mg/dL / Ketone: x  / Bili: x / Urobili: x   Blood: x / Protein: x / Nitrite: x   Leuk Esterase: x / RBC: x / WBC x   Sq Epi: x / Non Sq Epi: x / Bacteria: x          CAPILLARY BLOOD GLUCOSE            MEDICATIONS:  MEDICATIONS  (STANDING):  amLODIPine   Tablet 5 milliGRAM(s) Oral every 24 hours  dexAMETHasone     Tablet 4 milliGRAM(s) Oral <User Schedule>  dexAMETHasone     Tablet 2 milliGRAM(s) Oral <User Schedule>  enoxaparin Injectable 40 milliGRAM(s) SubCutaneous <User Schedule>  famotidine    Tablet 20 milliGRAM(s) Oral every 12 hours  influenza  Vaccine (HIGH DOSE) 0.5 milliLiter(s) IntraMuscular once  levETIRAcetam 1000 milliGRAM(s) Oral every 12 hours  multivitamin 1 Tablet(s) Oral daily  traZODone 25 milliGRAM(s) Oral <User Schedule>    MEDICATIONS  (PRN):  acetaminophen     Tablet .. 650 milliGRAM(s) Oral every 6 hours PRN Mild Pain (1 - 3)  senna 2 Tablet(s) Oral at bedtime PRN Constipation

## 2025-01-09 NOTE — PROGRESS NOTE ADULT - SUBJECTIVE AND OBJECTIVE BOX
Patient is a 69y old  Male who presents with a chief complaint of GBM s/p craniotomy for resection       Patient seen and examined at bedside.    ALLERGIES:  No Known Allergies    MEDICATIONS  (STANDING):  amLODIPine   Tablet 5 milliGRAM(s) Oral every 24 hours  dexAMETHasone     Tablet 4 milliGRAM(s) Oral <User Schedule>  dexAMETHasone     Tablet 2 milliGRAM(s) Oral <User Schedule>  enoxaparin Injectable 40 milliGRAM(s) SubCutaneous <User Schedule>  famotidine    Tablet 20 milliGRAM(s) Oral every 12 hours  influenza  Vaccine (HIGH DOSE) 0.5 milliLiter(s) IntraMuscular once  levETIRAcetam 1000 milliGRAM(s) Oral every 12 hours  multivitamin 1 Tablet(s) Oral daily  traZODone 25 milliGRAM(s) Oral <User Schedule>    MEDICATIONS  (PRN):  acetaminophen     Tablet .. 650 milliGRAM(s) Oral every 6 hours PRN Mild Pain (1 - 3)  senna 2 Tablet(s) Oral at bedtime PRN Constipation    Vital Signs Last 24 Hrs  T(F): 97.8 (09 Jan 2025 07:45), Max: 98 (08 Jan 2025 19:27)  HR: 52 (09 Jan 2025 07:45) (52 - 62)  BP: 112/79 (09 Jan 2025 07:45) (112/79 - 124/75)  RR: 16 (09 Jan 2025 07:45) (14 - 16)  SpO2: 97% (09 Jan 2025 07:45) (94% - 97%)  I&O's Summary    08 Jan 2025 07:01  -  09 Jan 2025 07:00  --------------------------------------------------------  IN: 240 mL / OUT: 500 mL / NET: -260 mL        PHYSICAL EXAM:  General: NAD, A/O  ENT: MMM, no scleral icterus  Neck: Supple, No JVD, no thyroidomegaly  Lungs: Clear to auscultation bilaterally, no wheezes, no rales, no rhonchi, good inspiratory effort  Cardio: RRR, S1/S2, No murmurs  Abdomen: Soft, Nontender, Nondistended; Bowel sounds present  Extremities: No calf tenderness, No pitting edema, no skin changes    LABS:                        12.8   4.99  )-----------( 299      ( 09 Jan 2025 06:08 )             39.5       01-09    139  |  103  |  20  ----------------------------<  92  3.9   |  30  |  0.88    Ca    9.0      09 Jan 2025 06:08    TPro  6.1  /  Alb  2.9  /  TBili  0.3  /  DBili  x   /  AST  11  /  ALT  36  /  AlkPhos  101  01-09     Urinalysis Basic - ( 09 Jan 2025 06:08 )    Color: x / Appearance: x / SG: x / pH: x  Gluc: 92 mg/dL / Ketone: x  / Bili: x / Urobili: x   Blood: x / Protein: x / Nitrite: x   Leuk Esterase: x / RBC: x / WBC x   Sq Epi: x / Non Sq Epi: x / Bacteria: x    COVID-19 PCR: NotDetec (12-27-24 @ 16:30)  COVID-19 PCR: NotDetec (12-26-24 @ 16:25)  COVID-19 PCR: NotDetec (12-27-24 @ 16:30)  COVID-19 PCR: NotDetec (12-26-24 @ 16:25)  COVID-19 PCR: Detected (05-30-23 @ 09:19)  COVID-19 PCR: Detected (05-24-23 @ 05:50)  COVID-19 PCR: Detected (05-23-23 @ 06:00)  COVID-19 PCR: Detected (05-17-23 @ 18:55)  COVID-19 PCR: Detected (05-17-23 @ 12:05)

## 2025-01-09 NOTE — PROGRESS NOTE ADULT - ASSESSMENT
69 year old male with PMH of HTN, OA, right TKA, s/p L craniotomy for GBM resection 04/25/2023, s/p chemo and RT 2023 with Dr. Causey, who presented to the ED at [facility] on 12/13 due to reoccurrence of brain tumor seen on his latest MRI completed 4 days prior in [place]. Patient c/o worsened R facial droop, worsening word finding difficulties, and right sided weakness x 2 weeks. He was admitted and placed on decadron and MRI done for OR planning. He underwent another left craniotomy  for resection of GBM with placement of Gammatile brachytherapy on 12/19. He was found to have new dyslexia on 12/22 and speech was consulted. He was also started on salt tabs for hyponatremia which resolved. He also had a few episodes of diarrhea coupled with hypotension and received IV fluids. Bowel regimen removed and diarrhea resolved. He was evaluated for admission to Acute Rehab and admitted to [facility] On 12/27/24.     #GBM  - s/p craniotomy for resection  - Start Comprehensive Rehab Program of PT/OT/SLP  -Continue Keppra 1000mg BID  -Continue Decadron 4mg at 6am and 2mg at 3pm   -Monitor incision - staples removed POD#14 (1/2) - (1/3) incision assessment: mid incision separation/skin not well approximated, but C/D/I.  Steri strips applied. Images of incision sent via teams to Dr. Hall > not concerned; (1/6) incision well approximated with steri strips  - Question of brachytherapy > contacted Dr. Wernicke via teams and office # in regards to protocol/precaution/duration.  Awaiting for response > another message left with office (1/7)-- call back from office 1/7--- no precautions needed.   -Follow up as outpatient for Radiation treatment   -May shower; hold washing head at this time  - E stim RUE    #HTN  - Norvasc 10mg daily > 5mg QD (1/7)  - /71 this AM    #Hyponatremia - resolved  -Na is 134 on 12/27  -Na 141 (1/2/25)   --taper salt tabs to 1G  daily - Na 140 (1/6) > dc NaCl-- Continue to monitor with lab tomorrow--Na 139 (1/9)    #Elevated Bun  - Bun 27>21>25>20 (1/9)- improved  - encourage oral hydration    #Sleep  --melatonin 3mg at 8pm - > inc to 6mg @8p > switched to Rozerem 8mg (1/7)---didn't help-- c/w trazodone 25 mg (1/8)--reported sleeping better last night    #Pain control  - Tylenol PRN    #GI/Bowel Mgmt   - Continue Senna at bedtime   - Pepcid 20mg BID for GI prophylaxis while on steroid    #Bladder management  -Monitor UO-- voiding, dc'd bladder scans  - voiding without issues    #DVT prophylaxis   - Lovenox  - TEDs     #Skin: intact    #Diet  - Regular diet    Precautions / PROPHYLAXIS:   - Falls,  Seizure   - Pressure injury/Skin: OOB to Chair, PT/OT      Juanjose Johnston  Neurosurgery  [address] [place] [address]  Phone: [phone]    Wernicke, A. Gabriella A  Radiation Oncology  [address] [address]  Phone: [phone]    Total time 35 mins-face to face time encounter and counseling the patient, meeting with hospitalist, nursing staff, therapists and social work to discuss medical updates and management, care coordination, reviewing chart and data

## 2025-01-10 PROCEDURE — 99232 SBSQ HOSP IP/OBS MODERATE 35: CPT

## 2025-01-10 PROCEDURE — 99232 SBSQ HOSP IP/OBS MODERATE 35: CPT | Mod: GC

## 2025-01-10 RX ADMIN — Medication 2 MILLIGRAM(S): at 17:26

## 2025-01-10 RX ADMIN — Medication 5 MILLIGRAM(S): at 05:50

## 2025-01-10 RX ADMIN — ENOXAPARIN SODIUM 40 MILLIGRAM(S): 60 INJECTION INTRAVENOUS; SUBCUTANEOUS at 21:13

## 2025-01-10 RX ADMIN — Medication 4 MILLIGRAM(S): at 05:42

## 2025-01-10 RX ADMIN — LEVETIRACETAM 1000 MILLIGRAM(S): 100 SOLUTION ORAL at 05:42

## 2025-01-10 RX ADMIN — FAMOTIDINE 20 MILLIGRAM(S): 20 TABLET, FILM COATED ORAL at 05:42

## 2025-01-10 RX ADMIN — Medication 1 TABLET(S): at 11:54

## 2025-01-10 RX ADMIN — LEVETIRACETAM 1000 MILLIGRAM(S): 100 SOLUTION ORAL at 17:28

## 2025-01-10 RX ADMIN — FAMOTIDINE 20 MILLIGRAM(S): 20 TABLET, FILM COATED ORAL at 17:27

## 2025-01-10 RX ADMIN — TRAZODONE HYDROCHLORIDE 25 MILLIGRAM(S): 150 TABLET ORAL at 21:13

## 2025-01-10 NOTE — CHART NOTE - NSCHARTNOTEFT_GEN_A_CORE
Nutrition Follow Up Note  Hospital Course   (Per Electronic Medical Record)    Source:  Patient [X]  Family Member [X]   Nursing Staff [X]   Medical Record [X]      Diet: Diet, Regular (12-27-24 @ 13:20) [Active]    At this time patient tolerating diet w/ adequate appetite/intake consuming % of meals. Patient follow a Ketogenic meal pattern. No issues w/ dentition or chewing and swallowing current diet texture. No N/V/C/D, last BM 1/8/ per nursing flowsheets.     Current Weight: 184.9lb on 12/27      Pertinent Medications: MEDICATIONS  (STANDING):  amLODIPine   Tablet 5 milliGRAM(s) Oral every 24 hours  dexAMETHasone     Tablet 4 milliGRAM(s) Oral <User Schedule>  dexAMETHasone     Tablet 2 milliGRAM(s) Oral <User Schedule>  enoxaparin Injectable 40 milliGRAM(s) SubCutaneous <User Schedule>  famotidine    Tablet 20 milliGRAM(s) Oral every 12 hours  influenza  Vaccine (HIGH DOSE) 0.5 milliLiter(s) IntraMuscular once  levETIRAcetam 1000 milliGRAM(s) Oral every 12 hours  multivitamin 1 Tablet(s) Oral daily  traZODone 25 milliGRAM(s) Oral <User Schedule>    MEDICATIONS  (PRN):  acetaminophen     Tablet .. 650 milliGRAM(s) Oral every 6 hours PRN Mild Pain (1 - 3)  senna 2 Tablet(s) Oral at bedtime PRN Constipation      Pertinent Labs:  01-09 Na139 mmol/L Glu 92 mg/dL K+ 3.9 mmol/L Cr  0.88 mg/dL BUN 20 mg/dL 01-09 Alb 2.9 g/dL[L]    Skin: No pressure injury per nursing flowsheets    Edema: No edema noted per nursing flowsheet    Last Bowel Movement: on 1/9 Per nursing flowsheets     Estimated Needs:   [X] No Change Since Previous Assessment    Previous Nutrition Diagnosis:   No Active Nutrition Dx @ This Present Time    Interventions:   1. Recommend continuing with current plan of care, diet consistency per SLP  2. Encourage PO intake  3. Obtain and honor food preferences as able  4. Ongoing diet education     Monitoring & Evaluation:   [X] Weights   [X] PO Intake   [X] Skin Integrity   [X] Follow Up (Per Protocol)  [X] Tolerance to Diet Prescription   [X] Other: Labs    Registered Dietitian/Nutritionist Remains Available.  Teodora Kalesis RD    Phone# [phone] Nutrition Follow Up Note  Hospital Course   (Per Electronic Medical Record)    Source:  Patient [X]  Family Member [X]   Nursing Staff [X]   Medical Record [X]      Diet: Diet, Regular (12-27-24 @ 13:20) [Active]    At this time patient tolerating diet w/ adequate appetite/intake consuming % of meals. Patient follow a Ketogenic meal pattern. No issues w/ dentition or chewing and swallowing current diet texture. No N/V/C/D, last BM 1/8/ per nursing flowsheets.     Current Weight: 184.9lb on 12/27      Pertinent Medications: MEDICATIONS  (STANDING):  amLODIPine   Tablet 5 milliGRAM(s) Oral every 24 hours  dexAMETHasone     Tablet 4 milliGRAM(s) Oral <User Schedule>  dexAMETHasone     Tablet 2 milliGRAM(s) Oral <User Schedule>  enoxaparin Injectable 40 milliGRAM(s) SubCutaneous <User Schedule>  famotidine    Tablet 20 milliGRAM(s) Oral every 12 hours  influenza  Vaccine (HIGH DOSE) 0.5 milliLiter(s) IntraMuscular once  levETIRAcetam 1000 milliGRAM(s) Oral every 12 hours  multivitamin 1 Tablet(s) Oral daily  traZODone 25 milliGRAM(s) Oral <User Schedule>    MEDICATIONS  (PRN):  acetaminophen     Tablet .. 650 milliGRAM(s) Oral every 6 hours PRN Mild Pain (1 - 3)  senna 2 Tablet(s) Oral at bedtime PRN Constipation      Pertinent Labs:  01-09 Na139 mmol/L Glu 92 mg/dL K+ 3.9 mmol/L Cr  0.88 mg/dL BUN 20 mg/dL 01-09 Alb 2.9 g/dL[L]    Skin: No pressure injury per nursing flowsheets    Edema: No edema noted per nursing flowsheet    Last Bowel Movement: on 1/9 Per nursing flowsheets     Estimated Needs:   [X] No Change Since Previous Assessment    Previous Nutrition Diagnosis:   No Active Nutrition Dx @ This Present Time    Interventions:   1. Recommend continuing with current plan of care    Monitoring & Evaluation:   [X] Weights   [X] PO Intake   [X] Skin Integrity   [X] Follow Up (Per Protocol)  [X] Tolerance to Diet Prescription   [X] Other: Labs    Registered Dietitian/Nutritionist Remains Available.  Teodora Pace RD    Phone# [phone] Nutrition Follow Up Note  Hospital Course   (Per Electronic Medical Record)    Source:  Patient [X]  Family Member [X] Wife  Nursing Staff [X]   Medical Record [X]      Diet: Diet, Regular (12-27-24 @ 13:20) [Active]    At this time patient tolerating diet w/ adequate appetite/intake consuming % of meals. Patient follow a Ketogenic meal pattern. Patient reporting he would like to omit beef and lamb, noted in Nutrition office. No issues w/ dentition or chewing and swallowing current diet texture. No N/V/C/D, last BM 1/8/ per nursing flowsheets.     Current Weight: 184.9lb on 12/27      Pertinent Medications: MEDICATIONS  (STANDING):  amLODIPine   Tablet 5 milliGRAM(s) Oral every 24 hours  dexAMETHasone     Tablet 4 milliGRAM(s) Oral <User Schedule>  dexAMETHasone     Tablet 2 milliGRAM(s) Oral <User Schedule>  enoxaparin Injectable 40 milliGRAM(s) SubCutaneous <User Schedule>  famotidine    Tablet 20 milliGRAM(s) Oral every 12 hours  influenza  Vaccine (HIGH DOSE) 0.5 milliLiter(s) IntraMuscular once  levETIRAcetam 1000 milliGRAM(s) Oral every 12 hours  multivitamin 1 Tablet(s) Oral daily  traZODone 25 milliGRAM(s) Oral <User Schedule>    MEDICATIONS  (PRN):  acetaminophen     Tablet .. 650 milliGRAM(s) Oral every 6 hours PRN Mild Pain (1 - 3)  senna 2 Tablet(s) Oral at bedtime PRN Constipation      Pertinent Labs:  01-09 Na139 mmol/L Glu 92 mg/dL K+ 3.9 mmol/L Cr  0.88 mg/dL BUN 20 mg/dL 01-09 Alb 2.9 g/dL[L]    Skin: No pressure injury per nursing flowsheets    Edema: No edema noted per nursing flowsheet    Last Bowel Movement: on 1/9 Per nursing flowsheets     Estimated Needs:   [X] No Change Since Previous Assessment    Previous Nutrition Diagnosis:   No Active Nutrition Dx @ This Present Time    Interventions:   1. Recommend continuing with current plan of care  2. [place] Ketogenic meal pattern (no beef, no lamb)    Monitoring & Evaluation:   [X] Weights   [X] PO Intake   [X] Skin Integrity   [X] Follow Up (Per Protocol)  [X] Tolerance to Diet Prescription   [X] Other: Labs    Registered Dietitian/Nutritionist Remains Available.  Teodora Pace RD    Phone# [phone]

## 2025-01-10 NOTE — PROGRESS NOTE ADULT - ASSESSMENT
69M w/ HTN, OA, right TKA, HO craniotomy for GBM resection 04/25/2023, s/p chemo and RT 2023 with Dr. Causey, Admit at [facility] on Dec13 for reoccurrence of GBM, Sp lt craniotomy  for resection of GBM with placement of Gammatile brachytherapy on 12/19. Hospital course complicated by hyponatremia, diarrhea,  admitted to [facility] On 12/27/24.     #GBM s/p craniotomy for resection  - Continue Keppra  - Continue Decadron taper    #HTN  - c/w Norvasc to 5 qd    #bradycardia  - asymptomatic  -monitor     #Hyponatremia- resolved   - monitor       DVT ppx:Lovenox

## 2025-01-10 NOTE — PROGRESS NOTE ADULT - SUBJECTIVE AND OBJECTIVE BOX
Patient is a 69y old  Male who presents with a chief complaint of GBM s/p craniotomy for resection     SUBJECTIVE: Patient seen and examined while sititng up in WC, wife also at bedside. Slept a little better, for 4hours.  Discussed option of increasing medication and/or try cognitive fatigue exercises to help with sleep >  would like to hold on increasing medication.  If still not better, will increase med tomorrow.    REVIEW OF SYSTEMS  Constitutional: No fever or chills  Cardio: No chest pain, No palpitations  Resp: No SOB, no respiratory distress   Neuro: No headaches, +right sided weakness  MSK: no joint/muscle pain    VITALS  69y  Vital Signs Last 24 Hrs  T(C): 36.8 (01-10-25 @ 07:30), Max: 36.9 (01-09-25 @ 21:02)  T(F): 98.2 (01-10-25 @ 07:30), Max: 98.4 (01-09-25 @ 21:02)  HR: 68 (01-10-25 @ 07:30) (61 - 71)  BP: 115/62 (01-10-25 @ 07:30) (115/62 - 127/77)  RR: 16 (01-10-25 @ 07:30) (16 - 16)  SpO2: 100% (01-10-25 @ 07:30) (96% - 100%)    PHYSICAL EXAM:   Constitutional - NAD, Comfortable  HEENT- s/p craniotomy > skin of incision better approximated with steri strip; +Left strabismus (baseline)  Pulm: resp nonlabored  Cardiovascular - warm and well perfused; no cyanosis or pedal edema  Abdomen - Soft, NT/ND  Extremities - No peripheral edema or calf tenderness  MSK - right shoulder sublux  Skin - RUE assessed since splint is off, no edema or impaired skin integrity   Neurologic Exam -                    Cognitive: AAO to x 4; able to follow commands and answer questions appropriately     Communication: +dysarthria (mild), comprehensible, No aphasia, able to repeat     Cranial Nerves - right lip droop, right facial weakness, right shoulder shrug depressed compared to left     Motor - right hemiparesis                     LEFT    UE - SF 5/5, EF 5/5, EE 5/5, WE 5/5,  5/5                    RIGHT UE - ShAB 1/5, EF 2/5, EE 1/5, WE 2/5, FF 3/5,  2/5                    LEFT    LE - HF 5/5, KE 5/5, DF 5/5, PF 5/5                    RIGHT LE - HF 3/5, KE 3/5, DF 1/5, PF 3/5       Tone- MAS 1+ right pec, right biceps   Psychiatric - Mood stable    RECENT LABS:                        12.8   4.99  )-----------( 299      ( 09 Jan 2025 06:08 )             39.5     01-09    139  |  103  |  20  ----------------------------<  92  3.9   |  30  |  0.88    Ca    9.0      09 Jan 2025 06:08    TPro  6.1  /  Alb  2.9[L]  /  TBili  0.3  /  DBili  x   /  AST  11  /  ALT  36  /  AlkPhos  101  01-09    LIVER FUNCTIONS - ( 09 Jan 2025 06:08 )  Alb: 2.9 g/dL / Pro: 6.1 g/dL / ALK PHOS: 101 U/L / ALT: 36 U/L / AST: 11 U/L / GGT: x           MEDICATIONS:  MEDICATIONS  (STANDING):  amLODIPine   Tablet 5 milliGRAM(s) Oral every 24 hours  dexAMETHasone     Tablet 4 milliGRAM(s) Oral <User Schedule>  dexAMETHasone     Tablet 2 milliGRAM(s) Oral <User Schedule>  enoxaparin Injectable 40 milliGRAM(s) SubCutaneous <User Schedule>  famotidine    Tablet 20 milliGRAM(s) Oral every 12 hours  influenza  Vaccine (HIGH DOSE) 0.5 milliLiter(s) IntraMuscular once  levETIRAcetam 1000 milliGRAM(s) Oral every 12 hours  multivitamin 1 Tablet(s) Oral daily  traZODone 25 milliGRAM(s) Oral <User Schedule>    MEDICATIONS  (PRN):  acetaminophen     Tablet .. 650 milliGRAM(s) Oral every 6 hours PRN Mild Pain (1 - 3)  senna 2 Tablet(s) Oral at bedtime PRN Constipation

## 2025-01-10 NOTE — PROGRESS NOTE ADULT - SUBJECTIVE AND OBJECTIVE BOX
Patient is a 69y old  Male who presents with a chief complaint of GBM s/p craniotomy for resection (09 Jan 2025 11:05)      Patient seen and examined at bedside.    ALLERGIES:  No Known Allergies    MEDICATIONS  (STANDING):  amLODIPine   Tablet 5 milliGRAM(s) Oral every 24 hours  dexAMETHasone     Tablet 4 milliGRAM(s) Oral <User Schedule>  dexAMETHasone     Tablet 2 milliGRAM(s) Oral <User Schedule>  enoxaparin Injectable 40 milliGRAM(s) SubCutaneous <User Schedule>  famotidine    Tablet 20 milliGRAM(s) Oral every 12 hours  influenza  Vaccine (HIGH DOSE) 0.5 milliLiter(s) IntraMuscular once  levETIRAcetam 1000 milliGRAM(s) Oral every 12 hours  multivitamin 1 Tablet(s) Oral daily  traZODone 25 milliGRAM(s) Oral <User Schedule>    MEDICATIONS  (PRN):  acetaminophen     Tablet .. 650 milliGRAM(s) Oral every 6 hours PRN Mild Pain (1 - 3)  senna 2 Tablet(s) Oral at bedtime PRN Constipation    Vital Signs Last 24 Hrs  T(F): 98.2 (10 Dami 2025 07:30), Max: 98.4 (09 Jan 2025 21:02)  HR: 68 (10 Dami 2025 07:30) (61 - 71)  BP: 115/62 (10 Dami 2025 07:30) (115/62 - 127/77)  RR: 16 (10 Dami 2025 07:30) (16 - 16)  SpO2: 100% (10 Dami 2025 07:30) (96% - 100%)  I&O's Summary      PHYSICAL EXAM:  General: NAD, A/O  ENT: MMM, no scleral icterus  Neck: Supple, No JVD, no thyroidomegaly  Lungs: Clear to auscultation bilaterally, no wheezes, no rales, no rhonchi, good inspiratory effort  Cardio: RRR, S1/S2, No murmurs  Abdomen: Soft, Nontender, Nondistended; Bowel sounds present  Extremities: No calf tenderness, No pitting edema, no skin changes    LABS:                        12.8   4.99  )-----------( 299      ( 09 Jan 2025 06:08 )             39.5       01-09    139  |  103  |  20  ----------------------------<  92  3.9   |  30  |  0.88    Ca    9.0      09 Jan 2025 06:08    TPro  6.1  /  Alb  2.9  /  TBili  0.3  /  DBili  x   /  AST  11  /  ALT  36  /  AlkPhos  101  01-09     Urinalysis Basic - ( 09 Jan 2025 06:08 )    Color: x / Appearance: x / SG: x / pH: x  Gluc: 92 mg/dL / Ketone: x  / Bili: x / Urobili: x   Blood: x / Protein: x / Nitrite: x   Leuk Esterase: x / RBC: x / WBC x   Sq Epi: x / Non Sq Epi: x / Bacteria: x    COVID-19 PCR: NotDetec (12-27-24 @ 16:30)  COVID-19 PCR: NotDetec (12-26-24 @ 16:25)  COVID-19 PCR: NotDetec (12-27-24 @ 16:30)  COVID-19 PCR: NotDetec (12-26-24 @ 16:25)  COVID-19 PCR: Detected (05-30-23 @ 09:19)  COVID-19 PCR: Detected (05-24-23 @ 05:50)  COVID-19 PCR: Detected (05-23-23 @ 06:00)  COVID-19 PCR: Detected (05-17-23 @ 18:55)  COVID-19 PCR: Detected (05-17-23 @ 12:05)

## 2025-01-10 NOTE — PROGRESS NOTE ADULT - ASSESSMENT
69 year old male with PMH of HTN, OA, right TKA, s/p L craniotomy for GBM resection 04/25/2023, s/p chemo and RT 2023 with Dr. Causey, who presented to the ED at [facility] on 12/13 due to reoccurrence of brain tumor seen on his latest MRI completed 4 days prior in [place]. Patient c/o worsened R facial droop, worsening word finding difficulties, and right sided weakness x 2 weeks. He was admitted and placed on decadron and MRI done for OR planning. He underwent another left craniotomy  for resection of GBM with placement of Gammatile brachytherapy on 12/19. He was found to have new dyslexia on 12/22 and speech was consulted. He was also started on salt tabs for hyponatremia which resolved. He also had a few episodes of diarrhea coupled with hypotension and received IV fluids. Bowel regimen removed and diarrhea resolved. He was evaluated for admission to Acute Rehab and admitted to [facility] On 12/27/24.     #GBM  - s/p craniotomy for resection  - Start Comprehensive Rehab Program of PT/OT/SLP  -Continue Keppra 1000mg BID  -Continue Decadron 4mg at 6am and 2mg at 3pm   -Monitor incision - staples removed POD#14 (1/2) - (1/3) incision assessment: mid incision separation/skin not well approximated, but C/D/I.  Steri strips applied. Images of incision sent via teams to Dr. Hall > not concerned; (1/6) incision well approximated with steri strips  - Question of brachytherapy > contacted Dr. Wernicke via teams and office # in regards to protocol/precaution/duration.  Awaiting for response > another message left with office (1/7)-- call back from office 1/7--- no precautions needed. Radiation card printed for patient  -Follow up as outpatient for Radiation treatment   -May shower  - Comprehensive therapy: 3 hours/day x 5 days/week: 90min PT, 60min OT, 30min SLP  - E stim RUE with OT    #HTN  - Norvasc 10mg daily > 5mg QD (1/7)  - /62 - 126/77 (1/10)    #Hyponatremia - resolved  -Na is 134 on 12/27  -Na 141 (1/2/25)   --taper salt tabs to 1G  daily - Na 140 (1/6) > dc NaCl-- -Na 139 (1/9)    #Elevated Bun - resolved  - Bun 27>21>25>20 (1/9)- improved  - encourage oral hydration    #Sleep  --melatonin 3mg at 8pm - > inc to 6mg @8p > switched to Rozerem 8mg (1/7)---didn't help-- c/w trazodone 25 mg (1/8)-- a little better, will try cognitive fatigue exercise to help promote sleep.  If no benefit, will increase Trazodone to 50mg    #Pain control  - Tylenol PRN    #GI/Bowel Mgmt   - Senna at bedtime   - Pepcid 20mg BID for GI prophylaxis while on steroid    #Bladder management  -Monitor UO-- voiding, dc'd bladder scans  - voiding without issues    #DVT prophylaxis   - Lovenox  - TEDs     #Skin: intact    #Diet  - Regular diet    Precautions / PROPHYLAXIS:   - Falls,  Seizure   - Pressure injury/Skin: OOB to Chair, PT/OT      Juanjose Johnston  Neurosurgery  [address] [place] [address]  Phone: [phone]    Wernicke, A. Gabriella A  Radiation Oncology  [address] [address]  Phone: [phone]    IDT 1/8  NSG: cont B/B  SW: lives [place] in [place], 2 [place], 7 steps inside. DME: RW/cane  SLP: reg/thin, functional exp/rec language, mild word finding diff, reduced visual acuity impacting reading  OT: min A groom, SV UBD, mod A LBD/min A footwear, mod A stand pivot transfer. Goal: SV  PT: CGA bed mob, SV transfer/amb 130' wide base quad can, min A steps.  Goal: close SV  Team goals: use word finding strategies in discourse w/ min A; amb + toilet with SV  Barriers: RUE weakness  TDD: 1/17 home.  will need family training   69 year old male with PMH of HTN, OA, right TKA, s/p L craniotomy for GBM resection 04/25/2023, s/p chemo and RT 2023 with Dr. Causey, who presented to the ED at [facility] on 12/13 due to reoccurrence of brain tumor seen on his latest MRI completed 4 days prior in [place]. Patient c/o worsened R facial droop, worsening word finding difficulties, and right sided weakness x 2 weeks. He was admitted and placed on decadron and MRI done for OR planning. He underwent another left craniotomy  for resection of GBM with placement of Gammatile brachytherapy on 12/19. He was found to have new dyslexia on 12/22 and speech was consulted. He was also started on salt tabs for hyponatremia which resolved. He also had a few episodes of diarrhea coupled with hypotension and received IV fluids. Bowel regimen removed and diarrhea resolved. He was evaluated for admission to Acute Rehab and admitted to [facility] On 12/27/24.     #GBM  - s/p craniotomy for resection  - Start Comprehensive Rehab Program of PT/OT/SLP  -Continue Keppra 1000mg BID  -Continue Decadron 4mg at 6am and 2mg at 3pm   -Monitor incision - staples removed POD#14 (1/2) - (1/3) incision assessment: mid incision separation/skin not well approximated, but C/D/I.  Steri strips applied. Images of incision sent via teams to Dr. Hall > not concerned; (1/6) incision well approximated with steri strips  - Question of brachytherapy > contacted Dr. Wernicke via teams and office # in regards to protocol/precaution/duration.  Awaiting for response > another message left with office (1/7)-- call back from office 1/7--- no precautions needed. Radiation card printed for patient  -Follow up as outpatient for Radiation treatment   -May shower  - Comprehensive therapy: 3 hours/day x 5 days/week: 90min PT, 60min OT, 30min SLP-- changed to 90 PT/90 OT-- DC FROM ST   - E stim RUE with OT    #HTN  - Norvasc 10mg daily > 5mg QD (1/7)  - /62 - 126/77 (1/10)    #Hyponatremia - resolved  -Na is 134 on 12/27  -Na 141 (1/2/25)   --taper salt tabs to 1G  daily - Na 140 (1/6) > dc NaCl-- -Na 139 (1/9)    #Elevated Bun - resolved  - Bun 27>21>25>20 (1/9)- improved  - encourage oral hydration    #Sleep  --melatonin 3mg at 8pm - > inc to 6mg @8p > switched to Rozerem 8mg (1/7)---didn't help-- c/w trazodone 25 mg (1/8)-- a little better, will try cognitive fatigue exercise to help promote sleep.  If no benefit, will increase Trazodone to 50mg    #Pain control  - Tylenol PRN    #GI/Bowel Mgmt   - Senna at bedtime   - Pepcid 20mg BID for GI prophylaxis while on steroid    #Bladder management  -Monitor UO-- voiding, dc'd bladder scans  - voiding without issues    #DVT prophylaxis   - Lovenox  - TEDs     #Skin: intact    #Diet  - Regular diet    Precautions / PROPHYLAXIS:   - Falls,  Seizure   - Pressure injury/Skin: OOB to Chair, PT/OT      Juanjose Johnston  Neurosurgery  [address] [place] [address]  Phone: [phone]    Wernicke, A. Gabriella A  Radiation Oncology  [address] [address]  Phone: [phone]    IDT 1/8  NSG: cont B/B  SW: [place] in [place], [place], 7 steps inside. DME: RW/cane  SLP: reg/thin, functional exp/rec language, mild word finding diff, reduced visual acuity impacting reading  OT: min A groom, SV UBD, mod A LBD/min A footwear, mod A stand pivot transfer. Goal: SV  PT: CGA bed mob, SV transfer/amb 130' wide base quad can, min A steps.  Goal: close SV  Team goals: use word finding strategies in discourse w/ min A; amb + toilet with SV  Barriers: RUE weakness  TDD: 1/17 home.  will need family training

## 2025-01-11 PROCEDURE — 99232 SBSQ HOSP IP/OBS MODERATE 35: CPT | Mod: GC

## 2025-01-11 PROCEDURE — 99232 SBSQ HOSP IP/OBS MODERATE 35: CPT

## 2025-01-11 RX ORDER — TRAZODONE HYDROCHLORIDE 150 MG/1
50 TABLET ORAL
Refills: 0 | Status: DISCONTINUED | OUTPATIENT
Start: 2025-01-11 | End: 2025-01-13

## 2025-01-11 RX ADMIN — FAMOTIDINE 20 MILLIGRAM(S): 20 TABLET, FILM COATED ORAL at 17:55

## 2025-01-11 RX ADMIN — ENOXAPARIN SODIUM 40 MILLIGRAM(S): 60 INJECTION INTRAVENOUS; SUBCUTANEOUS at 21:16

## 2025-01-11 RX ADMIN — LEVETIRACETAM 1000 MILLIGRAM(S): 100 SOLUTION ORAL at 05:50

## 2025-01-11 RX ADMIN — Medication 2 MILLIGRAM(S): at 17:54

## 2025-01-11 RX ADMIN — Medication 1 TABLET(S): at 11:43

## 2025-01-11 RX ADMIN — FAMOTIDINE 20 MILLIGRAM(S): 20 TABLET, FILM COATED ORAL at 05:50

## 2025-01-11 RX ADMIN — Medication 4 MILLIGRAM(S): at 05:50

## 2025-01-11 RX ADMIN — TRAZODONE HYDROCHLORIDE 50 MILLIGRAM(S): 150 TABLET ORAL at 20:02

## 2025-01-11 RX ADMIN — LEVETIRACETAM 1000 MILLIGRAM(S): 100 SOLUTION ORAL at 17:55

## 2025-01-11 NOTE — PROGRESS NOTE ADULT - ASSESSMENT
69 year old male with PMH of HTN, OA, right TKA, s/p L craniotomy for GBM resection 04/25/2023, s/p chemo and RT 2023 with Dr. Causey, who presented to the ED at [facility] on 12/13 due to reoccurrence of brain tumor seen on his latest MRI completed 4 days prior in [place]. Patient c/o worsened R facial droop, worsening word finding difficulties, and right sided weakness x 2 weeks. He was admitted and placed on decadron and MRI done for OR planning. He underwent another left craniotomy  for resection of GBM with placement of Gammatile brachytherapy on 12/19. He was found to have new dyslexia on 12/22 and speech was consulted. He was also started on salt tabs for hyponatremia which resolved. He also had a few episodes of diarrhea coupled with hypotension and received IV fluids. Bowel regimen removed and diarrhea resolved. He was evaluated for admission to Acute Rehab and admitted to [facility] On 12/27/24.     #GBM  - s/p craniotomy for resection  - Start Comprehensive Rehab Program of PT/OT/SLP  -Continue Keppra 1000mg BID  -Continue Decadron 4mg at 6am and 2mg at 3pm   -Monitor incision - staples removed POD#14 (1/2) - (1/3) incision assessment: mid incision separation/skin not well approximated, but C/D/I.  Steri strips applied. Images of incision sent via teams to Dr. Hall > not concerned; (1/6) incision well approximated with steri strips  - Question of brachytherapy > contacted Dr. Wernicke via teams and office # in regards to protocol/precaution/duration.  Awaiting for response > another message left with office (1/7)-- call back from office 1/7--- no precautions needed. Radiation card printed for patient  -Follow up as outpatient for Radiation treatment   -May shower  - Comprehensive therapy: 3 hours/day x 5 days/week: 90min PT, 60min OT, 30min SLP-- changed to 90 PT/90 OT-- DC FROM ST   - E stim RUE with OT    #HTN  - Norvasc 10mg daily > 5mg QD (1/7)  - BP stable    #Hyponatremia - resolved  -Na is 134 on 12/27  -Na 141 (1/2/25)   --taper salt tabs to 1G  daily - Na 140 (1/6) > dc NaCl-- -Na 139 (1/9)    #Elevated Bun - resolved  - Bun 27>21>25>20 (1/9)- improved  - encourage oral hydration    #Sleep  --melatonin 3mg at 8pm - > inc to 6mg @8p > switched to Rozerem 8mg (1/7)---didn't help-- increase trazodone to 50 mg (1/11)-- discussed with patient and wife at bedside    #Pain control  - Tylenol PRN    #GI/Bowel Mgmt   - Senna at bedtime   - Pepcid 20mg BID for GI prophylaxis while on steroid    #Bladder management  -Monitor UO-- voiding, dc'd bladder scans  - voiding without issues    #DVT prophylaxis   - Lovenox  - TEDs     #Skin: intact    #Diet  - Regular diet    Precautions / PROPHYLAXIS:   - Falls,  Seizure   - Pressure injury/Skin: OOB to Chair, PT/OT      Juanjose Johnston  Neurosurgery  [address] [place] [address]  Phone: [phone]    Wernicke, A. Gabriella A  Radiation Oncology  [address] [address]  Phone: [phone]    IDT 1/8  NSG: cont B/B  SW: [place] in [place], [place], 7 steps inside. DME: RW/cane  SLP: reg/thin, functional exp/rec language, mild word finding diff, reduced visual acuity impacting reading  OT: min A groom, SV UBD, mod A LBD/min A footwear, mod A stand pivot transfer. Goal: SV  PT: CGA bed mob, SV transfer/amb 130' wide base quad can, min A steps.  Goal: close SV  Team goals: use word finding strategies in discourse w/ min A; amb + toilet with SV  Barriers: RUE weakness  TDD: 1/17 home.  will need family training    Total time 35 mins-face to face time encounter and counseling the patient, meeting with hospitalist, nursing staff, therapists and social work to discuss medical updates and management, care coordination, reviewing chart and data

## 2025-01-11 NOTE — PROGRESS NOTE ADULT - ASSESSMENT
69M w/ HTN, OA, right TKA, HO craniotomy for GBM resection 04/25/2023, s/p chemo and RT 2023 with Dr. Causey, Admit at [facility] on Dec13 for reoccurrence of GBM, Sp lt craniotomy  for resection of GBM with placement of Gammatile brachytherapy on 12/19. Hospital course complicated by hyponatremia, diarrhea,  admitted to [facility] On 12/27/24.     #GBM s/p craniotomy for resection  - Continue Keppra  - Continue Decadron taper    #HTN  - c/w Norvasc to 5 qd    #bradycardia  - asymptomatic  -monitor     #Hyponatremia- resolved   - monitor       DVT ppx:Lovenox

## 2025-01-11 NOTE — PROGRESS NOTE ADULT - SUBJECTIVE AND OBJECTIVE BOX
Patient is a 69y old  Male who presents with a chief complaint of GBM s/p craniotomy for resection     SUBJECTIVE: Patient seen and examined while sititng up in WC, wife also at bedside. Slept better but still woke up around 430 am    REVIEW OF SYSTEMS  Constitutional: No fever or chills  Cardio: No chest pain, No palpitations  Resp: No SOB, no respiratory distress   Neuro: No headaches, +right sided weakness  MSK: no joint/muscle pain    VITALS  69y  Vital Signs Last 24 Hrs  T(C): 36 (11 Jan 2025 08:00), Max: 36.6 (10 Dami 2025 19:31)  T(F): 96.8 (11 Jan 2025 08:00), Max: 97.9 (10 Dami 2025 19:31)  HR: 54 (11 Jan 2025 08:00) (54 - 60)  BP: 122/71 (11 Jan 2025 08:00) (117/68 - 122/71)  BP(mean): --  RR: 17 (11 Jan 2025 08:00) (15 - 17)  SpO2: 96% (11 Jan 2025 08:00) (96% - 98%)    Parameters below as of 11 Jan 2025 08:00  Patient On (Oxygen Delivery Method): room air    PHYSICAL EXAM:   Constitutional - NAD, Comfortable  HEENT- s/p craniotomy > skin of incision better approximated with steri strip; +Left strabismus (baseline)  Pulm: resp nonlabored  Cardiovascular - warm and well perfused; no cyanosis or pedal edema  Abdomen - Soft, NT/ND  Extremities - No peripheral edema or calf tenderness  MSK - right shoulder sublux  Skin - RUE assessed since splint is off, no edema or impaired skin integrity   Neurologic Exam -                    Cognitive: AAO to x 4; able to follow commands and answer questions appropriately     Communication: +dysarthria (mild), comprehensible, No aphasia, able to repeat     Cranial Nerves - right lip droop, right facial weakness, right shoulder shrug depressed compared to left     Motor - right hemiparesis                     LEFT    UE - SF 5/5, EF 5/5, EE 5/5, WE 5/5,  5/5                    RIGHT UE - ShAB 1/5, EF 2/5, EE 1/5, WE 2/5, FF 3/5,  2/5                    LEFT    LE - HF 5/5, KE 5/5, DF 5/5, PF 5/5                    RIGHT LE - HF 3/5, KE 3/5, DF 1/5, PF 3/5       Tone- MAS 1+ right pec, right biceps   Psychiatric - Mood stable    RECENT LABS:                        12.8   4.99  )-----------( 299      ( 09 Jan 2025 06:08 )             39.5     01-09    139  |  103  |  20  ----------------------------<  92  3.9   |  30  |  0.88    Ca    9.0      09 Jan 2025 06:08    TPro  6.1  /  Alb  2.9[L]  /  TBili  0.3  /  DBili  x   /  AST  11  /  ALT  36  /  AlkPhos  101  01-09    LIVER FUNCTIONS - ( 09 Jan 2025 06:08 )  Alb: 2.9 g/dL / Pro: 6.1 g/dL / ALK PHOS: 101 U/L / ALT: 36 U/L / AST: 11 U/L / GGT: x           MEDICATIONS:  MEDICATIONS  (STANDING):  amLODIPine   Tablet 5 milliGRAM(s) Oral every 24 hours  dexAMETHasone     Tablet 4 milliGRAM(s) Oral <User Schedule>  dexAMETHasone     Tablet 2 milliGRAM(s) Oral <User Schedule>  enoxaparin Injectable 40 milliGRAM(s) SubCutaneous <User Schedule>  famotidine    Tablet 20 milliGRAM(s) Oral every 12 hours  influenza  Vaccine (HIGH DOSE) 0.5 milliLiter(s) IntraMuscular once  levETIRAcetam 1000 milliGRAM(s) Oral every 12 hours  multivitamin 1 Tablet(s) Oral daily  traZODone 25 milliGRAM(s) Oral <User Schedule>    MEDICATIONS  (PRN):  acetaminophen     Tablet .. 650 milliGRAM(s) Oral every 6 hours PRN Mild Pain (1 - 3)  senna 2 Tablet(s) Oral at bedtime PRN Constipation

## 2025-01-11 NOTE — PROGRESS NOTE ADULT - SUBJECTIVE AND OBJECTIVE BOX
Patient is a 69y old  Male who presents with a chief complaint of GBM s/p craniotomy for resection (10 Dami 2025 11:24)      Patient seen and examined at bedside.    ALLERGIES:  No Known Allergies    MEDICATIONS  (STANDING):  amLODIPine   Tablet 5 milliGRAM(s) Oral every 24 hours  dexAMETHasone     Tablet 4 milliGRAM(s) Oral <User Schedule>  dexAMETHasone     Tablet 2 milliGRAM(s) Oral <User Schedule>  enoxaparin Injectable 40 milliGRAM(s) SubCutaneous <User Schedule>  famotidine    Tablet 20 milliGRAM(s) Oral every 12 hours  influenza  Vaccine (HIGH DOSE) 0.5 milliLiter(s) IntraMuscular once  levETIRAcetam 1000 milliGRAM(s) Oral every 12 hours  multivitamin 1 Tablet(s) Oral daily  traZODone 25 milliGRAM(s) Oral <User Schedule>    MEDICATIONS  (PRN):  acetaminophen     Tablet .. 650 milliGRAM(s) Oral every 6 hours PRN Mild Pain (1 - 3)  senna 2 Tablet(s) Oral at bedtime PRN Constipation    Vital Signs Last 24 Hrs  T(F): 96.8 (11 Jan 2025 08:00), Max: 97.9 (10 Dami 2025 19:31)  HR: 54 (11 Jan 2025 08:00) (54 - 60)  BP: 122/71 (11 Jan 2025 08:00) (117/68 - 122/71)  RR: 17 (11 Jan 2025 08:00) (15 - 17)  SpO2: 96% (11 Jan 2025 08:00) (96% - 98%)  I&O's Summary      PHYSICAL EXAM:  General: NAD, A/O x 3  ENT: MMM, no scleral icterus  Neck: Supple, No JVD, no thyroidomegaly  Lungs: Clear to auscultation bilaterally, no wheezes, no rales, no rhonchi, good inspiratory effort  Cardio: RRR, S1/S2, No murmurs  Abdomen: Soft, Nontender, Nondistended; Bowel sounds present  Extremities: No calf tenderness, No pitting edema, no skin changes    LABS:                        12.8   4.99  )-----------( 299      ( 09 Jan 2025 06:08 )             39.5       01-09    139  |  103  |  20  ----------------------------<  92  3.9   |  30  |  0.88    Ca    9.0      09 Jan 2025 06:08    TPro  6.1  /  Alb  2.9  /  TBili  0.3  /  DBili  x   /  AST  11  /  ALT  36  /  AlkPhos  101  01-09     Urinalysis Basic - ( 09 Jan 2025 06:08 )    Color: x / Appearance: x / SG: x / pH: x  Gluc: 92 mg/dL / Ketone: x  / Bili: x / Urobili: x   Blood: x / Protein: x / Nitrite: x   Leuk Esterase: x / RBC: x / WBC x   Sq Epi: x / Non Sq Epi: x / Bacteria: x    COVID-19 PCR: NotDetec (12-27-24 @ 16:30)  COVID-19 PCR: NotDetec (12-26-24 @ 16:25)  COVID-19 PCR: NotDetec (12-27-24 @ 16:30)  COVID-19 PCR: NotDetec (12-26-24 @ 16:25)  COVID-19 PCR: Detected (05-30-23 @ 09:19)  COVID-19 PCR: Detected (05-24-23 @ 05:50)  COVID-19 PCR: Detected (05-23-23 @ 06:00)  COVID-19 PCR: Detected (05-17-23 @ 18:55)  COVID-19 PCR: Detected (05-17-23 @ 12:05)

## 2025-01-12 PROCEDURE — 99232 SBSQ HOSP IP/OBS MODERATE 35: CPT | Mod: GC

## 2025-01-12 PROCEDURE — 99232 SBSQ HOSP IP/OBS MODERATE 35: CPT

## 2025-01-12 RX ADMIN — LEVETIRACETAM 1000 MILLIGRAM(S): 100 SOLUTION ORAL at 17:16

## 2025-01-12 RX ADMIN — LEVETIRACETAM 1000 MILLIGRAM(S): 100 SOLUTION ORAL at 05:23

## 2025-01-12 RX ADMIN — Medication 2 MILLIGRAM(S): at 16:14

## 2025-01-12 RX ADMIN — Medication 4 MILLIGRAM(S): at 05:23

## 2025-01-12 RX ADMIN — Medication 5 MILLIGRAM(S): at 05:24

## 2025-01-12 RX ADMIN — FAMOTIDINE 20 MILLIGRAM(S): 20 TABLET, FILM COATED ORAL at 17:16

## 2025-01-12 RX ADMIN — TRAZODONE HYDROCHLORIDE 50 MILLIGRAM(S): 150 TABLET ORAL at 19:31

## 2025-01-12 RX ADMIN — ENOXAPARIN SODIUM 40 MILLIGRAM(S): 60 INJECTION INTRAVENOUS; SUBCUTANEOUS at 21:05

## 2025-01-12 RX ADMIN — Medication 1 TABLET(S): at 16:15

## 2025-01-12 RX ADMIN — FAMOTIDINE 20 MILLIGRAM(S): 20 TABLET, FILM COATED ORAL at 05:24

## 2025-01-12 NOTE — PROGRESS NOTE ADULT - SUBJECTIVE AND OBJECTIVE BOX
Patient is a 69y old  Male who presents with a chief complaint of GBM s/p craniotomy for resection     SUBJECTIVE: Patient seen and examined while sititng up in WC, wife also at bedside. Slept ok-- but still woke up a couple times    REVIEW OF SYSTEMS  Constitutional: No fever or chills  Cardio: No chest pain, No palpitations  Resp: No SOB, no respiratory distress   Neuro: No headaches, +right sided weakness  MSK: no joint/muscle pain    VITALS  69y  Vital Signs Last 24 Hrs  T(C): 36.6 (12 Jan 2025 08:00), Max: 37 (11 Jan 2025 19:56)  T(F): 97.9 (12 Jan 2025 08:00), Max: 98.6 (11 Jan 2025 19:56)  HR: 60 (12 Jan 2025 08:00) (60 - 66)  BP: 130/72 (12 Jan 2025 08:00) (106/68 - 130/72)  BP(mean): --  RR: 16 (12 Jan 2025 08:00) (16 - 17)  SpO2: 98% (12 Jan 2025 08:00) (96% - 98%)    Parameters below as of 12 Jan 2025 08:00  Patient On (Oxygen Delivery Method): room air        PHYSICAL EXAM:   Constitutional - NAD, Comfortable  HEENT- s/p craniotomy > skin of incision better approximated with steri strip; +Left strabismus (baseline)  Pulm: resp nonlabored  Cardiovascular - warm and well perfused; no cyanosis or pedal edema  Abdomen - Soft, NT/ND  Extremities - No peripheral edema or calf tenderness  MSK - right shoulder sublux  Skin - RUE assessed since splint is off, no edema or impaired skin integrity   Neurologic Exam -                    Cognitive: AAO to x 4; able to follow commands and answer questions appropriately     Communication: +dysarthria (mild), comprehensible, No aphasia, able to repeat     Cranial Nerves - right lip droop, right facial weakness, right shoulder shrug depressed compared to left     Motor - right hemiparesis                     LEFT    UE - SF 5/5, EF 5/5, EE 5/5, WE 5/5,  5/5                    RIGHT UE - ShAB 1/5, EF 2/5, EE 1/5, WE 2/5, FF 3/5,  2/5                    LEFT    LE - HF 5/5, KE 5/5, DF 5/5, PF 5/5                    RIGHT LE - HF 3/5, KE 3/5, DF 1/5, PF 3/5       Tone- MAS 1+ right pec, right biceps   Psychiatric - Mood stable    RECENT LABS:                        12.8   4.99  )-----------( 299      ( 09 Jan 2025 06:08 )             39.5     01-09    139  |  103  |  20  ----------------------------<  92  3.9   |  30  |  0.88    Ca    9.0      09 Jan 2025 06:08    TPro  6.1  /  Alb  2.9[L]  /  TBili  0.3  /  DBili  x   /  AST  11  /  ALT  36  /  AlkPhos  101  01-09    LIVER FUNCTIONS - ( 09 Jan 2025 06:08 )  Alb: 2.9 g/dL / Pro: 6.1 g/dL / ALK PHOS: 101 U/L / ALT: 36 U/L / AST: 11 U/L / GGT: x           MEDICATIONS:  MEDICATIONS  (STANDING):  amLODIPine   Tablet 5 milliGRAM(s) Oral every 24 hours  dexAMETHasone     Tablet 4 milliGRAM(s) Oral <User Schedule>  dexAMETHasone     Tablet 2 milliGRAM(s) Oral <User Schedule>  enoxaparin Injectable 40 milliGRAM(s) SubCutaneous <User Schedule>  famotidine    Tablet 20 milliGRAM(s) Oral every 12 hours  influenza  Vaccine (HIGH DOSE) 0.5 milliLiter(s) IntraMuscular once  levETIRAcetam 1000 milliGRAM(s) Oral every 12 hours  multivitamin 1 Tablet(s) Oral daily  traZODone 25 milliGRAM(s) Oral <User Schedule>    MEDICATIONS  (PRN):  acetaminophen     Tablet .. 650 milliGRAM(s) Oral every 6 hours PRN Mild Pain (1 - 3)  senna 2 Tablet(s) Oral at bedtime PRN Constipation

## 2025-01-12 NOTE — PROGRESS NOTE ADULT - ASSESSMENT
69 year old male with PMH of HTN, OA, right TKA, s/p L craniotomy for GBM resection 04/25/2023, s/p chemo and RT 2023 with Dr. Causey, who presented to the ED at [facility] on 12/13 due to reoccurrence of brain tumor seen on his latest MRI completed 4 days prior in [place]. Patient c/o worsened R facial droop, worsening word finding difficulties, and right sided weakness x 2 weeks. He was admitted and placed on decadron and MRI done for OR planning. He underwent another left craniotomy  for resection of GBM with placement of Gammatile brachytherapy on 12/19. He was found to have new dyslexia on 12/22 and speech was consulted. He was also started on salt tabs for hyponatremia which resolved. He also had a few episodes of diarrhea coupled with hypotension and received IV fluids. Bowel regimen removed and diarrhea resolved. He was evaluated for admission to Acute Rehab and admitted to [facility] On 12/27/24.     #GBM  - s/p craniotomy for resection  - Start Comprehensive Rehab Program of PT/OT/SLP  -Continue Keppra 1000mg BID  -Continue Decadron 4mg at 6am and 2mg at 3pm   -Monitor incision - staples removed POD#14 (1/2) - (1/3) incision assessment: mid incision separation/skin not well approximated, but C/D/I.  Steri strips applied. Images of incision sent via teams to Dr. Hall > not concerned; (1/6) incision well approximated with steri strips  - Question of brachytherapy > contacted Dr. Wernicke via teams and office # in regards to protocol/precaution/duration.  Awaiting for response > another message left with office (1/7)-- call back from office 1/7--- no precautions needed. Radiation card printed for patient  -Follow up as outpatient for Radiation treatment   -May shower  - Comprehensive therapy: 3 hours/day x 5 days/week: 90min PT, 60min OT, 30min SLP-- changed to 90 PT/90 OT-- DC FROM ST   - E stim RUE with OT    #HTN  - Norvasc 10mg daily > 5mg QD (1/7)  - BP stable    #Hyponatremia - resolved  -Na is 134 on 12/27  -Na 141 (1/2/25)   --taper salt tabs to 1G  daily - Na 140 (1/6) > dc NaCl-- -Na 139 (1/9)    #Elevated Bun - resolved  - Bun 27>21>25>20 (1/9)- improved  - encourage oral hydration    #Sleep  --melatonin 3mg at 8pm - > inc to 6mg @8p > switched to Rozerem 8mg (1/7)---didn't help-- increase trazodone to 50 mg (1/11)-- discussed with patient and wife at bedside-- patient still did not sleep through the night-- discussed increasing dose with patient and wife-- keep the same tonight     #Pain control  - Tylenol PRN    #GI/Bowel Mgmt   - Senna at bedtime   - Pepcid 20mg BID for GI prophylaxis while on steroid    #Bladder management  -Monitor UO-- voiding, dc'd bladder scans  - voiding without issues    #DVT prophylaxis   - Lovenox  - TEDs     #Skin: intact    #Diet  - Regular diet    Precautions / PROPHYLAXIS:   - Falls,  Seizure   - Pressure injury/Skin: OOB to Chair, PT/OT      Juanjose Johnston  Neurosurgery  [address] [place] [address]  Phone: [phone]    Wernicke, A. Gabriella A  Radiation Oncology  [address] [address]  Phone: [phone]    IDT 1/8  NSG: cont B/B  SW: lives with wife in [place], 2 TEODORA, 7 steps inside. DME: RW/cane  SLP: reg/thin, functional exp/rec language, mild word finding diff, reduced visual acuity impacting reading  OT: min A groom, SV UBD, mod A LBD/min A footwear, mod A stand pivot transfer. Goal: SV  PT: CGA bed mob, SV transfer/amb 130' wide base quad can, min A steps.  Goal: close SV  Team goals: use word finding strategies in discourse w/ min A; amb + toilet with SV  Barriers: RUE weakness  TDD: 1/17 home.  will need family training

## 2025-01-12 NOTE — PROGRESS NOTE ADULT - SUBJECTIVE AND OBJECTIVE BOX
Patient is a 69y old  Male who presents with a chief complaint of GBM s/p craniotomy for resection    Patient seen and examined at bedside.    ALLERGIES:  No Known Allergies    MEDICATIONS  (STANDING):  amLODIPine   Tablet 5 milliGRAM(s) Oral every 24 hours  dexAMETHasone     Tablet 4 milliGRAM(s) Oral <User Schedule>  dexAMETHasone     Tablet 2 milliGRAM(s) Oral <User Schedule>  enoxaparin Injectable 40 milliGRAM(s) SubCutaneous <User Schedule>  famotidine    Tablet 20 milliGRAM(s) Oral every 12 hours  influenza  Vaccine (HIGH DOSE) 0.5 milliLiter(s) IntraMuscular once  levETIRAcetam 1000 milliGRAM(s) Oral every 12 hours  multivitamin 1 Tablet(s) Oral daily  traZODone 50 milliGRAM(s) Oral <User Schedule>    MEDICATIONS  (PRN):  acetaminophen     Tablet .. 650 milliGRAM(s) Oral every 6 hours PRN Mild Pain (1 - 3)  senna 2 Tablet(s) Oral at bedtime PRN Constipation    Vital Signs Last 24 Hrs  T(F): 97.9 (12 Jan 2025 08:00), Max: 98.6 (11 Jan 2025 19:56)  HR: 60 (12 Jan 2025 08:00) (60 - 66)  BP: 130/72 (12 Jan 2025 08:00) (106/68 - 130/72)  RR: 16 (12 Jan 2025 08:00) (16 - 17)  SpO2: 98% (12 Jan 2025 08:00) (96% - 98%)  I&O's Summary    11 Jan 2025 07:01  -  12 Jan 2025 07:00  --------------------------------------------------------  IN: 480 mL / OUT: 0 mL / NET: 480 mL    PHYSICAL EXAM:  General: NAD, A/O   ENT: MMM, no scleral icterus  Neck: Supple, No JVD, no thyroidomegaly  Lungs: Clear to auscultation bilaterally, no wheezes, no rales, no rhonchi, good inspiratory effort  Cardio: RRR, S1/S2, No murmurs  Abdomen: Soft, Nontender, Nondistended; Bowel sounds present  Extremities: No calf tenderness, No pitting edema, no skin changes    LABS:      COVID-19 PCR: NotDetec (12-27-24 @ 16:30)  COVID-19 PCR: NotDetec (12-26-24 @ 16:25)  COVID-19 PCR: NotDetec (12-27-24 @ 16:30)  COVID-19 PCR: NotDetec (12-26-24 @ 16:25)  COVID-19 PCR: Detected (05-30-23 @ 09:19)  COVID-19 PCR: Detected (05-24-23 @ 05:50)  COVID-19 PCR: Detected (05-23-23 @ 06:00)  COVID-19 PCR: Detected (05-17-23 @ 18:55)  COVID-19 PCR: Detected (05-17-23 @ 12:05)

## 2025-01-12 NOTE — PROGRESS NOTE ADULT - ASSESSMENT
69M w/ HTN, OA, right TKA, HO craniotomy for GBM resection 04/25/2023, s/p chemo and RT 2023 with Dr. Causey, Admit at [facility] on Dec13 for reoccurrence of GBM, Sp lt craniotomy  for resection of GBM with placement of Gammatile brachytherapy on 12/19. Hospital course complicated by hyponatremia, diarrhea,  admitted to [facility] On 12/27/24.     #GBM s/p craniotomy for resection  - Continue Keppra  - Continue Decadron taper    #HTN  - c/w Norvasc to 5 qd    #bradycardia  - asymptomatic  -monitor     #Hyponatremia- resolved   - monitor       DVT ppx:Lovenox

## 2025-01-13 ENCOUNTER — TRANSCRIPTION ENCOUNTER (OUTPATIENT)
Age: 70
End: 2025-01-13

## 2025-01-13 LAB
ALBUMIN SERPL ELPH-MCNC: 2.8 G/DL — LOW (ref 3.3–5)
ALP SERPL-CCNC: 100 U/L — SIGNIFICANT CHANGE UP (ref 40–120)
ALT FLD-CCNC: 29 U/L — SIGNIFICANT CHANGE UP (ref 10–45)
ANION GAP SERPL CALC-SCNC: 8 MMOL/L — SIGNIFICANT CHANGE UP (ref 5–17)
AST SERPL-CCNC: 13 U/L — SIGNIFICANT CHANGE UP (ref 10–40)
BASOPHILS # BLD AUTO: 0.02 K/UL — SIGNIFICANT CHANGE UP (ref 0–0.2)
BASOPHILS NFR BLD AUTO: 0.3 % — SIGNIFICANT CHANGE UP (ref 0–2)
BILIRUB SERPL-MCNC: 0.2 MG/DL — SIGNIFICANT CHANGE UP (ref 0.2–1.2)
BUN SERPL-MCNC: 22 MG/DL — SIGNIFICANT CHANGE UP (ref 7–23)
CALCIUM SERPL-MCNC: 8.6 MG/DL — SIGNIFICANT CHANGE UP (ref 8.4–10.5)
CHLORIDE SERPL-SCNC: 106 MMOL/L — SIGNIFICANT CHANGE UP (ref 96–108)
CO2 SERPL-SCNC: 28 MMOL/L — SIGNIFICANT CHANGE UP (ref 22–31)
CREAT SERPL-MCNC: 0.97 MG/DL — SIGNIFICANT CHANGE UP (ref 0.5–1.3)
EGFR: 85 ML/MIN/1.73M2 — SIGNIFICANT CHANGE UP
EOSINOPHIL # BLD AUTO: 0.02 K/UL — SIGNIFICANT CHANGE UP (ref 0–0.5)
EOSINOPHIL NFR BLD AUTO: 0.3 % — SIGNIFICANT CHANGE UP (ref 0–6)
GLUCOSE SERPL-MCNC: 88 MG/DL — SIGNIFICANT CHANGE UP (ref 70–99)
HCT VFR BLD CALC: 38.4 % — LOW (ref 39–50)
HGB BLD-MCNC: 12.7 G/DL — LOW (ref 13–17)
IMM GRANULOCYTES NFR BLD AUTO: 2.8 % — HIGH (ref 0–0.9)
LYMPHOCYTES # BLD AUTO: 2.64 K/UL — SIGNIFICANT CHANGE UP (ref 1–3.3)
LYMPHOCYTES # BLD AUTO: 41.6 % — SIGNIFICANT CHANGE UP (ref 13–44)
MCHC RBC-ENTMCNC: 29.2 PG — SIGNIFICANT CHANGE UP (ref 27–34)
MCHC RBC-ENTMCNC: 33.1 G/DL — SIGNIFICANT CHANGE UP (ref 32–36)
MCV RBC AUTO: 88.3 FL — SIGNIFICANT CHANGE UP (ref 80–100)
MONOCYTES # BLD AUTO: 0.66 K/UL — SIGNIFICANT CHANGE UP (ref 0–0.9)
MONOCYTES NFR BLD AUTO: 10.4 % — SIGNIFICANT CHANGE UP (ref 2–14)
NEUTROPHILS # BLD AUTO: 2.82 K/UL — SIGNIFICANT CHANGE UP (ref 1.8–7.4)
NEUTROPHILS NFR BLD AUTO: 44.6 % — SIGNIFICANT CHANGE UP (ref 43–77)
NRBC # BLD: 0 /100 WBCS — SIGNIFICANT CHANGE UP (ref 0–0)
PLATELET # BLD AUTO: 343 K/UL — SIGNIFICANT CHANGE UP (ref 150–400)
POTASSIUM SERPL-MCNC: 3.9 MMOL/L — SIGNIFICANT CHANGE UP (ref 3.5–5.3)
POTASSIUM SERPL-SCNC: 3.9 MMOL/L — SIGNIFICANT CHANGE UP (ref 3.5–5.3)
PROT SERPL-MCNC: 5.8 G/DL — LOW (ref 6–8.3)
RBC # BLD: 4.35 M/UL — SIGNIFICANT CHANGE UP (ref 4.2–5.8)
RBC # FLD: 14 % — SIGNIFICANT CHANGE UP (ref 10.3–14.5)
SODIUM SERPL-SCNC: 142 MMOL/L — SIGNIFICANT CHANGE UP (ref 135–145)
WBC # BLD: 6.34 K/UL — SIGNIFICANT CHANGE UP (ref 3.8–10.5)
WBC # FLD AUTO: 6.34 K/UL — SIGNIFICANT CHANGE UP (ref 3.8–10.5)

## 2025-01-13 PROCEDURE — 99233 SBSQ HOSP IP/OBS HIGH 50: CPT | Mod: GC

## 2025-01-13 RX ORDER — DEXAMETHASONE SODIUM PHOSPHATE 4 MG/ML
1 VIAL (ML) INJECTION
Qty: 30 | Refills: 0
Start: 2025-01-13 | End: 2025-02-11

## 2025-01-13 RX ORDER — ACETAMINOPHEN 80 MG/.8ML
2 SOLUTION/ DROPS ORAL
Qty: 0 | Refills: 0 | DISCHARGE
Start: 2025-01-13

## 2025-01-13 RX ORDER — LEVETIRACETAM 100 MG/ML
1 SOLUTION ORAL
Qty: 60 | Refills: 0
Start: 2025-01-13 | End: 2025-02-11

## 2025-01-13 RX ORDER — FAMOTIDINE 20 MG/1
1 TABLET, FILM COATED ORAL
Qty: 0 | Refills: 0 | DISCHARGE
Start: 2025-01-13

## 2025-01-13 RX ORDER — TRAZODONE HYDROCHLORIDE 150 MG/1
25 TABLET ORAL
Refills: 0 | Status: DISCONTINUED | OUTPATIENT
Start: 2025-01-13 | End: 2025-01-17

## 2025-01-13 RX ORDER — GINKGO BILOBA 40 MG
2 CAPSULE ORAL
Qty: 0 | Refills: 0 | DISCHARGE
Start: 2025-01-13

## 2025-01-13 RX ORDER — GINKGO BILOBA 40 MG
6 CAPSULE ORAL
Refills: 0 | Status: DISCONTINUED | OUTPATIENT
Start: 2025-01-13 | End: 2025-01-17

## 2025-01-13 RX ORDER — SENNOSIDES 8.6 MG/1
2 TABLET, FILM COATED ORAL
Qty: 0 | Refills: 0 | DISCHARGE
Start: 2025-01-13

## 2025-01-13 RX ORDER — B COMPLEX, C NO.20/FOLIC ACID 1 MG
1 CAPSULE ORAL
Qty: 0 | Refills: 0 | DISCHARGE
Start: 2025-01-13

## 2025-01-13 RX ADMIN — TRAZODONE HYDROCHLORIDE 25 MILLIGRAM(S): 150 TABLET ORAL at 21:43

## 2025-01-13 RX ADMIN — FAMOTIDINE 20 MILLIGRAM(S): 20 TABLET, FILM COATED ORAL at 18:12

## 2025-01-13 RX ADMIN — ENOXAPARIN SODIUM 40 MILLIGRAM(S): 60 INJECTION INTRAVENOUS; SUBCUTANEOUS at 21:43

## 2025-01-13 RX ADMIN — Medication 1 TABLET(S): at 11:25

## 2025-01-13 RX ADMIN — FAMOTIDINE 20 MILLIGRAM(S): 20 TABLET, FILM COATED ORAL at 06:01

## 2025-01-13 RX ADMIN — LEVETIRACETAM 1000 MILLIGRAM(S): 100 SOLUTION ORAL at 18:12

## 2025-01-13 RX ADMIN — Medication 4 MILLIGRAM(S): at 06:02

## 2025-01-13 RX ADMIN — Medication 6 MILLIGRAM(S): at 21:44

## 2025-01-13 RX ADMIN — Medication 2 MILLIGRAM(S): at 14:50

## 2025-01-13 RX ADMIN — LEVETIRACETAM 1000 MILLIGRAM(S): 100 SOLUTION ORAL at 06:01

## 2025-01-13 NOTE — PROVIDER CONTACT NOTE (OTHER) - ASSESSMENT
Patient VSS stable. Patient seated BP-122/75, HR-54. Patient asymptomatic no C/o OF chest pain, nausea or vomiting.

## 2025-01-13 NOTE — DISCHARGE NOTE PROVIDER - CARE PROVIDER_API CALL
Juanjose Johnston  Neurosurgery  [address] [place] [address]  Phone: [phone]  Fax: [phone]  Follow Up Time: 2 weeks    Wernicke, Gabriella  Radiation Oncology  [address] [address]  Phone: [phone]  Fax: [phone]  Phone: (   )    -  Fax: (   )    -  Follow Up Time: 2 weeks    Edelmira Carmona  Physical/Rehab Medicine  [address] [place] [address]  Phone: [phone]  Fax: [phone]  Follow Up Time: 1 month   Juanjose Johnston  Neurosurgery  [address] [place] [address]  Phone: [phone]  Fax: [phone]  Follow Up Time: 2 weeks    Edelmira Carmona  Physical/Rehab Medicine  [address] [place] [address]  Phone: [phone]  Fax: [phone]  Follow Up Time: 1 month    Wernicke, Gabriella  Radiation Oncology  [address] [address]  Phone: [phone]  Fax: [phone]  Phone: (   )    -  Fax: (   )    -  Follow Up Time: 2 weeks    Carl Last  Physical/Rehab Medicine  [address]  [place] [address]  Phone: [phone]  Fax: [phone]  Follow Up Time: 1 month

## 2025-01-13 NOTE — DISCHARGE NOTE PROVIDER - NSDCMRMEDTOKEN_GEN_ALL_CORE_FT
acetaminophen 325 mg oral tablet: 2 tab(s) orally every 6 hours As needed Mild Pain (1 - 3)  amLODIPine 5 mg oral tablet: 1 tab(s) orally every 24 hours Hold medication if your systolic blood pressure (top number) is LESS than 125  dexAMETHasone 2 mg oral tablet: 1 tab(s) orally once a day at 3PM  dexAMETHasone 4 mg oral tablet: 1 tab(s) orally once a day at 6am, or as soon as you wake up  famotidine 20 mg oral tablet: 1 tab(s) orally every 12 hours  levETIRAcetam 1000 mg oral tablet: 1 tab(s) orally every 12 hours  melatonin 3 mg oral tablet: 2 tab(s) orally once a day (at bedtime)  Multiple Vitamins oral tablet: 1 tab(s) orally once a day  senna leaf extract oral tablet: 2 tab(s) orally once a day (at bedtime) As needed Constipation   acetaminophen 325 mg oral tablet: 2 tab(s) orally every 6 hours As needed Mild Pain (1 - 3)  amLODIPine 2.5 mg oral tablet: 1 tab(s) orally every 24 hours  dexAMETHasone 2 mg oral tablet: 1 tab(s) orally once a day at 3PM  dexAMETHasone 4 mg oral tablet: 1 tab(s) orally once a day at 6am, or as soon as you wake up  famotidine 20 mg oral tablet: 1 tab(s) orally every 12 hours  levETIRAcetam 1000 mg oral tablet: 1 tab(s) orally every 12 hours  melatonin 3 mg oral tablet: 2 tab(s) orally once a day (at bedtime)  Multiple Vitamins oral tablet: 1 tab(s) orally once a day  occupational therapy: 2-3x/week  Physical therapy: 2-3x/week  senna leaf extract oral tablet: 2 tab(s) orally once a day (at bedtime) As needed Constipation  speech therapy: 2-3x/week   acetaminophen 325 mg oral tablet: 2 tab(s) orally every 6 hours As needed Mild Pain (1 - 3)  amLODIPine 2.5 mg oral tablet: 1 tab(s) orally every 24 hours  dexAMETHasone 2 mg oral tablet: 1 tab(s) orally once a day at 3PM  dexAMETHasone 4 mg oral tablet: 1 tab(s) orally once a day at 6am, or as soon as you wake up  famotidine 20 mg oral tablet: 1 tab(s) orally every 12 hours  levETIRAcetam 1000 mg oral tablet: 1 tab(s) orally every 12 hours  melatonin 3 mg oral tablet: 2 tab(s) orally once a day (at bedtime)  Multiple Vitamins oral tablet: 1 tab(s) orally once a day  occupational therapy: 2-3x/week  Occupational Therapy: 4-5 days weekly x6-8 weeks  ICD 10: Glioblastoma  Physical Therapy: 4-5 days weekly x6-8 weeks  ICD 10: Glioblastoma  Physical therapy: 2-3x/week  senna leaf extract oral tablet: 2 tab(s) orally once a day (at bedtime) As needed Constipation  speech therapy: 2-3x/week

## 2025-01-13 NOTE — DISCHARGE NOTE PROVIDER - NSDCCPCAREPLAN_GEN_ALL_CORE_FT
PRINCIPAL DISCHARGE DIAGNOSIS  Diagnosis: GBM (glioblastoma multiforme)  Assessment and Plan of Treatment: Had left craniotomy for brain tumor resection 4/25/2023, s/p chemo and radiation therapy in 2023.  Admitted at [facility] for reoccurance of brain tumor seen on MRI.  S/p craniotomy with placement of Gammatile brachytherapy on 12/19/2024.  Staples wer removed on 1/2 > due to post staple removal edema, mid incision didn't look to well approximated.  However, after swelling reduced it looked better > photos were sent to Dr. Hall. Steri strips applied and it's been healing better. You were given a print out of your Radiation card > please care it at all times incase you set off a radiation detector. Remain on steroid and seizure prevention medications.  Follow up with: Neurosurgery and Radiation Oncologist.      SECONDARY DISCHARGE DIAGNOSES  Diagnosis: Hypertension  Assessment and Plan of Treatment: You have a history of hypertension, on Amlodipine.  However, during your rehab course, your blood pressure noted to be soft. Amlodipine isn't always given because it didn't meet parameters.  If you have a Blood pressure cuff at home, please monitor your BP.  If you don't, it's highly recommended that you invest in one.  If Systolic BP (top number) is less than 125, please hold medication.  Also take your BP at any given time you're feeling symptomatic (headache, dizziness, lightheaded, visual changes, etc).  Follow up with your primary care provider within 1-2 weeks of discharge.

## 2025-01-13 NOTE — PROGRESS NOTE ADULT - ASSESSMENT
69 year old male with PMH of HTN, OA, right TKA, s/p L craniotomy for GBM resection 04/25/2023, s/p chemo and RT 2023 with Dr. Causey, who presented to the ED at [facility] on 12/13 due to reoccurrence of brain tumor seen on his latest MRI completed 4 days prior in [place]. Patient c/o worsened R facial droop, worsening word finding difficulties, and right sided weakness x 2 weeks. He was admitted and placed on decadron and MRI done for OR planning. He underwent another left craniotomy  for resection of GBM with placement of Gammatile brachytherapy on 12/19. He was found to have new dyslexia on 12/22 and speech was consulted. He was also started on salt tabs for hyponatremia which resolved. He also had a few episodes of diarrhea coupled with hypotension and received IV fluids. Bowel regimen removed and diarrhea resolved. He was evaluated for admission to Acute Rehab and admitted to [facility] On 12/27/24.     #GBM  - s/p craniotomy for resection  - Start Comprehensive Rehab Program of PT 90min /OT 90min  -Continue Keppra 1000mg BID  -Continue Decadron 4mg at 6am and 2mg at 3pm   -Monitor incision - staples removed POD#14 (1/2) - (1/3) incision assessment: mid incision separation/skin not well approximated, but C/D/I.  Steri strips applied. Images of incision sent via teams to Dr. Hall > not concerned; (1/6) incision well approximated with steri strips  - Question of brachytherapy > contacted Dr. Wernicke via teams and office # in regards to protocol/precaution/duration.  Awaiting for response > another message left with office (1/7)-- call back from office 1/7--- no precautions needed. Radiation card printed and given to patient  -Follow up as outpatient for Radiation treatment   -May shower  - Comprehensive therapy: 3 hours/day x 5 days/week: 90min PT, 60min OT, 30min SLP-- changed to 90 PT/90 OT-- DC FROM ST   - E stim RUE with OT    #HTN  - Norvasc 10mg daily > 5mg QD (1/7)  - /72 > did not get norvasc this AM (1/13)    #Hyponatremia - resolved  -Na is 134 on 12/27  -Na 141 (1/2/25)   --taper salt tabs to 1G  daily - Na 140 (1/6) > dc NaCl-- -Na 139 (1/9) >142 (1/13) remaining stable (off NaCl since 1/6)    #Elevated Bun - resolved  - Bun 27>21>25>20 (1/9)- improved 22/0.97 (1/13)  - encourage oral hydration    #Sleep  --melatonin 3mg at 8pm - > inc to 6mg @8p > switched to Rozerem 8mg (1/7)---didn't help-- increase trazodone to 50 mg (1/11)-- sleep is better but still doesn't feel optimal > waking up at 12am, appears more tired this AM.  Will reduce trazodone to 25 + Melatonin 6mg.    #Pain control  - Tylenol PRN    #GI/Bowel Mgmt   - Senna at bedtime   - Pepcid 20mg BID for GI prophylaxis while on steroid    #Bladder management  -Monitor UO-- voiding, dc'd bladder scans  - voiding without issues    #DVT prophylaxis   - Lovenox  - TEDs     #Skin: intact    #Diet  - Regular diet    Precautions / PROPHYLAXIS:   - Falls,  Seizure   - Pressure injury/Skin: OOB to Chair, PT/OT      Juanjose Johnston  Neurosurgery  [address] [place] [address]  Phone: [phone]    Wernicke, A. Gabriella A  Radiation Oncology  [address] [address]  Phone: [phone]    IDT 1/8  NSG: cont B/B  SW: lives with wife in [place], 2 [place], 7 steps inside. DME: RW/cane  SLP: reg/thin, functional exp/rec language, mild word finding diff, reduced visual acuity impacting reading  OT: min A groom, SV UBD, mod A LBD/min A footwear, mod A stand pivot transfer. Goal: SV  PT: CGA bed mob, SV transfer/amb 130' wide base quad can, min A steps.  Goal: close SV  Team goals: use word finding strategies in discourse w/ min A; amb + toilet with SV  Barriers: RUE weakness  TDD: 1/17 home.  will need family training

## 2025-01-13 NOTE — DISCHARGE NOTE PROVIDER - PROVIDER TOKENS
PROVIDER:[TOKEN:[[id number]],FOLLOWUP:[2 weeks]],FREE:[LAST:[Wernicke],FIRST:[Ely],PHONE:[(   )    -],FAX:[(   )    -],ADDRESS:[Radiation Oncology  [address] [place] [address]  Phone: [phone]  Fax: [phone]],FOLLOWUP:[2 weeks]],PROVIDER:[TOKEN:[[id number]],FOLLOWUP:[1 month]] PROVIDER:[TOKEN:[[id number]],FOLLOWUP:[2 weeks]],PROVIDER:[TOKEN:[[id number]],FOLLOWUP:[1 month]],FREE:[LAST:[Wernicke],FIRST:[Ely],PHONE:[(   )    -],FAX:[(   )    -],ADDRESS:[Radiation Oncology  [address] [place] [address]  Phone: [phone]  Fax: [phone]],FOLLOWUP:[2 weeks]],PROVIDER:[TOKEN:[[id number]],FOLLOWUP:[1 month]]

## 2025-01-13 NOTE — DISCHARGE NOTE PROVIDER - CARE PROVIDERS DIRECT ADDRESSES
,[email or web address],DirectAddress_Unknown,DirectAddress_Unknown ,[email or web address],DirectAddress_Unknown,DirectAddress_Unknown,DirectAddress_Unknown

## 2025-01-13 NOTE — DISCHARGE NOTE PROVIDER - HOSPITAL COURSE
HPI:  69 year old male with PMH of HTN, OA, right TKA, s/p L craniotomy for GBM resection 04/25/2023, s/p chemo and RT 2023 with Dr. Causey, who presented to the ED at [facility] on 12/13 due to reoccurrence of brain tumor seen on his latest MRI completed 4 days prior in [place]. Patient c/o worsened R facial droop, worsening word finding difficulties, and right sided weakness x 2 weeks. He was admitted and placed on decadron and MRI done for OR planning. He underwent another left craniotomy  for resection of GBM with placement of Gammatile brachytherapy on 12/19. He was found to have new dyslexia on 12/22 and speech was consulted. He was also started on salt tabs for hyponatremia which resolved. He also had a few episodes of diarrhea coupled with hypotension and received IV fluids. Bowel regimen removed and diarrhea resolved. He was evaluated for admission to Acute Rehab     Pt was stable upon rehab admission to  [facility]. Admitted with gait instabilty, ADL, and functional impairments.     Rehab course significant for:  - After staple removal, edema to mid incision causing poor approximation of skin > photos sent to NSGY; steri strips applied.  Healing well  - sleep impairment > at baseline he's more awake overnight > tried melatonin, rozerem, and trazodone.  High dose trazodone with AM tiredness.  On low dose trazodone and mod melatonin for the rest of his rehab course.  Does not need sleep aide upon discharge    All other medical co-morbidities were stable.     Last IDT     Pt tolerated course of inpatient PT/OT rehab with significant functional improvements and met rehab goals prior to discharge.

## 2025-01-13 NOTE — PROGRESS NOTE ADULT - SUBJECTIVE AND OBJECTIVE BOX
Patient is a 69y old  Male who presents with a chief complaint of GBM s/p craniotomy for resection     SUBJECTIVE: Patient seen and examined while having breakfast in bed, wife also at bedside.  Endorse not sleeping well, waking up at midnight.  However, with increased meds he appears more sleepy.  Discussed reducing trazodone to 25 and adding melatonin.  At home patient is usually more awake/active at night and is on his own schedule so he's not concern about sleep upon discharge.  Further discussed outpatient therapy > he can take the script to any outpatient facility > [place] and come to [place].    REVIEW OF SYSTEMS  Constitutional: No fever or chills  Cardio: No chest pain, No palpitations  Resp: No SOB, no respiratory distress   Neuro: No headaches, +right sided weakness  MSK: no joint/muscle pain    VITALS  69y  Vital Signs Last 24 Hrs  T(C): 36.7 (01-13-25 @ 08:00), Max: 36.7 (01-13-25 @ 08:00)  T(F): 98.1 (01-13-25 @ 08:00), Max: 98.1 (01-13-25 @ 08:00)  HR: 68 (01-13-25 @ 08:00) (54 - 68)  BP: 113/72 (01-13-25 @ 08:00) (113/72 - 145/98)  RR: 17 (01-13-25 @ 08:00) (16 - 17)  SpO2: 96% (01-13-25 @ 08:00) (96% - 98%)    PHYSICAL EXAM:   Constitutional - NAD, Comfortable  HEENT- s/p craniotomy > skin of incision better approximated with steri strip; +Left strabismus (baseline)  Pulm: resp nonlabored  Cardiovascular - warm and well perfused; no cyanosis or pedal edema  Abdomen - Soft, NT/ND  Extremities - No peripheral edema or calf tenderness  MSK - right shoulder sublux  Skin - RUE assessed since splint is off, no edema or impaired skin integrity   Neurologic Exam -                    Cognitive: AAO to x 4; able to follow commands and answer questions appropriately     Communication: +dysarthria (mild), comprehensible, No aphasia, able to repeat     Cranial Nerves - right lip droop, right facial weakness, right shoulder shrug depressed compared to left     Motor - right hemiparesis                     LEFT    UE - SF 5/5, EF 5/5, EE 5/5, WE 5/5,  5/5                    RIGHT UE - ShAB 1/5, EF 2/5, EE 1/5, WE 2/5, FF 3/5,  2/5                    LEFT    LE - HF 5/5, KE 5/5, DF 5/5, PF 5/5                    RIGHT LE - HF 3/5, KE 3/5, DF 1/5, PF 3/5       Tone- MAS 1+ right pec, right biceps   Psychiatric - Mood stable    RECENT LABS:                        12.7   6.34  )-----------( 343      ( 13 Jan 2025 05:42 )             38.4     01-13    142  |  106  |  22  ----------------------------<  88  3.9   |  28  |  0.97    Ca    8.6      13 Jan 2025 05:42    TPro  5.8[L]  /  Alb  2.8[L]  /  TBili  0.2  /  DBili  x   /  AST  13  /  ALT  29  /  AlkPhos  100  01-13    LIVER FUNCTIONS - ( 13 Jan 2025 05:42 )  Alb: 2.8 g/dL / Pro: 5.8 g/dL / ALK PHOS: 100 U/L / ALT: 29 U/L / AST: 13 U/L / GGT: x           MEDICATIONS:  MEDICATIONS  (STANDING):  amLODIPine   Tablet 5 milliGRAM(s) Oral every 24 hours  dexAMETHasone     Tablet 4 milliGRAM(s) Oral <User Schedule>  dexAMETHasone     Tablet 2 milliGRAM(s) Oral <User Schedule>  enoxaparin Injectable 40 milliGRAM(s) SubCutaneous <User Schedule>  famotidine    Tablet 20 milliGRAM(s) Oral every 12 hours  influenza  Vaccine (HIGH DOSE) 0.5 milliLiter(s) IntraMuscular once  levETIRAcetam 1000 milliGRAM(s) Oral every 12 hours  melatonin 6 milliGRAM(s) Oral <User Schedule>  multivitamin 1 Tablet(s) Oral daily  traZODone 25 milliGRAM(s) Oral <User Schedule>    MEDICATIONS  (PRN):  acetaminophen     Tablet .. 650 milliGRAM(s) Oral every 6 hours PRN Mild Pain (1 - 3)  senna 2 Tablet(s) Oral at bedtime PRN Constipation

## 2025-01-14 PROCEDURE — 99232 SBSQ HOSP IP/OBS MODERATE 35: CPT

## 2025-01-14 PROCEDURE — 99233 SBSQ HOSP IP/OBS HIGH 50: CPT | Mod: GC

## 2025-01-14 RX ADMIN — Medication 6 MILLIGRAM(S): at 21:16

## 2025-01-14 RX ADMIN — FAMOTIDINE 20 MILLIGRAM(S): 20 TABLET, FILM COATED ORAL at 17:03

## 2025-01-14 RX ADMIN — FAMOTIDINE 20 MILLIGRAM(S): 20 TABLET, FILM COATED ORAL at 06:08

## 2025-01-14 RX ADMIN — Medication 2 MILLIGRAM(S): at 15:08

## 2025-01-14 RX ADMIN — Medication 1 TABLET(S): at 12:18

## 2025-01-14 RX ADMIN — TRAZODONE HYDROCHLORIDE 25 MILLIGRAM(S): 150 TABLET ORAL at 21:16

## 2025-01-14 RX ADMIN — LEVETIRACETAM 1000 MILLIGRAM(S): 100 SOLUTION ORAL at 06:08

## 2025-01-14 RX ADMIN — ENOXAPARIN SODIUM 40 MILLIGRAM(S): 60 INJECTION INTRAVENOUS; SUBCUTANEOUS at 21:17

## 2025-01-14 RX ADMIN — Medication 4 MILLIGRAM(S): at 06:08

## 2025-01-14 RX ADMIN — LEVETIRACETAM 1000 MILLIGRAM(S): 100 SOLUTION ORAL at 17:02

## 2025-01-14 NOTE — PROGRESS NOTE ADULT - SUBJECTIVE AND OBJECTIVE BOX
Interval History  Patient seen and examined at bedside. No acute overnight events noted.   Patient reports feeling well, denies any acute complaints. Denies HA/LH/dizziness. Rest of ROS are negative.      ALLERGIES:  No Known Allergies    MEDICATIONS  (STANDING):  amLODIPine   Tablet 5 milliGRAM(s) Oral every 24 hours  dexAMETHasone     Tablet 2 milliGRAM(s) Oral <User Schedule>  dexAMETHasone     Tablet 4 milliGRAM(s) Oral <User Schedule>  enoxaparin Injectable 40 milliGRAM(s) SubCutaneous <User Schedule>  famotidine    Tablet 20 milliGRAM(s) Oral every 12 hours  influenza  Vaccine (HIGH DOSE) 0.5 milliLiter(s) IntraMuscular once  levETIRAcetam 1000 milliGRAM(s) Oral every 12 hours  melatonin 6 milliGRAM(s) Oral <User Schedule>  multivitamin 1 Tablet(s) Oral daily  traZODone 25 milliGRAM(s) Oral <User Schedule>    MEDICATIONS  (PRN):  acetaminophen     Tablet .. 650 milliGRAM(s) Oral every 6 hours PRN Mild Pain (1 - 3)  senna 2 Tablet(s) Oral at bedtime PRN Constipation    Vital Signs Last 24 Hrs  T(F): 98.3 (14 Jan 2025 07:50), Max: 98.3 (14 Jan 2025 07:50)  HR: 65 (14 Jan 2025 07:50) (62 - 65)  BP: 121/71 (14 Jan 2025 07:50) (112/62 - 121/71)  RR: 16 (14 Jan 2025 07:50) (12 - 16)  SpO2: 97% (14 Jan 2025 07:50) (97% - 97%)  I&O's Summary    13 Jan 2025 07:01  -  14 Jan 2025 07:00  --------------------------------------------------------  IN: 0 mL / OUT: 1 mL / NET: -1 mL    PHYSICAL EXAM:  GENERAL: NAD  HEENT: crani incision well healed  CHEST/LUNG: Clear to percussion bilaterally; No rales, rhonchi, wheezing  HEART: Regular rate and rhythm  ABDOMEN: Soft, Nontender, Nondistended; Bowel sounds present  MUSCULOSKELETAL/EXTREMITIES:  2+ Peripheral Pulses, No LE edema  PSYCH: Appropriate affect  NEURO: Alert & Oriented x 3, right facial asymmetry, right sided weakness    I personally reviewed the below data/images/labs:    LABS:                        12.7   6.34  )-----------( 343      ( 13 Jan 2025 05:42 )             38.4       01-13    142  |  106  |  22  ----------------------------<  88  3.9   |  28  |  0.97    Ca    8.6      13 Jan 2025 05:42    TPro  5.8  /  Alb  2.8  /  TBili  0.2  /  DBili  x   /  AST  13  /  ALT  29  /  AlkPhos  100  01-13    Urinalysis Basic - ( 13 Jan 2025 05:42 )    Color: x / Appearance: x / SG: x / pH: x  Gluc: 88 mg/dL / Ketone: x  / Bili: x / Urobili: x   Blood: x / Protein: x / Nitrite: x   Leuk Esterase: x / RBC: x / WBC x   Sq Epi: x / Non Sq Epi: x / Bacteria: x    COVID-19 PCR: NotDetec (12-27-24 @ 16:30)  COVID-19 PCR: NotDetec (12-26-24 @ 16:25)    Consultant(s) Notes Reviewed:   Care Discused with Consultants/Other Providers:  Imaging Personally Reviewed:            Interval History  Patient seen and examined at bedside. No acute overnight events noted.   Patient reports feeling well, denies any acute complaints. Denies HA/LH/dizziness. Rest of ROS are negative.    ALLERGIES:  No Known Allergies    MEDICATIONS  (STANDING):  amLODIPine   Tablet 5 milliGRAM(s) Oral every 24 hours  dexAMETHasone     Tablet 2 milliGRAM(s) Oral <User Schedule>  dexAMETHasone     Tablet 4 milliGRAM(s) Oral <User Schedule>  enoxaparin Injectable 40 milliGRAM(s) SubCutaneous <User Schedule>  famotidine    Tablet 20 milliGRAM(s) Oral every 12 hours  influenza  Vaccine (HIGH DOSE) 0.5 milliLiter(s) IntraMuscular once  levETIRAcetam 1000 milliGRAM(s) Oral every 12 hours  melatonin 6 milliGRAM(s) Oral <User Schedule>  multivitamin 1 Tablet(s) Oral daily  traZODone 25 milliGRAM(s) Oral <User Schedule>    MEDICATIONS  (PRN):  acetaminophen     Tablet .. 650 milliGRAM(s) Oral every 6 hours PRN Mild Pain (1 - 3)  senna 2 Tablet(s) Oral at bedtime PRN Constipation    Vital Signs Last 24 Hrs  T(F): 98.3 (14 Jan 2025 07:50), Max: 98.3 (14 Jan 2025 07:50)  HR: 65 (14 Jan 2025 07:50) (62 - 65)  BP: 121/71 (14 Jan 2025 07:50) (112/62 - 121/71)  RR: 16 (14 Jan 2025 07:50) (12 - 16)  SpO2: 97% (14 Jan 2025 07:50) (97% - 97%)  I&O's Summary    13 Jan 2025 07:01  -  14 Jan 2025 07:00  --------------------------------------------------------  IN: 0 mL / OUT: 1 mL / NET: -1 mL    PHYSICAL EXAM:  GENERAL: NAD  HEENT: crani incision well healed  CHEST/LUNG: Clear to percussion bilaterally; No rales, rhonchi, wheezing  HEART: Regular rate and rhythm  ABDOMEN: Soft, Nontender, Nondistended; Bowel sounds present  MUSCULOSKELETAL/EXTREMITIES:  2+ Peripheral Pulses, No LE edema  PSYCH: Appropriate affect  NEURO: Alert & Oriented x 3, right facial asymmetry, right sided weakness    I personally reviewed the below data/images/labs:    LABS:                        12.7   6.34  )-----------( 343      ( 13 Jan 2025 05:42 )             38.4       01-13    142  |  106  |  22  ----------------------------<  88  3.9   |  28  |  0.97    Ca    8.6      13 Jan 2025 05:42    TPro  5.8  /  Alb  2.8  /  TBili  0.2  /  DBili  x   /  AST  13  /  ALT  29  /  AlkPhos  100  01-13    Urinalysis Basic - ( 13 Jan 2025 05:42 )    Color: x / Appearance: x / SG: x / pH: x  Gluc: 88 mg/dL / Ketone: x  / Bili: x / Urobili: x   Blood: x / Protein: x / Nitrite: x   Leuk Esterase: x / RBC: x / WBC x   Sq Epi: x / Non Sq Epi: x / Bacteria: x    COVID-19 PCR: NotDetec (12-27-24 @ 16:30)  COVID-19 PCR: NotDetec (12-26-24 @ 16:25)    Consultant(s) Notes Reviewed:   Care Discused with Consultants/Other Providers:  Imaging Personally Reviewed:

## 2025-01-14 NOTE — PROGRESS NOTE ADULT - SUBJECTIVE AND OBJECTIVE BOX
Patient is a 69y old  Male who presents with a chief complaint of GBM s/p craniotomy for resection     SUBJECTIVE: Patient seen and evaluated while sitting up in WC having breakfast.  Discussed with wife in regards to where she would like discharge medications to be forwarded to > still deciding. Sleep is fair, unclear why he's still waking up at midnight and hard to fall back asleep.  Sleep disturbance can be multifactorial his normal sleep habit (up during night when not in hospital), medication (on steroid), and unfamiliar environment + stimuli.  Feels tired at times, but still able to participate in therapy.  Ambulating using ortiz cane, walking a good distance.    REVIEW OF SYSTEMS  Constitutional: No fever or chills, +tiredness from poor sleep  Cardio: No chest pain, No palpitations  Resp: No SOB, no respiratory distress   Neuro: No headaches, +right sided weakness  MSK: no joint/muscle pain    VITALS  69y  Vital Signs Last 24 Hrs  T(C): 36.8 (01-14-25 @ 07:50), Max: 36.8 (01-13-25 @ 20:20)  T(F): 98.3 (01-14-25 @ 07:50), Max: 98.3 (01-14-25 @ 07:50)  HR: 65 (01-14-25 @ 07:50) (62 - 65)  BP: 121/71 (01-14-25 @ 07:50) (112/62 - 121/71)  RR: 16 (01-14-25 @ 07:50) (12 - 16)  SpO2: 97% (01-14-25 @ 07:50) (97% - 97%)    PHYSICAL EXAM:   Constitutional - NAD, Comfortable  HEENT- s/p craniotomy > skin of incision better approximated with steri strip; +Left strabismus (baseline)  Pulm: resp nonlabored  Cardiovascular - warm and well perfused; no cyanosis or pedal edema  Abdomen - Soft, NT/ND  Extremities - No peripheral edema or calf tenderness  MSK - right shoulder sublux  Skin - RUE assessed since splint is off, no edema or impaired skin integrity   Neurologic Exam -                    Cognitive: AAO to x 4; able to follow commands and answer questions appropriately     Communication: +dysarthria (mild), comprehensible, No aphasia, able to repeat     Cranial Nerves - right lip droop, right facial weakness, right shoulder shrug depressed compared to left     Motor - right hemiparesis                     LEFT    UE - SF 5/5, EF 5/5, EE 5/5, WE 5/5,  5/5                    RIGHT UE - ShAB 1/5, EF 2/5, EE 1/5, WE 2/5, FF 3/5,  2/5                    LEFT    LE - HF 5/5, KE 5/5, DF 5/5, PF 5/5                    RIGHT LE - HF 3/5, KE 3/5, DF 1/5, PF 3/5       Tone- MAS 1+ right pec, right biceps   Psychiatric - Mood stable    RECENT LABS:                        12.7   6.34  )-----------( 343      ( 13 Jan 2025 05:42 )             38.4     01-13    142  |  106  |  22  ----------------------------<  88  3.9   |  28  |  0.97    Ca    8.6      13 Jan 2025 05:42    TPro  5.8[L]  /  Alb  2.8[L]  /  TBili  0.2  /  DBili  x   /  AST  13  /  ALT  29  /  AlkPhos  100  01-13    LIVER FUNCTIONS - ( 13 Jan 2025 05:42 )  Alb: 2.8 g/dL / Pro: 5.8 g/dL / ALK PHOS: 100 U/L / ALT: 29 U/L / AST: 13 U/L / GGT: x           MEDICATIONS:  MEDICATIONS  (STANDING):  amLODIPine   Tablet 5 milliGRAM(s) Oral every 24 hours  dexAMETHasone     Tablet 4 milliGRAM(s) Oral <User Schedule>  dexAMETHasone     Tablet 2 milliGRAM(s) Oral <User Schedule>  enoxaparin Injectable 40 milliGRAM(s) SubCutaneous <User Schedule>  famotidine    Tablet 20 milliGRAM(s) Oral every 12 hours  influenza  Vaccine (HIGH DOSE) 0.5 milliLiter(s) IntraMuscular once  levETIRAcetam 1000 milliGRAM(s) Oral every 12 hours  melatonin 6 milliGRAM(s) Oral <User Schedule>  multivitamin 1 Tablet(s) Oral daily  traZODone 25 milliGRAM(s) Oral <User Schedule>    MEDICATIONS  (PRN):  acetaminophen     Tablet .. 650 milliGRAM(s) Oral every 6 hours PRN Mild Pain (1 - 3)  senna 2 Tablet(s) Oral at bedtime PRN Constipation

## 2025-01-14 NOTE — PROGRESS NOTE ADULT - ASSESSMENT
69 year old male with PMH of HTN, OA, right TKA, s/p L craniotomy for GBM resection 04/25/2023, s/p chemo and RT 2023 with Dr. Causey, who presented to the ED at [facility] on 12/13 due to reoccurrence of brain tumor seen on his latest MRI completed 4 days prior in [place]. Patient c/o worsened R facial droop, worsening word finding difficulties, and right sided weakness x 2 weeks. He was admitted and placed on decadron and MRI done for OR planning. He underwent another left craniotomy  for resection of GBM with placement of Gammatile brachytherapy on 12/19. He was found to have new dyslexia on 12/22 and speech was consulted. He was also started on salt tabs for hyponatremia which resolved. He also had a few episodes of diarrhea coupled with hypotension and received IV fluids. Bowel regimen removed and diarrhea resolved. He was evaluated for admission to Acute Rehab and admitted to [facility] On 12/27/24.     #GBM  - s/p craniotomy for resection  - Start Comprehensive Rehab Program of PT 90min /OT 90min  -Continue Keppra 1000mg BID  -Continue Decadron 4mg at 6am and 2mg at 3pm   -Monitor incision - staples removed POD#14 (1/2) - (1/3) incision assessment: mid incision separation/skin not well approximated, but C/D/I.  Steri strips applied. Images of incision sent via teams to Dr. Hall > not concerned; (1/6) incision well approximated with steri strips  - Question of brachytherapy > contacted Dr. Wernicke via teams and office # in regards to protocol/precaution/duration.  Awaiting for response > another message left with office (1/7)-- call back from office 1/7--- no precautions needed. Radiation card printed and given to patient  -Follow up as outpatient for Radiation treatment   -May shower  - Comprehensive therapy: 3 hours/day x 5 days/week: 90min PT, 60min OT, 30min SLP-- changed to 90 PT/90 OT-- DC FROM ST   - E stim RUE with OT    #HTN  - Norvasc 10mg daily > 5mg QD (1/7)  - /62 - 121/71 (1/14) - did not get norvasc today    #Hyponatremia - resolved  -Na is 134 on 12/27  -Na 141 (1/2/25)   --taper salt tabs to 1G  daily - Na 140 (1/6) > dc NaCl-- -Na 139 (1/9) >142 (1/13) remaining stable (off NaCl since 1/6)    #Elevated Bun - resolved  - Bun 27>21>25>20 (1/9)- improved 22/0.97 (1/13)  - encourage oral hydration    #Sleep  --melatonin 3mg at 8pm - > inc to 6mg @8p > switched to Rozerem 8mg (1/7)---didn't help-- increase trazodone to 50 mg (1/11)-- sleep is better but still doesn't feel optimal > waking up at 12am, appears more tired this AM.  Will reduce trazodone to 25 + Melatonin 6mg.    #Pain control  - Tylenol PRN    #GI/Bowel Mgmt   - Senna at bedtime   - Pepcid 20mg BID for GI prophylaxis while on steroid    #Bladder management  -Monitor UO-- voiding, dc'd bladder scans  - voiding without issues    #DVT prophylaxis   - Lovenox  - TEDs     #Skin: intact    #Diet  - Regular diet    Precautions / PROPHYLAXIS:   - Falls,  Seizure   - Pressure injury/Skin: OOB to Chair, PT/OT      Juanjose Johnston  Neurosurgery  [address] [place] [address]  Phone: [phone]    Wernicke, A. Gabriella A  Radiation Oncology  [address] [address]  Phone: [phone]    IDT 1/8  NSG: cont B/B  SW: lives with wife in [place], 2 [address], 7 steps inside. DME: RW/cane  SLP: reg/thin, functional exp/rec language, mild word finding diff, reduced visual acuity impacting reading  OT: min A groom, SV UBD, mod A LBD/min A footwear, mod A stand pivot transfer. Goal: SV  PT: CGA bed mob, SV transfer/amb 130' wide base quad can, min A steps.  Goal: close SV  Team goals: use word finding strategies in discourse w/ min A; amb + toilet with SV  Barriers: RUE weakness  TDD: 1/17 home.  will need family training

## 2025-01-14 NOTE — PROGRESS NOTE ADULT - ASSESSMENT
69M w/ HTN, OA, right TKA, HO craniotomy for GBM resection 04/25/2023, s/p chemo and RT 2023 with Dr. Causey, Admit at [facility] on Dec13 for reoccurrence of GBM, Sp lt craniotomy  for resection of GBM with placement of Gammatile brachytherapy on 12/19. Hospital course complicated by hyponatremia, diarrhea,  admitted to [facility] On 12/27/24.     #Recurrent GBM s/p Craniotomy for Resection and Gammatile Brachytherapy (12/19)  - Continue Keppra  - Continue Decadron taper + PPI  - Continue comprehensive rehab program with PT/OT/SLP  - Follow up as outpatient for radiation treatment    #HTN  - Continue amlodipine w/ holding parameters  - Monitor BP    #Intermittent Bradycardia  - EKg 12/29 showed NSR  - Asymptomatic  - Monitor     #Hyponatremia (Resolved)  - monitor     #Insomnia  - On trazodone and melatonin    DVT PPx  - Lovenox SC    Discussed with rehab team

## 2025-01-15 ENCOUNTER — TRANSCRIPTION ENCOUNTER (OUTPATIENT)
Age: 70
End: 2025-01-15

## 2025-01-15 PROCEDURE — 99233 SBSQ HOSP IP/OBS HIGH 50: CPT | Mod: GC

## 2025-01-15 PROCEDURE — 99232 SBSQ HOSP IP/OBS MODERATE 35: CPT

## 2025-01-15 RX ADMIN — ENOXAPARIN SODIUM 40 MILLIGRAM(S): 60 INJECTION INTRAVENOUS; SUBCUTANEOUS at 21:16

## 2025-01-15 RX ADMIN — FAMOTIDINE 20 MILLIGRAM(S): 20 TABLET, FILM COATED ORAL at 06:01

## 2025-01-15 RX ADMIN — LEVETIRACETAM 1000 MILLIGRAM(S): 100 SOLUTION ORAL at 06:00

## 2025-01-15 RX ADMIN — FAMOTIDINE 20 MILLIGRAM(S): 20 TABLET, FILM COATED ORAL at 17:06

## 2025-01-15 RX ADMIN — Medication 6 MILLIGRAM(S): at 21:15

## 2025-01-15 RX ADMIN — TRAZODONE HYDROCHLORIDE 25 MILLIGRAM(S): 150 TABLET ORAL at 21:16

## 2025-01-15 RX ADMIN — LEVETIRACETAM 1000 MILLIGRAM(S): 100 SOLUTION ORAL at 17:07

## 2025-01-15 RX ADMIN — Medication 1 TABLET(S): at 15:19

## 2025-01-15 RX ADMIN — Medication 4 MILLIGRAM(S): at 06:01

## 2025-01-15 RX ADMIN — Medication 2 MILLIGRAM(S): at 15:19

## 2025-01-15 RX ADMIN — Medication 5 MILLIGRAM(S): at 06:04

## 2025-01-15 NOTE — DISCHARGE NOTE NURSING/CASE MANAGEMENT/SOCIAL WORK - PATIENT PORTAL LINK FT
You can access the FollowMyHealth Patient Portal offered by [facility] by registering at the following website: [email or web address]. By joining [facility]’s FollowMyHealth portal, you will also be able to view your health information using other applications (apps) compatible with our system.

## 2025-01-15 NOTE — DISCHARGE NOTE NURSING/CASE MANAGEMENT/SOCIAL WORK - NSDCPEFALRISK_GEN_ALL_CORE
For information on Fall & Injury Prevention, visit: [email or web address] OR  [email or web address] OR  https://www.cdc.gov/steadi/patient.html

## 2025-01-15 NOTE — DISCHARGE NOTE NURSING/CASE MANAGEMENT/SOCIAL WORK - NSDCFUADDAPPT_GEN_ALL_CORE_FT
Follow up with outpatient therapy with the prescriptions that were provided to you.    Follow up with your PCP as soon as possible.

## 2025-01-15 NOTE — PROGRESS NOTE ADULT - ASSESSMENT
69 year old male with past medical history of HTN, OA, right TKA, HO craniotomy for GBM resection 04/25/2023, s/p chemo and RT 2023 with Dr. Causey, Admit at [facility] on Dec13 for reoccurrence of GBM, Sp lt craniotomy  for resection of GBM with placement of Gammatile brachytherapy on 12/19. Hospital course complicated by hyponatremia, diarrhea,  admitted to [facility] On 12/27/24.     #Recurrent GBM s/p Craniotomy for Resection and Gammatile Brachytherapy (12/19)  - Continue Keppra  - Continue Decadron taper + PPI  - Continue comprehensive rehab program with PT/OT/SLP  - Follow up as outpatient for radiation treatment    #HTN  - Continue amlodipine w/ holding parameters  - Monitor BP    #Intermittent Bradycardia  - EKg 12/29 showed NSR  - Asymptomatic  - Monitor     #Hyponatremia (Resolved)  - monitor     #Insomnia  - On trazodone and melatonin    DVT PPx  - Lovenox SC    Discussed with rehab team         69 year old male with past medical history of HTN, OA, right TKA, HO craniotomy for GBM resection 04/25/2023, s/p chemo and RT 2023 with Dr. Causey, Admit at [facility] on Dec13 for reoccurrence of GBM, Sp lt craniotomy  for resection of GBM with placement of Gammatile brachytherapy on 12/19. Hospital course complicated by hyponatremia, diarrhea,  admitted to [facility] On 12/27/24.     #Recurrent GBM s/p Craniotomy for Resection and Gammatile Brachytherapy (12/19)  - Pathology: Most consistent with glioblastoma, IDH-wildtype, with +7 and -10. Other neoplastic processes are not excluded.   - Continue Keppra  - Continue Decadron taper + famotidine  - Continue comprehensive rehab program with PT/OT/SLP  - Follow up as outpatient for radiation treatment    #HTN  - Continue amlodipine w/ holding parameters  - Monitor BP    #Intermittent Bradycardia  - EKg 12/29 showed NSR  - Asymptomatic  - Monitor     #Hyponatremia (Resolved)  - monitor     #Insomnia  - On trazodone and melatonin    DVT PPx  - Lovenox SC    Discussed with rehab team

## 2025-01-15 NOTE — PROGRESS NOTE ADULT - SUBJECTIVE AND OBJECTIVE BOX
Interval History  Patient seen and examined at bedside. No acute overnight events noted.  Patient reports feeling well, denies any acute complaints this morning.     ALLERGIES:  No Known Allergies    MEDICATIONS  (STANDING):  amLODIPine   Tablet 5 milliGRAM(s) Oral every 24 hours  dexAMETHasone     Tablet 2 milliGRAM(s) Oral <User Schedule>  dexAMETHasone     Tablet 4 milliGRAM(s) Oral <User Schedule>  enoxaparin Injectable 40 milliGRAM(s) SubCutaneous <User Schedule>  famotidine    Tablet 20 milliGRAM(s) Oral every 12 hours  influenza  Vaccine (HIGH DOSE) 0.5 milliLiter(s) IntraMuscular once  levETIRAcetam 1000 milliGRAM(s) Oral every 12 hours  melatonin 6 milliGRAM(s) Oral <User Schedule>  multivitamin 1 Tablet(s) Oral daily  traZODone 25 milliGRAM(s) Oral <User Schedule>    MEDICATIONS  (PRN):  acetaminophen     Tablet .. 650 milliGRAM(s) Oral every 6 hours PRN Mild Pain (1 - 3)  senna 2 Tablet(s) Oral at bedtime PRN Constipation    Vital Signs Last 24 Hrs  T(F): 98 (15 Dami 2025 07:26), Max: 98.3 (14 Jan 2025 19:30)  HR: 63 (15 Dami 2025 07:26) (58 - 64)  BP: 123/69 (15 Dami 2025 07:26) (105/67 - 134/76)  RR: 16 (15 Dami 2025 07:26) (14 - 16)  SpO2: 98% (15 Dami 2025 07:26) (96% - 98%)  I&O's Summary    PHYSICAL EXAM:  GENERAL: NAD  HEENT: crani incision well healed  CHEST/LUNG: Clear to percussion bilaterally; No rales, rhonchi, wheezing  HEART: Regular rate and rhythm  ABDOMEN: Soft, Nontender, Nondistended; Bowel sounds present  MUSCULOSKELETAL/EXTREMITIES:  2+ Peripheral Pulses, No LE edema, RUE sling in place   PSYCH: Appropriate affect  NEURO: Alert & Oriented x 3, right facial asymmetry, right sided weakness    I personally reviewed the below data/images/labs:    LABS:                        12.7   6.34  )-----------( 343      ( 13 Jan 2025 05:42 )             38.4       01-13    142  |  106  |  22  ----------------------------<  88  3.9   |  28  |  0.97    Ca    8.6      13 Jan 2025 05:42    TPro  5.8  /  Alb  2.8  /  TBili  0.2  /  DBili  x   /  AST  13  /  ALT  29  /  AlkPhos  100  01-13     Urinalysis Basic - ( 13 Jan 2025 05:42 )    Color: x / Appearance: x / SG: x / pH: x  Gluc: 88 mg/dL / Ketone: x  / Bili: x / Urobili: x   Blood: x / Protein: x / Nitrite: x   Leuk Esterase: x / RBC: x / WBC x   Sq Epi: x / Non Sq Epi: x / Bacteria: x    COVID-19 PCR: NotDetec (12-27-24 @ 16:30)  COVID-19 PCR: NotDetec (12-26-24 @ 16:25)    Consultant(s) Notes Reviewed:   Care Discused with Consultants/Other Providers:  Imaging Personally Reviewed:

## 2025-01-15 NOTE — DISCHARGE NOTE NURSING/CASE MANAGEMENT/SOCIAL WORK - FINANCIAL ASSISTANCE
[facility] provides services at a reduced cost to those who are determined to be eligible through [facility]’s financial assistance program. Information regarding [facility]’s financial assistance program can be found by going to [email or web address] or by calling [phone].

## 2025-01-15 NOTE — PROGRESS NOTE ADULT - ASSESSMENT
69 year old male with PMH of HTN, OA, right TKA, s/p L craniotomy for GBM resection 04/25/2023, s/p chemo and RT 2023 with Dr. Causey, who presented to the ED at [facility] on 12/13 due to reoccurrence of brain tumor seen on his latest MRI completed 4 days prior in [place]. Patient c/o worsened R facial droop, worsening word finding difficulties, and right sided weakness x 2 weeks. He was admitted and placed on decadron and MRI done for OR planning. He underwent another left craniotomy  for resection of GBM with placement of Gammatile brachytherapy on 12/19. He was found to have new dyslexia on 12/22 and speech was consulted. He was also started on salt tabs for hyponatremia which resolved. He also had a few episodes of diarrhea coupled with hypotension and received IV fluids. Bowel regimen removed and diarrhea resolved. He was evaluated for admission to Acute Rehab and admitted to [facility] On 12/27/24.     #GBM  - s/p craniotomy for resection  - Start Comprehensive Rehab Program of PT 90min /OT 90min  -Continue Keppra 1000mg BID  -Continue Decadron 4mg at 6am and 2mg at 3pm   -Monitor incision - staples removed POD#14 (1/2) - (1/3) incision assessment: mid incision separation/skin not well approximated, but C/D/I.  Steri strips applied. Images of incision sent via teams to Dr. Hall > not concerned; (1/6) incision well approximated with steri strips  - Question of brachytherapy > contacted Dr. Wernicke via teams and office # in regards to protocol/precaution/duration.  Awaiting for response > another message left with office (1/7)-- call back from office 1/7--- no precautions needed. Radiation card printed and given to patient  -Follow up as outpatient for Radiation treatment   -May shower  - Comprehensive therapy: 3 hours/day x 5 days/week: 90min PT, 60min OT, 30min SLP-- changed to 90 PT/90 OT-- DC FROM ST   - E stim RUE with OT    #HTN  - Norvasc 10mg daily > 5mg QD (1/7)  - BP pre-med 134/76, pos med 123/69 (1/15)    #Hyponatremia - resolved  -Na is 134 on 12/27  -Na 141 (1/2/25)   --taper salt tabs to 1G  daily - Na 140 (1/6) > dc NaCl-- -Na 139 (1/9) >142 (1/13) remaining stable (off NaCl since 1/6)    #Elevated Bun - resolved  - Bun 27>21>25>20 (1/9)- improved 22/0.97 (1/13)  - encourage oral hydration    #Sleep  --melatonin 3mg at 8pm - > inc to 6mg @8p > switched to Rozerem 8mg (1/7)---didn't help-- increase trazodone to 50 mg (1/11)-- sleep is better but still doesn't feel optimal > waking up at 12am, appears more tired this AM.  Will reduce trazodone to 25 + Melatonin 6mg.    #Pain control  - Tylenol PRN    #GI/Bowel Mgmt   - Senna at bedtime   - Pepcid 20mg BID for GI prophylaxis while on steroid    #Bladder management  -Monitor UO-- voiding, dc'd bladder scans  - voiding without issues    #DVT prophylaxis   - Lovenox  - TEDs     #Skin: intact    #Diet  - Regular diet    Precautions / PROPHYLAXIS:   - Falls,  Seizure   - Pressure injury/Skin: OOB to Chair, PT/OT      Juanjose Johnston  Neurosurgery  [address] [place] [address]  Phone: [phone]    Wernicke, A. Gabriella A  Radiation Oncology  [address] [address]  Phone: [phone]    IDT 1/8  NSG: cont B/B  SW: lives with wife in [place], 2 [address], 7 steps inside. DME: RW/cane  SLP: reg/thin, functional exp/rec language, mild word finding diff, reduced visual acuity impacting reading  OT: min A groom, SV UBD, mod A LBD/min A footwear, mod A stand pivot transfer. Goal: SV  PT: CGA bed mob, SV transfer/amb 130' wide base quad can, min A steps.  Goal: close SV  Team goals: use word finding strategies in discourse w/ min A; amb + toilet with SV  Barriers: RUE weakness  TDD: 1/17 home.  will need family training  outpatient therapy     69 year old male with PMH of HTN, OA, right TKA, s/p L craniotomy for GBM resection 04/25/2023, s/p chemo and RT 2023 with Dr. Causey, who presented to the ED at [facility] on 12/13 due to reoccurrence of brain tumor seen on his latest MRI completed 4 days prior in [place]. Patient c/o worsened R facial droop, worsening word finding difficulties, and right sided weakness x 2 weeks. He was admitted and placed on decadron and MRI done for OR planning. He underwent another left craniotomy  for resection of GBM with placement of Gammatile brachytherapy on 12/19. He was found to have new dyslexia on 12/22 and speech was consulted. He was also started on salt tabs for hyponatremia which resolved. He also had a few episodes of diarrhea coupled with hypotension and received IV fluids. Bowel regimen removed and diarrhea resolved. He was evaluated for admission to Acute Rehab and admitted to [facility] On 12/27/24.     #GBM  - s/p craniotomy for resection  - Start Comprehensive Rehab Program of PT 90min /OT 90min  -Continue Keppra 1000mg BID  -Continue Decadron 4mg at 6am and 2mg at 3pm   -Monitor incision - staples removed POD#14 (1/2) - (1/3) incision assessment: mid incision separation/skin not well approximated, but C/D/I.  Steri strips applied. Images of incision sent via teams to Dr. Hall > not concerned; (1/6) incision well approximated with steri strips  - Question of brachytherapy > contacted Dr. Wernicke via teams and office # in regards to protocol/precaution/duration.  Awaiting for response > another message left with office (1/7)-- call back from office 1/7--- no precautions needed. Radiation card printed and given to patient  -Follow up as outpatient for Radiation treatment   -May shower  - Comprehensive therapy: 3 hours/day x 5 days/week: 90min PT, 60min OT, 30min SLP-- changed to 90 PT/90 OT-- DC FROM ST   - E stim RUE with OT    #HTN  - Norvasc 10mg daily > 5mg QD (1/7)  - BP pre-med 134/76, pos med 123/69 (1/15)    #Hyponatremia - resolved  -Na is 134 on 12/27  -Na 141 (1/2/25)   --taper salt tabs to 1G  daily - Na 140 (1/6) > dc NaCl-- -Na 139 (1/9) >142 (1/13) remaining stable (off NaCl since 1/6)    #Elevated Bun - resolved  - Bun 27>21>25>20 (1/9)- improved 22/0.97 (1/13)  - encourage oral hydration    #Sleep  --melatonin 3mg at 8pm - > inc to 6mg @8p > switched to Rozerem 8mg (1/7)---didn't help-- increase trazodone to 50 mg (1/11)-- sleep is better but still doesn't feel optimal > waking up at 12am, appears more tired this AM.  Will reduce trazodone to 25 + Melatonin 6mg.    #Pain control  - Tylenol PRN    #GI/Bowel Mgmt   - Senna at bedtime   - Pepcid 20mg BID for GI prophylaxis while on steroid    #Bladder management  -Monitor UO-- voiding, dc'd bladder scans  - voiding without issues    #DVT prophylaxis   - Lovenox  - TEDs     #Skin: intact    #Diet  - Regular diet    Precautions / PROPHYLAXIS:   - Falls,  Seizure   - Pressure injury/Skin: OOB to Chair, PT/OT      Juanjose Johnston  Neurosurgery  [address] [place] [address]  Phone: [phone]    Wernicke, A. Gabriella A  Radiation Oncology  [address] [address]  Phone: [phone]

## 2025-01-15 NOTE — PROGRESS NOTE ADULT - SUBJECTIVE AND OBJECTIVE BOX
Patient is a 69y old  Male who presents with a chief complaint of GBM s/p craniotomy for resection     SUBJECTIVE: Patient seen and evaluated while sitting up in WC, wife at bedside.  Still deciding on where he would like to do outpatient therapy. Overall doing well, sleep unchanged but able to still participate in therapy.    REVIEW OF SYSTEMS  Constitutional: No fever or chills, +tiredness from poor sleep  Cardio: No chest pain, No palpitations  Resp: No SOB, no respiratory distress   Neuro: No headaches, +right sided weakness  MSK: no joint/muscle pain    VITALS  69y  Vital Signs Last 24 Hrs  T(C): 36.7 (01-15-25 @ 07:26), Max: 36.8 (01-14-25 @ 19:30)  T(F): 98 (01-15-25 @ 07:26), Max: 98.3 (01-14-25 @ 19:30)  HR: 63 (01-15-25 @ 07:26) (58 - 64)  BP: 123/69 (01-15-25 @ 07:26) (105/67 - 134/76)  RR: 16 (01-15-25 @ 07:26) (14 - 16)  SpO2: 98% (01-15-25 @ 07:26) (96% - 98%)    PHYSICAL EXAM:   Constitutional - NAD, Comfortable  HEENT- s/p craniotomy >incision with steri strip; +Left strabismus (baseline)  Pulm: resp nonlabored  Cardiovascular - warm and well perfused; no cyanosis or pedal edema  Abdomen - Soft, NT/ND  Extremities - No peripheral edema or calf tenderness  MSK - right shoulder sublux  Skin - no edema or impaired skin integrity   Neurologic Exam -                    Cognitive: AAO to x 4; able to follow commands and answer questions appropriately     Communication: +dysarthria (mild), comprehensible, No aphasia, able to repeat     Cranial Nerves - right lip droop, right facial weakness, right shoulder shrug depressed compared to left     Motor - right hemiparesis                     LEFT    UE - SF 5/5, EF 5/5, EE 5/5, WE 5/5,  5/5                    RIGHT UE - ShAB 1/5, EF 2/5, EE 1/5, WE 2/5, FF 3/5,  2/5                    LEFT    LE - HF 5/5, KE 5/5, DF 5/5, PF 5/5                    RIGHT LE - HF 3/5, KE 3/5, DF 1/5, PF 3/5       Tone- MAS 1+ right pec, right biceps   Psychiatric - Mood stable    RECENT LABS:                        12.7   6.34  )-----------( 343      ( 13 Jan 2025 05:42 )             38.4     01-13    142  |  106  |  22  ----------------------------<  88  3.9   |  28  |  0.97    Ca    8.6      13 Jan 2025 05:42    TPro  5.8[L]  /  Alb  2.8[L]  /  TBili  0.2  /  DBili  x   /  AST  13  /  ALT  29  /  AlkPhos  100  01-13    LIVER FUNCTIONS - ( 13 Jan 2025 05:42 )  Alb: 2.8 g/dL / Pro: 5.8 g/dL / ALK PHOS: 100 U/L / ALT: 29 U/L / AST: 13 U/L / GGT: x           MEDICATIONS:  MEDICATIONS  (STANDING):  amLODIPine   Tablet 5 milliGRAM(s) Oral every 24 hours  dexAMETHasone     Tablet 2 milliGRAM(s) Oral <User Schedule>  dexAMETHasone     Tablet 4 milliGRAM(s) Oral <User Schedule>  enoxaparin Injectable 40 milliGRAM(s) SubCutaneous <User Schedule>  famotidine    Tablet 20 milliGRAM(s) Oral every 12 hours  influenza  Vaccine (HIGH DOSE) 0.5 milliLiter(s) IntraMuscular once  levETIRAcetam 1000 milliGRAM(s) Oral every 12 hours  melatonin 6 milliGRAM(s) Oral <User Schedule>  multivitamin 1 Tablet(s) Oral daily  traZODone 25 milliGRAM(s) Oral <User Schedule>    MEDICATIONS  (PRN):  acetaminophen     Tablet .. 650 milliGRAM(s) Oral every 6 hours PRN Mild Pain (1 - 3)  senna 2 Tablet(s) Oral at bedtime PRN Constipation

## 2025-01-16 LAB
ALBUMIN SERPL ELPH-MCNC: 3.1 G/DL — LOW (ref 3.3–5)
ALP SERPL-CCNC: 141 U/L — HIGH (ref 40–120)
ALT FLD-CCNC: 32 U/L — SIGNIFICANT CHANGE UP (ref 10–45)
ANION GAP SERPL CALC-SCNC: 10 MMOL/L — SIGNIFICANT CHANGE UP (ref 5–17)
AST SERPL-CCNC: 12 U/L — SIGNIFICANT CHANGE UP (ref 10–40)
BASOPHILS # BLD AUTO: 0.02 K/UL — SIGNIFICANT CHANGE UP (ref 0–0.2)
BASOPHILS NFR BLD AUTO: 0.3 % — SIGNIFICANT CHANGE UP (ref 0–2)
BILIRUB SERPL-MCNC: 0.3 MG/DL — SIGNIFICANT CHANGE UP (ref 0.2–1.2)
BUN SERPL-MCNC: 25 MG/DL — HIGH (ref 7–23)
CALCIUM SERPL-MCNC: 9 MG/DL — SIGNIFICANT CHANGE UP (ref 8.4–10.5)
CHLORIDE SERPL-SCNC: 103 MMOL/L — SIGNIFICANT CHANGE UP (ref 96–108)
CO2 SERPL-SCNC: 26 MMOL/L — SIGNIFICANT CHANGE UP (ref 22–31)
CREAT SERPL-MCNC: 0.91 MG/DL — SIGNIFICANT CHANGE UP (ref 0.5–1.3)
EGFR: 92 ML/MIN/1.73M2 — SIGNIFICANT CHANGE UP
EOSINOPHIL # BLD AUTO: 0.01 K/UL — SIGNIFICANT CHANGE UP (ref 0–0.5)
EOSINOPHIL NFR BLD AUTO: 0.1 % — SIGNIFICANT CHANGE UP (ref 0–6)
GLUCOSE SERPL-MCNC: 91 MG/DL — SIGNIFICANT CHANGE UP (ref 70–99)
HCT VFR BLD CALC: 40.6 % — SIGNIFICANT CHANGE UP (ref 39–50)
HGB BLD-MCNC: 13.1 G/DL — SIGNIFICANT CHANGE UP (ref 13–17)
IMM GRANULOCYTES NFR BLD AUTO: 5.4 % — HIGH (ref 0–0.9)
LYMPHOCYTES # BLD AUTO: 2.47 K/UL — SIGNIFICANT CHANGE UP (ref 1–3.3)
LYMPHOCYTES # BLD AUTO: 35.4 % — SIGNIFICANT CHANGE UP (ref 13–44)
MCHC RBC-ENTMCNC: 28.8 PG — SIGNIFICANT CHANGE UP (ref 27–34)
MCHC RBC-ENTMCNC: 32.3 G/DL — SIGNIFICANT CHANGE UP (ref 32–36)
MCV RBC AUTO: 89.2 FL — SIGNIFICANT CHANGE UP (ref 80–100)
MONOCYTES # BLD AUTO: 0.69 K/UL — SIGNIFICANT CHANGE UP (ref 0–0.9)
MONOCYTES NFR BLD AUTO: 9.9 % — SIGNIFICANT CHANGE UP (ref 2–14)
NEUTROPHILS # BLD AUTO: 3.41 K/UL — SIGNIFICANT CHANGE UP (ref 1.8–7.4)
NEUTROPHILS NFR BLD AUTO: 48.9 % — SIGNIFICANT CHANGE UP (ref 43–77)
NRBC # BLD: 0 /100 WBCS — SIGNIFICANT CHANGE UP (ref 0–0)
PLATELET # BLD AUTO: 339 K/UL — SIGNIFICANT CHANGE UP (ref 150–400)
POTASSIUM SERPL-MCNC: 3.7 MMOL/L — SIGNIFICANT CHANGE UP (ref 3.5–5.3)
POTASSIUM SERPL-SCNC: 3.7 MMOL/L — SIGNIFICANT CHANGE UP (ref 3.5–5.3)
PROT SERPL-MCNC: 6.4 G/DL — SIGNIFICANT CHANGE UP (ref 6–8.3)
RBC # BLD: 4.55 M/UL — SIGNIFICANT CHANGE UP (ref 4.2–5.8)
RBC # FLD: 14.2 % — SIGNIFICANT CHANGE UP (ref 10.3–14.5)
SODIUM SERPL-SCNC: 139 MMOL/L — SIGNIFICANT CHANGE UP (ref 135–145)
WBC # BLD: 6.98 K/UL — SIGNIFICANT CHANGE UP (ref 3.8–10.5)
WBC # FLD AUTO: 6.98 K/UL — SIGNIFICANT CHANGE UP (ref 3.8–10.5)

## 2025-01-16 PROCEDURE — 99232 SBSQ HOSP IP/OBS MODERATE 35: CPT | Mod: GC

## 2025-01-16 RX ORDER — DEXAMETHASONE SODIUM PHOSPHATE 4 MG/ML
1 VIAL (ML) INJECTION
Qty: 30 | Refills: 0
Start: 2025-01-16 | End: 2025-02-14

## 2025-01-16 RX ORDER — LEVETIRACETAM 100 MG/ML
1 SOLUTION ORAL
Qty: 60 | Refills: 0
Start: 2025-01-16 | End: 2025-02-14

## 2025-01-16 RX ADMIN — LEVETIRACETAM 1000 MILLIGRAM(S): 100 SOLUTION ORAL at 17:33

## 2025-01-16 RX ADMIN — ENOXAPARIN SODIUM 40 MILLIGRAM(S): 60 INJECTION INTRAVENOUS; SUBCUTANEOUS at 21:08

## 2025-01-16 RX ADMIN — LEVETIRACETAM 1000 MILLIGRAM(S): 100 SOLUTION ORAL at 05:44

## 2025-01-16 RX ADMIN — FAMOTIDINE 20 MILLIGRAM(S): 20 TABLET, FILM COATED ORAL at 17:33

## 2025-01-16 RX ADMIN — Medication 6 MILLIGRAM(S): at 21:07

## 2025-01-16 RX ADMIN — Medication 2 MILLIGRAM(S): at 15:27

## 2025-01-16 RX ADMIN — TRAZODONE HYDROCHLORIDE 25 MILLIGRAM(S): 150 TABLET ORAL at 21:07

## 2025-01-16 RX ADMIN — Medication 4 MILLIGRAM(S): at 05:45

## 2025-01-16 RX ADMIN — Medication 5 MILLIGRAM(S): at 05:48

## 2025-01-16 RX ADMIN — FAMOTIDINE 20 MILLIGRAM(S): 20 TABLET, FILM COATED ORAL at 05:45

## 2025-01-16 RX ADMIN — Medication 1 TABLET(S): at 11:09

## 2025-01-16 NOTE — PROGRESS NOTE ADULT - NS ATTEND AMEND GEN_ALL_CORE FT
I have personally seen and examined the patient.  I fully participated in the care of this patient.  I have made amendments to the documentation where necessary, and agree with the history, physical exam, and plan as documented above  patient seen while working with OT. e-stim on RUE- tolerating, discussed outpatient therapy followups  slept better last night  plan for dc home tomorrow with wife
I have personally seen and examined the patient.  I fully participated in the care of this patient.  I have made amendments to the documentation where necessary, and agree with the history, physical exam, and plan as documented above  patient seen at bedside with wife  labs reviewed  encourage oral hydration for elevated Bun  increase melatonin to 6 mg as patient reported not sleeping last night  Interdisciplinary team meeting today with speech therapist, occupational therapist, physical therapist, social work and nursing where medical updates provided, progress with therapies and goals discussed. Plan of care reviewed. Team conference note documented on anya.  Total time 50 mins-face to face time encounter and counseling the patient, meeting with hospitalist, nursing staff, therapists and social work to discuss medical updates and management, care coordination, reviewing chart and data, team meeting. TTD 1/17 to home
I have personally seen and examined the patient.  I fully participated in the care of this patient.  I have made amendments to the documentation where necessary, and agree with the history, physical exam, and plan as documented above  patient seen at bedside, wife present  changed therapy orders to 90 PT/ 90 OT, DC'd from   Total time 35 mins-face to face time encounter and counseling the patient, meeting with hospitalist, nursing staff, therapists and social work to discuss medical updates and management, care coordination, reviewing chart and data
I have personally seen and examined the patient.  I fully participated in the care of this patient.  I have made amendments to the documentation where necessary, and agree with the history, physical exam, and plan as documented above  patient seen at bedside, wife present  patient reports not sleeping well overnight, appears tired this AM  labs stable, reviewed  does not want to increase trazodone  will try melatonin +trazodone 25   reviewed dispo-- patient considering coming to [facility] if he stays with his sister-in-law, will provide therapy scripts  Total time 50 mins-face to face time encounter and counseling the patient, meeting with hospitalist, nursing staff, therapists and social work to discuss medical updates and management, care coordination, reviewing chart and data,speaking to family at bedside
I have personally seen and examined the patient.  I fully participated in the care of this patient.  I have made amendments to the documentation where necessary, and agree with the history, physical exam, and plan as documented above    patient seen and examined at bedside while sitting in the wheelchair. Wife present.   Motor exam: LUE/LLE- 5/5                    RIGHT UE - ShAB 1/5, EF 1/5, EE 2/5, WE 1/5, FF 3/5,  2/5                    RIGHT LE - HF 3/5, KE 3/5, DF 1/5, PF 3/5       Labs reviewed. Bun elevated at 27. encourage oral hydration  Interdisciplinary team meeting today with speech therapist, occupational therapist, physical therapist, social work and nursing where medical updates provided, progress with therapies and goals discussed. Plan of care reviewed. Team conference note documented on anya.  plan for dc home1/17  Total time 50 mins-face to face time encounter and counseling the patient, meeting with hospitalist, nursing staff, therapists and social work to discuss medical updates and management, care coordination, reviewing chart and data, team meeting
I have personally seen and examined the patient.  I fully participated in the care of this patient.  I have made amendments to the documentation where necessary, and agree with the history, physical exam, and plan as documented above  patient seen at bedside , wife present  per patient, didn't sleep so well but able to participate in therapy without feeling too tired  discussed discharge, deciding [place]  filled out forms for parking permit  outpatient therapy scripts written  discussed with sw   Total time 50 mins-face to face time encounter and counseling the patient, meeting with hospitalist, nursing staff, therapists and social work to discuss medical updates and management, care coordination, reviewing chart and data, dc planning, filling out paperwork
Pt. seen this AM in PT.  Agree with documentation above as per NP with amendments made as appropriate. Patient medically stable. Making progress towards rehab goals.     GBM  Monitor incision - staples removed POD#14 (1/2) - 1/3 incision assessment: mid incision separation/skin not well approximated, but C/D/I.  Steri strips applied. Images of incision sent via teams to Dr. Hall > not concerned  --discussed with patient that incision healing well as per NSG and Rehab team  - Question of brachytherapy > contacted Dr. Wernicke via teams and office # in regards to protocol/precaution/duration.  Awaiting for response
I have personally seen and examined the patient.  I fully participated in the care of this patient.  I have made amendments to the documentation where necessary, and agree with the history, physical exam, and plan as documented above  patient seen at bedside, wife present  discussed outpatient therapies upon discharge (scripts in the chart)  pharmacy-- [facility] [phone]-- patient will be local after dc for some time  Interdisciplinary team meeting today with speech therapist, occupational therapist, physical therapist, social work and nursing where medical updates provided, progress with therapies and goals discussed. Plan of care reviewed. Team conference note documented on anya.  Total time 50 mins-face to face time encounter and counseling the patient, meeting with hospitalist, nursing staff, therapists and social work to discuss medical updates and management, care coordination, reviewing chart and data, team meeting.   DC 1/17 home
Pt. seen this AM with wife at bedside.  Agree with documentation above as per NP with amendments made as appropriate. Patient medically stable. Making progress towards rehab goals.     GBM  Hyponatremia  -Na is 134 on 12/27  -Na 141 (1/2/25)   --taper  salt tabs to 1G  daily    Sleep  --trial melatonin 3mg at 8pm
I have personally seen and examined the patient.  I fully participated in the care of this patient.  I have made amendments to the documentation where necessary, and agree with the history, physical exam, and plan as documented above  patient seen at bedside while in the wheelchair.  wife present  per patient, he did not sleep well last night-- agreeable to trying Rozerem. Per wife and patient, patient is a night owl, normally does not sleep until late at home.   encouraging oral hydration for elevated BUN  BP on softer side-- hospitalist decreased Norvasc to 5 mg this AM --discussed via TEAMS  Total time 50mins-face to face time encounter and counseling the patient, meeting with hospitalist, nursing staff, therapists and social work to discuss medical updates and management, care coordination, reviewing chart and data, speaking to family
Need for Mobility Assisted Device

## 2025-01-16 NOTE — PROGRESS NOTE ADULT - SUBJECTIVE AND OBJECTIVE BOX
Patient is a 69y old  Male who presents with a chief complaint of GBM s/p craniotomy for resection     SUBJECTIVE: Patient seen during PT > doing steps (RUE in sling).  Patient have not been wearing his custom RUE brace because of mild skin flaking to forearm. Now using cockup splint. Slept a little better overnight, but doesn't feel like it's enough. Patient and wife decided to go back [facility] for outpatient therapy (to start 1/21; if he was to stay in [place] > next appointment is 2/10).    REVIEW OF SYSTEMS  Constitutional: No fever or chills  Cardio: No chest pain, No palpitations  Resp: No SOB, no respiratory distress   Neuro: No headaches, +right sided weakness  MSK: no joint/muscle pain    VITALS  69y  Vital Signs Last 24 Hrs  T(C): 36.7 (01-16-25 @ 07:57), Max: 36.7 (01-15-25 @ 20:00)  T(F): 98 (01-16-25 @ 07:57), Max: 98.1 (01-15-25 @ 20:00)  HR: 67 (01-16-25 @ 07:57) (65 - 67)  BP: 101/61 (01-16-25 @ 07:57) (101/61 - 128/81)  RR: 16 (01-16-25 @ 07:57) (14 - 16)  SpO2: 96% (01-16-25 @ 07:57) (96% - 96%)    PHYSICAL EXAM:   Constitutional - NAD, Comfortable  HEENT- s/p craniotomy >incision with steri strip; +Left strabismus (baseline)  Pulm: resp nonlabored  Cardiovascular - warm and well perfused; no cyanosis or pedal edema  Abdomen - Soft, NT/ND  Extremities - No peripheral edema or calf tenderness  MSK - right shoulder sublux  Skin - no edema or impaired skin integrity   Neurologic Exam -                    Cognitive: AAO to x 4; able to follow commands and answer questions appropriately     Communication: +dysarthria (mild), comprehensible, No aphasia, able to repeat     Cranial Nerves - right lip droop, right facial weakness, right shoulder shrug depressed compared to left     Motor - right hemiparesis                     LEFT    UE - SF 5/5, EF 5/5, EE 5/5, WE 5/5,  5/5                    RIGHT UE - ShAB 1/5, EF 2/5, EE 1/5, WE 2/5, FF 3/5,  2/5                    LEFT    LE - HF 5/5, KE 5/5, DF 5/5, PF 5/5                    RIGHT LE - HF 3/5, KE 3/5, DF 1/5, PF 3/5       Tone- MAS 1+ right pec, right biceps   Psychiatric - Mood stable    RECENT LABS:                      13.1   6.98  )-----------( 339      ( 16 Jan 2025 06:50 )             40.6     01-16    139  |  103  |  25[H]  ----------------------------<  91  3.7   |  26  |  0.91    Ca    9.0      16 Jan 2025 06:50    TPro  6.4  /  Alb  3.1[L]  /  TBili  0.3  /  DBili  x   /  AST  12  /  ALT  32  /  AlkPhos  141[H]  01-16    LIVER FUNCTIONS - ( 16 Jan 2025 06:50 )  Alb: 3.1 g/dL / Pro: 6.4 g/dL / ALK PHOS: 141 U/L / ALT: 32 U/L / AST: 12 U/L / GGT: x             MEDICATIONS:  MEDICATIONS  (STANDING):  amLODIPine   Tablet 5 milliGRAM(s) Oral every 24 hours  dexAMETHasone     Tablet 2 milliGRAM(s) Oral <User Schedule>  dexAMETHasone     Tablet 4 milliGRAM(s) Oral <User Schedule>  enoxaparin Injectable 40 milliGRAM(s) SubCutaneous <User Schedule>  famotidine    Tablet 20 milliGRAM(s) Oral every 12 hours  influenza  Vaccine (HIGH DOSE) 0.5 milliLiter(s) IntraMuscular once  levETIRAcetam 1000 milliGRAM(s) Oral every 12 hours  melatonin 6 milliGRAM(s) Oral <User Schedule>  multivitamin 1 Tablet(s) Oral daily  traZODone 25 milliGRAM(s) Oral <User Schedule>    MEDICATIONS  (PRN):  acetaminophen     Tablet .. 650 milliGRAM(s) Oral every 6 hours PRN Mild Pain (1 - 3)  senna 2 Tablet(s) Oral at bedtime PRN Constipation

## 2025-01-16 NOTE — PROGRESS NOTE ADULT - ASSESSMENT
69 year old male with PMH of HTN, OA, right TKA, s/p L craniotomy for GBM resection 04/25/2023, s/p chemo and RT 2023 with Dr. Causey, who presented to the ED at [facility] on 12/13 due to reoccurrence of brain tumor seen on his latest MRI completed 4 days prior in [place]. Patient c/o worsened R facial droop, worsening word finding difficulties, and right sided weakness x 2 weeks. He was admitted and placed on decadron and MRI done for OR planning. He underwent another left craniotomy  for resection of GBM with placement of Gammatile brachytherapy on 12/19. He was found to have new dyslexia on 12/22 and speech was consulted. He was also started on salt tabs for hyponatremia which resolved. He also had a few episodes of diarrhea coupled with hypotension and received IV fluids. Bowel regimen removed and diarrhea resolved. He was evaluated for admission to Acute Rehab and admitted to [facility] On 12/27/24.     #GBM  - s/p craniotomy for resection  - Start Comprehensive Rehab Program of PT 90min /OT 90min  -Continue Keppra 1000mg BID  -Continue Decadron 4mg at 6am and 2mg at 3pm   -Monitor incision - staples removed POD#14 (1/2) - (1/3) incision assessment: mid incision separation/skin not well approximated, but C/D/I.  Steri strips applied. Images of incision sent via teams to Dr. Hall > not concerned; (1/6) incision well approximated with steri strips  - Question of brachytherapy > contacted Dr. Wernicke via teams and office # in regards to protocol/precaution/duration.  Awaiting for response > another message left with office (1/7)-- call back from office 1/7--- no precautions needed. Radiation card printed and given to patient  -Follow up as outpatient for Radiation treatment   -May shower  - Comprehensive therapy: 3 hours/day x 5 days/week: 90min PT, 60min OT, 30min SLP-- changed to 90 PT/90 OT-- DC FROM ST   - E stim RUE with OT    #HTN  - Norvasc 10mg daily > 5mg QD (1/7)> 2.5 QD (1/16)  - BP pre-med 128/81, pos med 101/61 (1/16) - asymptomatic, reduced norvasc    #Hyponatremia - resolved  -Na is 134 on 12/27  -Na 141 (1/2/25)   --taper salt tabs to 1G  daily - Na 140 (1/6) > dc NaCl  - Na 139 (1/16) (off NaCl since 1/6)    #Elevated Bun - resolved  - Bun 27>21>25>20 (1/9)- improved 22/0.97 (1/13)>25/0.91 (1/16)  - encourage oral hydration    #Sleep  --melatonin 3mg at 8pm - > inc to 6mg @8p > switched to Rozerem 8mg (1/7)---didn't help-- increase trazodone to 50 mg (1/11)-- sleep is better but still doesn't feel optimal > waking up at 12am, appears more tired this AM.  Will reduce trazodone to 25 + Melatonin 6mg - slept better (just not enough)    #Pain control  - Tylenol PRN    #GI/Bowel Mgmt   - Senna at bedtime   - Pepcid 20mg BID for GI prophylaxis while on steroid    #Bladder management  -Monitor UO-- voiding, dc'd bladder scans  - voiding without issues    #DVT prophylaxis   - Lovenox  - TEDs     #Skin: intact    #Diet  - Regular diet    Precautions / PROPHYLAXIS:   - Falls,  Seizure   - Pressure injury/Skin: OOB to Chair, PT/OT      Juanjose Johnston  Neurosurgery  [address] [place] [address]  Phone: [phone]    Wernicke, A. Gabriella A  Radiation Oncology  [address] [address]  Phone: [phone]

## 2025-01-16 NOTE — PROGRESS NOTE ADULT - NS ATTEND OPT1A GEN_ALL_CORE
History/Medical decision making
History/Medical decision making
History/Exam/Medical decision making
Medical decision making
History/Exam/Medical decision making
Medical decision making
History/Medical decision making

## 2025-01-17 VITALS
DIASTOLIC BLOOD PRESSURE: 76 MMHG | HEART RATE: 51 BPM | SYSTOLIC BLOOD PRESSURE: 128 MMHG | RESPIRATION RATE: 16 BRPM | OXYGEN SATURATION: 97 % | TEMPERATURE: 98 F

## 2025-01-17 PROCEDURE — 99232 SBSQ HOSP IP/OBS MODERATE 35: CPT

## 2025-01-17 PROCEDURE — 99238 HOSP IP/OBS DSCHRG MGMT 30/<: CPT

## 2025-01-17 RX ADMIN — FAMOTIDINE 20 MILLIGRAM(S): 20 TABLET, FILM COATED ORAL at 05:47

## 2025-01-17 RX ADMIN — LEVETIRACETAM 1000 MILLIGRAM(S): 100 SOLUTION ORAL at 05:46

## 2025-01-17 RX ADMIN — Medication 4 MILLIGRAM(S): at 05:47

## 2025-01-17 RX ADMIN — Medication 2.5 MILLIGRAM(S): at 05:47

## 2025-01-17 NOTE — PROGRESS NOTE ADULT - MOTOR
bilateral calves soft no TTP  heel cord tightness right ankle  improved posture, reduced flexion trunk and neck, improved right hip clearance and reduced ER leg on ambulation
right elbow flexor MAS 1  bilateral calves soft no TTP no clonus
tightness left heel cord  PROM ankle -5 degrees neutral no clonus  left ankle DF 1/5 PF 3-/5  calves soft no tTP  right resting hand splint in place

## 2025-01-17 NOTE — PROGRESS NOTE ADULT - SUBJECTIVE AND OBJECTIVE BOX
Interval History  Patient seen and examined at bedside. No acute overnight events noted. Patient reports feeling well, no acute complaints offered. No HA/LH/dizziness. Rest of ROS are negative.     ALLERGIES:  No Known Allergies    MEDICATIONS  (STANDING):  amLODIPine   Tablet 2.5 milliGRAM(s) Oral every 24 hours  dexAMETHasone     Tablet 2 milliGRAM(s) Oral <User Schedule>  dexAMETHasone     Tablet 4 milliGRAM(s) Oral <User Schedule>  enoxaparin Injectable 40 milliGRAM(s) SubCutaneous <User Schedule>  famotidine    Tablet 20 milliGRAM(s) Oral every 12 hours  influenza  Vaccine (HIGH DOSE) 0.5 milliLiter(s) IntraMuscular once  levETIRAcetam 1000 milliGRAM(s) Oral every 12 hours  melatonin 6 milliGRAM(s) Oral <User Schedule>  multivitamin 1 Tablet(s) Oral daily  traZODone 25 milliGRAM(s) Oral <User Schedule>    MEDICATIONS  (PRN):  acetaminophen     Tablet .. 650 milliGRAM(s) Oral every 6 hours PRN Mild Pain (1 - 3)  senna 2 Tablet(s) Oral at bedtime PRN Constipation    Vital Signs Last 24 Hrs  T(F): 97.8 (17 Jan 2025 07:46), Max: 98.1 (16 Jan 2025 19:36)  HR: 51 (17 Jan 2025 07:46) (51 - 61)  BP: 128/76 (17 Jan 2025 07:46) (128/76 - 133/80)  RR: 16 (17 Jan 2025 07:46) (14 - 16)  SpO2: 97% (17 Jan 2025 07:46) (96% - 97%)  I&O's Summary    16 Jan 2025 07:01  -  17 Jan 2025 07:00  --------------------------------------------------------  IN: 0 mL / OUT: 1000 mL / NET: -1000 mL    PHYSICAL EXAM:  GENERAL: NAD  HEENT: crani incision well healed  CHEST/LUNG: Clear to percussion bilaterally; No rales, rhonchi, wheezing  HEART: Regular rate and rhythm  ABDOMEN: Soft, Nontender, Nondistended; Bowel sounds present  MUSCULOSKELETAL/EXTREMITIES:  2+ Peripheral Pulses, No LE edema, RUE sling in place   PSYCH: Appropriate affect  NEURO: Alert & Oriented x 3, right facial asymmetry, right sided weakness    I personally reviewed the below data/images/labs:    LABS:                        13.1   6.98  )-----------( 339      ( 16 Jan 2025 06:50 )             40.6       01-16    139  |  103  |  25  ----------------------------<  91  3.7   |  26  |  0.91    Ca    9.0      16 Jan 2025 06:50    TPro  6.4  /  Alb  3.1  /  TBili  0.3  /  DBili  x   /  AST  12  /  ALT  32  /  AlkPhos  141  01-16    Urinalysis Basic - ( 16 Jan 2025 06:50 )    Color: x / Appearance: x / SG: x / pH: x  Gluc: 91 mg/dL / Ketone: x  / Bili: x / Urobili: x   Blood: x / Protein: x / Nitrite: x   Leuk Esterase: x / RBC: x / WBC x   Sq Epi: x / Non Sq Epi: x / Bacteria: x    COVID-19 PCR: NotDetec (12-27-24 @ 16:30)  COVID-19 PCR: NotDetec (12-26-24 @ 16:25)    Consultant(s) Notes Reviewed:   Care Discused with Consultants/Other Providers:  Imaging Personally Reviewed:

## 2025-01-17 NOTE — PROGRESS NOTE ADULT - COMMENTS
Patient seen with wife at bedside. He is in good spirits; remembers me from previous admission and filled me in on recovery process since. He has good awareness of timeline, of the procedure. Family also reports that follow up care in [place] [employer] established for oncology as well as PCP    He had interrupted sleep--woke up frequently throughout night ("every hour") was able to get back to sleep after. Denies H/A, pain, or specific trigger for awakening
Patient seen with wife at bedside. Slept well overnight as opposed to previous day, no H/A, no pain. He would like to have more PT time; will relay message to primary team to see if intensity of different treatments can be rearranged for more PT, but discussed that will need to talk to entire team inc current functional deficits and goals for RX. He and wife are agreeable
Patient sitting in chair, appears comfortable NAD. Denies h/A, dizziness, sleep disturbance. Scheduled for dc home today; is agreeable with plan, and has outpatient rehab followup [facility] ([facility]) We discussed typical frequency and intensity of outpt services, which is is aware of, but would like to do as much as allowed/possible

## 2025-01-17 NOTE — PROGRESS NOTE ADULT - SUBJECTIVE AND OBJECTIVE BOX
Patient is a 69y old  Male who presents with a chief complaint of GBM s/p craniotomy for resection (16 Jan 2025 09:33)      HPI:  69 year old male with PMH of HTN, OA, right TKA, s/p L craniotomy for GBM resection 04/25/2023, s/p chemo and RT 2023 with Dr. Causey, who presented to the ED at [facility] on 12/13 due to reoccurrence of brain tumor seen on his latest MRI completed 4 days prior in [place]. Patient c/o worsened R facial droop, worsening word finding difficulties, and right sided weakness x 2 weeks. He was admitted and placed on decadron and MRI done for OR planning. He underwent another left craniotomy  for resection of GBM with placement of Gammatile brachytherapy on 12/19. He was found to have new dyslexia on 12/22 and speech was consulted. He was also started on salt tabs for hyponatremia which resolved. He also had a few episodes of diarrhea coupled with hypotension and received IV fluids. Bowel regimen removed and diarrhea resolved. He was evaluated for admission to Acute Rehab and admitted to [facility] On 12/27/24.      (27 Dec 2024 13:01)      PAST MEDICAL & SURGICAL HISTORY:  Hypertension      Osteoarthritis      Glioblastoma      S/P total knee replacement, right      H/O craniotomy          MEDICATIONS  (STANDING):  amLODIPine   Tablet 2.5 milliGRAM(s) Oral every 24 hours  dexAMETHasone     Tablet 2 milliGRAM(s) Oral <User Schedule>  dexAMETHasone     Tablet 4 milliGRAM(s) Oral <User Schedule>  enoxaparin Injectable 40 milliGRAM(s) SubCutaneous <User Schedule>  famotidine    Tablet 20 milliGRAM(s) Oral every 12 hours  influenza  Vaccine (HIGH DOSE) 0.5 milliLiter(s) IntraMuscular once  levETIRAcetam 1000 milliGRAM(s) Oral every 12 hours  melatonin 6 milliGRAM(s) Oral <User Schedule>  multivitamin 1 Tablet(s) Oral daily  traZODone 25 milliGRAM(s) Oral <User Schedule>    MEDICATIONS  (PRN):  acetaminophen     Tablet .. 650 milliGRAM(s) Oral every 6 hours PRN Mild Pain (1 - 3)  senna 2 Tablet(s) Oral at bedtime PRN Constipation      Allergies    No Known Allergies    Intolerances          VITALS  69y  Vital Signs Last 24 Hrs  T(C): 36.6 (17 Jan 2025 07:46), Max: 36.7 (16 Jan 2025 19:36)  T(F): 97.8 (17 Jan 2025 07:46), Max: 98.1 (16 Jan 2025 19:36)  HR: 51 (17 Jan 2025 07:46) (51 - 61)  BP: 128/76 (17 Jan 2025 07:46) (128/76 - 133/80)  BP(mean): --  RR: 16 (17 Jan 2025 07:46) (14 - 16)  SpO2: 97% (17 Jan 2025 07:46) (96% - 97%)    Parameters below as of 17 Jan 2025 07:46  Patient On (Oxygen Delivery Method): room air      Daily     Daily         RECENT LABS:                          13.1   6.98  )-----------( 339      ( 16 Jan 2025 06:50 )             40.6     01-16    139  |  103  |  25[H]  ----------------------------<  91  3.7   |  26  |  0.91    Ca    9.0      16 Jan 2025 06:50    TPro  6.4  /  Alb  3.1[L]  /  TBili  0.3  /  DBili  x   /  AST  12  /  ALT  32  /  AlkPhos  141[H]  01-16    LIVER FUNCTIONS - ( 16 Jan 2025 06:50 )  Alb: 3.1 g/dL / Pro: 6.4 g/dL / ALK PHOS: 141 U/L / ALT: 32 U/L / AST: 12 U/L / GGT: x             Urinalysis Basic - ( 16 Jan 2025 06:50 )    Color: x / Appearance: x / SG: x / pH: x  Gluc: 91 mg/dL / Ketone: x  / Bili: x / Urobili: x   Blood: x / Protein: x / Nitrite: x   Leuk Esterase: x / RBC: x / WBC x   Sq Epi: x / Non Sq Epi: x / Bacteria: x          CAPILLARY BLOOD GLUCOSE

## 2025-01-17 NOTE — PROGRESS NOTE ADULT - CONSTITUTIONAL
mood stable no lability "I want to come back and show you how well I'm walking"
steri strips in place, margins incision wlell approxiamted, no redness, no drainage or swelling
incision intact, steri strips in place (had dehisence per family, but wound margins approximated, no redness or drainage, no subcutaneous swelling

## 2025-01-17 NOTE — PROGRESS NOTE ADULT - PROVIDER SPECIALTY LIST ADULT
Hospitalist
Hospitalist
Physiatry
Rehab Medicine
Hospitalist
Physiatry
Hospitalist
Physiatry
Rehab Medicine
Rehab Medicine
Hospitalist
Hospitalist
Physiatry
Hospitalist
Physiatry

## 2025-01-17 NOTE — PROGRESS NOTE ADULT - CARDIOVASCULAR
normal/regular rate and rhythm/S1 S2 present/no gallops/no rub/no murmur
regular rate and rhythm
regular rate and rhythm

## 2025-01-17 NOTE — PROGRESS NOTE ADULT - REASON FOR ADMISSION
GBM s/p craniotomy for resection

## 2025-01-17 NOTE — PROGRESS NOTE ADULT - TIME BILLING
Time spent includes direct patient care  (interview and examination of patient), discussion with other providers, support staff and/or patient's family members, review of medical records, imaging and lab results.
Time spent includes direct patient care (interview and examination of patient), discussion with other providers, support staff and/or patient's family members, review of medical records, ordering diagnostic tests and analyzing results, and documentation
- Ordering, reviewing, and interpreting labs, testing, and imaging.  - Independently obtaining a review of systems and performing a physical exam  - Reviewing prior hospitalization and where necessary, outpatient records.  - Counselling and educating patient and family regarding interpretation of aforementioned items and plan of care.
Time spent includes direct patient care (interview and examination of patient), discussion with other providers, support staff and/or patient's family members, review of medical records, ordering diagnostic tests and analyzing results, and documentation
Time spent includes direct patient care (interview and examination of patient), discussion with other providers, support staff and/or patient's family members, review of medical records, ordering diagnostic tests and analyzing results, and documentation

## 2025-01-17 NOTE — PROGRESS NOTE ADULT - ASSESSMENT
69 year old male with PMH of HTN, OA, right TKA, s/p L craniotomy for GBM resection 04/25/2023, s/p chemo and RT 2023 presented to the ED at [facility] on 12/13 due to reoccurrence of brain tumor, increased R facial droop, worsening word finding difficulties, and right sided weakness x 2 weeks. s/p left craniotomy  for resection of GBM with placement of Gammatile brachytherapy on 12/19. Hospital course significant for hyponatremia, diarrhea coupled with hypotension    # GBM  - s/p craniotomy for resection  - Continue Keppra 1000mg BID  - Continue Decadron 4mg at 6am and 2mg at 3pm   - s/p brachytherapy. Radiation card printed and given to patient  - Follow up as outpatient for Radiation treatment   - For outpatient PT, OT services on dc    # HTN  - Norvasc 2.5 QD (1/16)  - /76 - 133/80) 1/17 controlled  - PCP f/u on dc    # Hyponatremia - resolved  - Na 139 (1/16)   - PCP  f.u on dc    # Pain control  - Tylenol PRN    # GI/Bowel Mgmt   - Senna at bedtime   - Pepcid 20mg BID    #Diet  - Regular diet    Patient is medically cleared for dc home today with caregiver support. Caregiver training has been completed, and he will continue therapies on outpatient setting on dc. f/u PCP, NSGY, PMR, rads oncology,     Juanjose Johnston  Neurosurgery  [address] [place] [address]  Phone: [phone]    Wernicke, A. Gabriella A  Radiation Oncology  [address] [address]  Phone: [phone]

## 2025-01-17 NOTE — PROGRESS NOTE ADULT - CONSTITUTIONAL COMMENTS
Patient alert, mood fair, conversational speech with intact content O x4
alert,. incision intact, steri strips in place. no redness, no oozing, margins approximated. healing O x 3-4
alert, NAD mood stable, more alert, O x 4 grossly but some issues with complex problem solving and insight

## 2025-01-17 NOTE — PROGRESS NOTE ADULT - ASSESSMENT
69 year old male with past medical history of HTN, OA, right TKA, HO craniotomy for GBM resection 04/25/2023, s/p chemo and RT 2023 with Dr. Causey, Admit at [facility] on Dec13 for reoccurrence of GBM, Sp lt craniotomy  for resection of GBM with placement of Gammatile brachytherapy on 12/19. Hospital course complicated by hyponatremia, diarrhea, admitted to [facility] On 12/27/24.     #Recurrent GBM s/p Craniotomy for Resection and Gammatile Brachytherapy (12/19)  - Pathology: Most consistent with glioblastoma, IDH-wildtype, with +7 and -10. Other neoplastic processes are not excluded.   - Continue Keppra  - Continue Decadron taper + famotidine  - Continue comprehensive rehab program with PT/OT/SLP  - Follow up as outpatient for radiation treatment    #HTN  - Continue amlodipine w/ holding parameters  - Monitor BP, controlled     #Intermittent Bradycardia  - EKg 12/29 showed NSR  - Asymptomatic  - Monitor     #Hyponatremia (Resolved)  - monitor     #Insomnia  - On trazodone and melatonin    DVT PPx  - Lovenox SC    Discussed with rehab team

## 2025-01-29 ENCOUNTER — APPOINTMENT (OUTPATIENT)
Dept: INTERNAL MEDICINE | Facility: CLINIC | Age: 70
End: 2025-01-29
Payer: COMMERCIAL

## 2025-01-29 VITALS
OXYGEN SATURATION: 96 % | WEIGHT: 199.96 LBS | HEART RATE: 71 BPM | SYSTOLIC BLOOD PRESSURE: 126 MMHG | BODY MASS INDEX: 28.69 KG/M2 | DIASTOLIC BLOOD PRESSURE: 74 MMHG

## 2025-01-29 DIAGNOSIS — Z99.3 DEPENDENCE ON WHEELCHAIR: ICD-10-CM

## 2025-01-29 DIAGNOSIS — R53.1 WEAKNESS: ICD-10-CM

## 2025-01-29 DIAGNOSIS — Z09 ENCOUNTER FOR FOLLOW-UP EXAMINATION AFTER COMPLETED TREATMENT FOR CONDITIONS OTHER THAN MALIGNANT NEOPLASM: ICD-10-CM

## 2025-01-29 DIAGNOSIS — R73.03 PREDIABETES.: ICD-10-CM

## 2025-01-29 DIAGNOSIS — I10 ESSENTIAL (PRIMARY) HYPERTENSION: ICD-10-CM

## 2025-01-29 PROCEDURE — 99495 TRANSJ CARE MGMT MOD F2F 14D: CPT

## 2025-01-29 PROCEDURE — 36415 COLL VENOUS BLD VENIPUNCTURE: CPT

## 2025-01-29 RX ORDER — FAMOTIDINE 20 MG/1
20 TABLET, FILM COATED ORAL
Refills: 0 | Status: ACTIVE | COMMUNITY

## 2025-01-29 RX ORDER — DEXAMETHASONE 4 MG/1
4 TABLET ORAL
Refills: 0 | Status: ACTIVE | COMMUNITY

## 2025-01-29 RX ORDER — MELATONIN 3 MG
3 CAPSULE ORAL
Refills: 0 | Status: ACTIVE | COMMUNITY

## 2025-01-29 RX ORDER — ELECTROLYTES/DEXTROSE
SOLUTION, ORAL ORAL
Refills: 0 | Status: ACTIVE | COMMUNITY

## 2025-01-29 RX ORDER — LEVETIRACETAM 1000 MG/1
1000 TABLET, FILM COATED ORAL
Refills: 0 | Status: ACTIVE | COMMUNITY

## 2025-01-29 RX ORDER — DEXAMETHASONE 2 MG/1
2 TABLET ORAL
Refills: 0 | Status: ACTIVE | COMMUNITY

## 2025-01-30 ENCOUNTER — OUTPATIENT (OUTPATIENT)
Dept: OUTPATIENT SERVICES | Facility: HOSPITAL | Age: 70
LOS: 1 days | End: 2025-01-30

## 2025-01-30 ENCOUNTER — APPOINTMENT (OUTPATIENT)
Dept: MRI IMAGING | Facility: CLINIC | Age: 70
End: 2025-01-30
Payer: COMMERCIAL

## 2025-01-30 ENCOUNTER — APPOINTMENT (OUTPATIENT)
Dept: RADIATION ONCOLOGY | Facility: CLINIC | Age: 70
End: 2025-01-30
Payer: COMMERCIAL

## 2025-01-30 ENCOUNTER — APPOINTMENT (OUTPATIENT)
Dept: NEUROSURGERY | Facility: CLINIC | Age: 70
End: 2025-01-30
Payer: COMMERCIAL

## 2025-01-30 VITALS
HEART RATE: 90 BPM | SYSTOLIC BLOOD PRESSURE: 128 MMHG | RESPIRATION RATE: 18 BRPM | OXYGEN SATURATION: 98 % | DIASTOLIC BLOOD PRESSURE: 79 MMHG

## 2025-01-30 DIAGNOSIS — Z96.651 PRESENCE OF RIGHT ARTIFICIAL KNEE JOINT: Chronic | ICD-10-CM

## 2025-01-30 DIAGNOSIS — Z98.890 OTHER SPECIFIED POSTPROCEDURAL STATES: Chronic | ICD-10-CM

## 2025-01-30 PROCEDURE — 99024 POSTOP FOLLOW-UP VISIT: CPT

## 2025-01-30 PROCEDURE — 70553 MRI BRAIN STEM W/O & W/DYE: CPT | Mod: 26

## 2025-01-30 RX ORDER — OMEPRAZOLE 20 MG/1
20 CAPSULE, DELAYED RELEASE ORAL DAILY
Qty: 30 | Refills: 3 | Status: ACTIVE | COMMUNITY
Start: 2025-01-30 | End: 1900-01-01

## 2025-01-30 RX ORDER — AMLODIPINE BESYLATE 5 MG/1
5 TABLET ORAL
Refills: 0 | Status: ACTIVE | COMMUNITY
Start: 2025-01-30

## 2025-01-30 RX ORDER — DEXAMETHASONE 2 MG/1
2 TABLET ORAL
Qty: 75 | Refills: 0 | Status: ACTIVE | COMMUNITY
Start: 2025-01-30 | End: 1900-01-01

## 2025-02-03 ENCOUNTER — APPOINTMENT (OUTPATIENT)
Dept: NEUROSURGERY | Facility: CLINIC | Age: 70
End: 2025-02-03
Payer: COMMERCIAL

## 2025-02-03 PROCEDURE — 99024 POSTOP FOLLOW-UP VISIT: CPT

## 2025-02-04 ENCOUNTER — NON-APPOINTMENT (OUTPATIENT)
Age: 70
End: 2025-02-04

## 2025-02-04 LAB
ALBUMIN SERPL ELPH-MCNC: 4.1 G/DL
ALP BLD-CCNC: 101 U/L
ALT SERPL-CCNC: 14 U/L
ANION GAP SERPL CALC-SCNC: 12 MMOL/L
AST SERPL-CCNC: 12 U/L
BASOPHILS # BLD AUTO: 0.01 K/UL
BASOPHILS NFR BLD AUTO: 0.1 %
BILIRUB SERPL-MCNC: 0.2 MG/DL
BUN SERPL-MCNC: 32 MG/DL
CALCIUM SERPL-MCNC: 9.5 MG/DL
CHLORIDE SERPL-SCNC: 104 MMOL/L
CO2 SERPL-SCNC: 26 MMOL/L
CREAT SERPL-MCNC: 1.09 MG/DL
EGFR: 74 ML/MIN/1.73M2
EOSINOPHIL # BLD AUTO: 0 K/UL
EOSINOPHIL NFR BLD AUTO: 0 %
ESTIMATED AVERAGE GLUCOSE: 134 MG/DL
GLUCOSE SERPL-MCNC: 104 MG/DL
HBA1C MFR BLD HPLC: 6.3 %
HCT VFR BLD CALC: 44.3 %
HGB BLD-MCNC: 14 G/DL
IMM GRANULOCYTES NFR BLD AUTO: 2 %
LYMPHOCYTES # BLD AUTO: 1.13 K/UL
LYMPHOCYTES NFR BLD AUTO: 16.4 %
MAN DIFF?: NORMAL
MCHC RBC-ENTMCNC: 29.1 PG
MCHC RBC-ENTMCNC: 31.6 G/DL
MCV RBC AUTO: 92.1 FL
MONOCYTES # BLD AUTO: 0.47 K/UL
MONOCYTES NFR BLD AUTO: 6.8 %
NEUTROPHILS # BLD AUTO: 5.16 K/UL
NEUTROPHILS NFR BLD AUTO: 74.7 %
PLATELET # BLD AUTO: 288 K/UL
POTASSIUM SERPL-SCNC: 4.7 MMOL/L
PROT SERPL-MCNC: 6.4 G/DL
RBC # BLD: 4.81 M/UL
RBC # FLD: 14.7 %
SODIUM SERPL-SCNC: 143 MMOL/L
WBC # FLD AUTO: 6.91 K/UL

## 2025-02-11 ENCOUNTER — APPOINTMENT (OUTPATIENT)
Dept: HEMATOLOGY ONCOLOGY | Facility: CLINIC | Age: 70
End: 2025-02-11
Payer: COMMERCIAL

## 2025-02-11 DIAGNOSIS — C71.9 MALIGNANT NEOPLASM OF BRAIN, UNSPECIFIED: ICD-10-CM

## 2025-02-11 PROCEDURE — 99213 OFFICE O/P EST LOW 20 MIN: CPT | Mod: 93

## 2025-02-11 PROCEDURE — G2211 COMPLEX E/M VISIT ADD ON: CPT | Mod: 93

## 2025-02-14 PROCEDURE — 97530 THERAPEUTIC ACTIVITIES: CPT | Mod: GO

## 2025-02-14 PROCEDURE — 93005 ELECTROCARDIOGRAM TRACING: CPT

## 2025-02-14 PROCEDURE — 80053 COMPREHEN METABOLIC PANEL: CPT

## 2025-02-14 PROCEDURE — 97116 GAIT TRAINING THERAPY: CPT | Mod: GP

## 2025-02-14 PROCEDURE — 92507 TX SP LANG VOICE COMM INDIV: CPT | Mod: GN

## 2025-02-14 PROCEDURE — 97110 THERAPEUTIC EXERCISES: CPT | Mod: GP

## 2025-02-14 PROCEDURE — 97112 NEUROMUSCULAR REEDUCATION: CPT | Mod: GO

## 2025-02-14 PROCEDURE — 92610 EVALUATE SWALLOWING FUNCTION: CPT | Mod: GN

## 2025-02-14 PROCEDURE — 97161 PT EVAL LOW COMPLEX 20 MIN: CPT | Mod: GP

## 2025-02-14 PROCEDURE — 97165 OT EVAL LOW COMPLEX 30 MIN: CPT | Mod: GO

## 2025-02-14 PROCEDURE — 97535 SELF CARE MNGMENT TRAINING: CPT | Mod: GO

## 2025-02-14 PROCEDURE — 87635 SARS-COV-2 COVID-19 AMP PRB: CPT

## 2025-02-14 PROCEDURE — 92523 SPEECH SOUND LANG COMPREHEN: CPT | Mod: GN

## 2025-02-14 PROCEDURE — 36415 COLL VENOUS BLD VENIPUNCTURE: CPT

## 2025-02-14 PROCEDURE — 85025 COMPLETE CBC W/AUTO DIFF WBC: CPT

## 2025-02-21 RX ORDER — TEMOZOLOMIDE 100 MG/1
100 CAPSULE ORAL
Qty: 30 | Refills: 2 | Status: ACTIVE | COMMUNITY
Start: 2025-02-21 | End: 1900-01-01

## 2025-02-24 ENCOUNTER — NON-APPOINTMENT (OUTPATIENT)
Age: 70
End: 2025-02-24

## 2025-02-24 ENCOUNTER — EMERGENCY (EMERGENCY)
Facility: HOSPITAL | Age: 70
LOS: 1 days | Discharge: PSYCHIATRIC OR SUBSTANCE USE FACILITY | End: 2025-02-24
Attending: EMERGENCY MEDICINE
Payer: COMMERCIAL

## 2025-02-24 VITALS
TEMPERATURE: 99 F | HEIGHT: 71 IN | RESPIRATION RATE: 20 BRPM | SYSTOLIC BLOOD PRESSURE: 143 MMHG | WEIGHT: 190.04 LBS | HEART RATE: 72 BPM | DIASTOLIC BLOOD PRESSURE: 80 MMHG | OXYGEN SATURATION: 98 %

## 2025-02-24 DIAGNOSIS — Z98.890 OTHER SPECIFIED POSTPROCEDURAL STATES: Chronic | ICD-10-CM

## 2025-02-24 DIAGNOSIS — Z96.651 PRESENCE OF RIGHT ARTIFICIAL KNEE JOINT: Chronic | ICD-10-CM

## 2025-02-24 LAB
ALBUMIN SERPL ELPH-MCNC: 4 G/DL — SIGNIFICANT CHANGE UP (ref 3.3–5)
ALP SERPL-CCNC: 74 U/L — SIGNIFICANT CHANGE UP (ref 40–120)
ALT FLD-CCNC: 13 U/L — SIGNIFICANT CHANGE UP (ref 10–45)
ANION GAP SERPL CALC-SCNC: 12 MMOL/L — SIGNIFICANT CHANGE UP (ref 5–17)
APTT BLD: 35.7 SEC — HIGH (ref 24.5–35.6)
AST SERPL-CCNC: 13 U/L — SIGNIFICANT CHANGE UP (ref 10–40)
BASOPHILS # BLD AUTO: 0.02 K/UL — SIGNIFICANT CHANGE UP (ref 0–0.2)
BASOPHILS NFR BLD AUTO: 0.4 % — SIGNIFICANT CHANGE UP (ref 0–2)
BILIRUB SERPL-MCNC: 0.3 MG/DL — SIGNIFICANT CHANGE UP (ref 0.2–1.2)
BLD GP AB SCN SERPL QL: NEGATIVE — SIGNIFICANT CHANGE UP
BUN SERPL-MCNC: 18 MG/DL — SIGNIFICANT CHANGE UP (ref 7–23)
CALCIUM SERPL-MCNC: 9.7 MG/DL — SIGNIFICANT CHANGE UP (ref 8.4–10.5)
CHLORIDE SERPL-SCNC: 104 MMOL/L — SIGNIFICANT CHANGE UP (ref 96–108)
CO2 SERPL-SCNC: 26 MMOL/L — SIGNIFICANT CHANGE UP (ref 22–31)
CREAT SERPL-MCNC: 0.92 MG/DL — SIGNIFICANT CHANGE UP (ref 0.5–1.3)
EGFR: 91 ML/MIN/1.73M2 — SIGNIFICANT CHANGE UP
EOSINOPHIL # BLD AUTO: 0.01 K/UL — SIGNIFICANT CHANGE UP (ref 0–0.5)
EOSINOPHIL NFR BLD AUTO: 0.2 % — SIGNIFICANT CHANGE UP (ref 0–6)
GLUCOSE SERPL-MCNC: 104 MG/DL — HIGH (ref 70–99)
HCT VFR BLD CALC: 43.6 % — SIGNIFICANT CHANGE UP (ref 39–50)
HGB BLD-MCNC: 14.2 G/DL — SIGNIFICANT CHANGE UP (ref 13–17)
IMM GRANULOCYTES NFR BLD AUTO: 1.5 % — HIGH (ref 0–0.9)
INR BLD: 0.97 RATIO — SIGNIFICANT CHANGE UP (ref 0.85–1.16)
LYMPHOCYTES # BLD AUTO: 1.09 K/UL — SIGNIFICANT CHANGE UP (ref 1–3.3)
LYMPHOCYTES # BLD AUTO: 22.7 % — SIGNIFICANT CHANGE UP (ref 13–44)
MCHC RBC-ENTMCNC: 29.6 PG — SIGNIFICANT CHANGE UP (ref 27–34)
MCHC RBC-ENTMCNC: 32.6 G/DL — SIGNIFICANT CHANGE UP (ref 32–36)
MCV RBC AUTO: 91 FL — SIGNIFICANT CHANGE UP (ref 80–100)
MONOCYTES # BLD AUTO: 0.33 K/UL — SIGNIFICANT CHANGE UP (ref 0–0.9)
MONOCYTES NFR BLD AUTO: 6.9 % — SIGNIFICANT CHANGE UP (ref 2–14)
NEUTROPHILS # BLD AUTO: 3.28 K/UL — SIGNIFICANT CHANGE UP (ref 1.8–7.4)
NEUTROPHILS NFR BLD AUTO: 68.3 % — SIGNIFICANT CHANGE UP (ref 43–77)
NRBC BLD AUTO-RTO: 0 /100 WBCS — SIGNIFICANT CHANGE UP (ref 0–0)
PLATELET # BLD AUTO: 316 K/UL — SIGNIFICANT CHANGE UP (ref 150–400)
POTASSIUM SERPL-MCNC: 4.7 MMOL/L — SIGNIFICANT CHANGE UP (ref 3.5–5.3)
POTASSIUM SERPL-SCNC: 4.7 MMOL/L — SIGNIFICANT CHANGE UP (ref 3.5–5.3)
PROT SERPL-MCNC: 6.9 G/DL — SIGNIFICANT CHANGE UP (ref 6–8.3)
PROTHROM AB SERPL-ACNC: 11.1 SEC — SIGNIFICANT CHANGE UP (ref 9.9–13.4)
RBC # BLD: 4.79 M/UL — SIGNIFICANT CHANGE UP (ref 4.2–5.8)
RBC # FLD: 14.3 % — SIGNIFICANT CHANGE UP (ref 10.3–14.5)
RH IG SCN BLD-IMP: POSITIVE — SIGNIFICANT CHANGE UP
SODIUM SERPL-SCNC: 142 MMOL/L — SIGNIFICANT CHANGE UP (ref 135–145)
WBC # BLD: 4.8 K/UL — SIGNIFICANT CHANGE UP (ref 3.8–10.5)
WBC # FLD AUTO: 4.8 K/UL — SIGNIFICANT CHANGE UP (ref 3.8–10.5)

## 2025-02-24 PROCEDURE — 70496 CT ANGIOGRAPHY HEAD: CPT | Mod: 26

## 2025-02-24 PROCEDURE — 70450 CT HEAD/BRAIN W/O DYE: CPT | Mod: 26,59

## 2025-02-24 PROCEDURE — 99285 EMERGENCY DEPT VISIT HI MDM: CPT

## 2025-02-24 PROCEDURE — 70498 CT ANGIOGRAPHY NECK: CPT | Mod: 26

## 2025-02-24 NOTE — ED ADULT NURSE NOTE - OBJECTIVE STATEMENT
70 y/o M with PMHx of HTN, glioblastoma s/p surgery 12/2024 presents to the ED with complaints of increasing R sided weakness x 1 week. Per wife at bedside, patient presented with sxs prior to surgery 12/2024 and has since had improvement. However, states in last week has developed increasing weakness to RUE and RLE with increased expressive aphasia/difficulty word finding and difficulty ambulating. Of note, wife states patient has been weaned off Dexamethasone dose, most recently has been taking 2mg daily. Wife at bedside states contacted oncologist who advised presentation to the ED for repeat imaging/MRI. Patient denies fever, chills, cough, hematuria, dysuria, changes in vision. AOx2, disoriented to time/situation. Breathing is unlabored, spontaneous, and symmetrical. Abdomen is soft, nondistended, and nontender. Decreased muscle strength and sensation in RUE and RLE. <2s capillary refill.

## 2025-02-24 NOTE — ED ADULT NURSE NOTE - NSFALLHARMRISKINTERV_ED_ALL_ED
Assistance OOB with selected safe patient handling equipment if applicable/Assistance with ambulation/Communicate risk of Fall with Harm to all staff, patient, and family/Monitor gait and stability/Monitor for mental status changes and reorient to person, place, and time, as needed/Move patient closer to nursing station/within visual sight of ED staff/Provide visual cue: red socks, yellow wristband, yellow gown, etc/Reinforce activity limits and safety measures with patient and family/Toileting schedule using arm’s reach rule for commode and bathroom/Use of alarms - bed, stretcher, chair and/or video monitoring/Bed in lowest position, wheels locked, appropriate side rails in place/Call bell, personal items and telephone in reach/Instruct patient to call for assistance before getting out of bed/chair/stretcher/Non-slip footwear applied when patient is off stretcher/[place] to call system/Physically safe environment - no spills, clutter or unnecessary equipment/Purposeful Proactive Rounding/Room/bathroom lighting operational, light cord in reach

## 2025-02-24 NOTE — ED ADULT TRIAGE NOTE - CHIEF COMPLAINT QUOTE
Worsening right sided weakness with difficulty speaking x 1 week. Patient had surgery 12/19/2024 for glioblastoma.

## 2025-02-25 ENCOUNTER — INPATIENT (INPATIENT)
Facility: HOSPITAL | Age: 70
LOS: 14 days | Discharge: NURSING, REHAB OR LONG-TERM CARE | DRG: 25 | End: 2025-03-12
Attending: NEUROLOGICAL SURGERY | Admitting: STUDENT IN AN ORGANIZED HEALTH CARE EDUCATION/TRAINING PROGRAM
Payer: COMMERCIAL

## 2025-02-25 VITALS
DIASTOLIC BLOOD PRESSURE: 81 MMHG | RESPIRATION RATE: 18 BRPM | HEART RATE: 63 BPM | TEMPERATURE: 98 F | SYSTOLIC BLOOD PRESSURE: 138 MMHG | OXYGEN SATURATION: 98 %

## 2025-02-25 VITALS
HEART RATE: 60 BPM | RESPIRATION RATE: 16 BRPM | OXYGEN SATURATION: 98 % | SYSTOLIC BLOOD PRESSURE: 134 MMHG | DIASTOLIC BLOOD PRESSURE: 77 MMHG | TEMPERATURE: 99 F

## 2025-02-25 DIAGNOSIS — I10 ESSENTIAL (PRIMARY) HYPERTENSION: ICD-10-CM

## 2025-02-25 DIAGNOSIS — C71.9 MALIGNANT NEOPLASM OF BRAIN, UNSPECIFIED: ICD-10-CM

## 2025-02-25 DIAGNOSIS — Z96.651 PRESENCE OF RIGHT ARTIFICIAL KNEE JOINT: Chronic | ICD-10-CM

## 2025-02-25 DIAGNOSIS — Z98.890 OTHER SPECIFIED POSTPROCEDURAL STATES: Chronic | ICD-10-CM

## 2025-02-25 DIAGNOSIS — M19.90 UNSPECIFIED OSTEOARTHRITIS, UNSPECIFIED SITE: ICD-10-CM

## 2025-02-25 LAB
A1C WITH ESTIMATED AVERAGE GLUCOSE RESULT: 5.9 % — HIGH (ref 4–5.6)
ADD ON TEST-SPECIMEN IN LAB: SIGNIFICANT CHANGE UP
ADD ON TEST-SPECIMEN IN LAB: SIGNIFICANT CHANGE UP
ESTIMATED AVERAGE GLUCOSE: 123 MG/DL — HIGH (ref 68–114)
HCT VFR BLD CALC: 43.3 % — SIGNIFICANT CHANGE UP (ref 39–50)
HGB BLD-MCNC: 14.1 G/DL — SIGNIFICANT CHANGE UP (ref 13–17)
MAGNESIUM SERPL-MCNC: 2 MG/DL — SIGNIFICANT CHANGE UP (ref 1.6–2.6)
MCHC RBC-ENTMCNC: 29.7 PG — SIGNIFICANT CHANGE UP (ref 27–34)
MCHC RBC-ENTMCNC: 32.6 G/DL — SIGNIFICANT CHANGE UP (ref 32–36)
MCV RBC AUTO: 91.4 FL — SIGNIFICANT CHANGE UP (ref 80–100)
NRBC BLD AUTO-RTO: 0 /100 WBCS — SIGNIFICANT CHANGE UP (ref 0–0)
PHOSPHATE SERPL-MCNC: 3.3 MG/DL — SIGNIFICANT CHANGE UP (ref 2.5–4.5)
PLATELET # BLD AUTO: 295 K/UL — SIGNIFICANT CHANGE UP (ref 150–400)
RBC # BLD: 4.74 M/UL — SIGNIFICANT CHANGE UP (ref 4.2–5.8)
RBC # FLD: 14.2 % — SIGNIFICANT CHANGE UP (ref 10.3–14.5)
WBC # BLD: 5.01 K/UL — SIGNIFICANT CHANGE UP (ref 3.8–10.5)
WBC # FLD AUTO: 5.01 K/UL — SIGNIFICANT CHANGE UP (ref 3.8–10.5)

## 2025-02-25 PROCEDURE — 86900 BLOOD TYPING SEROLOGIC ABO: CPT

## 2025-02-25 PROCEDURE — 70498 CT ANGIOGRAPHY NECK: CPT | Mod: MC

## 2025-02-25 PROCEDURE — 80053 COMPREHEN METABOLIC PANEL: CPT

## 2025-02-25 PROCEDURE — 96374 THER/PROPH/DIAG INJ IV PUSH: CPT | Mod: XU

## 2025-02-25 PROCEDURE — A9585: CPT

## 2025-02-25 PROCEDURE — 86901 BLOOD TYPING SEROLOGIC RH(D): CPT

## 2025-02-25 PROCEDURE — 86850 RBC ANTIBODY SCREEN: CPT

## 2025-02-25 PROCEDURE — 70553 MRI BRAIN STEM W/O & W/DYE: CPT | Mod: 26

## 2025-02-25 PROCEDURE — 99285 EMERGENCY DEPT VISIT HI MDM: CPT | Mod: 25

## 2025-02-25 PROCEDURE — 70496 CT ANGIOGRAPHY HEAD: CPT | Mod: MC

## 2025-02-25 PROCEDURE — 70450 CT HEAD/BRAIN W/O DYE: CPT | Mod: MC

## 2025-02-25 PROCEDURE — 85610 PROTHROMBIN TIME: CPT

## 2025-02-25 PROCEDURE — 85025 COMPLETE CBC W/AUTO DIFF WBC: CPT

## 2025-02-25 PROCEDURE — 70553 MRI BRAIN STEM W/O & W/DYE: CPT | Mod: MC

## 2025-02-25 PROCEDURE — 85730 THROMBOPLASTIN TIME PARTIAL: CPT

## 2025-02-25 RX ORDER — DEXAMETHASONE 0.5 MG/1
4 TABLET ORAL EVERY 6 HOURS
Refills: 0 | Status: DISCONTINUED | OUTPATIENT
Start: 2025-02-25 | End: 2025-03-02

## 2025-02-25 RX ORDER — ACETAMINOPHEN 500 MG/5ML
650 LIQUID (ML) ORAL EVERY 6 HOURS
Refills: 0 | Status: DISCONTINUED | OUTPATIENT
Start: 2025-02-25 | End: 2025-03-06

## 2025-02-25 RX ORDER — LEVETIRACETAM 10 MG/ML
1000 INJECTION, SOLUTION INTRAVENOUS EVERY 12 HOURS
Refills: 0 | Status: DISCONTINUED | OUTPATIENT
Start: 2025-02-25 | End: 2025-03-06

## 2025-02-25 RX ORDER — DEXAMETHASONE SODIUM PHOSPHATE 4 MG/ML
10 INJECTION, SOLUTION INTRA-ARTICULAR; INTRALESIONAL; INTRAMUSCULAR; INTRAVENOUS; SOFT TISSUE ONCE
Refills: 0 | Status: COMPLETED | OUTPATIENT
Start: 2025-02-25 | End: 2025-02-25

## 2025-02-25 RX ORDER — AMLODIPINE BESYLATE 10 MG/1
5 TABLET ORAL DAILY
Refills: 0 | Status: DISCONTINUED | OUTPATIENT
Start: 2025-02-25 | End: 2025-03-06

## 2025-02-25 RX ORDER — LEVETIRACETAM 750 MG/1
1000 TABLET, FILM COATED ORAL ONCE
Refills: 0 | Status: COMPLETED | OUTPATIENT
Start: 2025-02-25 | End: 2025-02-25

## 2025-02-25 RX ORDER — INSULIN LISPRO 100 U/ML
INJECTION, SOLUTION INTRAVENOUS; SUBCUTANEOUS
Refills: 0 | Status: DISCONTINUED | OUTPATIENT
Start: 2025-02-25 | End: 2025-03-01

## 2025-02-25 RX ORDER — DEXAMETHASONE SODIUM PHOSPHATE 4 MG/ML
4 INJECTION, SOLUTION INTRA-ARTICULAR; INTRALESIONAL; INTRAMUSCULAR; INTRAVENOUS; SOFT TISSUE EVERY 6 HOURS
Refills: 0 | Status: ACTIVE | OUTPATIENT
Start: 2025-02-25 | End: 2026-01-24

## 2025-02-25 RX ORDER — SENNA 187 MG
2 TABLET ORAL AT BEDTIME
Refills: 0 | Status: DISCONTINUED | OUTPATIENT
Start: 2025-02-25 | End: 2025-03-06

## 2025-02-25 RX ADMIN — INSULIN LISPRO 2: 100 INJECTION, SOLUTION INTRAVENOUS; SUBCUTANEOUS at 22:01

## 2025-02-25 RX ADMIN — Medication 20 MILLIGRAM(S): at 18:12

## 2025-02-25 RX ADMIN — DEXAMETHASONE 4 MILLIGRAM(S): 0.5 TABLET ORAL at 18:12

## 2025-02-25 RX ADMIN — LEVETIRACETAM 1000 MILLIGRAM(S): 750 TABLET, FILM COATED ORAL at 10:04

## 2025-02-25 RX ADMIN — DEXAMETHASONE 4 MILLIGRAM(S): 0.5 TABLET ORAL at 13:54

## 2025-02-25 RX ADMIN — LEVETIRACETAM 1000 MILLIGRAM(S): 10 INJECTION, SOLUTION INTRAVENOUS at 18:11

## 2025-02-25 RX ADMIN — AMLODIPINE BESYLATE 5 MILLIGRAM(S): 10 TABLET ORAL at 13:54

## 2025-02-25 RX ADMIN — DEXAMETHASONE SODIUM PHOSPHATE 100 MILLIGRAM(S): 4 INJECTION, SOLUTION INTRA-ARTICULAR; INTRALESIONAL; INTRAMUSCULAR; INTRAVENOUS; SOFT TISSUE at 06:13

## 2025-02-25 NOTE — PATIENT PROFILE ADULT - FALL HARM RISK - HARM RISK INTERVENTIONS

## 2025-02-25 NOTE — ED PROVIDER NOTE - ATTENDING CONTRIBUTION TO CARE
see mdm    edited by Ana Zelaya DO - attending physician.   Please see progress notes for status/labs/consult updates and ED course after initial presentation.

## 2025-02-25 NOTE — CONSULT NOTE ADULT - ASSESSMENT
69M Hx HTN, Lt frontoparietal GBM s/p Bx @[facility] 2023, Rsxn x2 April/2023, Dec/2024 w/ GammaTile placement (20 seeds/5 Tiles). Also s/p >6 cycles of TMZ (Aug/2023-Jan/2024), RT (60 Gy in 30 fxns (June/2023-July/2024). Postop MRI and MRP 12/24/2024 limited by motion and artefact, does show very small foci of peripheral enhancement around periphery of caverty most c/w post-Tx changes. Just finished a dex taper to 2 BID, on Keppra 1g BID,  BID. P/w worsening Rt weakness and aphasia x1wk. CTH today shows worsening Rt sided whole-hemisphere vasogenic edema susp 2/2 GammaTile and Dex taper. no heme. MLS c/t prior MRI Jan 30. CBC/BMP/coags wnl.     Exam: AOx4, Language/naming/abstraction intact. PERRL, disconjugate gaze, however EOMI in each Lt and Rt eye, Lt eye left gaze at baseline. Rt facial. RUE Delt 4, Bi 4+, Tri 2, WF/WE 2, HG 3. LUE 5/5. RLE HF 4+, KF/KE 4, DF 3, PF 4-. LLE 5/5. Rt hemibody numb/dysesthesia UE>LE.     -no acute nsgy intervention  -xfer to [facility] for care per Dr. Johntson  -if unable to xfer, would staff with Dr. Johnston, likely adm medicine, MRI srs wwo, and Dex 10 then 4q6.

## 2025-02-25 NOTE — PATIENT PROFILE ADULT - FUNCTIONAL ASSESSMENT - DAILY ACTIVITY ASSESSMENT TYPE
Detail Level: Detailed Depth Of Biopsy: dermis Was A Bandage Applied: Yes Size Of Lesion In Cm: 0.4 X Size Of Lesion In Cm: 0 Biopsy Type: H and E Biopsy Method: Dermablade Anesthesia Type: 1% lidocaine with epinephrine Anesthesia Volume In Cc: 1 Hemostasis: Drysol Wound Care: Petrolatum Dressing: Band-Aid Destruction After The Procedure: No Type Of Destruction Used: Curettage Curettage Text: The wound bed was treated with curettage after the biopsy was performed. Cryotherapy Text: The wound bed was treated with cryotherapy after the biopsy was performed. Electrodesiccation Text: The wound bed was treated with electrodesiccation after the biopsy was performed. Electrodesiccation And Curettage Text: The wound bed was treated with electrodesiccation and curettage after the biopsy was performed. Silver Nitrate Text: The wound bed was treated with silver nitrate after the biopsy was performed. Lab: -212 Consent: Written consent was obtained and risks were reviewed including but not limited to scarring, infection, bleeding, scabbing, incomplete removal, nerve damage and allergy to anesthesia. Post-Care Instructions: I reviewed with the patient in detail post-care instructions. Patient is to keep the biopsy site dry overnight, and then apply bacitracin twice daily until healed. Patient may apply hydrogen peroxide soaks to remove any crusting. Notification Instructions: Patient will be notified of biopsy results. However, patient instructed to call the office if not contacted within 2 weeks. Billing Type: Third-Party Bill Information: Selecting Yes will display possible errors in your note based on the variables you have selected. This validation is only offered as a suggestion for you. PLEASE NOTE THAT THE VALIDATION TEXT WILL BE REMOVED WHEN YOU FINALIZE YOUR NOTE. IF YOU WANT TO FAX A PRELIMINARY NOTE YOU WILL NEED TO TOGGLE THIS TO 'NO' IF YOU DO NOT WANT IT IN YOUR FAXED NOTE. Admission

## 2025-02-25 NOTE — ED PROVIDER NOTE - CLINICAL SUMMARY MEDICAL DECISION MAKING FREE TEXT BOX
69-year-old male, history of glioblastoma multiforme status post resection 2 months ago with  at [facility], presents to ED with worsening weakness to right upper extremity, right lower extremity and dysphagia.  Patient and family report patient had similar symptoms prior to having surgery, states after surgery had some improvement in the symptoms.  Is currently on Decadron taper from 6 mg currently to 2 mg.  Reports for the past week, weakness and dysphage and been progressively worsening.  No fevers, chills, chest pain, shortness of breath.  Vital signs stable.  Patient no acute distress, heart regular rate and rhythm, lungs clear, abdomen soft and nontender, 4 out of 5 strength to right upper and right lower extremity, right sided facial droop, some dysphagia.  Concern for postop complication, mass recurrence, CVA.  Will obtain lab work, CTA head and neck. 69-year-old male, history of glioblastoma multiforme status post resection 2 months ago with  at [facility], presents to ED with worsening weakness to right upper extremity, right lower extremity and dysphagia.  Patient and family report patient had similar symptoms prior to having surgery, states after surgery had some improvement in the symptoms.  Is currently on Decadron taper from 6 mg currently to 2 mg.  Reports for the past week, weakness and dysphage and been progressively worsening.  No fevers, chills, chest pain, shortness of breath.  Vital signs stable.  Patient no acute distress, heart regular rate and rhythm, lungs clear, abdomen soft and nontender, 4 out of 5 strength to right upper and right lower extremity, right sided facial droop, some dysphagia.  Concern for postop complication, mass recurrence, CVA.  Will obtain lab work, CTA head and neck.    Ana Zelaya, Attending Physician: agree with above. given stability of symptoms will order CT and consideration of neurosurgical consultation pending results. May be 2/2 taper vs. mass recurrence, less likely stroke.

## 2025-02-25 NOTE — ED PROVIDER NOTE - PROGRESS NOTE DETAILS
Laz PGY2: discussed with transfer center, still unable to reach dr. crenshaw.  discussed with neurosurg unable to reach dr. crenshaw, states they will try and admit patient here and staff with flower. Hong PGY2: CT with vasogenic edema with midline shift.  neurosurg consulted, state no acute surgical intervention at this time and recommend transfer to [facility] to Dr. Johnston-neurosurgery for continuity of care.  Spoke with transfer center, they will reach out to Dr. Johnston and call back.

## 2025-02-25 NOTE — H&P ADULT - HISTORY OF PRESENT ILLNESS
69M with pmh HTN, L frontoparietal GBM s/p biopsy @[facility] 2023, resection x2 April/2023, Dec/2024 with GammaTile placement (20 seeds/5 Tiles), s/p >6 cycles of TMZ (Aug/2023-Jan/2024), RT (60 Gy in 30 fxns (June/2023-July/2024) presents with worsening right sided weakness and expressive aphasia x 1 week. Patient was due to start the last week of a decadron taper today at 1mg daily. He was brought to [facility] ER last night for symptoms where CTH  showed worsening R sided whole-hemisphere vasogenic edema susp 2/2 GammaTile and Dex taper. MRI brain was completed this AM and he received 10mg IV decadron at 6AM. Transferred to [facility] for further workup. Patient denies headache, nausea, vomiting, or vision changes. Patient has been compliant with his home keppra 1g BID.

## 2025-02-25 NOTE — H&P ADULT - NSHPLABSRESULTS_GEN_ALL_CORE
.  LABS:                         14.1   5.01  )-----------( 295      ( 25 Feb 2025 15:31 )             43.3     02-24    142  |  104  |  18  ----------------------------<  104[H]  4.7   |  26  |  0.92    Ca    9.7      24 Feb 2025 20:10    TPro  6.9  /  Alb  4.0  /  TBili  0.3  /  DBili  x   /  AST  13  /  ALT  13  /  AlkPhos  74  02-24    PT/INR - ( 24 Feb 2025 20:10 )   PT: 11.1 sec;   INR: 0.97 ratio         PTT - ( 24 Feb 2025 20:10 )  PTT:35.7 sec  Urinalysis Basic - ( 24 Feb 2025 20:10 )    Color: x / Appearance: x / SG: x / pH: x  Gluc: 104 mg/dL / Ketone: x  / Bili: x / Urobili: x   Blood: x / Protein: x / Nitrite: x   Leuk Esterase: x / RBC: x / WBC x   Sq Epi: x / Non Sq Epi: x / Bacteria: x            RADIOLOGY, EKG & ADDITIONAL TESTS: Reviewed.     < from: CT Head No Cont (02.24.25 @ 21:09) >    CT HEAD:  Since 1/30/2025, substantially increased vasogenic edema in the left   cerebral hemisphere centered around a left frontoparietal resection   cavity with diffuse left cerebral sulcal effacement, effacement of the   left temporal horn and atrium, and left to right midline shift of   approximately 5 mm.    No acute intracranial hemorrhage.    CTA NECK:  No evidence of significant stenosis or occlusion.    CTA HEAD:  No large vessel occlusion, significant stenosis or vascular abnormality   identified.    < end of copied text >    < from: MR Brain Stereotactic w/wo IV Cont w/ DTI (02.25.25 @ 11:06) >    IMPRESSION:  Rim-enhancing structure at the leftposterior hemispheric   operative bed appears similar to 1/30/2025. The extensive infiltrative   hyperintensity on the long TR images consistent with vasogenic edema and   nonenhancing tumor has mildly increased and its associated mass effect   with increased right-to-left subfalcine herniation although appears   comparable infiltrative extent within the left cerebral hemisphere,   contralateral extent into the right cerebral hemisphere and downward   extent to the ventral avila    < end of copied text >

## 2025-02-25 NOTE — H&P ADULT - NSHPPHYSICALEXAM_GEN_ALL_CORE
Never smoker
General: patient seen laying supine in bed in NAD  Neuro: AAOx3 (month/year with choice), +expressive aphasia, follows commands, opens eyes spontaneously, +R facial droop, strength LUE and LLE, RUE deltoid/triceps 2/5 biceps 4+/5 hand  3/5, RLE HF/DF 3/5 KF/KE 5/5 PF 4/5, Decreased sensation to light touch throughout R face, RUE, RLE and R torso.   HEENT: PERRL, dysconjugate gaze, unable to fully adduct L eye otherwise EOMI  Neck: supple  Cardiac: RRR, S1S2  Pulmonary: chest rise symmetric  Abdomen: soft, nontender, nondistended  Ext: perfusing well  Skin: warm, dry

## 2025-02-25 NOTE — CONSULT NOTE ADULT - SUBJECTIVE AND OBJECTIVE BOX
p (1480)     HPI:69M Hx HTN, Lt frontoparietal GBM s/p Bx @[facility], Rsxn x2 April/2023, Dec/2024 w/ GammaTile placement (20 seeds/5 Tiles). Also s/p >6 cycles of TMZ (Aug/2023-Jan/2024), RT (60 Gy in 30 fxns (June/2023-July/2024). Postop MRI and MRP 12/24/2024 limited by motion and artefact, does show very small foci of peripheral enhancement around periphery of caverty most c/w post-Tx changes. Just finished a dex taper to 2 BID, on Keppra 1g BID,  BID. P/w worsening Rt weakness and aphasia x1wk. CTH today shows worsening Rt sided whole-hemisphere vasogenic edema susp 2/2 GammaTile and Dex taper. no heme. MLS c/t prior MRI Jan 30. CBC/BMP/coags wnl.     Exam: AOx4, Language/naming/abstraction intact. PERRL, disconjugate gaze, however EOMI in each Lt and Rt eye, Lt eye left gaze at baseline. Rt facial. RUE Delt 4, Bi 4+, Tri 2, WF/WE 2, HG 3. LUE 5/5. RLE HF 4+, KF/KE 4, DF 3, PF 4-. LLE 5/5. Rt hemibody numb/dysesthesia UE>LE.     =====================  PAST MEDICAL HISTORY     PAST SURGICAL HISTORY     No Known Allergies      MEDICATIONS:  Antibiotics:    Neuro:    Other:      SOCIAL HISTORY:   Occupation:   Marital Status:     FAMILY HISTORY:      ROS: Negative except per HPI    LABS:  PT/INR - ( 24 Feb 2025 20:10 )   PT: 11.1 sec;   INR: 0.97 ratio         PTT - ( 24 Feb 2025 20:10 )  PTT:35.7 sec                        14.2   4.80  )-----------( 316      ( 24 Feb 2025 20:10 )             43.6     02-24    142  |  104  |  18  ----------------------------<  104[H]  4.7   |  26  |  0.92    Ca    9.7      24 Feb 2025 20:10    TPro  6.9  /  Alb  4.0  /  TBili  0.3  /  DBili  x   /  AST  13  /  ALT  13  /  AlkPhos  74  02-24

## 2025-02-25 NOTE — H&P ADULT - ASSESSMENT
69M with pmh HTN, L frontoparietal GBM s/p biopsy @[facility] 2023, resection x2 April/2023, Dec/2024 with GammaTile placement (20 seeds/5 Tiles), s/p >6 cycles of TMZ (Aug/2023-Jan/2024), RT (60 Gy in 30 fxns (June/2023-July/2024) presents with worsening right sided weakness and expressive aphasia x 1 week. At [facility] ER CTH  showed worsening R sided whole-hemisphere vasogenic edema susp 2/2 GammaTile and Dex taper and MR brain completed. Transferred to [facility] for further workup.     Plan:  Neuro:  - neuro/vital checks q4  - pain control: tylenol prn  - MRI brain w/ sarah completed 2/25  - CTH 2/24: worsening R sided whole-hemisphere vasogenic edema  - seizure prophylaxis: cont home keppra 1g q12  - decadron 4q6 for vasogenic edema  - GBM: hold home metformin 500mg BID    Cardiac:  - SBP goal 100-160  - HTN: cont home amlodipine 5mg daily    Pulmonary:  - on RA    GI:  - regular diet  - bowel regimen  - pepcid 20mg BID while on steroids    Renal:  - IVL  - Na goal 135-145    Heme:  - SCDs for DVT prophylaxis    Endo:  - ISS, A1C 5.9    ID:  - afebrile  - f/u ESR/CRP    Disposition: tele, full code, dispo pending    D/w Dr. Sue

## 2025-02-26 LAB
ANION GAP SERPL CALC-SCNC: 12 MMOL/L — SIGNIFICANT CHANGE UP (ref 5–17)
BUN SERPL-MCNC: 21 MG/DL — SIGNIFICANT CHANGE UP (ref 7–23)
CALCIUM SERPL-MCNC: 9.6 MG/DL — SIGNIFICANT CHANGE UP (ref 8.4–10.5)
CHLORIDE SERPL-SCNC: 103 MMOL/L — SIGNIFICANT CHANGE UP (ref 96–108)
CO2 SERPL-SCNC: 25 MMOL/L — SIGNIFICANT CHANGE UP (ref 22–31)
CREAT SERPL-MCNC: 0.83 MG/DL — SIGNIFICANT CHANGE UP (ref 0.5–1.3)
EGFR: 95 ML/MIN/1.73M2 — SIGNIFICANT CHANGE UP
EGFR: 95 ML/MIN/1.73M2 — SIGNIFICANT CHANGE UP
GLUCOSE SERPL-MCNC: 130 MG/DL — HIGH (ref 70–99)
HCT VFR BLD CALC: 44.4 % — SIGNIFICANT CHANGE UP (ref 39–50)
HGB BLD-MCNC: 14.5 G/DL — SIGNIFICANT CHANGE UP (ref 13–17)
MAGNESIUM SERPL-MCNC: 2.2 MG/DL — SIGNIFICANT CHANGE UP (ref 1.6–2.6)
MCHC RBC-ENTMCNC: 29.7 PG — SIGNIFICANT CHANGE UP (ref 27–34)
MCHC RBC-ENTMCNC: 32.7 G/DL — SIGNIFICANT CHANGE UP (ref 32–36)
MCV RBC AUTO: 91 FL — SIGNIFICANT CHANGE UP (ref 80–100)
NRBC BLD AUTO-RTO: 0 /100 WBCS — SIGNIFICANT CHANGE UP (ref 0–0)
PHOSPHATE SERPL-MCNC: 3.9 MG/DL — SIGNIFICANT CHANGE UP (ref 2.5–4.5)
PLATELET # BLD AUTO: 313 K/UL — SIGNIFICANT CHANGE UP (ref 150–400)
POTASSIUM SERPL-MCNC: 5.2 MMOL/L — SIGNIFICANT CHANGE UP (ref 3.5–5.3)
POTASSIUM SERPL-SCNC: 5.2 MMOL/L — SIGNIFICANT CHANGE UP (ref 3.5–5.3)
RBC # BLD: 4.88 M/UL — SIGNIFICANT CHANGE UP (ref 4.2–5.8)
RBC # FLD: 14.3 % — SIGNIFICANT CHANGE UP (ref 10.3–14.5)
SODIUM SERPL-SCNC: 140 MMOL/L — SIGNIFICANT CHANGE UP (ref 135–145)
WBC # BLD: 9.4 K/UL — SIGNIFICANT CHANGE UP (ref 3.8–10.5)
WBC # FLD AUTO: 9.4 K/UL — SIGNIFICANT CHANGE UP (ref 3.8–10.5)

## 2025-02-26 PROCEDURE — 99221 1ST HOSP IP/OBS SF/LOW 40: CPT

## 2025-02-26 PROCEDURE — 99223 1ST HOSP IP/OBS HIGH 75: CPT

## 2025-02-26 RX ORDER — ENOXAPARIN SODIUM 100 MG/ML
40 INJECTION SUBCUTANEOUS
Refills: 0 | Status: DISCONTINUED | OUTPATIENT
Start: 2025-02-26 | End: 2025-03-05

## 2025-02-26 RX ADMIN — DEXAMETHASONE 4 MILLIGRAM(S): 0.5 TABLET ORAL at 12:05

## 2025-02-26 RX ADMIN — DEXAMETHASONE 4 MILLIGRAM(S): 0.5 TABLET ORAL at 23:02

## 2025-02-26 RX ADMIN — DEXAMETHASONE 4 MILLIGRAM(S): 0.5 TABLET ORAL at 06:15

## 2025-02-26 RX ADMIN — AMLODIPINE BESYLATE 5 MILLIGRAM(S): 10 TABLET ORAL at 06:14

## 2025-02-26 RX ADMIN — LEVETIRACETAM 1000 MILLIGRAM(S): 10 INJECTION, SOLUTION INTRAVENOUS at 17:16

## 2025-02-26 RX ADMIN — LEVETIRACETAM 1000 MILLIGRAM(S): 10 INJECTION, SOLUTION INTRAVENOUS at 06:14

## 2025-02-26 RX ADMIN — Medication 2 TABLET(S): at 23:02

## 2025-02-26 RX ADMIN — Medication 20 MILLIGRAM(S): at 06:14

## 2025-02-26 RX ADMIN — ENOXAPARIN SODIUM 40 MILLIGRAM(S): 100 INJECTION SUBCUTANEOUS at 23:02

## 2025-02-26 RX ADMIN — DEXAMETHASONE 4 MILLIGRAM(S): 0.5 TABLET ORAL at 00:31

## 2025-02-26 RX ADMIN — DEXAMETHASONE 4 MILLIGRAM(S): 0.5 TABLET ORAL at 17:16

## 2025-02-26 RX ADMIN — Medication 20 MILLIGRAM(S): at 17:16

## 2025-02-26 NOTE — PROGRESS NOTE ADULT - SUBJECTIVE AND OBJECTIVE BOX
HPI:  69M with pmh HTN, L frontoparietal GBM s/p biopsy @[facility] 20[facility], resection x2 April/2023, Dec/2024 with GammaTile placement (20 seeds/5 Tiles), s/p >6 cycles of TMZ (Aug/2023-Jan/2024), RT (60 Gy in 30 fxns (June/2023-July/2024) presents with worsening right sided weakness and expressive aphasia x 1 week. Patient was due to start the last week of a decadron taper today at 1mg daily. He was brought to [facility] ER last night for symptoms where CTH  showed worsening R sided whole-hemisphere vasogenic edema susp 2/2 GammaTile and Dex taper. MRI brain was completed this AM and he received 10mg IV decadron at 6AM. Transferred to [facility] for further workup. Patient denies headache, nausea, vomiting, or vision changes. Patient has been compliant with his home keppra 1g BID.  (25 Feb 2025 15:09)    OVERNIGHT EVENTS: KYA overnight, neuro stable    Hospital Course:   2/25: admitted to [facility]  2/26: KYA overnight.       Vital Signs Last 24 Hrs  T(C): 36.6 (25 Feb 2025 21:00), Max: 37 (25 Feb 2025 12:42)  T(F): 97.9 (25 Feb 2025 21:00), Max: 98.6 (25 Feb 2025 12:42)  HR: 94 (25 Feb 2025 21:00) (60 - 94)  BP: 142/81 (25 Feb 2025 21:00) (123/81 - 158/88)  BP(mean): --  RR: 18 (25 Feb 2025 21:00) (16 - 20)  SpO2: 97% (25 Feb 2025 21:00) (96% - 99%)    Parameters below as of 25 Feb 2025 21:00  Patient On (Oxygen Delivery Method): room air        I&O's Summary    25 Feb 2025 07:01  -  26 Feb 2025 01:17  --------------------------------------------------------  IN: 0 mL / OUT: 601 mL / NET: -601 mL    Physical Exam:   General: patient seen laying supine in bed in NAD  Neuro: AAOx3 (year with choice), +expressive aphasia, follows commands, opens eyes spontaneously, +R facial droop, LUE/LLE 5/5, RUE deltoid/triceps 2/5 biceps 4+/5 hand  3/5, RLE HF/DF 3/5 KF/KE 2/5 PF 2/5, Decreased sensation to light touch throughout R face, RUE, RLE and R torso.   HEENT: PERRL, dysconjugate gaze but able to do EOMI,  Cardiac: RRR, S1S2  Pulmonary: chest rise symmetric  Abdomen: soft, nontender, nondistended  Ext: perfusing well  Skin: warm, dry        TUBES/LINES:  [] Shields  [] Lumbar Drain  [] Wound Drains  [] Others      DIET:  [] NPO  [x] Mechanical  [] Tube feeds    LABS:                        14.1   5.01  )-----------( 295      ( 25 Feb 2025 15:31 )             43.3     02-25    141  |  100  |  19  ----------------------------<  121[H]  4.7   |  25  |  0.89    Ca    9.5      25 Feb 2025 15:31  Phos  3.3     02-25  Mg     2.0     02-25    TPro  6.9  /  Alb  4.0  /  TBili  0.3  /  DBili  x   /  AST  13  /  ALT  13  /  AlkPhos  74  02-24    PT/INR - ( 24 Feb 2025 20:10 )   PT: 11.1 sec;   INR: 0.97 ratio         PTT - ( 24 Feb 2025 20:10 )  PTT:35.7 sec  Urinalysis Basic - ( 25 Feb 2025 15:31 )    Color: x / Appearance: x / SG: x / pH: x  Gluc: 121 mg/dL / Ketone: x  / Bili: x / Urobili: x   Blood: x / Protein: x / Nitrite: x   Leuk Esterase: x / RBC: x / WBC x   Sq Epi: x / Non Sq Epi: x / Bacteria: x          CAPILLARY BLOOD GLUCOSE      POCT Blood Glucose.: 162 mg/dL (25 Feb 2025 21:51)  POCT Blood Glucose.: 126 mg/dL (25 Feb 2025 13:43)      Drug Levels: [] N/A    CSF Analysis: [] N/A      Allergies    No Known Allergies    Intolerances      MEDICATIONS:  Antibiotics:    Neuro:  acetaminophen     Tablet .. 650 milliGRAM(s) Oral every 6 hours PRN  levETIRAcetam 1000 milliGRAM(s) Oral every 12 hours    Anticoagulation:    OTHER:  amLODIPine   Tablet 5 milliGRAM(s) Oral daily  dexAMETHasone     Tablet 4 milliGRAM(s) Oral every 6 hours  famotidine    Tablet 20 milliGRAM(s) Oral two times a day  insulin lispro (ADMELOG) corrective regimen sliding scale   SubCutaneous Before meals and at bedtime  senna 2 Tablet(s) Oral at bedtime    IVF:    CULTURES:    RADIOLOGY & ADDITIONAL TESTS:      ASSESSMENT:  69M with pmh HTN, L frontoparietal GBM s/p biopsy @[facility] 20[facility], resection x2 April/2023, Dec/2024 with GammaTile placement (20 seeds/5 Tiles), s/p >6 cycles of TMZ (Aug/2023-Jan/2024), RT (60 Gy in 30 fxns (June/2023-July/2024) presents with worsening right sided weakness and expressive aphasia x 1 week. At [facility] ER CTH  showed worsening R sided whole-hemisphere vasogenic edema susp 2/2 GammaTile and Dex taper and MR brain completed. Transferred to [facility] for further workup.       CEREBRAL VASOGENIC EDEMA    Dependence on wheelchair-Z99.3    Encounter for follow-up examination after completed treatment for conditions other than malignant neoplasm-Z09    Weakness-R53.1    Handoff    MEWS Score    Hypertension    Osteoarthritis    Brain mass    Glioblastoma    Glioblastoma    Osteoarthritis    Hypertension    S/P total knee replacement, right    H/O craniotomy    SysAdmin_[id number]      Plan:  Neuro:  - neuro/vital checks q4  - pain control: tylenol prn  - MRI brain w/ sarah completed 2/25  - CTH 2/24: worsening R sided whole-hemisphere vasogenic edema  - seizure prophylaxis: cont home keppra 1g q12  - decadron 4q6 for vasogenic edema  - GBM: hold home metformin 500mg BID  - plan for IV avastin     Cardiac:  - SBP goal 100-160  - HTN: cont home amlodipine 5mg daily    Pulmonary:  - on RA    GI:  - regular diet  - bowel regimen  - pepcid 20mg BID while on steroids    Renal:  - IVL  - Na goal 135-145    Heme:  - SCDs for DVT prophylaxis    Endo:  - ISS, A1C 5.9    ID:  - afebrile  - f/u ESR/CRP    Disposition: tele, full code, dispo pending    D/w Dr. Sue

## 2025-02-26 NOTE — CONSULT NOTE ADULT - ASSESSMENT
69 year old male with past medical history of HTN, OA of the knee, status post replacement, now with glioblastoma (MGMT methylated), treated with TMZ + RT (6/6/23-7/18/23), status post stereotactic needle biopsy and laser interstitial thermal therapy by Dr. Tobar (4/7/2023), then re-operation with  on 4/25/2023 with GTR.  Followed by additional TMZ x 6 cycles (8/15/23-1/30/24).  He suffered a recurrence in December 2024, followed by re-resection with Gamatile placement.      #MGMT methylated GBM  --oncologic history as above, Now presents with worsening right sided weakness and expressive aphasia x 1 week. At [facility] ER CTH showed worsening R sided whole-hemisphere vasogenic edema. Transferred to [facility] for further workup.   --MRb 2/25 w/ rim-enhancing structure at the left posterior hemispheric operative bed appears similar to 1/30/2025. The extensive infiltrative hyperintensity on the long TR images consistent with vasogenic edema and nonenhancing tumor has mildly increased and its associated mass effect with increased right-to-left subfalcine herniation although appears comparable infiltrative extent within the left cerebral hemisphere, contralateral extent into the right cerebral hemisphere and downward extent to the ventral avila.    --no neurosurgical intervention being offered at this time, oncology consulted for recommendations regarding additional treatment options   --per outpatient oncology discussion, given the fact that he is still MGMT methylated on this pathologic tissue it would be reasonable to re-challenge him with Temodar given this progression of disease. To minimize possible side effects we also discussed the option of doing metronomic Temodar(temozolomide) with blood work at least every 2 weeks for monitoring.   --discussed with patient, patients sister, brother and wife restarting Temodar w/ IV Avastin this admission, patient agreeable to plan  --patient would like to know more about additional treatment sites where he can go upon discharge that may be closer to their home in [place], [place], reached out to navigation to assist with coordinating/obtain more information   --upon discharge, patient to RTC with Dr. Hubbard which can be via , will arrange follow up upon discharge, please notify oncology team when patient is near discharge so we can coordinate  69 year old male with past medical history of HTN, OA of the knee, status post replacement, now with glioblastoma (MGMT methylated), treated with TMZ + RT (6/6/23-7/18/23), status post stereotactic needle biopsy and laser interstitial thermal therapy by Dr. Tobar (4/7/2023), then re-operation with  on 4/25/2023 with GTR.  Followed by additional TMZ x 6 cycles (8/15/23-1/30/24).  He suffered a recurrence in December 2024, followed by re-resection with Gamatile placement.      #MGMT methylated GBM  --oncologic history as above, Now presents with worsening right sided weakness and expressive aphasia x 1 week. At [facility] ER CTH showed worsening R sided whole-hemisphere vasogenic edema. Transferred to [facility] for further workup.   --MRb 2/25 w/ rim-enhancing structure at the left posterior hemispheric operative bed appears similar to 1/30/2025. The extensive infiltrative hyperintensity on the long TR images consistent with vasogenic edema and nonenhancing tumor has mildly increased and its associated mass effect with increased right-to-left subfalcine herniation although appears comparable infiltrative extent within the left cerebral hemisphere, contralateral extent into the right cerebral hemisphere and downward extent to the ventral avila.  --no neurosurgical intervention being offered at this time, oncology consulted for recommendations regarding additional treatment options   --per outpatient oncology discussion, given the fact that he is still MGMT methylated on this pathologic tissue it would be reasonable to re-challenge him with Temodar given this progression of disease. To minimize possible side effects we also discussed the option of doing metronomic Temodar  --discussed with patient, patients sister, brother and wife restarting Temodar w/ IV Avastin this admission, patient agreeable to plan, plan for IV avastin and temodar to start tomorrow, consent obtained and placed in patients chart, discussed possible side effects/risks, all questions answered   --patient would like to know more about additional treatment sites where he can go upon discharge that may be closer to their home in [place], [place], reached out to navigation to assist with coordinating/obtain more information   --upon discharge, patient to RTC with Dr. Hubbard which can be via , will arrange follow up upon discharge, please notify oncology team when patient is near discharge so we can coordinate       d/w Dr. Villela, note considered final after attending attestation  69 year old male with past medical history of HTN, OA of the knee, status post replacement, now with glioblastoma (MGMT methylated), treated with TMZ + RT (6/6/23-7/18/23), status post stereotactic needle biopsy and laser interstitial thermal therapy by Dr. Tobar (4/7/2023), then re-operation with  on 4/25/2023 with GTR.  Followed by additional TMZ x 6 cycles (8/15/23-1/30/24).  He suffered a recurrence in December 2024, followed by re-resection with Gammatile placement.      #MGMT methylated GBM  --oncologic history as above, Now presents with worsening right sided weakness and expressive aphasia x 1 week. At [facility] ER CTH showed worsening R sided whole-hemisphere vasogenic edema. Transferred to [facility] for further workup.   --MRb 2/25 w/ rim-enhancing structure at the left posterior hemispheric operative bed appears similar to 1/30/2025. The extensive infiltrative hyperintensity on the long TR images consistent with vasogenic edema and nonenhancing tumor has mildly increased and its associated mass effect with increased right-to-left subfalcine herniation although appears comparable infiltrative extent within the left cerebral hemisphere, contralateral extent into the right cerebral hemisphere and downward extent to the ventral avila.  --no neurosurgical intervention being offered at this time, oncology consulted for recommendations regarding additional treatment options   --per outpatient oncology discussion, given the fact that he is still MGMT methylated on this pathologic tissue it would be reasonable to re-challenge him with Temodar given this progression of disease.   --discussed with patient, patients sister, brother and wife restarting Temodar w/ IV Avastin this admission, patient agreeable to plan, plan for IV avastin and temodar to start tomorrow, consent obtained and placed in patients chart, discussed possible side effects/risks, all questions answered   --patient would like to know more about additional treatment sites where he can go upon discharge that may be closer to their home in [place], [place], reached out to navigation to assist with coordinating/obtain more information   --upon discharge, patient to RTC with Dr. Hubbard which can be via , will arrange follow up upon discharge, please notify oncology team when patient is near discharge so we can coordinate       d/w Dr. Villela, note considered final after attending attestation

## 2025-02-26 NOTE — CONSULT NOTE ADULT - NS ATTEND AMEND GEN_ALL_CORE FT
I evaluated the patient with the Hematology/Oncology physician assistant, Evelyn Avila. Briefly, the patient is a 69 year old man with recurrent glioblastoma, s/p re-resection w/ gammatile placement in 12/2024, now coming in with progressive disease and symptomatic vasogenic edema.  His outpatient dexamethasone dose had been tapered to 2 mg po daily;  but has now been increased during this admission.    Remainder of the history and physical examination as documented above by Mrs. Avila.  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    Plan will be to administer bevacizumab 10 mg/kg IV this admission and to continue every 2 weeks until disease progression.  The goal of this treatment is palliative, primarily aimed at alleviating his symptomatic vasogenic edema and permit tapering of the steroids.  Risks and side effect of this therapy were discussed in detail w/ the patient and his family;  informed consent for treatment was obtained.  As the patient has an MGMT methylated tumor, combining bevacizumab w/ the alkylating agent temozolomide may be of benefit.  Temozolomide will be dosed at 200 mg/m2 po daily x 5 days every 28 days.    The patient is familiar with temozolomide which previously was well tolerated.    Plan for day #1 of this therapy on 2/27/25.    Will need close f/u with neuro-oncology after discharge.

## 2025-02-26 NOTE — CONSULT NOTE ADULT - SUBJECTIVE AND OBJECTIVE BOX
69 year old male with past medical history of HTN, OA of the knee, status post replacement, now with glioblastoma (MGMT methylated), treated with TMZ + RT (6/6/23-7/18/23), status post stereotactic needle biopsy and laser interstitial thermal therapy by Dr. Tobar (4/7/2023), then re-operation with  on 4/25/2023 with GTR.  Followed by additional TMZ x 6 cycles (8/15/23-1/30/24).  He suffered a recurrence in December 2024, followed by re-resection with Gamatile placement.    Now presents with worsening right sided weakness and expressive aphasia x 1 week. At [facility] ER CTH showed worsening R sided whole-hemisphere vasogenic edema. Transferred to [facility] for further workup.     CTH 2/24: Since 1/30/2025, substantially increased vasogenic edema in the left cerebral hemisphere centered around a left frontoparietal resection cavity with diffuse left cerebral sulcal effacement, effacement of the left temporal horn and atrium, and left to right midline shift of approximately 5 mm.    MRb 2/25 w/ Rim-enhancing structure at the left posterior hemispheric operative bed appears similar to 1/30/2025. The extensive infiltrative hyperintensity on the long TR images consistent with vasogenic edema and nonenhancing tumor has mildly increased and its associated mass effect with increased right-to-left subfalcine herniation although appears comparable infiltrative extent within the left cerebral hemisphere, contralateral extent into the right cerebral hemisphere and downward   extent to the ventral avila.    Oncology consulted for consideration of avastin/additional treatment options.  Patient seen and examined at bedside, sister present in person, patients wife and brother present via phone.  Re-discussed MRb findings and recommendations for Avastin treatment for vasogenic edema with Temodar.  Patient and family in agreement with plan.  Patient is A&Ox3, continues to endorse R sided weakness.      ANTIBIOTICS/RELEVANT:  MEDICATIONS  (STANDING):  amLODIPine   Tablet 5 milliGRAM(s) Oral daily  dexAMETHasone     Tablet 4 milliGRAM(s) Oral every 6 hours  enoxaparin Injectable 40 milliGRAM(s) SubCutaneous <User Schedule>  famotidine    Tablet 20 milliGRAM(s) Oral two times a day  insulin lispro (ADMELOG) corrective regimen sliding scale   SubCutaneous Before meals and at bedtime  levETIRAcetam 1000 milliGRAM(s) Oral every 12 hours  senna 2 Tablet(s) Oral at bedtime    MEDICATIONS  (PRN):  acetaminophen     Tablet .. 650 milliGRAM(s) Oral every 6 hours PRN Temp greater or equal to 38.5C (101.3F), Mild Pain (1 - 3)      Vital Signs Last 24 Hrs  T(C): 36.6 (26 Feb 2025 06:08), Max: 36.9 (25 Feb 2025 17:09)  T(F): 97.8 (26 Feb 2025 06:08), Max: 98.4 (25 Feb 2025 17:09)  HR: 83 (26 Feb 2025 06:08) (74 - 94)  BP: 136/73 (26 Feb 2025 06:08) (123/81 - 142/81)  BP(mean): --  RR: 18 (26 Feb 2025 06:08) (18 - 18)  SpO2: 99% (26 Feb 2025 06:08) (97% - 99%)    Parameters below as of 26 Feb 2025 06:08  Patient On (Oxygen Delivery Method): room air    PHYSICAL EXAM:  General: in no acute distress  Lungs: CTA B/L. No wheezes, rales, or rhonchi  Cardiovascular: RRR. No murmurs, rubs, or gallops  Abdomen: Soft, non-tender non-distended; No rebound or guarding  Extremities: WWP, No clubbing, cyanosis or edema  Neuro: AAOx3, +expressive aphasia, follows commands, opens eyes spontaneously, +R facial droop, LUE/LLE 5/5, Decreased sensation to light touch throughout R face, RUE, RLE and R torso.   Skin: Warm and dry. No obvious rash     LABS:                        14.5   9.40  )-----------( 313      ( 26 Feb 2025 05:30 )             44.4     02-26    140  |  103  |  21  ----------------------------<  130[H]  5.2   |  25  |  0.83    Ca    9.6      26 Feb 2025 05:30  Phos  3.9     02-26  Mg     2.2     02-26    TPro  6.9  /  Alb  4.0  /  TBili  0.3  /  DBili  x   /  AST  13  /  ALT  13  /  AlkPhos  74  02-24    PT/INR - ( 24 Feb 2025 20:10 )   PT: 11.1 sec;   INR: 0.97 ratio         PTT - ( 24 Feb 2025 20:10 )  PTT:35.7 sec  Urinalysis Basic - ( 26 Feb 2025 05:30 )    Color: x / Appearance: x / SG: x / pH: x  Gluc: 130 mg/dL / Ketone: x  / Bili: x / Urobili: x   Blood: x / Protein: x / Nitrite: x   Leuk Esterase: x / RBC: x / WBC x   Sq Epi: x / Non Sq Epi: x / Bacteria: x        MICROBIOLOGY:    RADIOLOGY & ADDITIONAL STUDIES: 69 year old male with past medical history of HTN, OA of the knee, status post replacement, now with glioblastoma (MGMT methylated), treated with TMZ + RT (6/6/23-7/18/23), status post stereotactic needle biopsy and laser interstitial thermal therapy by Dr. Tobar (4/7/2023), then re-operation with  on 4/25/2023 with GTR.  Followed by additional TMZ x 6 cycles (8/15/23-1/30/24).  He suffered a recurrence in December 2024, followed by re-resection with Gammatile placement.    Now presents with worsening right sided weakness and expressive aphasia x 1 week. At [facility] ER CTH showed worsening R sided whole-hemisphere vasogenic edema. Transferred to [facility] for further workup.     CTH 2/24: Since 1/30/2025, substantially increased vasogenic edema in the left cerebral hemisphere centered around a left frontoparietal resection cavity with diffuse left cerebral sulcal effacement, effacement of the left temporal horn and atrium, and left to right midline shift of approximately 5 mm.    MRb 2/25 w/ Rim-enhancing structure at the left posterior hemispheric operative bed appears similar to 1/30/2025. The extensive infiltrative hyperintensity on the long TR images consistent with vasogenic edema and nonenhancing tumor has mildly increased and its associated mass effect with increased right-to-left subfalcine herniation although appears comparable infiltrative extent within the left cerebral hemisphere, contralateral extent into the right cerebral hemisphere and downward   extent to the ventral avila.    Oncology consulted for consideration of avastin/additional treatment options.  Patient seen and examined at bedside, sister present in person, patients wife and brother present via phone.  Re-discussed MRb findings and recommendations for Avastin treatment for vasogenic edema with Temodar.  Patient and family in agreement with plan.  Patient is A&Ox3, continues to endorse R sided weakness.      ANTIBIOTICS/RELEVANT:  MEDICATIONS  (STANDING):  amLODIPine   Tablet 5 milliGRAM(s) Oral daily  dexAMETHasone     Tablet 4 milliGRAM(s) Oral every 6 hours  enoxaparin Injectable 40 milliGRAM(s) SubCutaneous <User Schedule>  famotidine    Tablet 20 milliGRAM(s) Oral two times a day  insulin lispro (ADMELOG) corrective regimen sliding scale   SubCutaneous Before meals and at bedtime  levETIRAcetam 1000 milliGRAM(s) Oral every 12 hours  senna 2 Tablet(s) Oral at bedtime    MEDICATIONS  (PRN):  acetaminophen     Tablet .. 650 milliGRAM(s) Oral every 6 hours PRN Temp greater or equal to 38.5C (101.3F), Mild Pain (1 - 3)      Vital Signs Last 24 Hrs  T(C): 36.6 (26 Feb 2025 06:08), Max: 36.9 (25 Feb 2025 17:09)  T(F): 97.8 (26 Feb 2025 06:08), Max: 98.4 (25 Feb 2025 17:09)  HR: 83 (26 Feb 2025 06:08) (74 - 94)  BP: 136/73 (26 Feb 2025 06:08) (123/81 - 142/81)  BP(mean): --  RR: 18 (26 Feb 2025 06:08) (18 - 18)  SpO2: 99% (26 Feb 2025 06:08) (97% - 99%)    Parameters below as of 26 Feb 2025 06:08  Patient On (Oxygen Delivery Method): room air    PHYSICAL EXAM:  General: in no acute distress  Lungs: CTA B/L. No wheezes, rales, or rhonchi  Cardiovascular: RRR. No murmurs, rubs, or gallops  Abdomen: Soft, non-tender non-distended; No rebound or guarding  Extremities: WWP, No clubbing, cyanosis or edema  Neuro: AAOx3, +expressive aphasia, follows commands, opens eyes spontaneously, +R facial droop, LUE/LLE 5/5, Decreased sensation to light touch throughout R face, RUE, RLE and R torso.   Skin: Warm and dry. No obvious rash     LABS:                        14.5   9.40  )-----------( 313      ( 26 Feb 2025 05:30 )             44.4     02-26    140  |  103  |  21  ----------------------------<  130[H]  5.2   |  25  |  0.83    Ca    9.6      26 Feb 2025 05:30  Phos  3.9     02-26  Mg     2.2     02-26    TPro  6.9  /  Alb  4.0  /  TBili  0.3  /  DBili  x   /  AST  13  /  ALT  13  /  AlkPhos  74  02-24    PT/INR - ( 24 Feb 2025 20:10 )   PT: 11.1 sec;   INR: 0.97 ratio         PTT - ( 24 Feb 2025 20:10 )  PTT:35.7 sec  Urinalysis Basic - ( 26 Feb 2025 05:30 )    Color: x / Appearance: x / SG: x / pH: x  Gluc: 130 mg/dL / Ketone: x  / Bili: x / Urobili: x   Blood: x / Protein: x / Nitrite: x   Leuk Esterase: x / RBC: x / WBC x   Sq Epi: x / Non Sq Epi: x / Bacteria: x        MICROBIOLOGY:    RADIOLOGY & ADDITIONAL STUDIES:

## 2025-02-27 LAB
ANION GAP SERPL CALC-SCNC: 10 MMOL/L — SIGNIFICANT CHANGE UP (ref 5–17)
BUN SERPL-MCNC: 21 MG/DL — SIGNIFICANT CHANGE UP (ref 7–23)
CALCIUM SERPL-MCNC: 9 MG/DL — SIGNIFICANT CHANGE UP (ref 8.4–10.5)
CHLORIDE SERPL-SCNC: 102 MMOL/L — SIGNIFICANT CHANGE UP (ref 96–108)
CO2 SERPL-SCNC: 27 MMOL/L — SIGNIFICANT CHANGE UP (ref 22–31)
CREAT SERPL-MCNC: 0.81 MG/DL — SIGNIFICANT CHANGE UP (ref 0.5–1.3)
EGFR: 96 ML/MIN/1.73M2 — SIGNIFICANT CHANGE UP
EGFR: 96 ML/MIN/1.73M2 — SIGNIFICANT CHANGE UP
GLUCOSE SERPL-MCNC: 132 MG/DL — HIGH (ref 70–99)
HCT VFR BLD CALC: 41.9 % — SIGNIFICANT CHANGE UP (ref 39–50)
HGB BLD-MCNC: 13.8 G/DL — SIGNIFICANT CHANGE UP (ref 13–17)
MAGNESIUM SERPL-MCNC: 2.1 MG/DL — SIGNIFICANT CHANGE UP (ref 1.6–2.6)
MCHC RBC-ENTMCNC: 30 PG — SIGNIFICANT CHANGE UP (ref 27–34)
MCHC RBC-ENTMCNC: 32.9 G/DL — SIGNIFICANT CHANGE UP (ref 32–36)
MCV RBC AUTO: 91.1 FL — SIGNIFICANT CHANGE UP (ref 80–100)
NRBC BLD AUTO-RTO: 0 /100 WBCS — SIGNIFICANT CHANGE UP (ref 0–0)
PHOSPHATE SERPL-MCNC: 3 MG/DL — SIGNIFICANT CHANGE UP (ref 2.5–4.5)
PLATELET # BLD AUTO: 318 K/UL — SIGNIFICANT CHANGE UP (ref 150–400)
POTASSIUM SERPL-MCNC: 5.1 MMOL/L — SIGNIFICANT CHANGE UP (ref 3.5–5.3)
POTASSIUM SERPL-SCNC: 5.1 MMOL/L — SIGNIFICANT CHANGE UP (ref 3.5–5.3)
RBC # BLD: 4.6 M/UL — SIGNIFICANT CHANGE UP (ref 4.2–5.8)
RBC # FLD: 14.5 % — SIGNIFICANT CHANGE UP (ref 10.3–14.5)
SODIUM SERPL-SCNC: 139 MMOL/L — SIGNIFICANT CHANGE UP (ref 135–145)
WBC # BLD: 10.71 K/UL — HIGH (ref 3.8–10.5)
WBC # FLD AUTO: 10.71 K/UL — HIGH (ref 3.8–10.5)

## 2025-02-27 PROCEDURE — 99223 1ST HOSP IP/OBS HIGH 75: CPT

## 2025-02-27 PROCEDURE — 99231 SBSQ HOSP IP/OBS SF/LOW 25: CPT

## 2025-02-27 RX ORDER — BEVACIZUMAB-MALY 400 MG/16ML
900 INJECTION, SOLUTION INTRAVENOUS ONCE
Refills: 0 | Status: COMPLETED | OUTPATIENT
Start: 2025-02-27 | End: 2025-02-27

## 2025-02-27 RX ORDER — ONDANSETRON HCL/PF 4 MG/2 ML
8 VIAL (ML) INJECTION ONCE
Refills: 0 | Status: DISCONTINUED | OUTPATIENT
Start: 2025-02-27 | End: 2025-02-27

## 2025-02-27 RX ORDER — ONDANSETRON HCL/PF 4 MG/2 ML
8 VIAL (ML) INJECTION ONCE
Refills: 0 | Status: COMPLETED | OUTPATIENT
Start: 2025-02-27 | End: 2025-02-27

## 2025-02-27 RX ORDER — TEMOZOLOMIDE 100 MG/1
400 CAPSULE ORAL DAILY
Refills: 0 | Status: COMPLETED | OUTPATIENT
Start: 2025-02-27 | End: 2025-03-03

## 2025-02-27 RX ADMIN — Medication 8 MILLIGRAM(S): at 13:15

## 2025-02-27 RX ADMIN — ENOXAPARIN SODIUM 40 MILLIGRAM(S): 100 INJECTION SUBCUTANEOUS at 22:00

## 2025-02-27 RX ADMIN — AMLODIPINE BESYLATE 5 MILLIGRAM(S): 10 TABLET ORAL at 07:29

## 2025-02-27 RX ADMIN — BEVACIZUMAB-MALY 900 MILLIGRAM(S): 400 INJECTION, SOLUTION INTRAVENOUS at 11:31

## 2025-02-27 RX ADMIN — TEMOZOLOMIDE 400 MILLIGRAM(S): 100 CAPSULE ORAL at 15:10

## 2025-02-27 RX ADMIN — Medication 2 TABLET(S): at 22:00

## 2025-02-27 RX ADMIN — DEXAMETHASONE 4 MILLIGRAM(S): 0.5 TABLET ORAL at 07:30

## 2025-02-27 RX ADMIN — LEVETIRACETAM 1000 MILLIGRAM(S): 10 INJECTION, SOLUTION INTRAVENOUS at 07:29

## 2025-02-27 RX ADMIN — Medication 20 MILLIGRAM(S): at 18:33

## 2025-02-27 RX ADMIN — DEXAMETHASONE 4 MILLIGRAM(S): 0.5 TABLET ORAL at 13:15

## 2025-02-27 RX ADMIN — LEVETIRACETAM 1000 MILLIGRAM(S): 10 INJECTION, SOLUTION INTRAVENOUS at 18:33

## 2025-02-27 RX ADMIN — INSULIN LISPRO 2: 100 INJECTION, SOLUTION INTRAVENOUS; SUBCUTANEOUS at 00:03

## 2025-02-27 RX ADMIN — Medication 20 MILLIGRAM(S): at 07:30

## 2025-02-27 RX ADMIN — DEXAMETHASONE 4 MILLIGRAM(S): 0.5 TABLET ORAL at 18:33

## 2025-02-27 NOTE — PHYSICAL THERAPY INITIAL EVALUATION ADULT - ADDITIONAL COMMENTS
Pt reports living in house with ramp and stair lift, with wife. Reports using quad cane for ambulation. Reports that wife assists pt at some points.

## 2025-02-27 NOTE — OCCUPATIONAL THERAPY INITIAL EVALUATION ADULT - SENSORY TESTS
Cranial Nerves : facial sensation impaired on R side to light touch V1-V3 b/l VII: no ptosis, R facial droop, decreased R eyebrow raise and closure VIII: hearing intact to finger rub b/l  XI: head turning and shoulder shrug impaired on R side XII: tongue protrusion midline

## 2025-02-27 NOTE — PROGRESS NOTE ADULT - SUBJECTIVE AND OBJECTIVE BOX
HPI:  69M with pmh HTN, L frontoparietal GBM s/p biopsy @[facility] 2023, resection x2 April/2023, Dec/2024 with GammaTile placement (20 seeds/5 Tiles), s/p >6 cycles of TMZ (Aug/2023-Jan/2024), RT (60 Gy in 30 fxns (June/2023-July/2024) presents with worsening right sided weakness and expressive aphasia x 1 week. Patient was due to start the last week of a decadron taper today at 1mg daily. He was brought to [facility] ER last night for symptoms where CTH  showed worsening R sided whole-hemisphere vasogenic edema susp 2/2 GammaTile and Dex taper. MRI brain was completed this AM and he received 10mg IV decadron at 6AM. Transferred to [facility] for further workup. Patient denies headache, nausea, vomiting, or vision changes. Patient has been compliant with his home keppra 1g BID.  (25 Feb 2025 15:09)    OVERNIGHT EVENTS: KYA overnight, neuro stable.    Hospital Course:  2/25: admitted to [facility]  2/26: KYA overnight. Heme/onc consulted for possible IV avastin  2/27: KYA overnight.     Vital Signs Last 24 Hrs  T(C): 36.6 (27 Feb 2025 01:10), Max: 36.8 (26 Feb 2025 08:59)  T(F): 97.9 (27 Feb 2025 01:10), Max: 98.3 (26 Feb 2025 08:59)  HR: 60 (27 Feb 2025 01:10) (60 - 88)  BP: 118/64 (27 Feb 2025 01:10) (117/65 - 136/76)  BP(mean): --  RR: 17 (27 Feb 2025 01:10) (17 - 18)  SpO2: 97% (27 Feb 2025 01:10) (96% - 100%)    Parameters below as of 27 Feb 2025 01:10  Patient On (Oxygen Delivery Method): room air        I&O's Summary    25 Feb 2025 07:01  -  26 Feb 2025 07:00  --------------------------------------------------------  IN: 0 mL / OUT: 601 mL / NET: -601 mL    26 Feb 2025 07:01  -  27 Feb 2025 03:14  --------------------------------------------------------  IN: 490 mL / OUT: 625 mL / NET: -135 mL    Physical Exam:   General: patient seen laying supine in bed in NAD  Neuro: AAOx3 (year with choice), +expressive aphasia, follows commands, opens eyes spontaneously, +R facial droop, LUE/LLE 5/5, RUE deltoid/triceps 2/5 biceps 4+/5 hand  3/5, RLE HF/DF 3/5 KF/KE 2/5 PF 2/5, Decreased sensation to light touch throughout R face, RUE, RLE and R torso.   HEENT: PERRL, dysconjugate gaze but able to do EOMI,  Cardiac: RRR, S1S2  Pulmonary: chest rise symmetric  Abdomen: soft, nontender, nondistended  Ext: perfusing well  Skin: warm, dry    TUBES/LINES:  [] Shields  [] Lumbar Drain  [] Wound Drains  [] Others      DIET:  [] NPO  [x] Mechanical  [] Tube feeds    LABS:                        14.5   9.40  )-----------( 313      ( 26 Feb 2025 05:30 )             44.4     02-26    140  |  103  |  21  ----------------------------<  130[H]  5.2   |  25  |  0.83    Ca    9.6      26 Feb 2025 05:30  Phos  3.9     02-26  Mg     2.2     02-26        Urinalysis Basic - ( 26 Feb 2025 05:30 )    Color: x / Appearance: x / SG: x / pH: x  Gluc: 130 mg/dL / Ketone: x  / Bili: x / Urobili: x   Blood: x / Protein: x / Nitrite: x   Leuk Esterase: x / RBC: x / WBC x   Sq Epi: x / Non Sq Epi: x / Bacteria: x          CAPILLARY BLOOD GLUCOSE      POCT Blood Glucose.: 188 mg/dL (26 Feb 2025 23:59)  POCT Blood Glucose.: 157 mg/dL (26 Feb 2025 22:00)  POCT Blood Glucose.: 136 mg/dL (26 Feb 2025 18:57)  POCT Blood Glucose.: 145 mg/dL (26 Feb 2025 12:08)  POCT Blood Glucose.: 126 mg/dL (26 Feb 2025 07:17)      Drug Levels: [] N/A    CSF Analysis: [] N/A      Allergies    No Known Allergies    Intolerances      MEDICATIONS:  Antibiotics:    Neuro:  acetaminophen     Tablet .. 650 milliGRAM(s) Oral every 6 hours PRN  levETIRAcetam 1000 milliGRAM(s) Oral every 12 hours    Anticoagulation:  enoxaparin Injectable 40 milliGRAM(s) SubCutaneous <User Schedule>    OTHER:  amLODIPine   Tablet 5 milliGRAM(s) Oral daily  dexAMETHasone     Tablet 4 milliGRAM(s) Oral every 6 hours  famotidine    Tablet 20 milliGRAM(s) Oral two times a day  insulin lispro (ADMELOG) corrective regimen sliding scale   SubCutaneous Before meals and at bedtime  senna 2 Tablet(s) Oral at bedtime    IVF:    CULTURES:    RADIOLOGY & ADDITIONAL TESTS:      ASSESSMENT:  69M with pmh HTN, L frontoparietal GBM s/p biopsy @[facility] 2023, resection x2 April/2023, Dec/2024 with GammaTile placement (20 seeds/5 Tiles), s/p >6 cycles of TMZ (Aug/2023-Jan/2024), RT (60 Gy in 30 fxns (June/2023-July/2024) presents with worsening right sided weakness and expressive aphasia x 1 week. At [facility] ER CTH  showed worsening R sided whole-hemisphere vasogenic edema susp 2/2 GammaTile and Dex taper and MR brain completed. Transferred to [facility] for further workup.     CEREBRAL VASOGENIC EDEMA    Dependence on wheelchair-Z99.3    Encounter for follow-up examination after completed treatment for conditions other than malignant neoplasm-Z09    Weakness-R53.1    Handoff    MEWS Score    Hypertension    Osteoarthritis    Brain mass    Glioblastoma    Glioblastoma    Osteoarthritis    Hypertension    S/P total knee replacement, right    H/O craniotomy    [id number]        Plan:  Neuro:  - neuro/vital checks q4  - pain control: tylenol prn  - MRI brain w/ sarah completed 2/25  - CTH 2/24: worsening R sided whole-hemisphere vasogenic edema  - seizure prophylaxis: cont home keppra 1g q12  - decadron 4q6 for vasogenic edema  - GBM: hold home metformin 500mg BID  - plan for IV avastin     Cardiac:  - SBP goal 100-160  - HTN: cont home amlodipine 5mg daily    Pulmonary:  - on RA    GI:  - regular diet  - bowel regimen  - pepcid 20mg BID while on steroids    Renal:  - IVL  - Na goal 135-145    Heme:  - SCDs and SQL for DVT prophylaxis  - heme/onc consult, pending chemo plan    Endo:  - ISS, A1C 5.9    ID:  - afebrile    Disposition: tele, full code, dispo pending    D/w Dr. Sue

## 2025-02-27 NOTE — OCCUPATIONAL THERAPY INITIAL EVALUATION ADULT - PERTINENT HX OF CURRENT PROBLEM, REHAB EVAL
69M with pmh HTN, L frontoparietal GBM s/p biopsy @[facility] 2023, resection x2 April/2023, Dec/2024 with GammaTile placement (20 seeds/5 Tiles), s/p >6 cycles of TMZ (Aug/2023-Jan/2024), RT (60 Gy in 30 fxns (June/2023-July/2024) presents with worsening right sided weakness and expressive aphasia x 1 week. Patient was due to start the last week of a decadron taper today at 1mg daily. He was brought to [facility] ER last night for symptoms where CTH  showed worsening R sided whole-hemisphere vasogenic edema susp 2/2 GammaTile and Dex taper. MRI brain was completed this AM and he received 10mg IV decadron at 6AM. Transferred to [facility] for further workup. Patient denies headache, nausea, vomiting, or vision changes. Patient has been compliant with his home keppra 1g BID.

## 2025-02-27 NOTE — PHYSICAL THERAPY INITIAL EVALUATION ADULT - GENERAL OBSERVATIONS, REHAB EVAL
PT IE completed. Chart reviewed. Pt received semi-supine, NAD, +IV heplock, +tele, RN Mary cleared pt for PT.

## 2025-02-27 NOTE — OCCUPATIONAL THERAPY INITIAL EVALUATION ADULT - ADDITIONAL COMMENTS
PTA, pt reporting that he was able to perform all functional mobility/ADLs independently. Pt used SC for ambulation. Pt was able to ascend stairs independently at home, however required use of chair lift for descending stairs. Pt uses tub bench for bathing.

## 2025-02-27 NOTE — PROGRESS NOTE ADULT - NS ATTEND AMEND GEN_ALL_CORE FT
I have made amendments to the documentation where necessary. Additional comments: I evaluated the patient with the Hematology/Oncology physician assistant, Evelyn Avila. Briefly, the patient is a 69 year old man with recurrent glioblastoma, s/p re-resection w/ gammatile placement in 12/2024, now admitted with progressive disease and symptomatic vasogenic edema.  His outpatient dexamethasone dose had been tapered to 2 mg po daily;  but has now been increased during this admission.    Remainder of the history and physical examination as documented above by the physician assistant.  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    The patient received bevacizumab 10 mg/kg IV today without any acute complications.  This shall continue every 2 weeks until disease progression.  The goal of this treatment is palliative, primarily aimed at alleviating his symptomatic vasogenic edema and permit tapering of the steroids.       As the patient has an MGMT methylated tumor, combining bevacizumab w/ the alkylating agent temozolomide may be of benefit.  Temozolomide will be dosed at 200 mg/m2 = 400 mg po daily x 5 days every 28 days.  Today is day #1 of temozolomide.  An outpt Rx for this medication has been filed by Dr Causey.  If the patient is medically stable for discharge prior to completion of the five day course of temozolomide, he can finish at home w/ the prescription that was filed.    Dexamethasone dosing and taper per NSGY.    Will need close f/u with neuro-oncology after discharge.  The patient lives quite far away from [place].  Suggest neuro-oncology telehealth follow up with Dr Hubbard;  and establishing with one of the medical oncologists at [place] for infusion therapy.

## 2025-02-27 NOTE — OCCUPATIONAL THERAPY INITIAL EVALUATION ADULT - GENERAL OBSERVATIONS, REHAB EVAL
OT IE completed. Orders received, chart reviewed, pt cleared for OT by LORI Hernandez. Pt received semi supine in bed, NAD, +heplock, +tele. Pt A&Ox3 (disoriented to date), agreeable to OT, and tolerated session well.

## 2025-02-27 NOTE — OCCUPATIONAL THERAPY INITIAL EVALUATION ADULT - MODALITIES TREATMENT COMMENTS
Patient scored a 28/30 on the O-Log. A score of >25 is associated with normal orientation. Pt requires cues to orient to date and clock time. Pt performed bed mobility, requiring 1-2 person assist 2/2 impaired RUE/RLE strength, impacting ability to perform functional reaching/weight shifting. Pt demo ability to maintain unsupported static sitting at EOB for ~10min, demo fair+ balance. Pt noted with 2-3 finger R shoulder subluxation while seated at EOB, sling issued to pt. Pt educated to wear sling at all times OOB, and educated on donning/doffing sling, requiring 1 person assist 2/2 impaired RUE strength. Pt demo return demonstration and understanding. Pt performed UB dressing while seated at EOB, requiring Max Ax1 2/2 impaired functional reach/grasp, due to impaired RUE strength/motor control. Pt performed sit<->stand with Min Ax2 (b/l hand held assist), and able to ambulate in room ~5ft from bed to chair, requiring Mod Ax2, demo ataxic gait and impaired ability to weight shift.

## 2025-02-27 NOTE — PROGRESS NOTE ADULT - ASSESSMENT
69 year old male with past medical history of HTN, OA of the knee, status post replacement, now with glioblastoma (MGMT methylated), treated with TMZ + RT (6/6/23-7/18/23), status post stereotactic needle biopsy and laser interstitial thermal therapy by Dr. Tobar (4/7/2023), then re-operation with  on 4/25/2023 with GTR.  Followed by additional TMZ x 6 cycles (8/15/23-1/30/24).  He suffered a recurrence in December 2024, followed by re-resection with Gammatile placement.      #MGMT methylated GBM  --oncologic history as above, Now presents with worsening right sided weakness and expressive aphasia x 1 week. At [facility] ER CTH showed worsening R sided whole-hemisphere vasogenic edema. Transferred to [facility] for further workup.   --MRb 2/25 w/ rim-enhancing structure at the left posterior hemispheric operative bed appears similar to 1/30/2025. The extensive infiltrative hyperintensity on the long TR images consistent with vasogenic edema and nonenhancing tumor has mildly increased and its associated mass effect with increased right-to-left subfalcine herniation although appears comparable infiltrative extent within the left cerebral hemisphere, contralateral extent into the right cerebral hemisphere and downward extent to the ventral avila.  --no neurosurgical intervention being offered at this time, oncology consulted for recommendations regarding additional treatment options   --steroids per neurosurgery, currently on 4mg q6h   --patient requesting to work with PT, consult placed      plan:  --plan to give bevacizumab 10 mg/kg IV today and to continue every 2 weeks until disease progression as an outpatient  --start Temozolomide today which will be dosed at 200 mg/m2 po daily x 5 days every 28 days (2/27-3/3)  --discussed with patient and family: the goal of this treatment is palliative, primarily aimed at alleviating his symptomatic vasogenic edema and permit tapering of the steroids.    --Risks and side effect of this therapy were discussed in detail w/ the patient and his family;  informed consent for treatment was obtained.      d/w Dr. Villela, note considered final after attending attestation

## 2025-02-27 NOTE — CONSULT NOTE ADULT - ASSESSMENT
69 year old male with past medical history of HTN, OA, right TKA, HO craniotomy for GBM resection 04/25/2023, s/p chemo and RT 2023 with Dr. Causey, Admit at [facility] on Dec13 for reoccurrence of GBM, Sp lt craniotomy  for resection of GBM with placement of Gammatile brachytherapy on 12/19.     Readmitted from home with new aphasia and dysarthria after being discharged from Acute rehab with concern for worsening Tumor , however MRI revealed Vasogenic Edema and no new Tumor     #Recurrent GBM s/p Craniotomy for Resection and Gammatile Brachytherapy (12/19)  - Pathology: Most consistent with glioblastoma, IDH-wildtype, with +7 and -10. Other neoplastic processes are not excluded.   - Continue Keppra  - Continue Decadron taper + famotidine  - Continue comprehensive rehab program with PT/OT/SLP  - NSG and Hem Onc Planning for IV Avastin     #HTN  - Continue amlodipine w/ holding parameters  - Monitor BP, controlled       #Insomnia  - On trazodone and melatonin    DVT PPx  - Lovenox SC    Discussed with rehab team

## 2025-02-27 NOTE — PROGRESS NOTE ADULT - SUBJECTIVE AND OBJECTIVE BOX
patient seen and examined at bedside, wife also present  patient a&ox3 this morning, no new neuro deficits noted on exam   cleared to start treatment this morning w/ avastin and temozolomide   all questions answered         ANTIBIOTICS/RELEVANT:  MEDICATIONS  (STANDING):  amLODIPine   Tablet 5 milliGRAM(s) Oral daily  dexAMETHasone     Tablet 4 milliGRAM(s) Oral every 6 hours  enoxaparin Injectable 40 milliGRAM(s) SubCutaneous <User Schedule>  famotidine    Tablet 20 milliGRAM(s) Oral two times a day  insulin lispro (ADMELOG) corrective regimen sliding scale   SubCutaneous Before meals and at bedtime  levETIRAcetam 1000 milliGRAM(s) Oral every 12 hours  senna 2 Tablet(s) Oral at bedtime    MEDICATIONS  (PRN):  acetaminophen     Tablet .. 650 milliGRAM(s) Oral every 6 hours PRN Temp greater or equal to 38.5C (101.3F), Mild Pain (1 - 3)    Vital Signs Last 24 Hrs  T(C): 36.7 (27 Feb 2025 06:45), Max: 36.8 (26 Feb 2025 12:06)  T(F): 98.1 (27 Feb 2025 06:45), Max: 98.3 (26 Feb 2025 12:06)  HR: 58 (27 Feb 2025 06:45) (58 - 88)  BP: 144/88 (27 Feb 2025 06:45) (117/65 - 144/88)  BP(mean): --  RR: 18 (27 Feb 2025 06:45) (16 - 18)  SpO2: 98% (27 Feb 2025 06:45) (97% - 100%)    Parameters below as of 27 Feb 2025 06:45  Patient On (Oxygen Delivery Method): room air    PHYSICAL EXAM:  General: in no acute distress  Lungs: CTA B/L. No wheezes, rales, or rhonchi  Cardiovascular: RRR. No murmurs, rubs, or gallops  Abdomen: Soft, non-tender non-distended; No rebound or guarding  Extremities: WWP, No clubbing, cyanosis or edema  Neurological: Alert and oriented x3, baseline R sided facial droop, R sided weakness in arm/leg  R eye deviation, expressive aphasia   Skin: Warm and dry. No obvious rash     LABS:                        13.8   10.71 )-----------( 318      ( 27 Feb 2025 06:15 )             41.9     02-27    139  |  102  |  21  ----------------------------<  132[H]  5.1   |  27  |  0.81    Ca    9.0      27 Feb 2025 06:15  Phos  3.0     02-27  Mg     2.1     02-27        Urinalysis Basic - ( 27 Feb 2025 06:15 )    Color: x / Appearance: x / SG: x / pH: x  Gluc: 132 mg/dL / Ketone: x  / Bili: x / Urobili: x   Blood: x / Protein: x / Nitrite: x   Leuk Esterase: x / RBC: x / WBC x   Sq Epi: x / Non Sq Epi: x / Bacteria: x        MICROBIOLOGY:    RADIOLOGY & ADDITIONAL STUDIES:

## 2025-02-27 NOTE — PHYSICAL THERAPY INITIAL EVALUATION ADULT - MANUAL MUSCLE TESTING RESULTS, REHAB EVAL
MMT performed: (L)UE and (L)LE 5/5 for all major pivots; (R)shoulder flexion 2-/5, (R)elbow flexion 2/5, (R)elbow extension 2/5, (R)wrist extension N/T, (R)wrist flexion N/T; (R)hip flexion 3/5, (R)knee extension 2-/5, (R)knee flexion 2-/5, (R)ankle DF 0/5, (R)ankle PF 1/5

## 2025-02-27 NOTE — CONSULT NOTE ADULT - SUBJECTIVE AND OBJECTIVE BOX
Patient is a 69y old  Male who presents with a chief complaint of right sided weakness (27 Feb 2025 01:21)      HPI:  69M with pmh HTN, L frontoparietal GBM s/p biopsy @[facility] 20[facility], resection x2 April/2023, Dec/2024 with GammaTile placement (20 seeds/5 Tiles), s/p >6 cycles of TMZ (Aug/2023-Jan/2024), RT (60 Gy in 30 fxns (June/2023-July/2024) presents with worsening right sided weakness and expressive aphasia x 1 week. Patient was due to start the last week of a decadron taper today at 1mg daily. He was brought to [facility] ER last night for symptoms where CTH  showed worsening R sided whole-hemisphere vasogenic edema susp 2/2 GammaTile and Dex taper. MRI brain was completed this AM and he received 10mg IV decadron at 6AM. Transferred to [facility] for further workup. Patient denies headache, nausea, vomiting, or vision changes. Patient has been compliant with his home keppra 1g BID.  (25 Feb 2025 15:09)      Last Bowel Movement: 25-Feb-2025 (02-27)  Last Bowel Movement: 25-Feb-2025 (02-26)  Last Bowel Movement: 25-Feb-2025 (02-26)  Last Bowel Movement: 25-Feb-2025 (02-25)  Last Bowel Movement: 25-Feb-2025 (02-25)  Last Bowel Movement: 23-Feb-2025 (02-25)        PAST MEDICAL & SURGICAL HISTORY:  Hypertension      Osteoarthritis      Glioblastoma      S/P total knee replacement, right      H/O craniotomy            Allergies    No Known Allergies    Intolerances        FAMILY HISTORY:      Social History:  Patient is . (25 Feb 2025 15:09)      Home Medications:  amLODIPine 5 mg oral tablet: 1 tab(s) orally once a day (25 Feb 2025 15:25)  dexAMETHasone 1 mg oral tablet: 1 tab(s) orally once a day x 1 week, end of taper (25 Feb 2025 15:24)  fish oil: once a day, unknown dose (25 Feb 2025 15:43)  metFORMIN 500 mg oral tablet: 1 tab(s) orally 2 times a day (25 Feb 2025 15:27)  Multiple Vitamins oral tablet: 1 tab(s) orally once a day (25 Feb 2025 15:48)  vitamin C: once a day, unknown dose (25 Feb 2025 15:29)  vitamin D: once a day, unknown dose (25 Feb 2025 15:29)  vitamin k2: once a day, unknown dose (25 Feb 2025 15:29)  Zinc Acetate: 1 tab(s) orally once a day unknown dose (25 Feb 2025 15:29)          CURRENT  MEDICATIONS:   acetaminophen     Tablet .. 650 milliGRAM(s) Oral every 6 hours PRN  amLODIPine   Tablet 5 milliGRAM(s) Oral daily  dexAMETHasone     Tablet 4 milliGRAM(s) Oral every 6 hours  enoxaparin Injectable 40 milliGRAM(s) SubCutaneous <User Schedule>  famotidine    Tablet 20 milliGRAM(s) Oral two times a day  insulin lispro (ADMELOG) corrective regimen sliding scale   SubCutaneous Before meals and at bedtime  levETIRAcetam 1000 milliGRAM(s) Oral every 12 hours  senna 2 Tablet(s) Oral at bedtime  temozolomide 400 milliGRAM(s) Oral daily       Diet, Regular (02-25-25 @ 13:16) [Active]          VITAL SIGNS, INS/OUTS (last 24 hours):  Vital Signs Last 24 Hrs  T(C): 36.3 (27 Feb 2025 09:09), Max: 36.8 (26 Feb 2025 16:34)  T(F): 97.4 (27 Feb 2025 09:09), Max: 98.2 (26 Feb 2025 16:34)  HR: 73 (27 Feb 2025 09:09) (58 - 88)  BP: 129/78 (27 Feb 2025 09:09) (118/64 - 144/88)  BP(mean): --  RR: 18 (27 Feb 2025 09:09) (16 - 18)  SpO2: 100% (27 Feb 2025 09:09) (97% - 100%)    Parameters below as of 27 Feb 2025 09:09  Patient On (Oxygen Delivery Method): room air      I&O's Summary    26 Feb 2025 07:01  -  27 Feb 2025 07:00  --------------------------------------------------------  IN: 565 mL / OUT: 1450 mL / NET: -885 mL        Physical Exam   GENERAL: NAD, lying in bed comfortably  EYES: EOMI, PERRLA, conjunctiva and sclera clear  ENT: Moist mucous membranes, no mucosal lesions, normal dentition   NECK: Supple, No JVD  CHEST/LUNG: Clear to auscultation bilaterally; No rales, rhonchi, wheezing, or rubs. Unlabored respirations  HEART: Regular rate and rhythm; No murmurs, rubs, or gallops  ABDOMEN: Bowel sounds present; Soft, Nontender, Nondistended. No hepatomegaly  EXTREMITIES:  2+ Peripheral Pulses,  No clubbing, cyanosis, or edema  NERVOUS SYSTEM:  Alert & Oriented X3, speech clear. No deficits   MSK: FROM all 4 extremities, full and equal strength  SKIN: No rashes or lesions    LABS:                        13.8   10.71 )-----------( 318      ( 27 Feb 2025 06:15 )             41.9     02-27    139  |  102  |  21  ----------------------------<  132[H]  5.1   |  27  |  0.81    Ca    9.0      27 Feb 2025 06:15  Phos  3.0     02-27  Mg     2.1     02-27        Urinalysis Basic - ( 27 Feb 2025 06:15 )    Color: x / Appearance: x / SG: x / pH: x  Gluc: 132 mg/dL / Ketone: x  / Bili: x / Urobili: x   Blood: x / Protein: x / Nitrite: x   Leuk Esterase: x / RBC: x / WBC x   Sq Epi: x / Non Sq Epi: x / Bacteria: x      CAPILLARY BLOOD GLUCOSE      POCT Blood Glucose.: 146 mg/dL (27 Feb 2025 12:36)  POCT Blood Glucose.: 152 mg/dL (27 Feb 2025 11:42)  POCT Blood Glucose.: 121 mg/dL (27 Feb 2025 07:27)  POCT Blood Glucose.: 124 mg/dL (27 Feb 2025 06:30)  POCT Blood Glucose.: 188 mg/dL (26 Feb 2025 23:59)  POCT Blood Glucose.: 157 mg/dL (26 Feb 2025 22:00)  POCT Blood Glucose.: 136 mg/dL (26 Feb 2025 18:57)      OTHER LABS:        MICRODATA:      IMAGING:      EKG:            Patient is a 69y old  Male who presents with a chief complaint of right sided weakness (27 Feb 2025 01:21)      Impression and Plan     # Pre-operative Risk Evaluation and Stratification   - RCRI :   - NQSIP  - METS :   - EKG :   - PULM RISK :   - STOP BANG:   - Alcohol   - Bleeding Risk   - Periop Anticoagulation   - Post op Delirium       # DVT PPx -  # Dispo -       69y old  Male who presents with a chief complaint of right sided weakness (27 Feb 2025 01:21)      HPI:  69M with pmh HTN, L frontoparietal GBM s/p biopsy @[facility] 2023, resection x2 April/2023, Dec/2024 with GammaTile placement (20 seeds/5 Tiles), s/p >6 cycles of TMZ (Aug/2023-Jan/2024), RT (60 Gy in 30 fxns (June/2023-July/2024) presents with worsening right sided weakness and expressive aphasia x 1 week. Patient was due to start the last week of a decadron taper today at 1mg daily. He was brought to [facility] ER last night for symptoms where CTH  showed worsening R sided whole-hemisphere vasogenic edema susp 2/2 GammaTile and Dex taper. MRI brain was completed this AM and he received 10mg IV decadron at 6AM. Transferred to [facility] for further workup. Patient denies headache, nausea, vomiting, or vision changes. Patient has been compliant with his home keppra 1g BID.       Last Bowel Movement: 25-Feb-2025 (02-27)  d    PAST MEDICAL & SURGICAL HISTORY:  Hypertension      Osteoarthritis      Glioblastoma      S/P total knee replacement, right      H/O craniotomy            Allergies    No Known Allergies    Intolerances        FAMILY HISTORY:      Social History:  Patient is . (25 Feb 2025 15:09)      Home Medications:  amLODIPine 5 mg oral tablet: 1 tab(s) orally once a day (25 Feb 2025 15:25)  dexAMETHasone 1 mg oral tablet: 1 tab(s) orally once a day x 1 week, end of taper (25 Feb 2025 15:24)  fish oil: once a day, unknown dose (25 Feb 2025 15:43)  metFORMIN 500 mg oral tablet: 1 tab(s) orally 2 times a day (25 Feb 2025 15:27)  Multiple Vitamins oral tablet: 1 tab(s) orally once a day (25 Feb 2025 15:48)  vitamin C: once a day, unknown dose (25 Feb 2025 15:29)  vitamin D: once a day, unknown dose (25 Feb 2025 15:29)  vitamin k2: once a day, unknown dose (25 Feb 2025 15:29)  Zinc Acetate: 1 tab(s) orally once a day unknown dose (25 Feb 2025 15:29)          CURRENT  MEDICATIONS:   acetaminophen     Tablet .. 650 milliGRAM(s) Oral every 6 hours PRN  amLODIPine   Tablet 5 milliGRAM(s) Oral daily  dexAMETHasone     Tablet 4 milliGRAM(s) Oral every 6 hours  enoxaparin Injectable 40 milliGRAM(s) SubCutaneous <User Schedule>  famotidine    Tablet 20 milliGRAM(s) Oral two times a day  insulin lispro (ADMELOG) corrective regimen sliding scale   SubCutaneous Before meals and at bedtime  levETIRAcetam 1000 milliGRAM(s) Oral every 12 hours  senna 2 Tablet(s) Oral at bedtime  temozolomide 400 milliGRAM(s) Oral daily       Diet, Regular (02-25-25 @ 13:16) [Active]          VITAL SIGNS, INS/OUTS (last 24 hours):  Vital Signs Last 24 Hrs  T(C): 36.3 (27 Feb 2025 09:09), Max: 36.8 (26 Feb 2025 16:34)  T(F): 97.4 (27 Feb 2025 09:09), Max: 98.2 (26 Feb 2025 16:34)  HR: 73 (27 Feb 2025 09:09) (58 - 88)  BP: 129/78 (27 Feb 2025 09:09) (118/64 - 144/88)  BP(mean): --  RR: 18 (27 Feb 2025 09:09) (16 - 18)  SpO2: 100% (27 Feb 2025 09:09) (97% - 100%)    Parameters below as of 27 Feb 2025 09:09  Patient On (Oxygen Delivery Method): room air      I&O's Summary    26 Feb 2025 07:01  -  27 Feb 2025 07:00  --------------------------------------------------------  IN: 565 mL / OUT: 1450 mL / NET: -885 mL        Physical Exam   GENERAL: NAD  HEENT: crani incision well healed  CHEST/LUNG: Clear to percussion bilaterally; No rales, rhonchi, wheezing  HEART: Regular rate and rhythm  ABDOMEN: Soft, Nontender, Nondistended; Bowel sounds present  MUSCULOSKELETAL/EXTREMITIES:  2+ Peripheral Pulses, No LE edema,    PSYCH: Appropriate affect  NEURO: Alert & Oriented x 3, right facial asymmetry, right sided weakness    LABS:                        13.8   10.71 )-----------( 318      ( 27 Feb 2025 06:15 )             41.9     02-27    139  |  102  |  21  ----------------------------<  132[H]  5.1   |  27  |  0.81    Ca    9.0      27 Feb 2025 06:15  Phos  3.0     02-27  Mg     2.1     02-27        Urinalysis Basic - ( 27 Feb 2025 06:15 )    Color: x / Appearance: x / SG: x / pH: x  Gluc: 132 mg/dL / Ketone: x  / Bili: x / Urobili: x   Blood: x / Protein: x / Nitrite: x   Leuk Esterase: x / RBC: x / WBC x   Sq Epi: x / Non Sq Epi: x / Bacteria: x      CAPILLARY BLOOD GLUCOSE      POCT Blood Glucose.: 146 mg/dL (27 Feb 2025 12:36)  POCT Blood Glucose.: 152 mg/dL (27 Feb 2025 11:42)  POCT Blood Glucose.: 121 mg/dL (27 Feb 2025 07:27)  POCT Blood Glucose.: 124 mg/dL (27 Feb 2025 06:30)  POCT Blood Glucose.: 188 mg/dL (26 Feb 2025 23:59)  POCT Blood Glucose.: 157 mg/dL (26 Feb 2025 22:00)  POCT Blood Glucose.: 136 mg/dL (26 Feb 2025 18:57)      OTHER LABS:

## 2025-02-27 NOTE — PHYSICAL THERAPY INITIAL EVALUATION ADULT - PERTINENT HX OF CURRENT PROBLEM, REHAB EVAL
69M with pmh HTN, L frontoparietal GBM s/p biopsy @[facility] 2023, resection x2 April/2023, Dec/2024 with GammaTile placement (20 seeds/5 Tiles), s/p >6 cycles of TMZ (Aug/2023-Jan/2024), RT (60 Gy in 30 fxns (June/2023-July/2024) presents with worsening right sided weakness and expressive aphasia x 1 week. At [facility] ER CTH  showed worsening R sided whole-hemisphere vasogenic edema susp 2/2 GammaTile and Dex taper and MR brain completed. Transferred to [facility] for further workup.

## 2025-02-27 NOTE — PHYSICAL THERAPY INITIAL EVALUATION ADULT - SENSORY TESTS
(L) hand  5/5, (R) hand  2/5. CN Testing: B/L Frontalis intact; B/L buccinator intact; smile R droop; tongue protrusion at midline; B/L eyes open/close intact; Shoulder elevation: R impaired; Vision H-Test: bilateral tracking and smooth pursuit intact; Convergence/Divergence: intact

## 2025-02-27 NOTE — OCCUPATIONAL THERAPY INITIAL EVALUATION ADULT - DIAGNOSIS, OT EVAL
Pt p/w impaired RUE/RLE strength, impaired motor control/coordination, impaired balance, and decreased functional activity tolerance, impacting ability to perform functional mobility/ADLs.

## 2025-02-27 NOTE — PHYSICAL THERAPY INITIAL EVALUATION ADULT - GAIT DEVIATIONS NOTED, PT EVAL
impaired ability to lift and advance RLE; impaired foot placement and sequencing/decreased che/decreased step length/decreased weight-shifting ability

## 2025-02-27 NOTE — PHYSICAL THERAPY INITIAL EVALUATION ADULT - THERAPY FREQUENCY, PT EVAL
Patient educated on frequency of inpatient physical therapy at [facility], patient verbalized understanding./3-5x/week

## 2025-02-28 LAB
ANION GAP SERPL CALC-SCNC: 7 MMOL/L — SIGNIFICANT CHANGE UP (ref 5–17)
BUN SERPL-MCNC: 19 MG/DL — SIGNIFICANT CHANGE UP (ref 7–23)
CALCIUM SERPL-MCNC: 8.9 MG/DL — SIGNIFICANT CHANGE UP (ref 8.4–10.5)
CHLORIDE SERPL-SCNC: 105 MMOL/L — SIGNIFICANT CHANGE UP (ref 96–108)
CO2 SERPL-SCNC: 27 MMOL/L — SIGNIFICANT CHANGE UP (ref 22–31)
CREAT SERPL-MCNC: 0.69 MG/DL — SIGNIFICANT CHANGE UP (ref 0.5–1.3)
EGFR: 101 ML/MIN/1.73M2 — SIGNIFICANT CHANGE UP
EGFR: 101 ML/MIN/1.73M2 — SIGNIFICANT CHANGE UP
GLUCOSE SERPL-MCNC: 139 MG/DL — HIGH (ref 70–99)
HCT VFR BLD CALC: 41.3 % — SIGNIFICANT CHANGE UP (ref 39–50)
HGB BLD-MCNC: 13.4 G/DL — SIGNIFICANT CHANGE UP (ref 13–17)
MAGNESIUM SERPL-MCNC: 2.1 MG/DL — SIGNIFICANT CHANGE UP (ref 1.6–2.6)
MCHC RBC-ENTMCNC: 29.6 PG — SIGNIFICANT CHANGE UP (ref 27–34)
MCHC RBC-ENTMCNC: 32.4 G/DL — SIGNIFICANT CHANGE UP (ref 32–36)
MCV RBC AUTO: 91.2 FL — SIGNIFICANT CHANGE UP (ref 80–100)
NRBC BLD AUTO-RTO: 0 /100 WBCS — SIGNIFICANT CHANGE UP (ref 0–0)
PHOSPHATE SERPL-MCNC: 3.1 MG/DL — SIGNIFICANT CHANGE UP (ref 2.5–4.5)
PLATELET # BLD AUTO: 299 K/UL — SIGNIFICANT CHANGE UP (ref 150–400)
POTASSIUM SERPL-MCNC: 4.3 MMOL/L — SIGNIFICANT CHANGE UP (ref 3.5–5.3)
POTASSIUM SERPL-SCNC: 4.3 MMOL/L — SIGNIFICANT CHANGE UP (ref 3.5–5.3)
RBC # BLD: 4.53 M/UL — SIGNIFICANT CHANGE UP (ref 4.2–5.8)
RBC # FLD: 14.6 % — HIGH (ref 10.3–14.5)
SODIUM SERPL-SCNC: 139 MMOL/L — SIGNIFICANT CHANGE UP (ref 135–145)
WBC # BLD: 8.7 K/UL — SIGNIFICANT CHANGE UP (ref 3.8–10.5)
WBC # FLD AUTO: 8.7 K/UL — SIGNIFICANT CHANGE UP (ref 3.8–10.5)

## 2025-02-28 PROCEDURE — 99231 SBSQ HOSP IP/OBS SF/LOW 25: CPT

## 2025-02-28 PROCEDURE — 99233 SBSQ HOSP IP/OBS HIGH 50: CPT

## 2025-02-28 RX ADMIN — DEXAMETHASONE 4 MILLIGRAM(S): 0.5 TABLET ORAL at 18:53

## 2025-02-28 RX ADMIN — Medication 20 MILLIGRAM(S): at 18:56

## 2025-02-28 RX ADMIN — ENOXAPARIN SODIUM 40 MILLIGRAM(S): 100 INJECTION SUBCUTANEOUS at 21:17

## 2025-02-28 RX ADMIN — LEVETIRACETAM 1000 MILLIGRAM(S): 10 INJECTION, SOLUTION INTRAVENOUS at 05:35

## 2025-02-28 RX ADMIN — Medication 20 MILLIGRAM(S): at 05:35

## 2025-02-28 RX ADMIN — DEXAMETHASONE 4 MILLIGRAM(S): 0.5 TABLET ORAL at 05:35

## 2025-02-28 RX ADMIN — LEVETIRACETAM 1000 MILLIGRAM(S): 10 INJECTION, SOLUTION INTRAVENOUS at 18:53

## 2025-02-28 RX ADMIN — DEXAMETHASONE 4 MILLIGRAM(S): 0.5 TABLET ORAL at 12:09

## 2025-02-28 RX ADMIN — DEXAMETHASONE 4 MILLIGRAM(S): 0.5 TABLET ORAL at 00:41

## 2025-02-28 RX ADMIN — TEMOZOLOMIDE 400 MILLIGRAM(S): 100 CAPSULE ORAL at 12:09

## 2025-02-28 RX ADMIN — AMLODIPINE BESYLATE 5 MILLIGRAM(S): 10 TABLET ORAL at 05:35

## 2025-02-28 RX ADMIN — Medication 2 TABLET(S): at 21:17

## 2025-02-28 NOTE — DIETITIAN INITIAL EVALUATION ADULT - PERTINENT LABORATORY DATA
02-28    139  |  105  |  19  ----------------------------<  139[H]  4.3   |  27  |  0.69    Ca    8.9      28 Feb 2025 05:30  Phos  3.1     02-28  Mg     2.1     02-28    POCT Blood Glucose.: 142 mg/dL (02-28-25 @ 11:53)  A1C with Estimated Average Glucose Result: 5.9 % (02-25-25 @ 15:31)  A1C with Estimated Average Glucose Result: 5.5 % (12-20-24 @ 04:54)

## 2025-02-28 NOTE — PROGRESS NOTE ADULT - ASSESSMENT
69 year old male with past medical history of HTN, OA, right TKA, HO craniotomy for GBM resection 04/25/2023, s/p chemo and RT 2023 with Dr. Causey, Admit at [facility] on Dec13 for reoccurrence of GBM, Sp lt craniotomy  for resection of GBM with placement of Gammatile brachytherapy on 12/19.  Pt was readmitted from home with new aphasia and dysarthria after being discharged from Acute rehab with concern for worsening tumor.  Pt s/p  MRI Brain that showed vasogenic edema and no new tumor     #Recurrent GBM s/p Craniotomy for Resection and Gammatile Brachytherapy (12/19)  - Continue management as per NSGY and Heme Onc  -Pt s/p IV Avastin 2/27, temodar (2/27-3/3)  - Continue Keppra 1000 mg q12 and seizure precautions   - Continue Decadron taper and continue  famotidine and ISS while on steroids     #HTN  - Continue amlodipine 5mg daily w/ holding parameters    #Bradycardia   -Pt with HR to the 40's   -Will continue telemetry   -Will check  TSH     #DVT PPx  - Continue Enoxaparin SQ daily     #DISPO  -Pt seen by PT and recommended for acute rehab     55 minutes spent on total encounter. The necessity of the time spent during the encounter on this date of service was due to:    Review of hospital course, labs, vitals, medical records.  Bedside exam and interview of the patient  Discussed plan of care with primary team   Documenting the encounter.

## 2025-02-28 NOTE — DIETITIAN INITIAL EVALUATION ADULT - PERTINENT MEDS FT
MEDICATIONS  (STANDING):  amLODIPine   Tablet 5 milliGRAM(s) Oral daily  dexAMETHasone     Tablet 4 milliGRAM(s) Oral every 6 hours  enoxaparin Injectable 40 milliGRAM(s) SubCutaneous <User Schedule>  famotidine    Tablet 20 milliGRAM(s) Oral two times a day  insulin lispro (ADMELOG) corrective regimen sliding scale   SubCutaneous Before meals and at bedtime  levETIRAcetam 1000 milliGRAM(s) Oral every 12 hours  senna 2 Tablet(s) Oral at bedtime  temozolomide 400 milliGRAM(s) Oral daily    MEDICATIONS  (PRN):  acetaminophen     Tablet .. 650 milliGRAM(s) Oral every 6 hours PRN Temp greater or equal to 38.5C (101.3F), Mild Pain (1 - 3)

## 2025-02-28 NOTE — DIETITIAN INITIAL EVALUATION ADULT - OTHER INFO
"69M with pmh HTN, L frontoparietal GBM s/p biopsy @[facility] 2023, resection x2 April/2023, Dec/2024 with GammaTile placement (20 seeds/5 Tiles), s/p >6 cycles of TMZ (Aug/2023-Jan/2024), RT (60 Gy in 30 fxns (June/2023-July/2024) presents with worsening right sided weakness and expressive aphasia x 1 week. Patient was due to start the last week of a decadron taper today at 1mg daily. He was brought to [facility] ER last night for symptoms where CTH  showed worsening R sided whole-hemisphere vasogenic edema susp 2/2 GammaTile and Dex taper. MRI brain was completed this AM and he received 10mg IV decadron at 6AM. Transferred to [facility] for further workup. Patient denies headache, nausea, vomiting, or vision changes. Patient has been compliant with his home keppra 1g BID."     Patient seen at bedside on 9wo for nutrition assessment. Reports good appetite prior to admission and denies changes in PO intake, reports typically consuming 3 meals/day and follows a Keto diet due to cancer as per wife over the phone - reports avoiding carbohydrates and eating foods such as avocados, olive oil, and beef/ chicken / fish often, likely meeting >75% estimated nutrient needs. Current diet order: regular. Reports appetite remains unchanged, endorses continuing to consume 3 meals/day while in [facility], RD documented food preferences/ requests in CBORD / tray ticket - likely continuing to consume >75% estimated nutrient needs at this time. RD provided verbal nutrition education on general healthy diet and reviewed importance of including all food groups including carbohydrates, protein, and healthy fats in diet and not overly restricting due to cancer dx, pt appeared receptive. No known food allergies. No difficulty chewing/swallowing reported. Dosing weight: 199 pounds,  pounds, reports UBW of ~190 pounds (? accuracy of pt reported wt) but denies wt changes prior to admission. No pressure injuries documented, elaine 18. 3+ right foot edema documented. Denies nausea/vomiting/diarrhea/constipation; last bowel movement documented 2/26. Labs: POCTs (137, 146, 131, 146 mg/dl...), serum glucose 139 mg/dl (elevated blood glucose likely related to steroids but remains within goal range of 100-180 mg/dl). Meds: Dexamethasone, pepcid, Admelog, senna. Observed with no overt signs and symptoms of muscle or fat wasting - does not meet criteria for protein-calorie malnutrition as per ASPEN guidelines. No cultural, ethnic, Hindu food preferences reported. See nutrition recommendations below.

## 2025-02-28 NOTE — CHART NOTE - NSCHARTNOTEFT_GEN_A_CORE
Oncology Chart Note    69 year old male with past medical history of HTN, OA of the knee, status post replacement, now with glioblastoma (MGMT methylated), treated with TMZ + RT (6/6/23-7/18/23), status post stereotactic needle biopsy and laser interstitial thermal therapy by Dr. Tobar (4/7/2023), then re-operation with  on 4/25/2023 with GTR.  Followed by additional TMZ x 6 cycles (8/15/23-1/30/24).  He suffered a recurrence in December 2024, followed by re-resection with Gammatile placement.      #MGMT methylated GBM  --oncologic history as above, Now presents with worsening right sided weakness and expressive aphasia x 1 week. At [facility] ER CTH showed worsening R sided whole-hemisphere vasogenic edema. Transferred to [facility] for further workup.   --MRb 2/25 w/ rim-enhancing structure at the left posterior hemispheric operative bed appears similar to 1/30/2025. The extensive infiltrative hyperintensity on the long TR images consistent with vasogenic edema and nonenhancing tumor has mildly increased and its associated mass effect with increased right-to-left subfalcine herniation although appears comparable infiltrative extent within the left cerebral hemisphere, contralateral extent into the right cerebral hemisphere and downward extent to the ventral avila.  --steroids per neurosurgery, currently on 4mg q6h   **Bevacizumab 10 mg/kg IV x1 (2/27/25) and Temozolomide which will be dosed at 200 mg/m2 (400 mg) po daily x 5 days every 28 days (2/27-3/3)**     plan:  --Future Bevacizumab and TMZ cycles will be discussed by new neuro-oncologist outpatient Dr. Guy Hubbard at [facility]. Instructed patient to call for appointment if none by next week. Neuro-Onc navigation team enabled for follow up on case already.  --Advised Karen to fill prescription for TMZ to have pills available in case patient is discharged to rehab before completion. Prescription of TMZ is for 100 mg, so 4 pills would be 400 mg. They should not start a new cycle until confirmed by outpatient oncologist and labs are repeated.  --discussed with patient and family: the goal of this treatment is palliative, primarily aimed at alleviating his symptomatic vasogenic edema and permit tapering of the steroids.    --Risks and side effect of this therapy were discussed in detail w/ the patient and his family;  informed consent for treatment was obtained.    Will continue to follow. Discussed with hematology oncology attending Dr. Anabelle Villela

## 2025-02-28 NOTE — PROGRESS NOTE ADULT - SUBJECTIVE AND OBJECTIVE BOX
Patient was seen and examined at bedside. Case discuss with the primary team. Pt with low HR this morning. Pt without any complaints this morning.     OBJECTIVE:  Vital Signs Last 24 Hrs  T(C): 36.6 (28 Feb 2025 13:46), Max: 36.9 (27 Feb 2025 16:54)  T(F): 97.9 (28 Feb 2025 13:46), Max: 98.5 (27 Feb 2025 16:54)  HR: 63 (28 Feb 2025 13:46) (56 - 70)  BP: 132/65 (28 Feb 2025 13:46) (117/74 - 153/75)  RR: 18 (28 Feb 2025 13:46) (16 - 18)  SpO2: 97% (28 Feb 2025 13:46) (96% - 100%)    Parameters below as of 28 Feb 2025 05:34  Patient On (Oxygen Delivery Method): room air    PHYSICAL EXAM:  Gen: NAD sitting in a chair; family at bedside   CV: RRR, +S1/S2, no mumur  Pulm: CTA b/l no wheezing or crackles   Abd: soft, NTND + BS no rebound or guarding   Neuro: AAOX3; right facial asymmetry, right sided weakness      LABS:                        13.4   8.70  )-----------( 299      ( 28 Feb 2025 05:30 )             41.3     02-28    139  |  105  |  19  ----------------------------<  139[H]  4.3   |  27  |  0.69    Ca    8.9      28 Feb 2025 05:30  Phos  3.1     02-28  Mg     2.1     02-28    CAPILLARY BLOOD GLUCOSE  POCT Blood Glucose.: 142 mg/dL (28 Feb 2025 11:53)  POCT Blood Glucose.: 137 mg/dL (28 Feb 2025 07:39)  POCT Blood Glucose.: 146 mg/dL (27 Feb 2025 21:49)  POCT Blood Glucose.: 131 mg/dL (27 Feb 2025 17:35)    acetaminophen     Tablet .. 650 milliGRAM(s) Oral every 6 hours PRN  amLODIPine   Tablet 5 milliGRAM(s) Oral daily  dexAMETHasone     Tablet 4 milliGRAM(s) Oral every 6 hours  enoxaparin Injectable 40 milliGRAM(s) SubCutaneous <User Schedule>  famotidine    Tablet 20 milliGRAM(s) Oral two times a day  insulin lispro (ADMELOG) corrective regimen sliding scale   SubCutaneous Before meals and at bedtime  levETIRAcetam 1000 milliGRAM(s) Oral every 12 hours  senna 2 Tablet(s) Oral at bedtime  temozolomide 400 milliGRAM(s) Oral daily

## 2025-02-28 NOTE — PROGRESS NOTE ADULT - SUBJECTIVE AND OBJECTIVE BOX
HPI:  69M with pmh HTN, L frontoparietal GBM s/p biopsy @[facility] 2023, resection x2 April/2023, Dec/2024 with GammaTile placement (20 seeds/5 Tiles), s/p >6 cycles of TMZ (Aug/2023-Jan/2024), RT (60 Gy in 30 fxns (June/2023-July/2024) presents with worsening right sided weakness and expressive aphasia x 1 week. Patient was due to start the last week of a decadron taper today at 1mg daily. He was brought [place] [facility] last [place] for symptoms where CTH  showed worsening R sided whole-hemisphere vasogenic edema susp 2/2 GammaTile and Dex taper. MRI brain was completed this AM and he received 10mg IV decadron at 6AM. Transferred to [facility] for further workup. Patient denies headache, nausea, vomiting, or vision changes. Patient has been compliant with his home keppra 1g BID.  (25 Feb 2025 15:09)    OVERNIGHT EVENTS: KYA overnight, neuro stable.      Hospital Course:   2/25: admitted to [facility]  2/26: KYA overnight. Heme/onc consulted for possible IV avastin  2/27: KYA overnight. S/p IV avastin, temodar started. PT/OR rec AR.  2/29: KYA overnight, neuro stable.      Vital Signs Last 24 Hrs  T(C): 36.6 (28 Feb 2025 00:40), Max: 36.9 (27 Feb 2025 16:54)  T(F): 97.8 (28 Feb 2025 00:40), Max: 98.5 (27 Feb 2025 16:54)  HR: 56 (28 Feb 2025 00:40) (56 - 77)  BP: 138/78 (28 Feb 2025 00:40) (117/74 - 144/88)  BP(mean): --  RR: 16 (28 Feb 2025 00:40) (16 - 18)  SpO2: 96% (28 Feb 2025 00:40) (96% - 100%)    Parameters below as of 28 Feb 2025 00:40  Patient On (Oxygen Delivery Method): room air        I&O's Detail    26 Feb 2025 07:01  -  27 Feb 2025 07:00  --------------------------------------------------------  IN:    Oral Fluid: 565 mL  Total IN: 565 mL    OUT:    Stool (mL): 0 mL    Voided (mL): 1450 mL  Total OUT: 1450 mL    Total NET: -885 mL      27 Feb 2025 07:01  -  28 Feb 2025 01:51  --------------------------------------------------------  IN:  Total IN: 0 mL    OUT:    Voided (mL): 800 mL  Total OUT: 800 mL    Total NET: -800 mL        I&O's Summary    26 Feb 2025 07:01  -  27 Feb 2025 07:00  --------------------------------------------------------  IN: 565 mL / OUT: 1450 mL / NET: -885 mL    27 Feb 2025 07:01  -  28 Feb 2025 01:51  --------------------------------------------------------  IN: 0 mL / OUT: 800 mL / NET: -800 mL        Physical Exam:   General: patient seen laying supine in bed in NAD  Neuro: AAOx3 (year with choice), +expressive aphasia, follows commands, opens eyes spontaneously, +R facial droop, LUE/LLE 5/5, RUE deltoid/triceps 2/5 biceps 4+/5 hand  3/5, RLE HF/DF 3/5 KF/KE 2/5 PF 2/5, Decreased sensation to light touch throughout R face, RUE, RLE and R torso.   HEENT: PERRL, dysconjugate gaze but able to do EOMI,  Cardiac: RRR, S1S2  Pulmonary: chest rise symmetric  Abdomen: soft, nontender, nondistended  Ext: perfusing well  Skin: warm, dry       TUBES/LINES:  [] CVC  [] A-line  [] Lumbar Drain  [] Ventriculostomy  [] Other    DIET:  [] NPO  [x] Mechanical  [] Tube feeds    LABS:    Urinalysis Basic - ( 27 Feb 2025 06:15 )    Color: x / Appearance: x / SG: x / pH: x  Gluc: 132 mg/dL / Ketone: x  / Bili: x / Urobili: x   Blood: x / Protein: x / Nitrite: x   Leuk Esterase: x / RBC: x / WBC x   Sq Epi: x / Non Sq Epi: x / Bacteria: x          CAPILLARY BLOOD GLUCOSE      POCT Blood Glucose.: 146 mg/dL (27 Feb 2025 21:49)  POCT Blood Glucose.: 131 mg/dL (27 Feb 2025 17:35)  POCT Blood Glucose.: 146 mg/dL (27 Feb 2025 12:36)  POCT Blood Glucose.: 152 mg/dL (27 Feb 2025 11:42)  POCT Blood Glucose.: 121 mg/dL (27 Feb 2025 07:27)  POCT Blood Glucose.: 124 mg/dL (27 Feb 2025 06:30)      Drug Levels: [] N/A    CSF Analysis: [] N/A      Allergies    No Known Allergies    Intolerances      MEDICATIONS:  Antibiotics:    Neuro:  acetaminophen     Tablet .. 650 milliGRAM(s) Oral every 6 hours PRN  levETIRAcetam 1000 milliGRAM(s) Oral every 12 hours    Anticoagulation:  enoxaparin Injectable 40 milliGRAM(s) SubCutaneous <User Schedule>    OTHER:  amLODIPine   Tablet 5 milliGRAM(s) Oral daily  dexAMETHasone     Tablet 4 milliGRAM(s) Oral every 6 hours  famotidine    Tablet 20 milliGRAM(s) Oral two times a day  insulin lispro (ADMELOG) corrective regimen sliding scale   SubCutaneous Before meals and at bedtime  senna 2 Tablet(s) Oral at bedtime  temozolomide 400 milliGRAM(s) Oral daily    IVF:    CULTURES:    RADIOLOGY & ADDITIONAL TESTS:      ASSESSMENT: 69M with pmh HTN, L frontoparietal GBM s/p biopsy @[facility] 20[facility], resection x2 April/2023, Dec/2024 with GammaTile placement (20 seeds/5 Tiles), s/p >6 cycles of TMZ (Aug/2023-Jan/2024), RT (60 Gy in 30 fxns (June/2023-July/2024) presents with worsening right sided weakness and expressive aphasia x 1 week. At [facility] CTH  showed worsening R sided whole-hemisphere vasogenic edema susp 2/2 GammaTile and Dex taper and MR brain completed. Transferred to [facility] for further workup.     Plan:  Neuro:  - neuro/vital checks q4  - pain control: tylenol prn  - MRI brain w/ sarah completed 2/25  - CTH 2/24: worsening R sided whole-hemisphere vasogenic edema  - seizure prophylaxis: cont home keppra 1g q12  - decadron 4q6 for vasogenic edema  - GBM: hold home metformin 500mg BID  - plan for IV avastin     Cardiac:  - SBP goal 100-160  - HTN: cont home amlodipine 5mg daily    Pulmonary:  - on RA    GI:  - regular diet  - bowel regimen  - pepcid 20mg BID while on steroids    Renal:  - IVL  - Na goal 135-145    Heme:  - SCDs and SQL for DVT prophylaxis  - heme/onc consult, s/p IV Avastin 2/27, temodar (2/27-3/3)    Endo:  - ISS, A1C 5.9    ID:  - afebrile    Disposition: tele, full code, dispo PT/OT rec AR.    D/w Dr. Sue

## 2025-02-28 NOTE — DIETITIAN INITIAL EVALUATION ADULT - PERSON TAUGHT/METHOD
RD provided verbal nutrition education on general healthy diet and reviewed importance of including all food groups including carbohydrates, protein, and healthy fats in diet and not overly restricting due to cancer dx, pt appeared receptive./verbal instruction/patient instructed

## 2025-02-28 NOTE — DIETITIAN INITIAL EVALUATION ADULT - [id number]_GEN_A_CORE
I&O's Detail    27 Feb 2025 07:01  -  28 Feb 2025 07:00  --------------------------------------------------------  IN:  Total IN: 0 mL    OUT:    Voided (mL): 1250 mL  Total OUT: 1250 mL    Total NET: -1250 mL      28 Feb 2025 07:01  -  28 Feb 2025 13:25  --------------------------------------------------------  IN:    Oral Fluid: 180 mL  Total IN: 180 mL    OUT:  Total OUT: 0 mL    Total NET: 180 mL

## 2025-02-28 NOTE — DIETITIAN INITIAL EVALUATION ADULT - OTHER CALCULATIONS
Based on dosing wt 199 pounds as pt within % IBW (116%). Needs adjusted for hx cancer, wt status, age.

## 2025-02-28 NOTE — DIETITIAN INITIAL EVALUATION ADULT - ADD RECOMMEND
1. Continue current diet order  2. Encourage and monitor PO intake, honor preferences as able   >> Consistently meet >75% of estimated needs during admission   >> Consider oral nutrition supplement should intake decline </= 50%   3. Monitor wt trends, GI function, skin integrity  4. Monitor lytes, renal indices, blood glucose, LFTs    5. Pain and bowel regimen per team   RD will continue to monitor PO intake, labs, hydration, and wt prn.

## 2025-03-01 LAB
ALBUMIN SERPL ELPH-MCNC: 3.5 G/DL — SIGNIFICANT CHANGE UP (ref 3.3–5)
ALP SERPL-CCNC: 89 U/L — SIGNIFICANT CHANGE UP (ref 40–120)
ALT FLD-CCNC: 48 U/L — HIGH (ref 10–45)
ANION GAP SERPL CALC-SCNC: 9 MMOL/L — SIGNIFICANT CHANGE UP (ref 5–17)
AST SERPL-CCNC: 32 U/L — SIGNIFICANT CHANGE UP (ref 10–40)
BILIRUB SERPL-MCNC: 0.2 MG/DL — SIGNIFICANT CHANGE UP (ref 0.2–1.2)
BUN SERPL-MCNC: 20 MG/DL — SIGNIFICANT CHANGE UP (ref 7–23)
CALCIUM SERPL-MCNC: 8.6 MG/DL — SIGNIFICANT CHANGE UP (ref 8.4–10.5)
CHLORIDE SERPL-SCNC: 102 MMOL/L — SIGNIFICANT CHANGE UP (ref 96–108)
CO2 SERPL-SCNC: 27 MMOL/L — SIGNIFICANT CHANGE UP (ref 22–31)
CREAT SERPL-MCNC: 0.69 MG/DL — SIGNIFICANT CHANGE UP (ref 0.5–1.3)
EGFR: 101 ML/MIN/1.73M2 — SIGNIFICANT CHANGE UP
EGFR: 101 ML/MIN/1.73M2 — SIGNIFICANT CHANGE UP
GLUCOSE SERPL-MCNC: 142 MG/DL — HIGH (ref 70–99)
HCT VFR BLD CALC: 41.2 % — SIGNIFICANT CHANGE UP (ref 39–50)
HGB BLD-MCNC: 13.6 G/DL — SIGNIFICANT CHANGE UP (ref 13–17)
MAGNESIUM SERPL-MCNC: 2.1 MG/DL — SIGNIFICANT CHANGE UP (ref 1.6–2.6)
MCHC RBC-ENTMCNC: 29.9 PG — SIGNIFICANT CHANGE UP (ref 27–34)
MCHC RBC-ENTMCNC: 33 G/DL — SIGNIFICANT CHANGE UP (ref 32–36)
MCV RBC AUTO: 90.5 FL — SIGNIFICANT CHANGE UP (ref 80–100)
NRBC BLD AUTO-RTO: 0 /100 WBCS — SIGNIFICANT CHANGE UP (ref 0–0)
PHOSPHATE SERPL-MCNC: 2.8 MG/DL — SIGNIFICANT CHANGE UP (ref 2.5–4.5)
PLATELET # BLD AUTO: 316 K/UL — SIGNIFICANT CHANGE UP (ref 150–400)
POTASSIUM SERPL-MCNC: 4.1 MMOL/L — SIGNIFICANT CHANGE UP (ref 3.5–5.3)
POTASSIUM SERPL-SCNC: 4.1 MMOL/L — SIGNIFICANT CHANGE UP (ref 3.5–5.3)
PROT SERPL-MCNC: 6 G/DL — SIGNIFICANT CHANGE UP (ref 6–8.3)
RBC # BLD: 4.55 M/UL — SIGNIFICANT CHANGE UP (ref 4.2–5.8)
RBC # FLD: 14.5 % — SIGNIFICANT CHANGE UP (ref 10.3–14.5)
SODIUM SERPL-SCNC: 138 MMOL/L — SIGNIFICANT CHANGE UP (ref 135–145)
WBC # BLD: 9.03 K/UL — SIGNIFICANT CHANGE UP (ref 3.8–10.5)
WBC # FLD AUTO: 9.03 K/UL — SIGNIFICANT CHANGE UP (ref 3.8–10.5)

## 2025-03-01 PROCEDURE — 99233 SBSQ HOSP IP/OBS HIGH 50: CPT

## 2025-03-01 PROCEDURE — 99232 SBSQ HOSP IP/OBS MODERATE 35: CPT

## 2025-03-01 RX ORDER — POLYETHYLENE GLYCOL 3350 17 G/17G
17 POWDER, FOR SOLUTION ORAL DAILY
Refills: 0 | Status: DISCONTINUED | OUTPATIENT
Start: 2025-03-01 | End: 2025-03-02

## 2025-03-01 RX ORDER — SOD PHOS DI, MONO/K PHOS MONO 250 MG
1 TABLET ORAL ONCE
Refills: 0 | Status: COMPLETED | OUTPATIENT
Start: 2025-03-01 | End: 2025-03-01

## 2025-03-01 RX ADMIN — DEXAMETHASONE 4 MILLIGRAM(S): 0.5 TABLET ORAL at 05:38

## 2025-03-01 RX ADMIN — ENOXAPARIN SODIUM 40 MILLIGRAM(S): 100 INJECTION SUBCUTANEOUS at 21:43

## 2025-03-01 RX ADMIN — Medication 1 PACKET(S): at 10:32

## 2025-03-01 RX ADMIN — POLYETHYLENE GLYCOL 3350 17 GRAM(S): 17 POWDER, FOR SOLUTION ORAL at 11:53

## 2025-03-01 RX ADMIN — DEXAMETHASONE 4 MILLIGRAM(S): 0.5 TABLET ORAL at 11:54

## 2025-03-01 RX ADMIN — LEVETIRACETAM 1000 MILLIGRAM(S): 10 INJECTION, SOLUTION INTRAVENOUS at 17:44

## 2025-03-01 RX ADMIN — DEXAMETHASONE 4 MILLIGRAM(S): 0.5 TABLET ORAL at 00:25

## 2025-03-01 RX ADMIN — Medication 2 TABLET(S): at 21:43

## 2025-03-01 RX ADMIN — DEXAMETHASONE 4 MILLIGRAM(S): 0.5 TABLET ORAL at 17:43

## 2025-03-01 RX ADMIN — AMLODIPINE BESYLATE 5 MILLIGRAM(S): 10 TABLET ORAL at 05:54

## 2025-03-01 RX ADMIN — TEMOZOLOMIDE 400 MILLIGRAM(S): 100 CAPSULE ORAL at 11:54

## 2025-03-01 RX ADMIN — Medication 20 MILLIGRAM(S): at 05:38

## 2025-03-01 RX ADMIN — Medication 20 MILLIGRAM(S): at 17:43

## 2025-03-01 RX ADMIN — LEVETIRACETAM 1000 MILLIGRAM(S): 10 INJECTION, SOLUTION INTRAVENOUS at 05:38

## 2025-03-01 NOTE — PROGRESS NOTE ADULT - SUBJECTIVE AND OBJECTIVE BOX
HPI:  69M with pmh HTN, L frontoparietal GBM s/p biopsy @[facility] 2023, resection x2 April/2023, Dec/2024 with GammaTile placement (20 seeds/5 Tiles), s/p >6 cycles of TMZ (Aug/2023-Jan/2024), RT (60 Gy in 30 fxns (June/2023-July/2024) presents with worsening right sided weakness and expressive aphasia x 1 week. Patient was due to start the last week of a decadron taper today at 1mg daily. He was brought to [facility] ER last night for symptoms where CTH  showed worsening R sided whole-hemisphere vasogenic edema susp 2/2 GammaTile and Dex taper. MRI brain was completed this AM and he received 10mg IV decadron at 6AM. Transferred to [facility] for further workup. Patient denies headache, nausea, vomiting, or vision changes. Patient has been compliant with his home keppra 1g BID.  (25 Feb 2025 15:09)    OVERNIGHT EVENTS: Seen and examined in 9WO. Denies HA, N/V, chest pain, shortness of breath, new weakness or numbness.     HOSPITAL COURSE:  2/25: admitted to [facility]  2/26: KYA overnight. Heme/onc consulted for possible IV avastin  2/27: KYA overnight. S/p IV avastin, temodar started. PT/OR rec AR.  2/29: KYA overnight, neuro stable.  3/1: KYA overnight, neuro stable.     Vital Signs Last 24 Hrs  T(C): 36.3 (01 Mar 2025 00:20), Max: 36.6 (28 Feb 2025 13:46)  T(F): 97.4 (01 Mar 2025 00:20), Max: 97.9 (28 Feb 2025 13:46)  HR: 67 (01 Mar 2025 00:20) (61 - 70)  BP: 146/86 (01 Mar 2025 00:20) (132/65 - 153/75)  BP(mean): --  RR: 17 (01 Mar 2025 00:20) (17 - 18)  SpO2: 97% (01 Mar 2025 00:20) (96% - 100%)    Parameters below as of 01 Mar 2025 00:20  Patient On (Oxygen Delivery Method): room air        I&O's Summary    27 Feb 2025 07:01  -  28 Feb 2025 07:00  --------------------------------------------------------  IN: 0 mL / OUT: 1250 mL / NET: -1250 mL    28 Feb 2025 07:01  -  01 Mar 2025 02:43  --------------------------------------------------------  IN: 180 mL / OUT: 900 mL / NET: -720 mL      PHYSICAL EXAM:  General: NAD, pt is comfortably sitting up in bed, on room air  HEENT: PERRL 3mm briskly reactive, EOMI b/l, (+) dysconjugate gaze, (+) Right facial droop, neck FROM  Cardiovascular: RRR, S1, S2  Respiratory: breathing non-labored on RA, chest rise symmetric  GI: abd soft, NTND   Neuro: A&Ox3, No aphasia, speech clear, no dysmetria, unable to assess pronator drift 2/2 RUE strength. Follows commands.  RU delt 2/5, tricep 4/5, bicep 3/5 HG 2/5; LUE 5/5. Right HF 3/5, KF/KE w assistance 5/5, dorsiflexion 2/5, plantarflexion 5/5; LLE 5/5.   Dec sensation (60%) to RUE and RLE.   Extremities: distal pulses 2+ x4  Wound/incision: n/a  Drain: n/a    TUBES/LINES:  [] Shields  [] Wound Drains  [] Others      DIET:  [] NPO  [x] Mechanical  [] Tube feeds    LABS:                        13.4   8.70  )-----------( 299      ( 28 Feb 2025 05:30 )             41.3     02-28    139  |  105  |  19  ----------------------------<  139[H]  4.3   |  27  |  0.69    Ca    8.9      28 Feb 2025 05:30  Phos  3.1     02-28  Mg     2.1     02-28        Urinalysis Basic - ( 28 Feb 2025 05:30 )    Color: x / Appearance: x / SG: x / pH: x  Gluc: 139 mg/dL / Ketone: x  / Bili: x / Urobili: x   Blood: x / Protein: x / Nitrite: x   Leuk Esterase: x / RBC: x / WBC x   Sq Epi: x / Non Sq Epi: x / Bacteria: x          CAPILLARY BLOOD GLUCOSE      POCT Blood Glucose.: 140 mg/dL (28 Feb 2025 21:34)  POCT Blood Glucose.: 117 mg/dL (28 Feb 2025 17:18)  POCT Blood Glucose.: 142 mg/dL (28 Feb 2025 11:53)  POCT Blood Glucose.: 137 mg/dL (28 Feb 2025 07:39)      Drug Levels: [] N/A    CSF Analysis: [] N/A      Allergies    No Known Allergies    Intolerances      MEDICATIONS:  Antibiotics:    Neuro:  acetaminophen     Tablet .. 650 milliGRAM(s) Oral every 6 hours PRN  levETIRAcetam 1000 milliGRAM(s) Oral every 12 hours    Anticoagulation:  enoxaparin Injectable 40 milliGRAM(s) SubCutaneous <User Schedule>    OTHER:  amLODIPine   Tablet 5 milliGRAM(s) Oral daily  dexAMETHasone     Tablet 4 milliGRAM(s) Oral every 6 hours  famotidine    Tablet 20 milliGRAM(s) Oral two times a day  insulin lispro (ADMELOG) corrective regimen sliding scale   SubCutaneous Before meals and at bedtime  senna 2 Tablet(s) Oral at bedtime  temozolomide 400 milliGRAM(s) Oral daily    IVF:    CULTURES:    RADIOLOGY & ADDITIONAL TESTS:  n/a    ASSESSMENT:  69M with pmh HTN, L frontoparietal GBM s/p biopsy @[facility] 20[facility], resection x2 April/2023, Dec/2024 with GammaTile placement (20 seeds/5 Tiles), s/p >6 cycles of TMZ (Aug/2023-Jan/2024), RT (60 Gy in 30 fxns (June/2023-July/2024) presents with worsening right sided weakness and expressive aphasia x 1 week. At [facility] ER CTH  showed worsening R sided whole-hemisphere vasogenic edema susp 2/2 GammaTile and Dex taper and MR brain completed. Transferred to [facility] for further workup.     CEREBRAL VASOGENIC EDEMA    Dependence on wheelchair-Z99.3    Encounter for follow-up examination after completed treatment for conditions other than malignant neoplasm-Z09    Weakness-R53.1    Handoff    MEWS Score    Hypertension    Osteoarthritis    Brain mass    Glioblastoma    Glioblastoma    Osteoarthritis    Hypertension    S/P total knee replacement, right    H/O craniotomy    [id number]        PLAN:  Neuro:  - neuro/vital checks q4  - pain control: tylenol prn  - MRI brain w/ sarah completed 2/25  - CTH 2/24: worsening R sided whole-hemisphere vasogenic edema  - seizure prophylaxis: cont home keppra 1g q12  - decadron 4q6 for vasogenic edema  - GBM: hold home metformin 500mg BID    Cardiac:  - SBP goal 100-160  - HTN: cont home amlodipine 5mg daily    Pulmonary:  - on RA    GI:  - regular diet  - bowel regimen  - pepcid 20mg BID while on steroids    Renal:  - IVL  - Na goal 135-145    Heme:  - SCDs and SQL for DVT prophylaxis  - heme/onc consult, s/p IV Avastin 2/27, temodar (2/27-3/3)    Endo:  - ISS, A1C 5.9    ID:  - afebrile    Disposition: tele, full code, dispo PT/OT rec AR.    D/w Dr. Sue      Assessment:  Present when checked    []  GCS  E   V  M     Heart Failure: []Acute, [] acute on chronic , []chronic  Heart Failure:  [] Diastolic (HFpEF), [] Systolic (HFrEF), []Combined (HFpEF and HFrEF), [] RHF, [] Pulm HTN, [] Other    [] ROMERO, [] ATN, [] AIN, [] other  [] CKD1, [] CKD2, [] CKD 3, [] CKD 4, [] CKD 5, []ESRD    Encephalopathy: [] Metabolic, [] Hepatic, [] toxic, [] Neurological, [] Other    Abnormal Nurtitional Status: [] malnurtition (see nutrition note), [ ]underweight: BMI < 19, [] morbid obesity: BMI >40, [] Cachexia    [] Sepsis  [] hypovolemic shock,[] cardiogenic shock, [] hemorrhagic shock, [] neuogenic shock  [] Acute Respiratory Failure  []Cerebral edema, [] Brain compression/ herniation,   [] Functional quadriplegia  [] Acute blood loss anemia

## 2025-03-01 NOTE — PROGRESS NOTE ADULT - SUBJECTIVE AND OBJECTIVE BOX
Patient is a 69y old  Male who presents with a chief complaint of right sided weakness (01 Mar 2025 02:43)      INTERVAL HPI/OVERNIGHT EVENTS:    Pt. seen and examined earlier today  Pt.'s wife present at bedside  Pt. eating breakfast, feels well  Pt. denies CP, SOB, palpitations, dizziness    Review of Systems: 12 point review of systems otherwise negative    MEDICATIONS  (STANDING):  amLODIPine   Tablet 5 milliGRAM(s) Oral daily  dexAMETHasone     Tablet 4 milliGRAM(s) Oral every 6 hours  enoxaparin Injectable 40 milliGRAM(s) SubCutaneous <User Schedule>  famotidine    Tablet 20 milliGRAM(s) Oral two times a day  levETIRAcetam 1000 milliGRAM(s) Oral every 12 hours  polyethylene glycol 3350 17 Gram(s) Oral daily  senna 2 Tablet(s) Oral at bedtime  temozolomide 400 milliGRAM(s) Oral daily    MEDICATIONS  (PRN):  acetaminophen     Tablet .. 650 milliGRAM(s) Oral every 6 hours PRN Temp greater or equal to 38.5C (101.3F), Mild Pain (1 - 3)      Allergies    No Known Allergies    Intolerances          Vital Signs Last 24 Hrs  T(C): 36.8 (01 Mar 2025 12:04), Max: 36.8 (01 Mar 2025 12:04)  T(F): 98.2 (01 Mar 2025 12:04), Max: 98.2 (01 Mar 2025 12:04)  HR: 61 (01 Mar 2025 12:04) (48 - 67)  BP: 123/66 (01 Mar 2025 12:04) (123/66 - 146/86)  BP(mean): --  RR: 18 (01 Mar 2025 12:04) (17 - 19)  SpO2: 97% (01 Mar 2025 12:04) (96% - 97%)    Parameters below as of 01 Mar 2025 12:04  Patient On (Oxygen Delivery Method): room air      CAPILLARY BLOOD GLUCOSE      POCT Blood Glucose.: 161 mg/dL (01 Mar 2025 12:27)  POCT Blood Glucose.: 153 mg/dL (01 Mar 2025 07:27)  POCT Blood Glucose.: 140 mg/dL (28 Feb 2025 21:34)  POCT Blood Glucose.: 117 mg/dL (28 Feb 2025 17:18)      02-28 @ 07:01  -  03-01 @ 07:00  --------------------------------------------------------  IN: 180 mL / OUT: 900 mL / NET: -720 mL        Physical Exam:  (earlier today)  Daily     Daily   General:  comfortable appearing in NAD, sitting in a chair  HEENT:  MMM, R facial droop  CV:  RRR, no JVD  Lungs:  CTA B/L  Abdomen:  soft NT ND  Extremities:  no edema B/L LE  Skin:  WWP  Neuro:  AAOx3, R sided weakness  LABS:                        13.6   9.03  )-----------( 316      ( 01 Mar 2025 07:23 )             41.2     03-01    138  |  102  |  20  ----------------------------<  142[H]  4.1   |  27  |  0.69    Ca    8.6      01 Mar 2025 07:23  Phos  2.8     03-01  Mg     2.1     03-01    TPro  6.0  /  Alb  3.5  /  TBili  0.2  /  DBili  x   /  AST  32  /  ALT  48[H]  /  AlkPhos  89  03-01      Urinalysis Basic - ( 01 Mar 2025 07:23 )    Color: x / Appearance: x / SG: x / pH: x  Gluc: 142 mg/dL / Ketone: x  / Bili: x / Urobili: x   Blood: x / Protein: x / Nitrite: x   Leuk Esterase: x / RBC: x / WBC x   Sq Epi: x / Non Sq Epi: x / Bacteria: x

## 2025-03-01 NOTE — PROGRESS NOTE ADULT - ASSESSMENT
69 year old male with past medical history of HTN, OA, right TKA, HO craniotomy for GBM resection 04/25/2023, s/p chemo and RT 2023 with Dr. Causey, Admit at [facility] on Dec13 for reoccurrence of GBM, Sp lt craniotomy  for resection of GBM with placement of GammaTile brachytherapy on 12/19.  Pt was readmitted from home with new aphasia and dysarthria after being discharged from Acute rehab with concern for worsening tumor.  Pt s/p  MRI Brain that showed vasogenic edema and no new tumor     #Recurrent GBM s/p Craniotomy for Resection and Gammatile Brachytherapy (12/19)  - Continue management as per NSGY and Heme Onc  -Pt s/p IV Avastin 2/27, Temodar (2/27-3/3)  - Continue Keppra 1000 mg q12 and seizure precautions   - Continue Decadron taper and continue  famotidine and ISS while on steroids     #HTN  - Continue amlodipine 5mg daily w/ holding parameters    #Sinus bradycardia  - asymptomatic; monitor on telemetry  - avoid AV will blocking agents    #DVT PPx  - Continue Enoxaparin SQ daily     #DISPO  -Pt seen by PT and recommended for acute rehab

## 2025-03-02 LAB
ALBUMIN SERPL ELPH-MCNC: 4.1 G/DL — SIGNIFICANT CHANGE UP (ref 3.3–5)
ALP SERPL-CCNC: 89 U/L — SIGNIFICANT CHANGE UP (ref 40–120)
ALT FLD-CCNC: 59 U/L — HIGH (ref 10–45)
ANION GAP SERPL CALC-SCNC: 13 MMOL/L — SIGNIFICANT CHANGE UP (ref 5–17)
AST SERPL-CCNC: 26 U/L — SIGNIFICANT CHANGE UP (ref 10–40)
BILIRUB SERPL-MCNC: 0.3 MG/DL — SIGNIFICANT CHANGE UP (ref 0.2–1.2)
BUN SERPL-MCNC: 26 MG/DL — HIGH (ref 7–23)
CALCIUM SERPL-MCNC: 9.2 MG/DL — SIGNIFICANT CHANGE UP (ref 8.4–10.5)
CHLORIDE SERPL-SCNC: 101 MMOL/L — SIGNIFICANT CHANGE UP (ref 96–108)
CO2 SERPL-SCNC: 26 MMOL/L — SIGNIFICANT CHANGE UP (ref 22–31)
CREAT SERPL-MCNC: 0.75 MG/DL — SIGNIFICANT CHANGE UP (ref 0.5–1.3)
EGFR: 98 ML/MIN/1.73M2 — SIGNIFICANT CHANGE UP
EGFR: 98 ML/MIN/1.73M2 — SIGNIFICANT CHANGE UP
GLUCOSE SERPL-MCNC: 144 MG/DL — HIGH (ref 70–99)
HCT VFR BLD CALC: 48.3 % — SIGNIFICANT CHANGE UP (ref 39–50)
HGB BLD-MCNC: 15.4 G/DL — SIGNIFICANT CHANGE UP (ref 13–17)
MAGNESIUM SERPL-MCNC: 2.3 MG/DL — SIGNIFICANT CHANGE UP (ref 1.6–2.6)
MCHC RBC-ENTMCNC: 28.9 PG — SIGNIFICANT CHANGE UP (ref 27–34)
MCHC RBC-ENTMCNC: 31.9 G/DL — LOW (ref 32–36)
MCV RBC AUTO: 90.6 FL — SIGNIFICANT CHANGE UP (ref 80–100)
NRBC BLD AUTO-RTO: 0 /100 WBCS — SIGNIFICANT CHANGE UP (ref 0–0)
PHOSPHATE SERPL-MCNC: 2.9 MG/DL — SIGNIFICANT CHANGE UP (ref 2.5–4.5)
PLATELET # BLD AUTO: 392 K/UL — SIGNIFICANT CHANGE UP (ref 150–400)
POTASSIUM SERPL-MCNC: 4.8 MMOL/L — SIGNIFICANT CHANGE UP (ref 3.5–5.3)
POTASSIUM SERPL-SCNC: 4.8 MMOL/L — SIGNIFICANT CHANGE UP (ref 3.5–5.3)
PROT SERPL-MCNC: 7.2 G/DL — SIGNIFICANT CHANGE UP (ref 6–8.3)
RBC # BLD: 5.33 M/UL — SIGNIFICANT CHANGE UP (ref 4.2–5.8)
RBC # FLD: 14.5 % — SIGNIFICANT CHANGE UP (ref 10.3–14.5)
SODIUM SERPL-SCNC: 140 MMOL/L — SIGNIFICANT CHANGE UP (ref 135–145)
WBC # BLD: 11.49 K/UL — HIGH (ref 3.8–10.5)
WBC # FLD AUTO: 11.49 K/UL — HIGH (ref 3.8–10.5)

## 2025-03-02 PROCEDURE — 99233 SBSQ HOSP IP/OBS HIGH 50: CPT

## 2025-03-02 PROCEDURE — 99231 SBSQ HOSP IP/OBS SF/LOW 25: CPT

## 2025-03-02 RX ORDER — POLYETHYLENE GLYCOL 3350 17 G/17G
17 POWDER, FOR SOLUTION ORAL
Refills: 0 | Status: DISCONTINUED | OUTPATIENT
Start: 2025-03-02 | End: 2025-03-06

## 2025-03-02 RX ORDER — DEXAMETHASONE 0.5 MG/1
4 TABLET ORAL EVERY 6 HOURS
Refills: 0 | Status: COMPLETED | OUTPATIENT
Start: 2025-03-02 | End: 2025-03-03

## 2025-03-02 RX ORDER — DEXAMETHASONE 0.5 MG/1
2 TABLET ORAL EVERY 12 HOURS
Refills: 0 | Status: CANCELLED | OUTPATIENT
Start: 2025-03-15 | End: 2025-03-06

## 2025-03-02 RX ORDER — DEXAMETHASONE 0.5 MG/1
TABLET ORAL
Refills: 0 | Status: DISCONTINUED | OUTPATIENT
Start: 2025-03-02 | End: 2025-03-06

## 2025-03-02 RX ORDER — DEXAMETHASONE 0.5 MG/1
4 TABLET ORAL EVERY 8 HOURS
Refills: 0 | Status: DISCONTINUED | OUTPATIENT
Start: 2025-03-03 | End: 2025-03-06

## 2025-03-02 RX ORDER — DEXAMETHASONE 0.5 MG/1
4 TABLET ORAL EVERY 12 HOURS
Refills: 0 | Status: CANCELLED | OUTPATIENT
Start: 2025-03-09 | End: 2025-03-06

## 2025-03-02 RX ADMIN — TEMOZOLOMIDE 400 MILLIGRAM(S): 100 CAPSULE ORAL at 16:00

## 2025-03-02 RX ADMIN — Medication 20 MILLIGRAM(S): at 17:23

## 2025-03-02 RX ADMIN — LEVETIRACETAM 1000 MILLIGRAM(S): 10 INJECTION, SOLUTION INTRAVENOUS at 06:32

## 2025-03-02 RX ADMIN — DEXAMETHASONE 4 MILLIGRAM(S): 0.5 TABLET ORAL at 06:32

## 2025-03-02 RX ADMIN — DEXAMETHASONE 4 MILLIGRAM(S): 0.5 TABLET ORAL at 12:23

## 2025-03-02 RX ADMIN — Medication 20 MILLIGRAM(S): at 06:32

## 2025-03-02 RX ADMIN — DEXAMETHASONE 4 MILLIGRAM(S): 0.5 TABLET ORAL at 17:23

## 2025-03-02 RX ADMIN — AMLODIPINE BESYLATE 5 MILLIGRAM(S): 10 TABLET ORAL at 06:32

## 2025-03-02 RX ADMIN — Medication 2 TABLET(S): at 21:51

## 2025-03-02 RX ADMIN — ENOXAPARIN SODIUM 40 MILLIGRAM(S): 100 INJECTION SUBCUTANEOUS at 21:51

## 2025-03-02 RX ADMIN — LEVETIRACETAM 1000 MILLIGRAM(S): 10 INJECTION, SOLUTION INTRAVENOUS at 17:23

## 2025-03-02 RX ADMIN — DEXAMETHASONE 4 MILLIGRAM(S): 0.5 TABLET ORAL at 00:36

## 2025-03-02 RX ADMIN — POLYETHYLENE GLYCOL 3350 17 GRAM(S): 17 POWDER, FOR SOLUTION ORAL at 12:23

## 2025-03-02 NOTE — PROGRESS NOTE ADULT - SUBJECTIVE AND OBJECTIVE BOX
Patient is a 69y old  Male who presents with a chief complaint of right sided weakness (02 Mar 2025 02:05)      INTERVAL HPI/OVERNIGHT EVENTS:    Pt. seen and examined earlier today  Pt. reports no BM in 2 days, otherwise feels well  Pt. denies CP, SOB, palpitations, dizziness    Review of Systems: 12 point review of systems otherwise negative    MEDICATIONS  (STANDING):  amLODIPine   Tablet 5 milliGRAM(s) Oral daily  dexAMETHasone     Tablet   Oral   dexAMETHasone     Tablet 4 milliGRAM(s) Oral every 6 hours  enoxaparin Injectable 40 milliGRAM(s) SubCutaneous <User Schedule>  famotidine    Tablet 20 milliGRAM(s) Oral every 12 hours  levETIRAcetam 1000 milliGRAM(s) Oral every 12 hours  polyethylene glycol 3350 17 Gram(s) Oral two times a day  senna 2 Tablet(s) Oral at bedtime  temozolomide 400 milliGRAM(s) Oral daily    MEDICATIONS  (PRN):  acetaminophen     Tablet .. 650 milliGRAM(s) Oral every 6 hours PRN Temp greater or equal to 38.5C (101.3F), Mild Pain (1 - 3)      Allergies    No Known Allergies    Intolerances          Vital Signs Last 24 Hrs  T(C): 36.4 (02 Mar 2025 08:48), Max: 36.8 (01 Mar 2025 12:04)  T(F): 97.6 (02 Mar 2025 08:48), Max: 98.2 (01 Mar 2025 12:04)  HR: 64 (02 Mar 2025 08:48) (48 - 64)  BP: 126/72 (02 Mar 2025 08:48) (123/66 - 148/71)  BP(mean): --  RR: 18 (02 Mar 2025 08:48) (17 - 19)  SpO2: 94% (02 Mar 2025 08:48) (94% - 97%)    Parameters below as of 02 Mar 2025 08:48  Patient On (Oxygen Delivery Method): room air      CAPILLARY BLOOD GLUCOSE      POCT Blood Glucose.: 115 mg/dL (02 Mar 2025 07:58)  POCT Blood Glucose.: 135 mg/dL (01 Mar 2025 22:16)  POCT Blood Glucose.: 161 mg/dL (01 Mar 2025 12:27)      03-01 @ 07:01  -  03-02 @ 07:00  --------------------------------------------------------  IN: 0 mL / OUT: 1675 mL / NET: -1675 mL        Physical Exam:  (earlier today)  Daily     Daily   General:  comfortable appearing in NAD  HEENT:  MMM, R facial droop  CV:  RRR, no JVD  Lungs:  CTA B/L  Abdomen:  soft NT ND  Extremities:  no edema B/L LE  Skin:  WWP  Neuro:  AAOx3, expressive aphasia, R sided weakness    LABS:                        13.6   9.03  )-----------( 316      ( 01 Mar 2025 07:23 )             41.2     03-01    138  |  102  |  20  ----------------------------<  142[H]  4.1   |  27  |  0.69    Ca    8.6      01 Mar 2025 07:23  Phos  2.8     03-01  Mg     2.1     03-01    TPro  6.0  /  Alb  3.5  /  TBili  0.2  /  DBili  x   /  AST  32  /  ALT  48[H]  /  AlkPhos  89  03-01      Urinalysis Basic - ( 01 Mar 2025 07:23 )    Color: x / Appearance: x / SG: x / pH: x  Gluc: 142 mg/dL / Ketone: x  / Bili: x / Urobili: x   Blood: x / Protein: x / Nitrite: x   Leuk Esterase: x / RBC: x / WBC x   Sq Epi: x / Non Sq Epi: x / Bacteria: x          RADIOLOGY & ADDITIONAL TESTS:    ---------------------------------------------------------------------------  I personally reviewed: [  ]EKG   [  ]CXR    [  ] CT    [  ]Other  ---------------------------------------------------------------------------  PLEASE CHECK WHEN PRESENT:     [  ]Heart Failure     [  ] Acute     [  ] Acute on Chronic     [  ] Chronic  -------------------------------------------------------------------     [  ]Diastolic [HFpEF]     [  ]Systolic [HFrEF]     [  ]Combined [HFpEF & HFrEF]     [  ]Other:  -------------------------------------------------------------------  [  ]ROMERO     [  ]ATN     [  ]Reneal Medullary Necrosis     [  ]Renal Cortical Necrosis     [  ]Other Pathological Lesions:    [  ]CKD 1  [  ]CKD 2  [  ]CKD 3  [  ]CKD 4  [  ]CKD 5  [  ]Other  -------------------------------------------------------------------  [  ]Other/Unspecified:    --------------------------------------------------------------------    Abdominal Nutritional Status  [  ]Malnutrition: See Nutrition Note  [  ]Cachexia  [  ]Other:   [  ]Supplement Ordered:  [  ]Morbid Obesity (BMI >=40]

## 2025-03-02 NOTE — PROVIDER CONTACT NOTE (OTHER) - NAME OF MD/NP/PA/DO NOTIFIED:
After Visit Summary   2/6/2018    Brenda Morrissey    MRN: [id number]           Patient Information     Date Of Birth          1995        Visit Information        Provider Department      2/6/2018 9:30 AM [facility] ULTRASOUND [facility]        Today's Diagnoses     Vaginal bleeding           Follow-ups after your visit        Your next 10 appointments already scheduled     Mar 28, 2018 10:00 AM CDT   Annual Visit with Lisette Ordaz MD   [facility] ([facility])    [facility] [address]  [address]  [address]  [facility] [id number] [phone]              Who to contact     Please call your clinic at [phone] to:    Ask questions about your health    Make or cancel appointments    Discuss your medicines    Learn about your test results    Speak to your doctor   If you have compliments or concerns about an experience at your clinic, or if you wish to file a complaint, please contact [facility] Patient Relations at [phone] or email us at [email or web address]         Additional Information About Your Visit        MyChart Information     mySBX gives you secure access to your electronic health record. If you see a primary care provider, you can also send messages to your care team and make appointments. If you have questions, please call your primary care clinic.  If you do not have a primary care provider, please call [phone] and they will assist you.      mySBX is an electronic gateway that provides easy, online access to your medical records. With mySBX, you can request a clinic appointment, read your test results, renew a prescription or communicate with your care team.     To access your existing account, please contact your [facility] or call [phone] for assistance.        Care EveryWhere ID     This is your Care EveryWhere  Genie De Leon ID. This could be used by other organizations to access your [facility] medical records  [id number]         Blood Pressure from Last 3 Encounters:   02/02/18 103/71   01/11/16 103/66   07/31/15 105/69    Weight from Last 3 Encounters:   02/02/18 64 kg (141 lb)   02/12/16 55.9 kg (123 lb 4.8 oz) (41 %)*   01/11/16 57.6 kg (127 lb) (49 %)*     * Growth percentiles are based on [facility] 2-20 Years data.              We Performed the Following     US GYN Complete Transvaginal - [id number] (In Clinic)        Primary Care Provider Office Phone # Fax #    Elma Montes MD PhD [phone]       [address] [place] [id number]        Equal Access to Services     [facility]: Hadii aad ku hadasho Soernesto, waaxda luqadaha, qaybta kaalmada adeharjinder, bob adames . So [facility] [phone].    ATENCIÓN: Si habla español, tiene a villalta disposición servicios gratuitos de asistencia lingüística. [facility] [phone].    We comply with applicable federal civil rights laws and [place] laws. We do not discriminate on the basis of race, color, national origin, age, disability, sex, sexual orientation, or gender identity.            Thank you!     Thank you for choosing [facility]  for your care. Our goal is always to provide you with excellent care. Hearing back from our patients is one way we can continue to improve our services. Please take a few minutes to complete the written survey that you may receive in the mail after your visit with us. Thank you!             Your Updated Medication List - Protect others around you: Learn how to safely use, store and throw away your medicines at www.disposemymeds.org.          This list is accurate as of 2/6/18 11:59 PM.  Always use your most recent med list.                   Brand Name Dispense Instructions for use Diagnosis    clindamycin-benzoyl peroxide gel    BENZACLIN    30 g    Apply topically as needed Apply a  thin layer on the face every morning    Acne       conjugated estrogens cream    PREMARIN    5 g    Place 0.5 g vaginally daily    Vulvar irritation       estradiol 0.5 MG tablet    ESTRACE    30 tablet    Take 1 tablet (0.5 mg) by mouth daily as needed    Vaginal bleeding       etonogestrel-ethinyl estradiol 0.12-0.015 MG/24HR vaginal ring    NUVARING    3 each    Place 1 ring every 28 days. Continuous use. No days off.    PCOS (polycystic ovarian syndrome)       metFORMIN 500 MG 24 hr tablet    GLUCOPHAGE-XR    90 tablet    Take 1 tablet (500 mg) by mouth daily (with dinner)    PCOS (polycystic ovarian syndrome)       MULTIVITAMINS PO      Take  by mouth daily.        spironolactone 100 MG tablet    ALDACTONE    90 tablet    Take 1 tablet (100 mg) by mouth 2 times daily    PCOS (polycystic ovarian syndrome)       TAZORAC EX           tretinoin microspheres 0.1 % topical gel    RETIN-A MICRO     Apply  topically At Bedtime. For acne, thinly apply at bedtime. Skip if irritated        WELLBUTRIN PO      Take 50 mg by mouth daily           Alok Cabrera Giulia Connolly present

## 2025-03-02 NOTE — PROGRESS NOTE ADULT - ASSESSMENT
69 year old male with past medical history of HTN, OA, right TKA, HO craniotomy for GBM resection 04/25/2023, s/p chemo and RT 2023 with Dr. Causey, Admit at [facility] on Dec13 for reoccurrence of GBM, Sp lt craniotomy  for resection of GBM with placement of GammaTile brachytherapy on 12/19.  Pt was readmitted from home with new aphasia and dysarthria after being discharged from Acute rehab with concern for worsening tumor.  Pt s/p  MRI Brain that showed vasogenic edema and no new tumor     #Recurrent GBM s/p Craniotomy for Resection and Gammatile Brachytherapy (12/19)  - Continue management as per NSGY and Heme Onc  -Pt s/p IV Avastin 2/27, Temodar (2/27-3/3)  - Continue Keppra 1000 mg q12 and seizure precautions   - Continue Decadron taper and continue  famotidine and ISS while on steroids     #HTN  - Continue amlodipine 5mg daily w/ holding parameters    #Sinus bradycardia  - asymptomatic; intermittent;  - monitor on telemetry  - avoid AV will blocking agents    #Constipation  - Continue bowel regimen, can increase Senna to BID    #DVT PPx  - Continue Enoxaparin SQ daily     #DISPO  -Pt seen by PT and recommended for acute rehab

## 2025-03-02 NOTE — PROGRESS NOTE ADULT - SUBJECTIVE AND OBJECTIVE BOX
HPI:  69M with pmh HTN, L frontoparietal GBM s/p biopsy @[facility] 2023, resection x2 April/2023, Dec/2024 with GammaTile placement (20 seeds/5 Tiles), s/p >6 cycles of TMZ (Aug/2023-Jan/2024), RT (60 Gy in 30 fxns (June/2023-July/2024) presents with worsening right sided weakness and expressive aphasia x 1 week. Patient was due to start the last week of a decadron taper today at 1mg daily. He was brought to [facility] ER last night for symptoms where CTH  showed worsening R sided whole-hemisphere vasogenic edema susp 2/2 GammaTile and Dex taper. MRI brain was completed this AM and he received 10mg IV decadron at 6AM. Transferred to [facility] for further workup. Patient denies headache, nausea, vomiting, or vision changes. Patient has been compliant with his home keppra 1g BID.  (25 Feb 2025 15:09)    HOSPITAL COURSE:  2/25: admitted to [facility]  2/26: KYA overnight. Heme/onc consulted for possible IV avastin  2/27: KYA overnight. S/p IV avastin, temodar started. PT/OR rec AR.  2/29: KYA overnight, neuro stable.  3/1: KYA overnight, neuro stable.   3/2: KYA overnight, neuro stable.     Vital Signs Last 24 Hrs  T(C): 36.7 (02 Mar 2025 00:20), Max: 36.8 (01 Mar 2025 12:04)  T(F): 98 (02 Mar 2025 00:20), Max: 98.2 (01 Mar 2025 12:04)  HR: 62 (01 Mar 2025 20:38) (48 - 62)  BP: 146/80 (02 Mar 2025 00:20) (123/66 - 148/71)  BP(mean): --  RR: 18 (02 Mar 2025 00:20) (17 - 19)  SpO2: 96% (02 Mar 2025 00:20) (94% - 97%)    Parameters below as of 02 Mar 2025 00:20  Patient On (Oxygen Delivery Method): room air    I&O's Summary    28 Feb 2025 07:01  -  01 Mar 2025 07:00  --------------------------------------------------------  IN: 180 mL / OUT: 900 mL / NET: -720 mL    01 Mar 2025 07:01  -  02 Mar 2025 02:05  --------------------------------------------------------  IN: 0 mL / OUT: 800 mL / NET: -800 mL    PHYSICAL EXAM:  General: NAD, pt is comfortably sitting up in bed, on room air  HEENT: PERRL 3mm briskly reactive, EOMI b/l, (+) dysconjugate gaze, (+) Right facial droop  Cardiovascular: RRR, S1, S2  Respiratory: breathing non-labored on RA, chest rise symmetric  GI: abd soft, NTND   Neuro: A&Ox3, No aphasia, speech clear, no dysmetria, unable to assess pronator drift 2/2 RUE strength. Follows commands.  RU delt 2/5, tricep 4/5, bicep 3/5 HG 2/5; LUE 5/5. Right HF 3/5, KF/KE w assistance 5/5, dorsiflexion 1/5, plantarflexion 5/5; LLE 5/5.   Dec sensation (60%) to RUE and RLE and R face  Extremities: distal pulses 2+ x4    DIET:  [] NPO  [X] Mechanical  [] Tube feeds    LABS:                        13.6   9.03  )-----------( 316      ( 01 Mar 2025 07:23 )             41.2     03-01    138  |  102  |  20  ----------------------------<  142[H]  4.1   |  27  |  0.69    Ca    8.6      01 Mar 2025 07:23  Phos  2.8     03-01  Mg     2.1     03-01    TPro  6.0  /  Alb  3.5  /  TBili  0.2  /  DBili  x   /  AST  32  /  ALT  48[H]  /  AlkPhos  89  03-01      Urinalysis Basic - ( 01 Mar 2025 07:23 )    Color: x / Appearance: x / SG: x / pH: x  Gluc: 142 mg/dL / Ketone: x  / Bili: x / Urobili: x   Blood: x / Protein: x / Nitrite: x   Leuk Esterase: x / RBC: x / WBC x   Sq Epi: x / Non Sq Epi: x / Bacteria: x      CAPILLARY BLOOD GLUCOSE    POCT Blood Glucose.: 135 mg/dL (01 Mar 2025 22:16)  POCT Blood Glucose.: 161 mg/dL (01 Mar 2025 12:27)  POCT Blood Glucose.: 153 mg/dL (01 Mar 2025 07:27)    Allergies    No Known Allergies    Intolerances      MEDICATIONS:  Antibiotics:    Neuro:  acetaminophen     Tablet .. 650 milliGRAM(s) Oral every 6 hours PRN  levETIRAcetam 1000 milliGRAM(s) Oral every 12 hours    Anticoagulation:  enoxaparin Injectable 40 milliGRAM(s) SubCutaneous <User Schedule>    OTHER:  amLODIPine   Tablet 5 milliGRAM(s) Oral daily  dexAMETHasone     Tablet 4 milliGRAM(s) Oral every 6 hours  famotidine    Tablet 20 milliGRAM(s) Oral two times a day  polyethylene glycol 3350 17 Gram(s) Oral daily  senna 2 Tablet(s) Oral at bedtime  temozolomide 400 milliGRAM(s) Oral daily    ASSESSMENT:  69M with pmh HTN, L frontoparietal GBM s/p biopsy @[facility] 2023, resection x2 April/2023, Dec/2024 with GammaTile placement (20 seeds/5 Tiles), s/p >6 cycles of TMZ (Aug/2023-Jan/2024), RT (60 Gy in 30 fxns (June/2023-July/2024) presents with worsening right sided weakness and expressive aphasia x 1 week. At [facility] ER CTH  showed worsening R sided whole-hemisphere vasogenic edema susp 2/2 GammaTile and Dex taper and MR brain completed. Transferred to [facility] for further workup.     CEREBRAL VASOGENIC EDEMA    Dependence on wheelchair-Z99.3    Encounter for follow-up examination after completed treatment for conditions other than malignant neoplasm-Z09    Weakness-R53.1    Handoff    MEWS Score    Hypertension    Osteoarthritis    Brain mass    Glioblastoma    Glioblastoma    Osteoarthritis    Hypertension    S/P total knee replacement, right    H/O craniotomy    SysAdmin_VstLnk    PLAN:  Neuro:  - neuro/vital checks q4  - pain control: tylenol prn  - MRI brain w/ sarah completed 2/25  - CTH 2/24: worsening R sided whole-hemisphere vasogenic edema  - seizure prophylaxis: cont home keppra 1g q12  - decadron 4q6 for vasogenic edema  - GBM: hold home metformin 500mg BID    Cardiac:  - SBP goal 100-160  - HTN: cont home amlodipine 5mg daily    Pulmonary:  - on RA    GI:  - regular diet  - bowel regimen  - pepcid 20mg BID while on steroids    Renal:  - IVL  - Na goal 135-145    Heme:  - SCDs and SQL for DVT prophylaxis  - heme/onc consult, s/p IV Avastin 2/27, temodar (2/27-3/3)    Endo:  - A1C 5.9    ID:  - afebrile    Disposition: tele, full code, dispo PT/OT rec AR.    D/w Dr. Sue

## 2025-03-02 NOTE — CHART NOTE - NSCHARTNOTEFT_GEN_A_CORE
Mr. Quiroga is a 69 year old man with recurrent glioblastoma, s/p re-resection w/ gammatile placement in 12/2024, now admitted with progressive disease and symptomatic vasogenic edema.  His outpatient dexamethasone dose had been tapered to 2 mg po daily;  but has now been increased during this admission.     He is s/p bevacizumab, currently on TMZ  x 5 days every 28 days. Today is Day 4. The goal of this treatment is palliative, primarily aimed at alleviating his symptomatic vasogenic edema and allow for tapering of the steroids.    Dexamethasone dosing and taper per NSGY.    the patient is tolerating the treatment well. Denies any nausea, vomiting, fatigue or any other concerns.

## 2025-03-03 ENCOUNTER — NON-APPOINTMENT (OUTPATIENT)
Age: 70
End: 2025-03-03

## 2025-03-03 LAB
ALBUMIN SERPL ELPH-MCNC: 3.7 G/DL — SIGNIFICANT CHANGE UP (ref 3.3–5)
ALP SERPL-CCNC: 93 U/L — SIGNIFICANT CHANGE UP (ref 40–120)
ALT FLD-CCNC: 55 U/L — HIGH (ref 10–45)
ANION GAP SERPL CALC-SCNC: 9 MMOL/L — SIGNIFICANT CHANGE UP (ref 5–17)
AST SERPL-CCNC: 22 U/L — SIGNIFICANT CHANGE UP (ref 10–40)
BILIRUB DIRECT SERPL-MCNC: 0.1 MG/DL — SIGNIFICANT CHANGE UP (ref 0–0.3)
BILIRUB INDIRECT FLD-MCNC: 0.2 MG/DL — SIGNIFICANT CHANGE UP (ref 0.2–1)
BILIRUB SERPL-MCNC: 0.3 MG/DL — SIGNIFICANT CHANGE UP (ref 0.2–1.2)
BUN SERPL-MCNC: 23 MG/DL — SIGNIFICANT CHANGE UP (ref 7–23)
CALCIUM SERPL-MCNC: 8.6 MG/DL — SIGNIFICANT CHANGE UP (ref 8.4–10.5)
CHLORIDE SERPL-SCNC: 99 MMOL/L — SIGNIFICANT CHANGE UP (ref 96–108)
CO2 SERPL-SCNC: 27 MMOL/L — SIGNIFICANT CHANGE UP (ref 22–31)
CREAT SERPL-MCNC: 0.73 MG/DL — SIGNIFICANT CHANGE UP (ref 0.5–1.3)
EGFR: 99 ML/MIN/1.73M2 — SIGNIFICANT CHANGE UP
EGFR: 99 ML/MIN/1.73M2 — SIGNIFICANT CHANGE UP
GLUCOSE SERPL-MCNC: 135 MG/DL — HIGH (ref 70–99)
HCT VFR BLD CALC: 44.1 % — SIGNIFICANT CHANGE UP (ref 39–50)
HGB BLD-MCNC: 14.1 G/DL — SIGNIFICANT CHANGE UP (ref 13–17)
MAGNESIUM SERPL-MCNC: 2.2 MG/DL — SIGNIFICANT CHANGE UP (ref 1.6–2.6)
MCHC RBC-ENTMCNC: 29.6 PG — SIGNIFICANT CHANGE UP (ref 27–34)
MCHC RBC-ENTMCNC: 32 G/DL — SIGNIFICANT CHANGE UP (ref 32–36)
MCV RBC AUTO: 92.5 FL — SIGNIFICANT CHANGE UP (ref 80–100)
NRBC BLD AUTO-RTO: 0 /100 WBCS — SIGNIFICANT CHANGE UP (ref 0–0)
PHOSPHATE SERPL-MCNC: 3.1 MG/DL — SIGNIFICANT CHANGE UP (ref 2.5–4.5)
PLATELET # BLD AUTO: 333 K/UL — SIGNIFICANT CHANGE UP (ref 150–400)
POTASSIUM SERPL-MCNC: 4.5 MMOL/L — SIGNIFICANT CHANGE UP (ref 3.5–5.3)
POTASSIUM SERPL-SCNC: 4.5 MMOL/L — SIGNIFICANT CHANGE UP (ref 3.5–5.3)
PROT SERPL-MCNC: 6.1 G/DL — SIGNIFICANT CHANGE UP (ref 6–8.3)
RBC # BLD: 4.77 M/UL — SIGNIFICANT CHANGE UP (ref 4.2–5.8)
RBC # FLD: 14.4 % — SIGNIFICANT CHANGE UP (ref 10.3–14.5)
SODIUM SERPL-SCNC: 135 MMOL/L — SIGNIFICANT CHANGE UP (ref 135–145)
WBC # BLD: 10.54 K/UL — HIGH (ref 3.8–10.5)
WBC # FLD AUTO: 10.54 K/UL — HIGH (ref 3.8–10.5)

## 2025-03-03 PROCEDURE — 99232 SBSQ HOSP IP/OBS MODERATE 35: CPT

## 2025-03-03 PROCEDURE — 99223 1ST HOSP IP/OBS HIGH 75: CPT | Mod: GC

## 2025-03-03 RX ORDER — SULFAMETHOXAZOLE/TRIMETHOPRIM 800-160 MG
1 TABLET ORAL
Refills: 0 | Status: DISCONTINUED | OUTPATIENT
Start: 2025-03-03 | End: 2025-03-06

## 2025-03-03 RX ADMIN — ENOXAPARIN SODIUM 40 MILLIGRAM(S): 100 INJECTION SUBCUTANEOUS at 21:25

## 2025-03-03 RX ADMIN — DEXAMETHASONE 4 MILLIGRAM(S): 0.5 TABLET ORAL at 21:25

## 2025-03-03 RX ADMIN — LEVETIRACETAM 1000 MILLIGRAM(S): 10 INJECTION, SOLUTION INTRAVENOUS at 17:39

## 2025-03-03 RX ADMIN — DEXAMETHASONE 4 MILLIGRAM(S): 0.5 TABLET ORAL at 00:51

## 2025-03-03 RX ADMIN — AMLODIPINE BESYLATE 5 MILLIGRAM(S): 10 TABLET ORAL at 05:56

## 2025-03-03 RX ADMIN — Medication 20 MILLIGRAM(S): at 17:40

## 2025-03-03 RX ADMIN — DEXAMETHASONE 4 MILLIGRAM(S): 0.5 TABLET ORAL at 14:47

## 2025-03-03 RX ADMIN — DEXAMETHASONE 4 MILLIGRAM(S): 0.5 TABLET ORAL at 05:57

## 2025-03-03 RX ADMIN — TEMOZOLOMIDE 400 MILLIGRAM(S): 100 CAPSULE ORAL at 11:55

## 2025-03-03 RX ADMIN — LEVETIRACETAM 1000 MILLIGRAM(S): 10 INJECTION, SOLUTION INTRAVENOUS at 05:57

## 2025-03-03 RX ADMIN — Medication 2 TABLET(S): at 21:25

## 2025-03-03 RX ADMIN — Medication 20 MILLIGRAM(S): at 05:56

## 2025-03-03 NOTE — PROGRESS NOTE ADULT - ASSESSMENT
69 year old male with past medical history of HTN, OA of the knee, status post replacement, now with glioblastoma (MGMT methylated), treated with TMZ + RT (6/6/23-7/18/23), status post stereotactic needle biopsy and laser interstitial thermal therapy by Dr. Tobar (4/7/2023), then re-operation with  on 4/25/2023 with GTR.  Followed by additional TMZ x 6 cycles (8/15/23-1/30/24).  He suffered a recurrence in December 2024, followed by re-resection with Gammatile placement.      #MGMT methylated GBM  --oncologic history as above, Now presents with worsening right sided weakness and expressive aphasia x 1 week. At [facility] ER CTH showed worsening R sided whole-hemisphere vasogenic edema. Transferred to [facility] for further workup.   --MRb 2/25 w/ rim-enhancing structure at the left posterior hemispheric operative bed appears similar to 1/30/2025. The extensive infiltrative hyperintensity on the long TR images consistent with vasogenic edema and nonenhancing tumor has mildly increased and its associated mass effect with increased right-to-left subfalcine herniation although appears comparable infiltrative extent within the left cerebral hemisphere, contralateral extent into the right cerebral hemisphere and downward extent to the ventral avila.  --steroids per neurosurgery, taper as an outpatient per their plans   **Bevacizumab 10 mg/kg IV x1 (2/27/25) and Temozolomide which will be dosed at 200 mg/m2 (400 mg) po daily x 5 days every 28 days (2/27-3/3)**  --Future Bevacizumab and TMZ cycles will be discussed by new neuro-oncologist outpatient Dr. Guy Hubbard at [facility] via  and patient will follow locally in person at [facility] with Dr. Mix, coordination pending   --Advised Karen to fill prescription for TMZ to have pills available in case patient is discharged to rehab before completion. Prescription of TMZ is for 100 mg, so 4 pills would be 400 mg. They should not start a new cycle until confirmed by outpatient oncologist and labs are repeated.  --discussed with patient and family: the goal of this treatment is palliative, primarily aimed at alleviating his symptomatic vasogenic edema and permit tapering of the steroids.    --Risks and side effect of this therapy were discussed in detail w/ the patient and his family;  informed consent for treatment was obtained.    patient pending BAIRON placement

## 2025-03-03 NOTE — PROGRESS NOTE ADULT - SUBJECTIVE AND OBJECTIVE BOX
HPI:  69M with pmh HTN, L frontoparietal GBM s/p biopsy @[facility] 20[facility], resection x2 April/2023, Dec/2024 with GammaTile placement (20 seeds/5 Tiles), s/p >6 cycles of TMZ (Aug/2023-Jan/2024), RT (60 Gy in 30 fxns (June/2023-July/2024) presents with worsening right sided weakness and expressive aphasia x 1 week. Patient was due to start the last week of a decadron taper today at 1mg daily. He was brought to [facility] ER last night for symptoms where CTH  showed worsening R sided whole-hemisphere vasogenic edema susp 2/2 GammaTile and Dex taper. MRI brain was completed this AM and he received 10mg IV decadron at 6AM. Transferred to [facility] for further workup. Patient denies headache, nausea, vomiting, or vision changes. Patient has been compliant with his home keppra 1g BID.  (25 Feb 2025 15:09)    HOSPITAL COURSE:  2/25: admitted to [facility]  2/26: KYA overnight. Heme/onc consulted for possible IV avastin  2/27: KYA overnight. S/p IV avastin, temodar started. PT/OR rec AR.  2/29: KYA overnight, neuro stable.  3/1: KYA overnight, neuro stable.   3/2: KYA overnight, neuro stable. Decadron taper to 2BID over 2 weeks.   3/3: KYA overnight, neuro stable.     Vital Signs Last 24 Hrs  T(C): 36.6 (03 Mar 2025 00:10), Max: 37.1 (02 Mar 2025 21:45)  T(F): 97.9 (03 Mar 2025 00:10), Max: 98.7 (02 Mar 2025 21:45)  HR: 74 (03 Mar 2025 00:10) (48 - 74)  BP: 150/90 (03 Mar 2025 00:10) (122/74 - 150/90)  BP(mean): --  RR: 17 (03 Mar 2025 00:10) (17 - 18)  SpO2: 97% (03 Mar 2025 00:10) (94% - 99%)    Parameters below as of 03 Mar 2025 00:10  Patient On (Oxygen Delivery Method): room air    I&O's Summary    01 Mar 2025 07:01  -  02 Mar 2025 07:00  --------------------------------------------------------  IN: 0 mL / OUT: 1675 mL / NET: -1675 mL    02 Mar 2025 07:01  -  03 Mar 2025 04:08  --------------------------------------------------------  IN: 0 mL / OUT: 500 mL / NET: -500 mL    PHYSICAL EXAM:  General: NAD, pt is comfortably sitting up in bed, on room air  HEENT: PERRL 3mm briskly reactive, EOMI b/l, (+) dysconjugate gaze, (+) Right facial droop  Cardiovascular: RRR, S1, S2  Respiratory: breathing non-labored on RA, chest rise symmetric  GI: abd soft, NTND   Neuro: A&Ox3, No aphasia, speech clear, no dysmetria, unable to assess pronator drift 2/2 RUE strength. Follows commands.  RU delt 2/5, tricep 4/5, bicep 3/5 HG 2/5; LUE 5/5. Right HF 3/5, KF/KE w assistance 5/5, dorsiflexion 1/5, plantarflexion 5/5; LLE 5/5.   Dec sensation (60%) to RUE and RLE and R face  Extremities: distal pulses 2+ x4    DIET:  [] NPO  [X] Mechanical  [] Tube feeds    LABS:                        15.4   11.49 )-----------( 392      ( 02 Mar 2025 14:59 )             48.3     03-02    140  |  101  |  26[H]  ----------------------------<  144[H]  4.8   |  26  |  0.75    Ca    9.2      02 Mar 2025 14:59  Phos  2.9     03-02  Mg     2.3     03-02    TPro  7.2  /  Alb  4.1  /  TBili  0.3  /  DBili  x   /  AST  26  /  ALT  59[H]  /  AlkPhos  89  03-02      Urinalysis Basic - ( 02 Mar 2025 14:59 )    Color: x / Appearance: x / SG: x / pH: x  Gluc: 144 mg/dL / Ketone: x  / Bili: x / Urobili: x   Blood: x / Protein: x / Nitrite: x   Leuk Esterase: x / RBC: x / WBC x   Sq Epi: x / Non Sq Epi: x / Bacteria: x      CAPILLARY BLOOD GLUCOSE      POCT Blood Glucose.: 115 mg/dL (02 Mar 2025 07:58)    Allergies    No Known Allergies    Intolerances      MEDICATIONS:  Antibiotics:    Neuro:  acetaminophen     Tablet .. 650 milliGRAM(s) Oral every 6 hours PRN  levETIRAcetam 1000 milliGRAM(s) Oral every 12 hours    Anticoagulation:  enoxaparin Injectable 40 milliGRAM(s) SubCutaneous <User Schedule>    OTHER:  amLODIPine   Tablet 5 milliGRAM(s) Oral daily  dexAMETHasone     Tablet   Oral   dexAMETHasone     Tablet 4 milliGRAM(s) Oral every 6 hours  dexAMETHasone     Tablet 4 milliGRAM(s) Oral every 8 hours  famotidine    Tablet 20 milliGRAM(s) Oral every 12 hours  polyethylene glycol 3350 17 Gram(s) Oral two times a day  senna 2 Tablet(s) Oral at bedtime  temozolomide 400 milliGRAM(s) Oral daily    ASSESSMENT:  69M with pmh HTN, L frontoparietal GBM s/p biopsy @[facility] 20[facility], resection x2 April/2023, Dec/2024 with GammaTile placement (20 seeds/5 Tiles), s/p >6 cycles of TMZ (Aug/2023-Jan/2024), RT (60 Gy in 30 fxns (June/2023-July/2024) presents with worsening right sided weakness and expressive aphasia x 1 week. At [facility] ER CTH  showed worsening R sided whole-hemisphere vasogenic edema susp 2/2 GammaTile and Dex taper and MR brain completed. Transferred to [facility] for further workup.     CEREBRAL VASOGENIC EDEMA    Dependence on wheelchair-Z99.3    Encounter for follow-up examination after completed treatment for conditions other than malignant neoplasm-Z09    Weakness-R53.1    Handoff    MEWS Score    Hypertension    Osteoarthritis    Brain mass    Glioblastoma    Glioblastoma    Osteoarthritis    Hypertension    S/P total knee replacement, right    H/O craniotomy    SysAdmin_VstLnk    PLAN:  Neuro:  - neuro/vital checks q4  - pain control: tylenol prn  - MRI brain w/ sarah completed 2/25  - CTH 2/24: worsening R sided whole-hemisphere vasogenic edema  - seizure prophylaxis: cont home keppra 1g q12  - decadron 4q6 taper over 2 weeks to 2 BID for vasogenic edema  - GBM: hold home metformin 500mg BID    Cardiac:  - SBP goal 100-160  - HTN: cont home amlodipine 5mg daily    Pulmonary:  - on RA    GI:  - regular diet  - bowel regimen  - pepcid 20mg BID while on steroids    Renal:  - IVL  - Na goal 135-145    Heme:  - SCDs and SQL for DVT prophylaxis  - heme/onc consult, s/p IV Avastin 2/27, temodar (2/27-3/3)    Endo:  - A1C 5.9    ID:  - afebrile    Disposition: tele, full code, dispo PT/OT rec AR.    D/w Dr. Sue

## 2025-03-03 NOTE — CONSULT NOTE ADULT - SUBJECTIVE AND OBJECTIVE BOX
HPI:  69M with pmh HTN, L frontoparietal GBM s/p biopsy @[facility] 2023, resection x2 April/2023, Dec/2024 with GammaTile placement (20 seeds/5 Tiles), s/p >6 cycles of TMZ (Aug/2023-Jan/2024), RT (60 Gy in 30 fxns (June/2023-July/2024) presents with worsening right sided weakness and expressive aphasia x 1 week. CTH  showed worsening R sided whole-hemisphere vasogenic edema susp 2/2 GammaTile and Dex taper. Transferred to [facility] for further workup. Decadron 4q6 taper over 2 weeks to 2 BID for vasogenic edema. PT/OT recommended AR. PM&R consulted for evaluation of rehab needs.    -----------------------------------------------------------------------  SUBJECTIVE:      -----------------------------------------------------------------------  REVIEW OF SYSTEMS  Constitutional - No fever,  +fatigue  Neurological -   Pain -   Bowel -   Bladder -   -----------------------------------------------------------------------  FUNCTIONAL HISTORY:  PTA - independent with funcitonal mobility and ADLs  +Stairs at home    CURRENT FUNCTIONAL STATUS:    Physical Therapy: 2/27      Bed Mobility: Rolling/Turning:     · Level of [place]	contact guard  · Physical Assist/Nonphysical Assist	1 person assist; verbal cues    Bed Mobility: Sit to Supine:     · Level of [place]	N/T as pt left seated in chair    Bed Mobility: Supine to Sit:     · Level of [place]	contact guard  · Physical Assist/Nonphysical Assist	1 person assist; verbal cues    Bed Mobility Analysis:     · Bed Mobility Limitations	impaired ability to control trunk for mobility; decreased ability to use legs for bridging/pushing  · Impairments Contributing to Impaired Bed Mobility	impaired balance; narrow base of support; impaired postural control; decreased strength    Transfer: Sit to Stand:     · Level of [place]	moderate assist (50% patients effort)  · Physical Assist/Nonphysical Assist	2 person assist; verbal cues  · Assistive Device	B Handheld Assist, trunk assist    Transfer: Stand to Sit:     · Level of [place]	moderate assist (50% patients effort)  · Physical Assist/Nonphysical Assist	2 person assist; verbal cues    Sit/Stand Transfer Safety Analysis:     · Transfer Safety Concerns Noted	decreased balance during turns; decreased step length; decreased weight-shifting ability  · Impairments Contributing to Impaired Transfers	impaired balance; narrow base of support; impaired postural control; decreased strength    Gait Skills:     · Level of [place]	moderate assist (50% patients effort)  · Physical Assist/Nonphysical Assist	2 person assist; verbal cues  · Gait Distance	5 feet x 2    Gait Analysis:     · Gait Deviations Noted	decreased che; decreased step length; decreased weight-shifting ability; impaired ability to lift and advance RLE; impaired foot placement and sequencing  · Impairments Contributing to Gait Deviations	impaired coordination; narrow base of support; impaired postural control; decreased strength    Balance Skills Assessment:     · Sitting Balance: Static	supervision  · Sitting Balance: Dynamic	supervision  · Sit-to-Stand Balance	mod A x 2  · Standing Balance: Static	mod A x 2  · Standing Balance: Dynamic	mod A x 2  · Systems Impairment Contributing to Balance Disturbance	musculoskeletal; neuromuscular  · Identified Impairments Contributing to Balance Disturbance	decreased strength; impaired postural control    Sensory Examination:    Sensory Examination:    Grossly Intact:   · Gross Sensory Examination	Grossly Intact; Left UE; Left LE; impaired RUE/RLE    · Coordination Assessed	finger to nose; LUE intact      Proprioception:   · Coordination Assessed	finger to nose; LUE intact    · Sensory Tests	(L) hand  5/5, (R) hand  2/5. CN Testing: B/L Frontalis intact; B/L buccinator intact; smile R droop; tongue protrusion at midline; B/L eyes open/close intact; Shoulder elevation: R impaired; Vision H-Test: bilateral tracking and smooth pursuit intact; Convergence/Divergence: intact    Clinical Impressions:   · Criteria for Skilled Therapeutic Interventions	impairments found; functional limitations in following categories; risk reduction/prevention; anticipated discharge recommendation  · Impairments Found (describe specific impairments)	posture; gait, locomotion, and balance; muscle strength      Occupational Therapy: 2/27    Cognitive Status Examination:   · Level of Consciousness	alert  · Orientation	person; place; situation; o-log score = 28/30  · Follow Commands/Answers Questions	100% of the time; aphasia/oral apraxia  · Type of aphasia	expressive  · Personal Safety and Judgment	intact  · Short Term Memory	intact  · Long Term Memory	intact    Range of Motion Exam:   · Range of Motion Examination, Upper Extremity	bilateral UE Passive ROM was WNL (within normal limits); 2-3 finger R shoulder subluxation  · Range of Motion Examination, Lower Extremity	bilateral LE Passive ROM was WNL (within normal limits)    Extremity Manual Muscle Testing:     · Right Upper Extremity	shoulder 2-/5; elbow 2/5;  2/5  · Left Upper Extremity	5/5 for all major pivots  · Right Lower Extremity	hip 3/5; knee 2-/5; ankle DF 0/5; ankle PF 1/5  · Left Lower Extremity	5/5 for all major pivots    Muscle Tone Assessment:   · Muscle Tone Assessment	hypotonic; right-side extremities    Bed Mobility: Rolling/Turning:     · Level of [place]	moderate assist (50% patients effort)  · Physical Assist/Nonphysical Assist	verbal cues; nonverbal cues (demo/gestures); 1 person assist    Bed Mobility: Scooting/Bridging:     · Level of [place]	moderate assist (50% patients effort)  · Physical Assist/Nonphysical Assist	verbal cues; nonverbal cues (demo/gestures); 1 person assist    Bed Mobility: Sit to Supine:     · Level of [place]	NT - pt left OOBTC    Bed Mobility: Supine to Sit:     · Level of [place]	minimum assist (75% patients effort)  · Physical Assist/Nonphysical Assist	2 person assist; verbal cues; nonverbal cues (demo/gestures)    Bed Mobility Analysis:     · Bed Mobility Limitations	decreased ability to use arms for pushing/pulling; decreased ability to use legs for bridging/pushing; impaired ability to control trunk for mobility  · Impairments Contributing to Impaired Bed Mobility	decreased strength; impaired postural control; decreased flexibility; impaired motor control; impaired balance    Transfer: Sit to Stand:     · Level of [place]	minimum assist (75% patients effort)  · Physical Assist/Nonphysical Assist	2 person assist; verbal cues; nonverbal cues (demo/gestures)  · Assistive Device	b/l hand held assist    Transfer: Stand to Sit:     · Level of [place]	minimum assist (75% patients effort)  · Physical Assist/Nonphysical Assist	2 person assist; verbal cues; nonverbal cues (demo/gestures)  · Assistive Device	b/l hand held assist    Sit/Stand Transfer Safety Analysis:     · Transfer Safety Concerns Noted	decreased weight-shifting ability  · Impairments Contributing to Impaired Transfers	decreased strength; impaired postural control; impaired motor control; decreased flexibility; impaired balance    Balance Skills Assessment:     · Sitting Balance: Static	fair plus  · Sitting Balance: Dynamic	poor plus  · Sit-to-Stand Balance	poor balance  · Standing Balance: Static	poor balance  · Standing Balance: Dynamic	poor balance  · Systems Impairment Contributing to Balance Disturbance	neuromuscular; musculoskeletal  · Identified Impairments Contributing to Balance Disturbance	decreased strength; impaired postural control; impaired motor control; abnormal muscle tone; impaired coordination    Sensory Examination:     Light Touch Sensation:   · Left UE	within normal limits  · Right UE	absent sensation  · Left LE	within normal limits  · Right LE	severe impairment      Deep Pressure Sensation:   · Left UE	within normal limits  · Right UE	moderate impairment  · Left LE	within normal limits  · Right LE	mild impairment      Pain Sensation:   · Left UE	within normal limits  · Right UE	within normal limits  · Left LE	within normal limits  · Right LE	within normal limits      Proprioception:   · Left UE	within normal limits  · Right UE	severe impairment  · Left LE	within normal limits  · Right LE	moderate impairment    · Sensory Tests	Cranial Nerves : facial sensation impaired on R side to light touch V1-V3 b/l VII: no ptosis, R facial droop, decreased R eyebrow raise and closure VIII: hearing intact to finger rub b/l  XI: head turning and shoulder shrug impaired on R side XII: tongue protrusion midline    Visual Assessment:    Visual Assessment:    Visual Assessment:   · Visual Tracking	sluggish tracking, however able to reach end range in all directions/fields  · Visual Neglect	none  · Eye Muscle Balance	strabismus present  · Visual Convergence	sluggish    Fine Motor Coordination:     Fine Motor Coordination:   · Left Hand, Finger to Nose	normal performance  · Right Hand, Finger to Nose	unable to perform  2/2 severely impaired RUE strength  · Left Hand Thumb/Finger Opposition Skills	normal performance  · Right Hand Thumb/Finger Opposition Skills	severe impairment  · Left Hand, Diadochokinesis Skills	normal performance  · Right Hand, Diadochokinesis Skills	severe impairment    Upper Body Dressing Training:     · Level of [place]	maximum assist (25% patients effort); don/doff back gown while seated at EOB  · Physical Assist/Nonphysical Assist	1 person assist; verbal cues; nonverbal cues (demo/gestures)    Treatment Locations:   · Comments	Patient scored a 28/30 on the O-Log. A score of >25 is associated with normal orientation. Pt requires cues to orient to date and clock time. Pt performed bed mobility, requiring 1-2 person assist 2/2 impaired RUE/RLE strength, impacting ability to perform functional reaching/weight shifting. Pt demo ability to maintain unsupported static sitting at EOB for ~10min, demo fair+ balance. Pt noted with 2-3 finger R shoulder subluxation while seated at EOB, sling issued to pt. Pt educated to wear sling at all times OOB, and educated on donning/doffing sling, requiring 1 person assist 2/2 impaired RUE strength. Pt demo return demonstration and understanding. Pt performed UB dressing while seated at EOB, requiring Max Ax1 2/2 impaired functional reach/grasp, due to impaired RUE strength/motor control. Pt performed sit<->stand with Min Ax2 (b/l hand held assist), and able to ambulate in room ~5ft from bed to chair, requiring Mod Ax2, demo ataxic gait and impaired ability to weight shift.      Speech Therapy:    -----------------------------------------------------------------------  PAST MEDICAL & SURGICAL HISTORY  Hypertension    Osteoarthritis    Brain mass    Glioblastoma    S/P total knee replacement, right    H/O craniotomy      FAMILY HISTORY     SOCIAL HISTORY  As above  -----------------------------------------------------------------------  VITALS  T(C): 36.4 (03-03-25 @ 05:55), Max: 37.1 (03-02-25 @ 21:45)  HR: 51 (03-03-25 @ 05:55) (51 - 74)  BP: 125/75 (03-03-25 @ 05:55) (122/74 - 150/90)  RR: 18 (03-03-25 @ 05:55) (17 - 18)  SpO2: 98% (03-03-25 @ 05:55) (94% - 99%)  Wt(kg): --    PHYSICAL EXAM    -----------------------------------------------------------------------  RECENT LABS  CBC Full  -  ( 03 Mar 2025 06:01 )  WBC Count : 10.54 K/uL  RBC Count : 4.77 M/uL  Hemoglobin : 14.1 g/dL  Hematocrit : 44.1 %  Platelet Count - Automated : 333 K/uL  Mean Cell Volume : 92.5 fl  Mean Cell Hemoglobin : 29.6 pg  Mean Cell Hemoglobin Concentration : 32.0 g/dL  Auto Neutrophil # : x  Auto Lymphocyte # : x  Auto Monocyte # : x  Auto Eosinophil # : x  Auto Basophil # : x  Auto Neutrophil % : x  Auto Lymphocyte % : x  Auto Monocyte % : x  Auto Eosinophil % : x  Auto Basophil % : x    03-03    135  |  99  |  23  ----------------------------<  135[H]  4.5   |  27  |  0.73    Ca    8.6      03 Mar 2025 06:01  Phos  3.1     03-03  Mg     2.2     03-03    TPro  6.1  /  Alb  3.7  /  TBili  0.3  /  DBili  0.1  /  AST  22  /  ALT  55[H]  /  AlkPhos  93  03-03    Urinalysis Basic - ( 03 Mar 2025 06:01 )    Color: x / Appearance: x / SG: x / pH: x  Gluc: 135 mg/dL / Ketone: x  / Bili: x / Urobili: x   Blood: x / Protein: x / Nitrite: x   Leuk Esterase: x / RBC: x / WBC x   Sq Epi: x / Non Sq Epi: x / Bacteria: x      -----------------------------------------------------------------------  IMAGING:  < from: MR Brain Stereotactic w/wo IV Cont w/ DTI (02.25.25 @ 11:06) >  IMPRESSION:  Rim-enhancing structure at the leftposterior hemispheric   operative bed appears similar to 1/30/2025. The extensive infiltrative   hyperintensity on the long TR images consistent with vasogenic edema and   nonenhancing tumor has mildly increased and its associated mass effect   with increased right-to-left subfalcine herniation although appears   comparable infiltrative extent within the left cerebral hemisphere,   contralateral extent into the right cerebral hemisphere and downward   extent to the ventral avila    < end of copied text >    -----------------------------------------------------------------------  ALLERGIES  No Known Allergies      MEDICATIONS   acetaminophen     Tablet .. 650 milliGRAM(s) Oral every 6 hours PRN  amLODIPine   Tablet 5 milliGRAM(s) Oral daily  dexAMETHasone     Tablet   Oral   dexAMETHasone     Tablet 4 milliGRAM(s) Oral every 8 hours  enoxaparin Injectable 40 milliGRAM(s) SubCutaneous <User Schedule>  famotidine    Tablet 20 milliGRAM(s) Oral every 12 hours  levETIRAcetam 1000 milliGRAM(s) Oral every 12 hours  polyethylene glycol 3350 17 Gram(s) Oral two times a day  senna 2 Tablet(s) Oral at bedtime  temozolomide 400 milliGRAM(s) Oral daily    -----------------------------------------------------------------------   HPI:  69M with pmh HTN, L frontoparietal GBM s/p biopsy @[facility] 2023, resection x2 April/2023, Dec/2024 with GammaTile placement (20 seeds/5 Tiles), s/p >6 cycles of TMZ (Aug/2023-Jan/2024), RT (60 Gy in 30 fxns (June/2023-July/2024) presents with worsening right sided weakness and expressive aphasia x 1 week. CTH  showed worsening R sided whole-hemisphere vasogenic edema susp 2/2 GammaTile and Dex taper. Transferred to [facility] for further workup. Decadron 4q6 taper over 2 weeks to 2 BID for vasogenic edema. PT/OT recommended AR. PM&R consulted for evaluation of rehab needs.  -----------------------------------------------------------------------  SUBJECTIVE:  Seen at bedside this AM, accompanied by his wife. She confirms history as above. States his speech and right sided weakness was getting worse which prompted him to call EMS. Since Avastin she notes his speech is a little better but still with word finding difficulty. Right arm and leg weakness is acute on chronic. She notes he had a brace 2 years ago but now misplaced and had not used it regularly. He was independent at home with ambulation and ADLs. He has been to [place] rehab in the past following resections.  Tolerating PO diet.   -----------------------------------------------------------------------  REVIEW OF SYSTEMS  Constitutional - No fever,  +fatigue  Neurological - right hemiparesis, speech difficulties  Pain - denies  Bowel - no acute issues LBM 3/2  Bladder - voiding  -----------------------------------------------------------------------  FUNCTIONAL HISTORY:  PTA - independent with functional mobility and ADLs  +Stairs at home    CURRENT FUNCTIONAL STATUS:    Physical Therapy: 2/27      Bed Mobility: Rolling/Turning:     · Level of [place]	contact guard  · Physical Assist/Nonphysical Assist	1 person assist; verbal cues    Bed Mobility: Sit to Supine:     · Level of [place]	N/T as pt left seated in chair    Bed Mobility: Supine to Sit:     · Level of [place]	contact guard  · Physical Assist/Nonphysical Assist	1 person assist; verbal cues    Bed Mobility Analysis:     · Bed Mobility Limitations	impaired ability to control trunk for mobility; decreased ability to use legs for bridging/pushing  · Impairments Contributing to Impaired Bed Mobility	impaired balance; narrow base of support; impaired postural control; decreased strength    Transfer: Sit to Stand:     · Level of [place]	moderate assist (50% patients effort)  · Physical Assist/Nonphysical Assist	2 person assist; verbal cues  · Assistive Device	B Handheld Assist, trunk assist    Transfer: Stand to Sit:     · Level of [place]	moderate assist (50% patients effort)  · Physical Assist/Nonphysical Assist	2 person assist; verbal cues    Sit/Stand Transfer Safety Analysis:     · Transfer Safety Concerns Noted	decreased balance during turns; decreased step length; decreased weight-shifting ability  · Impairments Contributing to Impaired Transfers	impaired balance; narrow base of support; impaired postural control; decreased strength    Gait Skills:     · Level of [place]	moderate assist (50% patients effort)  · Physical Assist/Nonphysical Assist	2 person assist; verbal cues  · Gait Distance	5 feet x 2    Gait Analysis:     · Gait Deviations Noted	decreased che; decreased step length; decreased weight-shifting ability; impaired ability to lift and advance RLE; impaired foot placement and sequencing  · Impairments Contributing to Gait Deviations	impaired coordination; narrow base of support; impaired postural control; decreased strength    Balance Skills Assessment:     · Sitting Balance: Static	supervision  · Sitting Balance: Dynamic	supervision  · Sit-to-Stand Balance	mod A x 2  · Standing Balance: Static	mod A x 2  · Standing Balance: Dynamic	mod A x 2  · Systems Impairment Contributing to Balance Disturbance	musculoskeletal; neuromuscular  · Identified Impairments Contributing to Balance Disturbance	decreased strength; impaired postural control    Sensory Examination:    Sensory Examination:    Grossly Intact:   · Gross Sensory Examination	Grossly Intact; Left UE; Left LE; impaired RUE/RLE    · Coordination Assessed	finger to nose; LUE intact      Proprioception:   · Coordination Assessed	finger to nose; LUE intact    · Sensory Tests	(L) hand  5/5, (R) hand  2/5. CN Testing: B/L Frontalis intact; B/L buccinator intact; smile R droop; tongue protrusion at midline; B/L eyes open/close intact; Shoulder elevation: R impaired; Vision H-Test: bilateral tracking and smooth pursuit intact; Convergence/Divergence: intact    Clinical Impressions:   · Criteria for Skilled Therapeutic Interventions	impairments found; functional limitations in following categories; risk reduction/prevention; anticipated discharge recommendation  · Impairments Found (describe specific impairments)	posture; gait, locomotion, and balance; muscle strength      Occupational Therapy: 2/27    Cognitive Status Examination:   · Level of Consciousness	alert  · Orientation	person; place; situation; o-log score = 28/30  · Follow Commands/Answers Questions	100% of the time; aphasia/oral apraxia  · Type of aphasia	expressive  · Personal Safety and Judgment	intact  · Short Term Memory	intact  · Long Term Memory	intact    Range of Motion Exam:   · Range of Motion Examination, Upper Extremity	bilateral UE Passive ROM was WNL (within normal limits); 2-3 finger R shoulder subluxation  · Range of Motion Examination, Lower Extremity	bilateral LE Passive ROM was WNL (within normal limits)    Extremity Manual Muscle Testing:     · Right Upper Extremity	shoulder 2-/5; elbow 2/5;  2/5  · Left Upper Extremity	5/5 for all major pivots  · Right Lower Extremity	hip 3/5; knee 2-/5; ankle DF 0/5; ankle PF 1/5  · Left Lower Extremity	5/5 for all major pivots    Muscle Tone Assessment:   · Muscle Tone Assessment	hypotonic; right-side extremities    Bed Mobility: Rolling/Turning:     · Level of [place]	moderate assist (50% patients effort)  · Physical Assist/Nonphysical Assist	verbal cues; nonverbal cues (demo/gestures); 1 person assist    Bed Mobility: Scooting/Bridging:     · Level of [place]	moderate assist (50% patients effort)  · Physical Assist/Nonphysical Assist	verbal cues; nonverbal cues (demo/gestures); 1 person assist    Bed Mobility: Sit to Supine:     · Level of [place]	NT - pt left OOBTC    Bed Mobility: Supine to Sit:     · Level of [place]	minimum assist (75% patients effort)  · Physical Assist/Nonphysical Assist	2 person assist; verbal cues; nonverbal cues (demo/gestures)    Bed Mobility Analysis:     · Bed Mobility Limitations	decreased ability to use arms for pushing/pulling; decreased ability to use legs for bridging/pushing; impaired ability to control trunk for mobility  · Impairments Contributing to Impaired Bed Mobility	decreased strength; impaired postural control; decreased flexibility; impaired motor control; impaired balance    Transfer: Sit to Stand:     · Level of [place]	minimum assist (75% patients effort)  · Physical Assist/Nonphysical Assist	2 person assist; verbal cues; nonverbal cues (demo/gestures)  · Assistive Device	b/l hand held assist    Transfer: Stand to Sit:     · Level of [place]	minimum assist (75% patients effort)  · Physical Assist/Nonphysical Assist	2 person assist; verbal cues; nonverbal cues (demo/gestures)  · Assistive Device	b/l hand held assist    Sit/Stand Transfer Safety Analysis:     · Transfer Safety Concerns Noted	decreased weight-shifting ability  · Impairments Contributing to Impaired Transfers	decreased strength; impaired postural control; impaired motor control; decreased flexibility; impaired balance    Balance Skills Assessment:     · Sitting Balance: Static	fair plus  · Sitting Balance: Dynamic	poor plus  · Sit-to-Stand Balance	poor balance  · Standing Balance: Static	poor balance  · Standing Balance: Dynamic	poor balance  · Systems Impairment Contributing to Balance Disturbance	neuromuscular; musculoskeletal  · Identified Impairments Contributing to Balance Disturbance	decreased strength; impaired postural control; impaired motor control; abnormal muscle tone; impaired coordination    Sensory Examination:     Light Touch Sensation:   · Left UE	within normal limits  · Right UE	absent sensation  · Left LE	within normal limits  · Right LE	severe impairment      Deep Pressure Sensation:   · Left UE	within normal limits  · Right UE	moderate impairment  · Left LE	within normal limits  · Right LE	mild impairment      Pain Sensation:   · Left UE	within normal limits  · Right UE	within normal limits  · Left LE	within normal limits  · Right LE	within normal limits      Proprioception:   · Left UE	within normal limits  · Right UE	severe impairment  · Left LE	within normal limits  · Right LE	moderate impairment    · Sensory Tests	Cranial Nerves : facial sensation impaired on R side to light touch V1-V3 b/l VII: no ptosis, R facial droop, decreased R eyebrow raise and closure VIII: hearing intact to finger rub b/l  XI: head turning and shoulder shrug impaired on R side XII: tongue protrusion midline    Visual Assessment:    Visual Assessment:    Visual Assessment:   · Visual Tracking	sluggish tracking, however able to reach end range in all directions/fields  · Visual Neglect	none  · Eye Muscle Balance	strabismus present  · Visual Convergence	sluggish    Fine Motor Coordination:     Fine Motor Coordination:   · Left Hand, Finger to Nose	normal performance  · Right Hand, Finger to Nose	unable to perform  2/2 severely impaired RUE strength  · Left Hand Thumb/Finger Opposition Skills	normal performance  · Right Hand Thumb/Finger Opposition Skills	severe impairment  · Left Hand, Diadochokinesis Skills	normal performance  · Right Hand, Diadochokinesis Skills	severe impairment    Upper Body Dressing Training:     · Level of [place]	maximum assist (25% patients effort); don/doff back gown while seated at EOB  · Physical Assist/Nonphysical Assist	1 person assist; verbal cues; nonverbal cues (demo/gestures)    Treatment Locations:   · Comments	Patient scored a 28/30 on the O-Log. A score of >25 is associated with normal orientation. Pt requires cues to orient to date and clock time. Pt performed bed mobility, requiring 1-2 person assist 2/2 impaired RUE/RLE strength, impacting ability to perform functional reaching/weight shifting. Pt demo ability to maintain unsupported static sitting at EOB for ~10min, demo fair+ balance. Pt noted with 2-3 finger R shoulder subluxation while seated at EOB, sling issued to pt. Pt educated to wear sling at all times OOB, and educated on donning/doffing sling, requiring 1 person assist 2/2 impaired RUE strength. Pt demo return demonstration and understanding. Pt performed UB dressing while seated at EOB, requiring Max Ax1 2/2 impaired functional reach/grasp, due to impaired RUE strength/motor control. Pt performed sit<->stand with Min Ax2 (b/l hand held assist), and able to ambulate in room ~5ft from bed to chair, requiring Mod Ax2, demo ataxic gait and impaired ability to weight shift.      Speech Therapy:    -----------------------------------------------------------------------  PAST MEDICAL & SURGICAL HISTORY  Hypertension    Osteoarthritis    Brain mass    Glioblastoma    S/P total knee replacement, right    H/O craniotomy      FAMILY HISTORY     SOCIAL HISTORY  As above  -----------------------------------------------------------------------  VITALS  T(C): 36.4 (03-03-25 @ 05:55), Max: 37.1 (03-02-25 @ 21:45)  HR: 51 (03-03-25 @ 05:55) (51 - 74)  BP: 125/75 (03-03-25 @ 05:55) (122/74 - 150/90)  RR: 18 (03-03-25 @ 05:55) (17 - 18)  SpO2: 98% (03-03-25 @ 05:55) (94% - 99%)  Wt(kg): --    General - NAD, Comfortable  Chest - Breathing comfortably, No Respiratory distress  Cardiovascular - Regular pulse  Abdomen - No abdominal distension  Extremities - No deformities of upper or lower limbs.  MSK - ROM within functional limits except for tightness in RUE without contractures, moves with passive stretch  Neurologic Exam -                    Cognitive - Awake, Alert, AAO to self, place, date, year, situation; follows commands     Communication - +expressive aphasia, No dysarthria, repetition intact, attention/concentration intact, naming intact     Cranial Nerves -  EOMI, right facial droop     Motor -                     LEFT    UE - ShAB 5/5, EF 5/5, EE 5/5, WE 5/5,  5/5                    LEFT    LE - HF 5/5, KE 5/5, DF 5/5, PF 5/5                    RIGHT UE - ShAB 3/5, EF 4/5, EE 4/5, WE 5/5,  5/5 - some apraxia noted                    RIGHT LE - HF 3/5, KE 4/5, DF 4/5, PF 4/5        Tone - increased tone in RUE/RLE     Sensory - grossly intact to LT     Reflexes - no clonus  Psychiatric - Mood stable, Affect WNL   -----------------------------------------------------------------------  RECENT LABS  CBC Full  -  ( 03 Mar 2025 06:01 )  WBC Count : 10.54 K/uL  RBC Count : 4.77 M/uL  Hemoglobin : 14.1 g/dL  Hematocrit : 44.1 %  Platelet Count - Automated : 333 K/uL  Mean Cell Volume : 92.5 fl  Mean Cell Hemoglobin : 29.6 pg  Mean Cell Hemoglobin Concentration : 32.0 g/dL  Auto Neutrophil # : x  Auto Lymphocyte # : x  Auto Monocyte # : x  Auto Eosinophil # : x  Auto Basophil # : x  Auto Neutrophil % : x  Auto Lymphocyte % : x  Auto Monocyte % : x  Auto Eosinophil % : x  Auto Basophil % : x    03-03    135  |  99  |  23  ----------------------------<  135[H]  4.5   |  27  |  0.73    Ca    8.6      03 Mar 2025 06:01  Phos  3.1     03-03  Mg     2.2     03-03    TPro  6.1  /  Alb  3.7  /  TBili  0.3  /  DBili  0.1  /  AST  22  /  ALT  55[H]  /  AlkPhos  93  03-03    Urinalysis Basic - ( 03 Mar 2025 06:01 )    Color: x / Appearance: x / SG: x / pH: x  Gluc: 135 mg/dL / Ketone: x  / Bili: x / Urobili: x   Blood: x / Protein: x / Nitrite: x   Leuk Esterase: x / RBC: x / WBC x   Sq Epi: x / Non Sq Epi: x / Bacteria: x      -----------------------------------------------------------------------  IMAGING:  < from: MR Brain Stereotactic w/wo IV Cont w/ DTI (02.25.25 @ 11:06) >  IMPRESSION:  Rim-enhancing structure at the leftposterior hemispheric   operative bed appears similar to 1/30/2025. The extensive infiltrative   hyperintensity on the long TR images consistent with vasogenic edema and   nonenhancing tumor has mildly increased and its associated mass effect   with increased right-to-left subfalcine herniation although appears   comparable infiltrative extent within the left cerebral hemisphere,   contralateral extent into the right cerebral hemisphere and downward   extent to the ventral avila    < end of copied text >    -----------------------------------------------------------------------  ALLERGIES  No Known Allergies      MEDICATIONS   acetaminophen     Tablet .. 650 milliGRAM(s) Oral every 6 hours PRN  amLODIPine   Tablet 5 milliGRAM(s) Oral daily  dexAMETHasone     Tablet   Oral   dexAMETHasone     Tablet 4 milliGRAM(s) Oral every 8 hours  enoxaparin Injectable 40 milliGRAM(s) SubCutaneous <User Schedule>  famotidine    Tablet 20 milliGRAM(s) Oral every 12 hours  levETIRAcetam 1000 milliGRAM(s) Oral every 12 hours  polyethylene glycol 3350 17 Gram(s) Oral two times a day  senna 2 Tablet(s) Oral at bedtime  temozolomide 400 milliGRAM(s) Oral daily    -----------------------------------------------------------------------

## 2025-03-03 NOTE — PROVIDER CONTACT NOTE (OTHER) - ACTION/TREATMENT ORDERED:
No changes made to HR alarm parameter at this time.
No further orders at this time
No further orders at this time per provider.

## 2025-03-03 NOTE — PROVIDER CONTACT NOTE (OTHER) - ASSESSMENT
Pt bradycardic as low as 42, asleep.
HR 48, /72, o2 96. Pt asymptomatic
Pt's HR has been trending mostly in 50s but this morning HR went as low as 44bpm. Pt denied chest pain or sob. /75 HR 61. Telemetry monitor makes alarm sounds when HR is 50 or below. Asked if HR alarm parameter can be changed to 45 and below.

## 2025-03-03 NOTE — PROGRESS NOTE ADULT - SUBJECTIVE AND OBJECTIVE BOX
patient seen and examined at bedside a&ox3  no new complaints  overall reports feeling well, awaiting BAIRON placement     discussed plan to have patient follow up with neuro onc Dr. Yung via telemedicine and follow up locally with Dr. Mix for treatment and in person visits, coordination still pending       ANTIBIOTICS/RELEVANT:  MEDICATIONS  (STANDING):  amLODIPine   Tablet 5 milliGRAM(s) Oral daily  dexAMETHasone     Tablet   Oral   dexAMETHasone     Tablet 4 milliGRAM(s) Oral every 8 hours  enoxaparin Injectable 40 milliGRAM(s) SubCutaneous <User Schedule>  famotidine    Tablet 20 milliGRAM(s) Oral every 12 hours  levETIRAcetam 1000 milliGRAM(s) Oral every 12 hours  polyethylene glycol 3350 17 Gram(s) Oral two times a day  senna 2 Tablet(s) Oral at bedtime    MEDICATIONS  (PRN):  acetaminophen     Tablet .. 650 milliGRAM(s) Oral every 6 hours PRN Temp greater or equal to 38.5C (101.3F), Mild Pain (1 - 3)      Vital Signs Last 24 Hrs  T(C): 36.6 (03 Mar 2025 09:07), Max: 37.1 (02 Mar 2025 21:45)  T(F): 97.9 (03 Mar 2025 09:07), Max: 98.7 (02 Mar 2025 21:45)  HR: 52 (03 Mar 2025 12:59) (51 - 74)  BP: 129/92 (03 Mar 2025 12:59) (125/75 - 150/90)  BP(mean): 98 (03 Mar 2025 09:07) (98 - 98)  RR: 18 (03 Mar 2025 09:07) (17 - 18)  SpO2: 97% (03 Mar 2025 12:59) (97% - 99%)    Parameters below as of 03 Mar 2025 12:59  Patient On (Oxygen Delivery Method): room air      PHYSICAL EXAM:  General: in no acute distress  Lungs: CTA B/L. No wheezes, rales, or rhonchi  Cardiovascular: RRR. No murmurs, rubs, or gallops  Abdomen: Soft, non-tender non-distended; No rebound or guarding  Extremities: WWP, No clubbing, cyanosis or edema  Neurological: Alert and oriented, baseline neuro function with Rsided weakness in arms/legs, R eye deviation and expressive aphasia  Skin: Warm and dry. No obvious rash     LABS:                        14.1   10.54 )-----------( 333      ( 03 Mar 2025 06:01 )             44.1     03-03    135  |  99  |  23  ----------------------------<  135[H]  4.5   |  27  |  0.73    Ca    8.6      03 Mar 2025 06:01  Phos  3.1     03-03  Mg     2.2     03-03    TPro  6.1  /  Alb  3.7  /  TBili  0.3  /  DBili  0.1  /  AST  22  /  ALT  55[H]  /  AlkPhos  93  03-03      Urinalysis Basic - ( 03 Mar 2025 06:01 )

## 2025-03-03 NOTE — CONSULT NOTE ADULT - NSCONSULTADDITIONALINFOA_GEN_ALL_CORE
75 minutes spent on total encounter. The necessity of the time spent during the encounter on this date of service was due to:     Obtaining separately reported history including review of hospital course, relevant imaging, therapy notes, and consultant notes, performing medically appropriate examination, counseling and educating patient/ family/caregivers on rehabilitation course, care coordination, communication with primary team, documenting clinical information

## 2025-03-03 NOTE — PROGRESS NOTE ADULT - ASSESSMENT
69 year old male with past medical history of HTN, OA, right TKA, HO craniotomy for GBM resection 04/25/2023, s/p chemo and RT 2023 with Dr. Causey, Admit at [facility] on Dec13 for reoccurrence of GBM, Sp lt craniotomy  for resection of GBM with placement of GammaTile brachytherapy on 12/19.  Pt was readmitted from home with new aphasia and dysarthria after being discharged from Acute rehab with concern for worsening tumor.  Pt s/p  MRI Brain that showed vasogenic edema and no new tumor     #Recurrent GBM s/p Craniotomy for Resection and Gammatile Brachytherapy (12/19)  - Continue management as per NSGY and Heme Onc  -  s/p IV Avastin 2/27, Temodar (2/27-3/3)  - Continue Keppra 1000 mg q12 and seizure precautions   - Decadron taper per primary team, on famotidine and ISS while on steroids     #HTN  - Continue amlodipine 5mg daily w/ holding parameters    #Sinus bradycardia  - asymptomatic; intermittent;  - monitor on telemetry  - avoid AV will blocking agents    #Constipation( resolved)  - Continue bowel regimen,    DVT ppx: Enoxaparin  Discussed with primary team

## 2025-03-03 NOTE — PROGRESS NOTE ADULT - SUBJECTIVE AND OBJECTIVE BOX
SUBJECTIVE:  Patient was seen and examined at bedside. Patient reports feeling fine, reports BM yesterday, denies any complaints. Awaiting rehab.     Review of systems: No fever, chills, dizziness, HA, Changes in vision, CP, dyspnea, nausea or vomiting, dysuria, changes in bowel movements, LE edema. Rest of 12 point Review of systems negative unless otherwise documented elsewhere in note.     Diet, Regular (02-25-25 @ 13:16) [Active]      MEDICATIONS:  MEDICATIONS  (STANDING):  amLODIPine   Tablet 5 milliGRAM(s) Oral daily  dexAMETHasone     Tablet   Oral   dexAMETHasone     Tablet 4 milliGRAM(s) Oral every 8 hours  enoxaparin Injectable 40 milliGRAM(s) SubCutaneous <User Schedule>  famotidine    Tablet 20 milliGRAM(s) Oral every 12 hours  levETIRAcetam 1000 milliGRAM(s) Oral every 12 hours  polyethylene glycol 3350 17 Gram(s) Oral two times a day  senna 2 Tablet(s) Oral at bedtime  temozolomide 400 milliGRAM(s) Oral daily    MEDICATIONS  (PRN):  acetaminophen     Tablet .. 650 milliGRAM(s) Oral every 6 hours PRN Temp greater or equal to 38.5C (101.3F), Mild Pain (1 - 3)      Allergies    No Known Allergies    Intolerances        OBJECTIVE:  Vital Signs Last 24 Hrs  T(C): 36.6 (03 Mar 2025 09:07), Max: 37.1 (02 Mar 2025 21:45)  T(F): 97.9 (03 Mar 2025 09:07), Max: 98.7 (02 Mar 2025 21:45)  HR: 56 (03 Mar 2025 09:07) (51 - 74)  BP: 135/74 (03 Mar 2025 09:07) (122/74 - 150/90)  BP(mean): 98 (03 Mar 2025 09:07) (98 - 98)  RR: 18 (03 Mar 2025 09:07) (17 - 18)  SpO2: 98% (03 Mar 2025 09:07) (95% - 99%)    Parameters below as of 03 Mar 2025 09:07  Patient On (Oxygen Delivery Method): room air      I&O's Summary    02 Mar 2025 07:01  -  03 Mar 2025 07:00  --------------------------------------------------------  IN: 0 mL / OUT: 1400 mL / NET: -1400 mL        PHYSICAL EXAM:  General: AO, NAD, sitting in chair, speaking in full sentences, no labored breathing on RA   Lungs: no cra  Heart:  Abdomen:  Extremities:    LABS:                        14.1   10.54 )-----------( 333      ( 03 Mar 2025 06:01 )             44.1     03-03    135  |  99  |  23  ----------------------------<  135[H]  4.5   |  27  |  0.73    Ca    8.6      03 Mar 2025 06:01  Phos  3.1     03-03  Mg     2.2     03-03    TPro  6.1  /  Alb  3.7  /  TBili  0.3  /  DBili  0.1  /  AST  22  /  ALT  55[H]  /  AlkPhos  93  03-03    LIVER FUNCTIONS - ( 03 Mar 2025 06:01 )  Alb: 3.7 g/dL / Pro: 6.1 g/dL / ALK PHOS: 93 U/L / ALT: 55 U/L / AST: 22 U/L / GGT: x             CAPILLARY BLOOD GLUCOSE        Urinalysis Basic - ( 03 Mar 2025 06:01 )    Color: x / Appearance: x / SG: x / pH: x  Gluc: 135 mg/dL / Ketone: x  / Bili: x / Urobili: x   Blood: x / Protein: x / Nitrite: x   Leuk Esterase: x / RBC: x / WBC x   Sq Epi: x / Non Sq Epi: x / Bacteria: x        MICRODATA:      RADIOLOGY/OTHER STUDIES:

## 2025-03-03 NOTE — CONSULT NOTE ADULT - ASSESSMENT
**NOTE INCOMPLETE**  70 YO male with history of left frontoparietal GBM s/p resection, gamatile, TMZ, RT admitted for functional decline in the setting of worsening cerebral edema s/p IV Avastin, now with functional, gait, speech, and ADL impairments    Left frontotemporal GBM, vasogenic edema - s/p IV Avastin  Expressive Aphasia  Right spastic hemiparesis    PLAN / RECOMMENDATIONS:     # Rehab / Mobilization:   - Initial therapy assessment: [X] PT  [X] OT   - Continue skilled therapy while admitted to prevent secondary complications of immobility focus on transfer training, bed mobility, progressive ambulation, and equipment evaluation.   - OOB throughout the day with staff or OOB in chair with meals   - Nursing to reposition q2 turning to prevent skin breakdown given limited mobility  - Keep extremities elevated on pillows to assist with edema and positioning.   - Weight bearing status: as tolerated  - Therapy precautions: falls    # Mood/ Cognition  - O-log 28/30. Maintains alertness   - Optimize sleep/wake cycle to avoid day time fatigue    # Spasticity  - RUE tone with minimal pain.   - Encourage passive ROM by family and with therapy  - May benefit from baclofen if worsening or symptomatic    # Bracing/Splinting:   - Z-Flow multi-podus boots for positioning/contracture prevention for RLE  - RUE Sling while OOB to prevent shoulder subluxation  - Defer to AR for eval for AFO    # Pain Management:   - Avoid/ monitor use sedating medications that may interfere with cognitive recovery   - Current pain regimen reviewed    # Speech/ Swallow:   - Dysphagia screen [X]  - Diet:  reg + thins  - SLP eval and treat at AR for speech difficulties     # GI/ Bowel:  - Continue to monitor bowel patterns.   - Bowel Regimen: Senna, Miralax    # / Bladder:  - Screen for retention: Obtain PVR or Bladder scan q6hrs (if no void); Straight cath for residual urine >400cc  - Continue to monitor bladder patterns, avoid constipation.       # Disposition optimization:  - Disposition recommendation - Acute Inpatient Rehabilitaiton  - Barriers to discharge: pending AR acceptance and auth    Patient has significant functional impairments and would benefit from continued rehabilitation in the ACUTE inpatient rehab setting. The patient has both medical and functional needs appropriate for acute inpatient rehabilitation and would benefit from comprehensive interdisciplinary care, including a physiatrist, rehabilitation nursing, PT, OT, SLT and case management/social work. We anticipate that the patient would be able to tolerate 3 hours of PT/OT/SLT per day for 5 to 6 days per week to focus on mobility, transfers, gait training with assistive devices, ADL training, speech, swallow, cognition, and patient education/safety.    68 YO male with history of left frontoparietal GBM s/p resection, gamatile, TMZ, RT admitted for functional decline in the setting of worsening cerebral edema s/p IV Avastin, now with functional, gait, speech, and ADL impairments    Left frontotemporal GBM, vasogenic edema - s/p IV Avastin  Expressive Aphasia  Right spastic hemiparesis    PLAN / RECOMMENDATIONS:     # Rehab / Mobilization:   - Initial therapy assessment: [X] PT  [X] OT   - Continue skilled therapy while admitted to prevent secondary complications of immobility focus on transfer training, bed mobility, progressive ambulation, and equipment evaluation.   - OOB throughout the day with staff or OOB in chair with meals   - Weight bearing status: as tolerated  - Therapy precautions: falls    # Mood/ Cognition  - O-log 28/30. Maintains alertness   - Optimize sleep/wake cycle to avoid day time fatigue    # Spasticity  - RUE tone with minimal pain.   - Encourage passive ROM by family and with therapy  - May benefit from baclofen if worsening or symptomatic    # Bracing/Splinting:   - Z-Flow multi-podus boots for positioning/contracture prevention for RLE  - RUE Sling while OOB to prevent shoulder subluxation  - Defer to AR for eval for AFO    # Pain Management:   - Avoid/ monitor use sedating medications that may interfere with cognitive recovery   - Current pain regimen reviewed    # Speech/ Swallow:   - Dysphagia screen [X]  - Diet:  reg + thins  - SLP eval and treat at AR for speech difficulties     # GI/ Bowel:  - Continue to monitor bowel patterns.   - Bowel Regimen: Senna, Miralax    # / Bladder:  - Screen for retention: Obtain PVR or Bladder scan q6hrs (if no void); Straight cath for residual urine >400cc  - Continue to monitor bladder patterns, avoid constipation.       # Disposition optimization:  - Disposition recommendation - Acute Inpatient Rehabilitaiton  - Barriers to discharge: pending AR acceptance and auth    Patient has significant functional impairments and would benefit from continued rehabilitation in the ACUTE inpatient rehab setting. The patient has both medical and functional needs appropriate for acute inpatient rehabilitation and would benefit from comprehensive interdisciplinary care, including a physiatrist, rehabilitation nursing, PT, OT, SLT and case management/social work. We anticipate that the patient would be able to tolerate 3 hours of PT/OT/SLT per day for 5 to 6 days per week to focus on mobility, transfers, gait training with assistive devices, ADL training, speech, swallow, cognition, and patient education/safety.

## 2025-03-04 LAB
ALBUMIN SERPL ELPH-MCNC: 3.2 G/DL — LOW (ref 3.3–5)
ALP SERPL-CCNC: SIGNIFICANT CHANGE UP (ref 40–120)
ALT FLD-CCNC: SIGNIFICANT CHANGE UP (ref 10–45)
ANION GAP SERPL CALC-SCNC: 9 MMOL/L — SIGNIFICANT CHANGE UP (ref 5–17)
AST SERPL-CCNC: SIGNIFICANT CHANGE UP (ref 10–40)
BILIRUB SERPL-MCNC: 0.3 MG/DL — SIGNIFICANT CHANGE UP (ref 0.2–1.2)
BUN SERPL-MCNC: 24 MG/DL — HIGH (ref 7–23)
CALCIUM SERPL-MCNC: 8.6 MG/DL — SIGNIFICANT CHANGE UP (ref 8.4–10.5)
CHLORIDE SERPL-SCNC: 101 MMOL/L — SIGNIFICANT CHANGE UP (ref 96–108)
CO2 SERPL-SCNC: 24 MMOL/L — SIGNIFICANT CHANGE UP (ref 22–31)
CREAT SERPL-MCNC: 0.69 MG/DL — SIGNIFICANT CHANGE UP (ref 0.5–1.3)
EGFR: 101 ML/MIN/1.73M2 — SIGNIFICANT CHANGE UP
EGFR: 101 ML/MIN/1.73M2 — SIGNIFICANT CHANGE UP
GLUCOSE SERPL-MCNC: 134 MG/DL — HIGH (ref 70–99)
HCT VFR BLD CALC: 43.7 % — SIGNIFICANT CHANGE UP (ref 39–50)
HGB BLD-MCNC: 14.4 G/DL — SIGNIFICANT CHANGE UP (ref 13–17)
MAGNESIUM SERPL-MCNC: 2.3 MG/DL — SIGNIFICANT CHANGE UP (ref 1.6–2.6)
MCHC RBC-ENTMCNC: 30 PG — SIGNIFICANT CHANGE UP (ref 27–34)
MCHC RBC-ENTMCNC: 33 G/DL — SIGNIFICANT CHANGE UP (ref 32–36)
MCV RBC AUTO: 91 FL — SIGNIFICANT CHANGE UP (ref 80–100)
NRBC BLD AUTO-RTO: 0 /100 WBCS — SIGNIFICANT CHANGE UP (ref 0–0)
PHOSPHATE SERPL-MCNC: 3.8 MG/DL — SIGNIFICANT CHANGE UP (ref 2.5–4.5)
PLATELET # BLD AUTO: 321 K/UL — SIGNIFICANT CHANGE UP (ref 150–400)
POTASSIUM SERPL-MCNC: SIGNIFICANT CHANGE UP (ref 3.5–5.3)
POTASSIUM SERPL-SCNC: SIGNIFICANT CHANGE UP (ref 3.5–5.3)
PROT SERPL-MCNC: 6.1 G/DL — SIGNIFICANT CHANGE UP (ref 6–8.3)
RBC # BLD: 4.8 M/UL — SIGNIFICANT CHANGE UP (ref 4.2–5.8)
RBC # FLD: 14.5 % — SIGNIFICANT CHANGE UP (ref 10.3–14.5)
SODIUM SERPL-SCNC: 134 MMOL/L — LOW (ref 135–145)
WBC # BLD: 10.92 K/UL — HIGH (ref 3.8–10.5)
WBC # FLD AUTO: 10.92 K/UL — HIGH (ref 3.8–10.5)

## 2025-03-04 PROCEDURE — 99231 SBSQ HOSP IP/OBS SF/LOW 25: CPT

## 2025-03-04 PROCEDURE — 99233 SBSQ HOSP IP/OBS HIGH 50: CPT

## 2025-03-04 PROCEDURE — 93010 ELECTROCARDIOGRAM REPORT: CPT

## 2025-03-04 PROCEDURE — 71045 X-RAY EXAM CHEST 1 VIEW: CPT | Mod: 26

## 2025-03-04 RX ORDER — INSULIN LISPRO 100 U/ML
INJECTION, SOLUTION INTRAVENOUS; SUBCUTANEOUS
Refills: 0 | Status: DISCONTINUED | OUTPATIENT
Start: 2025-03-04 | End: 2025-03-06

## 2025-03-04 RX ADMIN — DEXAMETHASONE 4 MILLIGRAM(S): 0.5 TABLET ORAL at 05:37

## 2025-03-04 RX ADMIN — Medication 20 MILLIGRAM(S): at 05:37

## 2025-03-04 RX ADMIN — Medication 2 TABLET(S): at 22:15

## 2025-03-04 RX ADMIN — AMLODIPINE BESYLATE 5 MILLIGRAM(S): 10 TABLET ORAL at 05:37

## 2025-03-04 RX ADMIN — INSULIN LISPRO 2: 100 INJECTION, SOLUTION INTRAVENOUS; SUBCUTANEOUS at 22:14

## 2025-03-04 RX ADMIN — DEXAMETHASONE 4 MILLIGRAM(S): 0.5 TABLET ORAL at 22:15

## 2025-03-04 RX ADMIN — DEXAMETHASONE 4 MILLIGRAM(S): 0.5 TABLET ORAL at 14:13

## 2025-03-04 RX ADMIN — LEVETIRACETAM 1000 MILLIGRAM(S): 10 INJECTION, SOLUTION INTRAVENOUS at 17:41

## 2025-03-04 RX ADMIN — Medication 20 MILLIGRAM(S): at 17:41

## 2025-03-04 RX ADMIN — LEVETIRACETAM 1000 MILLIGRAM(S): 10 INJECTION, SOLUTION INTRAVENOUS at 05:36

## 2025-03-04 RX ADMIN — ENOXAPARIN SODIUM 40 MILLIGRAM(S): 100 INJECTION SUBCUTANEOUS at 22:15

## 2025-03-04 NOTE — PROGRESS NOTE ADULT - SUBJECTIVE AND OBJECTIVE BOX
SUBJECTIVE:  Patient was seen and examined at bedside. Patient seen with wife at the bedside, Reports feeling at baseline, denies headache, no change in vision, no N.V. Denies any other complaints. No     Review of systems: No fever, chills, dizziness, HA, Changes in vision, CP, dyspnea, nausea or vomiting, dysuria, changes in bowel movements, LE edema. Rest of 12 point Review of systems negative unless otherwise documented elsewhere in note.     Diet, Regular (02-25-25 @ 13:16) [Active]      MEDICATIONS:  MEDICATIONS  (STANDING):  amLODIPine   Tablet 5 milliGRAM(s) Oral daily  dexAMETHasone     Tablet   Oral   dexAMETHasone     Tablet 4 milliGRAM(s) Oral every 8 hours  enoxaparin Injectable 40 milliGRAM(s) SubCutaneous <User Schedule>  famotidine    Tablet 20 milliGRAM(s) Oral every 12 hours  levETIRAcetam 1000 milliGRAM(s) Oral every 12 hours  polyethylene glycol 3350 17 Gram(s) Oral two times a day  senna 2 Tablet(s) Oral at bedtime  trimethoprim  160 mG/sulfamethoxazole 800 mG 1 Tablet(s) Oral <User Schedule>    MEDICATIONS  (PRN):  acetaminophen     Tablet .. 650 milliGRAM(s) Oral every 6 hours PRN Temp greater or equal to 38.5C (101.3F), Mild Pain (1 - 3)      Allergies    No Known Allergies    Intolerances        OBJECTIVE:  Vital Signs Last 24 Hrs  T(C): 36.3 (04 Mar 2025 09:15), Max: 36.8 (03 Mar 2025 18:10)  T(F): 97.4 (04 Mar 2025 09:15), Max: 98.3 (03 Mar 2025 20:50)  HR: 64 (04 Mar 2025 09:15) (48 - 64)  BP: 131/72 (04 Mar 2025 09:15) (125/70 - 138/76)  BP(mean): 92 (03 Mar 2025 14:24) (92 - 92)  RR: 17 (04 Mar 2025 09:15) (17 - 18)  SpO2: 97% (04 Mar 2025 09:15) (96% - 98%)    Parameters below as of 04 Mar 2025 09:15  Patient On (Oxygen Delivery Method): room air      I&O's Summary    03 Mar 2025 07:01  -  04 Mar 2025 07:00  --------------------------------------------------------  IN: 0 mL / OUT: 2175 mL / NET: -2175 mL    04 Mar 2025 07:01  -  04 Mar 2025 14:05  --------------------------------------------------------  IN: 0 mL / OUT: 400 mL / NET: -400 mL        PHYSICAL EXAM:  General: AO, NAD, sitting on chair, some dysarthria, no labored breathing on RA  HEENT: AT/NC,   Lungs: no crackles, no wheezes  Heart: regualr  Abdomen: soft, no distension, no tenderness, + BS  Extremities: chronic skin changes on the right, no edema, no tenderness      LABS:                        14.4   10.92 )-----------( 321      ( 04 Mar 2025 05:30 )             43.7     03-04    134[L]  |  101  |  24[H]  ----------------------------<  134[H]  See Note   |  24  |  0.69    Ca    8.6      04 Mar 2025 05:30  Phos  3.8     03-04  Mg     2.3     03-04    TPro  6.1  /  Alb  3.2[L]  /  TBili  0.3  /  DBili  x   /  AST  See Note  /  ALT  See Note  /  AlkPhos  See Note  03-04    LIVER FUNCTIONS - ( 04 Mar 2025 05:30 )  Alb: 3.2 g/dL / Pro: 6.1 g/dL / ALK PHOS: See Note / ALT: See Note / AST: See Note / GGT: x             CAPILLARY BLOOD GLUCOSE        Urinalysis Basic - ( 04 Mar 2025 05:30 )    Color: x / Appearance: x / SG: x / pH: x  Gluc: 134 mg/dL / Ketone: x  / Bili: x / Urobili: x   Blood: x / Protein: x / Nitrite: x   Leuk Esterase: x / RBC: x / WBC x   Sq Epi: x / Non Sq Epi: x / Bacteria: x        MICRODATA:      RADIOLOGY/OTHER STUDIES:

## 2025-03-04 NOTE — PROGRESS NOTE ADULT - SUBJECTIVE AND OBJECTIVE BOX
HPI:  69M with pmh HTN, L frontoparietal GBM s/p biopsy @[facility] 2023, resection x2 April/2023, Dec/2024 with GammaTile placement (20 seeds/5 Tiles), s/p >6 cycles of TMZ (Aug/2023-Jan/2024), RT (60 Gy in 30 fxns (June/2023-July/2024) presents with worsening right sided weakness and expressive aphasia x 1 week. Patient was due to start the last week of a decadron taper today at 1mg daily. He was brought to [facility] ER last night for symptoms where CTH  showed worsening R sided whole-hemisphere vasogenic edema susp 2/2 GammaTile and Dex taper. MRI brain was completed this AM and he received 10mg IV decadron at 6AM. Transferred to [facility] for further workup. Patient denies headache, nausea, vomiting, or vision changes. Patient has been compliant with his home keppra 1g BID.  (25 Feb 2025 15:09)    HOSPITAL COURSE:   2/25: admitted to [facility]  2/26: KYA overnight. Heme/onc consulted for possible IV avastin  2/27: KYA overnight. S/p IV avastin, temodar started. PT/OR rec AR.  2/29: KYA overnight, neuro stable.  3/1: KYA overnight, neuro stable.   3/2: KYA overnight, neuro stable. Decadron taper to 2BID over 2 weeks.   3/3: KYA overnight, neuro stable.   3/4: KYA o/n.       OVERNIGHT EVENTS:  Vital Signs Last 24 Hrs  T(C): 36.3 (04 Mar 2025 09:15), Max: 36.8 (03 Mar 2025 18:10)  T(F): 97.4 (04 Mar 2025 09:15), Max: 98.3 (03 Mar 2025 20:50)  HR: 64 (04 Mar 2025 09:15) (48 - 64)  BP: 131/72 (04 Mar 2025 09:15) (125/70 - 138/76)  BP(mean): 92 (03 Mar 2025 14:24) (92 - 92)  RR: 17 (04 Mar 2025 09:15) (17 - 18)  SpO2: 97% (04 Mar 2025 09:15) (96% - 99%)    Parameters below as of 04 Mar 2025 09:15  Patient On (Oxygen Delivery Method): room air        I&O's Summary    03 Mar 2025 07:01  -  04 Mar 2025 07:00  --------------------------------------------------------  IN: 0 mL / OUT: 2175 mL / NET: -2175 mL        PHYSICAL EXAM:  General: NAD, pt is comfortably sitting up in bed, on room air  HEENT: PERRL 3mm briskly reactive, EOMI b/l, (+) dysconjugate gaze, (+) Right facial droop  Cardiovascular: RRR, S1, S2  Respiratory: breathing non-labored on RA, chest rise symmetric  GI: abd soft, NTND   Neuro: A&Ox3, No aphasia, speech clear, no dysmetria, unable to assess pronator drift 2/2 RUE strength. Follows commands.  RU delt 2/5, tricep 4/5, bicep 3/5 HG 2/5; LUE 5/5. Right HF 3/5, KF/KE w assistance 5/5, dorsiflexion 1/5, plantarflexion 5/5; LLE 5/5.   Dec sensation (60%) to RUE and RLE and R face  Extremities: distal pulses 2+ x4      DIET:  [] NPO  [X] Mechanical  [] Tube feeds    LABS:                        14.4   10.92 )-----------( 321      ( 04 Mar 2025 05:30 )             43.7     03-04    134[L]  |  101  |  24[H]  ----------------------------<  134[H]  See Note   |  24  |  0.69    Ca    8.6      04 Mar 2025 05:30  Phos  3.8     03-04  Mg     2.3     03-04    TPro  6.1  /  Alb  3.2[L]  /  TBili  0.3  /  DBili  x   /  AST  See Note  /  ALT  See Note  /  AlkPhos  See Note  03-04      Urinalysis Basic - ( 04 Mar 2025 05:30 )    Color: x / Appearance: x / SG: x / pH: x  Gluc: 134 mg/dL / Ketone: x  / Bili: x / Urobili: x   Blood: x / Protein: x / Nitrite: x   Leuk Esterase: x / RBC: x / WBC x   Sq Epi: x / Non Sq Epi: x / Bacteria: x          CAPILLARY BLOOD GLUCOSE          Drug Levels: [] N/A    CSF Analysis: [] N/A      Allergies    No Known Allergies    Intolerances      MEDICATIONS:  Antibiotics:  trimethoprim  160 mG/sulfamethoxazole 800 mG 1 Tablet(s) Oral <User Schedule>    Neuro:  acetaminophen     Tablet .. 650 milliGRAM(s) Oral every 6 hours PRN  levETIRAcetam 1000 milliGRAM(s) Oral every 12 hours    Anticoagulation:  enoxaparin Injectable 40 milliGRAM(s) SubCutaneous <User Schedule>    OTHER:  amLODIPine   Tablet 5 milliGRAM(s) Oral daily  dexAMETHasone     Tablet   Oral   dexAMETHasone     Tablet 4 milliGRAM(s) Oral every 8 hours  famotidine    Tablet 20 milliGRAM(s) Oral every 12 hours  polyethylene glycol 3350 17 Gram(s) Oral two times a day  senna 2 Tablet(s) Oral at bedtime    IVF:    CULTURES:    RADIOLOGY & ADDITIONAL TESTS:      ASSESSMENT:  69M with pmh HTN, L frontoparietal GBM s/p biopsy @[facility] 2023, resection x2 April/2023, Dec/2024 with GammaTile placement (20 seeds/5 Tiles), s/p >6 cycles of TMZ (Aug/2023-Jan/2024), RT (60 Gy in 30 fxns (June/2023-July/2024) presents with worsening right sided weakness and expressive aphasia x 1 week. At [facility] ER CTH  showed worsening R sided whole-hemisphere vasogenic edema susp 2/2 GammaTile and Dex taper and MR brain completed. Transferred to [facility] for further workup.       CEREBRAL VASOGENIC EDEMA    Dependence on wheelchair-Z99.3    Encounter for follow-up examination after completed treatment for conditions other than malignant neoplasm-Z09    Weakness-R53.1    Handoff    MEWS Score    Hypertension    Osteoarthritis    Brain mass    Glioblastoma    Glioblastoma    Osteoarthritis    Hypertension    S/P total knee replacement, right    H/O craniotomy    [email or web address]        PLAN:  Neuro:  - neuro/vital checks q4  - pain control: tylenol prn  - MRI brain w/ sarah completed 2/25  - CTH 2/24: worsening R sided whole-hemisphere vasogenic edema  - seizure prophylaxis: cont home keppra 1g q12  - decadron 4q6 taper over 2 weeks to 2 BID for vasogenic edema  - GBM: hold home metformin 500mg BID    Cardiac:  - SBP goal 100-160  - HTN: cont home amlodipine 5mg daily    Pulmonary:  - on RA    GI:  - regular diet  - bowel regimen  - pepcid 20mg BID while on steroids    Renal:  - IVL  - Na goal 135-145    Heme:  - SCDs and SQL for DVT prophylaxis  - heme/onc consult, s/p IV Avastin 2/27, temodar (2/27-3/3)    Endo:  - A1C 5.9    ID:  - afebrile  - bactrim DS 3x a week for prophylaxis i/s/o recent chemotherapy    Disposition: tele, full code, dispo PT/OT rec AR.    D/w Dr. Sue

## 2025-03-04 NOTE — PROGRESS NOTE ADULT - ASSESSMENT
69 year old male with past medical history of HTN, OA, right TKA, HO craniotomy for GBM resection 04/25/2023, s/p chemo and RT 2023 with Dr. Causey, Admit at [facility] on Dec13 for reoccurrence of GBM, Sp lt craniotomy  for resection of GBM with placement of GammaTile brachytherapy on 12/19.  Pt was readmitted from home with new aphasia and dysarthria after being discharged from Acute rehab with concern for worsening tumor.  Pt s/p  MRI Brain that showed vasogenic edema and no new tumor     #Recurrent GBM s/p Craniotomy for Resection and Gammatile Brachytherapy (12/19)  - Continue management as per NSGY and Heme Onc  -  s/p IV Avastin 2/27, Temodar (2/27-3/3)  - Continue Keppra 1000 mg q12 and seizure precautions   - Decadron taper per primary team, on famotidine and ISS while on steroids. PJP ppx. Check FS while on steroids    #Perioperative risk assessment  History per patient and his wife at the bedside.   Patient denies previous complications related to sedation or anesthesia, no chest pain or SOB on exertion. No orthopnea, reports right leg edema that improved during this admission. Ambulation limited because of dizziness, however able to climb 2 flight of stairs without chest pain or SOB.  METS<4 limited by dizziness  NSQIP below average risk  Patient planned for craniotomy for seeds removal   Patient known with history of sinus bradycardia, telemetry without block  CXR reviewed  Get Doppler US LE. If no DVT, no further testing needed. If patient with DVT, would get vascular input prior to OR     #HTN  - Continue amlodipine 5mg daily w/ holding parameters    #Sinus bradycardia  - asymptomatic; intermittent;  - monitor on telemetry  - avoid AV will blocking agents    #Constipation( resolved)  - Continue bowel regimen    DVT ppx: Enoxaparin  Discussed with primary team

## 2025-03-04 NOTE — CHART NOTE - NSCHARTNOTEFT_GEN_A_CORE
patient has telemedicine visit 3/20 at 3pm with Dr. Hubbard  patient and wife made aware at bedside today

## 2025-03-05 ENCOUNTER — APPOINTMENT (OUTPATIENT)
Dept: PHYSICAL MEDICINE AND REHAB | Facility: CLINIC | Age: 70
End: 2025-03-05

## 2025-03-05 LAB
ALBUMIN SERPL ELPH-MCNC: 3.6 G/DL — SIGNIFICANT CHANGE UP (ref 3.3–5)
ALP SERPL-CCNC: 81 U/L — SIGNIFICANT CHANGE UP (ref 40–120)
ALT FLD-CCNC: 38 U/L — SIGNIFICANT CHANGE UP (ref 10–45)
ANION GAP SERPL CALC-SCNC: 11 MMOL/L — SIGNIFICANT CHANGE UP (ref 5–17)
APTT BLD: 33 SEC — SIGNIFICANT CHANGE UP (ref 24.5–35.6)
AST SERPL-CCNC: 13 U/L — SIGNIFICANT CHANGE UP (ref 10–40)
BILIRUB SERPL-MCNC: 0.3 MG/DL — SIGNIFICANT CHANGE UP (ref 0.2–1.2)
BLD GP AB SCN SERPL QL: NEGATIVE — SIGNIFICANT CHANGE UP
BUN SERPL-MCNC: 23 MG/DL — SIGNIFICANT CHANGE UP (ref 7–23)
CALCIUM SERPL-MCNC: 8.4 MG/DL — SIGNIFICANT CHANGE UP (ref 8.4–10.5)
CHLORIDE SERPL-SCNC: 101 MMOL/L — SIGNIFICANT CHANGE UP (ref 96–108)
CO2 SERPL-SCNC: 25 MMOL/L — SIGNIFICANT CHANGE UP (ref 22–31)
CREAT SERPL-MCNC: 0.72 MG/DL — SIGNIFICANT CHANGE UP (ref 0.5–1.3)
EGFR: 100 ML/MIN/1.73M2 — SIGNIFICANT CHANGE UP
EGFR: 100 ML/MIN/1.73M2 — SIGNIFICANT CHANGE UP
GLUCOSE SERPL-MCNC: 136 MG/DL — HIGH (ref 70–99)
HCT VFR BLD CALC: 40.7 % — SIGNIFICANT CHANGE UP (ref 39–50)
HGB BLD-MCNC: 13.6 G/DL — SIGNIFICANT CHANGE UP (ref 13–17)
INR BLD: 0.9 — SIGNIFICANT CHANGE UP (ref 0.85–1.16)
MAGNESIUM SERPL-MCNC: 2 MG/DL — SIGNIFICANT CHANGE UP (ref 1.6–2.6)
MCHC RBC-ENTMCNC: 29.8 PG — SIGNIFICANT CHANGE UP (ref 27–34)
MCHC RBC-ENTMCNC: 33.4 G/DL — SIGNIFICANT CHANGE UP (ref 32–36)
MCV RBC AUTO: 89.3 FL — SIGNIFICANT CHANGE UP (ref 80–100)
NRBC BLD AUTO-RTO: 0 /100 WBCS — SIGNIFICANT CHANGE UP (ref 0–0)
PHOSPHATE SERPL-MCNC: 3.2 MG/DL — SIGNIFICANT CHANGE UP (ref 2.5–4.5)
PLATELET # BLD AUTO: 281 K/UL — SIGNIFICANT CHANGE UP (ref 150–400)
POTASSIUM SERPL-MCNC: 4.3 MMOL/L — SIGNIFICANT CHANGE UP (ref 3.5–5.3)
POTASSIUM SERPL-SCNC: 4.3 MMOL/L — SIGNIFICANT CHANGE UP (ref 3.5–5.3)
PROT SERPL-MCNC: 5.6 G/DL — LOW (ref 6–8.3)
PROTHROM AB SERPL-ACNC: 10.5 SEC — SIGNIFICANT CHANGE UP (ref 9.9–13.4)
RBC # BLD: 4.56 M/UL — SIGNIFICANT CHANGE UP (ref 4.2–5.8)
RBC # FLD: 14.6 % — HIGH (ref 10.3–14.5)
RH IG SCN BLD-IMP: POSITIVE — SIGNIFICANT CHANGE UP
SODIUM SERPL-SCNC: 137 MMOL/L — SIGNIFICANT CHANGE UP (ref 135–145)
WBC # BLD: 8.99 K/UL — SIGNIFICANT CHANGE UP (ref 3.8–10.5)
WBC # FLD AUTO: 8.99 K/UL — SIGNIFICANT CHANGE UP (ref 3.8–10.5)

## 2025-03-05 PROCEDURE — 99232 SBSQ HOSP IP/OBS MODERATE 35: CPT

## 2025-03-05 PROCEDURE — 93970 EXTREMITY STUDY: CPT | Mod: 26

## 2025-03-05 PROCEDURE — 99233 SBSQ HOSP IP/OBS HIGH 50: CPT

## 2025-03-05 RX ORDER — APREPITANT 40 MG/1
40 CAPSULE ORAL ONCE
Refills: 0 | Status: COMPLETED | OUTPATIENT
Start: 2025-03-06 | End: 2025-03-06

## 2025-03-05 RX ORDER — POVIDONE-IODINE 7.5 %
1 SOLUTION, NON-ORAL TOPICAL ONCE
Refills: 0 | Status: DISCONTINUED | OUTPATIENT
Start: 2025-03-06 | End: 2025-03-06

## 2025-03-05 RX ORDER — ACETAMINOPHEN 500 MG/5ML
1000 LIQUID (ML) ORAL ONCE
Refills: 0 | Status: COMPLETED | OUTPATIENT
Start: 2025-03-06 | End: 2025-03-06

## 2025-03-05 RX ADMIN — LEVETIRACETAM 1000 MILLIGRAM(S): 10 INJECTION, SOLUTION INTRAVENOUS at 17:12

## 2025-03-05 RX ADMIN — DEXAMETHASONE 4 MILLIGRAM(S): 0.5 TABLET ORAL at 23:11

## 2025-03-05 RX ADMIN — Medication 20 MILLIGRAM(S): at 05:31

## 2025-03-05 RX ADMIN — DEXAMETHASONE 4 MILLIGRAM(S): 0.5 TABLET ORAL at 05:32

## 2025-03-05 RX ADMIN — Medication 2 TABLET(S): at 23:12

## 2025-03-05 RX ADMIN — LEVETIRACETAM 1000 MILLIGRAM(S): 10 INJECTION, SOLUTION INTRAVENOUS at 05:31

## 2025-03-05 RX ADMIN — POLYETHYLENE GLYCOL 3350 17 GRAM(S): 17 POWDER, FOR SOLUTION ORAL at 23:11

## 2025-03-05 RX ADMIN — DEXAMETHASONE 4 MILLIGRAM(S): 0.5 TABLET ORAL at 14:23

## 2025-03-05 RX ADMIN — Medication 20 MILLIGRAM(S): at 17:12

## 2025-03-05 RX ADMIN — AMLODIPINE BESYLATE 5 MILLIGRAM(S): 10 TABLET ORAL at 05:32

## 2025-03-05 RX ADMIN — INSULIN LISPRO 2: 100 INJECTION, SOLUTION INTRAVENOUS; SUBCUTANEOUS at 18:04

## 2025-03-05 RX ADMIN — Medication 1 APPLICATION(S): at 17:26

## 2025-03-05 NOTE — PROGRESS NOTE ADULT - ASSESSMENT
#Perioperative risk assessment  History per patient and his wife at the bedside.   Patient denies previous complications related to sedation or anesthesia, no chest pain or SOB on exertion. No orthopnea, reports right leg edema that improved during this admission. Ambulation limited because of dizziness, however able to climb 12 steps without chest pain or SOB. Tolerated last operation (brain tumor resection with implantation of Cesium 131) in Dec 2024.   Patient planned for craniotomy for seeds removal   Patient  with known history of sinus bradycardia, EKG from 3/4 without block.   Doppler US LE showing no DVT   Unlikely that further cardiac evaluation would change edgardo-op management.   Patient is medically optimized for planned procedure, ---removal of cesium 131 brachytherapy seeds; may proceed with surgery without further cardiac eval so long as surgery remains indicated and consistent with his stated wishes.   [ ] recommend monitoring on telemetry post op given bradycardia.  69 year old male with past medical history of HTN, OA, right TKA, HO craniotomy for GBM resection 04/25/2023, s/p chemo and RT 2023 with Dr. Causey, Admitted to [facility] on Dec13 for reoccurrence of GBM, Sp lt craniotomy  for resection of GBM with placement of GammaTile brachytherapy on 12/19.  Pt was readmitted from home with new aphasia and dysarthria after being discharged from Acute rehab with concern for worsening tumor.  Pt s/p  MRI Brain that showed vasogenic edema and no new tumor     #Recurrent GBM s/p Craniotomy for Resection and Gammatile Brachytherapy (12/19)  - Continue management as per NSGY and Heme Onc  -  s/p IV Avastin 2/27, Temodar (2/27-3/3)  - Continue Keppra 1000 mg q12 and seizure precautions   - Decadron taper per primary team, on famotidine and ISS while on steroids. PJP ppx. Check FS while on steroids    #Perioperative risk assessment  History per patient and his wife at the bedside.   Patient denies previous complications related to sedation or anesthesia, no chest pain or SOB on exertion. No orthopnea, reports right leg edema that improved during this admission. Ambulation limited because of dizziness, however able to climb 12 steps without chest pain or SOB. Tolerated last operation (brain tumor resection with implantation of Cesium 131) in Dec 2024.   Patient planned for craniotomy for seeds removal   Patient  with known history of sinus bradycardia, EKG from 3/4 without block.   Doppler US LE showing no DVT   Unlikely that further cardiac evaluation would change edgardo-op management.   Patient is medically optimized for planned procedure, ---removal of cesium 131 brachytherapy seeds; may proceed with surgery without further cardiac eval so long as surgery remains indicated and consistent with his stated wishes.   [ ] recommend monitoring on telemetry post op given bradycardia.     #HTN  - Continue amlodipine 5mg daily w/ holding parameters    #Sinus bradycardia  - asymptomatic; intermittent;  - monitor on telemetry  - avoid AV will blocking agents    #Constipation( resolved)  - Continue bowel regimen    DVT ppx: resume lovenox for ppx post op when post op bleeding risk acceptable

## 2025-03-05 NOTE — PROGRESS NOTE ADULT - SUBJECTIVE AND OBJECTIVE BOX
HPI:  69M with pmh HTN, L frontoparietal GBM s/p biopsy @[facility] 2023, resection x2 April/2023, Dec/2024 with GammaTile placement (20 seeds/5 Tiles), s/p >6 cycles of TMZ (Aug/2023-Jan/2024), RT (60 Gy in 30 fxns (June/2023-July/2024) presents with worsening right sided weakness and expressive aphasia x 1 week. Patient was due to start the last week of a decadron taper today at 1mg daily. He was brought to [facility] ER last night for symptoms where CTH  showed worsening R sided whole-hemisphere vasogenic edema susp 2/2 GammaTile and Dex taper. MRI brain was completed this AM and he received 10mg IV decadron at 6AM. Transferred to [facility] for further workup. Patient denies headache, nausea, vomiting, or vision changes. Patient has been compliant with his home keppra 1g BID.  (25 Feb 2025 15:09)    INTERVAL EVENTS:  No complaints    HOSPITAL COURSE:  2/25: admitted to [facility]  2/26: KYA overnight. Heme/onc consulted for possible IV avastin  2/27: KYA overnight. S/p IV avastin, temodar started. PT/OR rec AR.  2/29: KYA overnight, neuro stable.  3/1: KYA overnight, neuro stable.   3/2: KYA overnight, neuro stable. Decadron taper to 2BID over 2 weeks.   3/3: KYA overnight, neuro stable.   3/4: KYA o/n. CXR done. ISS added.   3/5: Plan for OR Thurs      Vital Signs Last 24 Hrs  T(C): 36.4 (05 Mar 2025 00:23), Max: 36.7 (04 Mar 2025 05:20)  T(F): 97.6 (05 Mar 2025 00:23), Max: 98.1 (04 Mar 2025 05:20)  HR: 66 (05 Mar 2025 00:23) (51 - 66)  BP: 132/71 (05 Mar 2025 00:23) (114/71 - 132/71)  BP(mean): --  RR: 18 (05 Mar 2025 00:23) (17 - 18)  SpO2: 96% (05 Mar 2025 00:23) (96% - 98%)    Parameters below as of 05 Mar 2025 00:23  Patient On (Oxygen Delivery Method): room air        I&O's Summary    03 Mar 2025 07:01  -  04 Mar 2025 07:00  --------------------------------------------------------  IN: 0 mL / OUT: 2175 mL / NET: -2175 mL    04 Mar 2025 07:01  -  05 Mar 2025 01:27  --------------------------------------------------------  IN: 0 mL / OUT: 725 mL / NET: -725 mL        PHYSICAL EXAM:  General: NAD, pt is comfortably sitting up in bed, on room air  HEENT: PERRL 3mm briskly reactive, EOMI b/l, (+) dysconjugate gaze, (+) Right facial droop  Cardiovascular: RRR, S1, S2  Respiratory: breathing non-labored on RA, chest rise symmetric  GI: abd soft, NTND   Neuro: A&Ox3, No aphasia, speech clear, no dysmetria, unable to assess pronator drift 2/2 RUE strength. Follows commands.  RU delt 2/5, tricep 4/5, bicep 3/5 HG 2/5; LUE 5/5. Right HF 3/5, KF/KE w assistance 5/5, dorsiflexion 1/5, plantarflexion 5/5; LLE 5/5.   Dec sensation (60%) to RUE and RLE and R face  Extremities: distal pulses 2+ x4        LABS:                        14.4   10.92 )-----------( 321      ( 04 Mar 2025 05:30 )             43.7     03-04    134[L]  |  101  |  24[H]  ----------------------------<  134[H]  See Note   |  24  |  0.69    Ca    8.6      04 Mar 2025 05:30  Phos  3.8     03-04  Mg     2.3     03-04    TPro  6.1  /  Alb  3.2[L]  /  TBili  0.3  /  DBili  x   /  AST  See Note  /  ALT  See Note  /  AlkPhos  See Note  03-04      Urinalysis Basic - ( 04 Mar 2025 05:30 )    Color: x / Appearance: x / SG: x / pH: x  Gluc: 134 mg/dL / Ketone: x  / Bili: x / Urobili: x   Blood: x / Protein: x / Nitrite: x   Leuk Esterase: x / RBC: x / WBC x   Sq Epi: x / Non Sq Epi: x / Bacteria: x          CAPILLARY BLOOD GLUCOSE      POCT Blood Glucose.: 158 mg/dL (04 Mar 2025 21:56)  POCT Blood Glucose.: 137 mg/dL (04 Mar 2025 17:05)      Drug Levels: [] N/A    CSF Analysis: [] N/A      Allergies    No Known Allergies    Intolerances      MEDICATIONS:  Antibiotics:  trimethoprim  160 mG/sulfamethoxazole 800 mG 1 Tablet(s) Oral <User Schedule>    Neuro:  acetaminophen     Tablet .. 650 milliGRAM(s) Oral every 6 hours PRN  levETIRAcetam 1000 milliGRAM(s) Oral every 12 hours    Anticoagulation:  enoxaparin Injectable 40 milliGRAM(s) SubCutaneous <User Schedule>    OTHER:  amLODIPine   Tablet 5 milliGRAM(s) Oral daily  dexAMETHasone     Tablet   Oral   dexAMETHasone     Tablet 4 milliGRAM(s) Oral every 8 hours  famotidine    Tablet 20 milliGRAM(s) Oral every 12 hours  insulin lispro (ADMELOG) corrective regimen sliding scale   SubCutaneous Before meals and at bedtime  polyethylene glycol 3350 17 Gram(s) Oral two times a day  senna 2 Tablet(s) Oral at bedtime    IVF:    CULTURES:    RADIOLOGY & ADDITIONAL TESTS:      ASSESSMENT:  69M with pmh HTN, L frontoparietal GBM s/p biopsy @[facility] 2023, resection x2 April/2023, Dec/2024 with GammaTile placement (20 seeds/5 Tiles), s/p >6 cycles of TMZ (Aug/2023-Jan/2024), RT (60 Gy in 30 fxns (June/2023-July/2024) presents with worsening right sided weakness and expressive aphasia x 1 week. At [facility] ER CTH  showed worsening R sided whole-hemisphere vasogenic edema susp 2/2 GammaTile and Dex taper and MR brain completed. Transferred to [facility] for further workup.     Neuro:  - neuro/vital checks q4  - pain control: tylenol prn  - MRI brain w/ sarah completed 2/25  - CTH 2/24: worsening R sided whole-hemisphere vasogenic edema  - seizure prophylaxis: cont home keppra 1g q12  - decadron 4q6 taper over 2 weeks to 2 BID for vasogenic edema  - GBM: hold home metformin 500mg BID    Cardiac:  - SBP goal 100-160  - HTN: cont home amlodipine 5mg daily    Pulmonary:  - on RA    GI:  - regular diet  - bowel regimen  - pepcid 20mg BID while on steroids    Renal:  - IVL  - Na goal 135-145    Heme:  - SCDs and SQL for DVT prophylaxis  - heme/onc consult, s/p IV Avastin 2/27, temodar (2/27-3/3)  - pending LE dopplers     Endo:  - A1C 5.9  - ISS     ID:  - afebrile  - bactrim DS 3x a week for prophylaxis i/s/o recent chemotherapy    Disposition: tele, full code, PT/OT rec AR.

## 2025-03-05 NOTE — PROGRESS NOTE ADULT - SUBJECTIVE AND OBJECTIVE BOX
Patient is a 69y old  Male who presents with a chief complaint of right sided weakness (05 Mar 2025 18:24)    INTERVAL EVENTS:  tolerating regular texture diet, no nausea   no dysuria, no constipation    SUBJECTIVE:  Patient was seen and examined at bedside.  Review of systems: No CP, dyspnea, nausea or vomiting, dysuria, LE edema.     Diet, NPO after Midnight:      NPO Start Date: 05-Mar-2025,   NPO Start Time: 23:59  Except Medications (03-05-25 @ 12:06) [Active]  Diet, Regular (02-25-25 @ 13:16) [Active]    MEDICATIONS:  MEDICATIONS  (STANDING):  acetaminophen     Tablet .. 1000 milliGRAM(s) Oral once  amLODIPine   Tablet 5 milliGRAM(s) Oral daily  aprepitant 40 milliGRAM(s) Oral once  chlorhexidine 2% Cloths 1 Application(s) Topical every 12 hours  dexAMETHasone     Tablet 4 milliGRAM(s) Oral every 8 hours  dexAMETHasone     Tablet   Oral   famotidine    Tablet 20 milliGRAM(s) Oral every 12 hours  insulin lispro (ADMELOG) corrective regimen sliding scale   SubCutaneous Before meals and at bedtime  levETIRAcetam 1000 milliGRAM(s) Oral every 12 hours  polyethylene glycol 3350 17 Gram(s) Oral two times a day  povidone iodine 10% Nasal Swab 1 Application(s) Both Nostrils once  senna 2 Tablet(s) Oral at bedtime  sodium chloride 0.9%. 1000 milliLiter(s) (90 mL/Hr) IV Continuous <Continuous>  trimethoprim  160 mG/sulfamethoxazole 800 mG 1 Tablet(s) Oral <User Schedule>    MEDICATIONS  (PRN):  acetaminophen     Tablet .. 650 milliGRAM(s) Oral every 6 hours PRN Temp greater or equal to 38.5C (101.3F), Mild Pain (1 - 3)      Allergies    No Known Allergies    Intolerances        OBJECTIVE:  Vital Signs Last 24 Hrs  T(C): 36.7 (06 Mar 2025 00:36), Max: 36.7 (05 Mar 2025 14:13)  T(F): 98 (06 Mar 2025 00:36), Max: 98 (05 Mar 2025 14:13)  HR: 68 (06 Mar 2025 00:36) (49 - 68)  BP: 152/89 (06 Mar 2025 00:36) (122/66 - 152/89)  BP(mean): 97 (05 Mar 2025 17:01) (90 - 97)  RR: 18 (06 Mar 2025 00:36) (18 - 18)  SpO2: 98% (06 Mar 2025 00:36) (96% - 98%)    Parameters below as of 06 Mar 2025 00:36  Patient On (Oxygen Delivery Method): room air      I&O's Summary    04 Mar 2025 07:01  -  05 Mar 2025 07:00  --------------------------------------------------------  IN: 0 mL / OUT: 1775 mL / NET: -1775 mL        PHYSICAL EXAM:  Gen: Sitting in chair next to bed at time of exam, appears stated age  HEENT: MMM, clear OP  Neck: trachea at midline  CV: RRR, +S1/S2  Pulm: adequate respiratory effort, no increase in work of breathing  Abd: soft, ND  Skin: warm and dry,   Ext: no LE edema today   Neuro: Alert, oriented,, speaking in full sentences  Psych: affect and behavior appropriate, pleasant at time of interview    LABS:                        13.6   8.99  )-----------( 281      ( 05 Mar 2025 05:30 )             40.7     03-05    137  |  101  |  23  ----------------------------<  136[H]  4.3   |  25  |  0.72    Ca    8.4      05 Mar 2025 05:30  Phos  3.2     03-05  Mg     2.0     03-05    TPro  5.6[L]  /  Alb  3.6  /  TBili  0.3  /  DBili  x   /  AST  13  /  ALT  38  /  AlkPhos  81  03-05    LIVER FUNCTIONS - ( 05 Mar 2025 05:30 )  Alb: 3.6 g/dL / Pro: 5.6 g/dL / ALK PHOS: 81 U/L / ALT: 38 U/L / AST: 13 U/L / GGT: x           PT/INR - ( 05 Mar 2025 05:30 )   PT: 10.5 sec;   INR: 0.90          PTT - ( 05 Mar 2025 05:30 )  PTT:33.0 sec  CAPILLARY BLOOD GLUCOSE      POCT Blood Glucose.: 134 mg/dL (05 Mar 2025 21:57)  POCT Blood Glucose.: 157 mg/dL (05 Mar 2025 17:52)  POCT Blood Glucose.: 139 mg/dL (05 Mar 2025 11:40)  POCT Blood Glucose.: 148 mg/dL (05 Mar 2025 07:39)    Urinalysis Basic - ( 05 Mar 2025 05:30 )    Color: x / Appearance: x / SG: x / pH: x  Gluc: 136 mg/dL / Ketone: x  / Bili: x / Urobili: x   Blood: x / Protein: x / Nitrite: x   Leuk Esterase: x / RBC: x / WBC x   Sq Epi: x / Non Sq Epi: x / Bacteria: x        MICRODATA:      RADIOLOGY/OTHER STUDIES:

## 2025-03-05 NOTE — PROGRESS NOTE ADULT - SUBJECTIVE AND OBJECTIVE BOX
Surgery: left craniotomy for cesium seed removal  Consent: Signed by patient                   NAME/NUMBER of HCP: Karen Quiroga (wife) [phone]    Representative Consent: [x  ] Signed by patient                                                  [  ] N/A -> only for cerebral angiogram    No Known Allergies      T(C): 36.6 (03-05-25 @ 09:06), Max: 36.7 (03-04-25 @ 20:50)  HR: 54 (03-05-25 @ 09:06) (49 - 66)  BP: 123/74 (03-05-25 @ 09:06) (123/74 - 141/81)  RR: 18 (03-05-25 @ 09:06) (18 - 18)  SpO2: 97% (03-05-25 @ 09:06) (96% - 98%)  Wt(kg): --    EXAM:  General: NAD, pt is comfortably sitting up in bed, on room air  HEENT: PERRL 3mm briskly reactive, EOMI b/l, (+) dysconjugate gaze, (+) Right facial droop  Cardiovascular: RRR, S1, S2  Respiratory: breathing non-labored on RA, chest rise symmetric  GI: abd soft, NTND   Neuro: A&Ox3, No aphasia, speech clear, no dysmetria, unable to assess pronator drift 2/2 RUE strength. Follows commands.  RU delt 2/5, tricep 4/5, bicep 3/5 HG 2/5; LUE 5/5. Right HF 3/5, KF/KE w assistance 5/5, dorsiflexion 1/5, plantarflexion 5/5; LLE 5/5.   Dec sensation (60%) to RUE and RLE and R face  Extremities: distal pulses 2+ x4    03-05    137  |  101  |  23  ----------------------------<  136[H]  4.3   |  25  |  0.72    Ca    8.4      05 Mar 2025 05:30  Phos  3.2     03-05  Mg     2.0     03-05    TPro  5.6[L]  /  Alb  3.6  /  TBili  0.3  /  DBili  x   /  AST  13  /  ALT  38  /  AlkPhos  81  03-05    CBC Full  -  ( 05 Mar 2025 05:30 )  WBC Count : 8.99 K/uL  RBC Count : 4.56 M/uL  Hemoglobin : 13.6 g/dL  Hematocrit : 40.7 %  Platelet Count - Automated : 281 K/uL  Mean Cell Volume : 89.3 fl  Mean Cell Hemoglobin : 29.8 pg  Mean Cell Hemoglobin Concentration : 33.4 g/dL  Auto Neutrophil # : x  Auto Lymphocyte # : x  Auto Monocyte # : x  Auto Eosinophil # : x  Auto Basophil # : x  Auto Neutrophil % : x  Auto Lymphocyte % : x  Auto Monocyte % : x  Auto Eosinophil % : x  Auto Basophil % : x    PT/INR - ( 05 Mar 2025 05:30 )   PT: 10.5 sec;   INR: 0.90          PTT - ( 05 Mar 2025 05:30 )  PTT:33.0 sec    Pregnancy test: n/a  Type & Screen (in past 72hrs):     2 Type & Screen within 72 hours if anticipate blood need in OR:  x Y _ N     Blood ordered and on hold for OR:   [ ] No need     [ ] 1u pRBC on hold      [x ] 2u pRBC on hold    CXR: 3/4: wnl  EKG: 3/4: sinus margarita  ECHO:  Medical Clearances: pending  Other Clearances:     Last dose of antiplatelet/anticoagulation drug: lovenox 40mg 3/4    Implanted Devices (pacemaker, drug pump...etc):  []YES   [x] NO                  If yes --> EPS consulted to interrogate device: [ ] YES  [ ] NO                            If yes -->  EPS called to let them know patient going for surgery: [ ] device needs to be turned off                                                                                                                                                 [ ] magnet needs to be placed for surgery                                                                                                                                                [ ] nothing to do per EP, may proceed with bovie use in OR                                       3M nasal swab ordered?  xY  _N    Cranial surgery: Order written for hair to be shampooed night before surgery and morning before surgery  [x] yes   []no  Chlorhexidine Wipes ordered for Neck Down?  x Y  _ N  (twice a day if 1 day before surgery, daily for 3 days if 3 days prior, daily if in ICU)                 Assessment: 69M with pmh HTN, L frontoparietal GBM s/p biopsy @[facility] 2023, resection x2 April/2023, Dec/2024 with GammaTile placement (20 seeds/5 Tiles), s/p >6 cycles of TMZ (Aug/2023-Jan/2024), RT (60 Gy in 30 fxns (June/2023-July/2024) presents with worsening right sided weakness and expressive aphasia x 1 week. At [facility] ER CTH  showed worsening R sided whole-hemisphere vasogenic edema susp 2/2 GammaTile and Dex taper and MR brain completed. Transferred to [facility] for further workup.      Plan:  Neuro:  - neuro/vital checks q4  - pain control: tylenol prn  - MRI brain w/ sarah completed 2/25  - CTH 2/24: worsening R sided whole-hemisphere vasogenic edema  - seizure prophylaxis: cont home keppra 1g q12  - decadron 4q6 taper over 2 weeks to 2 BID for vasogenic edema  - GBM: hold home metformin 500mg BID    Cardiac:  - SBP goal 100-160  - HTN: cont home amlodipine 5mg daily    Pulmonary:  - on RA    GI:  - regular diet, NPO after midnight for OR  - bowel regimen  - pepcid 20mg BID while on steroids    Renal:  - IVF while NPO  - Na goal 135-145    Heme:  - DVT ppx: SCDs (SQL held for OR)  - heme/onc consult, s/p IV Avastin 2/27, temodar (2/27-3/3)  - pending LE dopplers     Endo:  - A1C 5.9  - ISS     ID:  - afebrile  - bactrim DS 3x a week for prophylaxis i/s/o recent chemotherapy    Disposition: tele, full code, PT/OT rec AR.    D/w Dr. Johnston    Assessment:  Present when checked    []  GCS  E   V  M     Heart Failure: []Acute, [] acute on chronic , []chronic  Heart Failure:  [] Diastolic (HFpEF), [] Systolic (HFrEF), []Combined (HFpEF and HFrEF), [] RHF, [] Pulm HTN, [] Other    [] ROMERO, [] ATN, [] AIN, [] other  [] CKD1, [] CKD2, [] CKD 3, [] CKD 4, [] CKD 5, []ESRD    Encephalopathy: [] Metabolic, [] Hepatic, [] toxic, [] Neurological, [] Other    Abnormal Nurtitional Status: [] malnurtition (see nutrition note), [ ]underweight: BMI < 19, [] morbid obesity: BMI >40, [] Cachexia    [] Sepsis  [] hypovolemic shock,[] cardiogenic shock, [] hemorrhagic shock, [] neuogenic shock  [] Acute Respiratory Failure  []Cerebral edema, [] Brain compression/ herniation,   [] Functional quadriplegia  [] Acute blood loss anemia

## 2025-03-06 ENCOUNTER — RESULT REVIEW (OUTPATIENT)
Age: 70
End: 2025-03-06

## 2025-03-06 ENCOUNTER — TRANSCRIPTION ENCOUNTER (OUTPATIENT)
Age: 70
End: 2025-03-06

## 2025-03-06 LAB
ANION GAP SERPL CALC-SCNC: 11 MMOL/L — SIGNIFICANT CHANGE UP (ref 5–17)
ANION GAP SERPL CALC-SCNC: 13 MMOL/L — SIGNIFICANT CHANGE UP (ref 5–17)
APTT BLD: 27.7 SEC — SIGNIFICANT CHANGE UP (ref 24.5–35.6)
BUN SERPL-MCNC: 21 MG/DL — SIGNIFICANT CHANGE UP (ref 7–23)
BUN SERPL-MCNC: 29 MG/DL — HIGH (ref 7–23)
CALCIUM SERPL-MCNC: 8.6 MG/DL — SIGNIFICANT CHANGE UP (ref 8.4–10.5)
CALCIUM SERPL-MCNC: 8.7 MG/DL — SIGNIFICANT CHANGE UP (ref 8.4–10.5)
CHLORIDE SERPL-SCNC: 101 MMOL/L — SIGNIFICANT CHANGE UP (ref 96–108)
CHLORIDE SERPL-SCNC: 103 MMOL/L — SIGNIFICANT CHANGE UP (ref 96–108)
CO2 SERPL-SCNC: 24 MMOL/L — SIGNIFICANT CHANGE UP (ref 22–31)
CO2 SERPL-SCNC: 24 MMOL/L — SIGNIFICANT CHANGE UP (ref 22–31)
CREAT SERPL-MCNC: 0.74 MG/DL — SIGNIFICANT CHANGE UP (ref 0.5–1.3)
CREAT SERPL-MCNC: 0.78 MG/DL — SIGNIFICANT CHANGE UP (ref 0.5–1.3)
EGFR: 97 ML/MIN/1.73M2 — SIGNIFICANT CHANGE UP
EGFR: 97 ML/MIN/1.73M2 — SIGNIFICANT CHANGE UP
EGFR: 99 ML/MIN/1.73M2 — SIGNIFICANT CHANGE UP
EGFR: 99 ML/MIN/1.73M2 — SIGNIFICANT CHANGE UP
GLUCOSE SERPL-MCNC: 130 MG/DL — HIGH (ref 70–99)
GLUCOSE SERPL-MCNC: 134 MG/DL — HIGH (ref 70–99)
HCT VFR BLD CALC: 39.5 % — SIGNIFICANT CHANGE UP (ref 39–50)
HCT VFR BLD CALC: 45.9 % — SIGNIFICANT CHANGE UP (ref 39–50)
HGB BLD-MCNC: 13.2 G/DL — SIGNIFICANT CHANGE UP (ref 13–17)
HGB BLD-MCNC: 14.6 G/DL — SIGNIFICANT CHANGE UP (ref 13–17)
INR BLD: 0.95 — SIGNIFICANT CHANGE UP (ref 0.85–1.16)
MAGNESIUM SERPL-MCNC: 2 MG/DL — SIGNIFICANT CHANGE UP (ref 1.6–2.6)
MAGNESIUM SERPL-MCNC: 2 MG/DL — SIGNIFICANT CHANGE UP (ref 1.6–2.6)
MCHC RBC-ENTMCNC: 28.9 PG — SIGNIFICANT CHANGE UP (ref 27–34)
MCHC RBC-ENTMCNC: 30.3 PG — SIGNIFICANT CHANGE UP (ref 27–34)
MCHC RBC-ENTMCNC: 31.8 G/DL — LOW (ref 32–36)
MCHC RBC-ENTMCNC: 33.4 G/DL — SIGNIFICANT CHANGE UP (ref 32–36)
MCV RBC AUTO: 90.6 FL — SIGNIFICANT CHANGE UP (ref 80–100)
MCV RBC AUTO: 90.9 FL — SIGNIFICANT CHANGE UP (ref 80–100)
NRBC BLD AUTO-RTO: 0 /100 WBCS — SIGNIFICANT CHANGE UP (ref 0–0)
NRBC BLD AUTO-RTO: 0 /100 WBCS — SIGNIFICANT CHANGE UP (ref 0–0)
PHOSPHATE SERPL-MCNC: 3.5 MG/DL — SIGNIFICANT CHANGE UP (ref 2.5–4.5)
PHOSPHATE SERPL-MCNC: 3.8 MG/DL — SIGNIFICANT CHANGE UP (ref 2.5–4.5)
PLATELET # BLD AUTO: 275 K/UL — SIGNIFICANT CHANGE UP (ref 150–400)
PLATELET # BLD AUTO: 283 K/UL — SIGNIFICANT CHANGE UP (ref 150–400)
POTASSIUM SERPL-MCNC: 4.3 MMOL/L — SIGNIFICANT CHANGE UP (ref 3.5–5.3)
POTASSIUM SERPL-MCNC: 4.3 MMOL/L — SIGNIFICANT CHANGE UP (ref 3.5–5.3)
POTASSIUM SERPL-SCNC: 4.3 MMOL/L — SIGNIFICANT CHANGE UP (ref 3.5–5.3)
POTASSIUM SERPL-SCNC: 4.3 MMOL/L — SIGNIFICANT CHANGE UP (ref 3.5–5.3)
PROTHROM AB SERPL-ACNC: 11.1 SEC — SIGNIFICANT CHANGE UP (ref 9.9–13.4)
RBC # BLD: 4.36 M/UL — SIGNIFICANT CHANGE UP (ref 4.2–5.8)
RBC # BLD: 5.05 M/UL — SIGNIFICANT CHANGE UP (ref 4.2–5.8)
RBC # FLD: 14.5 % — SIGNIFICANT CHANGE UP (ref 10.3–14.5)
RBC # FLD: 14.6 % — HIGH (ref 10.3–14.5)
SODIUM SERPL-SCNC: 138 MMOL/L — SIGNIFICANT CHANGE UP (ref 135–145)
SODIUM SERPL-SCNC: 138 MMOL/L — SIGNIFICANT CHANGE UP (ref 135–145)
WBC # BLD: 8.16 K/UL — SIGNIFICANT CHANGE UP (ref 3.8–10.5)
WBC # BLD: 8.76 K/UL — SIGNIFICANT CHANGE UP (ref 3.8–10.5)
WBC # FLD AUTO: 8.16 K/UL — SIGNIFICANT CHANGE UP (ref 3.8–10.5)
WBC # FLD AUTO: 8.76 K/UL — SIGNIFICANT CHANGE UP (ref 3.8–10.5)

## 2025-03-06 PROCEDURE — 61320 CRNEC/CRNOT DRG ICR ABS STTL: CPT | Mod: 78

## 2025-03-06 PROCEDURE — 88342 IMHCHEM/IMCYTCHM 1ST ANTB: CPT | Mod: 26

## 2025-03-06 PROCEDURE — 62142 RMVL B1 FLP/PROSTC PLATE SKL: CPT | Mod: 78

## 2025-03-06 PROCEDURE — 99291 CRITICAL CARE FIRST HOUR: CPT

## 2025-03-06 PROCEDURE — 88341 IMHCHEM/IMCYTCHM EA ADD ANTB: CPT | Mod: 26

## 2025-03-06 PROCEDURE — 88305 TISSUE EXAM BY PATHOLOGIST: CPT | Mod: 26

## 2025-03-06 PROCEDURE — 99232 SBSQ HOSP IP/OBS MODERATE 35: CPT

## 2025-03-06 PROCEDURE — 61781 SCAN PROC CRANIAL INTRA: CPT

## 2025-03-06 PROCEDURE — 14021 TIS TRNFR S/A/L 10.1-30 SQCM: CPT | Mod: 78

## 2025-03-06 DEVICE — PLATE UN3 DOUBLE-Y 6 HOLE W/BAR: Type: IMPLANTABLE DEVICE | Site: LEFT | Status: FUNCTIONAL

## 2025-03-06 DEVICE — SCREW UN3 AXS SELF DRILL 1.5X4MM: Type: IMPLANTABLE DEVICE | Site: LEFT | Status: FUNCTIONAL

## 2025-03-06 DEVICE — MESH DYNAMIC 1.7MM 90X90X.6MM: Type: IMPLANTABLE DEVICE | Site: LEFT | Status: FUNCTIONAL

## 2025-03-06 DEVICE — SURGIFOAM 8 X 12.5CM X 10MM (100): Type: IMPLANTABLE DEVICE | Site: LEFT | Status: FUNCTIONAL

## 2025-03-06 DEVICE — PLATE UN3 2 HOLE RIGID: Type: IMPLANTABLE DEVICE | Site: LEFT | Status: FUNCTIONAL

## 2025-03-06 DEVICE — SURGIFLO HEMOSTATIC MATRIX KIT: Type: IMPLANTABLE DEVICE | Site: LEFT | Status: FUNCTIONAL

## 2025-03-06 DEVICE — MATRIX DURAGEN PLUS DURAL REGENERATION 3X3: Type: IMPLANTABLE DEVICE | Site: LEFT | Status: FUNCTIONAL

## 2025-03-06 RX ORDER — OXYCODONE HYDROCHLORIDE 30 MG/1
10 TABLET ORAL EVERY 4 HOURS
Refills: 0 | Status: DISCONTINUED | OUTPATIENT
Start: 2025-03-06 | End: 2025-03-12

## 2025-03-06 RX ORDER — ONDANSETRON HCL/PF 4 MG/2 ML
4 VIAL (ML) INJECTION EVERY 6 HOURS
Refills: 0 | Status: DISCONTINUED | OUTPATIENT
Start: 2025-03-06 | End: 2025-03-12

## 2025-03-06 RX ORDER — DEXAMETHASONE 0.5 MG/1
2 TABLET ORAL EVERY 12 HOURS
Refills: 0 | Status: CANCELLED | OUTPATIENT
Start: 2025-03-21 | End: 2025-03-12

## 2025-03-06 RX ORDER — ACETAMINOPHEN 500 MG/5ML
1000 LIQUID (ML) ORAL EVERY 8 HOURS
Refills: 0 | Status: COMPLETED | OUTPATIENT
Start: 2025-03-06 | End: 2025-03-08

## 2025-03-06 RX ORDER — POLYETHYLENE GLYCOL 3350 17 G/17G
17 POWDER, FOR SOLUTION ORAL DAILY
Refills: 0 | Status: DISCONTINUED | OUTPATIENT
Start: 2025-03-06 | End: 2025-03-08

## 2025-03-06 RX ORDER — OXYCODONE HYDROCHLORIDE 30 MG/1
5 TABLET ORAL EVERY 4 HOURS
Refills: 0 | Status: DISCONTINUED | OUTPATIENT
Start: 2025-03-06 | End: 2025-03-12

## 2025-03-06 RX ORDER — CEFAZOLIN SODIUM IN 0.9 % NACL 3 G/100 ML
2000 INTRAVENOUS SOLUTION, PIGGYBACK (ML) INTRAVENOUS EVERY 8 HOURS
Refills: 0 | Status: COMPLETED | OUTPATIENT
Start: 2025-03-06 | End: 2025-03-07

## 2025-03-06 RX ORDER — SULFAMETHOXAZOLE/TRIMETHOPRIM 800-160 MG
1 TABLET ORAL
Refills: 0 | Status: DISCONTINUED | OUTPATIENT
Start: 2025-03-06 | End: 2025-03-12

## 2025-03-06 RX ORDER — LEVETIRACETAM 10 MG/ML
1000 INJECTION, SOLUTION INTRAVENOUS EVERY 12 HOURS
Refills: 0 | Status: DISCONTINUED | OUTPATIENT
Start: 2025-03-06 | End: 2025-03-12

## 2025-03-06 RX ORDER — AMLODIPINE BESYLATE 10 MG/1
5 TABLET ORAL ONCE
Refills: 0 | Status: COMPLETED | OUTPATIENT
Start: 2025-03-06 | End: 2025-03-06

## 2025-03-06 RX ORDER — INSULIN LISPRO 100 U/ML
INJECTION, SOLUTION INTRAVENOUS; SUBCUTANEOUS
Refills: 0 | Status: DISCONTINUED | OUTPATIENT
Start: 2025-03-06 | End: 2025-03-10

## 2025-03-06 RX ORDER — AMLODIPINE BESYLATE 10 MG/1
5 TABLET ORAL DAILY
Refills: 0 | Status: DISCONTINUED | OUTPATIENT
Start: 2025-03-07 | End: 2025-03-12

## 2025-03-06 RX ORDER — DEXAMETHASONE 0.5 MG/1
4 TABLET ORAL EVERY 8 HOURS
Refills: 0 | Status: DISCONTINUED | OUTPATIENT
Start: 2025-03-08 | End: 2025-03-12

## 2025-03-06 RX ORDER — DEXAMETHASONE 0.5 MG/1
4 TABLET ORAL EVERY 6 HOURS
Refills: 0 | Status: COMPLETED | OUTPATIENT
Start: 2025-03-06 | End: 2025-03-08

## 2025-03-06 RX ORDER — DEXAMETHASONE 0.5 MG/1
TABLET ORAL
Refills: 0 | Status: DISCONTINUED | OUTPATIENT
Start: 2025-03-06 | End: 2025-03-12

## 2025-03-06 RX ORDER — DEXAMETHASONE 0.5 MG/1
4 TABLET ORAL EVERY 12 HOURS
Refills: 0 | Status: CANCELLED | OUTPATIENT
Start: 2025-03-15 | End: 2025-03-12

## 2025-03-06 RX ORDER — SENNA 187 MG
2 TABLET ORAL AT BEDTIME
Refills: 0 | Status: DISCONTINUED | OUTPATIENT
Start: 2025-03-06 | End: 2025-03-12

## 2025-03-06 RX ORDER — NICARDIPINE HCL 30 MG
5 CAPSULE ORAL
Qty: 40 | Refills: 0 | Status: DISCONTINUED | OUTPATIENT
Start: 2025-03-06 | End: 2025-03-07

## 2025-03-06 RX ADMIN — LEVETIRACETAM 1000 MILLIGRAM(S): 10 INJECTION, SOLUTION INTRAVENOUS at 17:18

## 2025-03-06 RX ADMIN — Medication 25 MG/HR: at 17:27

## 2025-03-06 RX ADMIN — Medication 100 MILLIGRAM(S): at 21:35

## 2025-03-06 RX ADMIN — Medication 1000 MILLIGRAM(S): at 12:52

## 2025-03-06 RX ADMIN — Medication 1000 MILLIGRAM(S): at 21:35

## 2025-03-06 RX ADMIN — DEXAMETHASONE 4 MILLIGRAM(S): 0.5 TABLET ORAL at 06:39

## 2025-03-06 RX ADMIN — Medication 4 MILLIGRAM(S): at 23:34

## 2025-03-06 RX ADMIN — DEXAMETHASONE 4 MILLIGRAM(S): 0.5 TABLET ORAL at 23:34

## 2025-03-06 RX ADMIN — Medication 1000 MILLIGRAM(S): at 22:19

## 2025-03-06 RX ADMIN — Medication 2 TABLET(S): at 21:35

## 2025-03-06 RX ADMIN — Medication 90 MILLILITER(S): at 00:21

## 2025-03-06 RX ADMIN — Medication 1 APPLICATION(S): at 07:41

## 2025-03-06 RX ADMIN — DEXAMETHASONE 4 MILLIGRAM(S): 0.5 TABLET ORAL at 17:17

## 2025-03-06 RX ADMIN — Medication 20 MILLIGRAM(S): at 06:39

## 2025-03-06 RX ADMIN — LEVETIRACETAM 1000 MILLIGRAM(S): 10 INJECTION, SOLUTION INTRAVENOUS at 06:38

## 2025-03-06 RX ADMIN — Medication 20 MILLIGRAM(S): at 17:17

## 2025-03-06 RX ADMIN — INSULIN LISPRO 2: 100 INJECTION, SOLUTION INTRAVENOUS; SUBCUTANEOUS at 21:44

## 2025-03-06 RX ADMIN — APREPITANT 40 MILLIGRAM(S): 40 CAPSULE ORAL at 12:53

## 2025-03-06 RX ADMIN — Medication 4 MILLIGRAM(S): at 17:19

## 2025-03-06 RX ADMIN — AMLODIPINE BESYLATE 5 MILLIGRAM(S): 10 TABLET ORAL at 06:48

## 2025-03-06 RX ADMIN — AMLODIPINE BESYLATE 5 MILLIGRAM(S): 10 TABLET ORAL at 18:28

## 2025-03-06 NOTE — PROGRESS NOTE ADULT - SUBJECTIVE AND OBJECTIVE BOX
NEUROSURGERY POST OP NOTE:    POD# 0 S/P left parietal craniotomy for cesium seed removal (3/6).     S: Seen and examined in NSCU. Denies HA, N/V, chest pain, shortness of breath, new weakness or numbness.       T(C): 36.4 (03-06-25 @ 13:18), Max: 36.7 (03-05-25 @ 17:01)  HR: 50 (03-06-25 @ 13:18) (44 - 68)  BP: 143/83 (03-06-25 @ 13:18) (123/69 - 152/89)  RR: 18 (03-06-25 @ 13:18) (17 - 18)  SpO2: 100% (03-06-25 @ 13:18) (94% - 100%)      03-06-25 @ 07:01  -  03-06-25 @ 16:59  --------------------------------------------------------  IN: 270 mL / OUT: 400 mL / NET: -130 mL        acetaminophen     Tablet .. 1000 milliGRAM(s) Oral every 8 hours  ceFAZolin   IVPB 2000 milliGRAM(s) IV Intermittent every 8 hours  chlorhexidine 2% Cloths 1 Application(s) Topical <User Schedule>  dexAMETHasone     Tablet 4 milliGRAM(s) Oral every 6 hours  dexAMETHasone     Tablet   Oral   famotidine    Tablet 20 milliGRAM(s) Oral every 12 hours  insulin lispro (ADMELOG) corrective regimen sliding scale   SubCutaneous Before meals and at bedtime  levETIRAcetam 1000 milliGRAM(s) Oral every 12 hours  ondansetron Injectable 4 milliGRAM(s) IV Push every 6 hours  oxyCODONE    IR 5 milliGRAM(s) Oral every 4 hours PRN  oxyCODONE    IR 10 milliGRAM(s) Oral every 4 hours PRN  polyethylene glycol 3350 17 Gram(s) Oral daily  senna 2 Tablet(s) Oral at bedtime  sodium chloride 0.9%. 1000 milliLiter(s) IV Continuous <Continuous>      RADIOLOGY:     Exam:    WOUND/DRAINS:    DEVICES:       Assessment: 69yMale      Plan:       NEUROSURGERY POST OP NOTE:    POD# 0 S/P left parietal craniotomy for cesium seed removal (3/6).     S: Seen and examined in NSCU. Denies HA, N/V, chest pain, shortness of breath, new weakness or numbness.       T(C): 36.4 (03-06-25 @ 13:18), Max: 36.7 (03-05-25 @ 17:01)  HR: 50 (03-06-25 @ 13:18) (44 - 68)  BP: 143/83 (03-06-25 @ 13:18) (123/69 - 152/89)  RR: 18 (03-06-25 @ 13:18) (17 - 18)  SpO2: 100% (03-06-25 @ 13:18) (94% - 100%)      03-06-25 @ 07:01  -  03-06-25 @ 16:59  --------------------------------------------------------  IN: 270 mL / OUT: 400 mL / NET: -130 mL        acetaminophen     Tablet .. 1000 milliGRAM(s) Oral every 8 hours  ceFAZolin   IVPB 2000 milliGRAM(s) IV Intermittent every 8 hours  chlorhexidine 2% Cloths 1 Application(s) Topical <User Schedule>  dexAMETHasone     Tablet 4 milliGRAM(s) Oral every 6 hours  dexAMETHasone     Tablet   Oral   famotidine    Tablet 20 milliGRAM(s) Oral every 12 hours  insulin lispro (ADMELOG) corrective regimen sliding scale   SubCutaneous Before meals and at bedtime  levETIRAcetam 1000 milliGRAM(s) Oral every 12 hours  ondansetron Injectable 4 milliGRAM(s) IV Push every 6 hours  oxyCODONE    IR 5 milliGRAM(s) Oral every 4 hours PRN  oxyCODONE    IR 10 milliGRAM(s) Oral every 4 hours PRN  polyethylene glycol 3350 17 Gram(s) Oral daily  senna 2 Tablet(s) Oral at bedtime  sodium chloride 0.9%. 1000 milliLiter(s) IV Continuous <Continuous>      RADIOLOGY:   pending post op MRI     Exam:  General: NAD, pt is comfortably sitting up in bed, on room air  HEENT: PERRL 3mm briskly reactive, (+) dysconjugate gaze, cannot cross to right side, right facial droop, tongue midline, neck FROM  Cardiovascular: RRR, S1, S2  Respiratory: non-labored breathing on RA, chest rise symmetric  GI: abd soft, NTND   Neuro: A&Ox3, (+) mixed aphasia, speech clear, no dysmetria, no pronator drift. Follows commands.  LUE/LLE 5/5. RUE delt 2/5, biceps/triceps 3/5, HG 2/5. RLE ____ Sensation intact  Extremities: distal pulses 2+ x4  Wound/incision: staples over crani incision w SG CHELSEY x1   Drain: SG CHELSEY x1 dressing c/d/i     Assessment: 69yMale      Plan:  Neuro:  - neuro/vital checks q1  - pain control: cranial ERAS  - pending post op MRI   - SG CHELSEY x1, monitor output   - MRI brain w/ sarah completed 2/25  - CTH 2/24: worsening R sided whole-hemisphere vasogenic edema  - seizure prophylaxis: cont home keppra 1g q12  - decadron 4q6 taper over 2 weeks to 2 BID for vasogenic edema  - GBM: hold home metformin 500mg BID    Cardiac:  - SBP goal 100-140  - HTN: cont home amlodipine 5mg daily    Pulmonary:  - on RA    GI:  - ADAT  - bowel regimen  - pepcid 20mg BID while on steroids    Renal:  - IVF while NPO  - Na goal 135-145    Heme:  - DVT ppx: SCDs (SQL held for OR)  - heme/onc consult, s/p IV Avastin 2/27, temodar (2/27-3/3)  - LE dopplers 3/5 negative     Endo:  - A1C 5.9  - ISS     ID:  - afebrile  - bactrim DS 3x a week for prophylaxis i/s/o recent chemotherapy    Disposition: tele, full code, PT/OT rec AR.    D/w Dr. Johnston and Dr. Espinoza      NEUROSURGERY POST OP NOTE:    POD# 0 S/P left parietal craniotomy for cesium seed removal (3/6).     S: Seen and examined in NSCU. Denies HA, N/V, chest pain, shortness of breath, new weakness or numbness.       T(C): 36.4 (03-06-25 @ 13:18), Max: 36.7 (03-05-25 @ 17:01)  HR: 50 (03-06-25 @ 13:18) (44 - 68)  BP: 143/83 (03-06-25 @ 13:18) (123/69 - 152/89)  RR: 18 (03-06-25 @ 13:18) (17 - 18)  SpO2: 100% (03-06-25 @ 13:18) (94% - 100%)      03-06-25 @ 07:01  -  03-06-25 @ 16:59  --------------------------------------------------------  IN: 270 mL / OUT: 400 mL / NET: -130 mL        acetaminophen     Tablet .. 1000 milliGRAM(s) Oral every 8 hours  ceFAZolin   IVPB 2000 milliGRAM(s) IV Intermittent every 8 hours  chlorhexidine 2% Cloths 1 Application(s) Topical <User Schedule>  dexAMETHasone     Tablet 4 milliGRAM(s) Oral every 6 hours  dexAMETHasone     Tablet   Oral   famotidine    Tablet 20 milliGRAM(s) Oral every 12 hours  insulin lispro (ADMELOG) corrective regimen sliding scale   SubCutaneous Before meals and at bedtime  levETIRAcetam 1000 milliGRAM(s) Oral every 12 hours  ondansetron Injectable 4 milliGRAM(s) IV Push every 6 hours  oxyCODONE    IR 5 milliGRAM(s) Oral every 4 hours PRN  oxyCODONE    IR 10 milliGRAM(s) Oral every 4 hours PRN  polyethylene glycol 3350 17 Gram(s) Oral daily  senna 2 Tablet(s) Oral at bedtime  sodium chloride 0.9%. 1000 milliLiter(s) IV Continuous <Continuous>      RADIOLOGY:   pending post op MRI     Exam:  General: NAD, pt is comfortably sitting up in bed, on room air  HEENT: PERRL 3mm briskly reactive, (+) dysconjugate gaze, cannot cross to right side, right facial droop, tongue midline, neck FROM  Cardiovascular: RRR, S1, S2  Respiratory: non-labored breathing on RA, chest rise symmetric  GI: abd soft, NTND   Neuro: A&Ox3, (+) mixed aphasia, speech clear, no dysmetria. Follows commands.  LUE/LLE 5/5. RUE delt 2/5, biceps/triceps 3/5, HG 2/5. RLE pending further examination  Decreased sensation to entire right side (RUE/RLE).   Extremities: distal pulses 2+ x4  Wound/incision: staples over crani incision w SG CHELSEY x1   Drain: SG CHELSEY x1 dressing c/d/i     Assessment: 69M with pmh HTN, L frontoparietal GBM s/p biopsy @[facility] 2023, resection x2 April/2023, Dec/2024 with GammaTile placement (20 seeds/5 Tiles), s/p >6 cycles of TMZ (Aug/2023-Jan/2024), RT (60 Gy in 30 fxns (June/2023-July/2024) presents with worsening right sided weakness and expressive aphasia x 1 week. At [facility] ER CTH  showed worsening left sided whole-hemisphere vasogenic edema susp 2/2 GammaTile and Dex taper and MR brain completed. Transferred to [facility] for further workup. Now s/p left parietal crani for cesium seed removal (3/6).       Plan:  Neuro:  - neuro/vital checks q1  - pain control: cranial ERAS  - pending post op MRI   - SG CHELSEY x1, monitor output   - MRI brain w/ sarah completed 2/25  - CTH 2/24: worsening R sided whole-hemisphere vasogenic edema  - seizure prophylaxis: cont home keppra 1g q12  - decadron 4q6 taper over 2 weeks to 2 BID for vasogenic edema  - GBM: hold home metformin 500mg BID    Cardiac:  - SBP goal 100-140  - HTN: cont home amlodipine 5mg daily    Pulmonary:  - on RA    GI:  - ADAT  - bowel regimen  - pepcid 20mg BID while on steroids    Renal:  - IVF while NPO  - Na goal 135-145    Heme:  - DVT ppx: SCDs (SQL held for OR)  - heme/onc consult, s/p IV Avastin 2/27, temodar (2/27-3/3)  - LE dopplers 3/5 negative     Endo:  - A1C 5.9  - ISS     ID:  - afebrile  - bactrim DS 3x a week for prophylaxis i/s/o recent chemotherapy    Disposition: tele, full code, PT/OT rec AR.    D/w Dr. Johnston and Dr. Espinoza      NEUROSURGERY POST OP NOTE:    POD# 0 S/P left parietal craniotomy for cesium seed removal (3/6).     S: Seen and examined in NSCU. Denies HA, N/V, chest pain, shortness of breath, new weakness or numbness.       T(C): 36.4 (03-06-25 @ 13:18), Max: 36.7 (03-05-25 @ 17:01)  HR: 50 (03-06-25 @ 13:18) (44 - 68)  BP: 143/83 (03-06-25 @ 13:18) (123/69 - 152/89)  RR: 18 (03-06-25 @ 13:18) (17 - 18)  SpO2: 100% (03-06-25 @ 13:18) (94% - 100%)      03-06-25 @ 07:01  -  03-06-25 @ 16:59  --------------------------------------------------------  IN: 270 mL / OUT: 400 mL / NET: -130 mL        acetaminophen     Tablet .. 1000 milliGRAM(s) Oral every 8 hours  ceFAZolin   IVPB 2000 milliGRAM(s) IV Intermittent every 8 hours  chlorhexidine 2% Cloths 1 Application(s) Topical <User Schedule>  dexAMETHasone     Tablet 4 milliGRAM(s) Oral every 6 hours  dexAMETHasone     Tablet   Oral   famotidine    Tablet 20 milliGRAM(s) Oral every 12 hours  insulin lispro (ADMELOG) corrective regimen sliding scale   SubCutaneous Before meals and at bedtime  levETIRAcetam 1000 milliGRAM(s) Oral every 12 hours  ondansetron Injectable 4 milliGRAM(s) IV Push every 6 hours  oxyCODONE    IR 5 milliGRAM(s) Oral every 4 hours PRN  oxyCODONE    IR 10 milliGRAM(s) Oral every 4 hours PRN  polyethylene glycol 3350 17 Gram(s) Oral daily  senna 2 Tablet(s) Oral at bedtime  sodium chloride 0.9%. 1000 milliLiter(s) IV Continuous <Continuous>      RADIOLOGY:   pending post op MRI     Exam:  General: NAD, pt is comfortably sitting up in bed, on room air  HEENT: PERRL 3mm briskly reactive, (+) dysconjugate gaze, cannot cross to right side, right facial droop, tongue midline, neck FROM  Cardiovascular: RRR, S1, S2  Respiratory: non-labored breathing on RA, chest rise symmetric  GI: abd soft, NTND   Neuro: A&Ox3, (+) mixed aphasia, speech clear, no dysmetria. Follows commands.  LUE/LLE 5/5. RUE delt 2/5, biceps/triceps 3/5, HG 2/5. RLE 4-/5 with slight drift   Decreased sensation to entire right side (RUE/RLE).   Extremities: distal pulses 2+ x4  Wound/incision: staples over crani incision w SG CHELSEY x1   Drain: SG CHELSEY x1 dressing c/d/i     Assessment: 69M with pmh HTN, L frontoparietal GBM s/p biopsy @[facility] 2023, resection x2 April/2023, Dec/2024 with GammaTile placement (20 seeds/5 Tiles), s/p >6 cycles of TMZ (Aug/2023-Jan/2024), RT (60 Gy in 30 fxns (June/2023-July/2024) presents with worsening right sided weakness and expressive aphasia x 1 week. At [facility] ER CTH  showed worsening left sided whole-hemisphere vasogenic edema susp 2/2 GammaTile and Dex taper and MR brain completed. Transferred to [facility] for further workup. Now s/p left parietal crani for cesium seed removal (3/6).       Plan:  Neuro:  - neuro/vital checks q1  - pain control: cranial ERAS  - pending post op MRI   - SG CHELSEY x1, monitor output   - MRI brain w/ sarah completed 2/25  - CTH 2/24: worsening R sided whole-hemisphere vasogenic edema  - seizure prophylaxis: cont home keppra 1g q12  - decadron 4q6 taper over 2 weeks to 2 BID for vasogenic edema  - GBM: hold home metformin 500mg BID    Cardiac:  - SBP goal 100-140  - HTN: cont home amlodipine 5mg daily    Pulmonary:  - on RA    GI:  - ADAT  - bowel regimen  - pepcid 20mg BID while on steroids    Renal:  - IVF while NPO  - Na goal 135-145    Heme:  - DVT ppx: SCDs (SQL held for OR)  - heme/onc consult, s/p IV Avastin 2/27, temodar (2/27-3/3)  - LE dopplers 3/5 negative     Endo:  - A1C 5.9  - ISS     ID:  - afebrile  - bactrim DS 3x a week for prophylaxis i/s/o recent chemotherapy    Disposition: tele, full code, PT/OT rec AR.    D/w Dr. Johnston and Dr. Espinoza

## 2025-03-06 NOTE — PROGRESS NOTE ADULT - SUBJECTIVE AND OBJECTIVE BOX
HPI:  69M with pmh HTN, L frontoparietal GBM s/p biopsy @[facility] 2023, resection x2 April/2023, Dec/2024 with GammaTile placement (20 seeds/5 Tiles), s/p >6 cycles of TMZ (Aug/2023-Jan/2024), RT (60 Gy in 30 fxns (June/2023-July/2024) presents with worsening right sided weakness and expressive aphasia x 1 week. At [facility] ER CTH  showed worsening R sided whole-hemisphere vasogenic edema susp 2/2 GammaTile and Dex taper and MR brain completed. Transferred to [facility] for further workup.     Subjective: Pt offers no complaints at this time. Denies headache, n/v, chest pain, shortness of breath.     Vital Signs Last 24 Hrs  T(C): 36.7 (06 Mar 2025 00:36), Max: 36.7 (05 Mar 2025 14:13)  T(F): 98 (06 Mar 2025 00:36), Max: 98 (05 Mar 2025 14:13)  HR: 68 (06 Mar 2025 00:36) (49 - 68)  BP: 152/89 (06 Mar 2025 00:36) (122/66 - 152/89)  BP(mean): 97 (05 Mar 2025 17:01) (90 - 97)  RR: 18 (06 Mar 2025 00:36) (18 - 18)  SpO2: 98% (06 Mar 2025 00:36) (96% - 98%)    Parameters below as of 06 Mar 2025 00:36  Patient On (Oxygen Delivery Method): room air        I&O's Detail    04 Mar 2025 07:01  -  05 Mar 2025 07:00  --------------------------------------------------------  IN:  Total IN: 0 mL    OUT:    Voided (mL): 1775 mL  Total OUT: 1775 mL    Total NET: -1775 mL        I&O's Summary    04 Mar 2025 07:01  -  05 Mar 2025 07:00  --------------------------------------------------------  IN: 0 mL / OUT: 1775 mL / NET: -1775 mL        PHYSICAL EXAM:  Constitutional: NAD  Respiratory: breathing non-labored, symmetrical chest wall movement  Cardiovascuar: RRR  Gastrointestinal: abdomen soft, non tender  Genitourinary: exam deffered  Neurological:  AAOX3. Verbal function intact  Pupils equal round and briskly reactive to light. Dysconjugate gaze but EOMI.   Right facial, activates, tongue midline.   Motor: 5/5 power on left upper and lower. RIght upper: delt 2/5, tricep/bicep 3/5, handgrip 2/5. Right lower hip flexion 3/5, knee flexion/extension 4/5, dorsiflexion 1/5, plantar flexion 5/5  Sensation: decreased sensation to entire right side of body.     LABS:                        13.6   8.99  )-----------( 281      ( 05 Mar 2025 05:30 )             40.7     03-05    137  |  101  |  23  ----------------------------<  136[H]  4.3   |  25  |  0.72    Ca    8.4      05 Mar 2025 05:30  Phos  3.2     03-05  Mg     2.0     03-05    TPro  5.6[L]  /  Alb  3.6  /  TBili  0.3  /  DBili  x   /  AST  13  /  ALT  38  /  AlkPhos  81  03-05    PT/INR - ( 05 Mar 2025 05:30 )   PT: 10.5 sec;   INR: 0.90          PTT - ( 05 Mar 2025 05:30 )  PTT:33.0 sec  Urinalysis Basic - ( 05 Mar 2025 05:30 )    Color: x / Appearance: x / SG: x / pH: x  Gluc: 136 mg/dL / Ketone: x  / Bili: x / Urobili: x   Blood: x / Protein: x / Nitrite: x   Leuk Esterase: x / RBC: x / WBC x   Sq Epi: x / Non Sq Epi: x / Bacteria: x          CAPILLARY BLOOD GLUCOSE      POCT Blood Glucose.: 134 mg/dL (05 Mar 2025 21:57)  POCT Blood Glucose.: 157 mg/dL (05 Mar 2025 17:52)  POCT Blood Glucose.: 139 mg/dL (05 Mar 2025 11:40)  POCT Blood Glucose.: 148 mg/dL (05 Mar 2025 07:39)      Drug Levels: [] N/A    CSF Analysis: [] N/A      Allergies    No Known Allergies    Intolerances      MEDICATIONS:  Antibiotics:  trimethoprim  160 mG/sulfamethoxazole 800 mG 1 Tablet(s) Oral <User Schedule>    Neuro:  acetaminophen     Tablet .. 650 milliGRAM(s) Oral every 6 hours PRN  acetaminophen     Tablet .. 1000 milliGRAM(s) Oral once  aprepitant 40 milliGRAM(s) Oral once  levETIRAcetam 1000 milliGRAM(s) Oral every 12 hours    Anticoagulation:    OTHER:  amLODIPine   Tablet 5 milliGRAM(s) Oral daily  chlorhexidine 2% Cloths 1 Application(s) Topical every 12 hours  dexAMETHasone     Tablet 4 milliGRAM(s) Oral every 8 hours  dexAMETHasone     Tablet   Oral   famotidine    Tablet 20 milliGRAM(s) Oral every 12 hours  insulin lispro (ADMELOG) corrective regimen sliding scale   SubCutaneous Before meals and at bedtime  polyethylene glycol 3350 17 Gram(s) Oral two times a day  povidone iodine 10% Nasal Swab 1 Application(s) Both Nostrils once  senna 2 Tablet(s) Oral at bedtime    IVF:  sodium chloride 0.9%. 1000 milliLiter(s) IV Continuous <Continuous>    CULTURES:    RADIOLOGY & ADDITIONAL TESTS:      ASSESSMENT:  69M with pmh HTN, L frontoparietal GBM s/p biopsy @[facility] 2023, resection x2 April/2023, Dec/2024 with GammaTile placement (20 seeds/5 Tiles), s/p >6 cycles of TMZ (Aug/2023-Jan/2024), RT (60 Gy in 30 fxns (June/2023-July/2024) presents with worsening right sided weakness and expressive aphasia x 1 week. At [facility] ER CTH  showed worsening R sided whole-hemisphere vasogenic edema susp 2/2 GammaTile and Dex taper and MR brain completed. Transferred to [facility] for further workup.   Pre- op for OR today for removal of Cesium seeds.     Neuro:  - neuro/vital checks q4  - pain control: tylenol prn  - MRI brain w/ sarah completed 2/25  - CTH 2/24: worsening R sided whole-hemisphere vasogenic edema  - seizure prophylaxis: cont home keppra 1g q12  - decadron 4q6 taper over 2 weeks to 2 BID for vasogenic edema  - GBM: hold home metformin 500mg BID    Cardiac:  - SBP goal 100-160  - HTN: cont home amlodipine 5mg daily    Pulmonary:  - on RA    GI:  - regular diet, NPO after midnight for OR  - bowel regimen  - pepcid 20mg BID while on steroids    Renal:  - IVF while NPO  - Na goal 135-145    Heme:  - DVT ppx: SCDs (SQL held for OR)  - heme/onc consult, s/p IV Avastin 2/27, temodar (2/27-3/3)  - LE dopplers 3/5 negative     Endo:  - A1C 5.9  - ISS     ID:  - afebrile  - bactrim DS 3x a week for prophylaxis i/s/o recent chemotherapy    Disposition: tele, full code, PT/OT rec AR.    D/w Dr. Johnston

## 2025-03-06 NOTE — PROGRESS NOTE ADULT - SUBJECTIVE AND OBJECTIVE BOX
patient seen and examined at bedside, wife present, A&OX3  patient pending gamma tile removal later today with neurosurgery   neuro exam unchanged from previous encounters, no new complaints/questions     MEDICATIONS  (STANDING):  acetaminophen     Tablet .. 1000 milliGRAM(s) Oral once  amLODIPine   Tablet 5 milliGRAM(s) Oral daily  aprepitant 40 milliGRAM(s) Oral once  dexAMETHasone     Tablet   Oral   dexAMETHasone     Tablet 4 milliGRAM(s) Oral every 8 hours  famotidine    Tablet 20 milliGRAM(s) Oral every 12 hours  insulin lispro (ADMELOG) corrective regimen sliding scale   SubCutaneous Before meals and at bedtime  levETIRAcetam 1000 milliGRAM(s) Oral every 12 hours  polyethylene glycol 3350 17 Gram(s) Oral two times a day  povidone iodine 10% Nasal Swab 1 Application(s) Both Nostrils once  senna 2 Tablet(s) Oral at bedtime  sodium chloride 0.9%. 1000 milliLiter(s) (90 mL/Hr) IV Continuous <Continuous>  trimethoprim  160 mG/sulfamethoxazole 800 mG 1 Tablet(s) Oral <User Schedule>    MEDICATIONS  (PRN):  acetaminophen     Tablet .. 650 milliGRAM(s) Oral every 6 hours PRN Temp greater or equal to 38.5C (101.3F), Mild Pain (1 - 3)      Vital Signs Last 24 Hrs  T(C): 36.4 (06 Mar 2025 08:39), Max: 36.7 (05 Mar 2025 14:13)  T(F): 97.5 (06 Mar 2025 08:39), Max: 98 (05 Mar 2025 14:13)  HR: 50 (06 Mar 2025 08:39) (44 - 68)  BP: 143/83 (06 Mar 2025 08:39) (123/69 - 152/89)  BP(mean): 97 (05 Mar 2025 17:01) (97 - 97)  RR: 18 (06 Mar 2025 08:39) (18 - 18)  SpO2: 100% (06 Mar 2025 08:39) (94% - 100%)    Parameters below as of 06 Mar 2025 08:39  Patient On (Oxygen Delivery Method): room air      PHYSICAL EXAM:  General: in no acute distressLungs: CTA B/L. No wheezes, rales, or rhonchi  Cardiovascular: RRR. No murmurs, rubs, or gallops  Abdomen: Soft, non-tender non-distended; No rebound or guarding  Extremities: WWP, No clubbing, cyanosis or edema  Neurological: Alert and oriented, R sided weakness unchanged, R facial droop and R eye deviation   Skin: Warm and dry. No obvious rash     LABS:                        13.2   8.76  )-----------( 275      ( 06 Mar 2025 06:35 )             39.5     03-06    138  |  103  |  29[H]  ----------------------------<  130[H]  4.3   |  24  |  0.74    Ca    8.7      06 Mar 2025 06:35  Phos  3.5     03-06  Mg     2.0     03-06    TPro  5.6[L]  /  Alb  3.6  /  TBili  0.3  /  DBili  x   /  AST  13  /  ALT  38  /  AlkPhos  81  03-05    PT/INR - ( 05 Mar 2025 05:30 )   PT: 10.5 sec;   INR: 0.90          PTT - ( 05 Mar 2025 05:30 )  PTT:33.0 sec  Urinalysis Basic - ( 06 Mar 2025 06:35 )    Color: x / Appearance: x / SG: x / pH: x  Gluc: 130 mg/dL / Ketone: x  / Bili: x / Urobili: x   Blood: x / Protein: x / Nitrite: x   Leuk Esterase: x / RBC: x / WBC x   Sq Epi: x / Non Sq Epi: x / Bacteria: x        MICROBIOLOGY:    RADIOLOGY & ADDITIONAL STUDIES:

## 2025-03-06 NOTE — PROGRESS NOTE ADULT - ASSESSMENT
69 year old male with past medical history of HTN, OA, right TKA, HO craniotomy for GBM resection 04/25/2023, s/p chemo and RT 2023 with Dr. Causey, Admitted to [facility] on Dec13 for reoccurrence of GBM, Sp lt craniotomy  for resection of GBM with placement of GammaTile brachytherapy on 12/19.  Pt was readmitted from home with new aphasia and dysarthria after being discharged from Acute rehab with concern for worsening tumor.  Pt s/p  MRI Brain that showed vasogenic edema and no new tumor     #Recurrent GBM s/p Craniotomy for Resection and Gammatile Brachytherapy (12/19)  - Continue management as per NSGY and Heme Onc  -  s/p IV Avastin 2/27, Temodar (2/27-3/3)  - Continue Keppra 1000 mg q12 and seizure precautions   - Decadron taper per primary team, on famotidine and ISS while on steroids. PJP ppx. Check FS while on steroids    #HTN  - Continue amlodipine 5mg daily w/ holding parameters    #Sinus bradycardia  - asymptomatic;   - monitor on telemetry  - avoid AV will blocking agents    #Constipation( resolved)  - Continue bowel regimen    DVT ppx: resume lovenox for ppx post op when post op bleeding risk ok per primary team   Discussed with primary team

## 2025-03-06 NOTE — PROGRESS NOTE ADULT - SUBJECTIVE AND OBJECTIVE BOX
INTERVAL HISTORY: HPI:  69M with pmh HTN, L frontoparietal GBM s/p biopsy @[facility] 2023, resection x2 April/2023, Dec/2024 with GammaTile placement (20 seeds/5 Tiles), s/p >6 cycles of TMZ (Aug/2023-Jan/2024), RT (60 Gy in 30 fxns (June/2023-July/2024) presents with worsening right sided weakness and expressive aphasia x 1 week. Patient was due to start the last week of a decadron taper today at 1mg daily. He was brought to [facility] ER last night for symptoms where CTH  showed worsening R sided whole-hemisphere vasogenic edema susp 2/2 GammaTile and Dex taper. MRI brain was completed this AM and he received 10mg IV decadron at 6AM. Transferred to [facility] for further workup. Patient denies headache, nausea, vomiting, or vision changes. Patient has been compliant with his home keppra 1g BID.  (25 Feb 2025 15:09)      MEDICATIONS  (STANDING):  acetaminophen     Tablet .. 1000 milliGRAM(s) Oral every 8 hours  amLODIPine   Tablet 5 milliGRAM(s) Oral daily  ceFAZolin   IVPB 2000 milliGRAM(s) IV Intermittent every 8 hours  chlorhexidine 2% Cloths 1 Application(s) Topical <User Schedule>  dexAMETHasone     Tablet   Oral   dexAMETHasone     Tablet 4 milliGRAM(s) Oral every 6 hours  famotidine    Tablet 20 milliGRAM(s) Oral every 12 hours  insulin lispro (ADMELOG) corrective regimen sliding scale   SubCutaneous Before meals and at bedtime  levETIRAcetam 1000 milliGRAM(s) Oral every 12 hours  niCARdipine Infusion 5 mG/Hr (25 mL/Hr) IV Continuous <Continuous>  ondansetron Injectable 4 milliGRAM(s) IV Push every 6 hours  polyethylene glycol 3350 17 Gram(s) Oral daily  senna 2 Tablet(s) Oral at bedtime  sodium chloride 0.9%. 1000 milliLiter(s) (90 mL/Hr) IV Continuous <Continuous>  trimethoprim  160 mG/sulfamethoxazole 800 mG 1 Tablet(s) Oral <User Schedule>    MEDICATIONS  (PRN):  oxyCODONE    IR 5 milliGRAM(s) Oral every 4 hours PRN Moderate Pain (4 - 6)  oxyCODONE    IR 10 milliGRAM(s) Oral every 4 hours PRN Severe Pain (7 - 10)      Drug Dosing Weight  Height (cm): 180.3 (06 Mar 2025 13:18)  Weight (kg): 90 (06 Mar 2025 13:18)  BMI (kg/m2): 27.7 (06 Mar 2025 13:18)  BSA (m2): 2.1 (06 Mar 2025 13:18)    PAST MEDICAL & SURGICAL HISTORY:  Hypertension      Osteoarthritis      Glioblastoma      S/P total knee replacement, right      H/O craniotomy          REVIEW OF SYSTEMS: [ ] Unable to Assess due to neurologic exam   [ ] All ROS addressed below are non-contributory, except:  Neuro: [ ] Headache [ ] Back pain [ ] Numbness [ ] Weakness [ ] Ataxia [ ] Dizziness [ ] Aphasia [ ] Dysarthria [ ] Visual disturbance  Resp: [ ] Shortness of breath/dyspnea, [ ] Orthopnea [ ] Cough  CV: [ ] Chest pain [ ] Palpitation [ ] Lightheadedness [ ] Syncope  Renal: [ ] Thirst [ ] Edema  GI: [ ] Nausea [ ] Emesis [ ] Abdominal pain [ ] Constipation [ ] Diarrhea  Hem: [ ] Hematemesis [ ] bright red blood per rectum  ID: [ ] Fever [ ] Chills [ ] Dysuria  ENT: [ ] Rhinorrhea    PHYSICAL EXAM:    General: No Acute Distress   Neurological: dysconjugate gaze, 6mm b/l brisk, word finding difficulty, R facial, RUE HG 1/5, bicep 4/5 tricep 3/5 deltoid 1/5, RLE 4-/5 w/ drift, LLE 5/5 LUE 5/5, decreased sensation R side   Pulmonary: Clear to Auscultation, No Rales, No Rhonchi, No Wheezes   Cardiovascular: S1, S2, Regular Rate and Rhythm   Gastrointestinal: Soft, Nontender, Nondistended   Extremities: No calf tenderness   Incision: CDI x1 SG CHELSEY approx 25cc bloody output     ICU Vital Signs Last 24 Hrs  T(C): 36.3 (06 Mar 2025 22:26), Max: 36.7 (06 Mar 2025 00:36)  T(F): 97.4 (06 Mar 2025 22:26), Max: 98 (06 Mar 2025 00:36)  HR: 57 (07 Mar 2025 00:00) (44 - 73)  BP: 103/58 (07 Mar 2025 00:00) (101/57 - 180/86)  BP(mean): 75 (07 Mar 2025 00:00) (74 - 124)  ABP: --  ABP(mean): --  RR: 17 (07 Mar 2025 00:00) (16 - 18)  SpO2: 98% (07 Mar 2025 00:00) (94% - 100%)    O2 Parameters below as of 07 Mar 2025 00:00  Patient On (Oxygen Delivery Method): room air            I&O's Detail    06 Mar 2025 07:01  -  07 Mar 2025 00:33  --------------------------------------------------------  IN:    NiCARdipine: 87.5 mL    Oral Fluid: 480 mL    sodium chloride 0.9%: 270 mL    sodium chloride 0.9%: 720 mL  Total IN: 1557.5 mL    OUT:    Bulb (mL): 130 mL    Indwelling Catheter - Urethral (mL): 750 mL    Voided (mL): 850 mL  Total OUT: 1730 mL    Total NET: -172.5 mL              LABS:  CBC Full  -  ( 06 Mar 2025 17:07 )  WBC Count : 8.16 K/uL  RBC Count : 5.05 M/uL  Hemoglobin : 14.6 g/dL  Hematocrit : 45.9 %  Platelet Count - Automated : 283 K/uL  Mean Cell Volume : 90.9 fl  Mean Cell Hemoglobin : 28.9 pg  Mean Cell Hemoglobin Concentration : 31.8 g/dL  Auto Neutrophil # : x  Auto Lymphocyte # : x  Auto Monocyte # : x  Auto Eosinophil # : x  Auto Basophil # : x  Auto Neutrophil % : x  Auto Lymphocyte % : x  Auto Monocyte % : x  Auto Eosinophil % : x  Auto Basophil % : x    03-06    138  |  101  |  21  ----------------------------<  134[H]  4.3   |  24  |  0.78    Ca    8.6      06 Mar 2025 17:07  Phos  3.8     03-06  Mg     2.0     03-06    TPro  5.6[L]  /  Alb  3.6  /  TBili  0.3  /  DBili  x   /  AST  13  /  ALT  38  /  AlkPhos  81  03-05    PT/INR - ( 06 Mar 2025 17:07 )   PT: 11.1 sec;   INR: 0.95          PTT - ( 06 Mar 2025 17:07 )  PTT:27.7 sec  Urinalysis Basic - ( 06 Mar 2025 17:07 )    Color: x / Appearance: x / SG: x / pH: x  Gluc: 134 mg/dL / Ketone: x  / Bili: x / Urobili: x   Blood: x / Protein: x / Nitrite: x   Leuk Esterase: x / RBC: x / WBC x   Sq Epi: x / Non Sq Epi: x / Bacteria: x    RADIOLOGY & ADDITIONAL STUDIES:   INTERVAL HISTORY: HPI:  69M with pmh HTN, L frontoparietal GBM s/p biopsy @[facility] 2023, resection x2 April/2023, Dec/2024 with GammaTile placement (20 seeds/5 Tiles), s/p >6 cycles of TMZ (Aug/2023-Jan/2024), RT (60 Gy in 30 fxns (June/2023-July/2024) presents with worsening right sided weakness and expressive aphasia x 1 week. Patient was due to start the last week of a decadron taper today at 1mg daily. He was brought to [facility] ER last night for symptoms where CTH  showed worsening R sided whole-hemisphere vasogenic edema susp 2/2 GammaTile and Dex taper. MRI brain was completed this AM and he received 10mg IV decadron at 6AM. Transferred to [facility] for further workup. Patient denies headache, nausea, vomiting, or vision changes. Patient has been compliant with his home keppra 1g BID.  (25 Feb 2025 15:09)      MEDICATIONS  (STANDING):  acetaminophen     Tablet .. 1000 milliGRAM(s) Oral every 8 hours  amLODIPine   Tablet 5 milliGRAM(s) Oral daily  ceFAZolin   IVPB 2000 milliGRAM(s) IV Intermittent every 8 hours  chlorhexidine 2% Cloths 1 Application(s) Topical <User Schedule>  dexAMETHasone     Tablet   Oral   dexAMETHasone     Tablet 4 milliGRAM(s) Oral every 6 hours  famotidine    Tablet 20 milliGRAM(s) Oral every 12 hours  insulin lispro (ADMELOG) corrective regimen sliding scale   SubCutaneous Before meals and at bedtime  levETIRAcetam 1000 milliGRAM(s) Oral every 12 hours  niCARdipine Infusion 5 mG/Hr (25 mL/Hr) IV Continuous <Continuous>  ondansetron Injectable 4 milliGRAM(s) IV Push every 6 hours  polyethylene glycol 3350 17 Gram(s) Oral daily  senna 2 Tablet(s) Oral at bedtime  sodium chloride 0.9%. 1000 milliLiter(s) (90 mL/Hr) IV Continuous <Continuous>  trimethoprim  160 mG/sulfamethoxazole 800 mG 1 Tablet(s) Oral <User Schedule>    MEDICATIONS  (PRN):  oxyCODONE    IR 5 milliGRAM(s) Oral every 4 hours PRN Moderate Pain (4 - 6)  oxyCODONE    IR 10 milliGRAM(s) Oral every 4 hours PRN Severe Pain (7 - 10)      Drug Dosing Weight  Height (cm): 180.3 (06 Mar 2025 13:18)  Weight (kg): 90 (06 Mar 2025 13:18)  BMI (kg/m2): 27.7 (06 Mar 2025 13:18)  BSA (m2): 2.1 (06 Mar 2025 13:18)    PAST MEDICAL & SURGICAL HISTORY:  Hypertension  Osteoarthritis  Glioblastoma  S/P total knee replacement, right  H/O craniotomy    REVIEW OF SYSTEMS: [ ] Unable to Assess due to neurologic exam   [ ] All ROS addressed below are non-contributory, except:  Neuro: [ ] Headache [ ] Back pain [ ] Numbness [ ] Weakness [ ] Ataxia [ ] Dizziness [ ] Aphasia [ ] Dysarthria [ ] Visual disturbance  Resp: [ ] Shortness of breath/dyspnea, [ ] Orthopnea [ ] Cough  CV: [ ] Chest pain [ ] Palpitation [ ] Lightheadedness [ ] Syncope  Renal: [ ] Thirst [ ] Edema  GI: [ ] Nausea [ ] Emesis [ ] Abdominal pain [ ] Constipation [ ] Diarrhea  Hem: [ ] Hematemesis [ ] bright red blood per rectum  ID: [ ] Fever [ ] Chills [ ] Dysuria  ENT: [ ] Rhinorrhea    PHYSICAL EXAM:    General: No Acute Distress   Neurological: dysconjugate gaze, 6mm b/l brisk, word finding difficulty, R facial, RUE HG 1/5, bicep 4/5 tricep 3/5 deltoid 1/5, RLE 4-/5 w/ drift, LLE 5/5 LUE 5/5, decreased sensation R side   Pulmonary: Clear to Auscultation, No Rales, No Rhonchi, No Wheezes   Cardiovascular: S1, S2, Regular Rate and Rhythm   Gastrointestinal: Soft, Nontender, Nondistended   Extremities: No calf tenderness   Incision: CDI x1 SG CHELSEY approx 25cc bloody output     ICU Vital Signs Last 24 Hrs  T(C): 36.3 (06 Mar 2025 22:26), Max: 36.7 (06 Mar 2025 00:36)  T(F): 97.4 (06 Mar 2025 22:26), Max: 98 (06 Mar 2025 00:36)  HR: 57 (07 Mar 2025 00:00) (44 - 73)  BP: 103/58 (07 Mar 2025 00:00) (101/57 - 180/86)  BP(mean): 75 (07 Mar 2025 00:00) (74 - 124)  RR: 17 (07 Mar 2025 00:00) (16 - 18)  SpO2: 98% (07 Mar 2025 00:00) (94% - 100%)    O2 Parameters below as of 07 Mar 2025 00:00  Patient On (Oxygen Delivery Method): room air    I&O's Detail    06 Mar 2025 07:01  -  07 Mar 2025 00:33  --------------------------------------------------------  IN:    NiCARdipine: 87.5 mL    Oral Fluid: 480 mL    sodium chloride 0.9%: 270 mL    sodium chloride 0.9%: 720 mL  Total IN: 1557.5 mL    OUT:    Bulb (mL): 130 mL    Indwelling Catheter - Urethral (mL): 750 mL    Voided (mL): 850 mL  Total OUT: 1730 mL    Total NET: -172.5 mL      LABS:  CBC Full  -  ( 06 Mar 2025 17:07 )  WBC Count : 8.16 K/uL  RBC Count : 5.05 M/uL  Hemoglobin : 14.6 g/dL  Hematocrit : 45.9 %  Platelet Count - Automated : 283 K/uL  Mean Cell Volume : 90.9 fl  Mean Cell Hemoglobin : 28.9 pg  Mean Cell Hemoglobin Concentration : 31.8 g/dL  Auto Neutrophil # : x  Auto Lymphocyte # : x  Auto Monocyte # : x  Auto Eosinophil # : x  Auto Basophil # : x  Auto Neutrophil % : x  Auto Lymphocyte % : x  Auto Monocyte % : x  Auto Eosinophil % : x  Auto Basophil % : x    03-06    138  |  101  |  21  ----------------------------<  134[H]  4.3   |  24  |  0.78    Ca    8.6      06 Mar 2025 17:07  Phos  3.8     03-06  Mg     2.0     03-06    TPro  5.6[L]  /  Alb  3.6  /  TBili  0.3  /  DBili  x   /  AST  13  /  ALT  38  /  AlkPhos  81  03-05    PT/INR - ( 06 Mar 2025 17:07 )   PT: 11.1 sec;   INR: 0.95          PTT - ( 06 Mar 2025 17:07 )  PTT:27.7 sec  Urinalysis Basic - ( 06 Mar 2025 17:07 )    Color: x / Appearance: x / SG: x / pH: x  Gluc: 134 mg/dL / Ketone: x  / Bili: x / Urobili: x   Blood: x / Protein: x / Nitrite: x   Leuk Esterase: x / RBC: x / WBC x   Sq Epi: x / Non Sq Epi: x / Bacteria: x    RADIOLOGY & ADDITIONAL STUDIES:

## 2025-03-06 NOTE — BRIEF OPERATIVE NOTE - NSICDXBRIEFPROCEDURE_GEN_ALL_CORE_FT
PROCEDURES:  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 06-Mar-2025 09:09:28  Phoebe Pimentel

## 2025-03-06 NOTE — PROGRESS NOTE ADULT - SUBJECTIVE AND OBJECTIVE BOX
SUBJECTIVE:  Patient was seen and examined at bedside. Patient seen with wife at the bedside. Reports feeling fine, denies complaints. Denies chest pain, no SOB, no abdominal pain. No other complaints or events reported.    Review of systems: No fever, chills, dizziness, HA, Changes in vision, CP, dyspnea, nausea or vomiting, dysuria, changes in bowel movements, LE edema. Rest of 12 point Review of systems negative unless otherwise documented elsewhere in note.     Diet, NPO after Midnight:      NPO Start Date: 05-Mar-2025,   NPO Start Time: 23:59  Except Medications (03-05-25 @ 12:06) [Active]  Diet, Regular (02-25-25 @ 13:16) [Active]      MEDICATIONS:  MEDICATIONS  (STANDING):  amLODIPine   Tablet 5 milliGRAM(s) Oral daily  dexAMETHasone     Tablet   Oral   dexAMETHasone     Tablet 4 milliGRAM(s) Oral every 8 hours  famotidine    Tablet 20 milliGRAM(s) Oral every 12 hours  insulin lispro (ADMELOG) corrective regimen sliding scale   SubCutaneous Before meals and at bedtime  levETIRAcetam 1000 milliGRAM(s) Oral every 12 hours  polyethylene glycol 3350 17 Gram(s) Oral two times a day  povidone iodine 10% Nasal Swab 1 Application(s) Both Nostrils once  senna 2 Tablet(s) Oral at bedtime  sodium chloride 0.9%. 1000 milliLiter(s) (90 mL/Hr) IV Continuous <Continuous>  trimethoprim  160 mG/sulfamethoxazole 800 mG 1 Tablet(s) Oral <User Schedule>    MEDICATIONS  (PRN):  acetaminophen     Tablet .. 650 milliGRAM(s) Oral every 6 hours PRN Temp greater or equal to 38.5C (101.3F), Mild Pain (1 - 3)      Allergies    No Known Allergies    Intolerances        OBJECTIVE:  Vital Signs Last 24 Hrs  T(C): 36.4 (06 Mar 2025 08:39), Max: 36.7 (05 Mar 2025 14:13)  T(F): 97.5 (06 Mar 2025 08:39), Max: 98 (05 Mar 2025 14:13)  HR: 50 (06 Mar 2025 08:39) (44 - 68)  BP: 143/83 (06 Mar 2025 08:39) (123/69 - 152/89)  BP(mean): 97 (05 Mar 2025 17:01) (97 - 97)  RR: 18 (06 Mar 2025 08:39) (17 - 18)  SpO2: 100% (06 Mar 2025 08:39) (94% - 100%)    Parameters below as of 06 Mar 2025 08:39  Patient On (Oxygen Delivery Method): room air      I&O's Summary    06 Mar 2025 07:01  -  06 Mar 2025 13:12  --------------------------------------------------------  IN: 270 mL / OUT: 400 mL / NET: -130 mL        PHYSICAL EXAM:  General: AOX, NAD, lying in bed, speaking in full sentences, no labored breathing on RA  Lungs: no crackles, no wheezes  Heart: bradycardic, regular  Abdomen: soft, no distension, no tenderness  Extremities: warm, no edema, no tenderness    LABS:                        13.2   8.76  )-----------( 275      ( 06 Mar 2025 06:35 )             39.5     03-06    138  |  103  |  29[H]  ----------------------------<  130[H]  4.3   |  24  |  0.74    Ca    8.7      06 Mar 2025 06:35  Phos  3.5     03-06  Mg     2.0     03-06    TPro  5.6[L]  /  Alb  3.6  /  TBili  0.3  /  DBili  x   /  AST  13  /  ALT  38  /  AlkPhos  81  03-05    LIVER FUNCTIONS - ( 05 Mar 2025 05:30 )  Alb: 3.6 g/dL / Pro: 5.6 g/dL / ALK PHOS: 81 U/L / ALT: 38 U/L / AST: 13 U/L / GGT: x           PT/INR - ( 05 Mar 2025 05:30 )   PT: 10.5 sec;   INR: 0.90          PTT - ( 05 Mar 2025 05:30 )  PTT:33.0 sec  CAPILLARY BLOOD GLUCOSE      POCT Blood Glucose.: 117 mg/dL (06 Mar 2025 11:59)  POCT Blood Glucose.: 138 mg/dL (06 Mar 2025 07:27)  POCT Blood Glucose.: 134 mg/dL (05 Mar 2025 21:57)  POCT Blood Glucose.: 157 mg/dL (05 Mar 2025 17:52)    Urinalysis Basic - ( 06 Mar 2025 06:35 )    Color: x / Appearance: x / SG: x / pH: x  Gluc: 130 mg/dL / Ketone: x  / Bili: x / Urobili: x   Blood: x / Protein: x / Nitrite: x   Leuk Esterase: x / RBC: x / WBC x   Sq Epi: x / Non Sq Epi: x / Bacteria: x        MICRODATA:      RADIOLOGY/OTHER STUDIES:

## 2025-03-06 NOTE — PROGRESS NOTE ADULT - ASSESSMENT
Assessment:      NEURO:  - neuro/vital checks q1  - pain control: cranial ERAS, tylenol, oxycodone   - pending post op MRI   - SG CHELSEY x1, monitor output   - MRI brain w/ sarah completed 2/25  - CTH 2/24: worsening R sided whole-hemisphere vasogenic edema  - seizure prophylaxis: cont home keppra 1g q12  - decadron 4q6 taper over 2 weeks to 2 BID for vasogenic edema      PULMONARY:  saturating well on RA,   -continue to monitor on pulse o2   -incentive spirometry 10q/hr when awake     CARDIOVASCULAR:  monitor on telemetry   vitals q1  sbp goal 100-160; c/w amlodipine 10mg   EKG: pending      GASTROENTEROLOGY:  bedside speech & swallow if pass can start advancing diet as tolerated.   ensure BMs w/ Miralax & senna  GI ppx : Protonix 40mg qd  Daily stool count, LBM prior to arrival    RENAL/:  -check BMP qd  -strict i/o's ; c/w Shields   -Na goal 135-145    ENDOCRINE:  A1c- 5.9%  Monitor FSG q6 hrs while NPO / w/ tube feeds   HISS & Lantus     Hypothyroidism   TSH/t3/t4- pending   c/w home Synthroid     HEME/ONC:  DVT ppx: will hold chemical dvt ppx in setting of recent operation.  b/l SCDs  Obtain b/l LE screening dopplers    INFECTIOUS:   Monitor for fevers   post operative antibiotic w/ ancef     ADVANCE DIRECTIVES:    DNR  MOLST  [ ] YES [ ] NO                      [ ] Completed  Health Care Proxy [ ] YES  [ ] NO   [ ] Completed  Living Will  [ ] YES [ ] NO            Patient is critically ill, requiring critical care services.     I have personally and independently provided [   ] minutes of critical care services.  This excludes any time spent on separate procedures or teaching. Assessment: 69M with pmh HTN, L frontoparietal GBM s/p biopsy @[facility] 2023, resection x2 April/2023, Dec/2024 with GammaTile placement (20 seeds/5 Tiles), s/p >6 cycles of TMZ (Aug/2023-Jan/2024), RT (60 Gy in 30 fxns (June/2023-July/2024) presents with worsening right sided weakness and expressive aphasia x 1 week. At [facility] ER CTH  showed worsening left sided whole-hemisphere vasogenic edema susp 2/2 GammaTile and Dex taper and MR brain completed. Transferred to [facility] for further workup. Now s/p left parietal crani for cesium seed removal (3/6).       NEURO:  - neuro/vital checks q1  - pain control: cranial ERAS, tylenol, oxycodone   - pending post op MRI   - SG CHELSEY x1, monitor output   - MRI brain w/ sarah completed 2/25  - CTH 2/24: worsening R sided whole-hemisphere vasogenic edema  - seizure prophylaxis: cont home keppra 1g q12  - decadron 4q6 taper over 2 weeks to 2 BID for vasogenic edema      PULMONARY:  saturating well on RA,   -continue to monitor on pulse o2   -incentive spirometry 10q/hr when awake     CARDIOVASCULAR:  monitor on telemetry   vitals q1  sbp goal 100-160; c/w amlodipine 10mg   EKG: pending      GASTROENTEROLOGY:  bedside speech & swallow if pass can start advancing diet as tolerated.   ensure BMs w/ Miralax & senna  GI ppx : famotidine 20mg BID   Daily stool count, LBM prior to arrival    RENAL/:  -check BMP qd  -strict i/o's ; remove metzger; initiate TOV   -Na goal 135-145    ENDOCRINE:  A1c- 5.9%  FSG while on dexamethasone     HEME/ONC:  DVT ppx: will hold chemical dvt ppx in setting of recent operation.  b/l SCDs    INFECTIOUS:   Monitor for fevers   post operative antibiotic w/ ancef

## 2025-03-06 NOTE — PRE-OP CHECKLIST - SELECT TESTS ORDERED
CBC/CMP/PT/PTT/INR/Type and Screen/EKG/CXR CBC/CMP/PT/PTT/INR/Type and Screen/EKG/CXR/POCT Blood Glucose

## 2025-03-06 NOTE — PRE-OP CHECKLIST - IDENTIFICATION BAND VERIFIED
34-year-old with cap-like distribution of headache since yesterday.  When he tips his head back it creates a pulsating sensation in his head.  Has had some episodes of lightheadedness for 5 days.  Did actually vomit black coffee-ground looking material x1.  Has had black stools.  Shortness of breath with walking.  No chest pain.  Family history of stroke.  Mother and father had strokes in their 50s.  Brother had a stroke last year in his 40s.  To ER for further evaluation and treatment.  Limited on labs and imaging.     done

## 2025-03-06 NOTE — PROGRESS NOTE ADULT - ASSESSMENT
69 year old male with past medical history of HTN, OA of the knee, status post replacement, now with glioblastoma (MGMT methylated), treated with TMZ + RT (6/6/23-7/18/23), status post stereotactic needle biopsy and laser interstitial thermal therapy by Dr. Tobar (4/7/2023), then re-operation with  on 4/25/2023 with GTR.  Followed by additional TMZ x 6 cycles (8/15/23-1/30/24).  He suffered a recurrence in December 2024, followed by re-resection with Gammatile placement.      #MGMT methylated GBM  --oncologic history as above, Now presents with worsening right sided weakness and expressive aphasia x 1 week. At [facility] ER CTH showed worsening R sided whole-hemisphere vasogenic edema. Transferred to [facility] for further workup.   --MRb 2/25 w/ rim-enhancing structure at the left posterior hemispheric operative bed appears similar to 1/30/2025. The extensive infiltrative hyperintensity on the long TR images consistent with vasogenic edema and nonenhancing tumor has mildly increased and its associated mass effect with increased right-to-left subfalcine herniation although appears comparable infiltrative extent within the left cerebral hemisphere, contralateral extent into the right cerebral hemisphere and downward extent to the ventral avila.  --steroids per neurosurgery, taper as an outpatient per their plans   **Bevacizumab 10 mg/kg IV x1 (2/27/25) and Temozolomide which will be dosed at 200 mg/m2 (400 mg) po daily x 5 days every 28 days (2/27-3/3)**  --Future Bevacizumab and TMZ cycles will be discussed by new neuro-oncologist outpatient Dr. Guy Hubbard at [facility] via  and patient will follow locally in person at [place] with Dr. Mix, coordination pending   --Advised Karen to fill prescription for TMZ to have pills available in case patient is discharged to rehab before completion. Prescription of TMZ is for 100 mg, so 4 pills would be 400 mg. They should not start a new cycle until confirmed by outpatient oncologist and labs are repeated.  --discussed with patient and family: the goal of this treatment is palliative, primarily aimed at alleviating his symptomatic vasogenic edema and permit tapering of the steroids.    --Risks and side effect of this therapy were discussed in detail w/ the patient and his family;  informed consent for treatment was obtained.  --3/6/25 patient now pending OR for gamma tile removal with neurosurgery: generally it is recommended to abstain from surgical procedures for at least 4-6 weeks after/before Avastin administration, discussed with surgical team     oncology will continue to follow, patient has telehealth follow up with Dr. Yung on 3/20 at 3pm

## 2025-03-06 NOTE — PRE-OP CHECKLIST - SITE MARKED BY ANESTHESIOLOGIST
Last labs 06/18/2019 from external facility show creatinine 1.03.   Nothing found elsewhere, even in Care everywhere.   She was stable when seen July 2019.    I recommend get labs preop and ensure anesthesia reviews the lab and medically clears patient before surgery.   n/a

## 2025-03-07 LAB
ANION GAP SERPL CALC-SCNC: 10 MMOL/L — SIGNIFICANT CHANGE UP (ref 5–17)
BUN SERPL-MCNC: 30 MG/DL — HIGH (ref 7–23)
CALCIUM SERPL-MCNC: 8.1 MG/DL — LOW (ref 8.4–10.5)
CHLORIDE SERPL-SCNC: 104 MMOL/L — SIGNIFICANT CHANGE UP (ref 96–108)
CO2 SERPL-SCNC: 25 MMOL/L — SIGNIFICANT CHANGE UP (ref 22–31)
CREAT SERPL-MCNC: 0.78 MG/DL — SIGNIFICANT CHANGE UP (ref 0.5–1.3)
EGFR: 97 ML/MIN/1.73M2 — SIGNIFICANT CHANGE UP
EGFR: 97 ML/MIN/1.73M2 — SIGNIFICANT CHANGE UP
GLUCOSE SERPL-MCNC: 139 MG/DL — HIGH (ref 70–99)
HCT VFR BLD CALC: 38.3 % — LOW (ref 39–50)
HGB BLD-MCNC: 12.9 G/DL — LOW (ref 13–17)
MAGNESIUM SERPL-MCNC: 2 MG/DL — SIGNIFICANT CHANGE UP (ref 1.6–2.6)
MCHC RBC-ENTMCNC: 30.5 PG — SIGNIFICANT CHANGE UP (ref 27–34)
MCHC RBC-ENTMCNC: 33.7 G/DL — SIGNIFICANT CHANGE UP (ref 32–36)
MCV RBC AUTO: 90.5 FL — SIGNIFICANT CHANGE UP (ref 80–100)
NRBC BLD AUTO-RTO: 0 /100 WBCS — SIGNIFICANT CHANGE UP (ref 0–0)
PHOSPHATE SERPL-MCNC: 3.9 MG/DL — SIGNIFICANT CHANGE UP (ref 2.5–4.5)
PLATELET # BLD AUTO: 281 K/UL — SIGNIFICANT CHANGE UP (ref 150–400)
POTASSIUM SERPL-MCNC: 4.8 MMOL/L — SIGNIFICANT CHANGE UP (ref 3.5–5.3)
POTASSIUM SERPL-SCNC: 4.8 MMOL/L — SIGNIFICANT CHANGE UP (ref 3.5–5.3)
RBC # BLD: 4.23 M/UL — SIGNIFICANT CHANGE UP (ref 4.2–5.8)
RBC # FLD: 14.6 % — HIGH (ref 10.3–14.5)
SODIUM SERPL-SCNC: 139 MMOL/L — SIGNIFICANT CHANGE UP (ref 135–145)
WBC # BLD: 9.89 K/UL — SIGNIFICANT CHANGE UP (ref 3.8–10.5)
WBC # FLD AUTO: 9.89 K/UL — SIGNIFICANT CHANGE UP (ref 3.8–10.5)

## 2025-03-07 PROCEDURE — 99024 POSTOP FOLLOW-UP VISIT: CPT

## 2025-03-07 PROCEDURE — 99232 SBSQ HOSP IP/OBS MODERATE 35: CPT

## 2025-03-07 RX ORDER — LINEZOLID 2 MG/ML
600 INJECTION, SOLUTION INTRAVENOUS EVERY 12 HOURS
Refills: 0 | Status: DISCONTINUED | OUTPATIENT
Start: 2025-03-07 | End: 2025-03-08

## 2025-03-07 RX ORDER — ENOXAPARIN SODIUM 100 MG/ML
40 INJECTION SUBCUTANEOUS
Refills: 0 | Status: DISCONTINUED | OUTPATIENT
Start: 2025-03-07 | End: 2025-03-12

## 2025-03-07 RX ORDER — CEFEPIME 2 G/20ML
2000 INJECTION, POWDER, FOR SOLUTION INTRAVENOUS EVERY 8 HOURS
Refills: 0 | Status: DISCONTINUED | OUTPATIENT
Start: 2025-03-07 | End: 2025-03-07

## 2025-03-07 RX ADMIN — Medication 20 MILLIGRAM(S): at 06:35

## 2025-03-07 RX ADMIN — DEXAMETHASONE 4 MILLIGRAM(S): 0.5 TABLET ORAL at 18:14

## 2025-03-07 RX ADMIN — Medication 1000 MILLIGRAM(S): at 07:50

## 2025-03-07 RX ADMIN — LEVETIRACETAM 1000 MILLIGRAM(S): 10 INJECTION, SOLUTION INTRAVENOUS at 06:34

## 2025-03-07 RX ADMIN — Medication 20 MILLIGRAM(S): at 18:14

## 2025-03-07 RX ADMIN — DEXAMETHASONE 4 MILLIGRAM(S): 0.5 TABLET ORAL at 11:00

## 2025-03-07 RX ADMIN — Medication 1000 MILLIGRAM(S): at 14:08

## 2025-03-07 RX ADMIN — Medication 100 MILLIGRAM(S): at 06:35

## 2025-03-07 RX ADMIN — INSULIN LISPRO 2: 100 INJECTION, SOLUTION INTRAVENOUS; SUBCUTANEOUS at 22:58

## 2025-03-07 RX ADMIN — Medication 1 TABLET(S): at 18:14

## 2025-03-07 RX ADMIN — LEVETIRACETAM 1000 MILLIGRAM(S): 10 INJECTION, SOLUTION INTRAVENOUS at 18:14

## 2025-03-07 RX ADMIN — Medication 2 TABLET(S): at 21:16

## 2025-03-07 RX ADMIN — DEXAMETHASONE 4 MILLIGRAM(S): 0.5 TABLET ORAL at 06:35

## 2025-03-07 RX ADMIN — ENOXAPARIN SODIUM 40 MILLIGRAM(S): 100 INJECTION SUBCUTANEOUS at 21:16

## 2025-03-07 RX ADMIN — Medication 4 MILLIGRAM(S): at 23:43

## 2025-03-07 RX ADMIN — Medication 1000 MILLIGRAM(S): at 06:34

## 2025-03-07 RX ADMIN — INSULIN LISPRO 2: 100 INJECTION, SOLUTION INTRAVENOUS; SUBCUTANEOUS at 11:22

## 2025-03-07 RX ADMIN — Medication 1000 MILLIGRAM(S): at 21:16

## 2025-03-07 RX ADMIN — Medication 4 MILLIGRAM(S): at 11:00

## 2025-03-07 RX ADMIN — POLYETHYLENE GLYCOL 3350 17 GRAM(S): 17 POWDER, FOR SOLUTION ORAL at 11:00

## 2025-03-07 RX ADMIN — Medication 4 MILLIGRAM(S): at 06:35

## 2025-03-07 RX ADMIN — DEXAMETHASONE 4 MILLIGRAM(S): 0.5 TABLET ORAL at 23:43

## 2025-03-07 RX ADMIN — Medication 1 APPLICATION(S): at 06:00

## 2025-03-07 RX ADMIN — AMLODIPINE BESYLATE 5 MILLIGRAM(S): 10 TABLET ORAL at 06:35

## 2025-03-07 NOTE — PROGRESS NOTE ADULT - CRITICAL CARE ATTENDING COMMENT
cc time for post operative neurological monitoring q1 hour, strict SBP parameters, q1 vital checks
cc time for post operative neurological monitoring q1 hour, strict SBP parameters, q1 vital checks

## 2025-03-07 NOTE — PROGRESS NOTE ADULT - SUBJECTIVE AND OBJECTIVE BOX
INTERVAL HISTORY: HPI:  69M with pmh HTN, L frontoparietal GBM s/p biopsy @[facility] 2023, resection x2 April/2023, Dec/2024 with GammaTile placement (20 seeds/5 Tiles), s/p >6 cycles of TMZ (Aug/2023-Jan/2024), RT (60 Gy in 30 fxns (June/2023-July/2024) presents with worsening right sided weakness and expressive aphasia x 1 week. Patient was due to start the last week of a decadron taper today at 1mg daily. He was brought to [facility] ER last night for symptoms where CTH  showed worsening R sided whole-hemisphere vasogenic edema susp 2/2 GammaTile and Dex taper. MRI brain was completed this AM and he received 10mg IV decadron at 6AM. Transferred to [facility] for further workup. Patient denies headache, nausea, vomiting, or vision changes. Patient has been compliant with his home keppra 1g BID.  (25 Feb 2025 15:09)      Hospital Course/Interval Events   2/25: admitted to [facility]  2/26: KYA overnight. Heme/onc consulted for possible IV avastin  2/27: KYA overnight. S/p IV avastin, temodar started. PT/OR rec AR.  2/29: KYA overnight, neuro stable.  3/1: KYA overnight, neuro stable.   3/2: KYA overnight, neuro stable. Decadron taper to 2BID over 2 weeks.   3/3: KYA overnight, neuro stable.   3/4: KYA o/n. CXR done. ISS added.   3/5: Plan for OR Thurs. LE dopplers negative for DVT.   3/6: KYA overnight. pending OR. POD 0 s/p left parietal crani for cesium seed removal (3/6). SBP 160s, cardene gtt started. Neurologically stable.   3/7: POD#1, cardene off overnight, vitals stable. Pain controlled. F/u SG CHELSEY output. Pend MRI brain postop.             PHYSICAL EXAM:  ICU Vital Signs Last 24 Hrs  T(C): 36.8 (07 Mar 2025 05:08), Max: 36.8 (07 Mar 2025 00:52)  T(F): 98.3 (07 Mar 2025 05:08), Max: 98.3 (07 Mar 2025 00:52)  HR: 54 (07 Mar 2025 06:00) (47 - 73)  BP: 130/70 (07 Mar 2025 06:00) (101/57 - 180/86)  BP(mean): 94 (07 Mar 2025 06:00) (74 - 124)  RR: 16 (07 Mar 2025 06:00) (15 - 18)  SpO2: 99% (07 Mar 2025 06:00) (97% - 100%)  O2 Parameters below as of 07 Mar 2025 06:00  Patient On (Oxygen Delivery Method): room air  General: No Acute Distress   Pulmonary: Clear to Auscultation, No Rales, No Rhonchi, No Wheezes   Cardiovascular: S1, S2, Regular Rate and Rhythm   Gastrointestinal: Soft, Nontender, Nondistended   Extremities: No calf tenderness   Incision: CDI x1 SG CHELSEY approx 25cc bloody output   Neurological: dysconjugate gaze, 6mm b/l brisk, word finding difficulty, R facial, RUE HG 1/5, bicep 4/5 tricep 3/5 deltoid 1/5, RLE 4-/5 w/ drift, LLE 5/5 LUE 5/5, decreased sensation R side         MEDICATIONS  (STANDING):  acetaminophen     Tablet .. 1000 milliGRAM(s) Oral every 8 hours  amLODIPine   Tablet 5 milliGRAM(s) Oral daily  chlorhexidine 2% Cloths 1 Application(s) Topical <User Schedule>  dexAMETHasone     Tablet 4 milliGRAM(s) Oral every 6 hours  dexAMETHasone     Tablet   Oral   famotidine    Tablet 20 milliGRAM(s) Oral every 12 hours  insulin lispro (ADMELOG) corrective regimen sliding scale   SubCutaneous Before meals and at bedtime  levETIRAcetam 1000 milliGRAM(s) Oral every 12 hours  ondansetron Injectable 4 milliGRAM(s) IV Push every 6 hours  polyethylene glycol 3350 17 Gram(s) Oral daily  senna 2 Tablet(s) Oral at bedtime  trimethoprim  160 mG/sulfamethoxazole 800 mG 1 Tablet(s) Oral <User Schedule>    MEDICATIONS  (PRN):  oxyCODONE    IR 5 milliGRAM(s) Oral every 4 hours PRN Moderate Pain (4 - 6)  oxyCODONE    IR 10 milliGRAM(s) Oral every 4 hours PRN Severe Pain (7 - 10)   INTERVAL HISTORY: HPI:  69M with pmh HTN, L frontoparietal GBM s/p biopsy @[facility] 2023, resection x2 April/2023, Dec/2024 with GammaTile placement (20 seeds/5 Tiles), s/p >6 cycles of TMZ (Aug/2023-Jan/2024), RT (60 Gy in 30 fxns (June/2023-July/2024) presents with worsening right sided weakness and expressive aphasia x 1 week. Patient was due to start the last week of a decadron taper today at 1mg daily. He was brought to [facility] ER last night for symptoms where CTH  showed worsening R sided whole-hemisphere vasogenic edema susp 2/2 GammaTile and Dex taper. MRI brain was completed this AM and he received 10mg IV decadron at 6AM. Transferred to [facility] for further workup. Patient denies headache, nausea, vomiting, or vision changes. Patient has been compliant with his home keppra 1g BID.  (25 Feb 2025 15:09)      Hospital Course/Interval Events   2/25: admitted to [facility]  2/26: KYA overnight. Heme/onc consulted for possible IV avastin  2/27: KYA overnight. S/p IV avastin, temodar started. PT/OR rec AR.  2/29: KYA overnight, neuro stable.  3/1: KYA overnight, neuro stable.   3/2: KYA overnight, neuro stable. Decadron taper to 2BID over 2 weeks.   3/3: KYA overnight, neuro stable.   3/4: KYA o/n. CXR done. ISS added.   3/5: Plan for OR Thurs. LE dopplers negative for DVT.   3/6: KYA overnight. pending OR. POD 0 s/p left parietal crani for cesium seed removal (3/6). SBP 160s, cardene gtt started. Neurologically stable.   3/7: POD#1, cardene off overnight, vitals stable. Pain controlled. F/u SG CHELSEY output. Pend MRI brain postop.         PHYSICAL EXAM:  ICU Vital Signs Last 24 Hrs  T(C): 36.8 (07 Mar 2025 05:08), Max: 36.8 (07 Mar 2025 00:52)  T(F): 98.3 (07 Mar 2025 05:08), Max: 98.3 (07 Mar 2025 00:52)  HR: 54 (07 Mar 2025 06:00) (47 - 73)  BP: 130/70 (07 Mar 2025 06:00) (101/57 - 180/86)  BP(mean): 94 (07 Mar 2025 06:00) (74 - 124)  RR: 16 (07 Mar 2025 06:00) (15 - 18)  SpO2: 99% (07 Mar 2025 06:00) (97% - 100%)  O2 Parameters below as of 07 Mar 2025 06:00  Patient On (Oxygen Delivery Method): room air  General: No Acute Distress   Pulmonary: Clear to Auscultation, No Rales, No Rhonchi, No Wheezes   Cardiovascular: S1, S2, Regular Rate and Rhythm   Gastrointestinal: Soft, Nontender, Nondistended   Extremities: No calf tenderness   Incision: CDI x1 SG CHELSEY approx 25cc bloody output   Neurological: awake, alert, oriented to self, place and year with some words hesitancy, expressive aphasia/can name high frequency objects   dysconjugate gaze, 6mm b/l brisk, word finding difficulty  Motor: R facial, RUE HG 1/5, bicep 4/5 tricep 3/5 deltoid 1/5, RLE 4-/5 w/ drift, LLE 5/5 LUE 5/5  Sensory: decreased sensation R side       MEDICATIONS  (STANDING):  acetaminophen     Tablet .. 1000 milliGRAM(s) Oral every 8 hours  amLODIPine   Tablet 5 milliGRAM(s) Oral daily  chlorhexidine 2% Cloths 1 Application(s) Topical <User Schedule>  dexAMETHasone     Tablet 4 milliGRAM(s) Oral every 6 hours  dexAMETHasone     Tablet   Oral   famotidine    Tablet 20 milliGRAM(s) Oral every 12 hours  insulin lispro (ADMELOG) corrective regimen sliding scale   SubCutaneous Before meals and at bedtime  levETIRAcetam 1000 milliGRAM(s) Oral every 12 hours  ondansetron Injectable 4 milliGRAM(s) IV Push every 6 hours  polyethylene glycol 3350 17 Gram(s) Oral daily  senna 2 Tablet(s) Oral at bedtime  trimethoprim  160 mG/sulfamethoxazole 800 mG 1 Tablet(s) Oral <User Schedule>    MEDICATIONS  (PRN):  oxyCODONE    IR 5 milliGRAM(s) Oral every 4 hours PRN Moderate Pain (4 - 6)  oxyCODONE    IR 10 milliGRAM(s) Oral every 4 hours PRN Severe Pain (7 - 10)

## 2025-03-07 NOTE — PROGRESS NOTE ADULT - SUBJECTIVE AND OBJECTIVE BOX
HPI:   69M with pmh HTN, L frontoparietal GBM s/p biopsy @[facility] 2023, resection x2 April/2023, Dec/2024 with GammaTile placement (20 seeds/5 Tiles), s/p >6 cycles of TMZ (Aug/2023-Jan/2024), RT (60 Gy in 30 fxns (June/2023-July/2024) presents with worsening right sided weakness and expressive aphasia x 1 week. Patient was due to start the last week of a decadron taper today at 1mg daily. He was brought to [facility] ER last night for symptoms where CTH  showed worsening R sided whole-hemisphere vasogenic edema susp 2/2 GammaTile and Dex taper. MRI brain was completed this AM and he received 10mg IV decadron at 6AM. Transferred to [facility] for further workup. Patient denies headache, nausea, vomiting, or vision changes. Patient has been compliant with his home keppra 1g BID.     Hospital Course:   2/25: admitted to [facility]  2/26: KYA overnight. Heme/onc consulted for possible IV avastin  2/27: KYA overnight. S/p IV avastin, temodar started. PT/OR rec AR.  2/29: KYA overnight, neuro stable.  3/1: KYA overnight, neuro stable.   3/2: KYA overnight, neuro stable. Decadron taper to 2BID over 2 weeks.   3/3: KYA overnight, neuro stable.   3/4: KYA o/n. CXR done. ISS added.   3/5: Plan for OR Thurs. LE dopplers negative for DVT.   3/6: KYA overnight. pending OR. POD 0 s/p left parietal crani for cesium seed removal (3/6). SBP 160s, cardene gtt started. Neurologically stable.   3/7: POD#1, cardene off overnight, vitals stable. Pain controlled. F/u SG CHELSEY output. Pend MRI brain postop.       Vital Signs Last 24 Hrs  T(C): 36.8 (07 Mar 2025 00:52), Max: 36.8 (07 Mar 2025 00:52)  T(F): 98.3 (07 Mar 2025 00:52), Max: 98.3 (07 Mar 2025 00:52)  HR: 55 (07 Mar 2025 03:00) (44 - 73)  BP: 110/56 (07 Mar 2025 02:00) (101/57 - 180/86)  BP(mean): 77 (07 Mar 2025 02:00) (74 - 124)  RR: 15 (07 Mar 2025 03:00) (15 - 18)  SpO2: 98% (07 Mar 2025 03:00) (94% - 100%)    Parameters below as of 07 Mar 2025 03:00  Patient On (Oxygen Delivery Method): room air        I&O's Detail    06 Mar 2025 07:01  -  07 Mar 2025 03:34  --------------------------------------------------------  IN:    NiCARdipine: 87.5 mL    Oral Fluid: 480 mL    sodium chloride 0.9%: 270 mL    sodium chloride 0.9%: 900 mL  Total IN: 1737.5 mL    OUT:    Bulb (mL): 130 mL    Indwelling Catheter - Urethral (mL): 905 mL    Voided (mL): 850 mL  Total OUT: 1885 mL    Total NET: -147.5 mL        I&O's Summary    06 Mar 2025 07:01  -  07 Mar 2025 03:34  --------------------------------------------------------  IN: 1737.5 mL / OUT: 1885 mL / NET: -147.5 mL        PHYSICAL EXAM:  General: NAD, pt is comfortably sitting up in bed, A&O x3, some WFD intermittently, on RA  HEENT: PERRL 6mm, +dysconjugate gaze, EOMI b/l, +R facial asymmetry  Cardiovascular: RRR, normal S1 and S2   Respiratory: lungs CTAB, no wheezing, rhonchi, or crackles   GI: normoactive BS to auscultation, abd soft, NTND   Neuro: no aphasia, speech clear, RUE delt 2/5, RUE biceps 4-/5, triceps 3/5, HG 1-2/5, RLE 4-/5 w/ drift, LUE/LLE strength 5/5   decreased sensation to RUE/RLE compared to LUE/LLE   Extremities: distal pulses 2+ x4   Wound/incision: left parietal crani incision with staples C/D/I     TUBES/LINES:  [] CVC  [] A-line  [] Lumbar Drain  [] Ventriculostomy  [] Other    DIET:  [] NPO  [X] Mechanical  [] Tube feeds    LABS:  PT/INR - ( 06 Mar 2025 17:07 )   PT: 11.1 sec;   INR: 0.95          PTT - ( 06 Mar 2025 17:07 )  PTT:27.7 sec  Urinalysis Basic - ( 06 Mar 2025 17:07 )    Color: x / Appearance: x / SG: x / pH: x  Gluc: 134 mg/dL / Ketone: x  / Bili: x / Urobili: x   Blood: x / Protein: x / Nitrite: x   Leuk Esterase: x / RBC: x / WBC x   Sq Epi: x / Non Sq Epi: x / Bacteria: x          CAPILLARY BLOOD GLUCOSE      POCT Blood Glucose.: 165 mg/dL (06 Mar 2025 21:38)  POCT Blood Glucose.: 117 mg/dL (06 Mar 2025 11:59)  POCT Blood Glucose.: 138 mg/dL (06 Mar 2025 07:27)      Drug Levels: [] N/A    CSF Analysis: [] N/A      Allergies    No Known Allergies    Intolerances      MEDICATIONS:  Antibiotics:  ceFAZolin   IVPB 2000 milliGRAM(s) IV Intermittent every 8 hours  trimethoprim  160 mG/sulfamethoxazole 800 mG 1 Tablet(s) Oral <User Schedule>    Neuro:  acetaminophen     Tablet .. 1000 milliGRAM(s) Oral every 8 hours  levETIRAcetam 1000 milliGRAM(s) Oral every 12 hours  ondansetron Injectable 4 milliGRAM(s) IV Push every 6 hours  oxyCODONE    IR 5 milliGRAM(s) Oral every 4 hours PRN  oxyCODONE    IR 10 milliGRAM(s) Oral every 4 hours PRN    Anticoagulation:    OTHER:  amLODIPine   Tablet 5 milliGRAM(s) Oral daily  chlorhexidine 2% Cloths 1 Application(s) Topical <User Schedule>  dexAMETHasone     Tablet   Oral   dexAMETHasone     Tablet 4 milliGRAM(s) Oral every 6 hours  famotidine    Tablet 20 milliGRAM(s) Oral every 12 hours  insulin lispro (ADMELOG) corrective regimen sliding scale   SubCutaneous Before meals and at bedtime  polyethylene glycol 3350 17 Gram(s) Oral daily  senna 2 Tablet(s) Oral at bedtime    IVF:  sodium chloride 0.9%. 1000 milliLiter(s) IV Continuous <Continuous>    CULTURES:    RADIOLOGY & ADDITIONAL TESTS:      ASSESSMENT:  69M with pmh HTN, L frontoparietal GBM s/p biopsy @[facility] 20[facility], resection x2 April/2023, Dec/2024 with GammaTile placement (20 seeds/5 Tiles), s/p >6 cycles of TMZ (Aug/2023-Jan/2024), RT (60 Gy in 30 fxns (June/2023-July/2024) presents with worsening right sided weakness and expressive aphasia x 1 week. At [facility] ER CTH  showed worsening left sided whole-hemisphere vasogenic edema susp 2/2 GammaTile and Dex taper and MR brain completed. Transferred to [facility] for further workup. Now s/p left parietal crani for cesium seed removal (3/6).     CEREBRAL VASOGENIC EDEMA    Dependence on wheelchair-Z99.3    Encounter for follow-up examination after completed treatment for conditions other than malignant neoplasm-Z09    Weakness-R53.1    Handoff    MEWS Score    Hypertension    Osteoarthritis    Brain mass    Glioblastoma    Glioblastoma    Glioblastoma    Glioblastoma    Osteoarthritis    Hypertension    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    S/P total knee replacement, right    H/O craniotomy    [id number]        PLAN:  Neuro:  - neuro/vital checks q1hrs   - pain control: cranial ERAS  - pending post op MRI   - SG CHELSEY x1, monitor output   - MRI brain w/ sarah completed 2/25  - CTH 2/24: worsening R sided whole-hemisphere vasogenic edema  - seizure prophylaxis: cont home keppra 1g q12  - decadron 4q6 taper over 2 weeks to 2 BID for vasogenic edema  - GBM: hold home metformin 500mg BID    Cardiac:  - SBP goal 100-140, cardene gtt   - HTN: cont home amlodipine 5mg daily    Pulmonary:  - on RA    GI:  - regular diet   - bowel regimen  - pepcid 20mg BID while on steroids    Renal:  - IVF while NPO  - Na goal 135-145    Heme:  - DVT ppx: SCDs (SQL held for OR)  - heme/onc consult, s/p IV Avastin 2/27, temodar (2/27-3/3)  - LE dopplers 3/5 negative     Endo:  - A1C 5.9  - ISS     ID:  - afebrile  - bactrim DS 3x a week for prophylaxis i/s/o recent chemotherapy    Disposition: tele, full code, PT/OT rec AR.    D/w Dr. Johnston and Dr. Moura

## 2025-03-07 NOTE — PROGRESS NOTE ADULT - ASSESSMENT
Assessment/Plan:   69M with pmh HTN, L frontoparietal GBM s/p biopsy @[facility] 20[facility], resection x2 April/2023, Dec/2024 with GammaTile placement (20 seeds/5 Tiles), s/p >6 cycles of TMZ (Aug/2023-Jan/2024), RT (60 Gy in 30 fxns (June/2023-July/2024) presents with worsening right sided weakness and expressive aphasia x 1 week. At [facility] ER CTH  showed worsening left sided whole-hemisphere vasogenic edema susp 2/2 GammaTile and Dex taper and MR brain completed. Transferred to [facility] for further workup. Now s/p left parietal crani for cesium seed removal (3/6).       NEURO:  - neuro/vital checks q1  - pain control: cranial ERAS, Tylenol oxycodone   - pending post op MRI   - SG CHELSEY x1, monitor output   - MRI brain w/ sarah completed 2/25  - CTH 2/24: worsening R sided whole-hemisphere vasogenic edema  - seizure prophylaxis: cont home keppra 1g q12  - decadron 4q6 taper over 2 weeks to 2 BID for vasogenic edema      PULMONARY:  saturating well on RA,   -continue to monitor on pulse o2   -incentive spirometry 10q/hr when awake     CARDIOVASCULAR:  monitor on telemetry   vitals q1  sbp goal 100-160; c/w amlodipine 10mg   EKG: pending      GASTROENTEROLOGY:  bedside speech & swallow if pass can start advancing diet as tolerated.   ensure BMs w/ Miralax & senna  GI ppx : famotidine 20mg BID   Daily stool count, LBM prior to arrival    RENAL/:  -check BMP qd  -strict i/o's ; remove metzger; initiate TOV   -Na goal 135-145    ENDOCRINE:  A1c- 5.9%  FSG while on dexamethasone     HEME/ONC:  DVT ppx: will hold chemical dvt ppx in setting of recent operation.  b/l SCDs    INFECTIOUS:   Monitor for fevers   post operative antibiotic w/ ancef     Code Status: Full Code    Dispo: NeuroICU    Sapna Pichardo MD   Neurocritical Care Attending    Assessment/Plan:   69M with pmh HTN, L frontoparietal GBM s/p biopsy [facility], resection x2 April/2023, Dec/2024 with GammaTile placement (20 seeds/5 Tiles), s/p >6 cycles of TMZ (Aug/2023-Jan/2024), RT (60 Gy in 30 fxns (June/2023-July/2024) presents with worsening right sided weakness and expressive aphasia x 1 week. At [facility] ER CTH  showed worsening left sided whole-hemisphere vasogenic edema susp 2/2 GammaTile and Dex taper and MR brain completed. Transferred to [facility] for further workup. Now s/p left parietal crani for cesium seed removal (3/6).       NEURO:  - neuro/vital checks q4 hrs ---> transfer to [place]   - pain control: cranial ERAS, Tylenol oxycodone   - pending post op MRI   - SG CHELSEY x1, monitor output   - MRI brain w/ sarah completed 2/25  - CTH 2/24: worsening R sided whole-hemisphere vasogenic edema  - seizure prophylaxis: cont home keppra 1g q12  - decadron 4q6 taper over 2 weeks to 2 BID for vasogenic edema  - PT/OT     PULMONARY:  saturating well on RA,   -continue to monitor on pulse o2   -incentive spirometry 10q/hr when awake     CARDIOVASCULAR:  monitor on telemetry   sbp goal 100-160; c/w amlodipine 10mg       GASTROENTEROLOGY:  bedside speech & swallow if pass can start advancing diet as tolerated.   ensure BMs w/ Miralax & senna  GI ppx : famotidine 20mg BID   Daily stool count, LBM prior to arrival    RENAL/:  -check BMP qd  -strict i/o's ; remove metzger; initiate TOV   -Na goal 135-145    ENDOCRINE:  A1c- 5.9%  FSG while on dexamethasone     HEME/ONC:  DVT ppx: will hold chemical dvt ppx in setting of recent operation.  b/l SCDs, start Lovenox tonight     INFECTIOUS:   Monitor for fevers   post operative antibiotic w/ ancef     Code Status: Full Code    Dispo: Transfer to [place]     Sapna Pichardo MD   Neurocritical Care Attending

## 2025-03-07 NOTE — CHART NOTE - NSCHARTNOTEFT_GEN_A_CORE
Admitting Diagnosis:   Patient is a 69y old  Male who presents with a chief complaint of right sided weakness (07 Mar 2025 07:52)      PAST MEDICAL & SURGICAL HISTORY:  Hypertension      Osteoarthritis      Glioblastoma      S/P total knee replacement, right      H/O craniotomy          Current Nutrition Order:   Diet, Regular (03-06-25 @ 17:32)  Diet, Regular (03-06-25 @ 14:30)  Diet, Clear Liquid (03-06-25 @ 14:30)  Diet, NPO (03-06-25 @ 14:30)      PO Intake: Good (%) [   ]  Fair (50-75%) [   ] Poor (<25%) [   ]    GI Issues:     Pain:    Skin Integrity:        03-06-25 @ 07:01  -  03-07-25 @ 07:00  --------------------------------------------------------  IN: 2007.5 mL / OUT: 2315 mL / NET: -307.5 mL    03-07-25 @ 07:01  -  03-07-25 @ 12:49  --------------------------------------------------------  IN: 480 mL / OUT: 615 mL / NET: -135 mL        Labs:   03-07    139  |  104  |  30[H]  ----------------------------<  139[H]  4.8   |  25  |  0.78    Ca    8.1[L]      07 Mar 2025 04:56  Phos  3.9     03-07  Mg     2.0     03-07      CAPILLARY BLOOD GLUCOSE      POCT Blood Glucose.: 161 mg/dL (07 Mar 2025 11:19)  POCT Blood Glucose.: 124 mg/dL (07 Mar 2025 06:21)  POCT Blood Glucose.: 165 mg/dL (06 Mar 2025 21:38)      Medications:  MEDICATIONS  (STANDING):  acetaminophen     Tablet .. 1000 milliGRAM(s) Oral every 8 hours  amLODIPine   Tablet 5 milliGRAM(s) Oral daily  dexAMETHasone     Tablet 4 milliGRAM(s) Oral every 6 hours  dexAMETHasone     Tablet   Oral   enoxaparin Injectable 40 milliGRAM(s) SubCutaneous <User Schedule>  famotidine    Tablet 20 milliGRAM(s) Oral every 12 hours  insulin lispro (ADMELOG) corrective regimen sliding scale   SubCutaneous Before meals and at bedtime  levETIRAcetam 1000 milliGRAM(s) Oral every 12 hours  ondansetron Injectable 4 milliGRAM(s) IV Push every 6 hours  polyethylene glycol 3350 17 Gram(s) Oral daily  senna 2 Tablet(s) Oral at bedtime  trimethoprim  160 mG/sulfamethoxazole 800 mG 1 Tablet(s) Oral <User Schedule>    MEDICATIONS  (PRN):  oxyCODONE    IR 5 milliGRAM(s) Oral every 4 hours PRN Moderate Pain (4 - 6)  oxyCODONE    IR 10 milliGRAM(s) Oral every 4 hours PRN Severe Pain (7 - 10)      Height for BMI (FEET)	5 Feet  Height for BMI (INCHES)	11 Inch(s)  Height for BMI (CENTIMETERS)	180.34 Centimeter(s)  Weight for BMI (lbs)	199 lb  Weight for BMI (kg)	90.3 kg  Body Mass Index	27.7    Weight Change: no weight changes noted.       Estimated energy needs: 7301-5034    Subjective:     Previous Nutrition Diagnosis:    Active [   ]  Resolved [   ]    If resolved, new PES:     Goal:    Recommendations:    Education:     Risk Level: High [   ] Moderate [   ] Low [   ] Admitting Diagnosis:   Patient is a 69y old  Male who presents with a chief complaint of right sided weakness (07 Mar 2025 07:52)    PAST MEDICAL & SURGICAL HISTORY:  Hypertension  Osteoarthritis  Glioblastoma  S/P total knee replacement, right  H/O craniotomy    Current Nutrition Order:   Diet, Regular (03-06-25 @ 17:32)  Diet, Regular (03-06-25 @ 14:30)  Diet, Clear Liquid (03-06-25 @ 14:30)  Diet, NPO (03-06-25 @ 14:30)    PO Intake: Good (%) [ X ]  Fair (50-75%) [   ] Poor (<25%) [   ]    GI Issues: No GI upsets noted. Contour symmetrical, Soft/nontender abdomen noted. last BM 2/25/2025.     Pain: no pain/ discomfort note.     Skin Integrity: surgical incision on head (staple intact). Antonio 18. No pressure injury noted. No edema noted.        03-06-25 @ 07:01  -  03-07-25 @ 07:00  --------------------------------------------------------  IN: 2007.5 mL / OUT: 2315 mL / NET: -307.5 mL    03-07-25 @ 07:01  -  03-07-25 @ 12:49  --------------------------------------------------------  IN: 480 mL / OUT: 615 mL / NET: -135 mL      Labs:   03-07    139  |  104  |  30[H]  ----------------------------<  139[H]  4.8   |  25  |  0.78    Ca    8.1[L]      07 Mar 2025 04:56  Phos  3.9     03-07  Mg     2.0     03-07      CAPILLARY BLOOD GLUCOSE    POCT Blood Glucose.: 161 mg/dL (07 Mar 2025 11:19)  POCT Blood Glucose.: 124 mg/dL (07 Mar 2025 06:21)  POCT Blood Glucose.: 165 mg/dL (06 Mar 2025 21:38)    Medications:  MEDICATIONS  (STANDING):  acetaminophen     Tablet .. 1000 milliGRAM(s) Oral every 8 hours  amLODIPine   Tablet 5 milliGRAM(s) Oral daily  dexAMETHasone     Tablet 4 milliGRAM(s) Oral every 6 hours  dexAMETHasone     Tablet   Oral   enoxaparin Injectable 40 milliGRAM(s) SubCutaneous <User Schedule>  famotidine    Tablet 20 milliGRAM(s) Oral every 12 hours  insulin lispro (ADMELOG) corrective regimen sliding scale   SubCutaneous Before meals and at bedtime  levETIRAcetam 1000 milliGRAM(s) Oral every 12 hours  ondansetron Injectable 4 milliGRAM(s) IV Push every 6 hours  polyethylene glycol 3350 17 Gram(s) Oral daily  senna 2 Tablet(s) Oral at bedtime  trimethoprim  160 mG/sulfamethoxazole 800 mG 1 Tablet(s) Oral <User Schedule>    MEDICATIONS  (PRN):  oxyCODONE    IR 5 milliGRAM(s) Oral every 4 hours PRN Moderate Pain (4 - 6)  oxyCODONE    IR 10 milliGRAM(s) Oral every 4 hours PRN Severe Pain (7 - 10)      Height for BMI (FEET)	5 Feet  Height for BMI (INCHES)	11 Inch(s)  Height for BMI (CENTIMETERS)	180.34 Centimeter(s)  Weight for BMI (lbs)	199 lb  Weight for BMI (kg)	90.3 kg  Body Mass Index	27.7    Weight Change: no weight changes noted.     Estimated energy needs: 1122-3425 (25-30 kcal/kg)   Protein Needs: 90.2-117.26 ( 1-1.3 g/kg)   Fluid Needs: 4049-3316 (25-30 ml/kg)     Current body weight 199 pounds used as pt within % IBW (116%). Needs adjusted for history cancer, age, post surgical demands.       Subjective: 68 y/o male with PMH of HTN, GBM s/p biopsy @[facility] 20[facility], resection x2 April/2023, Dec/2024 with GammaTile placement (20 seeds/5 Tiles), s/p >6 cycles of TMZ (Aug/2023-Jan/2024), RT (60 Gy in 30 fxns (June/2023-July/2024) presents with worsening right sided weakness and expressive aphasia x 1 week. Patient was due to start the last week of a decadron taper today at 1mg daily. He was brought to [facility] ER last night for symptoms where CTH  showed worsening R sided whole-hemisphere vasogenic edema susp 2/2 GammaTile and Dex taper. MRI brain was completed this AM and he received 10mg IV decadron at 6AM. Transferred to [facility] for further workup.    pt seen in [facility] for nutrition follow up. pt was sitting in chair and alert with spouse present. pt reports of "excellent" appetite, usually finishing all meals. Pt wife reports of pt being on ketogenic diet-- mostly consuming fish, chicken, beef, avocado, olive oil, and vegetables. Wife expressed, they would like to follow ketogenic diet due to research showing it good for cancer. No nausea, vomiting, diarrhea noted. pt reports of constipation, last BM 2/25/25, on bowel regimen. Medications: Dexamethasone, pepcid, Admelog, senna, miralax. Labs to note: elevated POCT (161,124,165), elevated BUN (30), low calcium (8.1), medical team is aware. RD to monitor trends. pt educated on eating a variety of foods and lean proteins.     Previous Nutrition Diagnosis: Increased Nutrient protein needs related to physiological demands as evidenced by  hx cancer  Active [   ]  Resolved [ X ]    If resolved, new PES: Increased nutrient Needs related to physiological demands as evidenced by postoperative demands (craniotomy for cesium seed removal (3/6).    Goal: pt will come >75% of EER.     Recommendations:  1. Continue current Regular diet   - provide additional nourishments and/or oral nutrition supplement per patient preference and/or if pt PO intake are consistently <50%.   2. Monitor pain/ GI/ skin integrity/ weight trends.   3. Align nutrition with goals of care at all times.   4. RD to remain available for additional nutrition interventions.     Education: pt educated on eating a variety of foods, lean proteins, and healthy fats.     Risk Level: High [   ] Moderate [ X ] Low [   ] Admitting Diagnosis:   Patient is a 69y old  Male who presents with a chief complaint of right sided weakness (07 Mar 2025 07:52)    PAST MEDICAL & SURGICAL HISTORY:  Hypertension  Osteoarthritis  Glioblastoma  S/P total knee replacement, right  H/O craniotomy    Current Nutrition Order:  Diet, Regular     PO Intake: Good (%) [ X ]  Fair (50-75%) [   ] Poor (<25%) [   ]    GI Issues: No GI upsets noted. Contour symmetrical, Soft/nontender abdomen noted. last BM 2/25/2025.     Pain: no pain/ discomfort note.     Skin Integrity: surgical incision on head (staple intact). Antonio 18. No pressure injury noted. No edema noted.        03-06-25 @ 07:01  -  03-07-25 @ 07:00  --------------------------------------------------------  IN: 2007.5 mL / OUT: 2315 mL / NET: -307.5 mL    03-07-25 @ 07:01  -  03-07-25 @ 12:49  --------------------------------------------------------  IN: 480 mL / OUT: 615 mL / NET: -135 mL      Labs:   03-07    139  |  104  |  30[H]  ----------------------------<  139[H]  4.8   |  25  |  0.78    Ca    8.1[L]      07 Mar 2025 04:56  Phos  3.9     03-07  Mg     2.0     03-07      CAPILLARY BLOOD GLUCOSE    POCT Blood Glucose.: 161 mg/dL (07 Mar 2025 11:19)  POCT Blood Glucose.: 124 mg/dL (07 Mar 2025 06:21)  POCT Blood Glucose.: 165 mg/dL (06 Mar 2025 21:38)    Medications:  MEDICATIONS  (STANDING):  acetaminophen     Tablet .. 1000 milliGRAM(s) Oral every 8 hours  amLODIPine   Tablet 5 milliGRAM(s) Oral daily  dexAMETHasone     Tablet 4 milliGRAM(s) Oral every 6 hours  dexAMETHasone     Tablet   Oral   enoxaparin Injectable 40 milliGRAM(s) SubCutaneous <User Schedule>  famotidine    Tablet 20 milliGRAM(s) Oral every 12 hours  insulin lispro (ADMELOG) corrective regimen sliding scale   SubCutaneous Before meals and at bedtime  levETIRAcetam 1000 milliGRAM(s) Oral every 12 hours  ondansetron Injectable 4 milliGRAM(s) IV Push every 6 hours  polyethylene glycol 3350 17 Gram(s) Oral daily  senna 2 Tablet(s) Oral at bedtime  trimethoprim  160 mG/sulfamethoxazole 800 mG 1 Tablet(s) Oral <User Schedule>    MEDICATIONS  (PRN):  oxyCODONE    IR 5 milliGRAM(s) Oral every 4 hours PRN Moderate Pain (4 - 6)  oxyCODONE    IR 10 milliGRAM(s) Oral every 4 hours PRN Severe Pain (7 - 10)      Height for BMI (FEET)	5 Feet  Height for BMI (INCHES)	11 Inch(s)  Height for BMI (CENTIMETERS)	180.34 Centimeter(s)  Weight for BMI (lbs)	199 lb  Weight for BMI (kg)	90.3 kg  Body Mass Index	27.7    Weight Change: no weight changes noted.     Estimated energy needs: 6571-1096 (25-30 kcal/kg)   Protein Needs: 90.2-117.26 ( 1-1.3 g/kg)   Fluid Needs: 6775-1955 (25-30 ml/kg)     Current body weight 199 pounds used as pt within % IBW (116%). Needs adjusted for history cancer, age, post surgical demands.       Subjective: 68 y/o male with PMH of HTN, GBM s/p biopsy @[facility] 20[facility], resection x2 April/2023, Dec/2024 with GammaTile placement (20 seeds/5 Tiles), s/p >6 cycles of TMZ (Aug/2023-Jan/2024), RT (60 Gy in 30 fxns (June/2023-July/2024) presents with worsening right sided weakness and expressive aphasia x 1 week. Patient was due to start the last week of a decadron taper today at 1mg daily. He was brought to [facility] ER last night for symptoms where CTH  showed worsening R sided whole-hemisphere vasogenic edema susp 2/2 Gamma Tile and Dex taper. MRI brain was completed this AM and he received 10mg IV decadron at 6AM. Transferred to [facility] for further workup.    pt seen in [facility] for nutrition follow up. pt was sitting in chair and alert with spouse present. pt reports of "excellent" appetite, usually finishing all meals. Pt wife reports of pt being on ketogenic diet-- mostly consuming fish, chicken, beef, avocado, olive oil, and vegetables. Wife expressed, they would like to follow ketogenic diet due to research showing it good for cancer. No nausea, vomiting, diarrhea noted. pt reports of constipation, last BM 2/25/25, on bowel regimen. Medications: Dexamethasone, Pepcid, ADMELOG, senna, Miralax. Labs to note: elevated POCT (161,124,165), elevated BUN (30), low calcium (8.1), medical team is aware. RD to monitor trends. pt educated on eating a variety of foods and lean proteins. pt appeared overall receptive and verbalized understanding. No additional nutrition-related concerns. Will continue to follow. Additional nutrition recommendations below will follow.     Previous Nutrition Diagnosis: Increased Nutrient protein needs related to physiological demands as evidenced by  hx cancer  Active [   ]  Resolved [ X ]    If resolved, new PES: Increased nutrient Needs related to physiological demands as evidenced by postoperative demands (craniotomy for cesium seed removal (3/6).    Goal: pt to meet >75% of EER via tolerated route that is consistent with goals of care during hospital stay.     Recommendations:  1. Continue current Regular diet   - provide additional nourishments and/or oral nutrition supplement per patient preference and/or if pt PO intake are consistently <50%.   2. Monitor pain/ GI/ skin integrity/ weight trends.   3. Align nutrition with goals of care at all times.   4. RD to remain available for additional nutrition interventions.     Education: pt educated on eating a variety of foods, lean proteins, and healthy fats.     Risk Level: High [   ] Moderate [ X ] Low [   ] Admitting Diagnosis:   Patient is a 69y old  Male who presents with a chief complaint of right sided weakness (07 Mar 2025 07:52)    PAST MEDICAL & SURGICAL HISTORY:  Hypertension  Osteoarthritis  Glioblastoma  S/P total knee replacement, right  H/O craniotomy    Current Nutrition Order:  Diet, Regular     PO Intake: Good (%) [ X ]  Fair (50-75%) [   ] Poor (<25%) [   ]    GI Issues: No GI upsets noted. Contour symmetrical, Soft/nontender abdomen noted. last BM 2/25/2025.     Pain: no pain/ discomfort note.     Skin Integrity: surgical incision on head (staple intact). Antonio 18. No pressure injury noted. No edema noted.        03-06-25 @ 07:01  -  03-07-25 @ 07:00  --------------------------------------------------------  IN: 2007.5 mL / OUT: 2315 mL / NET: -307.5 mL    03-07-25 @ 07:01  -  03-07-25 @ 12:49  --------------------------------------------------------  IN: 480 mL / OUT: 615 mL / NET: -135 mL      Labs:   03-07    139  |  104  |  30[H]  ----------------------------<  139[H]  4.8   |  25  |  0.78    Ca    8.1[L]      07 Mar 2025 04:56  Phos  3.9     03-07  Mg     2.0     03-07      CAPILLARY BLOOD GLUCOSE    POCT Blood Glucose.: 161 mg/dL (07 Mar 2025 11:19)  POCT Blood Glucose.: 124 mg/dL (07 Mar 2025 06:21)  POCT Blood Glucose.: 165 mg/dL (06 Mar 2025 21:38)    Medications:  MEDICATIONS  (STANDING):  acetaminophen     Tablet .. 1000 milliGRAM(s) Oral every 8 hours  amLODIPine   Tablet 5 milliGRAM(s) Oral daily  dexAMETHasone     Tablet 4 milliGRAM(s) Oral every 6 hours  dexAMETHasone     Tablet   Oral   enoxaparin Injectable 40 milliGRAM(s) SubCutaneous <User Schedule>  famotidine    Tablet 20 milliGRAM(s) Oral every 12 hours  insulin lispro (ADMELOG) corrective regimen sliding scale   SubCutaneous Before meals and at bedtime  levETIRAcetam 1000 milliGRAM(s) Oral every 12 hours  ondansetron Injectable 4 milliGRAM(s) IV Push every 6 hours  polyethylene glycol 3350 17 Gram(s) Oral daily  senna 2 Tablet(s) Oral at bedtime  trimethoprim  160 mG/sulfamethoxazole 800 mG 1 Tablet(s) Oral <User Schedule>    MEDICATIONS  (PRN):  oxyCODONE    IR 5 milliGRAM(s) Oral every 4 hours PRN Moderate Pain (4 - 6)  oxyCODONE    IR 10 milliGRAM(s) Oral every 4 hours PRN Severe Pain (7 - 10)      Height for BMI (FEET)	5 Feet  Height for BMI (INCHES)	11 Inch(s)  Height for BMI (CENTIMETERS)	180.34 Centimeter(s)  Weight for BMI (lbs)	199 lb  Weight for BMI (kg)	90.3 kg  Body Mass Index	27.7    Weight Change: no weight changes noted.     Estimated energy needs: 8812-6854 (25-30 kcal/kg)   Protein Needs: 90.2-117.26 ( 1-1.3 g/kg)   Fluid Needs: 1056-9329 (25-30 ml/kg)     Current body weight 199 pounds used as pt within % IBW (116%). Needs adjusted for history cancer, age, post surgical demands.       Subjective: 68 y/o male with PMH of HTN, GBM s/p biopsy @[facility] 2023, resection x2 April/2023, Dec/2024 with GammaTile placement (20 seeds/5 Tiles), s/p >6 cycles of TMZ (Aug/2023-Jan/2024), RT (60 Gy in 30 fxns (June/2023-July/2024) presents with worsening right sided weakness and expressive aphasia x 1 week. Patient was due to start the last week of a decadron taper today at 1mg daily. He was brought to [facility] ER last night for symptoms where CTH  showed worsening R sided whole-hemisphere vasogenic edema susp 2/2 Gamma Tile and Dex taper. MRI brain was completed this AM and he received 10mg IV decadron at 6AM. Transferred to [facility] for further workup.    pt seen in [facility] for nutrition follow up. pt was sitting in chair and alert with spouse present. pt reports of "excellent" appetite, usually finishing all meals. Pt wife reports of pt being on ketogenic diet-- mostly consuming fish, chicken, beef, avocado, olive oil, and vegetables. Wife expressed, they would like to follow ketogenic diet due to research showing it good for cancer. No nausea, vomiting, diarrhea noted. pt reports of constipation, last BM 2/25/25, on bowel regimen. Medications: Dexamethasone, Pepcid, ADMELOG, senna, Miralax. Labs to note: elevated POCT (161,124,165), elevated BUN (30), low calcium (8.1), medical team is aware. RD to monitor trends. pt educated on eating a variety of foods and lean proteins, promoted adequate fluids and hydration related to constipation. pt appeared overall receptive and verbalized understanding. No additional nutrition-related concerns. Will continue to follow. Additional nutrition recommendations below will follow.     Previous Nutrition Diagnosis: Increased Nutrient protein needs related to physiological demands as evidenced by  hx cancer  Active [   ]  Resolved [ X ]    If resolved, new PES: Increased nutrient Needs related to physiological demands as evidenced by postoperative demands (craniotomy for cesium seed removal (3/6).    Goal: pt to meet >75% of EER via tolerated route that is consistent with goals of care during hospital stay.     Nutrition Recommendations:  1. Continue current Regular diet   - provide additional nourishments and/or oral nutrition supplement per patient preference and/or if pt PO intake are consistently <50%.   2. Monitor pain/GI/ skin integrity/ weight trends/labs  3. Align nutrition with goals of care at all times.   4. RD to remain available for additional nutrition interventions.     Education: pt educated on eating a variety of foods, lean proteins, and healthy fats, promoted adequate fluids and hydration related to constipation. pt appeared receptive, verbalized understanding.     Risk Level: High [   ] Moderate [ X ] Low [   ]

## 2025-03-08 LAB
ANION GAP SERPL CALC-SCNC: 11 MMOL/L — SIGNIFICANT CHANGE UP (ref 5–17)
BUN SERPL-MCNC: 21 MG/DL — SIGNIFICANT CHANGE UP (ref 7–23)
CALCIUM SERPL-MCNC: 8.6 MG/DL — SIGNIFICANT CHANGE UP (ref 8.4–10.5)
CHLORIDE SERPL-SCNC: 101 MMOL/L — SIGNIFICANT CHANGE UP (ref 96–108)
CO2 SERPL-SCNC: 23 MMOL/L — SIGNIFICANT CHANGE UP (ref 22–31)
CREAT SERPL-MCNC: 0.79 MG/DL — SIGNIFICANT CHANGE UP (ref 0.5–1.3)
EGFR: 97 ML/MIN/1.73M2 — SIGNIFICANT CHANGE UP
EGFR: 97 ML/MIN/1.73M2 — SIGNIFICANT CHANGE UP
GLUCOSE SERPL-MCNC: 133 MG/DL — HIGH (ref 70–99)
HCT VFR BLD CALC: 42.1 % — SIGNIFICANT CHANGE UP (ref 39–50)
HGB BLD-MCNC: 13.6 G/DL — SIGNIFICANT CHANGE UP (ref 13–17)
MAGNESIUM SERPL-MCNC: 2 MG/DL — SIGNIFICANT CHANGE UP (ref 1.6–2.6)
MCHC RBC-ENTMCNC: 30.2 PG — SIGNIFICANT CHANGE UP (ref 27–34)
MCHC RBC-ENTMCNC: 32.3 G/DL — SIGNIFICANT CHANGE UP (ref 32–36)
MCV RBC AUTO: 93.3 FL — SIGNIFICANT CHANGE UP (ref 80–100)
NRBC BLD AUTO-RTO: 0 /100 WBCS — SIGNIFICANT CHANGE UP (ref 0–0)
PHOSPHATE SERPL-MCNC: 2.9 MG/DL — SIGNIFICANT CHANGE UP (ref 2.5–4.5)
PLATELET # BLD AUTO: 278 K/UL — SIGNIFICANT CHANGE UP (ref 150–400)
POTASSIUM SERPL-MCNC: 4.4 MMOL/L — SIGNIFICANT CHANGE UP (ref 3.5–5.3)
POTASSIUM SERPL-SCNC: 4.4 MMOL/L — SIGNIFICANT CHANGE UP (ref 3.5–5.3)
RBC # BLD: 4.51 M/UL — SIGNIFICANT CHANGE UP (ref 4.2–5.8)
RBC # FLD: 14.9 % — HIGH (ref 10.3–14.5)
SODIUM SERPL-SCNC: 135 MMOL/L — SIGNIFICANT CHANGE UP (ref 135–145)
WBC # BLD: 10.6 K/UL — HIGH (ref 3.8–10.5)
WBC # FLD AUTO: 10.6 K/UL — HIGH (ref 3.8–10.5)

## 2025-03-08 PROCEDURE — 70553 MRI BRAIN STEM W/O & W/DYE: CPT | Mod: 26

## 2025-03-08 PROCEDURE — 99254 IP/OBS CNSLTJ NEW/EST MOD 60: CPT

## 2025-03-08 PROCEDURE — 99233 SBSQ HOSP IP/OBS HIGH 50: CPT

## 2025-03-08 PROCEDURE — 99024 POSTOP FOLLOW-UP VISIT: CPT

## 2025-03-08 RX ORDER — LACTULOSE 10 G/15ML
20 SOLUTION ORAL ONCE
Refills: 0 | Status: COMPLETED | OUTPATIENT
Start: 2025-03-08 | End: 2025-03-08

## 2025-03-08 RX ORDER — SOD PHOS DI, MONO/K PHOS MONO 250 MG
1 TABLET ORAL ONCE
Refills: 0 | Status: COMPLETED | OUTPATIENT
Start: 2025-03-08 | End: 2025-03-08

## 2025-03-08 RX ORDER — POLYETHYLENE GLYCOL 3350 17 G/17G
17 POWDER, FOR SOLUTION ORAL
Refills: 0 | Status: DISCONTINUED | OUTPATIENT
Start: 2025-03-08 | End: 2025-03-12

## 2025-03-08 RX ADMIN — INSULIN LISPRO 2: 100 INJECTION, SOLUTION INTRAVENOUS; SUBCUTANEOUS at 12:17

## 2025-03-08 RX ADMIN — INSULIN LISPRO 2: 100 INJECTION, SOLUTION INTRAVENOUS; SUBCUTANEOUS at 17:32

## 2025-03-08 RX ADMIN — LINEZOLID 300 MILLIGRAM(S): 2 INJECTION, SOLUTION INTRAVENOUS at 12:16

## 2025-03-08 RX ADMIN — DEXAMETHASONE 4 MILLIGRAM(S): 0.5 TABLET ORAL at 05:55

## 2025-03-08 RX ADMIN — Medication 1 PACKET(S): at 09:12

## 2025-03-08 RX ADMIN — LEVETIRACETAM 1000 MILLIGRAM(S): 10 INJECTION, SOLUTION INTRAVENOUS at 05:55

## 2025-03-08 RX ADMIN — Medication 4 MILLIGRAM(S): at 05:55

## 2025-03-08 RX ADMIN — Medication 20 MILLIGRAM(S): at 17:32

## 2025-03-08 RX ADMIN — ENOXAPARIN SODIUM 40 MILLIGRAM(S): 100 INJECTION SUBCUTANEOUS at 22:10

## 2025-03-08 RX ADMIN — DEXAMETHASONE 4 MILLIGRAM(S): 0.5 TABLET ORAL at 22:10

## 2025-03-08 RX ADMIN — Medication 1000 MILLIGRAM(S): at 13:36

## 2025-03-08 RX ADMIN — LINEZOLID 300 MILLIGRAM(S): 2 INJECTION, SOLUTION INTRAVENOUS at 00:50

## 2025-03-08 RX ADMIN — Medication 2 TABLET(S): at 22:10

## 2025-03-08 RX ADMIN — LEVETIRACETAM 1000 MILLIGRAM(S): 10 INJECTION, SOLUTION INTRAVENOUS at 17:32

## 2025-03-08 RX ADMIN — Medication 4 MILLIGRAM(S): at 12:16

## 2025-03-08 RX ADMIN — Medication 20 MILLIGRAM(S): at 05:55

## 2025-03-08 RX ADMIN — DEXAMETHASONE 4 MILLIGRAM(S): 0.5 TABLET ORAL at 12:16

## 2025-03-08 RX ADMIN — AMLODIPINE BESYLATE 5 MILLIGRAM(S): 10 TABLET ORAL at 05:55

## 2025-03-08 RX ADMIN — Medication 1000 MILLIGRAM(S): at 05:55

## 2025-03-08 RX ADMIN — POLYETHYLENE GLYCOL 3350 17 GRAM(S): 17 POWDER, FOR SOLUTION ORAL at 12:17

## 2025-03-08 NOTE — CONSULT NOTE ADULT - SUBJECTIVE AND OBJECTIVE BOX
HPI:  69M with pmh HTN, L frontoparietal GBM s/p biopsy @[facility] 2023, resection x2 April/2023, Dec/2024 with GammaTile placement (20 seeds/5 Tiles), s/p >6 cycles of TMZ (Aug/2023-Jan/2024), RT (60 Gy in 30 fxns (June/2023-July/2024) presents with worsening right sided weakness and expressive aphasia x 1 week. Patient was due to start the last week of a decadron taper today at 1mg daily. He was brought to [facility] ER last night for symptoms where CTH  showed worsening R sided whole-hemisphere vasogenic edema susp 2/2 GammaTile and Dex taper. MRI brain was completed this AM and he received 10mg IV decadron at 6AM. Transferred to [facility] for further workup. Patient denies headache, nausea, vomiting, or vision changes. Patient has been compliant with his home keppra 1g BID.     Patient went to OR on 3/6/25 for removal of "seeds" and craniotomy for resection of tumor of left side of brain. It's reported there was no concern for infection.  Cultures were sent from OR.  All cultures negative to date except culture of seeds which is showing rare E. faecalis.  Patient denies fevers, chills.  Had no discharge from scalp prior to this procedure.      PAST MEDICAL & SURGICAL HISTORY:  Hypertension      Osteoarthritis      Glioblastoma      S/P total knee replacement, right      H/O craniotomy            REVIEW OF SYSTEMS:    General:	 no weakness; no fevers, no chills  Skin/Breast: no rash  Respiratory and Thorax: no SOB, no cough  Cardiovascular:	No chest pain  Gastrointestinal:	 no nausea, vomiting , diarrhea  Genitourinary:	no dysuria, no difficulty urinating, no hematuria  Musculoskeletal:	no weakness, no joint swelling/pain  Neurological:	no focal weakness/numbness  Endocrine:	no polyuria, no polydipsia      ANTIBIOTICS:  MEDICATIONS  (STANDING):  amLODIPine   Tablet 5 milliGRAM(s) Oral daily  dexAMETHasone     Tablet   Oral   dexAMETHasone     Tablet 4 milliGRAM(s) Oral every 8 hours  enoxaparin Injectable 40 milliGRAM(s) SubCutaneous <User Schedule>  famotidine    Tablet 20 milliGRAM(s) Oral every 12 hours  insulin lispro (ADMELOG) corrective regimen sliding scale   SubCutaneous Before meals and at bedtime  levETIRAcetam 1000 milliGRAM(s) Oral every 12 hours  linezolid  IVPB 600 milliGRAM(s) IV Intermittent every 12 hours  ondansetron Injectable 4 milliGRAM(s) IV Push every 6 hours  polyethylene glycol 3350 17 Gram(s) Oral daily  senna 2 Tablet(s) Oral at bedtime  trimethoprim  160 mG/sulfamethoxazole 800 mG 1 Tablet(s) Oral <User Schedule>    MEDICATIONS  (PRN):  oxyCODONE    IR 5 milliGRAM(s) Oral every 4 hours PRN Moderate Pain (4 - 6)  oxyCODONE    IR 10 milliGRAM(s) Oral every 4 hours PRN Severe Pain (7 - 10)      Allergies    No Known Allergies    Intolerances        SOCIAL HISTORY:    FAMILY HISTORY:      Vital Signs Last 24 Hrs  T(C): 36.7 (08 Mar 2025 12:52), Max: 36.7 (08 Mar 2025 01:00)  T(F): 98.1 (08 Mar 2025 12:52), Max: 98.1 (08 Mar 2025 01:00)  HR: 51 (08 Mar 2025 12:52) (48 - 66)  BP: 116/67 (08 Mar 2025 12:52) (116/67 - 165/76)  BP(mean): --  RR: 18 (08 Mar 2025 09:18) (17 - 18)  SpO2: 98% (08 Mar 2025 12:52) (97% - 99%)    Parameters below as of 08 Mar 2025 12:52  Patient On (Oxygen Delivery Method): room air        PHYSICAL EXAM:  Constitutional:Well-developed, well nourished  Eyes:SHELBI, EOMI  Ear/Nose/Throat: no oral lesion, no sinus tenderness on percussion	  Neck:no JVD, no lymphadenopathy, supple  Respiratory: CTA maude  Cardiovascular: S1S2 RRR, no murmurs  Gastrointestinal:soft, (+) BS, no HSM  Extremities:no e/e/c  Vascular: DP Pulse:	right normal; left normal            LABS:                        13.6   10.60 )-----------( 278      ( 08 Mar 2025 07:38 )             42.1     03-08    135  |  101  |  21  ----------------------------<  133[H]  4.4   |  23  |  0.79    Ca    8.6      08 Mar 2025 07:38  Phos  2.9     03-08  Mg     2.0     03-08      PT/INR - ( 06 Mar 2025 17:07 )   PT: 11.1 sec;   INR: 0.95          PTT - ( 06 Mar 2025 17:07 )  PTT:27.7 sec  Urinalysis Basic - ( 08 Mar 2025 07:38 )    Color: x / Appearance: x / SG: x / pH: x  Gluc: 133 mg/dL / Ketone: x  / Bili: x / Urobili: x   Blood: x / Protein: x / Nitrite: x   Leuk Esterase: x / RBC: x / WBC x   Sq Epi: x / Non Sq Epi: x / Bacteria: x        MICROBIOLOGY:    Culture - Tissue with Gram Stain (03.06.25 @ 17:07)    Gram Stain:   No polymorphonuclear cells seen per low power field  No organisms seen per oil power field   Specimen Source: Tissue 3. Seed   Culture Results:   Rare Enterococcus faecalis    Culture - Wound Aerobic/Anaerobic (03.06.25 @ 17:07)    Specimen Source: Surgical Swab 2. Intradural   Culture Results:   No growth      RADIOLOGY & ADDITIONAL STUDIES:

## 2025-03-08 NOTE — CONSULT NOTE ADULT - TIME BILLING
Bedside exam and interview    Review of hospital course, labs, vitals, imaging ,  medical records.   Reviewing outside records   Discharge planning on Interdisciplinary rounds   Discussion with consultants and Primary team   Documenting the encounter.
evaluation of E. faecalis in a culture

## 2025-03-08 NOTE — PROGRESS NOTE ADULT - SUBJECTIVE AND OBJECTIVE BOX
Patient was seen and examined at bedside. Case discuss with the primary team. Pt with constipation    OBJECTIVE:  Vital Signs Last 24 Hrs  T(C): 36.7 (08 Mar 2025 12:52), Max: 36.7 (08 Mar 2025 01:00)  T(F): 98.1 (08 Mar 2025 12:52), Max: 98.1 (08 Mar 2025 01:00)  HR: 51 (08 Mar 2025 12:52) (48 - 66)  BP: 116/67 (08 Mar 2025 12:52) (116/67 - 165/76)  RR: 18 (08 Mar 2025 09:18) (17 - 18)  SpO2: 98% (08 Mar 2025 12:52) (97% - 99%)    Parameters below as of 08 Mar 2025 12:52  Patient On (Oxygen Delivery Method): room air    PHYSICAL EXAM:  Gen: NAD laying in bed; family at bedside   HEENT: scalp staples and +CHELSEY drain in place  CV: RRR, +S1/S2, no mumur  Pulm: CTA b/l no wheezing or crackles   Abd: soft, NTND + BS no rebound or guarding   Neuro: AAOX3; right facial asymmetry, right sided weakness      LABS:                        13.6   10.60 )-----------( 278      ( 08 Mar 2025 07:38 )             42.1       135  |  101  |  21  ----------------------------<  133[H]  4.4   |  23  |  0.79    Ca    8.6      08 Mar 2025 07:38  Phos  2.9     03-08  Mg     2.0     03-08    PT: 11.1 sec;   INR: 0.95   PTT:27.7 sec    CAPILLARY BLOOD GLUCOSE  POCT Blood Glucose.: 170 mg/dL (08 Mar 2025 11:54)  POCT Blood Glucose.: 149 mg/dL (08 Mar 2025 07:33)  POCT Blood Glucose.: 162 mg/dL (07 Mar 2025 22:27)  POCT Blood Glucose.: 133 mg/dL (07 Mar 2025 17:56)    3/6 OR cx:  +enterococcus faecalis      amLODIPine   Tablet 5 milliGRAM(s) Oral daily  dexAMETHasone     Tablet   Oral   dexAMETHasone     Tablet 4 milliGRAM(s) Oral every 8 hours  enoxaparin Injectable 40 milliGRAM(s) SubCutaneous <User Schedule>  famotidine    Tablet 20 milliGRAM(s) Oral every 12 hours  insulin lispro (ADMELOG) corrective regimen sliding scale   SubCutaneous Before meals and at bedtime  levETIRAcetam 1000 milliGRAM(s) Oral every 12 hours  linezolid  IVPB 600 milliGRAM(s) IV Intermittent every 12 hours  ondansetron Injectable 4 milliGRAM(s) IV Push every 6 hours  oxyCODONE    IR 5 milliGRAM(s) Oral every 4 hours PRN  oxyCODONE    IR 10 milliGRAM(s) Oral every 4 hours PRN  polyethylene glycol 3350 17 Gram(s) Oral daily  senna 2 Tablet(s) Oral at bedtime  trimethoprim  160 mG/sulfamethoxazole 800 mG 1 Tablet(s) Oral <User Schedule>

## 2025-03-08 NOTE — PROGRESS NOTE ADULT - ASSESSMENT
69M with pmh HTN, L frontoparietal GBM s/p biopsy @[facility] 2023, resection x2 April/2023, Dec/2024 with GammaTile placement (20 seeds/5 Tiles), s/p >6 cycles of TMZ (Aug/2023-Jan/2024), RT (60 Gy in 30 fxns (June/2023-July/2024) presents with worsening right sided weakness and expressive aphasia x 1 week. At [facility] ER CTH  showed worsening left sided whole-hemisphere vasogenic edema susp 2/2 GammaTile and Dex taper and MR brain completed. Transferred to [facility] for further workup. Now s/p left parietal crani for cesium seed removal (3/6).     #Hx of recurrent GBM s/p Craniotomy for Resection and Gammatile Brachytherapy (12/19)  # s/p left parietal crani for cesium seed removal (3/6)  -Continue pain medication and drain management as per NSGY   -Will continue steroids as per NSGY   -Continue Famotidine and ISS while on steroids  -Pt's OR cx +enterococcus faecalis and the pt was started on linezolid; ID consulted awaiting official consult note   - Continue Keppra 1000 mg q12 and seizure precautions   -Pt seen by  heme/onc consult; Pt  s/p IV Avastin 2/27, temodar (2/27-3/3); Will bactrim DS 3x a week for prophylaxis 2/2 recent chemotherapy    #HTN  - Continue amlodipine 5mg daily w/ holding parameters    #Constipation  -Will continue senna and Miralax   -Will continue serial abdominal exam     #DVT PPx  - Continue Enoxaparin SQ daily     #DISPO  -Pt seen by PT and recommended for acute rehab     55 minutes spent on total encounter. The necessity of the time spent during the encounter on this date of service was due to:    Review of hospital course, labs, vitals, medical records.  Bedside exam and interview of the patient  Discussed plan of care with primary team   Documenting the encounter.

## 2025-03-08 NOTE — CONSULT NOTE ADULT - ASSESSMENT
IMPRESSION:  I suspect the Enterococcous faecalis is a contaminant here.  Reportedly there was no concerns for infection intraoperatively.  He has not been having fevers or other signs of infection.  Enterococcus is a GI bacteria and does not fit the clinical picture. Further it is present in only 1/4 OR cultures. On the culture it is present in, the gram stain has no WBC or organism and it is present in rare amounts, suggesting colonization or contamination    Recommend:  1.  Would hold off on treating enterococcus  2. Will discuss with neurosurgery reason why cultures were sent and their concerns for intracranial infection    ID team 2 will follow

## 2025-03-08 NOTE — PROGRESS NOTE ADULT - SUBJECTIVE AND OBJECTIVE BOX
HPI:  69M with pmh HTN, L frontoparietal GBM s/p biopsy @[facility] 2023, resection x2 April/2023, Dec/2024 with GammaTile placement (20 seeds/5 Tiles), s/p >6 cycles of TMZ (Aug/2023-Jan/2024), RT (60 Gy in 30 fxns (June/2023-July/2024) presents with worsening right sided weakness and expressive aphasia x 1 week. Patient was due to start the last week of a decadron taper today at 1mg daily. He was brought to [facility] ER last night for symptoms where CTH  showed worsening R sided whole-hemisphere vasogenic edema susp 2/2 GammaTile and Dex taper. MRI brain was completed this AM and he received 10mg IV decadron at 6AM. Transferred to [facility] for further workup. Patient denies headache, nausea, vomiting, or vision changes. Patient has been compliant with his home keppra 1g BID.  (25 Feb 2025 15:09)    OVERNIGHT EVENTS: OR cx +enterococcus faecalis, started on linezolid    Hospital Course:   2/25: admitted to [facility]  2/26: KYA overnight. Heme/onc consulted for possible IV avastin  2/27: KYA overnight. S/p IV avastin, temodar started. PT/OR rec AR.  2/29: KYA overnight, neuro stable.  3/1: KYA overnight, neuro stable.   3/2: KYA overnight, neuro stable. Decadron taper to 2BID over 2 weeks.   3/3: KYA overnight, neuro stable.   3/4: KYA o/n. CXR done. ISS added.   3/5: Plan for OR Thurs. LE dopplers negative for DVT.   3/6: KYA overnight. pending OR. POD 0 s/p left parietal crani for cesium seed removal (3/6). SBP 160s, cardene gtt started. Neurologically stable.   3/7: POD#1, cardene off, SQL to start tonight   3/8: POD2. KYA ovn. surgical cx growing enterococcus faecalis, started on linezolid    Vital Signs Last 24 Hrs  T(C): 36.7 (08 Mar 2025 01:00), Max: 36.8 (07 Mar 2025 05:08)  T(F): 98.1 (08 Mar 2025 01:00), Max: 98.3 (07 Mar 2025 05:08)  HR: 51 (08 Mar 2025 01:00) (45 - 97)  BP: 158/72 (08 Mar 2025 01:00) (118/64 - 170/81)  BP(mean): 93 (07 Mar 2025 13:00) (84 - 117)  RR: 18 (08 Mar 2025 01:00) (16 - 18)  SpO2: 99% (08 Mar 2025 01:00) (97% - 100%)    Parameters below as of 08 Mar 2025 01:00  Patient On (Oxygen Delivery Method): room air        I&O's Summary    06 Mar 2025 07:01  -  07 Mar 2025 07:00  --------------------------------------------------------  IN: 2007.5 mL / OUT: 2315 mL / NET: -307.5 mL    07 Mar 2025 07:01  -  08 Mar 2025 03:03  --------------------------------------------------------  IN: 480 mL / OUT: 2045 mL / NET: -1565 mL        PHYSICAL EXAM:  GEN: laying in bed, NAD  NEURO: AOx3. FC, OE spont, speech intact, R facial. PERRL, +disconjugate gaze. LUE/LLE 5/5. RUE delt 2/5, bi/tri 3/5, HG 2/5. RLE HF 3/5, KF/KE 4/5, DF1/5, PF 5/5.   CV: RRR +S1/S2  PULM: CTAB  GI: Abd soft, NT/ND  EXT: ext warm, dry, nontender  WOUND: L crani site c/d/i    TUBES/LINES:  [] Shields  [] Lumbar Drain  [] Wound Drains  [] Others      DIET:  [] NPO  [x] Mechanical  [] Tube feeds    LABS:                        12.9   9.89  )-----------( 281      ( 07 Mar 2025 04:56 )             38.3     03-07    139  |  104  |  30[H]  ----------------------------<  139[H]  4.8   |  25  |  0.78    Ca    8.1[L]      07 Mar 2025 04:56  Phos  3.9     03-07  Mg     2.0     03-07      PT/INR - ( 06 Mar 2025 17:07 )   PT: 11.1 sec;   INR: 0.95          PTT - ( 06 Mar 2025 17:07 )  PTT:27.7 sec  Urinalysis Basic - ( 07 Mar 2025 04:56 )    Color: x / Appearance: x / SG: x / pH: x  Gluc: 139 mg/dL / Ketone: x  / Bili: x / Urobili: x   Blood: x / Protein: x / Nitrite: x   Leuk Esterase: x / RBC: x / WBC x   Sq Epi: x / Non Sq Epi: x / Bacteria: x          CAPILLARY BLOOD GLUCOSE      POCT Blood Glucose.: 162 mg/dL (07 Mar 2025 22:27)  POCT Blood Glucose.: 133 mg/dL (07 Mar 2025 17:56)  POCT Blood Glucose.: 161 mg/dL (07 Mar 2025 11:19)  POCT Blood Glucose.: 124 mg/dL (07 Mar 2025 06:21)      Drug Levels: [] N/A    CSF Analysis: [] N/A      Allergies    No Known Allergies    Intolerances      MEDICATIONS:  Antibiotics:  linezolid  IVPB 600 milliGRAM(s) IV Intermittent every 12 hours  trimethoprim  160 mG/sulfamethoxazole 800 mG 1 Tablet(s) Oral <User Schedule>    Neuro:  acetaminophen     Tablet .. 1000 milliGRAM(s) Oral every 8 hours  levETIRAcetam 1000 milliGRAM(s) Oral every 12 hours  ondansetron Injectable 4 milliGRAM(s) IV Push every 6 hours  oxyCODONE    IR 5 milliGRAM(s) Oral every 4 hours PRN  oxyCODONE    IR 10 milliGRAM(s) Oral every 4 hours PRN    Anticoagulation:  enoxaparin Injectable 40 milliGRAM(s) SubCutaneous <User Schedule>    OTHER:  amLODIPine   Tablet 5 milliGRAM(s) Oral daily  dexAMETHasone     Tablet   Oral   dexAMETHasone     Tablet 4 milliGRAM(s) Oral every 6 hours  dexAMETHasone     Tablet 4 milliGRAM(s) Oral every 8 hours  famotidine    Tablet 20 milliGRAM(s) Oral every 12 hours  insulin lispro (ADMELOG) corrective regimen sliding scale   SubCutaneous Before meals and at bedtime  polyethylene glycol 3350 17 Gram(s) Oral daily  senna 2 Tablet(s) Oral at bedtime    IVF:    CULTURES:  Culture Results:   No growth (03-06 @ 17:07)  Culture Results:   No growth (03-06 @ 17:07)    RADIOLOGY & ADDITIONAL TESTS:      ASSESSMENT:  69M with pmh HTN, L frontoparietal GBM s/p biopsy @[facility] 2023, resection x2 April/2023, Dec/2024 with GammaTile placement (20 seeds/5 Tiles), s/p >6 cycles of TMZ (Aug/2023-Jan/2024), RT (60 Gy in 30 fxns (June/2023-July/2024) presents with worsening right sided weakness and expressive aphasia x 1 week. At [facility] ER CTH  showed worsening left sided whole-hemisphere vasogenic edema susp 2/2 GammaTile and Dex taper and MR brain completed. Transferred to [facility] for further workup. Now s/p left parietal crani for cesium seed removal (3/6).     CEREBRAL VASOGENIC EDEMA    Dependence on wheelchair-Z99.3    Encounter for follow-up examination after completed treatment for conditions other than malignant neoplasm-Z09    Weakness-R53.1    Handoff    MEWS Score    Hypertension    Osteoarthritis    Brain mass    Glioblastoma    Glioblastoma    Glioblastoma    Glioblastoma    Osteoarthritis    Hypertension    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    S/P total knee replacement, right    H/O craniotomy    SysAdmin_VstLnk        PLAN:  Neuro:  - neuro/vital checks q4hrs   - pain control: cranial ERAS  - pending post op MRI   - SG CHELSEY x1, monitor output   - MRI brain w/ sarah completed 2/25  - CTH 2/24: worsening R sided whole-hemisphere vasogenic edema  - seizure prophylaxis: cont home keppra 1g q12  - decadron 4q6 taper over 2 weeks to 2 BID for vasogenic edema  - GBM: hold home metformin 500mg BID    Cardiac:  - SBP goal 100-140  - HTN: cont home amlodipine 5mg daily    Pulmonary:  - on RA    GI:  - regular diet   - bowel regimen  - pepcid 20mg BID while on steroids    Renal:  - IVL  - Na goal 135-145  - voiding    Heme:  - DVT ppx: SCDs and SQL   - heme/onc consult, s/p IV Avastin 2/27, temodar (2/27-3/3)  - LE dopplers 3/5 negative     Endo:  - A1C 5.9  - ISS     ID:  - afebrile  - OR cx +enterococcus faecalis, on linezolid (3/9-   - bactrim DS 3x a week for prophylaxis i/s/o recent chemotherapy    Disposition: tele, full code, PT/OT rec AR.    D/w Dr. Johnston     Assessment:  Present when checked    []  GCS  E   V  M     Heart Failure: []Acute, [] acute on chronic , []chronic  Heart Failure:  [] Diastolic (HFpEF), [] Systolic (HFrEF), []Combined (HFpEF and HFrEF), [] RHF, [] Pulm HTN, [] Other    [] ROMERO, [] ATN, [] AIN, [] other  [] CKD1, [] CKD2, [] CKD 3, [] CKD 4, [] CKD 5, []ESRD    Encephalopathy: [] Metabolic, [] Hepatic, [] toxic, [] Neurological, [] Other    Abnormal Nurtitional Status: [] malnurtition (see nutrition note), [ ]underweight: BMI < 19, [] morbid obesity: BMI >40, [] Cachexia    [] Sepsis  [] hypovolemic shock,[] cardiogenic shock, [] hemorrhagic shock, [] neuogenic shock  [] Acute Respiratory Failure  []Cerebral edema, [] Brain compression/ herniation,   [] Functional quadriplegia  [] Acute blood loss anemia

## 2025-03-09 LAB
ANION GAP SERPL CALC-SCNC: 10 MMOL/L — SIGNIFICANT CHANGE UP (ref 5–17)
BUN SERPL-MCNC: 22 MG/DL — SIGNIFICANT CHANGE UP (ref 7–23)
CALCIUM SERPL-MCNC: 8.5 MG/DL — SIGNIFICANT CHANGE UP (ref 8.4–10.5)
CHLORIDE SERPL-SCNC: 104 MMOL/L — SIGNIFICANT CHANGE UP (ref 96–108)
CO2 SERPL-SCNC: 25 MMOL/L — SIGNIFICANT CHANGE UP (ref 22–31)
CREAT SERPL-MCNC: 0.78 MG/DL — SIGNIFICANT CHANGE UP (ref 0.5–1.3)
EGFR: 97 ML/MIN/1.73M2 — SIGNIFICANT CHANGE UP
EGFR: 97 ML/MIN/1.73M2 — SIGNIFICANT CHANGE UP
GLUCOSE SERPL-MCNC: 139 MG/DL — HIGH (ref 70–99)
HCT VFR BLD CALC: 43.1 % — SIGNIFICANT CHANGE UP (ref 39–50)
HGB BLD-MCNC: 13.8 G/DL — SIGNIFICANT CHANGE UP (ref 13–17)
MAGNESIUM SERPL-MCNC: 1.9 MG/DL — SIGNIFICANT CHANGE UP (ref 1.6–2.6)
MCHC RBC-ENTMCNC: 29.9 PG — SIGNIFICANT CHANGE UP (ref 27–34)
MCHC RBC-ENTMCNC: 32 G/DL — SIGNIFICANT CHANGE UP (ref 32–36)
MCV RBC AUTO: 93.5 FL — SIGNIFICANT CHANGE UP (ref 80–100)
NRBC BLD AUTO-RTO: 0 /100 WBCS — SIGNIFICANT CHANGE UP (ref 0–0)
PHOSPHATE SERPL-MCNC: 2.9 MG/DL — SIGNIFICANT CHANGE UP (ref 2.5–4.5)
PLATELET # BLD AUTO: 264 K/UL — SIGNIFICANT CHANGE UP (ref 150–400)
POTASSIUM SERPL-MCNC: 4.5 MMOL/L — SIGNIFICANT CHANGE UP (ref 3.5–5.3)
POTASSIUM SERPL-SCNC: 4.5 MMOL/L — SIGNIFICANT CHANGE UP (ref 3.5–5.3)
RBC # BLD: 4.61 M/UL — SIGNIFICANT CHANGE UP (ref 4.2–5.8)
RBC # FLD: 15 % — HIGH (ref 10.3–14.5)
SODIUM SERPL-SCNC: 139 MMOL/L — SIGNIFICANT CHANGE UP (ref 135–145)
WBC # BLD: 10.5 K/UL — SIGNIFICANT CHANGE UP (ref 3.8–10.5)
WBC # FLD AUTO: 10.5 K/UL — SIGNIFICANT CHANGE UP (ref 3.8–10.5)

## 2025-03-09 PROCEDURE — 99233 SBSQ HOSP IP/OBS HIGH 50: CPT

## 2025-03-09 PROCEDURE — 99024 POSTOP FOLLOW-UP VISIT: CPT

## 2025-03-09 RX ORDER — LACTULOSE 10 G/15ML
20 SOLUTION ORAL ONCE
Refills: 0 | Status: DISCONTINUED | OUTPATIENT
Start: 2025-03-09 | End: 2025-03-09

## 2025-03-09 RX ADMIN — LEVETIRACETAM 1000 MILLIGRAM(S): 10 INJECTION, SOLUTION INTRAVENOUS at 17:03

## 2025-03-09 RX ADMIN — POLYETHYLENE GLYCOL 3350 17 GRAM(S): 17 POWDER, FOR SOLUTION ORAL at 06:41

## 2025-03-09 RX ADMIN — Medication 20 MILLIGRAM(S): at 06:40

## 2025-03-09 RX ADMIN — DEXAMETHASONE 4 MILLIGRAM(S): 0.5 TABLET ORAL at 06:40

## 2025-03-09 RX ADMIN — DEXAMETHASONE 4 MILLIGRAM(S): 0.5 TABLET ORAL at 22:39

## 2025-03-09 RX ADMIN — Medication 20 MILLIGRAM(S): at 17:03

## 2025-03-09 RX ADMIN — INSULIN LISPRO 2: 100 INJECTION, SOLUTION INTRAVENOUS; SUBCUTANEOUS at 18:01

## 2025-03-09 RX ADMIN — DEXAMETHASONE 4 MILLIGRAM(S): 0.5 TABLET ORAL at 13:46

## 2025-03-09 RX ADMIN — AMLODIPINE BESYLATE 5 MILLIGRAM(S): 10 TABLET ORAL at 06:41

## 2025-03-09 RX ADMIN — Medication 2 TABLET(S): at 22:39

## 2025-03-09 RX ADMIN — LEVETIRACETAM 1000 MILLIGRAM(S): 10 INJECTION, SOLUTION INTRAVENOUS at 06:40

## 2025-03-09 RX ADMIN — ENOXAPARIN SODIUM 40 MILLIGRAM(S): 100 INJECTION SUBCUTANEOUS at 22:39

## 2025-03-09 NOTE — PROGRESS NOTE ADULT - SUBJECTIVE AND OBJECTIVE BOX
HPI:  69M with pmh HTN, L frontoparietal GBM s/p biopsy @[facility] 2023, resection x2 April/2023, Dec/2024 with GammaTile placement (20 seeds/5 Tiles), s/p >6 cycles of TMZ (Aug/2023-Jan/2024), RT (60 Gy in 30 fxns (June/2023-July/2024) presents with worsening right sided weakness and expressive aphasia x 1 week. Patient was due to start the last week of a decadron taper today at 1mg daily. He was brought to [facility] ER last night for symptoms where CTH  showed worsening R sided whole-hemisphere vasogenic edema susp 2/2 GammaTile and Dex taper. MRI brain was completed this AM and he received 10mg IV decadron at 6AM. Transferred to [facility] for further workup. Patient denies headache, nausea, vomiting, or vision changes. Patient has been compliant with his home keppra 1g BID.  (25 Feb 2025 15:09)    OVERNIGHT EVENTS: lactulose given    Hospital Course:   2/25: admitted to [facility]  2/26: KYA overnight. Heme/onc consulted for possible IV avastin  2/27: KYA overnight. S/p IV avastin, temodar started. PT/OR rec AR.  2/29: KYA overnight, neuro stable.  3/1: KYA overnight, neuro stable.   3/2: KYA overnight, neuro stable. Decadron taper to 2BID over 2 weeks.   3/3: KYA overnight, neuro stable.   3/4: KYA o/n. CXR done. ISS added.   3/5: Plan for OR Thurs. LE dopplers negative for DVT.   3/6: KYA overnight. pending OR. POD 0 s/p left parietal crani for cesium seed removal (3/6). SBP 160s, cardene gtt started. Neurologically stable.   3/7: POD#1, cardene off, SQL to start tonight   3/8: POD2. KYA ovn. surgical cx growing enterococcus faecalis, started on linezolid. ID consulted, likely contaminate, linezolid dc'd. MRI complete.   3/9: POD3. KYA ovn    Vital Signs Last 24 Hrs  T(C): 36.4 (09 Mar 2025 00:32), Max: 36.7 (08 Mar 2025 09:18)  T(F): 97.5 (09 Mar 2025 00:32), Max: 98.1 (08 Mar 2025 09:18)  HR: 50 (09 Mar 2025 00:32) (49 - 66)  BP: 143/82 (09 Mar 2025 00:32) (116/67 - 143/82)  BP(mean): --  RR: 18 (09 Mar 2025 00:32) (18 - 18)  SpO2: 100% (09 Mar 2025 00:32) (96% - 100%)    Parameters below as of 09 Mar 2025 00:32  Patient On (Oxygen Delivery Method): room air        I&O's Summary    07 Mar 2025 07:01  -  08 Mar 2025 07:00  --------------------------------------------------------  IN: 480 mL / OUT: 2765 mL / NET: -2285 mL    08 Mar 2025 06:01  -  09 Mar 2025 01:05  --------------------------------------------------------  IN: 0 mL / OUT: 985 mL / NET: -985 mL        PHYSICAL EXAM:  GEN: laying in bed, NAD  NEURO: AOx3. FC, OE spont, speech intact, R facial. PERRL, +disconjugate gaze. LUE/LLE 5/5. RUE delt 2/5, bi/tri 3/5, HG 2/5. RLE HF 3/5, KF/KE 4/5, DF1/5, PF 5/5.   CV: RRR +S1/S2  PULM: CTAB  GI: Abd soft, NT/ND  EXT: ext warm, dry, nontender  WOUND: L crani site c/d/i    TUBES/LINES:  [] Shields  [] Lumbar Drain  [] Wound Drains  [] Others      DIET:  [] NPO  [x] Mechanical  [] Tube feeds    LABS:                        13.6   10.60 )-----------( 278      ( 08 Mar 2025 07:38 )             42.1     03-08    135  |  101  |  21  ----------------------------<  133[H]  4.4   |  23  |  0.79    Ca    8.6      08 Mar 2025 07:38  Phos  2.9     03-08  Mg     2.0     03-08        Urinalysis Basic - ( 08 Mar 2025 07:38 )    Color: x / Appearance: x / SG: x / pH: x  Gluc: 133 mg/dL / Ketone: x  / Bili: x / Urobili: x   Blood: x / Protein: x / Nitrite: x   Leuk Esterase: x / RBC: x / WBC x   Sq Epi: x / Non Sq Epi: x / Bacteria: x          CAPILLARY BLOOD GLUCOSE      POCT Blood Glucose.: 135 mg/dL (08 Mar 2025 21:57)  POCT Blood Glucose.: 170 mg/dL (08 Mar 2025 17:27)  POCT Blood Glucose.: 170 mg/dL (08 Mar 2025 11:54)  POCT Blood Glucose.: 149 mg/dL (08 Mar 2025 07:33)      Drug Levels: [] N/A    CSF Analysis: [] N/A      Allergies    No Known Allergies    Intolerances      MEDICATIONS:  Antibiotics:  trimethoprim  160 mG/sulfamethoxazole 800 mG 1 Tablet(s) Oral <User Schedule>    Neuro:  levETIRAcetam 1000 milliGRAM(s) Oral every 12 hours  ondansetron Injectable 4 milliGRAM(s) IV Push every 6 hours  oxyCODONE    IR 5 milliGRAM(s) Oral every 4 hours PRN  oxyCODONE    IR 10 milliGRAM(s) Oral every 4 hours PRN    Anticoagulation:  enoxaparin Injectable 40 milliGRAM(s) SubCutaneous <User Schedule>    OTHER:  amLODIPine   Tablet 5 milliGRAM(s) Oral daily  dexAMETHasone     Tablet   Oral   dexAMETHasone     Tablet 4 milliGRAM(s) Oral every 8 hours  famotidine    Tablet 20 milliGRAM(s) Oral every 12 hours  insulin lispro (ADMELOG) corrective regimen sliding scale   SubCutaneous Before meals and at bedtime  lactulose Syrup 20 Gram(s) Oral once  polyethylene glycol 3350 17 Gram(s) Oral two times a day  senna 2 Tablet(s) Oral at bedtime    IVF:    CULTURES:  Culture Results:   No growth (03-06 @ 17:07)  Culture Results:   No growth (03-06 @ 17:07)    RADIOLOGY & ADDITIONAL TESTS:      ASSESSMENT:  69M PMH HTN, L frontoparietal GBM s/p biopsy @[facility] 20[facility], resection x2 April/2023, Dec/2024 with GammaTile placement (20 seeds/5 Tiles), s/p >6 cycles of TMZ (Aug/2023-Jan/2024), RT (60 Gy in 30 fxns (June/2023-July/2024) presents with worsening right sided weakness and expressive aphasia x 1 week. At [facility] CTH +worsening left sided whole-hemisphere vasogenic edema susp 2/2 GammaTile and Dex taper and MR brain completed. Transferred to [facility]. Now s/p left parietal crani for cesium seed removal (3/6).     CEREBRAL VASOGENIC EDEMA    Dependence on wheelchair-Z99.3    Encounter for follow-up examination after completed treatment for conditions other than malignant neoplasm-Z09    Weakness-R53.1    Handoff    MEWS Score    Hypertension    Osteoarthritis    Brain mass    Glioblastoma    Glioblastoma    Glioblastoma    Glioblastoma    Osteoarthritis    Hypertension    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    S/P total knee replacement, right    H/O craniotomy    SysAdmin_VstLnk        PLAN:  Neuro:  - neuro/vital checks q4hrs   - pain control: cranial ERAS  - post op MRI complete 3/8  - SG CHELSEY x1, monitor output   - CT 2/24: worsening R sided whole-hemisphere vasogenic edema  - seizure prophylaxis: cont home keppra 1g q12  - decadron 4q6 taper over 2 weeks to 2 BID for vasogenic edema  - GBM: hold home metformin 500mg BID    Cardiac:  - SBP goal 100-140  - HTN: cont home amlodipine 5mg daily    Pulmonary:  - on RA    GI:  - regular diet   - bowel regimen  - pepcid 20mg BID while on steroids    Renal:  - IVL  - Na goal 135-145  - voiding    Heme:  - DVT ppx: SCDs and SQL   - heme/onc consult, s/p IV Avastin 2/27, temodar (2/27-3/3)  - LE dopplers 3/5 negative     Endo:  - A1C 5.9  - ISS     ID:  - afebrile  - OR cx +enterococcus faecalis, on linezolid (3/8-3/8)   - bactrim DS 3x a week for prophylaxis i/s/o recent chemotherapy  - ID consulted, appreciate recs     Disposition: tele, full code, PT/OT rec AR.    D/w Dr. Johnston     Assessment:  Present when checked    []  GCS  E   V  M     Heart Failure: []Acute, [] acute on chronic , []chronic  Heart Failure:  [] Diastolic (HFpEF), [] Systolic (HFrEF), []Combined (HFpEF and HFrEF), [] RHF, [] Pulm HTN, [] Other    [] ROMERO, [] ATN, [] AIN, [] other  [] CKD1, [] CKD2, [] CKD 3, [] CKD 4, [] CKD 5, []ESRD    Encephalopathy: [] Metabolic, [] Hepatic, [] toxic, [] Neurological, [] Other    Abnormal Nurtitional Status: [] malnurtition (see nutrition note), [ ]underweight: BMI < 19, [] morbid obesity: BMI >40, [] Cachexia    [] Sepsis  [] hypovolemic shock,[] cardiogenic shock, [] hemorrhagic shock, [] neuogenic shock  [] Acute Respiratory Failure  []Cerebral edema, [] Brain compression/ herniation,   [] Functional quadriplegia  [] Acute blood loss anemia

## 2025-03-09 NOTE — PROGRESS NOTE ADULT - SUBJECTIVE AND OBJECTIVE BOX
Patient was seen and examined at bedside. Case discuss with the primary team. Pt reports that he had a bowel movement yesterday. Pt w/o additional complaints.     OBJECTIVE:  Vital Signs Last 24 Hrs  T(C): 36.7 (09 Mar 2025 12:18), Max: 36.9 (09 Mar 2025 08:58)  T(F): 98 (09 Mar 2025 12:18), Max: 98.5 (09 Mar 2025 08:58)  HR: 58 (09 Mar 2025 12:51) (49 - 58)  BP: 114/65 (09 Mar 2025 12:51) (111/67 - 164/79)  RR: 18 (09 Mar 2025 12:18) (18 - 18)  SpO2: 95% (09 Mar 2025 12:18) (95% - 100%)    Parameters below as of 09 Mar 2025 12:18  Patient On (Oxygen Delivery Method): room air      PHYSICAL EXAM:  Gen: NAD laying in bed; family at bedside   HEENT: scalp staples and +CHELSEY drain in place  CV: RRR, +S1/S2, no mumur  Pulm: CTA b/l no wheezing or crackles   Abd: soft, NTND + BS no rebound or guarding   Neuro: AAOX3; right facial asymmetry, right sided weakness        LABS:                        13.8   10.50 )-----------( 264      ( 09 Mar 2025 05:30 )             43.1     139  |  104  |  22  ----------------------------<  139[H]  4.5   |  25  |  0.78    Ca    8.5      09 Mar 2025 05:30  Phos  2.9     03-09  Mg     1.9     03-09      CAPILLARY BLOOD GLUCOSE  POCT Blood Glucose.: 146 mg/dL (09 Mar 2025 12:06)  POCT Blood Glucose.: 130 mg/dL (09 Mar 2025 07:11)  POCT Blood Glucose.: 135 mg/dL (08 Mar 2025 21:57)  POCT Blood Glucose.: 170 mg/dL (08 Mar 2025 17:27)    amLODIPine   Tablet 5 milliGRAM(s) Oral daily  dexAMETHasone     Tablet   Oral   dexAMETHasone     Tablet 4 milliGRAM(s) Oral every 8 hours  enoxaparin Injectable 40 milliGRAM(s) SubCutaneous <User Schedule>  famotidine    Tablet 20 milliGRAM(s) Oral every 12 hours  insulin lispro (ADMELOG) corrective regimen sliding scale   SubCutaneous Before meals and at bedtime  levETIRAcetam 1000 milliGRAM(s) Oral every 12 hours  ondansetron Injectable 4 milliGRAM(s) IV Push every 6 hours  oxyCODONE    IR 5 milliGRAM(s) Oral every 4 hours PRN  oxyCODONE    IR 10 milliGRAM(s) Oral every 4 hours PRN  polyethylene glycol 3350 17 Gram(s) Oral two times a day  senna 2 Tablet(s) Oral at bedtime  trimethoprim  160 mG/sulfamethoxazole 800 mG 1 Tablet(s) Oral <User Schedule>

## 2025-03-09 NOTE — PROGRESS NOTE ADULT - ASSESSMENT
69M with pmh HTN, L frontoparietal GBM s/p biopsy @[facility] 2023, resection x2 April/2023, Dec/2024 with GammaTile placement (20 seeds/5 Tiles), s/p >6 cycles of TMZ (Aug/2023-Jan/2024), RT (60 Gy in 30 fxns (June/2023-July/2024) presents with worsening right sided weakness and expressive aphasia x 1 week. At [facility] ER CTH  showed worsening left sided whole-hemisphere vasogenic edema susp 2/2 GammaTile and Dex taper and MR brain completed. Transferred to [facility] for further workup. Now s/p left parietal crani for cesium seed removal (3/6).     #Hx of recurrent GBM s/p Craniotomy for Resection and Gammatile Brachytherapy (12/19)  # s/p left parietal crani for cesium seed removal (3/6)  -Continue pain medication and drain management as per NSGY   -Will continue steroids as per NSGY   -Continue Famotidine and ISS while on steroids  -Pt's OR cx +enterococcus faecalis; Pt was seen by ID on 3/8 who though the specimen was likely contaminated therefore linezolid was d/c'ed   - Continue Keppra 1000 mg q12 and seizure precautions   -Pt seen by heme/onc consult; Pt  s/p IV Avastin 2/27, temodar (2/27-3/3); Will bactrim DS 3x a week for prophylaxis 2/2 recent chemotherapy    #HTN  - Continue amlodipine 5mg daily w/ holding parameters    #Constipation (RESOLVED)  -Pt had a bowel movement on 3/8   -Will continue senna and Miralax   -Will continue serial abdominal exam     #DVT PPx  - Continue Enoxaparin SQ daily     #DISPO  -Pt seen by PT and recommended for acute rehab     55 minutes spent on total encounter. The necessity of the time spent during the encounter on this date of service was due to:    Review of hospital course, labs, vitals, medical records.  Bedside exam and interview of the patient  Discussed plan of care with primary team   Documenting the encounter.

## 2025-03-09 NOTE — PROCEDURE NOTE - GENERAL PROCEDURE DETAILS
SGJP drain taken off suction, drain site cleaned with chlorhexidine, anchoring suture identified and cut, CHELSEY drain pulled out until distal tip of the catheter visualized, drain site closed with two staples. No leaking or blood loss

## 2025-03-10 LAB
ANION GAP SERPL CALC-SCNC: 9 MMOL/L — SIGNIFICANT CHANGE UP (ref 5–17)
BUN SERPL-MCNC: 25 MG/DL — HIGH (ref 7–23)
CALCIUM SERPL-MCNC: 8.8 MG/DL — SIGNIFICANT CHANGE UP (ref 8.4–10.5)
CHLORIDE SERPL-SCNC: 100 MMOL/L — SIGNIFICANT CHANGE UP (ref 96–108)
CO2 SERPL-SCNC: 26 MMOL/L — SIGNIFICANT CHANGE UP (ref 22–31)
CREAT SERPL-MCNC: 0.79 MG/DL — SIGNIFICANT CHANGE UP (ref 0.5–1.3)
EGFR: 97 ML/MIN/1.73M2 — SIGNIFICANT CHANGE UP
EGFR: 97 ML/MIN/1.73M2 — SIGNIFICANT CHANGE UP
GLUCOSE SERPL-MCNC: 147 MG/DL — HIGH (ref 70–99)
HCT VFR BLD CALC: 40.8 % — SIGNIFICANT CHANGE UP (ref 39–50)
HGB BLD-MCNC: 14 G/DL — SIGNIFICANT CHANGE UP (ref 13–17)
MAGNESIUM SERPL-MCNC: 2 MG/DL — SIGNIFICANT CHANGE UP (ref 1.6–2.6)
MCHC RBC-ENTMCNC: 30.6 PG — SIGNIFICANT CHANGE UP (ref 27–34)
MCHC RBC-ENTMCNC: 34.3 G/DL — SIGNIFICANT CHANGE UP (ref 32–36)
MCV RBC AUTO: 89.3 FL — SIGNIFICANT CHANGE UP (ref 80–100)
NRBC BLD AUTO-RTO: 0 /100 WBCS — SIGNIFICANT CHANGE UP (ref 0–0)
PHOSPHATE SERPL-MCNC: 3.1 MG/DL — SIGNIFICANT CHANGE UP (ref 2.5–4.5)
PLATELET # BLD AUTO: 267 K/UL — SIGNIFICANT CHANGE UP (ref 150–400)
POTASSIUM SERPL-MCNC: 4.2 MMOL/L — SIGNIFICANT CHANGE UP (ref 3.5–5.3)
POTASSIUM SERPL-SCNC: 4.2 MMOL/L — SIGNIFICANT CHANGE UP (ref 3.5–5.3)
RBC # BLD: 4.57 M/UL — SIGNIFICANT CHANGE UP (ref 4.2–5.8)
RBC # FLD: 15.4 % — HIGH (ref 10.3–14.5)
SODIUM SERPL-SCNC: 135 MMOL/L — SIGNIFICANT CHANGE UP (ref 135–145)
WBC # BLD: 8.56 K/UL — SIGNIFICANT CHANGE UP (ref 3.8–10.5)
WBC # FLD AUTO: 8.56 K/UL — SIGNIFICANT CHANGE UP (ref 3.8–10.5)

## 2025-03-10 PROCEDURE — 99024 POSTOP FOLLOW-UP VISIT: CPT

## 2025-03-10 PROCEDURE — 99232 SBSQ HOSP IP/OBS MODERATE 35: CPT

## 2025-03-10 RX ADMIN — Medication 2 TABLET(S): at 21:52

## 2025-03-10 RX ADMIN — AMLODIPINE BESYLATE 5 MILLIGRAM(S): 10 TABLET ORAL at 05:10

## 2025-03-10 RX ADMIN — DEXAMETHASONE 4 MILLIGRAM(S): 0.5 TABLET ORAL at 05:10

## 2025-03-10 RX ADMIN — POLYETHYLENE GLYCOL 3350 17 GRAM(S): 17 POWDER, FOR SOLUTION ORAL at 05:10

## 2025-03-10 RX ADMIN — Medication 1 TABLET(S): at 17:36

## 2025-03-10 RX ADMIN — LEVETIRACETAM 1000 MILLIGRAM(S): 10 INJECTION, SOLUTION INTRAVENOUS at 17:36

## 2025-03-10 RX ADMIN — DEXAMETHASONE 4 MILLIGRAM(S): 0.5 TABLET ORAL at 13:27

## 2025-03-10 RX ADMIN — ENOXAPARIN SODIUM 40 MILLIGRAM(S): 100 INJECTION SUBCUTANEOUS at 21:52

## 2025-03-10 RX ADMIN — LEVETIRACETAM 1000 MILLIGRAM(S): 10 INJECTION, SOLUTION INTRAVENOUS at 05:10

## 2025-03-10 RX ADMIN — Medication 20 MILLIGRAM(S): at 05:10

## 2025-03-10 RX ADMIN — Medication 20 MILLIGRAM(S): at 17:36

## 2025-03-10 RX ADMIN — DEXAMETHASONE 4 MILLIGRAM(S): 0.5 TABLET ORAL at 21:52

## 2025-03-10 NOTE — PROGRESS NOTE ADULT - ASSESSMENT
69 year old male with past medical history of HTN, OA of the knee, status post replacement, now with glioblastoma (MGMT methylated), treated with TMZ + RT (6/6/23-7/18/23), status post stereotactic needle biopsy and laser interstitial thermal therapy by Dr. Tobar (4/7/2023), then re-operation with  on 4/25/2023 with GTR.  Followed by additional TMZ x 6 cycles (8/15/23-1/30/24).  He suffered a recurrence in December 2024, followed by re-resection with Gammatile placement.      #MGMT methylated GBM  --oncologic history as above, Now presents with worsening right sided weakness and expressive aphasia x 1 week. At [facility] ER CTH showed worsening R sided whole-hemisphere vasogenic edema. Transferred to [facility] for further workup.   --MRb 2/25 w/ rim-enhancing structure at the left posterior hemispheric operative bed appears similar to 1/30/2025. The extensive infiltrative hyperintensity on the long TR images consistent with vasogenic edema and nonenhancing tumor has mildly increased and its associated mass effect with increased right-to-left subfalcine herniation although appears comparable infiltrative extent within the left cerebral hemisphere, contralateral extent into the right cerebral hemisphere and downward extent to the ventral avila.  --steroids per neurosurgery, taper as an outpatient per their plans   **Bevacizumab 10 mg/kg IV x1 (2/27/25) and Temozolomide which will be dosed at 200 mg/m2 (400 mg) po daily x 5 days every 28 days (2/27-3/3)**  --Future Bevacizumab and TMZ cycles will be discussed by new neuro-oncologist outpatient Dr. Guy Hubbard at [facility] via  and patient will follow locally in person at [place] with Dr. Mix, coordination pending   --Advised Karen to fill prescription for TMZ to have pills available in case patient is discharged to rehab before completion. Prescription of TMZ is for 100 mg, so 4 pills would be 400 mg. They should not start a new cycle until confirmed by outpatient oncologist and labs are repeated.  --discussed with patient and family: the goal of this treatment is palliative, primarily aimed at alleviating his symptomatic vasogenic edema and permit tapering of the steroids.    --Risks and side effect of this therapy were discussed in detail w/ the patient and his family;  informed consent for treatment was obtained.  --3/6/25 patient now pending OR for gamma tile removal with neurosurgery: generally it is recommended to abstain from surgical procedures for at least 4-6 weeks after/before Avastin administration, discussed with surgical team   --3/10 POD4 s/p left parietal crani for cesium seed removal, seen and examined at bedside in [facility], awaiting rehab placement     oncology will continue to follow, patient has telehealth follow up with Dr. Yung on 3/20 at 3pm

## 2025-03-10 NOTE — PROGRESS NOTE ADULT - SUBJECTIVE AND OBJECTIVE BOX
SUBJECTIVE:  Patient was seen and examined at bedside. Patient seen with wife at the bedside. Feeling fine, no complaints reported.     Review of systems: No fever, chills, dizziness, HA, Changes in vision, CP, dyspnea, nausea or vomiting, dysuria, changes in bowel movements, LE edema. Rest of 12 point Review of systems negative unless otherwise documented elsewhere in note.     Diet, Regular (03-06-25 @ 17:32) [Active]      MEDICATIONS:  MEDICATIONS  (STANDING):  amLODIPine   Tablet 5 milliGRAM(s) Oral daily  dexAMETHasone     Tablet   Oral   dexAMETHasone     Tablet 4 milliGRAM(s) Oral every 8 hours  enoxaparin Injectable 40 milliGRAM(s) SubCutaneous <User Schedule>  famotidine    Tablet 20 milliGRAM(s) Oral every 12 hours  levETIRAcetam 1000 milliGRAM(s) Oral every 12 hours  ondansetron Injectable 4 milliGRAM(s) IV Push every 6 hours  polyethylene glycol 3350 17 Gram(s) Oral two times a day  senna 2 Tablet(s) Oral at bedtime  trimethoprim  160 mG/sulfamethoxazole 800 mG 1 Tablet(s) Oral <User Schedule>    MEDICATIONS  (PRN):  oxyCODONE    IR 5 milliGRAM(s) Oral every 4 hours PRN Moderate Pain (4 - 6)  oxyCODONE    IR 10 milliGRAM(s) Oral every 4 hours PRN Severe Pain (7 - 10)      Allergies    No Known Allergies    Intolerances        OBJECTIVE:  Vital Signs Last 24 Hrs  T(C): 36.7 (10 Mar 2025 05:00), Max: 36.7 (09 Mar 2025 12:18)  T(F): 98.1 (10 Mar 2025 05:00), Max: 98.1 (10 Mar 2025 05:00)  HR: 65 (10 Mar 2025 09:22) (52 - 65)  BP: 118/62 (10 Mar 2025 09:22) (111/67 - 134/73)  BP(mean): --  RR: 18 (10 Mar 2025 09:22) (17 - 18)  SpO2: 96% (10 Mar 2025 09:22) (95% - 98%)    Parameters below as of 10 Mar 2025 09:22  Patient On (Oxygen Delivery Method): room air      I&O's Summary    09 Mar 2025 07:01  -  10 Mar 2025 07:00  --------------------------------------------------------  IN: 0 mL / OUT: 1975 mL / NET: -1975 mL        PHYSICAL EXAM:  General: initially sleeping, awakes to voice, NAD, lying in bed, no labored breathing  Lungs: no crackles, no wheezes  Abdomen: soft, no tenderness, no distension  Extremities:  warm, no edema, no calf tenderness     LABS:                        14.0   8.56  )-----------( 267      ( 10 Mar 2025 06:30 )             40.8     03-10    135  |  100  |  25[H]  ----------------------------<  147[H]  4.2   |  26  |  0.79    Ca    8.8      10 Mar 2025 06:30  Phos  3.1     03-10  Mg     2.0     03-10          CAPILLARY BLOOD GLUCOSE      POCT Blood Glucose.: 132 mg/dL (09 Mar 2025 21:52)  POCT Blood Glucose.: 170 mg/dL (09 Mar 2025 17:09)  POCT Blood Glucose.: 146 mg/dL (09 Mar 2025 12:06)    Urinalysis Basic - ( 10 Mar 2025 06:30 )    Color: x / Appearance: x / SG: x / pH: x  Gluc: 147 mg/dL / Ketone: x  / Bili: x / Urobili: x   Blood: x / Protein: x / Nitrite: x   Leuk Esterase: x / RBC: x / WBC x   Sq Epi: x / Non Sq Epi: x / Bacteria: x        MICRODATA:      RADIOLOGY/OTHER STUDIES:

## 2025-03-10 NOTE — PROGRESS NOTE ADULT - SUBJECTIVE AND OBJECTIVE BOX
HPI:  69M with pmh HTN, L frontoparietal GBM s/p biopsy @[facility] 2023, resection x2 April/2023, Dec/2024 with GammaTile placement (20 seeds/5 Tiles), s/p >6 cycles of TMZ (Aug/2023-Jan/2024), RT (60 Gy in 30 fxns (June/2023-July/2024) presents with worsening right sided weakness and expressive aphasia x 1 week. Patient was due to start the last week of a decadron taper today at 1mg daily. He was brought to [facility] ER last night for symptoms where CTH  showed worsening R sided whole-hemisphere vasogenic edema susp 2/2 GammaTile and Dex taper. MRI brain was completed this AM and he received 10mg IV decadron at 6AM. Transferred to [facility] for further workup. Patient denies headache, nausea, vomiting, or vision changes. Patient has been compliant with his home keppra 1g BID.  (25 Feb 2025 15:09)    OVERNIGHT EVENTS: KYA    Hospital Course:   2/25: admitted to [facility]  2/26: KYA overnight. Heme/onc consulted for possible IV avastin  2/27: KYA overnight. S/p IV avastin, temodar started. PT/OR rec AR.  2/29: KYA overnight, neuro stable.  3/1: KYA overnight, neuro stable.   3/2: KYA overnight, neuro stable. Decadron taper to 2BID over 2 weeks.   3/3: KYA overnight, neuro stable.   3/4: KYA o/n. CXR done. ISS added.   3/5: Plan for OR Thurs. LE dopplers negative for DVT.   3/6: KYA overnight. pending OR. POD 0 s/p left parietal crani for cesium seed removal (3/6). SBP 160s, cardene gtt started. Neurologically stable.   3/7: POD#1, cardene off, SQL to start tonight   3/8: POD2. KYA ovn. surgical cx growing enterococcus faecalis, started on linezolid. ID consulted, likely contaminate, linezolid dc'd. MRI complete.   3/9: POD3. KYA ovn. Subgaleal CHELSEY removed.   3/10: POD4. KYA ovn    Vital Signs Last 24 Hrs  T(C): 36.6 (09 Mar 2025 20:45), Max: 36.9 (09 Mar 2025 08:58)  T(F): 97.9 (09 Mar 2025 20:45), Max: 98.5 (09 Mar 2025 08:58)  HR: 52 (09 Mar 2025 20:45) (49 - 58)  BP: 133/80 (09 Mar 2025 20:45) (111/67 - 164/79)  BP(mean): --  RR: 18 (09 Mar 2025 20:45) (17 - 18)  SpO2: 97% (09 Mar 2025 20:45) (95% - 100%)    Parameters below as of 09 Mar 2025 20:45  Patient On (Oxygen Delivery Method): room air        I&O's Summary    08 Mar 2025 06:01  -  09 Mar 2025 07:00  --------------------------------------------------------  IN: 0 mL / OUT: 985 mL / NET: -985 mL    09 Mar 2025 07:01  -  10 Mar 2025 00:29  --------------------------------------------------------  IN: 0 mL / OUT: 1400 mL / NET: -1400 mL        PHYSICAL EXAM:  GEN: laying in bed, NAD  NEURO: AOx3. FC, OE spont, speech intact, R facial. PERRL, +disconjugate gaze. LUE/LLE 5/5. RUE delt 2/5, bi/tri 3/5, HG 2/5. RLE HF 3/5, KF/KE 4/5, DF1/5, PF 5/5.   CV: RRR +S1/S2  PULM: CTAB  GI: Abd soft, NT/ND  EXT: ext warm, dry, nontender  WOUND: L crani site c/d/i    TUBES/LINES:  [] Shields  [] Lumbar Drain  [] Wound Drains  [] Others      DIET:  [] NPO  [x] Mechanical  [] Tube feeds    LABS:                        13.8   10.50 )-----------( 264      ( 09 Mar 2025 05:30 )             43.1     03-09    139  |  104  |  22  ----------------------------<  139[H]  4.5   |  25  |  0.78    Ca    8.5      09 Mar 2025 05:30  Phos  2.9     03-09  Mg     1.9     03-09        Urinalysis Basic - ( 09 Mar 2025 05:30 )    Color: x / Appearance: x / SG: x / pH: x  Gluc: 139 mg/dL / Ketone: x  / Bili: x / Urobili: x   Blood: x / Protein: x / Nitrite: x   Leuk Esterase: x / RBC: x / WBC x   Sq Epi: x / Non Sq Epi: x / Bacteria: x          CAPILLARY BLOOD GLUCOSE      POCT Blood Glucose.: 132 mg/dL (09 Mar 2025 21:52)  POCT Blood Glucose.: 170 mg/dL (09 Mar 2025 17:09)  POCT Blood Glucose.: 146 mg/dL (09 Mar 2025 12:06)  POCT Blood Glucose.: 130 mg/dL (09 Mar 2025 07:11)      Drug Levels: [] N/A    CSF Analysis: [] N/A      Allergies    No Known Allergies    Intolerances      MEDICATIONS:  Antibiotics:  trimethoprim  160 mG/sulfamethoxazole 800 mG 1 Tablet(s) Oral <User Schedule>    Neuro:  levETIRAcetam 1000 milliGRAM(s) Oral every 12 hours  ondansetron Injectable 4 milliGRAM(s) IV Push every 6 hours  oxyCODONE    IR 5 milliGRAM(s) Oral every 4 hours PRN  oxyCODONE    IR 10 milliGRAM(s) Oral every 4 hours PRN    Anticoagulation:  enoxaparin Injectable 40 milliGRAM(s) SubCutaneous <User Schedule>    OTHER:  amLODIPine   Tablet 5 milliGRAM(s) Oral daily  dexAMETHasone     Tablet   Oral   dexAMETHasone     Tablet 4 milliGRAM(s) Oral every 8 hours  famotidine    Tablet 20 milliGRAM(s) Oral every 12 hours  insulin lispro (ADMELOG) corrective regimen sliding scale   SubCutaneous Before meals and at bedtime  polyethylene glycol 3350 17 Gram(s) Oral two times a day  senna 2 Tablet(s) Oral at bedtime    IVF:    CULTURES:  Culture Results:   No growth (03-06 @ 17:07)  Culture Results:   No growth (03-06 @ 17:07)    RADIOLOGY & ADDITIONAL TESTS:      ASSESSMENT:  69M PMH HTN, L frontoparietal GBM s/p biopsy @[facility] 2023, resection x2 April/2023, Dec/2024 with GammaTile placement (20 seeds/5 Tiles), s/p >6 cycles of TMZ (Aug/2023-Jan/2024), RT (60 Gy in 30 fxns (June/2023-July/2024) presents with worsening right sided weakness and expressive aphasia x 1 week. At [facility] +worsening left sided whole-hemisphere vasogenic edema susp 2/2 GammaTile and Dex taper and MR brain completed. Transferred to [facility]. Now s/p left parietal crani for cesium seed removal (3/6).     CEREBRAL VASOGENIC EDEMA    Dependence on wheelchair-Z99.3    Encounter for follow-up examination after completed treatment for conditions other than malignant neoplasm-Z09    Weakness-R53.1    Handoff    MEWS Score    Hypertension    Osteoarthritis    Brain mass    Glioblastoma    Glioblastoma    Glioblastoma    Glioblastoma    Osteoarthritis    Hypertension    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    S/P total knee replacement, right    H/O craniotomy    [id number]        PLAN:  Neuro:  - neuro/vital checks q4hrs   - pain control: cranial ERAS  - post op MRI complete 3/8  - s/p SG CHELSEY x1 removal 3/9   - CT 2/24: worsening R sided whole-hemisphere vasogenic edema  - seizure prophylaxis: cont home keppra 1g q12  - decadron taper over 2 weeks to 2 BID for vasogenic edema  - GBM: hold home metformin 500mg BID    Cardiac:  - SBP goal 100-140  - HTN: cont home amlodipine 5mg daily    Pulmonary:  - on RA    GI:  - regular diet   - bowel regimen, LBM 3/9  - pepcid 20mg BID while on steroids    Renal:  - IVL  - Na goal 135-145  - voiding    Heme:  - DVT ppx: SCDs and SQL   - heme/onc consult, s/p IV Avastin 2/27, temodar (2/27-3/3)  - LE dopplers 3/5 negative     Endo:  - A1C 5.9  - ISS     ID:  - afebrile  - OR cx +enterococcus faecalis, on linezolid (3/8-3/8)   - bactrim DS 3x a week for prophylaxis i/s/o recent chemotherapy  - ID consulted, appreciate recs     Disposition: tele, full code, PT/OT rec AR.    D/w Dr. Johnston     Assessment:  Present when checked    []  GCS  E   V  M     Heart Failure: []Acute, [] acute on chronic , []chronic  Heart Failure:  [] Diastolic (HFpEF), [] Systolic (HFrEF), []Combined (HFpEF and HFrEF), [] RHF, [] Pulm HTN, [] Other    [] ROMERO, [] ATN, [] AIN, [] other  [] CKD1, [] CKD2, [] CKD 3, [] CKD 4, [] CKD 5, []ESRD    Encephalopathy: [] Metabolic, [] Hepatic, [] toxic, [] Neurological, [] Other    Abnormal Nurtitional Status: [] malnurtition (see nutrition note), [ ]underweight: BMI < 19, [] morbid obesity: BMI >40, [] Cachexia    [] Sepsis  [] hypovolemic shock,[] cardiogenic shock, [] hemorrhagic shock, [] neuogenic shock  [] Acute Respiratory Failure  []Cerebral edema, [] Brain compression/ herniation,   [] Functional quadriplegia  [] Acute blood loss anemia

## 2025-03-10 NOTE — PROGRESS NOTE ADULT - ASSESSMENT
69M with pmh HTN, L frontoparietal GBM s/p biopsy @[facility] 20[facility], resection x2 April/2023, Dec/2024 with GammaTile placement (20 seeds/5 Tiles), s/p >6 cycles of TMZ (Aug/2023-Jan/2024), RT (60 Gy in 30 fxns (June/2023-July/2024) presents with worsening right sided weakness and expressive aphasia x 1 week. At [facility] ER CTH  showed worsening left sided whole-hemisphere vasogenic edema susp 2/2 GammaTile and Dex taper and MR brain completed. Transferred to [facility] for further workup. Now s/p left parietal crani for cesium seed removal (3/6).     #Hx of recurrent GBM s/p Craniotomy for Resection and Gammatile Brachytherapy (12/19)  # s/p left parietal crani for cesium seed removal (3/6)  -Continue pain medication and drain management as per NSGY   -Will continue steroids as per NSGY   -Continue Famotidine and ISS while on steroids  -Pt's OR cx +enterococcus faecalis; Pt was seen by ID on 3/8 who though the specimen was likely contaminated therefore linezolid was d/c'ed   - Continue Keppra 1000 mg q12 and seizure precautions   -Pt seen by heme/onc consult; Pt  s/p IV Avastin 2/27, temodar (2/27-3/3); On bactrim DS 3x a week for prophylaxis 2/2 recent chemotherapy    #HTN  - Continue amlodipine 5mg daily w/ holding parameters    #Constipation (RESOLVED)  -Pt had a bowel movement on 3/8   -Will continue senna and Miralax   -Will continue serial abdominal exam     #DVT PPx: Enoxaparin  Discussed with primary team

## 2025-03-10 NOTE — PROGRESS NOTE ADULT - SUBJECTIVE AND OBJECTIVE BOX
patient seen and examined at bedside  overall appears well, a&ox3, recovering well post-op   awaiting rehab transfer  no new complaints      ANTIBIOTICS/RELEVANT:    MEDICATIONS  (STANDING):  amLODIPine   Tablet 5 milliGRAM(s) Oral daily  dexAMETHasone     Tablet   Oral   dexAMETHasone     Tablet 4 milliGRAM(s) Oral every 8 hours  enoxaparin Injectable 40 milliGRAM(s) SubCutaneous <User Schedule>  famotidine    Tablet 20 milliGRAM(s) Oral every 12 hours  levETIRAcetam 1000 milliGRAM(s) Oral every 12 hours  ondansetron Injectable 4 milliGRAM(s) IV Push every 6 hours  polyethylene glycol 3350 17 Gram(s) Oral two times a day  senna 2 Tablet(s) Oral at bedtime  trimethoprim  160 mG/sulfamethoxazole 800 mG 1 Tablet(s) Oral <User Schedule>    MEDICATIONS  (PRN):  oxyCODONE    IR 5 milliGRAM(s) Oral every 4 hours PRN Moderate Pain (4 - 6)  oxyCODONE    IR 10 milliGRAM(s) Oral every 4 hours PRN Severe Pain (7 - 10)      Vital Signs Last 24 Hrs  T(C): 36.7 (10 Mar 2025 09:22), Max: 36.7 (10 Mar 2025 05:00)  T(F): 98 (10 Mar 2025 09:22), Max: 98.1 (10 Mar 2025 05:00)  HR: 65 (10 Mar 2025 09:22) (52 - 65)  BP: 118/62 (10 Mar 2025 09:22) (116/64 - 134/73)  BP(mean): --  RR: 18 (10 Mar 2025 09:22) (17 - 18)  SpO2: 96% (10 Mar 2025 09:22) (96% - 98%)    Parameters below as of 10 Mar 2025 09:22  Patient On (Oxygen Delivery Method): room air      PHYSICAL EXAM:  General: in no acute distress  Lungs: CTA B/L. No wheezes, rales, or rhonchi  Cardiovascular: RRR. No murmurs, rubs, or gallops  Abdomen: Soft, non-tender non-distended; No rebound or guarding  Neurological: Alert and oriented  Skin: Warm and dry. No obvious rash     LABS:                        14.0   8.56  )-----------( 267      ( 10 Mar 2025 06:30 )             40.8     03-10    135  |  100  |  25[H]  ----------------------------<  147[H]  4.2   |  26  |  0.79    Ca    8.8      10 Mar 2025 06:30  Phos  3.1     03-10  Mg     2.0     03-10

## 2025-03-10 NOTE — CHART NOTE - NSCHARTNOTESELECT_GEN_ALL_CORE
Heme/Onc/Event Note
Nutrition Services
Oncology/Off Service Note
PT/OT/Event Note
Event Note
hemonc/Event Note

## 2025-03-11 LAB
ALBUMIN SERPL ELPH-MCNC: 3.3 G/DL — SIGNIFICANT CHANGE UP (ref 3.3–5)
ALP SERPL-CCNC: 95 U/L — SIGNIFICANT CHANGE UP (ref 40–120)
ALT FLD-CCNC: 17 U/L — SIGNIFICANT CHANGE UP (ref 10–45)
ANION GAP SERPL CALC-SCNC: 7 MMOL/L — SIGNIFICANT CHANGE UP (ref 5–17)
AST SERPL-CCNC: 9 U/L — LOW (ref 10–40)
BASOPHILS # BLD AUTO: 0 K/UL — SIGNIFICANT CHANGE UP (ref 0–0.2)
BASOPHILS NFR BLD AUTO: 0 % — SIGNIFICANT CHANGE UP (ref 0–2)
BILIRUB DIRECT SERPL-MCNC: 0.1 MG/DL — SIGNIFICANT CHANGE UP (ref 0–0.3)
BILIRUB INDIRECT FLD-MCNC: 0.1 MG/DL — LOW (ref 0.2–1)
BILIRUB SERPL-MCNC: 0.2 MG/DL — SIGNIFICANT CHANGE UP (ref 0.2–1.2)
BUN SERPL-MCNC: 32 MG/DL — HIGH (ref 7–23)
CALCIUM SERPL-MCNC: 8.5 MG/DL — SIGNIFICANT CHANGE UP (ref 8.4–10.5)
CHLORIDE SERPL-SCNC: 105 MMOL/L — SIGNIFICANT CHANGE UP (ref 96–108)
CO2 SERPL-SCNC: 25 MMOL/L — SIGNIFICANT CHANGE UP (ref 22–31)
CREAT SERPL-MCNC: 0.79 MG/DL — SIGNIFICANT CHANGE UP (ref 0.5–1.3)
EGFR: 97 ML/MIN/1.73M2 — SIGNIFICANT CHANGE UP
EGFR: 97 ML/MIN/1.73M2 — SIGNIFICANT CHANGE UP
EOSINOPHIL # BLD AUTO: 0 K/UL — SIGNIFICANT CHANGE UP (ref 0–0.5)
EOSINOPHIL NFR BLD AUTO: 0 % — SIGNIFICANT CHANGE UP (ref 0–6)
GLUCOSE SERPL-MCNC: 142 MG/DL — HIGH (ref 70–99)
HCT VFR BLD CALC: 36.9 % — LOW (ref 39–50)
HGB BLD-MCNC: 12.3 G/DL — LOW (ref 13–17)
LYMPHOCYTES # BLD AUTO: 1.61 K/UL — SIGNIFICANT CHANGE UP (ref 1–3.3)
LYMPHOCYTES # BLD AUTO: 19.6 % — SIGNIFICANT CHANGE UP (ref 13–44)
MAGNESIUM SERPL-MCNC: 2 MG/DL — SIGNIFICANT CHANGE UP (ref 1.6–2.6)
MCHC RBC-ENTMCNC: 30.4 PG — SIGNIFICANT CHANGE UP (ref 27–34)
MCHC RBC-ENTMCNC: 33.3 G/DL — SIGNIFICANT CHANGE UP (ref 32–36)
MCV RBC AUTO: 91.1 FL — SIGNIFICANT CHANGE UP (ref 80–100)
MONOCYTES # BLD AUTO: 0.73 K/UL — SIGNIFICANT CHANGE UP (ref 0–0.9)
MONOCYTES NFR BLD AUTO: 8.9 % — SIGNIFICANT CHANGE UP (ref 2–14)
NEUTROPHILS # BLD AUTO: 5.63 K/UL — SIGNIFICANT CHANGE UP (ref 1.8–7.4)
NEUTROPHILS NFR BLD AUTO: 67.9 % — SIGNIFICANT CHANGE UP (ref 43–77)
PHOSPHATE SERPL-MCNC: 3.2 MG/DL — SIGNIFICANT CHANGE UP (ref 2.5–4.5)
PLATELET # BLD AUTO: 245 K/UL — SIGNIFICANT CHANGE UP (ref 150–400)
POTASSIUM SERPL-MCNC: 4.2 MMOL/L — SIGNIFICANT CHANGE UP (ref 3.5–5.3)
POTASSIUM SERPL-SCNC: 4.2 MMOL/L — SIGNIFICANT CHANGE UP (ref 3.5–5.3)
PROT SERPL-MCNC: 5.4 G/DL — LOW (ref 6–8.3)
RBC # BLD: 4.05 M/UL — LOW (ref 4.2–5.8)
RBC # FLD: 15.4 % — HIGH (ref 10.3–14.5)
SODIUM SERPL-SCNC: 137 MMOL/L — SIGNIFICANT CHANGE UP (ref 135–145)
WBC # BLD: 8.19 K/UL — SIGNIFICANT CHANGE UP (ref 3.8–10.5)
WBC # FLD AUTO: 8.19 K/UL — SIGNIFICANT CHANGE UP (ref 3.8–10.5)

## 2025-03-11 PROCEDURE — 99233 SBSQ HOSP IP/OBS HIGH 50: CPT

## 2025-03-11 PROCEDURE — 99024 POSTOP FOLLOW-UP VISIT: CPT

## 2025-03-11 PROCEDURE — 99232 SBSQ HOSP IP/OBS MODERATE 35: CPT

## 2025-03-11 PROCEDURE — G0545: CPT

## 2025-03-11 RX ORDER — AMPICILLIN SODIUM 1 G/1
2 INJECTION, POWDER, FOR SOLUTION INTRAMUSCULAR; INTRAVENOUS EVERY 4 HOURS
Refills: 0 | Status: DISCONTINUED | OUTPATIENT
Start: 2025-03-11 | End: 2025-03-12

## 2025-03-11 RX ADMIN — Medication 20 MILLIGRAM(S): at 06:14

## 2025-03-11 RX ADMIN — DEXAMETHASONE 4 MILLIGRAM(S): 0.5 TABLET ORAL at 06:14

## 2025-03-11 RX ADMIN — DEXAMETHASONE 4 MILLIGRAM(S): 0.5 TABLET ORAL at 21:37

## 2025-03-11 RX ADMIN — POLYETHYLENE GLYCOL 3350 17 GRAM(S): 17 POWDER, FOR SOLUTION ORAL at 19:01

## 2025-03-11 RX ADMIN — Medication 20 MILLIGRAM(S): at 19:01

## 2025-03-11 RX ADMIN — LEVETIRACETAM 1000 MILLIGRAM(S): 10 INJECTION, SOLUTION INTRAVENOUS at 19:00

## 2025-03-11 RX ADMIN — LEVETIRACETAM 1000 MILLIGRAM(S): 10 INJECTION, SOLUTION INTRAVENOUS at 06:14

## 2025-03-11 RX ADMIN — AMPICILLIN SODIUM 216 GRAM(S): 1 INJECTION, POWDER, FOR SOLUTION INTRAMUSCULAR; INTRAVENOUS at 23:13

## 2025-03-11 RX ADMIN — DEXAMETHASONE 4 MILLIGRAM(S): 0.5 TABLET ORAL at 13:48

## 2025-03-11 RX ADMIN — AMLODIPINE BESYLATE 5 MILLIGRAM(S): 10 TABLET ORAL at 06:44

## 2025-03-11 RX ADMIN — AMPICILLIN SODIUM 216 GRAM(S): 1 INJECTION, POWDER, FOR SOLUTION INTRAMUSCULAR; INTRAVENOUS at 19:01

## 2025-03-11 RX ADMIN — ENOXAPARIN SODIUM 40 MILLIGRAM(S): 100 INJECTION SUBCUTANEOUS at 21:37

## 2025-03-11 RX ADMIN — Medication 2 TABLET(S): at 21:37

## 2025-03-11 NOTE — PROGRESS NOTE ADULT - SUBJECTIVE AND OBJECTIVE BOX
INTERVAL HPI/OVERNIGHT EVENTS:    Patient was seen and examined at bedside.  From OR, cesium seeds were sent to pathology.  "Clusters of bacteria" were noted by the pathologist.    CONSTITUTIONAL:  Negative fever or chills, feels well, good appetite  EYES:  Negative  blurry vision or double vision  CARDIOVASCULAR:  Negative for chest pain or palpitations  RESPIRATORY:  Negative for cough, wheezing, or SOB   GASTROINTESTINAL:  Negative for nausea, vomiting, diarrhea, constipation, or abdominal pain  GENITOURINARY:  Negative frequency, urgency or dysuria  NEUROLOGIC:  No headache, confusion, dizziness, lightheadedness      ANTIBIOTICS/RELEVANT:    MEDICATIONS  (STANDING):  amLODIPine   Tablet 5 milliGRAM(s) Oral daily  dexAMETHasone     Tablet   Oral   dexAMETHasone     Tablet 4 milliGRAM(s) Oral every 8 hours  enoxaparin Injectable 40 milliGRAM(s) SubCutaneous <User Schedule>  famotidine    Tablet 20 milliGRAM(s) Oral every 12 hours  levETIRAcetam 1000 milliGRAM(s) Oral every 12 hours  ondansetron Injectable 4 milliGRAM(s) IV Push every 6 hours  polyethylene glycol 3350 17 Gram(s) Oral two times a day  senna 2 Tablet(s) Oral at bedtime  trimethoprim  160 mG/sulfamethoxazole 800 mG 1 Tablet(s) Oral <User Schedule>    MEDICATIONS  (PRN):  oxyCODONE    IR 5 milliGRAM(s) Oral every 4 hours PRN Moderate Pain (4 - 6)  oxyCODONE    IR 10 milliGRAM(s) Oral every 4 hours PRN Severe Pain (7 - 10)        Vital Signs Last 24 Hrs  T(C): 36.7 (11 Mar 2025 09:47), Max: 37 (10 Mar 2025 17:00)  T(F): 98 (11 Mar 2025 09:47), Max: 98.6 (10 Mar 2025 17:00)  HR: 62 (11 Mar 2025 09:47) (46 - 65)  BP: 115/70 (11 Mar 2025 09:47) (115/70 - 152/87)  BP(mean): 82 (11 Mar 2025 00:00) (82 - 97)  RR: 18 (11 Mar 2025 09:47) (16 - 18)  SpO2: 97% (11 Mar 2025 09:47) (95% - 97%)    Parameters below as of 11 Mar 2025 09:47  Patient On (Oxygen Delivery Method): room air        PHYSICAL EXAM:  Constitutional: non-toxic, no distress  Eyes:SHELBI, EOMI  Ear/Nose/Throat: no oral lesion, no sinus tenderness on percussion	  Neck:  supple  Respiratory: CTA maude  Cardiovascular: S1S2 RRR, no murmurs  Gastrointestinal:soft, (+) BS, no HSM  Extremities:no e/e/c  Vascular: DP Pulse:	right normal; left normal      LABS:                        12.3   8.19  )-----------( 245      ( 11 Mar 2025 05:30 )             36.9     03-11    137  |  105  |  32[H]  ----------------------------<  142[H]  4.2   |  25  |  0.79    Ca    8.5      11 Mar 2025 05:30  Phos  3.2     03-11  Mg     2.0     03-11    TPro  5.4[L]  /  Alb  3.3  /  TBili  0.2  /  DBili  0.1  /  AST  9[L]  /  ALT  17  /  AlkPhos  95  03-11      Urinalysis Basic - ( 11 Mar 2025 05:30 )    Color: x / Appearance: x / SG: x / pH: x  Gluc: 142 mg/dL / Ketone: x  / Bili: x / Urobili: x   Blood: x / Protein: x / Nitrite: x   Leuk Esterase: x / RBC: x / WBC x   Sq Epi: x / Non Sq Epi: x / Bacteria: x        MICROBIOLOGY:    Culture - Tissue with Gram Stain (03.06.25 @ 17:07)    -  Ampicillin: S <=2 Predicts results to ampicillin/sulbactam, amoxacillin-clavulanate and  piperacillin-tazobactam.   -  Vancomycin: S 1   Gram Stain:   No polymorphonuclear cells seen per low power field  No organisms seen per oil power field   Specimen Source: Tissue 3. Seed   Culture Results:   Rare Enterococcus faecalis   Organism Identification: Enterococcus faecalis   Organism: Enterococcus faecalis   Method Type: [facility]        RADIOLOGY & ADDITIONAL STUDIES:

## 2025-03-11 NOTE — PROGRESS NOTE ADULT - ASSESSMENT
IMPRESSION:  Patient went to OR on 3/6/25 for removal of "seeds" and craniotomy for resection of tumor of left side of brain. Reportedly there was no concerns for infection intraoperatively.  He has not been having fevers or other signs of infection.  One culture grew E. Faecalis. It wasn't clear if this was a pathogen or not but pathology sample has clusters of bacteria.  Will opt to treat before patient becomes symptomatic    Recommend:  1.  Start Ampicillin 2 grams IV q4hrs.  This can also be given as 12 grams over 24hrs via continuous infusion  2. Would recommend 2 weeks of IV antibiotics  3. OK to place PICC    ID team 2 will follow

## 2025-03-11 NOTE — PROGRESS NOTE ADULT - SUBJECTIVE AND OBJECTIVE BOX
HPI:  69M with pmh HTN, L frontoparietal GBM s/p biopsy @[facility] 2023, resection x2 April/2023, Dec/2024 with GammaTile placement (20 seeds/5 Tiles), s/p >6 cycles of TMZ (Aug/2023-Jan/2024), RT (60 Gy in 30 fxns (June/2023-July/2024) presents with worsening right sided weakness and expressive aphasia x 1 week. Patient was due to start the last week of a decadron taper today at 1mg daily. He was brought to [facility] ER last night for symptoms where CTH  showed worsening R sided whole-hemisphere vasogenic edema susp 2/2 GammaTile and Dex taper. MRI brain was completed this AM and he received 10mg IV decadron at 6AM. Transferred to [facility] for further workup. Patient denies headache, nausea, vomiting, or vision changes. Patient has been compliant with his home keppra 1g BID.  (25 Feb 2025 15:09)    OVERNIGHT EVENTS: KYA overnight, neuro stable.    Hospital Course:  2/25: admitted to [facility]  2/26: KYA overnight. Heme/onc consulted for possible IV avastin  2/27: KYA overnight. S/p IV avastin, temodar started. PT/OR rec AR.  2/29: KYA overnight, neuro stable.  3/1: KYA overnight, neuro stable.   3/2: KYA overnight, neuro stable. Decadron taper to 2BID over 2 weeks.   3/3: KYA overnight, neuro stable.   3/4: KYA o/n. CXR done. ISS added.   3/5: Plan for OR Thurs. LE dopplers negative for DVT.   3/6: KYA overnight. pending OR. POD 0 s/p left parietal crani for cesium seed removal (3/6). SBP 160s, cardene gtt started. Neurologically stable.   3/7: POD#1, cardene off, SQL to start tonight   3/8: POD2. KYA ovn. surgical cx growing enterococcus faecalis, started on linezolid. ID consulted, likely contaminate, linezolid dc'd. MRI complete.   3/9: POD3. KYA ovn. Subgaleal CHELSEY removed.   3/10: POD4. KYA ovn  3/11: POD5. KYA ovn.     Vital Signs Last 24 Hrs  T(C): 36.1 (11 Mar 2025 00:00), Max: 37 (10 Mar 2025 17:00)  T(F): 97 (11 Mar 2025 00:00), Max: 98.6 (10 Mar 2025 17:00)  HR: 65 (11 Mar 2025 00:00) (58 - 65)  BP: 116/61 (11 Mar 2025 00:00) (116/61 - 152/87)  BP(mean): 82 (11 Mar 2025 00:00) (82 - 97)  RR: 16 (11 Mar 2025 00:00) (16 - 18)  SpO2: 96% (11 Mar 2025 00:00) (95% - 98%)    Parameters below as of 11 Mar 2025 00:00  Patient On (Oxygen Delivery Method): room air        I&O's Summary    09 Mar 2025 07:01  -  10 Mar 2025 07:00  --------------------------------------------------------  IN: 0 mL / OUT: 1975 mL / NET: -1975 mL        Physical Exam:   General: patient seen laying supine in bed in NAD  Neuro: AAOx3 (year with choice), +expressive aphasia, follows commands, opens eyes spontaneously, +R facial droop, LUE/LLE 5/5, RUE deltoid 2/5 triceps 3/5 biceps 3/5 hand  2/5, RLE HF 3/5 KF/KE 4/5 DF 1/5 PF 5/5, Decreased sensation to light touch throughout R face, RUE, RLE and R torso.   HEENT: PERRL, dysconjugate gaze but able to do EOMI,  Cardiac: RRR, S1S2  Pulmonary: chest rise symmetric  Abdomen: soft, nontender, nondistended  Ext: perfusing well  Skin: warm, dry        TUBES/LINES:  [] Shields  [] Lumbar Drain  [] Wound Drains  [] Others      DIET:  [] NPO  [x] Mechanical  [] Tube feeds    LABS:                        14.0   8.56  )-----------( 267      ( 10 Mar 2025 06:30 )             40.8     03-10    135  |  100  |  25[H]  ----------------------------<  147[H]  4.2   |  26  |  0.79    Ca    8.8      10 Mar 2025 06:30  Phos  3.1     03-10  Mg     2.0     03-10        Urinalysis Basic - ( 10 Mar 2025 06:30 )    Color: x / Appearance: x / SG: x / pH: x  Gluc: 147 mg/dL / Ketone: x  / Bili: x / Urobili: x   Blood: x / Protein: x / Nitrite: x   Leuk Esterase: x / RBC: x / WBC x   Sq Epi: x / Non Sq Epi: x / Bacteria: x          CAPILLARY BLOOD GLUCOSE          Drug Levels: [] N/A    CSF Analysis: [] N/A      Allergies    No Known Allergies    Intolerances      MEDICATIONS:  Antibiotics:  trimethoprim  160 mG/sulfamethoxazole 800 mG 1 Tablet(s) Oral <User Schedule>    Neuro:  levETIRAcetam 1000 milliGRAM(s) Oral every 12 hours  ondansetron Injectable 4 milliGRAM(s) IV Push every 6 hours  oxyCODONE    IR 5 milliGRAM(s) Oral every 4 hours PRN  oxyCODONE    IR 10 milliGRAM(s) Oral every 4 hours PRN    Anticoagulation:  enoxaparin Injectable 40 milliGRAM(s) SubCutaneous <User Schedule>    OTHER:  amLODIPine   Tablet 5 milliGRAM(s) Oral daily  dexAMETHasone     Tablet   Oral   dexAMETHasone     Tablet 4 milliGRAM(s) Oral every 8 hours  famotidine    Tablet 20 milliGRAM(s) Oral every 12 hours  polyethylene glycol 3350 17 Gram(s) Oral two times a day  senna 2 Tablet(s) Oral at bedtime    IVF:    CULTURES:  Culture Results:   No growth (03-06 @ 17:07)  Culture Results:   No growth (03-06 @ 17:07)    RADIOLOGY & ADDITIONAL TESTS:      ASSESSMENT:  69M PMH HTN, L frontoparietal GBM s/p biopsy @[facility], resection x2 April/2023, Dec/2024 with GammaTile placement (20 seeds/5 Tiles), s/p >6 cycles of TMZ (Aug/2023-Jan/2024), RT (60 Gy in 30 fxns (June/2023-July/2024) presents with worsening right sided weakness and expressive aphasia x 1 week. At [facility] CT +worsening left sided whole-hemisphere vasogenic edema susp 2/2 GammaTile and Dex taper and MR brain completed. Transferred to [facility]. Now s/p left parietal crani for cesium seed removal (3/6).     CEREBRAL VASOGENIC EDEMA    Dependence on wheelchair-Z99.3    Encounter for follow-up examination after completed treatment for conditions other than malignant neoplasm-Z09    Weakness-R53.1    Handoff    MEWS Score    Hypertension    Osteoarthritis    Brain mass    Glioblastoma    Glioblastoma    Glioblastoma    Glioblastoma    Osteoarthritis    Hypertension    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    S/P total knee replacement, right    H/O craniotomy    [id number]          PLAN:   Neuro:  - neuro/vital checks q4hrs   - pain control: cranial ERAS  - post op MRI complete 3/8  - s/p SG CHELSEY x1 removal 3/9   - CT 2/24: worsening R sided whole-hemisphere vasogenic edema  - seizure prophylaxis: cont home keppra 1g q12  - decadron taper over 2 weeks to 2 BID for vasogenic edema  - GBM: hold home metformin 500mg BID    Cardiac:  - SBP goal 100-140  - HTN: cont home amlodipine 5mg daily    Pulmonary:  - on RA    GI:  - regular diet   - bowel regimen, LBM 3/9  - pepcid 20mg BID while on steroids    Renal:  - IVL  - Na goal 135-145  - voiding    Heme:  - DVT ppx: SCDs and SQL   - heme/onc consult, s/p IV Avastin 2/27, temodar (2/27-3/3)  - LE dopplers 3/5 negative     Endo:  - A1C 5.9  - ISS     ID:  - afebrile  - OR cx +enterococcus faecalis, on linezolid (3/8-3/8)   - bactrim DS 3x a week for prophylaxis i/s/o recent chemotherapy  - ID consulted, appreciate recs     Disposition: tele, full code, PT/OT rec AR.    D/w Dr. Johnston

## 2025-03-11 NOTE — PROGRESS NOTE ADULT - ASSESSMENT
69M with pmh HTN, L frontoparietal GBM s/p biopsy @[facility], resection x2 April/2023, Dec/2024 with GammaTile placement (20 seeds/5 Tiles), s/p >6 cycles of TMZ (Aug/2023-Jan/2024), RT (60 Gy in 30 fxns (June/2023-July/2024) presents with worsening right sided weakness and expressive aphasia x 1 week. At [facility] CTH  showed worsening left sided whole-hemisphere vasogenic edema susp 2/2 GammaTile and Dex taper and MR brain completed. Transferred to [facility] for further workup. Now s/p left parietal crani for cesium seed removal (3/6).     #Hx of recurrent GBM s/p Craniotomy for Resection and Gammatile Brachytherapy (12/19)  # s/p left parietal crani for cesium seed removal (3/6)  - steroids taper per NSGY   -Continue Famotidine and ISS while on steroids  -OR cx +enterococcus faecalis; Pt was seen by ID on 3/8 who though the specimen was likely contaminated therefore linezolid was d/c'ed   - Continue Keppra 1000 mg q12 and seizure precautions   -Pt seen by heme/onc consult; Pt  s/p IV Avastin 2/27, temodar (2/27-3/3); On bactrim DS 3x a week for prophylaxis 2/2 recent chemotherapy    #Anemia  Noted with drop in Hb, no signs of bleeding. Continue on monitoring Hb  Continue GI ppx while on steroids    # BUN elevation  Creatinine stable, no signs of bleeding  If further increase, send Cystatin C     #HTN  - Continue amlodipine 5mg daily w/ holding parameters    #Constipation (RESOLVED)  Continue bowel regimen, monitor response     #DVT PPx: Enoxaparin, monitor Hb   Discussed with primary team

## 2025-03-11 NOTE — PROGRESS NOTE ADULT - SUBJECTIVE AND OBJECTIVE BOX
SUBJECTIVE:  Patient was seen and examined at bedside. Patient seen with wife at the bedside, reports feeling fine.     Review of systems: No fever, chills, dizziness, HA, Changes in vision, CP, dyspnea, nausea or vomiting, dysuria, changes in bowel movements, LE edema. Rest of 12 point Review of systems negative unless otherwise documented elsewhere in note.     Diet, Regular (03-06-25 @ 17:32) [Active]      MEDICATIONS:  MEDICATIONS  (STANDING):  amLODIPine   Tablet 5 milliGRAM(s) Oral daily  dexAMETHasone     Tablet   Oral   dexAMETHasone     Tablet 4 milliGRAM(s) Oral every 8 hours  enoxaparin Injectable 40 milliGRAM(s) SubCutaneous <User Schedule>  famotidine    Tablet 20 milliGRAM(s) Oral every 12 hours  levETIRAcetam 1000 milliGRAM(s) Oral every 12 hours  ondansetron Injectable 4 milliGRAM(s) IV Push every 6 hours  polyethylene glycol 3350 17 Gram(s) Oral two times a day  senna 2 Tablet(s) Oral at bedtime  trimethoprim  160 mG/sulfamethoxazole 800 mG 1 Tablet(s) Oral <User Schedule>    MEDICATIONS  (PRN):  oxyCODONE    IR 5 milliGRAM(s) Oral every 4 hours PRN Moderate Pain (4 - 6)  oxyCODONE    IR 10 milliGRAM(s) Oral every 4 hours PRN Severe Pain (7 - 10)      Allergies    No Known Allergies    Intolerances        OBJECTIVE:  Vital Signs Last 24 Hrs  T(C): 36.7 (11 Mar 2025 09:47), Max: 37 (10 Mar 2025 17:00)  T(F): 98 (11 Mar 2025 09:47), Max: 98.6 (10 Mar 2025 17:00)  HR: 62 (11 Mar 2025 09:47) (46 - 65)  BP: 115/70 (11 Mar 2025 09:47) (115/70 - 152/87)  BP(mean): 82 (11 Mar 2025 00:00) (82 - 97)  RR: 18 (11 Mar 2025 09:47) (16 - 18)  SpO2: 97% (11 Mar 2025 09:47) (95% - 97%)    Parameters below as of 11 Mar 2025 09:47  Patient On (Oxygen Delivery Method): room air      I&O's Summary      PHYSICAL EXAM:  General: AOX, NAD, sitting in chair, speaking in full sentences, no labored breathing on RA  Lungs: no crakles, no wheezes  Heart: regular  Abdomen: soft, no distension, no tenderness  Extremities:  warm, right arm in sling, no tenderness to palpation, warm, perfused distally. LE: warm, no edema, no tenderness, no calf tenderness    LABS:                        12.3   8.19  )-----------( 245      ( 11 Mar 2025 05:30 )             36.9     03-11    137  |  105  |  32[H]  ----------------------------<  142[H]  4.2   |  25  |  0.79    Ca    8.5      11 Mar 2025 05:30  Phos  3.2     03-11  Mg     2.0     03-11    TPro  5.4[L]  /  Alb  3.3  /  TBili  0.2  /  DBili  0.1  /  AST  9[L]  /  ALT  17  /  AlkPhos  95  03-11    LIVER FUNCTIONS - ( 11 Mar 2025 05:30 )  Alb: 3.3 g/dL / Pro: 5.4 g/dL / ALK PHOS: 95 U/L / ALT: 17 U/L / AST: 9 U/L / GGT: x             CAPILLARY BLOOD GLUCOSE        Urinalysis Basic - ( 11 Mar 2025 05:30 )    Color: x / Appearance: x / SG: x / pH: x  Gluc: 142 mg/dL / Ketone: x  / Bili: x / Urobili: x   Blood: x / Protein: x / Nitrite: x   Leuk Esterase: x / RBC: x / WBC x   Sq Epi: x / Non Sq Epi: x / Bacteria: x        MICRODATA:      RADIOLOGY/OTHER STUDIES:

## 2025-03-12 ENCOUNTER — TRANSCRIPTION ENCOUNTER (OUTPATIENT)
Age: 70
End: 2025-03-12

## 2025-03-12 ENCOUNTER — INPATIENT (INPATIENT)
Facility: HOSPITAL | Age: 70
LOS: 23 days | Discharge: HOME HEALTH | DRG: 55 | End: 2025-04-05
Attending: STUDENT IN AN ORGANIZED HEALTH CARE EDUCATION/TRAINING PROGRAM | Admitting: STUDENT IN AN ORGANIZED HEALTH CARE EDUCATION/TRAINING PROGRAM
Payer: COMMERCIAL

## 2025-03-12 VITALS
RESPIRATION RATE: 15 BRPM | HEIGHT: 71 IN | SYSTOLIC BLOOD PRESSURE: 125 MMHG | HEART RATE: 56 BPM | WEIGHT: 208.56 LBS | TEMPERATURE: 98 F | DIASTOLIC BLOOD PRESSURE: 68 MMHG | OXYGEN SATURATION: 96 %

## 2025-03-12 VITALS
RESPIRATION RATE: 18 BRPM | OXYGEN SATURATION: 96 % | DIASTOLIC BLOOD PRESSURE: 70 MMHG | TEMPERATURE: 98 F | HEART RATE: 65 BPM | SYSTOLIC BLOOD PRESSURE: 130 MMHG

## 2025-03-12 DIAGNOSIS — G93.6 CEREBRAL EDEMA: ICD-10-CM

## 2025-03-12 DIAGNOSIS — R41.844 FRONTAL LOBE AND EXECUTIVE FUNCTION DEFICIT: ICD-10-CM

## 2025-03-12 DIAGNOSIS — R53.83 OTHER FATIGUE: ICD-10-CM

## 2025-03-12 DIAGNOSIS — G81.91 HEMIPLEGIA, UNSPECIFIED AFFECTING RIGHT DOMINANT SIDE: ICD-10-CM

## 2025-03-12 DIAGNOSIS — R41.840 ATTENTION AND CONCENTRATION DEFICIT: ICD-10-CM

## 2025-03-12 DIAGNOSIS — Z51.89 ENCOUNTER FOR OTHER SPECIFIED AFTERCARE: ICD-10-CM

## 2025-03-12 DIAGNOSIS — D32.0 BENIGN NEOPLASM OF CEREBRAL MENINGES: ICD-10-CM
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
## 2025-03-12 DIAGNOSIS — C71.1 MALIGNANT NEOPLASM OF FRONTAL LOBE: ICD-10-CM

## 2025-03-12 DIAGNOSIS — R89.5 ABNORMAL MICROBIOLOGICAL FINDINGS IN SPECIMENS FROM OTHER ORGANS, SYSTEMS AND TISSUES: ICD-10-CM

## 2025-03-12 DIAGNOSIS — R79.89 OTHER SPECIFIED ABNORMAL FINDINGS OF BLOOD CHEMISTRY: ICD-10-CM

## 2025-03-12 DIAGNOSIS — R53.81 OTHER MALAISE: ICD-10-CM

## 2025-03-12 DIAGNOSIS — D84.9 IMMUNODEFICIENCY, UNSPECIFIED: ICD-10-CM

## 2025-03-12 DIAGNOSIS — R26.89 OTHER ABNORMALITIES OF GAIT AND MOBILITY: ICD-10-CM

## 2025-03-12 DIAGNOSIS — Z48.3 AFTERCARE FOLLOWING SURGERY FOR NEOPLASM: ICD-10-CM

## 2025-03-12 DIAGNOSIS — Z98.890 OTHER SPECIFIED POSTPROCEDURAL STATES: Chronic | ICD-10-CM

## 2025-03-12 DIAGNOSIS — E11.9 TYPE 2 DIABETES MELLITUS WITHOUT COMPLICATIONS: ICD-10-CM

## 2025-03-12 DIAGNOSIS — R47.1 DYSARTHRIA AND ANARTHRIA: ICD-10-CM

## 2025-03-12 DIAGNOSIS — D61.810 ANTINEOPLASTIC CHEMOTHERAPY INDUCED PANCYTOPENIA: ICD-10-CM

## 2025-03-12 DIAGNOSIS — B95.2 ENTEROCOCCUS AS THE CAUSE OF DISEASES CLASSIFIED ELSEWHERE: ICD-10-CM

## 2025-03-12 DIAGNOSIS — R50.9 FEVER, UNSPECIFIED: ICD-10-CM

## 2025-03-12 DIAGNOSIS — S43.001A UNSPECIFIED SUBLUXATION OF RIGHT SHOULDER JOINT, INITIAL ENCOUNTER: ICD-10-CM

## 2025-03-12 DIAGNOSIS — D63.0 ANEMIA IN NEOPLASTIC DISEASE: ICD-10-CM

## 2025-03-12 DIAGNOSIS — R00.1 BRADYCARDIA, UNSPECIFIED: ICD-10-CM

## 2025-03-12 DIAGNOSIS — R20.3 HYPERESTHESIA: ICD-10-CM

## 2025-03-12 DIAGNOSIS — R41.3 OTHER AMNESIA: ICD-10-CM

## 2025-03-12 DIAGNOSIS — I10 ESSENTIAL (PRIMARY) HYPERTENSION: ICD-10-CM

## 2025-03-12 DIAGNOSIS — Z92.21 PERSONAL HISTORY OF ANTINEOPLASTIC CHEMOTHERAPY: ICD-10-CM

## 2025-03-12 DIAGNOSIS — R29.810 FACIAL WEAKNESS: ICD-10-CM

## 2025-03-12 DIAGNOSIS — G47.00 INSOMNIA, UNSPECIFIED: ICD-10-CM

## 2025-03-12 DIAGNOSIS — H50.9 UNSPECIFIED STRABISMUS: ICD-10-CM
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
## 2025-03-12 DIAGNOSIS — J10.1 INFLUENZA DUE TO OTHER IDENTIFIED INFLUENZA VIRUS WITH OTHER RESPIRATORY MANIFESTATIONS: ICD-10-CM

## 2025-03-12 DIAGNOSIS — Z96.651 PRESENCE OF RIGHT ARTIFICIAL KNEE JOINT: Chronic | ICD-10-CM

## 2025-03-12 LAB
ANION GAP SERPL CALC-SCNC: 9 MMOL/L — SIGNIFICANT CHANGE UP (ref 5–17)
BUN SERPL-MCNC: 27 MG/DL — HIGH (ref 7–23)
CALCIUM SERPL-MCNC: 8.4 MG/DL — SIGNIFICANT CHANGE UP (ref 8.4–10.5)
CHLORIDE SERPL-SCNC: 102 MMOL/L — SIGNIFICANT CHANGE UP (ref 96–108)
CO2 SERPL-SCNC: 25 MMOL/L — SIGNIFICANT CHANGE UP (ref 22–31)
CREAT SERPL-MCNC: 0.73 MG/DL — SIGNIFICANT CHANGE UP (ref 0.5–1.3)
EGFR: 99 ML/MIN/1.73M2 — SIGNIFICANT CHANGE UP
EGFR: 99 ML/MIN/1.73M2 — SIGNIFICANT CHANGE UP
GLUCOSE SERPL-MCNC: 143 MG/DL — HIGH (ref 70–99)
HCT VFR BLD CALC: 40.2 % — SIGNIFICANT CHANGE UP (ref 39–50)
HGB BLD-MCNC: 13.2 G/DL — SIGNIFICANT CHANGE UP (ref 13–17)
MAGNESIUM SERPL-MCNC: 2 MG/DL — SIGNIFICANT CHANGE UP (ref 1.6–2.6)
MCHC RBC-ENTMCNC: 30.2 PG — SIGNIFICANT CHANGE UP (ref 27–34)
MCHC RBC-ENTMCNC: 32.8 G/DL — SIGNIFICANT CHANGE UP (ref 32–36)
MCV RBC AUTO: 92 FL — SIGNIFICANT CHANGE UP (ref 80–100)
NRBC BLD AUTO-RTO: 0 /100 WBCS — SIGNIFICANT CHANGE UP (ref 0–0)
PHOSPHATE SERPL-MCNC: 2.9 MG/DL — SIGNIFICANT CHANGE UP (ref 2.5–4.5)
PLATELET # BLD AUTO: 249 K/UL — SIGNIFICANT CHANGE UP (ref 150–400)
POTASSIUM SERPL-MCNC: 4 MMOL/L — SIGNIFICANT CHANGE UP (ref 3.5–5.3)
POTASSIUM SERPL-SCNC: 4 MMOL/L — SIGNIFICANT CHANGE UP (ref 3.5–5.3)
RBC # BLD: 4.37 M/UL — SIGNIFICANT CHANGE UP (ref 4.2–5.8)
RBC # FLD: 15.6 % — HIGH (ref 10.3–14.5)
SODIUM SERPL-SCNC: 136 MMOL/L — SIGNIFICANT CHANGE UP (ref 135–145)
WBC # BLD: 7.92 K/UL — SIGNIFICANT CHANGE UP (ref 3.8–10.5)
WBC # FLD AUTO: 7.92 K/UL — SIGNIFICANT CHANGE UP (ref 3.8–10.5)

## 2025-03-12 PROCEDURE — 99232 SBSQ HOSP IP/OBS MODERATE 35: CPT

## 2025-03-12 PROCEDURE — 36569 INSJ PICC 5 YR+ W/O IMAGING: CPT

## 2025-03-12 PROCEDURE — 99233 SBSQ HOSP IP/OBS HIGH 50: CPT

## 2025-03-12 PROCEDURE — 76937 US GUIDE VASCULAR ACCESS: CPT | Mod: 26,59

## 2025-03-12 PROCEDURE — 71045 X-RAY EXAM CHEST 1 VIEW: CPT | Mod: 26

## 2025-03-12 PROCEDURE — 99024 POSTOP FOLLOW-UP VISIT: CPT

## 2025-03-12 PROCEDURE — G0545: CPT

## 2025-03-12 PROCEDURE — 99223 1ST HOSP IP/OBS HIGH 75: CPT | Mod: GC

## 2025-03-12 RX ORDER — ERGOCALCIFEROL 1.25 MG/1
0 CAPSULE ORAL
Refills: 0 | DISCHARGE

## 2025-03-12 RX ORDER — ACETAMINOPHEN 500 MG/5ML
2 LIQUID (ML) ORAL
Qty: 0 | Refills: 0 | DISCHARGE

## 2025-03-12 RX ORDER — DEXAMETHASONE 0.5 MG/1
4 TABLET ORAL EVERY 8 HOURS
Refills: 0 | Status: COMPLETED | OUTPATIENT
Start: 2025-03-12 | End: 2025-03-14

## 2025-03-12 RX ORDER — DEXAMETHASONE 0.5 MG/1
2 TABLET ORAL EVERY 12 HOURS
Refills: 0 | Status: DISCONTINUED | OUTPATIENT
Start: 2025-03-21 | End: 2025-04-05

## 2025-03-12 RX ORDER — POLYETHYLENE GLYCOL 3350 17 G/17G
17 POWDER, FOR SOLUTION ORAL
Refills: 0 | Status: DISCONTINUED | OUTPATIENT
Start: 2025-03-13 | End: 2025-03-17

## 2025-03-12 RX ORDER — AMPICILLIN SODIUM 1 G/1
2 INJECTION, POWDER, FOR SOLUTION INTRAMUSCULAR; INTRAVENOUS
Qty: 0 | Refills: 0 | DISCHARGE
Start: 2025-03-12

## 2025-03-12 RX ORDER — B1/B2/B3/B5/B6/B12/VIT C/FOLIC 500-0.5 MG
1 TABLET ORAL DAILY
Refills: 0 | Status: DISCONTINUED | OUTPATIENT
Start: 2025-03-13 | End: 2025-04-05

## 2025-03-12 RX ORDER — LEVETIRACETAM 10 MG/ML
1000 INJECTION, SOLUTION INTRAVENOUS EVERY 12 HOURS
Refills: 0 | Status: DISCONTINUED | OUTPATIENT
Start: 2025-03-13 | End: 2025-04-05

## 2025-03-12 RX ORDER — LEVETIRACETAM 10 MG/ML
1 INJECTION, SOLUTION INTRAVENOUS
Qty: 0 | Refills: 0 | DISCHARGE
Start: 2025-03-12

## 2025-03-12 RX ORDER — DEXAMETHASONE 0.5 MG/1
4 TABLET ORAL
Qty: 0 | Refills: 0 | DISCHARGE
Start: 2025-03-12

## 2025-03-12 RX ORDER — SULFAMETHOXAZOLE/TRIMETHOPRIM 800-160 MG
1 TABLET ORAL
Refills: 0 | Status: DISCONTINUED | OUTPATIENT
Start: 2025-03-14 | End: 2025-03-27

## 2025-03-12 RX ORDER — DEXAMETHASONE 0.5 MG/1
TABLET ORAL
Refills: 0 | Status: DISCONTINUED | OUTPATIENT
Start: 2025-03-12 | End: 2025-04-05

## 2025-03-12 RX ORDER — OMEGA-3-ACID ETHYL ESTERS CAPSULES 1 G/1
0 CAPSULE, LIQUID FILLED ORAL
Refills: 0 | DISCHARGE

## 2025-03-12 RX ORDER — DEXAMETHASONE 0.5 MG/1
1 TABLET ORAL
Refills: 0 | DISCHARGE

## 2025-03-12 RX ORDER — DEXAMETHASONE 0.5 MG/1
4 TABLET ORAL EVERY 12 HOURS
Refills: 0 | Status: COMPLETED | OUTPATIENT
Start: 2025-03-15 | End: 2025-03-21

## 2025-03-12 RX ORDER — OXYCODONE HYDROCHLORIDE 30 MG/1
5 TABLET ORAL EVERY 4 HOURS
Refills: 0 | Status: DISCONTINUED | OUTPATIENT
Start: 2025-03-12 | End: 2025-03-12

## 2025-03-12 RX ORDER — SENNA 187 MG
2 TABLET ORAL
Qty: 0 | Refills: 0 | DISCHARGE
Start: 2025-03-12

## 2025-03-12 RX ORDER — AMPICILLIN SODIUM 1 G/1
2 INJECTION, POWDER, FOR SOLUTION INTRAMUSCULAR; INTRAVENOUS EVERY 4 HOURS
Refills: 0 | Status: DISCONTINUED | OUTPATIENT
Start: 2025-03-12 | End: 2025-03-25

## 2025-03-12 RX ORDER — ACETAMINOPHEN 500 MG/5ML
650 LIQUID (ML) ORAL EVERY 6 HOURS
Refills: 0 | Status: DISCONTINUED | OUTPATIENT
Start: 2025-03-12 | End: 2025-04-05

## 2025-03-12 RX ORDER — AMLODIPINE BESYLATE 10 MG/1
5 TABLET ORAL DAILY
Refills: 0 | Status: DISCONTINUED | OUTPATIENT
Start: 2025-03-13 | End: 2025-03-17

## 2025-03-12 RX ORDER — ENOXAPARIN SODIUM 100 MG/ML
40 INJECTION SUBCUTANEOUS
Refills: 0 | Status: DISCONTINUED | OUTPATIENT
Start: 2025-03-12 | End: 2025-03-25

## 2025-03-12 RX ORDER — ENOXAPARIN SODIUM 100 MG/ML
40 INJECTION SUBCUTANEOUS
Qty: 0 | Refills: 0 | DISCHARGE
Start: 2025-03-12

## 2025-03-12 RX ORDER — SULFAMETHOXAZOLE/TRIMETHOPRIM 800-160 MG
1 TABLET ORAL
Qty: 0 | Refills: 0 | DISCHARGE
Start: 2025-03-12

## 2025-03-12 RX ORDER — OXYCODONE HYDROCHLORIDE 30 MG/1
10 TABLET ORAL EVERY 4 HOURS
Refills: 0 | Status: DISCONTINUED | OUTPATIENT
Start: 2025-03-12 | End: 2025-03-12

## 2025-03-12 RX ORDER — POLYETHYLENE GLYCOL 3350 17 G/17G
17 POWDER, FOR SOLUTION ORAL
Qty: 0 | Refills: 0 | DISCHARGE
Start: 2025-03-12

## 2025-03-12 RX ORDER — METFORMIN HYDROCHLORIDE 850 MG/1
1 TABLET ORAL
Refills: 0 | DISCHARGE

## 2025-03-12 RX ORDER — SENNA 187 MG
2 TABLET ORAL AT BEDTIME
Refills: 0 | Status: DISCONTINUED | OUTPATIENT
Start: 2025-03-12 | End: 2025-04-05

## 2025-03-12 RX ADMIN — AMPICILLIN SODIUM 216 GRAM(S): 1 INJECTION, POWDER, FOR SOLUTION INTRAMUSCULAR; INTRAVENOUS at 02:40

## 2025-03-12 RX ADMIN — Medication 2 TABLET(S): at 22:48

## 2025-03-12 RX ADMIN — AMPICILLIN SODIUM 216 GRAM(S): 1 INJECTION, POWDER, FOR SOLUTION INTRAMUSCULAR; INTRAVENOUS at 18:30

## 2025-03-12 RX ADMIN — LEVETIRACETAM 1000 MILLIGRAM(S): 10 INJECTION, SOLUTION INTRAVENOUS at 17:59

## 2025-03-12 RX ADMIN — AMPICILLIN SODIUM 216 GRAM(S): 1 INJECTION, POWDER, FOR SOLUTION INTRAMUSCULAR; INTRAVENOUS at 06:27

## 2025-03-12 RX ADMIN — Medication 1 TABLET(S): at 17:59

## 2025-03-12 RX ADMIN — DEXAMETHASONE 4 MILLIGRAM(S): 0.5 TABLET ORAL at 06:27

## 2025-03-12 RX ADMIN — AMPICILLIN SODIUM 216 GRAM(S): 1 INJECTION, POWDER, FOR SOLUTION INTRAMUSCULAR; INTRAVENOUS at 10:21

## 2025-03-12 RX ADMIN — DEXAMETHASONE 4 MILLIGRAM(S): 0.5 TABLET ORAL at 22:48

## 2025-03-12 RX ADMIN — ENOXAPARIN SODIUM 40 MILLIGRAM(S): 100 INJECTION SUBCUTANEOUS at 22:45

## 2025-03-12 RX ADMIN — DEXAMETHASONE 4 MILLIGRAM(S): 0.5 TABLET ORAL at 14:33

## 2025-03-12 RX ADMIN — LEVETIRACETAM 1000 MILLIGRAM(S): 10 INJECTION, SOLUTION INTRAVENOUS at 06:27

## 2025-03-12 RX ADMIN — AMPICILLIN SODIUM 200 GRAM(S): 1 INJECTION, POWDER, FOR SOLUTION INTRAMUSCULAR; INTRAVENOUS at 22:45

## 2025-03-12 RX ADMIN — Medication 20 MILLIGRAM(S): at 06:27

## 2025-03-12 RX ADMIN — Medication 20 MILLIGRAM(S): at 17:59

## 2025-03-12 RX ADMIN — AMLODIPINE BESYLATE 5 MILLIGRAM(S): 10 TABLET ORAL at 06:27

## 2025-03-12 RX ADMIN — AMPICILLIN SODIUM 216 GRAM(S): 1 INJECTION, POWDER, FOR SOLUTION INTRAMUSCULAR; INTRAVENOUS at 14:35

## 2025-03-12 NOTE — DISCHARGE NOTE PROVIDER - NSDCMRMEDTOKEN_GEN_ALL_CORE_FT
amLODIPine 5 mg oral tablet: 1 tab(s) orally once a day  ampicillin: 2 gram(s) intravenous every 4 hours through 3/25  dexamethasone: 4 milligram(s) orally every 8 hours TAPER:  4mg every 8 hours through 3/14  4mg every 12 hours (3/15-3/21)  2mg every 12 hours (3/22 - indefinitely)  enoxaparin: 40 milligram(s) subcutaneous once a day (at bedtime) for DVT prevention while at rehab  famotidine 20 mg oral tablet: 1 tab(s) orally every 12 hours  levETIRAcetam 1000 mg oral tablet: 1 tab(s) orally every 12 hours  metFORMIN 500 mg oral tablet: 1 tab(s) orally 2 times a day  Multiple Vitamins oral tablet: 1 tab(s) orally once a day  polyethylene glycol 3350 oral powder for reconstitution: 17 gram(s) orally 2 times a day  senna leaf extract oral tablet: 2 tab(s) orally once a day (at bedtime)  sulfamethoxazole-trimethoprim 800 mg-160 mg oral tablet: 1 tab(s) orally Monday, Wednesday, and Friday Monday, Wednesday, Friday at 6pm  Tylenol 325 mg oral tablet: 2 tab(s) orally every 6 hours as needed for pain   amLODIPine 5 mg oral tablet: 1 tab(s) orally once a day  ampicillin: 2 gram(s) intravenous every 4 hours through 3/25  dexamethasone: 4 milligram(s) orally every 8 hours TAPER:  4mg every 8 hours through 3/14  4mg every 12 hours (3/15-3/21)  2mg every 12 hours (3/22 - indefinitely)  enoxaparin: 40 milligram(s) subcutaneous once a day (at bedtime) for DVT prevention while at rehab  famotidine 20 mg oral tablet: 1 tab(s) orally every 12 hours  levETIRAcetam 1000 mg oral tablet: 1 tab(s) orally every 12 hours  Multiple Vitamins oral tablet: 1 tab(s) orally once a day  polyethylene glycol 3350 oral powder for reconstitution: 17 gram(s) orally 2 times a day  senna leaf extract oral tablet: 2 tab(s) orally once a day (at bedtime)  sulfamethoxazole-trimethoprim 800 mg-160 mg oral tablet: 1 tab(s) orally Monday, Wednesday, and Friday Monday, Wednesday, Friday at 6pm  Tylenol 325 mg oral tablet: 2 tab(s) orally every 6 hours as needed for pain

## 2025-03-12 NOTE — DISCHARGE NOTE PROVIDER - PROVIDER TOKENS
PROVIDER:[TOKEN:[[id number]]],PROVIDER:[TOKEN:[[id number]]],PROVIDER:[TOKEN:[[id number]]]

## 2025-03-12 NOTE — PROCEDURE NOTE - ADDITIONAL PROCEDURE DETAILS
Tip of the PICC line confirmed to be at SVC-RA junction, easily flushes, brisk blood return. Length of the catheter 42 cm

## 2025-03-12 NOTE — PROGRESS NOTE ADULT - SUBJECTIVE AND OBJECTIVE BOX
SUBJECTIVE:  Patient was seen and examined at bedside. Patient seen with wife at the bedside, reports feeling fine. Denies any complaints. Seeds growing Enterococcus, started on Ampicillin. No other complaints or events reported.    Review of systems: No fever, chills, dizziness, HA, Changes in vision, CP, dyspnea, nausea or vomiting, dysuria, changes in bowel movements, LE edema. Rest of 12 point Review of systems negative unless otherwise documented elsewhere in note.     Diet, Regular (03-06-25 @ 17:32) [Active]      MEDICATIONS:  MEDICATIONS  (STANDING):  amLODIPine   Tablet 5 milliGRAM(s) Oral daily  ampicillin  IVPB 2 Gram(s) IV Intermittent every 4 hours  dexAMETHasone     Tablet   Oral   dexAMETHasone     Tablet 4 milliGRAM(s) Oral every 8 hours  enoxaparin Injectable 40 milliGRAM(s) SubCutaneous <User Schedule>  famotidine    Tablet 20 milliGRAM(s) Oral every 12 hours  levETIRAcetam 1000 milliGRAM(s) Oral every 12 hours  ondansetron Injectable 4 milliGRAM(s) IV Push every 6 hours  polyethylene glycol 3350 17 Gram(s) Oral two times a day  senna 2 Tablet(s) Oral at bedtime  trimethoprim  160 mG/sulfamethoxazole 800 mG 1 Tablet(s) Oral <User Schedule>    MEDICATIONS  (PRN):  oxyCODONE    IR 5 milliGRAM(s) Oral every 4 hours PRN Moderate Pain (4 - 6)  oxyCODONE    IR 10 milliGRAM(s) Oral every 4 hours PRN Severe Pain (7 - 10)      Allergies    No Known Allergies    Intolerances        OBJECTIVE:  Vital Signs Last 24 Hrs  T(C): 36.7 (12 Mar 2025 05:16), Max: 36.7 (12 Mar 2025 05:16)  T(F): 98.1 (12 Mar 2025 05:16), Max: 98.1 (12 Mar 2025 05:16)  HR: 46 (12 Mar 2025 05:16) (46 - 79)  BP: 128/70 (12 Mar 2025 05:16) (115/72 - 132/84)  BP(mean): --  RR: 16 (12 Mar 2025 05:16) (16 - 18)  SpO2: 99% (12 Mar 2025 05:16) (94% - 99%)    Parameters below as of 12 Mar 2025 05:16  Patient On (Oxygen Delivery Method): room air      I&O's Summary      PHYSICAL EXAM:  General: AO, NAD, sitting in chair, speaking in full sentences, no labored breathing on RA  Lungs: no crakles, no wheezes  Heart: regular  Abdomen: soft, no distension, no tenderness  Extremities:  warm, no edema, no tenderness, no calf tenderness     LABS:                        13.2   7.92  )-----------( 249      ( 12 Mar 2025 05:30 )             40.2     03-12    136  |  102  |  27[H]  ----------------------------<  143[H]  4.0   |  25  |  0.73    Ca    8.4      12 Mar 2025 05:30  Phos  2.9     03-12  Mg     2.0     03-12    TPro  5.4[L]  /  Alb  3.3  /  TBili  0.2  /  DBili  0.1  /  AST  9[L]  /  ALT  17  /  AlkPhos  95  03-11    LIVER FUNCTIONS - ( 11 Mar 2025 05:30 )  Alb: 3.3 g/dL / Pro: 5.4 g/dL / ALK PHOS: 95 U/L / ALT: 17 U/L / AST: 9 U/L / GGT: x             CAPILLARY BLOOD GLUCOSE        Urinalysis Basic - ( 12 Mar 2025 05:30 )    Color: x / Appearance: x / SG: x / pH: x  Gluc: 143 mg/dL / Ketone: x  / Bili: x / Urobili: x   Blood: x / Protein: x / Nitrite: x   Leuk Esterase: x / RBC: x / WBC x   Sq Epi: x / Non Sq Epi: x / Bacteria: x        MICRODATA:      RADIOLOGY/OTHER STUDIES:

## 2025-03-12 NOTE — PROGRESS NOTE ADULT - REASON FOR ADMISSION
right sided weakness

## 2025-03-12 NOTE — DISCHARGE NOTE PROVIDER - NSDCCPTREATMENT_GEN_ALL_CORE_FT
PRINCIPAL PROCEDURE  Procedure: 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  Findings and Treatment: removal of cesium seeds

## 2025-03-12 NOTE — DISCHARGE NOTE NURSING/CASE MANAGEMENT/SOCIAL WORK - PATIENT PORTAL LINK FT
You can access the FollowMyHealth Patient Portal offered by [facility] by registering at the following website: [email or web address]. By joining [facility]s FollowMyHealth portal, you will also be able to view your health information using other applications (apps) compatible with our system.

## 2025-03-12 NOTE — DISCHARGE NOTE PROVIDER - NSDCCPCAREPLAN_GEN_ALL_CORE_FT
PRINCIPAL DISCHARGE DIAGNOSIS  Diagnosis: Glioblastoma  Assessment and Plan of Treatment: while inpatient you received IV Avastin on 2/27 and Temodar PO (2/27-3/3)   you underwent a left craniotomy for evacuation of abscess/tumor necrosis and cessium seed removal on 3/6  Surgical cultures positive for Enterococcus faecalis  Contine Ampicillin 2g IV every 4 hours through 3/25  Continue Dexamethasone taper for cerebral edema  Continue Bactrim for PCP prophylaxis while on steroids   Continue Keppra for seizure prevention  Follow up with Dr. Johnston and Dr. Neil Ernandez   Follow up with Dr. Mera from Infectious Disease  Follow up with Dr. Hubbard from Oncology      SECONDARY DISCHARGE DIAGNOSES  Diagnosis: Hypertension  Assessment and Plan of Treatment: continue home Amlodipine     PRINCIPAL DISCHARGE DIAGNOSIS  Diagnosis: Glioblastoma  Assessment and Plan of Treatment: while inpatient you received IV Avastin on 2/27 and Temodar PO (2/27-3/3)   you underwent a left craniotomy for evacuation of abscess/tumor necrosis and cessium seed removal on 3/6  Surgical cultures positive for Enterococcus faecalis and P. Acnes  Contine Ampicillin 2g IV every 4 hours through 3/25  Continue Dexamethasone taper for cerebral edema  Continue Bactrim for PCP prophylaxis while on steroids   Continue Keppra for seizure prevention  Follow up with Dr. Johnston and Dr. Neil Ernandez   Follow up with Dr. Mera from Infectious Disease  Follow up with Dr. Hubbard from Oncology      SECONDARY DISCHARGE DIAGNOSES  Diagnosis: Hypertension  Assessment and Plan of Treatment: continue home Amlodipine

## 2025-03-12 NOTE — PROGRESS NOTE ADULT - ASSESSMENT
IMPRESSION:  Patient went to OR on 3/6/25 for removal of "seeds" and craniotomy for resection of tumor of left side of brain. Reportedly there was no concerns for infection intraoperatively.  He has not been having fevers or other signs of infection.  One culture grew E. Faecalis. It wasn't clear if this was a pathogen or not but pathology sample has clusters of bacteria.  All cultures with Cutibacterium acne which can cause meningitis/ventriculitis.  Will opt to treat before patient becomes symptomatic    Ampicillin should cover both E faecalis and C acne    Recommend:  1.   Continue Ampicillin 2 grams IV q4hrs.  This can also be given as 12 grams over 24hrs via continuous infusion  2. Would recommend 2 weeks of IV antibiotics  3. OK to place PICC  4.  Needs weekly CBC with diff, CMP.  Can fax to Dr. Mera's office:  [phone]  5.  He can follow up with me as outpatient    ID team 2 will sign off.  Reconsult as needed  IMPRESSION:  Patient went to OR on 3/6/25 for removal of "seeds" and craniotomy for resection of tumor of left side of brain. Reportedly there was no concerns for infection intraoperatively.  He has not been having fevers or other signs of infection.  One culture grew E. Faecalis. It wasn't clear if this was a pathogen or not but pathology sample has clusters of bacteria.  All cultures with Cutibacterium acne which can cause meningitis/ventriculitis.  Will opt to treat before patient becomes symptomatic    Ampicillin should cover both E faecalis and C acne    Recommend:  1.   Continue Ampicillin 2 grams IV q4hrs.  This can also be given as 12 grams over 24hrs via continuous infusion  2. Would recommend 2 weeks of IV antibiotics (ends 3/25/25)  3. OK to place PICC  4.  Needs weekly CBC with diff, CMP.  Can fax to Dr. Mera's office:  [phone]  5.  He can follow up with me as outpatient    ID team 2 will sign off.  Reconsult as needed

## 2025-03-12 NOTE — PROGRESS NOTE ADULT - TIME BILLING
treatment of E. faecalis and C acne infection
preparing to see Pt.; interviewing Pt.; examining Pt.; reviewing labs and images; documentation in [place]; d/w NSGY ACP
treatment of enterococcus infection
Bedside exam and interview   Reviewed vitals, labs, imaging   Discussed patient's plan of care with primary team   Documentation of encounter
Review of hospital course, labs, vitals, medical records.   Bedside exam and interview   Discussed plan of care with primary team ACP  Documenting the encounter  Excludes teaching time and/or separately reported services
preparing to see Pt.; interviewing Pt.; examining Pt.; reviewing labs and images; documentation in [place]; d/w NSGY ACP
Bedside exam and interview   Reviewed vitals, labs  Discussed patient's plan of care with primary team  Documentation of encounter
post-tumor resection, HTN

## 2025-03-12 NOTE — DISCHARGE NOTE NURSING/CASE MANAGEMENT/SOCIAL WORK - FINANCIAL ASSISTANCE
[facility] provides services at a reduced cost to those who are determined to be eligible through [facility]’s financial assistance program. Information regarding [facility]’s financial assistance program can be found by going to [email or web address] or by calling [phone].

## 2025-03-12 NOTE — PROGRESS NOTE ADULT - PROVIDER SPECIALTY LIST ADULT
Heme/Onc
Heme/Onc
Internal Medicine
Neurosurgery
Neurosurgery
Hospitalist
Infectious Disease
Neurosurgery
Heme/Onc
Heme/Onc
Hospitalist
Internal Medicine
NSICU
Neurosurgery
Neurosurgery
Infectious Disease
Internal Medicine
Internal Medicine
Neurosurgery
Infectious Disease
Internal Medicine
NSICU

## 2025-03-12 NOTE — PROGRESS NOTE ADULT - SUBJECTIVE AND OBJECTIVE BOX
HPI:  69M with pmh HTN, L frontoparietal GBM s/p biopsy @[facility] 2023, resection x2 April/2023, Dec/2024 with GammaTile placement (20 seeds/5 Tiles), s/p >6 cycles of TMZ (Aug/2023-Jan/2024), RT (60 Gy in 30 fxns (June/2023-July/2024) presents with worsening right sided weakness and expressive aphasia x 1 week. Patient was due to start the last week of a decadron taper today at 1mg daily. He was brought to [facility] ER last night for symptoms where CTH  showed worsening R sided whole-hemisphere vasogenic edema susp 2/2 GammaTile and Dex taper. MRI brain was completed this AM and he received 10mg IV decadron at 6AM. Transferred to [facility] for further workup. Patient denies headache, nausea, vomiting, or vision changes. Patient has been compliant with his home keppra 1g BID.  (25 Feb 2025 15:09)    OVERNIGHT EVENTS: KYA overnight, neuro stable.     Hospital Course:  2/25: admitted to [facility]  2/26: KYA overnight. Heme/onc consulted for possible IV avastin  2/27: KYA overnight. S/p IV avastin, temodar started. PT/OR rec AR.  2/29: KYA overnight, neuro stable.  3/1: KYA overnight, neuro stable.   3/2: KYA overnight, neuro stable. Decadron taper to 2BID over 2 weeks.   3/3: KYA overnight, neuro stable.   3/4: KYA o/n. CXR done. ISS added.   3/5: Plan for OR Thurs. LE dopplers negative for DVT.   3/6: KYA overnight. pending OR. POD 0 s/p left parietal crani for cesium seed removal (3/6). SBP 160s, cardene gtt started. Neurologically stable.   3/7: POD#1, cardene off, SQL to start tonight   3/8: POD2. KYA ovn. surgical cx growing enterococcus faecalis, started on linezolid. ID consulted, likely contaminate, linezolid dc'd. MRI complete.   3/9: POD3. KYA ovn. Subgaleal CHELSEY removed.   3/10: POD4. KYA ovn  3/11: POD5. KYA ovn. Patholgy reported bacterial clusters on cesium seeds. ID called. Started on ampicillin 2g q4h  3/12: POD7. KYA ovn.     Vital Signs Last 24 Hrs  T(C): 36.4 (11 Mar 2025 20:30), Max: 36.7 (11 Mar 2025 09:47)  T(F): 97.6 (11 Mar 2025 20:30), Max: 98 (11 Mar 2025 09:47)  HR: 61 (11 Mar 2025 20:30) (46 - 79)  BP: 120/71 (11 Mar 2025 20:30) (115/70 - 141/71)  BP(mean): --  RR: 17 (11 Mar 2025 20:30) (16 - 18)  SpO2: 94% (11 Mar 2025 20:30) (94% - 97%)    Parameters below as of 11 Mar 2025 20:30  Patient On (Oxygen Delivery Method): room air        I&O's Summary    Physical Exam:   General: patient seen laying supine in bed in NAD  Neuro: AAOx3 (year with choice), +expressive aphasia, follows commands, opens eyes spontaneously, +R facial droop, LUE/LLE 5/5, RUE deltoid 2/5 triceps 3/5 biceps 3/5 hand  2/5, RLE HF 3/5 KF/KE 4/5 DF 1/5 PF 5/5, Decreased sensation to light touch throughout R face, RUE, RLE and R torso.   HEENT: PERRL, dysconjugate gaze but able to do EOMI,  Cardiac: RRR, S1S2  Pulmonary: chest rise symmetric  Abdomen: soft, nontender, nondistended  Ext: perfusing well  Skin: warm, dry        TUBES/LINES:  [] Shields  [] Lumbar Drain  [] Wound Drains  [] Others      DIET:  [] NPO  [x] Mechanical  [] Tube feeds    LABS:                        12.3   8.19  )-----------( 245      ( 11 Mar 2025 05:30 )             36.9     03-11    137  |  105  |  32[H]  ----------------------------<  142[H]  4.2   |  25  |  0.79    Ca    8.5      11 Mar 2025 05:30  Phos  3.2     03-11  Mg     2.0     03-11    TPro  5.4[L]  /  Alb  3.3  /  TBili  0.2  /  DBili  0.1  /  AST  9[L]  /  ALT  17  /  AlkPhos  95  03-11      Urinalysis Basic - ( 11 Mar 2025 05:30 )    Color: x / Appearance: x / SG: x / pH: x  Gluc: 142 mg/dL / Ketone: x  / Bili: x / Urobili: x   Blood: x / Protein: x / Nitrite: x   Leuk Esterase: x / RBC: x / WBC x   Sq Epi: x / Non Sq Epi: x / Bacteria: x          CAPILLARY BLOOD GLUCOSE          Drug Levels: [] N/A    CSF Analysis: [] N/A      Allergies    No Known Allergies    Intolerances      MEDICATIONS:  Antibiotics:  ampicillin  IVPB 2 Gram(s) IV Intermittent every 4 hours  trimethoprim  160 mG/sulfamethoxazole 800 mG 1 Tablet(s) Oral <User Schedule>    Neuro:  levETIRAcetam 1000 milliGRAM(s) Oral every 12 hours  ondansetron Injectable 4 milliGRAM(s) IV Push every 6 hours  oxyCODONE    IR 5 milliGRAM(s) Oral every 4 hours PRN  oxyCODONE    IR 10 milliGRAM(s) Oral every 4 hours PRN    Anticoagulation:  enoxaparin Injectable 40 milliGRAM(s) SubCutaneous <User Schedule>    OTHER:  amLODIPine   Tablet 5 milliGRAM(s) Oral daily  dexAMETHasone     Tablet   Oral   dexAMETHasone     Tablet 4 milliGRAM(s) Oral every 8 hours  famotidine    Tablet 20 milliGRAM(s) Oral every 12 hours  polyethylene glycol 3350 17 Gram(s) Oral two times a day  senna 2 Tablet(s) Oral at bedtime    IVF:    CULTURES:  Culture Results:   Growth in fluid media only Cutibacterium acnes "Susceptibilities not  performed" (03-06 @ 17:07)  Culture Results:   Growth in fluid media only Cutibacterium acnes (03-06 @ 17:07)    RADIOLOGY & ADDITIONAL TESTS:      ASSESSMENT:  69M PMH HTN, L frontoparietal GBM s/p biopsy @[facility], resection x2 April/2023, Dec/2024 with GammaTile placement (20 seeds/5 Tiles), s/p >6 cycles of TMZ (Aug/2023-Jan/2024), RT (60 Gy in 30 fxns (June/2023-July/2024) presents with worsening right sided weakness and expressive aphasia x 1 week. At [facility] CT +worsening left sided whole-hemisphere vasogenic edema susp 2/2 GammaTile and Dex taper and MR brain completed. Transferred to [facility]. Now s/p left parietal crani for cesium seed removal (3/6).       CEREBRAL VASOGENIC EDEMA    Dependence on wheelchair-Z99.3    Encounter for follow-up examination after completed treatment for conditions other than malignant neoplasm-Z09    Weakness-R53.1    Handoff    MEWS Score    Hypertension    Osteoarthritis    Brain mass    Glioblastoma    Glioblastoma    Glioblastoma    Glioblastoma    Osteoarthritis    Hypertension    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    S/P total knee replacement, right    H/O craniotomy    SysAdmin_VstLnk        PLAN:   Neuro:  - neuro/vital checks q8hrs   - pain control: cranial ERAS  - post op MRI complete 3/8  - s/p SG CHELSEY x1 removal 3/9   - CTH 2/24: worsening R sided whole-hemisphere vasogenic edema  - seizure prophylaxis: cont home keppra 1g q12  - decadron taper over 2 weeks to 2 BID for vasogenic edema  - GBM: hold home metformin 500mg BID    Cardiac:  - SBP goal 100-140  - HTN: cont home amlodipine 5mg daily    Pulmonary:  - on RA    GI:  - regular diet   - bowel regimen, LBM 3/9  - pepcid 20mg BID while on steroids    Renal:  - IVL  - Na goal 135-145  - voiding    Heme:  - DVT ppx: SCDs and SQL   - heme/onc consult, s/p IV Avastin 2/27, temodar (2/27-3/3)  - LE dopplers 3/5 negative     Endo:  - A1C 5.9  - ISS     ID:  - afebrile  - Ampicillin 2g IV q4h for 2 weeks (3/11 - 3/25)  - OR cx +enterococcus faecalis, on linezolid (3/8-3/8)   - bactrim DS 3x a week for prophylaxis i/s/o recent chemotherapy  - ID consulted, appreciate recs     Disposition: tele, full code, PT/OT rec AR.    D/w Dr. Johnston

## 2025-03-12 NOTE — DISCHARGE NOTE PROVIDER - HOSPITAL COURSE
HPI:  69M with pmh HTN, L frontoparietal GBM s/p biopsy @[facility] 20[facility], resection x2 April/2023, Dec/2024 with GammaTile placement (20 seeds/5 Tiles), s/p >6 cycles of TMZ (Aug/2023-Jan/2024), RT (60 Gy in 30 fxns (June/2023-July/2024) presents with worsening right sided weakness and expressive aphasia x 1 week. Patient was due to start the last week of a decadron taper today at 1mg daily. He was brought to [facility] ER last night for symptoms where CTH  showed worsening R sided whole-hemisphere vasogenic edema susp 2/2 GammaTile and Dex taper. MRI brain was completed this AM and he received 10mg IV decadron at 6AM. Transferred to [facility] for further workup. Patient denies headache, nausea, vomiting, or vision changes. Patient has been compliant with his home keppra 1g BID    Hospital Course:  2/25: admitted to [facility]  2/26: KYA overnight. Heme/onc consulted for possible IV avastin  2/27: KYA overnight. S/p IV avastin, temodar started. PT/OR rec AR.  2/29: KYA overnight, neuro stable.  3/1: KYA overnight, neuro stable.   3/2: KYA overnight, neuro stable. Decadron taper to 2BID over 2 weeks.   3/3: KYA overnight, neuro stable.   3/4: KYA o/n. CXR done. ISS added.   3/5: Plan for OR Thurs. LE dopplers negative for DVT.   3/6: KYA overnight. pending OR. POD 0 s/p left parietal crani for cesium seed removal (3/6). SBP 160s, cardene gtt started. Neurologically stable.   3/7: POD#1, cardene off, SQL to start tonight   3/8: POD2. KYA ovn. surgical cx growing enterococcus faecalis, started on linezolid. ID consulted, likely contaminate, linezolid dc'd. MRI complete.   3/9: POD3. KYA ovn. Subgaleal CHELSEY removed.   3/10: POD4. KYA ovn  3/11: POD5. KYA ovn. Patholgy reported bacterial clusters on cesium seeds. ID called. Started on ampicillin 2g q4h  3/12: POD7. KYA ovn.       Patient evaluated by PT/OT who recommended: acute rehab  Patient is going to [place] acute rehab     Hospital course uncomplicated    Exam on day of discharge:  General: patient seen laying supine in bed in NAD  Neuro: AAOx3 (year with choice), +expressive aphasia, follows commands, opens eyes spontaneously, +R facial droop, LUE/LLE 5/5, RUE deltoid 2/5 triceps 3/5 biceps 3/5 hand  2/5, RLE HF 3/5 KF/KE 4/5 DF 1/5 PF 5/5, Decreased sensation to light touch throughout R face, RUE, RLE and R torso.   HEENT: PERRL, dysconjugate gaze but able to do EOMI,  Cardiac: RRR, S1S2  Pulmonary: chest rise symmetric  Abdomen: soft, nontender, nondistended  Ext: perfusing well  Skin: warm, dry    Patient is neuro stable, vitals stable, afebrile, medically ready for discharge    Given the ongoing treatment plan, please note that the patient has a high potential for further admissions to deliver ongoing treatment and/or procedures as part of the normal course of neurosurgical care    HPI:  69M with pmh HTN, L frontoparietal GBM s/p biopsy @[facility] 20[facility], resection x2 April/2023, Dec/2024 with GammaTile placement (20 seeds/5 Tiles), s/p >6 cycles of TMZ (Aug/2023-Jan/2024), RT (60 Gy in 30 fxns (June/2023-July/2024) presents with worsening right sided weakness and expressive aphasia x 1 week. Patient was due to start the last week of a decadron taper today at 1mg daily. He was brought to [facility] ER last night for symptoms where CTH  showed worsening R sided whole-hemisphere vasogenic edema susp 2/2 GammaTile and Dex taper. MRI brain was completed this AM and he received 10mg IV decadron at 6AM. Transferred to [facility] for further workup. Patient denies headache, nausea, vomiting, or vision changes. Patient has been compliant with his home keppra 1g BID    Hospital Course:  2/25: admitted to [facility]  2/26: KYA overnight. Heme/onc consulted for possible IV avastin  2/27: KYA overnight. S/p IV avastin, temodar started. PT/OR rec AR.  2/29: KYA overnight, neuro stable.  3/1: KYA overnight, neuro stable.   3/2: KYA overnight, neuro stable. Decadron taper to 2BID over 2 weeks.   3/3: KYA overnight, neuro stable.   3/4: KYA o/n. CXR done. ISS added.   3/5: Plan for OR Thurs. LE dopplers negative for DVT.   3/6: KYA overnight. pending OR. POD 0 s/p left parietal crani for cesium seed removal (3/6). SBP 160s, cardene gtt started. Neurologically stable.   3/7: POD#1, cardene off, SQL to start tonight   3/8: POD2. KYA ovn. surgical cx growing enterococcus faecalis, started on linezolid. ID consulted, likely contaminate, linezolid dc'd. MRI complete.   3/9: POD3. KYA ovn. Subgaleal CHELSEY removed.   3/10: POD4. KYA ovn  3/11: POD5. KYA ovn. Patholgy reported bacterial clusters on cesium seeds. ID called. Started on ampicillin 2g q4h  3/12: POD7. KYA ovn. PICC placed. DC      Patient evaluated by PT/OT who recommended: acute rehab  Patient is going to [place] acute [facility]     Hospital course uncomplicated    Exam on day of discharge:  General: patient seen laying supine in bed in NAD  Neuro: AAOx3 (year with choice), +expressive aphasia, follows commands, opens eyes spontaneously, +R facial droop, LUE/LLE 5/5, RUE deltoid 2/5 triceps 3/5 biceps 3/5 hand  2/5, RLE HF 3/5 KF/KE 4/5 DF 1/5 PF 5/5, Decreased sensation to light touch throughout R face, RUE, RLE and R torso.   HEENT: PERRL, dysconjugate gaze but able to do EOMI,  Cardiac: RRR, S1S2  Pulmonary: chest rise symmetric  Abdomen: soft, nontender, nondistended  Ext: perfusing well  Skin: warm, dry    Patient is neuro stable, vitals stable, afebrile, medically ready for discharge    Given the ongoing treatment plan, please note that the patient has a high potential for further admissions to deliver ongoing treatment and/or procedures as part of the normal course of neurosurgical care

## 2025-03-12 NOTE — DISCHARGE NOTE PROVIDER - CARE PROVIDERS DIRECT ADDRESSES
[email or web address]

## 2025-03-12 NOTE — DISCHARGE NOTE PROVIDER - CARE PROVIDER_API CALL
Juanjose Johnston  Neurosurgery  [address] [place] [address]  Phone: [phone]  Fax: [phone]  Follow Up Time:     Julio Sue  Neurosurgery  [address] [place] [address]  Phone: [phone]  Fax: [phone]  Follow Up Time:     Colby Mera  Infectious Disease  [address], [address] [address]  Phone: [phone]  Fax: [phone]  Follow Up Time:

## 2025-03-12 NOTE — DISCHARGE NOTE PROVIDER - NSDCFUSCHEDAPPT_GEN_ALL_CORE_FT
Merissa Hubbard Faat [facility]  [facility] [address]  Scheduled Appointment: 03/20/2025     Fabiola Mayen  [facility]  NEUROSURG [address]  Scheduled Appointment: 03/19/2025    Merissa Hubbard Faat Guy  [facility]  HEMONC [address]  Scheduled Appointment: 03/20/2025    Julio Sue  [facility]  NEUROSURG [address]  Scheduled Appointment: 03/25/2025

## 2025-03-12 NOTE — PROGRESS NOTE ADULT - SUBJECTIVE AND OBJECTIVE BOX
INTERVAL HPI/OVERNIGHT EVENTS:    CONSTITUTIONAL:  Negative fever or chills, feels well, good appetite  EYES:  Negative  blurry vision or double vision  CARDIOVASCULAR:  Negative for chest pain or palpitations  RESPIRATORY:  Negative for cough, wheezing, or SOB   GASTROINTESTINAL:  Negative for nausea, vomiting, diarrhea, constipation, or abdominal pain  GENITOURINARY:  Negative frequency, urgency or dysuria  NEUROLOGIC:  No headache, confusion, dizziness, lightheadedness      ANTIBIOTICS/RELEVANT:    MEDICATIONS  (STANDING):  amLODIPine   Tablet 5 milliGRAM(s) Oral daily  ampicillin  IVPB 2 Gram(s) IV Intermittent every 4 hours  dexAMETHasone     Tablet   Oral   dexAMETHasone     Tablet 4 milliGRAM(s) Oral every 8 hours  enoxaparin Injectable 40 milliGRAM(s) SubCutaneous <User Schedule>  famotidine    Tablet 20 milliGRAM(s) Oral every 12 hours  levETIRAcetam 1000 milliGRAM(s) Oral every 12 hours  ondansetron Injectable 4 milliGRAM(s) IV Push every 6 hours  polyethylene glycol 3350 17 Gram(s) Oral two times a day  senna 2 Tablet(s) Oral at bedtime  trimethoprim  160 mG/sulfamethoxazole 800 mG 1 Tablet(s) Oral <User Schedule>    MEDICATIONS  (PRN):  oxyCODONE    IR 5 milliGRAM(s) Oral every 4 hours PRN Moderate Pain (4 - 6)  oxyCODONE    IR 10 milliGRAM(s) Oral every 4 hours PRN Severe Pain (7 - 10)        Vital Signs Last 24 Hrs  T(C): 36.6 (12 Mar 2025 12:00), Max: 36.7 (12 Mar 2025 05:16)  T(F): 97.8 (12 Mar 2025 12:00), Max: 98.1 (12 Mar 2025 05:16)  HR: 59 (12 Mar 2025 12:00) (46 - 79)  BP: 122/72 (12 Mar 2025 12:00) (115/72 - 132/84)  BP(mean): --  RR: 18 (12 Mar 2025 12:00) (16 - 18)  SpO2: 98% (12 Mar 2025 12:00) (94% - 99%)    Parameters below as of 12 Mar 2025 12:00  Patient On (Oxygen Delivery Method): room air        PHYSICAL EXAM:  Constitutional: non-toxic, no distress  Eyes:SHELBI, EOMI  Ear/Nose/Throat: no oral lesion, no sinus tenderness on percussion	  Neck:  supple  Respiratory: CTA maude  Cardiovascular: S1S2 RRR, no murmurs  Gastrointestinal:soft, (+) BS, no HSM  Extremities:no e/e/c  Vascular: DP Pulse:	right normal; left normal      LABS:                        13.2   7.92  )-----------( 249      ( 12 Mar 2025 05:30 )             40.2     03-12    136  |  102  |  27[H]  ----------------------------<  143[H]  4.0   |  25  |  0.73    Ca    8.4      12 Mar 2025 05:30  Phos  2.9     03-12  Mg     2.0     03-12    TPro  5.4[L]  /  Alb  3.3  /  TBili  0.2  /  DBili  0.1  /  AST  9[L]  /  ALT  17  /  AlkPhos  95  03-11      Urinalysis Basic - ( 12 Mar 2025 05:30 )    Color: x / Appearance: x / SG: x / pH: x  Gluc: 143 mg/dL / Ketone: x  / Bili: x / Urobili: x   Blood: x / Protein: x / Nitrite: x   Leuk Esterase: x / RBC: x / WBC x   Sq Epi: x / Non Sq Epi: x / Bacteria: x        MICROBIOLOGY:    Culture - Wound Aerobic/Anaerobic (03.06.25 @ 17:07)    Specimen Source: Surgical Swab 4. Deep Wound   Culture Results:   Few Cutibacterium acnes "Susceptibilities not performed"    Culture - Tissue with Gram Stain (03.06.25 @ 17:07)    -  Ampicillin: S <=2 Predicts results to ampicillin/sulbactam, amoxacillin-clavulanate and  piperacillin-tazobactam.   -  Vancomycin: S 1   Gram Stain:   No polymorphonuclear cells seen per low power field  No organisms seen per oil power field   Specimen Source: Tissue 3. Seed   Culture Results:   Rare Enterococcus faecalis  Few Cutibacterium acnes "Susceptibilities not performed"   Organism Identification: Enterococcus faecalis   Organism: Enterococcus faecalis   Method Type: BRADFORD        RADIOLOGY & ADDITIONAL STUDIES: INTERVAL HPI/OVERNIGHT EVENTS:    Patient was seen and examined at bedside.  No events    CONSTITUTIONAL:  Negative fever or chills, feels well, good appetite  EYES:  Negative  blurry vision or double vision  CARDIOVASCULAR:  Negative for chest pain or palpitations  RESPIRATORY:  Negative for cough, wheezing, or SOB   GASTROINTESTINAL:  Negative for nausea, vomiting, diarrhea, constipation, or abdominal pain  GENITOURINARY:  Negative frequency, urgency or dysuria  NEUROLOGIC:  No headache, confusion, dizziness, lightheadedness      ANTIBIOTICS/RELEVANT:    MEDICATIONS  (STANDING):  amLODIPine   Tablet 5 milliGRAM(s) Oral daily  ampicillin  IVPB 2 Gram(s) IV Intermittent every 4 hours  dexAMETHasone     Tablet   Oral   dexAMETHasone     Tablet 4 milliGRAM(s) Oral every 8 hours  enoxaparin Injectable 40 milliGRAM(s) SubCutaneous <User Schedule>  famotidine    Tablet 20 milliGRAM(s) Oral every 12 hours  levETIRAcetam 1000 milliGRAM(s) Oral every 12 hours  ondansetron Injectable 4 milliGRAM(s) IV Push every 6 hours  polyethylene glycol 3350 17 Gram(s) Oral two times a day  senna 2 Tablet(s) Oral at bedtime  trimethoprim  160 mG/sulfamethoxazole 800 mG 1 Tablet(s) Oral <User Schedule>    MEDICATIONS  (PRN):  oxyCODONE    IR 5 milliGRAM(s) Oral every 4 hours PRN Moderate Pain (4 - 6)  oxyCODONE    IR 10 milliGRAM(s) Oral every 4 hours PRN Severe Pain (7 - 10)        Vital Signs Last 24 Hrs  T(C): 36.6 (12 Mar 2025 12:00), Max: 36.7 (12 Mar 2025 05:16)  T(F): 97.8 (12 Mar 2025 12:00), Max: 98.1 (12 Mar 2025 05:16)  HR: 59 (12 Mar 2025 12:00) (46 - 79)  BP: 122/72 (12 Mar 2025 12:00) (115/72 - 132/84)  BP(mean): --  RR: 18 (12 Mar 2025 12:00) (16 - 18)  SpO2: 98% (12 Mar 2025 12:00) (94% - 99%)    Parameters below as of 12 Mar 2025 12:00  Patient On (Oxygen Delivery Method): room air        PHYSICAL EXAM:  Constitutional: non-toxic, no distress  Eyes:SHELBI, EOMI  Ear/Nose/Throat: no oral lesion, no sinus tenderness on percussion	  Neck:  supple  Respiratory: CTA maude  Cardiovascular: S1S2 RRR, no murmurs  Gastrointestinal:soft, (+) BS, no HSM  Extremities:no e/e/c  Vascular: DP Pulse:	right normal; left normal      LABS:                        13.2   7.92  )-----------( 249      ( 12 Mar 2025 05:30 )             40.2     03-12    136  |  102  |  27[H]  ----------------------------<  143[H]  4.0   |  25  |  0.73    Ca    8.4      12 Mar 2025 05:30  Phos  2.9     03-12  Mg     2.0     03-12    TPro  5.4[L]  /  Alb  3.3  /  TBili  0.2  /  DBili  0.1  /  AST  9[L]  /  ALT  17  /  AlkPhos  95  03-11      Urinalysis Basic - ( 12 Mar 2025 05:30 )    Color: x / Appearance: x / SG: x / pH: x  Gluc: 143 mg/dL / Ketone: x  / Bili: x / Urobili: x   Blood: x / Protein: x / Nitrite: x   Leuk Esterase: x / RBC: x / WBC x   Sq Epi: x / Non Sq Epi: x / Bacteria: x        MICROBIOLOGY:    Culture - Wound Aerobic/Anaerobic (03.06.25 @ 17:07)    Specimen Source: Surgical Swab 4. Deep Wound   Culture Results:   Few Cutibacterium acnes "Susceptibilities not performed"    Culture - Tissue with Gram Stain (03.06.25 @ 17:07)    -  Ampicillin: S <=2 Predicts results to ampicillin/sulbactam, amoxacillin-clavulanate and  piperacillin-tazobactam.   -  Vancomycin: S 1   Gram Stain:   No polymorphonuclear cells seen per low power field  No organisms seen per oil power field   Specimen Source: Tissue 3. Seed   Culture Results:   Rare Enterococcus faecalis  Few Cutibacterium acnes "Susceptibilities not performed"   Organism Identification: Enterococcus faecalis   Organism: Enterococcus faecalis   Method Type: BRADFORD        RADIOLOGY & ADDITIONAL STUDIES:

## 2025-03-12 NOTE — PROGRESS NOTE ADULT - ASSESSMENT
69M with pmh HTN, L frontoparietal GBM s/p biopsy @[facility] 2023, resection x2 April/2023, Dec/2024 with GammaTile placement (20 seeds/5 Tiles), s/p >6 cycles of TMZ (Aug/2023-Jan/2024), RT (60 Gy in 30 fxns (June/2023-July/2024) presents with worsening right sided weakness and expressive aphasia x 1 week. At [facility] ER CTH  showed worsening left sided whole-hemisphere vasogenic edema susp 2/2 GammaTile and Dex taper and MR brain completed. Transferred to [facility] for further workup. Now s/p left parietal crani for cesium seed removal (3/6).     #Hx of recurrent GBM s/p Craniotomy for Resection and Gammatile Brachytherapy (12/19)  # s/p left parietal crani for cesium seed removal (3/6)  - steroids taper per NSGY   -Continue Famotidine and ISS while on steroids  -OR cx +enterococcus faecalis; path with Enterococcus and Cutibacterium acnes. Started on Ampicillin, final ID recommendations pending.   - Continue Keppra 1000 mg q12 and seizure precautions   -Pt seen by heme/onc consult; Pt  s/p IV Avastin 2/27, temodar (2/27-3/3); On bactrim DS 3x a week for prophylaxis     #Anemia  Noted with drop in Hb, no signs of bleeding. Continue on monitoring Hb  Continue GI ppx while on steroids    # BUN elevation  Creatinine stable, no signs of bleeding  If further increase, send Cystatin C     #HTN  - Continue amlodipine 5mg daily w/ holding parameters    #Constipation (RESOLVED)  Continue bowel regimen, monitor response     #DVT PPx: Enoxaparin, monitor Hb   Discussed with primary team

## 2025-03-12 NOTE — DISCHARGE NOTE PROVIDER - NSDCFUADDINST_GEN_ALL_CORE_FT
Neurosurgery follow up appointment date/time:  - follow up in the office for a wound check and staple removal   - please call the office to confirm appointment: [phone]    Wound Care:  - shower daily and let soapy water run down your back  - gently clean incision with soapy water  - pat dry incision with a clean towel after showering  - leave incision uncovered, open to air  - no picking at incision     Devices:  - PICC in place for duration of IV antibiotics through 3/25, please confirm with Dr. Mera before removing.     Activity:  - fatigue is common after surgery, rest if you feel tired   - no bending, lifting, twisting or heavy lifting   - walking is recommended, ambulate as tolerated  - you may shower when you get home, keep your incision dry  - no soaking in a tub/pool/hot tub   - no driving within 24 hours of anesthesia or while taking prescription pain medications   - keep hydrated, drink plenty of water     Inpatient consults:  - Infectious Disease: Continue Ampicillin 2 grams IV q4hrs.  This can also be given as 12 grams over 24hrs via continuous infusion, end date = 3/25. Needs weekly CBC with diff, CMP.  Can fax to Dr. Mera's office: [phone]    Please also follow up with your primary care doctor.     Pain Expectations:  - pain after surgery is expected  - please take pain meds as prescribed     Medications:  - continue home meds as prescribed: Amlodipine, Keppra, Metformin  - new meds:  Ampicillin 2g IV every 4 hours for wound infection through 3/25  Dexamethasone taper to 2mg BID indefinitely  Lovenox SQ while at rehab for DVT prevention  Famotidine for GI protection while on steroids  Bactrim 3x per week for PCP prophylaxis while on steroids   Bowel regimen: Miralax, Senna   - pain meds:  Tylenol as needed for pain   - adverse affects of meds discussed with patient  - pain medications can cause constipation, you should eat a high fiber diet and may take a stool softener while on pain meds   - Avoid taking Advil (ibuprofen), Motrin (naproxen), or Aspirin for pain as they can cause bleeding     Call the office or come to ED if:  - wound has drainage or bleeding, increased redness or pain at incision site, neurological change, fever (>101), chills, night sweats, syncope, nausea/vomiting, chest pain, shortness of breath      Playback:  - see [facility] for a copy of your discharge paperwork     WITHIN 24 HOURS OF DISCHARGE, PLEASE CONTACT NEURO PA  WITH ANY QUESTIONS OR CONCERNS: [phone]   OTHERWISE, PLEASE CALL THE OFFICE WITH ANY QUESTIONS OR CONCERNS: [phone] Neurosurgery follow up appointment date/time: Phone call with DELFINA Matute on 3/19 at 12pm and Appt with Dr. Niel Ernandez in office 3/23 at 11:30am  - follow up in the office for a wound check and staple removal; if patient is still at rehab on POD 21 (3.28), please contact Dr. Neil Ernandez or Dr. Cheung (via Teams okay) prior to removing staples.   - please call the office to confirm appointment: [phone]    Wound Care:  - shower daily and let soapy water run down your back  - gently clean incision with soapy water  - pat dry incision with a clean towel after showering  - leave incision uncovered, open to air  - no picking at incision     Devices:  - PICC in place for duration of IV antibiotics through 3/25, please confirm with Dr. Mera before removing.     Activity:  - fatigue is common after surgery, rest if you feel tired   - no bending, lifting, twisting or heavy lifting   - walking is recommended, ambulate as tolerated  - you may shower when you get home, keep your incision dry  - no soaking in a tub/pool/hot tub   - no driving within 24 hours of anesthesia or while taking prescription pain medications   - keep hydrated, drink plenty of water     Inpatient consults:  - Infectious Disease: Continue Ampicillin 2 grams IV q4hrs.  This can also be given as 12 grams over 24hrs via continuous infusion, end date = 3/25. Needs weekly CBC with diff, CMP.  Can fax to Dr. Mera's office: [phone]    Please also follow up with your primary care doctor.     Pain Expectations:  - pain after surgery is expected  - please take pain meds as prescribed     Medications:  - continue home meds as prescribed: Amlodipine, Keppra, Metformin  - new meds:  Ampicillin 2g IV every 4 hours for wound infection through 3/25  Dexamethasone taper to 2mg BID indefinitely  Lovenox SQ while at rehab for DVT prevention  Famotidine for GI protection while on steroids  Bactrim 3x per week for PCP prophylaxis while on steroids   Bowel regimen: Miralax, Senna   - pain meds:  Tylenol as needed for pain   - adverse affects of meds discussed with patient  - pain medications can cause constipation, you should eat a high fiber diet and may take a stool softener while on pain meds   - Avoid taking Advil (ibuprofen), Motrin (naproxen), or Aspirin for pain as they can cause bleeding     Call the office or come to ED if:  - wound has drainage or bleeding, increased redness or pain at incision site, neurological change, fever (>101), chills, night sweats, syncope, nausea/vomiting, chest pain, shortness of breath      Playback:  - see [facility] for a copy of your discharge paperwork     WITHIN 24 HOURS OF DISCHARGE, PLEASE CONTACT NEURO PA  WITH ANY QUESTIONS OR CONCERNS: [phone]   OTHERWISE, PLEASE CALL THE OFFICE WITH ANY QUESTIONS OR CONCERNS: [phone]

## 2025-03-12 NOTE — DISCHARGE NOTE PROVIDER - NSDCFUADDAPPT_GEN_ALL_CORE_FT
Please follow up with Dr. Johnston, call the office to confirm appointment at [phone]    Please follow up with Dr. Neil Ernandez, call the office to confirm appointment at [phone]    Please follow up with Dr. Mera from Infectious Disease    Please follow up with Dr. Hubbard from Oncology on 3/20 at 3PM    Please follow up with your primary care doctor

## 2025-03-12 NOTE — DISCHARGE NOTE NURSING/CASE MANAGEMENT/SOCIAL WORK - NSDPFAC_GEN_ALL_CORE
[facility]: [facility], [address] [place] [address], P: [phone]

## 2025-03-13 ENCOUNTER — NON-APPOINTMENT (OUTPATIENT)
Age: 70
End: 2025-03-13

## 2025-03-13 LAB
ALBUMIN SERPL ELPH-MCNC: 2.6 G/DL — LOW (ref 3.3–5)
ALP SERPL-CCNC: 106 U/L — SIGNIFICANT CHANGE UP (ref 40–120)
ALT FLD-CCNC: 26 U/L — SIGNIFICANT CHANGE UP (ref 10–45)
ANION GAP SERPL CALC-SCNC: 9 MMOL/L — SIGNIFICANT CHANGE UP (ref 5–17)
AST SERPL-CCNC: 12 U/L — SIGNIFICANT CHANGE UP (ref 10–40)
BASOPHILS # BLD AUTO: 0 K/UL — SIGNIFICANT CHANGE UP (ref 0–0.2)
BASOPHILS NFR BLD AUTO: 0 % — SIGNIFICANT CHANGE UP (ref 0–2)
BILIRUB SERPL-MCNC: 0.4 MG/DL — SIGNIFICANT CHANGE UP (ref 0.2–1.2)
BUN SERPL-MCNC: 37 MG/DL — HIGH (ref 7–23)
CALCIUM SERPL-MCNC: 8.3 MG/DL — LOW (ref 8.4–10.5)
CHLORIDE SERPL-SCNC: 106 MMOL/L — SIGNIFICANT CHANGE UP (ref 96–108)
CO2 SERPL-SCNC: 26 MMOL/L — SIGNIFICANT CHANGE UP (ref 22–31)
CREAT SERPL-MCNC: 0.85 MG/DL — SIGNIFICANT CHANGE UP (ref 0.5–1.3)
EGFR: 95 ML/MIN/1.73M2 — SIGNIFICANT CHANGE UP
EGFR: 95 ML/MIN/1.73M2 — SIGNIFICANT CHANGE UP
EOSINOPHIL # BLD AUTO: 0 K/UL — SIGNIFICANT CHANGE UP (ref 0–0.5)
EOSINOPHIL NFR BLD AUTO: 0 % — SIGNIFICANT CHANGE UP (ref 0–6)
GLUCOSE SERPL-MCNC: 143 MG/DL — HIGH (ref 70–99)
HCT VFR BLD CALC: 38.7 % — LOW (ref 39–50)
HGB BLD-MCNC: 12.6 G/DL — LOW (ref 13–17)
LYMPHOCYTES # BLD AUTO: 0.84 K/UL — LOW (ref 1–3.3)
LYMPHOCYTES # BLD AUTO: 12 % — LOW (ref 13–44)
MANUAL SMEAR VERIFICATION: SIGNIFICANT CHANGE UP
MCHC RBC-ENTMCNC: 29.3 PG — SIGNIFICANT CHANGE UP (ref 27–34)
MCHC RBC-ENTMCNC: 32.6 G/DL — SIGNIFICANT CHANGE UP (ref 32–36)
MCV RBC AUTO: 90 FL — SIGNIFICANT CHANGE UP (ref 80–100)
MONOCYTES # BLD AUTO: 0.56 K/UL — SIGNIFICANT CHANGE UP (ref 0–0.9)
MONOCYTES NFR BLD AUTO: 8 % — SIGNIFICANT CHANGE UP (ref 2–14)
MYELOCYTES NFR BLD: 4 % — HIGH (ref 0–0)
NEUTROPHILS # BLD AUTO: 5.34 K/UL — SIGNIFICANT CHANGE UP (ref 1.8–7.4)
NEUTROPHILS NFR BLD AUTO: 74 % — SIGNIFICANT CHANGE UP (ref 43–77)
NEUTS BAND # BLD: 2 % — SIGNIFICANT CHANGE UP (ref 0–8)
NEUTS BAND NFR BLD: 2 % — SIGNIFICANT CHANGE UP (ref 0–8)
NRBC # BLD: 0 /100 WBCS — SIGNIFICANT CHANGE UP (ref 0–0)
NRBC BLD-RTO: 0 /100 WBCS — SIGNIFICANT CHANGE UP (ref 0–0)
PLAT MORPH BLD: NORMAL — SIGNIFICANT CHANGE UP
PLATELET # BLD AUTO: 245 K/UL — SIGNIFICANT CHANGE UP (ref 150–400)
POTASSIUM SERPL-MCNC: 4.3 MMOL/L — SIGNIFICANT CHANGE UP (ref 3.5–5.3)
POTASSIUM SERPL-SCNC: 4.3 MMOL/L — SIGNIFICANT CHANGE UP (ref 3.5–5.3)
PROT SERPL-MCNC: 5.6 G/DL — LOW (ref 6–8.3)
RBC # BLD: 4.3 M/UL — SIGNIFICANT CHANGE UP (ref 4.2–5.8)
RBC # FLD: 15.6 % — HIGH (ref 10.3–14.5)
RBC BLD AUTO: NORMAL — SIGNIFICANT CHANGE UP
SODIUM SERPL-SCNC: 141 MMOL/L — SIGNIFICANT CHANGE UP (ref 135–145)
WBC # BLD: 7.03 K/UL — SIGNIFICANT CHANGE UP (ref 3.8–10.5)
WBC # FLD AUTO: 7.03 K/UL — SIGNIFICANT CHANGE UP (ref 3.8–10.5)

## 2025-03-13 PROCEDURE — 99255 IP/OBS CONSLTJ NEW/EST HI 80: CPT

## 2025-03-13 RX ADMIN — ENOXAPARIN SODIUM 40 MILLIGRAM(S): 100 INJECTION SUBCUTANEOUS at 21:06

## 2025-03-13 RX ADMIN — AMPICILLIN SODIUM 200 GRAM(S): 1 INJECTION, POWDER, FOR SOLUTION INTRAMUSCULAR; INTRAVENOUS at 06:04

## 2025-03-13 RX ADMIN — AMPICILLIN SODIUM 200 GRAM(S): 1 INJECTION, POWDER, FOR SOLUTION INTRAMUSCULAR; INTRAVENOUS at 11:09

## 2025-03-13 RX ADMIN — AMLODIPINE BESYLATE 5 MILLIGRAM(S): 10 TABLET ORAL at 06:03

## 2025-03-13 RX ADMIN — AMPICILLIN SODIUM 200 GRAM(S): 1 INJECTION, POWDER, FOR SOLUTION INTRAMUSCULAR; INTRAVENOUS at 21:06

## 2025-03-13 RX ADMIN — AMPICILLIN SODIUM 200 GRAM(S): 1 INJECTION, POWDER, FOR SOLUTION INTRAMUSCULAR; INTRAVENOUS at 17:08

## 2025-03-13 RX ADMIN — Medication 20 MILLIGRAM(S): at 06:03

## 2025-03-13 RX ADMIN — AMPICILLIN SODIUM 200 GRAM(S): 1 INJECTION, POWDER, FOR SOLUTION INTRAMUSCULAR; INTRAVENOUS at 13:33

## 2025-03-13 RX ADMIN — DEXAMETHASONE 4 MILLIGRAM(S): 0.5 TABLET ORAL at 06:04

## 2025-03-13 RX ADMIN — LEVETIRACETAM 1000 MILLIGRAM(S): 10 INJECTION, SOLUTION INTRAVENOUS at 17:07

## 2025-03-13 RX ADMIN — AMPICILLIN SODIUM 200 GRAM(S): 1 INJECTION, POWDER, FOR SOLUTION INTRAMUSCULAR; INTRAVENOUS at 02:17

## 2025-03-13 RX ADMIN — LEVETIRACETAM 1000 MILLIGRAM(S): 10 INJECTION, SOLUTION INTRAVENOUS at 06:03

## 2025-03-13 RX ADMIN — Medication 1 APPLICATION(S): at 11:27

## 2025-03-13 RX ADMIN — DEXAMETHASONE 4 MILLIGRAM(S): 0.5 TABLET ORAL at 21:06

## 2025-03-13 RX ADMIN — DEXAMETHASONE 4 MILLIGRAM(S): 0.5 TABLET ORAL at 13:29

## 2025-03-13 RX ADMIN — Medication 1 TABLET(S): at 11:05

## 2025-03-13 RX ADMIN — Medication 20 MILLIGRAM(S): at 17:07

## 2025-03-14 PROCEDURE — 86900 BLOOD TYPING SEROLOGIC ABO: CPT

## 2025-03-14 PROCEDURE — C1713: CPT

## 2025-03-14 PROCEDURE — 97161 PT EVAL LOW COMPLEX 20 MIN: CPT

## 2025-03-14 PROCEDURE — 97112 NEUROMUSCULAR REEDUCATION: CPT

## 2025-03-14 PROCEDURE — 93970 EXTREMITY STUDY: CPT

## 2025-03-14 PROCEDURE — 83735 ASSAY OF MAGNESIUM: CPT

## 2025-03-14 PROCEDURE — 86901 BLOOD TYPING SEROLOGIC RH(D): CPT

## 2025-03-14 PROCEDURE — 36415 COLL VENOUS BLD VENIPUNCTURE: CPT

## 2025-03-14 PROCEDURE — 70553 MRI BRAIN STEM W/O & W/DYE: CPT | Mod: MC

## 2025-03-14 PROCEDURE — 87070 CULTURE OTHR SPECIMN AEROBIC: CPT

## 2025-03-14 PROCEDURE — 88305 TISSUE EXAM BY PATHOLOGIST: CPT

## 2025-03-14 PROCEDURE — 86850 RBC ANTIBODY SCREEN: CPT

## 2025-03-14 PROCEDURE — 85652 RBC SED RATE AUTOMATED: CPT

## 2025-03-14 PROCEDURE — 97164 PT RE-EVAL EST PLAN CARE: CPT

## 2025-03-14 PROCEDURE — 99233 SBSQ HOSP IP/OBS HIGH 50: CPT | Mod: GC

## 2025-03-14 PROCEDURE — 86140 C-REACTIVE PROTEIN: CPT

## 2025-03-14 PROCEDURE — 93005 ELECTROCARDIOGRAM TRACING: CPT

## 2025-03-14 PROCEDURE — 85610 PROTHROMBIN TIME: CPT

## 2025-03-14 PROCEDURE — 87186 SC STD MICRODIL/AGAR DIL: CPT

## 2025-03-14 PROCEDURE — 88341 IMHCHEM/IMCYTCHM EA ADD ANTB: CPT

## 2025-03-14 PROCEDURE — 88342 IMHCHEM/IMCYTCHM 1ST ANTB: CPT

## 2025-03-14 PROCEDURE — 97110 THERAPEUTIC EXERCISES: CPT

## 2025-03-14 PROCEDURE — 97116 GAIT TRAINING THERAPY: CPT

## 2025-03-14 PROCEDURE — 80076 HEPATIC FUNCTION PANEL: CPT

## 2025-03-14 PROCEDURE — 71045 X-RAY EXAM CHEST 1 VIEW: CPT

## 2025-03-14 PROCEDURE — 85027 COMPLETE CBC AUTOMATED: CPT

## 2025-03-14 PROCEDURE — A9585: CPT

## 2025-03-14 PROCEDURE — 97168 OT RE-EVAL EST PLAN CARE: CPT

## 2025-03-14 PROCEDURE — 87077 CULTURE AEROBIC IDENTIFY: CPT

## 2025-03-14 PROCEDURE — 97165 OT EVAL LOW COMPLEX 30 MIN: CPT

## 2025-03-14 PROCEDURE — 85025 COMPLETE CBC W/AUTO DIFF WBC: CPT

## 2025-03-14 PROCEDURE — 97535 SELF CARE MNGMENT TRAINING: CPT

## 2025-03-14 PROCEDURE — 80048 BASIC METABOLIC PNL TOTAL CA: CPT

## 2025-03-14 PROCEDURE — 83036 HEMOGLOBIN GLYCOSYLATED A1C: CPT

## 2025-03-14 PROCEDURE — 87075 CULTR BACTERIA EXCEPT BLOOD: CPT

## 2025-03-14 PROCEDURE — C1889: CPT

## 2025-03-14 PROCEDURE — 82962 GLUCOSE BLOOD TEST: CPT

## 2025-03-14 PROCEDURE — 85730 THROMBOPLASTIN TIME PARTIAL: CPT

## 2025-03-14 PROCEDURE — 84100 ASSAY OF PHOSPHORUS: CPT

## 2025-03-14 PROCEDURE — 80053 COMPREHEN METABOLIC PANEL: CPT

## 2025-03-14 RX ORDER — TRAZODONE HCL 100 MG
25 TABLET ORAL
Refills: 0 | Status: DISCONTINUED | OUTPATIENT
Start: 2025-03-14 | End: 2025-04-05

## 2025-03-14 RX ADMIN — AMPICILLIN SODIUM 200 GRAM(S): 1 INJECTION, POWDER, FOR SOLUTION INTRAMUSCULAR; INTRAVENOUS at 05:37

## 2025-03-14 RX ADMIN — Medication 2 TABLET(S): at 21:10

## 2025-03-14 RX ADMIN — AMPICILLIN SODIUM 200 GRAM(S): 1 INJECTION, POWDER, FOR SOLUTION INTRAMUSCULAR; INTRAVENOUS at 13:31

## 2025-03-14 RX ADMIN — AMPICILLIN SODIUM 200 GRAM(S): 1 INJECTION, POWDER, FOR SOLUTION INTRAMUSCULAR; INTRAVENOUS at 17:15

## 2025-03-14 RX ADMIN — AMPICILLIN SODIUM 200 GRAM(S): 1 INJECTION, POWDER, FOR SOLUTION INTRAMUSCULAR; INTRAVENOUS at 01:11

## 2025-03-14 RX ADMIN — LEVETIRACETAM 1000 MILLIGRAM(S): 10 INJECTION, SOLUTION INTRAVENOUS at 05:42

## 2025-03-14 RX ADMIN — Medication 20 MILLIGRAM(S): at 05:43

## 2025-03-14 RX ADMIN — Medication 1 TABLET(S): at 11:00

## 2025-03-14 RX ADMIN — DEXAMETHASONE 4 MILLIGRAM(S): 0.5 TABLET ORAL at 13:31

## 2025-03-14 RX ADMIN — AMPICILLIN SODIUM 200 GRAM(S): 1 INJECTION, POWDER, FOR SOLUTION INTRAMUSCULAR; INTRAVENOUS at 09:33

## 2025-03-14 RX ADMIN — Medication 25 MILLIGRAM(S): at 21:10

## 2025-03-14 RX ADMIN — DEXAMETHASONE 4 MILLIGRAM(S): 0.5 TABLET ORAL at 21:10

## 2025-03-14 RX ADMIN — Medication 20 MILLIGRAM(S): at 17:15

## 2025-03-14 RX ADMIN — AMPICILLIN SODIUM 200 GRAM(S): 1 INJECTION, POWDER, FOR SOLUTION INTRAMUSCULAR; INTRAVENOUS at 21:10

## 2025-03-14 RX ADMIN — ENOXAPARIN SODIUM 40 MILLIGRAM(S): 100 INJECTION SUBCUTANEOUS at 21:10

## 2025-03-14 RX ADMIN — Medication 1 APPLICATION(S): at 11:00

## 2025-03-14 RX ADMIN — LEVETIRACETAM 1000 MILLIGRAM(S): 10 INJECTION, SOLUTION INTRAVENOUS at 17:15

## 2025-03-14 RX ADMIN — DEXAMETHASONE 4 MILLIGRAM(S): 0.5 TABLET ORAL at 05:42

## 2025-03-14 RX ADMIN — Medication 1 TABLET(S): at 17:19

## 2025-03-14 RX ADMIN — AMLODIPINE BESYLATE 5 MILLIGRAM(S): 10 TABLET ORAL at 05:42

## 2025-03-15 PROCEDURE — 99232 SBSQ HOSP IP/OBS MODERATE 35: CPT

## 2025-03-15 PROCEDURE — 99233 SBSQ HOSP IP/OBS HIGH 50: CPT | Mod: GC

## 2025-03-15 RX ADMIN — AMPICILLIN SODIUM 200 GRAM(S): 1 INJECTION, POWDER, FOR SOLUTION INTRAMUSCULAR; INTRAVENOUS at 01:24

## 2025-03-15 RX ADMIN — AMPICILLIN SODIUM 200 GRAM(S): 1 INJECTION, POWDER, FOR SOLUTION INTRAMUSCULAR; INTRAVENOUS at 21:38

## 2025-03-15 RX ADMIN — AMPICILLIN SODIUM 200 GRAM(S): 1 INJECTION, POWDER, FOR SOLUTION INTRAMUSCULAR; INTRAVENOUS at 18:42

## 2025-03-15 RX ADMIN — LEVETIRACETAM 1000 MILLIGRAM(S): 10 INJECTION, SOLUTION INTRAVENOUS at 17:35

## 2025-03-15 RX ADMIN — Medication 1 TABLET(S): at 11:05

## 2025-03-15 RX ADMIN — AMLODIPINE BESYLATE 5 MILLIGRAM(S): 10 TABLET ORAL at 05:31

## 2025-03-15 RX ADMIN — Medication 20 MILLIGRAM(S): at 17:35

## 2025-03-15 RX ADMIN — Medication 20 MILLIGRAM(S): at 05:31

## 2025-03-15 RX ADMIN — ENOXAPARIN SODIUM 40 MILLIGRAM(S): 100 INJECTION SUBCUTANEOUS at 21:24

## 2025-03-15 RX ADMIN — LEVETIRACETAM 1000 MILLIGRAM(S): 10 INJECTION, SOLUTION INTRAVENOUS at 05:31

## 2025-03-15 RX ADMIN — AMPICILLIN SODIUM 200 GRAM(S): 1 INJECTION, POWDER, FOR SOLUTION INTRAMUSCULAR; INTRAVENOUS at 11:04

## 2025-03-15 RX ADMIN — Medication 2 TABLET(S): at 21:25

## 2025-03-15 RX ADMIN — DEXAMETHASONE 4 MILLIGRAM(S): 0.5 TABLET ORAL at 17:35

## 2025-03-15 RX ADMIN — Medication 1 APPLICATION(S): at 11:04

## 2025-03-15 RX ADMIN — Medication 25 MILLIGRAM(S): at 20:59

## 2025-03-15 RX ADMIN — AMPICILLIN SODIUM 200 GRAM(S): 1 INJECTION, POWDER, FOR SOLUTION INTRAMUSCULAR; INTRAVENOUS at 05:32

## 2025-03-15 RX ADMIN — AMPICILLIN SODIUM 200 GRAM(S): 1 INJECTION, POWDER, FOR SOLUTION INTRAMUSCULAR; INTRAVENOUS at 14:35

## 2025-03-16 PROCEDURE — 99232 SBSQ HOSP IP/OBS MODERATE 35: CPT

## 2025-03-16 PROCEDURE — 99232 SBSQ HOSP IP/OBS MODERATE 35: CPT | Mod: GC

## 2025-03-16 RX ADMIN — AMPICILLIN SODIUM 200 GRAM(S): 1 INJECTION, POWDER, FOR SOLUTION INTRAMUSCULAR; INTRAVENOUS at 21:16

## 2025-03-16 RX ADMIN — AMLODIPINE BESYLATE 5 MILLIGRAM(S): 10 TABLET ORAL at 05:47

## 2025-03-16 RX ADMIN — DEXAMETHASONE 4 MILLIGRAM(S): 0.5 TABLET ORAL at 17:35

## 2025-03-16 RX ADMIN — Medication 20 MILLIGRAM(S): at 17:36

## 2025-03-16 RX ADMIN — Medication 20 MILLIGRAM(S): at 05:47

## 2025-03-16 RX ADMIN — DEXAMETHASONE 4 MILLIGRAM(S): 0.5 TABLET ORAL at 05:47

## 2025-03-16 RX ADMIN — POLYETHYLENE GLYCOL 3350 17 GRAM(S): 17 POWDER, FOR SOLUTION ORAL at 17:36

## 2025-03-16 RX ADMIN — AMPICILLIN SODIUM 200 GRAM(S): 1 INJECTION, POWDER, FOR SOLUTION INTRAMUSCULAR; INTRAVENOUS at 17:36

## 2025-03-16 RX ADMIN — Medication 2 TABLET(S): at 21:16

## 2025-03-16 RX ADMIN — AMPICILLIN SODIUM 200 GRAM(S): 1 INJECTION, POWDER, FOR SOLUTION INTRAMUSCULAR; INTRAVENOUS at 05:49

## 2025-03-16 RX ADMIN — Medication 1 APPLICATION(S): at 11:15

## 2025-03-16 RX ADMIN — ENOXAPARIN SODIUM 40 MILLIGRAM(S): 100 INJECTION SUBCUTANEOUS at 21:16

## 2025-03-16 RX ADMIN — AMPICILLIN SODIUM 200 GRAM(S): 1 INJECTION, POWDER, FOR SOLUTION INTRAMUSCULAR; INTRAVENOUS at 10:30

## 2025-03-16 RX ADMIN — AMPICILLIN SODIUM 200 GRAM(S): 1 INJECTION, POWDER, FOR SOLUTION INTRAMUSCULAR; INTRAVENOUS at 14:58

## 2025-03-16 RX ADMIN — Medication 25 MILLIGRAM(S): at 20:32

## 2025-03-16 RX ADMIN — LEVETIRACETAM 1000 MILLIGRAM(S): 10 INJECTION, SOLUTION INTRAVENOUS at 17:35

## 2025-03-16 RX ADMIN — AMPICILLIN SODIUM 200 GRAM(S): 1 INJECTION, POWDER, FOR SOLUTION INTRAMUSCULAR; INTRAVENOUS at 01:56

## 2025-03-16 RX ADMIN — LEVETIRACETAM 1000 MILLIGRAM(S): 10 INJECTION, SOLUTION INTRAVENOUS at 05:47

## 2025-03-16 RX ADMIN — Medication 1 TABLET(S): at 11:15

## 2025-03-17 ENCOUNTER — NON-APPOINTMENT (OUTPATIENT)
Age: 70
End: 2025-03-17

## 2025-03-17 DIAGNOSIS — G06.0 INTRACRANIAL ABSCESS AND GRANULOMA: ICD-10-CM

## 2025-03-17 DIAGNOSIS — Z96.651 PRESENCE OF RIGHT ARTIFICIAL KNEE JOINT: ICD-10-CM

## 2025-03-17 DIAGNOSIS — R00.1 BRADYCARDIA, UNSPECIFIED: ICD-10-CM

## 2025-03-17 DIAGNOSIS — K59.00 CONSTIPATION, UNSPECIFIED: ICD-10-CM

## 2025-03-17 DIAGNOSIS — R47.02 DYSPHASIA: ICD-10-CM

## 2025-03-17 DIAGNOSIS — G93.6 CEREBRAL EDEMA: ICD-10-CM

## 2025-03-17 DIAGNOSIS — G81.91 HEMIPLEGIA, UNSPECIFIED AFFECTING RIGHT DOMINANT SIDE: ICD-10-CM

## 2025-03-17 DIAGNOSIS — C71.9 MALIGNANT NEOPLASM OF BRAIN, UNSPECIFIED: ICD-10-CM

## 2025-03-17 DIAGNOSIS — G47.00 INSOMNIA, UNSPECIFIED: ICD-10-CM

## 2025-03-17 DIAGNOSIS — B95.2 ENTEROCOCCUS AS THE CAUSE OF DISEASES CLASSIFIED ELSEWHERE: ICD-10-CM

## 2025-03-17 DIAGNOSIS — I10 ESSENTIAL (PRIMARY) HYPERTENSION: ICD-10-CM

## 2025-03-17 DIAGNOSIS — G93.5 COMPRESSION OF BRAIN: ICD-10-CM

## 2025-03-17 DIAGNOSIS — M19.90 UNSPECIFIED OSTEOARTHRITIS, UNSPECIFIED SITE: ICD-10-CM
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
## 2025-03-17 DIAGNOSIS — I67.89 OTHER CEREBROVASCULAR DISEASE: ICD-10-CM

## 2025-03-17 LAB
A1C WITH ESTIMATED AVERAGE GLUCOSE RESULT: 6.2 % — HIGH (ref 4–5.6)
ALBUMIN SERPL ELPH-MCNC: 2.8 G/DL — LOW (ref 3.3–5)
ALP SERPL-CCNC: 107 U/L — SIGNIFICANT CHANGE UP (ref 40–120)
ALT FLD-CCNC: 27 U/L — SIGNIFICANT CHANGE UP (ref 10–45)
ANION GAP SERPL CALC-SCNC: 6 MMOL/L — SIGNIFICANT CHANGE UP (ref 5–17)
AST SERPL-CCNC: 12 U/L — SIGNIFICANT CHANGE UP (ref 10–40)
BASOPHILS # BLD AUTO: 0.01 K/UL — SIGNIFICANT CHANGE UP (ref 0–0.2)
BASOPHILS NFR BLD AUTO: 0.1 % — SIGNIFICANT CHANGE UP (ref 0–2)
BILIRUB SERPL-MCNC: 0.4 MG/DL — SIGNIFICANT CHANGE UP (ref 0.2–1.2)
BUN SERPL-MCNC: 34 MG/DL — HIGH (ref 7–23)
CALCIUM SERPL-MCNC: 8.3 MG/DL — LOW (ref 8.4–10.5)
CHLORIDE SERPL-SCNC: 105 MMOL/L — SIGNIFICANT CHANGE UP (ref 96–108)
CO2 SERPL-SCNC: 29 MMOL/L — SIGNIFICANT CHANGE UP (ref 22–31)
CREAT SERPL-MCNC: 0.8 MG/DL — SIGNIFICANT CHANGE UP (ref 0.5–1.3)
EGFR: 97 ML/MIN/1.73M2 — SIGNIFICANT CHANGE UP
EGFR: 97 ML/MIN/1.73M2 — SIGNIFICANT CHANGE UP
EOSINOPHIL # BLD AUTO: 0 K/UL — SIGNIFICANT CHANGE UP (ref 0–0.5)
EOSINOPHIL NFR BLD AUTO: 0 % — SIGNIFICANT CHANGE UP (ref 0–6)
ESTIMATED AVERAGE GLUCOSE: 131 MG/DL — HIGH (ref 68–114)
GLUCOSE SERPL-MCNC: 167 MG/DL — HIGH (ref 70–99)
HCT VFR BLD CALC: 39.6 % — SIGNIFICANT CHANGE UP (ref 39–50)
HGB BLD-MCNC: 12.8 G/DL — LOW (ref 13–17)
IMM GRANULOCYTES NFR BLD AUTO: 1.6 % — HIGH (ref 0–0.9)
LYMPHOCYTES # BLD AUTO: 0.98 K/UL — LOW (ref 1–3.3)
LYMPHOCYTES # BLD AUTO: 14.3 % — SIGNIFICANT CHANGE UP (ref 13–44)
MCHC RBC-ENTMCNC: 29.5 PG — SIGNIFICANT CHANGE UP (ref 27–34)
MCHC RBC-ENTMCNC: 32.3 G/DL — SIGNIFICANT CHANGE UP (ref 32–36)
MCV RBC AUTO: 91.2 FL — SIGNIFICANT CHANGE UP (ref 80–100)
MONOCYTES # BLD AUTO: 0.39 K/UL — SIGNIFICANT CHANGE UP (ref 0–0.9)
MONOCYTES NFR BLD AUTO: 5.7 % — SIGNIFICANT CHANGE UP (ref 2–14)
NEUTROPHILS # BLD AUTO: 5.35 K/UL — SIGNIFICANT CHANGE UP (ref 1.8–7.4)
NEUTROPHILS NFR BLD AUTO: 78.3 % — HIGH (ref 43–77)
NRBC BLD AUTO-RTO: 0 /100 WBCS — SIGNIFICANT CHANGE UP (ref 0–0)
PLATELET # BLD AUTO: 214 K/UL — SIGNIFICANT CHANGE UP (ref 150–400)
POTASSIUM SERPL-MCNC: 4.1 MMOL/L — SIGNIFICANT CHANGE UP (ref 3.5–5.3)
POTASSIUM SERPL-SCNC: 4.1 MMOL/L — SIGNIFICANT CHANGE UP (ref 3.5–5.3)
PROT SERPL-MCNC: 5.8 G/DL — LOW (ref 6–8.3)
RBC # BLD: 4.34 M/UL — SIGNIFICANT CHANGE UP (ref 4.2–5.8)
RBC # FLD: 15.8 % — HIGH (ref 10.3–14.5)
SODIUM SERPL-SCNC: 140 MMOL/L — SIGNIFICANT CHANGE UP (ref 135–145)
WBC # BLD: 6.84 K/UL — SIGNIFICANT CHANGE UP (ref 3.8–10.5)
WBC # FLD AUTO: 6.84 K/UL — SIGNIFICANT CHANGE UP (ref 3.8–10.5)

## 2025-03-17 PROCEDURE — 99232 SBSQ HOSP IP/OBS MODERATE 35: CPT

## 2025-03-17 PROCEDURE — 99233 SBSQ HOSP IP/OBS HIGH 50: CPT | Mod: GC

## 2025-03-17 RX ORDER — AMLODIPINE BESYLATE 10 MG/1
5 TABLET ORAL DAILY
Refills: 0 | Status: DISCONTINUED | OUTPATIENT
Start: 2025-03-18 | End: 2025-04-05

## 2025-03-17 RX ORDER — POLYETHYLENE GLYCOL 3350 17 G/17G
17 POWDER, FOR SOLUTION ORAL
Refills: 0 | Status: DISCONTINUED | OUTPATIENT
Start: 2025-03-17 | End: 2025-04-05

## 2025-03-17 RX ADMIN — DEXAMETHASONE 4 MILLIGRAM(S): 0.5 TABLET ORAL at 18:30

## 2025-03-17 RX ADMIN — Medication 25 MILLIGRAM(S): at 20:57

## 2025-03-17 RX ADMIN — ENOXAPARIN SODIUM 40 MILLIGRAM(S): 100 INJECTION SUBCUTANEOUS at 21:22

## 2025-03-17 RX ADMIN — Medication 1 APPLICATION(S): at 11:19

## 2025-03-17 RX ADMIN — AMLODIPINE BESYLATE 5 MILLIGRAM(S): 10 TABLET ORAL at 05:27

## 2025-03-17 RX ADMIN — Medication 20 MILLIGRAM(S): at 05:27

## 2025-03-17 RX ADMIN — AMPICILLIN SODIUM 200 GRAM(S): 1 INJECTION, POWDER, FOR SOLUTION INTRAMUSCULAR; INTRAVENOUS at 18:31

## 2025-03-17 RX ADMIN — AMPICILLIN SODIUM 200 GRAM(S): 1 INJECTION, POWDER, FOR SOLUTION INTRAMUSCULAR; INTRAVENOUS at 05:27

## 2025-03-17 RX ADMIN — Medication 1 TABLET(S): at 11:00

## 2025-03-17 RX ADMIN — Medication 2 TABLET(S): at 21:22

## 2025-03-17 RX ADMIN — Medication 20 MILLIGRAM(S): at 18:30

## 2025-03-17 RX ADMIN — LEVETIRACETAM 1000 MILLIGRAM(S): 10 INJECTION, SOLUTION INTRAVENOUS at 05:27

## 2025-03-17 RX ADMIN — DEXAMETHASONE 4 MILLIGRAM(S): 0.5 TABLET ORAL at 05:27

## 2025-03-17 RX ADMIN — AMPICILLIN SODIUM 200 GRAM(S): 1 INJECTION, POWDER, FOR SOLUTION INTRAMUSCULAR; INTRAVENOUS at 14:28

## 2025-03-17 RX ADMIN — Medication 1 TABLET(S): at 18:30

## 2025-03-17 RX ADMIN — AMPICILLIN SODIUM 200 GRAM(S): 1 INJECTION, POWDER, FOR SOLUTION INTRAMUSCULAR; INTRAVENOUS at 11:01

## 2025-03-17 RX ADMIN — AMPICILLIN SODIUM 200 GRAM(S): 1 INJECTION, POWDER, FOR SOLUTION INTRAMUSCULAR; INTRAVENOUS at 01:38

## 2025-03-17 RX ADMIN — LEVETIRACETAM 1000 MILLIGRAM(S): 10 INJECTION, SOLUTION INTRAVENOUS at 18:30

## 2025-03-17 RX ADMIN — AMPICILLIN SODIUM 200 GRAM(S): 1 INJECTION, POWDER, FOR SOLUTION INTRAMUSCULAR; INTRAVENOUS at 21:22

## 2025-03-18 LAB
CYSTATIN C SERPL-MCNC: 0.93 MG/L — SIGNIFICANT CHANGE UP (ref 0.77–1.42)
GFR/BSA.PRED SERPLBLD CYS-BASED-ARV: 83 ML/MIN/1.73M2 — SIGNIFICANT CHANGE UP

## 2025-03-18 PROCEDURE — 99232 SBSQ HOSP IP/OBS MODERATE 35: CPT | Mod: GC

## 2025-03-18 PROCEDURE — 99232 SBSQ HOSP IP/OBS MODERATE 35: CPT

## 2025-03-18 RX ADMIN — LEVETIRACETAM 1000 MILLIGRAM(S): 10 INJECTION, SOLUTION INTRAVENOUS at 18:28

## 2025-03-18 RX ADMIN — AMPICILLIN SODIUM 200 GRAM(S): 1 INJECTION, POWDER, FOR SOLUTION INTRAMUSCULAR; INTRAVENOUS at 14:58

## 2025-03-18 RX ADMIN — AMPICILLIN SODIUM 200 GRAM(S): 1 INJECTION, POWDER, FOR SOLUTION INTRAMUSCULAR; INTRAVENOUS at 21:24

## 2025-03-18 RX ADMIN — Medication 25 MILLIGRAM(S): at 20:44

## 2025-03-18 RX ADMIN — Medication 2 TABLET(S): at 21:10

## 2025-03-18 RX ADMIN — AMPICILLIN SODIUM 200 GRAM(S): 1 INJECTION, POWDER, FOR SOLUTION INTRAMUSCULAR; INTRAVENOUS at 18:29

## 2025-03-18 RX ADMIN — AMPICILLIN SODIUM 200 GRAM(S): 1 INJECTION, POWDER, FOR SOLUTION INTRAMUSCULAR; INTRAVENOUS at 10:59

## 2025-03-18 RX ADMIN — AMPICILLIN SODIUM 200 GRAM(S): 1 INJECTION, POWDER, FOR SOLUTION INTRAMUSCULAR; INTRAVENOUS at 06:08

## 2025-03-18 RX ADMIN — Medication 20 MILLIGRAM(S): at 18:29

## 2025-03-18 RX ADMIN — Medication 20 MILLIGRAM(S): at 05:44

## 2025-03-18 RX ADMIN — AMPICILLIN SODIUM 200 GRAM(S): 1 INJECTION, POWDER, FOR SOLUTION INTRAMUSCULAR; INTRAVENOUS at 02:11

## 2025-03-18 RX ADMIN — LEVETIRACETAM 1000 MILLIGRAM(S): 10 INJECTION, SOLUTION INTRAVENOUS at 05:44

## 2025-03-18 RX ADMIN — AMLODIPINE BESYLATE 5 MILLIGRAM(S): 10 TABLET ORAL at 05:44

## 2025-03-18 RX ADMIN — Medication 1 TABLET(S): at 11:15

## 2025-03-18 RX ADMIN — ENOXAPARIN SODIUM 40 MILLIGRAM(S): 100 INJECTION SUBCUTANEOUS at 21:10

## 2025-03-18 RX ADMIN — DEXAMETHASONE 4 MILLIGRAM(S): 0.5 TABLET ORAL at 05:44

## 2025-03-18 RX ADMIN — Medication 1 APPLICATION(S): at 11:17

## 2025-03-18 RX ADMIN — DEXAMETHASONE 4 MILLIGRAM(S): 0.5 TABLET ORAL at 18:28

## 2025-03-19 ENCOUNTER — APPOINTMENT (OUTPATIENT)
Age: 70
End: 2025-03-19
Payer: COMMERCIAL

## 2025-03-19 PROCEDURE — 99024 POSTOP FOLLOW-UP VISIT: CPT

## 2025-03-19 PROCEDURE — 99232 SBSQ HOSP IP/OBS MODERATE 35: CPT

## 2025-03-19 PROCEDURE — 99233 SBSQ HOSP IP/OBS HIGH 50: CPT | Mod: GC

## 2025-03-19 RX ADMIN — DEXAMETHASONE 4 MILLIGRAM(S): 0.5 TABLET ORAL at 17:32

## 2025-03-19 RX ADMIN — Medication 1 APPLICATION(S): at 11:35

## 2025-03-19 RX ADMIN — AMPICILLIN SODIUM 200 GRAM(S): 1 INJECTION, POWDER, FOR SOLUTION INTRAMUSCULAR; INTRAVENOUS at 10:02

## 2025-03-19 RX ADMIN — Medication 20 MILLIGRAM(S): at 05:17

## 2025-03-19 RX ADMIN — DEXAMETHASONE 4 MILLIGRAM(S): 0.5 TABLET ORAL at 05:16

## 2025-03-19 RX ADMIN — AMPICILLIN SODIUM 200 GRAM(S): 1 INJECTION, POWDER, FOR SOLUTION INTRAMUSCULAR; INTRAVENOUS at 18:15

## 2025-03-19 RX ADMIN — ENOXAPARIN SODIUM 40 MILLIGRAM(S): 100 INJECTION SUBCUTANEOUS at 22:54

## 2025-03-19 RX ADMIN — Medication 1 TABLET(S): at 11:33

## 2025-03-19 RX ADMIN — AMPICILLIN SODIUM 200 GRAM(S): 1 INJECTION, POWDER, FOR SOLUTION INTRAMUSCULAR; INTRAVENOUS at 05:15

## 2025-03-19 RX ADMIN — AMPICILLIN SODIUM 200 GRAM(S): 1 INJECTION, POWDER, FOR SOLUTION INTRAMUSCULAR; INTRAVENOUS at 14:18

## 2025-03-19 RX ADMIN — Medication 2 TABLET(S): at 22:54

## 2025-03-19 RX ADMIN — Medication 25 MILLIGRAM(S): at 20:17

## 2025-03-19 RX ADMIN — Medication 1 TABLET(S): at 17:32

## 2025-03-19 RX ADMIN — Medication 20 MILLIGRAM(S): at 17:32

## 2025-03-19 RX ADMIN — AMPICILLIN SODIUM 200 GRAM(S): 1 INJECTION, POWDER, FOR SOLUTION INTRAMUSCULAR; INTRAVENOUS at 22:55

## 2025-03-19 RX ADMIN — LEVETIRACETAM 1000 MILLIGRAM(S): 10 INJECTION, SOLUTION INTRAVENOUS at 17:32

## 2025-03-19 RX ADMIN — AMPICILLIN SODIUM 200 GRAM(S): 1 INJECTION, POWDER, FOR SOLUTION INTRAMUSCULAR; INTRAVENOUS at 01:24

## 2025-03-19 RX ADMIN — LEVETIRACETAM 1000 MILLIGRAM(S): 10 INJECTION, SOLUTION INTRAVENOUS at 05:17

## 2025-03-19 RX ADMIN — AMLODIPINE BESYLATE 5 MILLIGRAM(S): 10 TABLET ORAL at 05:16

## 2025-03-20 ENCOUNTER — APPOINTMENT (OUTPATIENT)
Dept: HEMATOLOGY ONCOLOGY | Facility: CLINIC | Age: 70
End: 2025-03-20
Payer: COMMERCIAL

## 2025-03-20 DIAGNOSIS — I63.9 CEREBRAL INFARCTION, UNSPECIFIED: ICD-10-CM

## 2025-03-20 DIAGNOSIS — Z99.3 DEPENDENCE ON WHEELCHAIR: ICD-10-CM

## 2025-03-20 DIAGNOSIS — Z09 ENCOUNTER FOR FOLLOW-UP EXAMINATION AFTER COMPLETED TREATMENT FOR CONDITIONS OTHER THAN MALIGNANT NEOPLASM: ICD-10-CM

## 2025-03-20 DIAGNOSIS — C71.9 MALIGNANT NEOPLASM OF BRAIN, UNSPECIFIED: ICD-10-CM

## 2025-03-20 DIAGNOSIS — R53.1 WEAKNESS: ICD-10-CM

## 2025-03-20 LAB
ALBUMIN SERPL ELPH-MCNC: 2.7 G/DL — LOW (ref 3.3–5)
ALP SERPL-CCNC: 111 U/L — SIGNIFICANT CHANGE UP (ref 40–120)
ALT FLD-CCNC: 27 U/L — SIGNIFICANT CHANGE UP (ref 10–45)
ANION GAP SERPL CALC-SCNC: 8 MMOL/L — SIGNIFICANT CHANGE UP (ref 5–17)
AST SERPL-CCNC: 12 U/L — SIGNIFICANT CHANGE UP (ref 10–40)
BASOPHILS # BLD AUTO: 0 K/UL — SIGNIFICANT CHANGE UP (ref 0–0.2)
BASOPHILS NFR BLD AUTO: 0 % — SIGNIFICANT CHANGE UP (ref 0–2)
BILIRUB SERPL-MCNC: 0.4 MG/DL — SIGNIFICANT CHANGE UP (ref 0.2–1.2)
BUN SERPL-MCNC: 34 MG/DL — HIGH (ref 7–23)
CALCIUM SERPL-MCNC: 8.2 MG/DL — LOW (ref 8.4–10.5)
CHLORIDE SERPL-SCNC: 104 MMOL/L — SIGNIFICANT CHANGE UP (ref 96–108)
CO2 SERPL-SCNC: 28 MMOL/L — SIGNIFICANT CHANGE UP (ref 22–31)
CREAT SERPL-MCNC: 0.83 MG/DL — SIGNIFICANT CHANGE UP (ref 0.5–1.3)
EGFR: 95 ML/MIN/1.73M2 — SIGNIFICANT CHANGE UP
EGFR: 95 ML/MIN/1.73M2 — SIGNIFICANT CHANGE UP
EOSINOPHIL # BLD AUTO: 0 K/UL — SIGNIFICANT CHANGE UP (ref 0–0.5)
EOSINOPHIL NFR BLD AUTO: 0 % — SIGNIFICANT CHANGE UP (ref 0–6)
GLUCOSE SERPL-MCNC: 136 MG/DL — HIGH (ref 70–99)
HCT VFR BLD CALC: 35.3 % — LOW (ref 39–50)
HGB BLD-MCNC: 11.5 G/DL — LOW (ref 13–17)
IMM GRANULOCYTES NFR BLD AUTO: 1.5 % — HIGH (ref 0–0.9)
LYMPHOCYTES # BLD AUTO: 1.25 K/UL — SIGNIFICANT CHANGE UP (ref 1–3.3)
LYMPHOCYTES # BLD AUTO: 22.8 % — SIGNIFICANT CHANGE UP (ref 13–44)
MCHC RBC-ENTMCNC: 29.9 PG — SIGNIFICANT CHANGE UP (ref 27–34)
MCHC RBC-ENTMCNC: 32.6 G/DL — SIGNIFICANT CHANGE UP (ref 32–36)
MCV RBC AUTO: 91.7 FL — SIGNIFICANT CHANGE UP (ref 80–100)
MONOCYTES # BLD AUTO: 0.37 K/UL — SIGNIFICANT CHANGE UP (ref 0–0.9)
MONOCYTES NFR BLD AUTO: 6.7 % — SIGNIFICANT CHANGE UP (ref 2–14)
NEUTROPHILS # BLD AUTO: 3.79 K/UL — SIGNIFICANT CHANGE UP (ref 1.8–7.4)
NEUTROPHILS NFR BLD AUTO: 69 % — SIGNIFICANT CHANGE UP (ref 43–77)
NRBC BLD AUTO-RTO: 0 /100 WBCS — SIGNIFICANT CHANGE UP (ref 0–0)
PLATELET # BLD AUTO: 158 K/UL — SIGNIFICANT CHANGE UP (ref 150–400)
POTASSIUM SERPL-MCNC: 4.1 MMOL/L — SIGNIFICANT CHANGE UP (ref 3.5–5.3)
POTASSIUM SERPL-SCNC: 4.1 MMOL/L — SIGNIFICANT CHANGE UP (ref 3.5–5.3)
PROT SERPL-MCNC: 5.5 G/DL — LOW (ref 6–8.3)
RBC # BLD: 3.85 M/UL — LOW (ref 4.2–5.8)
RBC # FLD: 16 % — HIGH (ref 10.3–14.5)
SODIUM SERPL-SCNC: 140 MMOL/L — SIGNIFICANT CHANGE UP (ref 135–145)
WBC # BLD: 5.49 K/UL — SIGNIFICANT CHANGE UP (ref 3.8–10.5)
WBC # FLD AUTO: 5.49 K/UL — SIGNIFICANT CHANGE UP (ref 3.8–10.5)

## 2025-03-20 PROCEDURE — 99232 SBSQ HOSP IP/OBS MODERATE 35: CPT | Mod: GC

## 2025-03-20 PROCEDURE — 99205 OFFICE O/P NEW HI 60 MIN: CPT | Mod: 95

## 2025-03-20 PROCEDURE — 99232 SBSQ HOSP IP/OBS MODERATE 35: CPT

## 2025-03-20 PROCEDURE — G2211 COMPLEX E/M VISIT ADD ON: CPT | Mod: 95

## 2025-03-20 RX ADMIN — AMPICILLIN SODIUM 200 GRAM(S): 1 INJECTION, POWDER, FOR SOLUTION INTRAMUSCULAR; INTRAVENOUS at 17:42

## 2025-03-20 RX ADMIN — AMPICILLIN SODIUM 200 GRAM(S): 1 INJECTION, POWDER, FOR SOLUTION INTRAMUSCULAR; INTRAVENOUS at 22:34

## 2025-03-20 RX ADMIN — Medication 20 MILLIGRAM(S): at 06:24

## 2025-03-20 RX ADMIN — ENOXAPARIN SODIUM 40 MILLIGRAM(S): 100 INJECTION SUBCUTANEOUS at 22:34

## 2025-03-20 RX ADMIN — Medication 2 TABLET(S): at 22:34

## 2025-03-20 RX ADMIN — AMPICILLIN SODIUM 200 GRAM(S): 1 INJECTION, POWDER, FOR SOLUTION INTRAMUSCULAR; INTRAVENOUS at 11:31

## 2025-03-20 RX ADMIN — AMPICILLIN SODIUM 200 GRAM(S): 1 INJECTION, POWDER, FOR SOLUTION INTRAMUSCULAR; INTRAVENOUS at 06:23

## 2025-03-20 RX ADMIN — AMLODIPINE BESYLATE 5 MILLIGRAM(S): 10 TABLET ORAL at 06:23

## 2025-03-20 RX ADMIN — AMPICILLIN SODIUM 200 GRAM(S): 1 INJECTION, POWDER, FOR SOLUTION INTRAMUSCULAR; INTRAVENOUS at 02:58

## 2025-03-20 RX ADMIN — Medication 20 MILLIGRAM(S): at 17:43

## 2025-03-20 RX ADMIN — DEXAMETHASONE 4 MILLIGRAM(S): 0.5 TABLET ORAL at 17:43

## 2025-03-20 RX ADMIN — LEVETIRACETAM 1000 MILLIGRAM(S): 10 INJECTION, SOLUTION INTRAVENOUS at 17:42

## 2025-03-20 RX ADMIN — Medication 25 MILLIGRAM(S): at 20:04

## 2025-03-20 RX ADMIN — Medication 1 TABLET(S): at 11:33

## 2025-03-20 RX ADMIN — AMPICILLIN SODIUM 200 GRAM(S): 1 INJECTION, POWDER, FOR SOLUTION INTRAMUSCULAR; INTRAVENOUS at 15:35

## 2025-03-20 RX ADMIN — DEXAMETHASONE 4 MILLIGRAM(S): 0.5 TABLET ORAL at 06:23

## 2025-03-20 RX ADMIN — Medication 1 APPLICATION(S): at 11:33

## 2025-03-20 RX ADMIN — LEVETIRACETAM 1000 MILLIGRAM(S): 10 INJECTION, SOLUTION INTRAVENOUS at 06:24

## 2025-03-21 LAB
HCT VFR BLD CALC: 37.2 % — LOW (ref 39–50)
HGB BLD-MCNC: 12.2 G/DL — LOW (ref 13–17)
MCHC RBC-ENTMCNC: 29.9 PG — SIGNIFICANT CHANGE UP (ref 27–34)
MCHC RBC-ENTMCNC: 32.8 G/DL — SIGNIFICANT CHANGE UP (ref 32–36)
MCV RBC AUTO: 91.2 FL — SIGNIFICANT CHANGE UP (ref 80–100)
NRBC BLD AUTO-RTO: 0 /100 WBCS — SIGNIFICANT CHANGE UP (ref 0–0)
PLATELET # BLD AUTO: 150 K/UL — SIGNIFICANT CHANGE UP (ref 150–400)
RBC # BLD: 4.08 M/UL — LOW (ref 4.2–5.8)
RBC # FLD: 15.9 % — HIGH (ref 10.3–14.5)
WBC # BLD: 5.15 K/UL — SIGNIFICANT CHANGE UP (ref 3.8–10.5)
WBC # FLD AUTO: 5.15 K/UL — SIGNIFICANT CHANGE UP (ref 3.8–10.5)

## 2025-03-21 PROCEDURE — 99232 SBSQ HOSP IP/OBS MODERATE 35: CPT

## 2025-03-21 RX ADMIN — DEXAMETHASONE 4 MILLIGRAM(S): 0.5 TABLET ORAL at 06:34

## 2025-03-21 RX ADMIN — AMPICILLIN SODIUM 200 GRAM(S): 1 INJECTION, POWDER, FOR SOLUTION INTRAMUSCULAR; INTRAVENOUS at 02:40

## 2025-03-21 RX ADMIN — ENOXAPARIN SODIUM 40 MILLIGRAM(S): 100 INJECTION SUBCUTANEOUS at 22:13

## 2025-03-21 RX ADMIN — Medication 1 TABLET(S): at 11:32

## 2025-03-21 RX ADMIN — LEVETIRACETAM 1000 MILLIGRAM(S): 10 INJECTION, SOLUTION INTRAVENOUS at 06:34

## 2025-03-21 RX ADMIN — AMPICILLIN SODIUM 200 GRAM(S): 1 INJECTION, POWDER, FOR SOLUTION INTRAMUSCULAR; INTRAVENOUS at 06:34

## 2025-03-21 RX ADMIN — AMPICILLIN SODIUM 200 GRAM(S): 1 INJECTION, POWDER, FOR SOLUTION INTRAMUSCULAR; INTRAVENOUS at 15:02

## 2025-03-21 RX ADMIN — AMPICILLIN SODIUM 200 GRAM(S): 1 INJECTION, POWDER, FOR SOLUTION INTRAMUSCULAR; INTRAVENOUS at 19:28

## 2025-03-21 RX ADMIN — AMPICILLIN SODIUM 200 GRAM(S): 1 INJECTION, POWDER, FOR SOLUTION INTRAMUSCULAR; INTRAVENOUS at 10:40

## 2025-03-21 RX ADMIN — AMPICILLIN SODIUM 200 GRAM(S): 1 INJECTION, POWDER, FOR SOLUTION INTRAMUSCULAR; INTRAVENOUS at 22:13

## 2025-03-21 RX ADMIN — Medication 20 MILLIGRAM(S): at 06:34

## 2025-03-21 RX ADMIN — DEXAMETHASONE 2 MILLIGRAM(S): 0.5 TABLET ORAL at 18:10

## 2025-03-21 RX ADMIN — Medication 2 TABLET(S): at 22:14

## 2025-03-21 RX ADMIN — Medication 20 MILLIGRAM(S): at 18:12

## 2025-03-21 RX ADMIN — AMLODIPINE BESYLATE 5 MILLIGRAM(S): 10 TABLET ORAL at 06:37

## 2025-03-21 RX ADMIN — Medication 1 TABLET(S): at 18:11

## 2025-03-21 RX ADMIN — LEVETIRACETAM 1000 MILLIGRAM(S): 10 INJECTION, SOLUTION INTRAVENOUS at 18:10

## 2025-03-21 RX ADMIN — Medication 25 MILLIGRAM(S): at 22:13

## 2025-03-22 PROCEDURE — 99232 SBSQ HOSP IP/OBS MODERATE 35: CPT

## 2025-03-22 PROCEDURE — 99232 SBSQ HOSP IP/OBS MODERATE 35: CPT | Mod: GC

## 2025-03-22 RX ADMIN — ENOXAPARIN SODIUM 40 MILLIGRAM(S): 100 INJECTION SUBCUTANEOUS at 21:45

## 2025-03-22 RX ADMIN — Medication 25 MILLIGRAM(S): at 20:24

## 2025-03-22 RX ADMIN — AMPICILLIN SODIUM 200 GRAM(S): 1 INJECTION, POWDER, FOR SOLUTION INTRAMUSCULAR; INTRAVENOUS at 06:08

## 2025-03-22 RX ADMIN — Medication 2 TABLET(S): at 21:45

## 2025-03-22 RX ADMIN — Medication 20 MILLIGRAM(S): at 06:09

## 2025-03-22 RX ADMIN — Medication 1 APPLICATION(S): at 11:36

## 2025-03-22 RX ADMIN — DEXAMETHASONE 2 MILLIGRAM(S): 0.5 TABLET ORAL at 06:09

## 2025-03-22 RX ADMIN — AMPICILLIN SODIUM 200 GRAM(S): 1 INJECTION, POWDER, FOR SOLUTION INTRAMUSCULAR; INTRAVENOUS at 18:11

## 2025-03-22 RX ADMIN — AMPICILLIN SODIUM 200 GRAM(S): 1 INJECTION, POWDER, FOR SOLUTION INTRAMUSCULAR; INTRAVENOUS at 02:08

## 2025-03-22 RX ADMIN — AMPICILLIN SODIUM 200 GRAM(S): 1 INJECTION, POWDER, FOR SOLUTION INTRAMUSCULAR; INTRAVENOUS at 14:40

## 2025-03-22 RX ADMIN — Medication 20 MILLIGRAM(S): at 17:29

## 2025-03-22 RX ADMIN — AMPICILLIN SODIUM 200 GRAM(S): 1 INJECTION, POWDER, FOR SOLUTION INTRAMUSCULAR; INTRAVENOUS at 10:07

## 2025-03-22 RX ADMIN — AMLODIPINE BESYLATE 5 MILLIGRAM(S): 10 TABLET ORAL at 06:09

## 2025-03-22 RX ADMIN — AMPICILLIN SODIUM 200 GRAM(S): 1 INJECTION, POWDER, FOR SOLUTION INTRAMUSCULAR; INTRAVENOUS at 21:49

## 2025-03-22 RX ADMIN — LEVETIRACETAM 1000 MILLIGRAM(S): 10 INJECTION, SOLUTION INTRAVENOUS at 17:29

## 2025-03-22 RX ADMIN — LEVETIRACETAM 1000 MILLIGRAM(S): 10 INJECTION, SOLUTION INTRAVENOUS at 06:09

## 2025-03-22 RX ADMIN — DEXAMETHASONE 2 MILLIGRAM(S): 0.5 TABLET ORAL at 17:29

## 2025-03-22 RX ADMIN — Medication 1 TABLET(S): at 11:36

## 2025-03-23 PROCEDURE — 99232 SBSQ HOSP IP/OBS MODERATE 35: CPT | Mod: GC

## 2025-03-23 PROCEDURE — 99232 SBSQ HOSP IP/OBS MODERATE 35: CPT

## 2025-03-23 RX ADMIN — Medication 20 MILLIGRAM(S): at 17:31

## 2025-03-23 RX ADMIN — AMPICILLIN SODIUM 200 GRAM(S): 1 INJECTION, POWDER, FOR SOLUTION INTRAMUSCULAR; INTRAVENOUS at 18:47

## 2025-03-23 RX ADMIN — Medication 1 TABLET(S): at 11:02

## 2025-03-23 RX ADMIN — LEVETIRACETAM 1000 MILLIGRAM(S): 10 INJECTION, SOLUTION INTRAVENOUS at 17:30

## 2025-03-23 RX ADMIN — Medication 20 MILLIGRAM(S): at 05:57

## 2025-03-23 RX ADMIN — DEXAMETHASONE 2 MILLIGRAM(S): 0.5 TABLET ORAL at 17:30

## 2025-03-23 RX ADMIN — Medication 25 MILLIGRAM(S): at 21:00

## 2025-03-23 RX ADMIN — Medication 1 APPLICATION(S): at 11:57

## 2025-03-23 RX ADMIN — AMPICILLIN SODIUM 200 GRAM(S): 1 INJECTION, POWDER, FOR SOLUTION INTRAMUSCULAR; INTRAVENOUS at 01:42

## 2025-03-23 RX ADMIN — DEXAMETHASONE 2 MILLIGRAM(S): 0.5 TABLET ORAL at 05:57

## 2025-03-23 RX ADMIN — AMLODIPINE BESYLATE 5 MILLIGRAM(S): 10 TABLET ORAL at 05:57

## 2025-03-23 RX ADMIN — AMPICILLIN SODIUM 200 GRAM(S): 1 INJECTION, POWDER, FOR SOLUTION INTRAMUSCULAR; INTRAVENOUS at 21:51

## 2025-03-23 RX ADMIN — AMPICILLIN SODIUM 200 GRAM(S): 1 INJECTION, POWDER, FOR SOLUTION INTRAMUSCULAR; INTRAVENOUS at 14:44

## 2025-03-23 RX ADMIN — AMPICILLIN SODIUM 200 GRAM(S): 1 INJECTION, POWDER, FOR SOLUTION INTRAMUSCULAR; INTRAVENOUS at 05:59

## 2025-03-23 RX ADMIN — AMPICILLIN SODIUM 200 GRAM(S): 1 INJECTION, POWDER, FOR SOLUTION INTRAMUSCULAR; INTRAVENOUS at 11:01

## 2025-03-23 RX ADMIN — ENOXAPARIN SODIUM 40 MILLIGRAM(S): 100 INJECTION SUBCUTANEOUS at 21:01

## 2025-03-23 RX ADMIN — LEVETIRACETAM 1000 MILLIGRAM(S): 10 INJECTION, SOLUTION INTRAVENOUS at 05:57

## 2025-03-23 RX ADMIN — Medication 2 TABLET(S): at 21:01

## 2025-03-24 LAB
ALBUMIN SERPL ELPH-MCNC: 2.6 G/DL — LOW (ref 3.3–5)
ALP SERPL-CCNC: 85 U/L — SIGNIFICANT CHANGE UP (ref 40–120)
ALT FLD-CCNC: 29 U/L — SIGNIFICANT CHANGE UP (ref 10–45)
ANION GAP SERPL CALC-SCNC: 10 MMOL/L — SIGNIFICANT CHANGE UP (ref 5–17)
AST SERPL-CCNC: 11 U/L — SIGNIFICANT CHANGE UP (ref 10–40)
BASOPHILS # BLD AUTO: 0 K/UL — SIGNIFICANT CHANGE UP (ref 0–0.2)
BASOPHILS NFR BLD AUTO: 0 % — SIGNIFICANT CHANGE UP (ref 0–2)
BILIRUB SERPL-MCNC: 0.5 MG/DL — SIGNIFICANT CHANGE UP (ref 0.2–1.2)
BUN SERPL-MCNC: 26 MG/DL — HIGH (ref 7–23)
CALCIUM SERPL-MCNC: 8.7 MG/DL — SIGNIFICANT CHANGE UP (ref 8.4–10.5)
CHLORIDE SERPL-SCNC: 107 MMOL/L — SIGNIFICANT CHANGE UP (ref 96–108)
CO2 SERPL-SCNC: 27 MMOL/L — SIGNIFICANT CHANGE UP (ref 22–31)
CREAT SERPL-MCNC: 0.7 MG/DL — SIGNIFICANT CHANGE UP (ref 0.5–1.3)
EGFR: 100 ML/MIN/1.73M2 — SIGNIFICANT CHANGE UP
EGFR: 100 ML/MIN/1.73M2 — SIGNIFICANT CHANGE UP
EOSINOPHIL # BLD AUTO: 0.02 K/UL — SIGNIFICANT CHANGE UP (ref 0–0.5)
EOSINOPHIL NFR BLD AUTO: 0.5 % — SIGNIFICANT CHANGE UP (ref 0–6)
GLUCOSE SERPL-MCNC: 109 MG/DL — HIGH (ref 70–99)
HCT VFR BLD CALC: 38.3 % — LOW (ref 39–50)
HGB BLD-MCNC: 12.7 G/DL — LOW (ref 13–17)
IMM GRANULOCYTES NFR BLD AUTO: 1.1 % — HIGH (ref 0–0.9)
LYMPHOCYTES # BLD AUTO: 1.28 K/UL — SIGNIFICANT CHANGE UP (ref 1–3.3)
LYMPHOCYTES # BLD AUTO: 35.2 % — SIGNIFICANT CHANGE UP (ref 13–44)
MCHC RBC-ENTMCNC: 30.6 PG — SIGNIFICANT CHANGE UP (ref 27–34)
MCHC RBC-ENTMCNC: 33.2 G/DL — SIGNIFICANT CHANGE UP (ref 32–36)
MCV RBC AUTO: 92.3 FL — SIGNIFICANT CHANGE UP (ref 80–100)
MONOCYTES # BLD AUTO: 0.31 K/UL — SIGNIFICANT CHANGE UP (ref 0–0.9)
MONOCYTES NFR BLD AUTO: 8.5 % — SIGNIFICANT CHANGE UP (ref 2–14)
NEUTROPHILS # BLD AUTO: 1.99 K/UL — SIGNIFICANT CHANGE UP (ref 1.8–7.4)
NEUTROPHILS NFR BLD AUTO: 54.7 % — SIGNIFICANT CHANGE UP (ref 43–77)
NRBC BLD AUTO-RTO: 0 /100 WBCS — SIGNIFICANT CHANGE UP (ref 0–0)
PLATELET # BLD AUTO: 114 K/UL — LOW (ref 150–400)
POTASSIUM SERPL-MCNC: 4.3 MMOL/L — SIGNIFICANT CHANGE UP (ref 3.5–5.3)
POTASSIUM SERPL-SCNC: 4.3 MMOL/L — SIGNIFICANT CHANGE UP (ref 3.5–5.3)
PROT SERPL-MCNC: 5.6 G/DL — LOW (ref 6–8.3)
RBC # BLD: 4.15 M/UL — LOW (ref 4.2–5.8)
RBC # FLD: 16.2 % — HIGH (ref 10.3–14.5)
SODIUM SERPL-SCNC: 144 MMOL/L — SIGNIFICANT CHANGE UP (ref 135–145)
WBC # BLD: 3.64 K/UL — LOW (ref 3.8–10.5)
WBC # FLD AUTO: 3.64 K/UL — LOW (ref 3.8–10.5)

## 2025-03-24 PROCEDURE — 99233 SBSQ HOSP IP/OBS HIGH 50: CPT

## 2025-03-24 PROCEDURE — 99233 SBSQ HOSP IP/OBS HIGH 50: CPT | Mod: GC

## 2025-03-24 RX ORDER — DEXTROMETHORPHAN HBR, GUAIFENESIN 200 MG/10ML
200 LIQUID ORAL EVERY 6 HOURS
Refills: 0 | Status: DISCONTINUED | OUTPATIENT
Start: 2025-03-24 | End: 2025-04-05

## 2025-03-24 RX ADMIN — AMPICILLIN SODIUM 200 GRAM(S): 1 INJECTION, POWDER, FOR SOLUTION INTRAMUSCULAR; INTRAVENOUS at 11:05

## 2025-03-24 RX ADMIN — DEXTROMETHORPHAN HBR, GUAIFENESIN 200 MILLIGRAM(S): 200 LIQUID ORAL at 21:24

## 2025-03-24 RX ADMIN — AMPICILLIN SODIUM 200 GRAM(S): 1 INJECTION, POWDER, FOR SOLUTION INTRAMUSCULAR; INTRAVENOUS at 17:12

## 2025-03-24 RX ADMIN — Medication 1 TABLET(S): at 17:12

## 2025-03-24 RX ADMIN — LEVETIRACETAM 1000 MILLIGRAM(S): 10 INJECTION, SOLUTION INTRAVENOUS at 05:35

## 2025-03-24 RX ADMIN — Medication 2 TABLET(S): at 21:24

## 2025-03-24 RX ADMIN — AMLODIPINE BESYLATE 5 MILLIGRAM(S): 10 TABLET ORAL at 05:35

## 2025-03-24 RX ADMIN — ENOXAPARIN SODIUM 40 MILLIGRAM(S): 100 INJECTION SUBCUTANEOUS at 21:24

## 2025-03-24 RX ADMIN — AMPICILLIN SODIUM 200 GRAM(S): 1 INJECTION, POWDER, FOR SOLUTION INTRAMUSCULAR; INTRAVENOUS at 21:24

## 2025-03-24 RX ADMIN — AMPICILLIN SODIUM 200 GRAM(S): 1 INJECTION, POWDER, FOR SOLUTION INTRAMUSCULAR; INTRAVENOUS at 05:40

## 2025-03-24 RX ADMIN — LEVETIRACETAM 1000 MILLIGRAM(S): 10 INJECTION, SOLUTION INTRAVENOUS at 17:11

## 2025-03-24 RX ADMIN — Medication 20 MILLIGRAM(S): at 05:35

## 2025-03-24 RX ADMIN — Medication 25 MILLIGRAM(S): at 20:10

## 2025-03-24 RX ADMIN — AMPICILLIN SODIUM 200 GRAM(S): 1 INJECTION, POWDER, FOR SOLUTION INTRAMUSCULAR; INTRAVENOUS at 15:37

## 2025-03-24 RX ADMIN — Medication 1 APPLICATION(S): at 14:19

## 2025-03-24 RX ADMIN — DEXAMETHASONE 2 MILLIGRAM(S): 0.5 TABLET ORAL at 17:12

## 2025-03-24 RX ADMIN — Medication 20 MILLIGRAM(S): at 17:11

## 2025-03-24 RX ADMIN — AMPICILLIN SODIUM 200 GRAM(S): 1 INJECTION, POWDER, FOR SOLUTION INTRAMUSCULAR; INTRAVENOUS at 01:42

## 2025-03-24 RX ADMIN — Medication 1 TABLET(S): at 11:06

## 2025-03-24 RX ADMIN — DEXAMETHASONE 2 MILLIGRAM(S): 0.5 TABLET ORAL at 05:35

## 2025-03-25 ENCOUNTER — APPOINTMENT (OUTPATIENT)
Dept: NEUROSURGERY | Facility: CLINIC | Age: 70
End: 2025-03-25

## 2025-03-25 LAB
ALBUMIN SERPL ELPH-MCNC: 2.8 G/DL — LOW (ref 3.3–5)
ALP SERPL-CCNC: 72 U/L — SIGNIFICANT CHANGE UP (ref 40–120)
ALT FLD-CCNC: 31 U/L — SIGNIFICANT CHANGE UP (ref 10–45)
ANION GAP SERPL CALC-SCNC: 7 MMOL/L — SIGNIFICANT CHANGE UP (ref 5–17)
APPEARANCE UR: CLEAR — SIGNIFICANT CHANGE UP
AST SERPL-CCNC: 17 U/L — SIGNIFICANT CHANGE UP (ref 10–40)
BASOPHILS # BLD AUTO: 0 K/UL — SIGNIFICANT CHANGE UP (ref 0–0.2)
BASOPHILS NFR BLD AUTO: 0 % — SIGNIFICANT CHANGE UP (ref 0–2)
BILIRUB SERPL-MCNC: 0.8 MG/DL — SIGNIFICANT CHANGE UP (ref 0.2–1.2)
BILIRUB UR-MCNC: NEGATIVE — SIGNIFICANT CHANGE UP
BUN SERPL-MCNC: 20 MG/DL — SIGNIFICANT CHANGE UP (ref 7–23)
CALCIUM SERPL-MCNC: 8.5 MG/DL — SIGNIFICANT CHANGE UP (ref 8.4–10.5)
CHLORIDE SERPL-SCNC: 100 MMOL/L — SIGNIFICANT CHANGE UP (ref 96–108)
CO2 SERPL-SCNC: 29 MMOL/L — SIGNIFICANT CHANGE UP (ref 22–31)
COLOR SPEC: YELLOW — SIGNIFICANT CHANGE UP
CREAT SERPL-MCNC: 0.96 MG/DL — SIGNIFICANT CHANGE UP (ref 0.5–1.3)
DIFF PNL FLD: NEGATIVE — SIGNIFICANT CHANGE UP
EGFR: 86 ML/MIN/1.73M2 — SIGNIFICANT CHANGE UP
EGFR: 86 ML/MIN/1.73M2 — SIGNIFICANT CHANGE UP
EOSINOPHIL # BLD AUTO: 0.01 K/UL — SIGNIFICANT CHANGE UP (ref 0–0.5)
EOSINOPHIL NFR BLD AUTO: 0.4 % — SIGNIFICANT CHANGE UP (ref 0–6)
FLUAV AG NPH QL: DETECTED
FLUAV H3 RNA SPEC QL NAA+PROBE: DETECTED
FLUBV AG NPH QL: SIGNIFICANT CHANGE UP
GLUCOSE SERPL-MCNC: 102 MG/DL — HIGH (ref 70–99)
GLUCOSE UR QL: NEGATIVE MG/DL — SIGNIFICANT CHANGE UP
HCT VFR BLD CALC: 38.7 % — LOW (ref 39–50)
HGB BLD-MCNC: 13 G/DL — SIGNIFICANT CHANGE UP (ref 13–17)
IMM GRANULOCYTES NFR BLD AUTO: 1.9 % — HIGH (ref 0–0.9)
KETONES UR-MCNC: NEGATIVE MG/DL — SIGNIFICANT CHANGE UP
LEUKOCYTE ESTERASE UR-ACNC: NEGATIVE — SIGNIFICANT CHANGE UP
LYMPHOCYTES # BLD AUTO: 0.53 K/UL — LOW (ref 1–3.3)
LYMPHOCYTES # BLD AUTO: 20.1 % — SIGNIFICANT CHANGE UP (ref 13–44)
MAGNESIUM SERPL-MCNC: 1.8 MG/DL — SIGNIFICANT CHANGE UP (ref 1.6–2.6)
MCHC RBC-ENTMCNC: 30.3 PG — SIGNIFICANT CHANGE UP (ref 27–34)
MCHC RBC-ENTMCNC: 33.6 G/DL — SIGNIFICANT CHANGE UP (ref 32–36)
MCV RBC AUTO: 90.2 FL — SIGNIFICANT CHANGE UP (ref 80–100)
MONOCYTES # BLD AUTO: 0.25 K/UL — SIGNIFICANT CHANGE UP (ref 0–0.9)
MONOCYTES NFR BLD AUTO: 9.5 % — SIGNIFICANT CHANGE UP (ref 2–14)
NEUTROPHILS # BLD AUTO: 1.8 K/UL — SIGNIFICANT CHANGE UP (ref 1.8–7.4)
NEUTROPHILS NFR BLD AUTO: 68.1 % — SIGNIFICANT CHANGE UP (ref 43–77)
NITRITE UR-MCNC: NEGATIVE — SIGNIFICANT CHANGE UP
NRBC BLD AUTO-RTO: 0 /100 WBCS — SIGNIFICANT CHANGE UP (ref 0–0)
PH UR: 8 — SIGNIFICANT CHANGE UP (ref 5–8)
PHOSPHATE SERPL-MCNC: 3.7 MG/DL — SIGNIFICANT CHANGE UP (ref 2.5–4.5)
PLATELET # BLD AUTO: 86 K/UL — LOW (ref 150–400)
POTASSIUM SERPL-MCNC: 4.2 MMOL/L — SIGNIFICANT CHANGE UP (ref 3.5–5.3)
POTASSIUM SERPL-SCNC: 4.2 MMOL/L — SIGNIFICANT CHANGE UP (ref 3.5–5.3)
PROT SERPL-MCNC: 5.9 G/DL — LOW (ref 6–8.3)
PROT UR-MCNC: NEGATIVE MG/DL — SIGNIFICANT CHANGE UP
RAPID RVP RESULT: DETECTED
RBC # BLD: 4.29 M/UL — SIGNIFICANT CHANGE UP (ref 4.2–5.8)
RBC # FLD: 16.5 % — HIGH (ref 10.3–14.5)
RSV RNA NPH QL NAA+NON-PROBE: SIGNIFICANT CHANGE UP
SARS-COV-2 RNA SPEC QL NAA+PROBE: SIGNIFICANT CHANGE UP
SARS-COV-2 RNA SPEC QL NAA+PROBE: SIGNIFICANT CHANGE UP
SODIUM SERPL-SCNC: 136 MMOL/L — SIGNIFICANT CHANGE UP (ref 135–145)
SOURCE RESPIRATORY: SIGNIFICANT CHANGE UP
SOURCE RESPIRATORY: SIGNIFICANT CHANGE UP
SP GR SPEC: 1.01 — SIGNIFICANT CHANGE UP (ref 1–1.03)
UROBILINOGEN FLD QL: 0.2 MG/DL — SIGNIFICANT CHANGE UP (ref 0.2–1)
WBC # BLD: 2.64 K/UL — LOW (ref 3.8–10.5)
WBC # FLD AUTO: 2.64 K/UL — LOW (ref 3.8–10.5)

## 2025-03-25 PROCEDURE — 99232 SBSQ HOSP IP/OBS MODERATE 35: CPT | Mod: GC

## 2025-03-25 PROCEDURE — 71045 X-RAY EXAM CHEST 1 VIEW: CPT | Mod: 26

## 2025-03-25 PROCEDURE — 70450 CT HEAD/BRAIN W/O DYE: CPT | Mod: 26

## 2025-03-25 PROCEDURE — 99233 SBSQ HOSP IP/OBS HIGH 50: CPT | Mod: GC

## 2025-03-25 RX ORDER — VANCOMYCIN HCL IN 5 % DEXTROSE 1.5G/250ML
PLASTIC BAG, INJECTION (ML) INTRAVENOUS
Refills: 0 | Status: DISCONTINUED | OUTPATIENT
Start: 2025-03-25 | End: 2025-03-25

## 2025-03-25 RX ORDER — ACETAMINOPHEN 500 MG/5ML
650 LIQUID (ML) ORAL ONCE
Refills: 0 | Status: COMPLETED | OUTPATIENT
Start: 2025-03-25 | End: 2025-03-25

## 2025-03-25 RX ORDER — PIPERACILLIN-TAZO-DEXTROSE,ISO 3.375G/5
3.38 IV SOLUTION, PIGGYBACK PREMIX FROZEN(ML) INTRAVENOUS EVERY 8 HOURS
Refills: 0 | Status: DISCONTINUED | OUTPATIENT
Start: 2025-03-25 | End: 2025-03-26

## 2025-03-25 RX ORDER — PIPERACILLIN-TAZO-DEXTROSE,ISO 3.375G/5
3.38 IV SOLUTION, PIGGYBACK PREMIX FROZEN(ML) INTRAVENOUS ONCE
Refills: 0 | Status: COMPLETED | OUTPATIENT
Start: 2025-03-25 | End: 2025-03-25

## 2025-03-25 RX ORDER — VANCOMYCIN HCL IN 5 % DEXTROSE 1.5G/250ML
1500 PLASTIC BAG, INJECTION (ML) INTRAVENOUS EVERY 12 HOURS
Refills: 0 | Status: DISCONTINUED | OUTPATIENT
Start: 2025-03-25 | End: 2025-03-25

## 2025-03-25 RX ORDER — OSELTAMIVIR PHOSPHATE 75 MG/1
75 CAPSULE ORAL
Refills: 0 | Status: COMPLETED | OUTPATIENT
Start: 2025-03-25 | End: 2025-03-30

## 2025-03-25 RX ORDER — ACETAMINOPHEN 500 MG/5ML
1000 LIQUID (ML) ORAL ONCE
Refills: 0 | Status: COMPLETED | OUTPATIENT
Start: 2025-03-25 | End: 2025-03-25

## 2025-03-25 RX ORDER — VANCOMYCIN HCL IN 5 % DEXTROSE 1.5G/250ML
1500 PLASTIC BAG, INJECTION (ML) INTRAVENOUS EVERY 12 HOURS
Refills: 0 | Status: DISCONTINUED | OUTPATIENT
Start: 2025-03-25 | End: 2025-03-27

## 2025-03-25 RX ADMIN — Medication 1 APPLICATION(S): at 13:48

## 2025-03-25 RX ADMIN — Medication 400 MILLIGRAM(S): at 21:15

## 2025-03-25 RX ADMIN — OSELTAMIVIR PHOSPHATE 75 MILLIGRAM(S): 75 CAPSULE ORAL at 18:21

## 2025-03-25 RX ADMIN — DEXTROMETHORPHAN HBR, GUAIFENESIN 200 MILLIGRAM(S): 200 LIQUID ORAL at 05:50

## 2025-03-25 RX ADMIN — Medication 80 MILLILITER(S): at 14:48

## 2025-03-25 RX ADMIN — Medication 1000 MILLIGRAM(S): at 22:15

## 2025-03-25 RX ADMIN — DEXAMETHASONE 2 MILLIGRAM(S): 0.5 TABLET ORAL at 05:50

## 2025-03-25 RX ADMIN — LEVETIRACETAM 1000 MILLIGRAM(S): 10 INJECTION, SOLUTION INTRAVENOUS at 18:20

## 2025-03-25 RX ADMIN — AMPICILLIN SODIUM 200 GRAM(S): 1 INJECTION, POWDER, FOR SOLUTION INTRAMUSCULAR; INTRAVENOUS at 05:50

## 2025-03-25 RX ADMIN — Medication 200 GRAM(S): at 11:56

## 2025-03-25 RX ADMIN — Medication 20 MILLIGRAM(S): at 05:50

## 2025-03-25 RX ADMIN — Medication 25 GRAM(S): at 22:41

## 2025-03-25 RX ADMIN — Medication 1 TABLET(S): at 11:56

## 2025-03-25 RX ADMIN — Medication 300 MILLIGRAM(S): at 13:44

## 2025-03-25 RX ADMIN — Medication 25 GRAM(S): at 16:46

## 2025-03-25 RX ADMIN — AMLODIPINE BESYLATE 5 MILLIGRAM(S): 10 TABLET ORAL at 05:51

## 2025-03-25 RX ADMIN — LEVETIRACETAM 1000 MILLIGRAM(S): 10 INJECTION, SOLUTION INTRAVENOUS at 05:51

## 2025-03-25 RX ADMIN — Medication 25 MILLIGRAM(S): at 21:21

## 2025-03-25 RX ADMIN — AMPICILLIN SODIUM 200 GRAM(S): 1 INJECTION, POWDER, FOR SOLUTION INTRAMUSCULAR; INTRAVENOUS at 01:17

## 2025-03-25 RX ADMIN — Medication 20 MILLIGRAM(S): at 18:21

## 2025-03-25 RX ADMIN — Medication 2 TABLET(S): at 21:22

## 2025-03-25 RX ADMIN — DEXAMETHASONE 2 MILLIGRAM(S): 0.5 TABLET ORAL at 18:20

## 2025-03-25 RX ADMIN — Medication 1000 MILLILITER(S): at 22:55

## 2025-03-25 RX ADMIN — Medication 80 MILLILITER(S): at 17:22

## 2025-03-26 PROCEDURE — 99232 SBSQ HOSP IP/OBS MODERATE 35: CPT

## 2025-03-26 PROCEDURE — 99233 SBSQ HOSP IP/OBS HIGH 50: CPT | Mod: GC

## 2025-03-26 RX ORDER — PIPERACILLIN-TAZO-DEXTROSE,ISO 3.375G/5
3.38 IV SOLUTION, PIGGYBACK PREMIX FROZEN(ML) INTRAVENOUS ONCE
Refills: 0 | Status: DISCONTINUED | OUTPATIENT
Start: 2025-03-26 | End: 2025-03-26

## 2025-03-26 RX ORDER — PIPERACILLIN-TAZO-DEXTROSE,ISO 3.375G/5
3.38 IV SOLUTION, PIGGYBACK PREMIX FROZEN(ML) INTRAVENOUS ONCE
Refills: 0 | Status: COMPLETED | OUTPATIENT
Start: 2025-03-26 | End: 2025-03-26

## 2025-03-26 RX ORDER — PIPERACILLIN-TAZO-DEXTROSE,ISO 3.375G/5
3.38 IV SOLUTION, PIGGYBACK PREMIX FROZEN(ML) INTRAVENOUS EVERY 8 HOURS
Refills: 0 | Status: DISCONTINUED | OUTPATIENT
Start: 2025-03-26 | End: 2025-03-27

## 2025-03-26 RX ORDER — PIPERACILLIN-TAZO-DEXTROSE,ISO 3.375G/5
3.38 IV SOLUTION, PIGGYBACK PREMIX FROZEN(ML) INTRAVENOUS EVERY 8 HOURS
Refills: 0 | Status: DISCONTINUED | OUTPATIENT
Start: 2025-03-26 | End: 2025-03-26

## 2025-03-26 RX ADMIN — DEXAMETHASONE 2 MILLIGRAM(S): 0.5 TABLET ORAL at 05:29

## 2025-03-26 RX ADMIN — AMLODIPINE BESYLATE 5 MILLIGRAM(S): 10 TABLET ORAL at 05:29

## 2025-03-26 RX ADMIN — Medication 1 TABLET(S): at 11:40

## 2025-03-26 RX ADMIN — LEVETIRACETAM 1000 MILLIGRAM(S): 10 INJECTION, SOLUTION INTRAVENOUS at 18:09

## 2025-03-26 RX ADMIN — Medication 25 GRAM(S): at 21:21

## 2025-03-26 RX ADMIN — DEXAMETHASONE 2 MILLIGRAM(S): 0.5 TABLET ORAL at 18:09

## 2025-03-26 RX ADMIN — LEVETIRACETAM 1000 MILLIGRAM(S): 10 INJECTION, SOLUTION INTRAVENOUS at 05:29

## 2025-03-26 RX ADMIN — OSELTAMIVIR PHOSPHATE 75 MILLIGRAM(S): 75 CAPSULE ORAL at 05:29

## 2025-03-26 RX ADMIN — Medication 20 MILLIGRAM(S): at 05:29

## 2025-03-26 RX ADMIN — Medication 25 GRAM(S): at 15:10

## 2025-03-26 RX ADMIN — Medication 300 MILLIGRAM(S): at 05:29

## 2025-03-26 RX ADMIN — OSELTAMIVIR PHOSPHATE 75 MILLIGRAM(S): 75 CAPSULE ORAL at 18:09

## 2025-03-26 RX ADMIN — Medication 25 MILLIGRAM(S): at 21:22

## 2025-03-26 RX ADMIN — Medication 1 TABLET(S): at 18:10

## 2025-03-26 RX ADMIN — Medication 300 MILLIGRAM(S): at 19:17

## 2025-03-26 RX ADMIN — Medication 25 GRAM(S): at 07:35

## 2025-03-26 RX ADMIN — Medication 20 MILLIGRAM(S): at 18:09

## 2025-03-26 RX ADMIN — Medication 1 APPLICATION(S): at 11:45

## 2025-03-26 RX ADMIN — Medication 2 TABLET(S): at 21:23

## 2025-03-27 LAB
ALBUMIN SERPL ELPH-MCNC: 2.4 G/DL — LOW (ref 3.3–5)
ALP SERPL-CCNC: 91 U/L — SIGNIFICANT CHANGE UP (ref 40–120)
ALT FLD-CCNC: 27 U/L — SIGNIFICANT CHANGE UP (ref 10–45)
ANION GAP SERPL CALC-SCNC: 5 MMOL/L — SIGNIFICANT CHANGE UP (ref 5–17)
AST SERPL-CCNC: 17 U/L — SIGNIFICANT CHANGE UP (ref 10–40)
BASOPHILS # BLD AUTO: 0 K/UL — SIGNIFICANT CHANGE UP (ref 0–0.2)
BASOPHILS NFR BLD AUTO: 0 % — SIGNIFICANT CHANGE UP (ref 0–2)
BILIRUB SERPL-MCNC: 0.4 MG/DL — SIGNIFICANT CHANGE UP (ref 0.2–1.2)
BUN SERPL-MCNC: 18 MG/DL — SIGNIFICANT CHANGE UP (ref 7–23)
CALCIUM SERPL-MCNC: 8.2 MG/DL — LOW (ref 8.4–10.5)
CHLORIDE SERPL-SCNC: 107 MMOL/L — SIGNIFICANT CHANGE UP (ref 96–108)
CO2 SERPL-SCNC: 29 MMOL/L — SIGNIFICANT CHANGE UP (ref 22–31)
CREAT SERPL-MCNC: 0.86 MG/DL — SIGNIFICANT CHANGE UP (ref 0.5–1.3)
EGFR: 94 ML/MIN/1.73M2 — SIGNIFICANT CHANGE UP
EGFR: 94 ML/MIN/1.73M2 — SIGNIFICANT CHANGE UP
EOSINOPHIL # BLD AUTO: 0.01 K/UL — SIGNIFICANT CHANGE UP (ref 0–0.5)
EOSINOPHIL NFR BLD AUTO: 0.5 % — SIGNIFICANT CHANGE UP (ref 0–6)
GLUCOSE SERPL-MCNC: 129 MG/DL — HIGH (ref 70–99)
HCT VFR BLD CALC: 37 % — LOW (ref 39–50)
HGB BLD-MCNC: 12 G/DL — LOW (ref 13–17)
IMM GRANULOCYTES NFR BLD AUTO: 1 % — HIGH (ref 0–0.9)
LYMPHOCYTES # BLD AUTO: 0.44 K/UL — LOW (ref 1–3.3)
LYMPHOCYTES # BLD AUTO: 22.1 % — SIGNIFICANT CHANGE UP (ref 13–44)
MCHC RBC-ENTMCNC: 30.1 PG — SIGNIFICANT CHANGE UP (ref 27–34)
MCHC RBC-ENTMCNC: 32.4 G/DL — SIGNIFICANT CHANGE UP (ref 32–36)
MCV RBC AUTO: 92.7 FL — SIGNIFICANT CHANGE UP (ref 80–100)
MONOCYTES # BLD AUTO: 0.19 K/UL — SIGNIFICANT CHANGE UP (ref 0–0.9)
MONOCYTES NFR BLD AUTO: 9.5 % — SIGNIFICANT CHANGE UP (ref 2–14)
NEUTROPHILS # BLD AUTO: 1.33 K/UL — LOW (ref 1.8–7.4)
NEUTROPHILS NFR BLD AUTO: 66.9 % — SIGNIFICANT CHANGE UP (ref 43–77)
NRBC BLD AUTO-RTO: 0 /100 WBCS — SIGNIFICANT CHANGE UP (ref 0–0)
PLATELET # BLD AUTO: 76 K/UL — LOW (ref 150–400)
POTASSIUM SERPL-MCNC: 4.2 MMOL/L — SIGNIFICANT CHANGE UP (ref 3.5–5.3)
POTASSIUM SERPL-SCNC: 4.2 MMOL/L — SIGNIFICANT CHANGE UP (ref 3.5–5.3)
PROT SERPL-MCNC: 5.9 G/DL — LOW (ref 6–8.3)
RBC # BLD: 3.99 M/UL — LOW (ref 4.2–5.8)
RBC # FLD: 16.1 % — HIGH (ref 10.3–14.5)
SODIUM SERPL-SCNC: 141 MMOL/L — SIGNIFICANT CHANGE UP (ref 135–145)
VANCOMYCIN TROUGH SERPL-MCNC: 14.2 UG/ML — SIGNIFICANT CHANGE UP (ref 10–20)
WBC # BLD: 1.99 K/UL — LOW (ref 3.8–10.5)
WBC # FLD AUTO: 1.99 K/UL — LOW (ref 3.8–10.5)

## 2025-03-27 PROCEDURE — 99233 SBSQ HOSP IP/OBS HIGH 50: CPT | Mod: GC

## 2025-03-27 PROCEDURE — 99233 SBSQ HOSP IP/OBS HIGH 50: CPT

## 2025-03-27 RX ADMIN — Medication 20 MILLIGRAM(S): at 17:03

## 2025-03-27 RX ADMIN — DEXAMETHASONE 2 MILLIGRAM(S): 0.5 TABLET ORAL at 05:38

## 2025-03-27 RX ADMIN — LEVETIRACETAM 1000 MILLIGRAM(S): 10 INJECTION, SOLUTION INTRAVENOUS at 17:03

## 2025-03-27 RX ADMIN — Medication 25 MILLIGRAM(S): at 21:24

## 2025-03-27 RX ADMIN — Medication 2 TABLET(S): at 21:24

## 2025-03-27 RX ADMIN — OSELTAMIVIR PHOSPHATE 75 MILLIGRAM(S): 75 CAPSULE ORAL at 17:03

## 2025-03-27 RX ADMIN — OSELTAMIVIR PHOSPHATE 75 MILLIGRAM(S): 75 CAPSULE ORAL at 05:39

## 2025-03-27 RX ADMIN — LEVETIRACETAM 1000 MILLIGRAM(S): 10 INJECTION, SOLUTION INTRAVENOUS at 05:39

## 2025-03-27 RX ADMIN — Medication 300 MILLIGRAM(S): at 05:29

## 2025-03-27 RX ADMIN — Medication 1 APPLICATION(S): at 11:15

## 2025-03-27 RX ADMIN — AMLODIPINE BESYLATE 5 MILLIGRAM(S): 10 TABLET ORAL at 05:39

## 2025-03-27 RX ADMIN — Medication 1 TABLET(S): at 11:14

## 2025-03-27 RX ADMIN — Medication 25 GRAM(S): at 07:14

## 2025-03-27 RX ADMIN — Medication 20 MILLIGRAM(S): at 05:39

## 2025-03-27 RX ADMIN — DEXAMETHASONE 2 MILLIGRAM(S): 0.5 TABLET ORAL at 17:03

## 2025-03-28 DIAGNOSIS — C71.9 MALIGNANT NEOPLASM OF BRAIN, UNSPECIFIED: ICD-10-CM

## 2025-03-28 DIAGNOSIS — D61.818 OTHER PANCYTOPENIA: ICD-10-CM

## 2025-03-28 LAB
HCT VFR BLD CALC: 39.8 % — SIGNIFICANT CHANGE UP (ref 39–50)
HGB BLD-MCNC: 13 G/DL — SIGNIFICANT CHANGE UP (ref 13–17)
MCHC RBC-ENTMCNC: 30 PG — SIGNIFICANT CHANGE UP (ref 27–34)
MCHC RBC-ENTMCNC: 32.7 G/DL — SIGNIFICANT CHANGE UP (ref 32–36)
MCV RBC AUTO: 91.9 FL — SIGNIFICANT CHANGE UP (ref 80–100)
NRBC BLD AUTO-RTO: 0 /100 WBCS — SIGNIFICANT CHANGE UP (ref 0–0)
PLATELET # BLD AUTO: 101 K/UL — LOW (ref 150–400)
RBC # BLD: 4.33 M/UL — SIGNIFICANT CHANGE UP (ref 4.2–5.8)
RBC # FLD: 15.9 % — HIGH (ref 10.3–14.5)
WBC # BLD: 2.32 K/UL — LOW (ref 3.8–10.5)
WBC # FLD AUTO: 2.32 K/UL — LOW (ref 3.8–10.5)

## 2025-03-28 PROCEDURE — 99233 SBSQ HOSP IP/OBS HIGH 50: CPT | Mod: GC

## 2025-03-28 PROCEDURE — 99223 1ST HOSP IP/OBS HIGH 75: CPT

## 2025-03-28 PROCEDURE — 99233 SBSQ HOSP IP/OBS HIGH 50: CPT

## 2025-03-28 RX ADMIN — OSELTAMIVIR PHOSPHATE 75 MILLIGRAM(S): 75 CAPSULE ORAL at 05:50

## 2025-03-28 RX ADMIN — Medication 1 TABLET(S): at 11:55

## 2025-03-28 RX ADMIN — Medication 20 MILLIGRAM(S): at 18:07

## 2025-03-28 RX ADMIN — LEVETIRACETAM 1000 MILLIGRAM(S): 10 INJECTION, SOLUTION INTRAVENOUS at 18:07

## 2025-03-28 RX ADMIN — Medication 25 MILLIGRAM(S): at 20:36

## 2025-03-28 RX ADMIN — DEXAMETHASONE 2 MILLIGRAM(S): 0.5 TABLET ORAL at 05:49

## 2025-03-28 RX ADMIN — Medication 2 TABLET(S): at 20:35

## 2025-03-28 RX ADMIN — OSELTAMIVIR PHOSPHATE 75 MILLIGRAM(S): 75 CAPSULE ORAL at 18:07

## 2025-03-28 RX ADMIN — AMLODIPINE BESYLATE 5 MILLIGRAM(S): 10 TABLET ORAL at 05:51

## 2025-03-28 RX ADMIN — DEXAMETHASONE 2 MILLIGRAM(S): 0.5 TABLET ORAL at 18:07

## 2025-03-28 RX ADMIN — Medication 20 MILLIGRAM(S): at 05:50

## 2025-03-28 RX ADMIN — LEVETIRACETAM 1000 MILLIGRAM(S): 10 INJECTION, SOLUTION INTRAVENOUS at 05:49

## 2025-03-29 PROCEDURE — 99232 SBSQ HOSP IP/OBS MODERATE 35: CPT

## 2025-03-29 RX ADMIN — Medication 20 MILLIGRAM(S): at 06:27

## 2025-03-29 RX ADMIN — LEVETIRACETAM 1000 MILLIGRAM(S): 10 INJECTION, SOLUTION INTRAVENOUS at 06:26

## 2025-03-29 RX ADMIN — Medication 25 MILLIGRAM(S): at 20:59

## 2025-03-29 RX ADMIN — Medication 2 TABLET(S): at 20:59

## 2025-03-29 RX ADMIN — Medication 1 TABLET(S): at 11:59

## 2025-03-29 RX ADMIN — OSELTAMIVIR PHOSPHATE 75 MILLIGRAM(S): 75 CAPSULE ORAL at 17:28

## 2025-03-29 RX ADMIN — Medication 20 MILLIGRAM(S): at 17:28

## 2025-03-29 RX ADMIN — LEVETIRACETAM 1000 MILLIGRAM(S): 10 INJECTION, SOLUTION INTRAVENOUS at 17:28

## 2025-03-29 RX ADMIN — OSELTAMIVIR PHOSPHATE 75 MILLIGRAM(S): 75 CAPSULE ORAL at 06:26

## 2025-03-29 RX ADMIN — DEXAMETHASONE 2 MILLIGRAM(S): 0.5 TABLET ORAL at 06:27

## 2025-03-29 RX ADMIN — AMLODIPINE BESYLATE 5 MILLIGRAM(S): 10 TABLET ORAL at 06:26

## 2025-03-29 RX ADMIN — DEXAMETHASONE 2 MILLIGRAM(S): 0.5 TABLET ORAL at 17:28

## 2025-03-30 LAB
CULTURE RESULTS: SIGNIFICANT CHANGE UP
CULTURE RESULTS: SIGNIFICANT CHANGE UP
HCT VFR BLD CALC: 38.7 % — LOW (ref 39–50)
HGB BLD-MCNC: 12.7 G/DL — LOW (ref 13–17)
MCHC RBC-ENTMCNC: 29.9 PG — SIGNIFICANT CHANGE UP (ref 27–34)
MCHC RBC-ENTMCNC: 32.8 G/DL — SIGNIFICANT CHANGE UP (ref 32–36)
MCV RBC AUTO: 91.1 FL — SIGNIFICANT CHANGE UP (ref 80–100)
NRBC BLD AUTO-RTO: 0 /100 WBCS — SIGNIFICANT CHANGE UP (ref 0–0)
PLATELET # BLD AUTO: 128 K/UL — LOW (ref 150–400)
RBC # BLD: 4.25 M/UL — SIGNIFICANT CHANGE UP (ref 4.2–5.8)
RBC # FLD: 15.7 % — HIGH (ref 10.3–14.5)
SPECIMEN SOURCE: SIGNIFICANT CHANGE UP
SPECIMEN SOURCE: SIGNIFICANT CHANGE UP
WBC # BLD: 3.04 K/UL — LOW (ref 3.8–10.5)
WBC # FLD AUTO: 3.04 K/UL — LOW (ref 3.8–10.5)

## 2025-03-30 PROCEDURE — 99232 SBSQ HOSP IP/OBS MODERATE 35: CPT

## 2025-03-30 RX ORDER — ENOXAPARIN SODIUM 100 MG/ML
40 INJECTION SUBCUTANEOUS
Refills: 0 | Status: DISCONTINUED | OUTPATIENT
Start: 2025-03-30 | End: 2025-04-05

## 2025-03-30 RX ADMIN — AMLODIPINE BESYLATE 5 MILLIGRAM(S): 10 TABLET ORAL at 06:22

## 2025-03-30 RX ADMIN — ENOXAPARIN SODIUM 40 MILLIGRAM(S): 100 INJECTION SUBCUTANEOUS at 21:20

## 2025-03-30 RX ADMIN — LEVETIRACETAM 1000 MILLIGRAM(S): 10 INJECTION, SOLUTION INTRAVENOUS at 06:21

## 2025-03-30 RX ADMIN — Medication 20 MILLIGRAM(S): at 06:22

## 2025-03-30 RX ADMIN — Medication 20 MILLIGRAM(S): at 17:27

## 2025-03-30 RX ADMIN — Medication 25 MILLIGRAM(S): at 21:21

## 2025-03-30 RX ADMIN — OSELTAMIVIR PHOSPHATE 75 MILLIGRAM(S): 75 CAPSULE ORAL at 06:22

## 2025-03-30 RX ADMIN — DEXAMETHASONE 2 MILLIGRAM(S): 0.5 TABLET ORAL at 17:27

## 2025-03-30 RX ADMIN — Medication 1 TABLET(S): at 11:35

## 2025-03-30 RX ADMIN — Medication 2 TABLET(S): at 21:21

## 2025-03-30 RX ADMIN — DEXAMETHASONE 2 MILLIGRAM(S): 0.5 TABLET ORAL at 06:21

## 2025-03-30 RX ADMIN — LEVETIRACETAM 1000 MILLIGRAM(S): 10 INJECTION, SOLUTION INTRAVENOUS at 17:27

## 2025-03-31 ENCOUNTER — TRANSCRIPTION ENCOUNTER (OUTPATIENT)
Age: 70
End: 2025-03-31

## 2025-03-31 LAB
ALBUMIN SERPL ELPH-MCNC: 2.6 G/DL — LOW (ref 3.3–5)
ALP SERPL-CCNC: 76 U/L — SIGNIFICANT CHANGE UP (ref 40–120)
ALT FLD-CCNC: 23 U/L — SIGNIFICANT CHANGE UP (ref 10–45)
ANION GAP SERPL CALC-SCNC: 9 MMOL/L — SIGNIFICANT CHANGE UP (ref 5–17)
AST SERPL-CCNC: 10 U/L — SIGNIFICANT CHANGE UP (ref 10–40)
BASOPHILS # BLD AUTO: 0 K/UL — SIGNIFICANT CHANGE UP (ref 0–0.2)
BASOPHILS NFR BLD AUTO: 0 % — SIGNIFICANT CHANGE UP (ref 0–2)
BILIRUB SERPL-MCNC: 0.3 MG/DL — SIGNIFICANT CHANGE UP (ref 0.2–1.2)
BUN SERPL-MCNC: 20 MG/DL — SIGNIFICANT CHANGE UP (ref 7–23)
CALCIUM SERPL-MCNC: 8.4 MG/DL — SIGNIFICANT CHANGE UP (ref 8.4–10.5)
CHLORIDE SERPL-SCNC: 108 MMOL/L — SIGNIFICANT CHANGE UP (ref 96–108)
CO2 SERPL-SCNC: 27 MMOL/L — SIGNIFICANT CHANGE UP (ref 22–31)
CREAT SERPL-MCNC: 0.83 MG/DL — SIGNIFICANT CHANGE UP (ref 0.5–1.3)
EGFR: 95 ML/MIN/1.73M2 — SIGNIFICANT CHANGE UP
EGFR: 95 ML/MIN/1.73M2 — SIGNIFICANT CHANGE UP
EOSINOPHIL # BLD AUTO: 0 K/UL — SIGNIFICANT CHANGE UP (ref 0–0.5)
EOSINOPHIL NFR BLD AUTO: 0 % — SIGNIFICANT CHANGE UP (ref 0–6)
GLUCOSE SERPL-MCNC: 126 MG/DL — HIGH (ref 70–99)
HCT VFR BLD CALC: 38.2 % — LOW (ref 39–50)
HGB BLD-MCNC: 12.3 G/DL — LOW (ref 13–17)
LYMPHOCYTES # BLD AUTO: 1.54 K/UL — SIGNIFICANT CHANGE UP (ref 1–3.3)
LYMPHOCYTES # BLD AUTO: 40 % — SIGNIFICANT CHANGE UP (ref 13–44)
MANUAL SMEAR VERIFICATION: SIGNIFICANT CHANGE UP
MCHC RBC-ENTMCNC: 29.9 PG — SIGNIFICANT CHANGE UP (ref 27–34)
MCHC RBC-ENTMCNC: 32.2 G/DL — SIGNIFICANT CHANGE UP (ref 32–36)
MCV RBC AUTO: 92.7 FL — SIGNIFICANT CHANGE UP (ref 80–100)
METAMYELOCYTES # FLD: 2 % — HIGH (ref 0–0)
METAMYELOCYTES NFR BLD: 2 % — HIGH (ref 0–0)
MONOCYTES # BLD AUTO: 0.46 K/UL — SIGNIFICANT CHANGE UP (ref 0–0.9)
MONOCYTES NFR BLD AUTO: 12 % — SIGNIFICANT CHANGE UP (ref 2–14)
MYELOCYTES NFR BLD: 3 % — HIGH (ref 0–0)
NEUTROPHILS # BLD AUTO: 1.65 K/UL — LOW (ref 1.8–7.4)
NEUTROPHILS NFR BLD AUTO: 37 % — LOW (ref 43–77)
NEUTS BAND # BLD: 6 % — SIGNIFICANT CHANGE UP (ref 0–8)
NEUTS BAND NFR BLD: 6 % — SIGNIFICANT CHANGE UP (ref 0–8)
NRBC # BLD: 0 /100 WBCS — SIGNIFICANT CHANGE UP (ref 0–0)
NRBC BLD-RTO: 0 /100 WBCS — SIGNIFICANT CHANGE UP (ref 0–0)
PLAT MORPH BLD: NORMAL — SIGNIFICANT CHANGE UP
PLATELET # BLD AUTO: 154 K/UL — SIGNIFICANT CHANGE UP (ref 150–400)
POTASSIUM SERPL-MCNC: 4.2 MMOL/L — SIGNIFICANT CHANGE UP (ref 3.5–5.3)
POTASSIUM SERPL-SCNC: 4.2 MMOL/L — SIGNIFICANT CHANGE UP (ref 3.5–5.3)
PROT SERPL-MCNC: 6.4 G/DL — SIGNIFICANT CHANGE UP (ref 6–8.3)
RBC # BLD: 4.12 M/UL — LOW (ref 4.2–5.8)
RBC # FLD: 15.8 % — HIGH (ref 10.3–14.5)
RBC BLD AUTO: NORMAL — SIGNIFICANT CHANGE UP
SODIUM SERPL-SCNC: 144 MMOL/L — SIGNIFICANT CHANGE UP (ref 135–145)
WBC # BLD: 3.84 K/UL — SIGNIFICANT CHANGE UP (ref 3.8–10.5)
WBC # FLD AUTO: 3.84 K/UL — SIGNIFICANT CHANGE UP (ref 3.8–10.5)

## 2025-03-31 PROCEDURE — 99232 SBSQ HOSP IP/OBS MODERATE 35: CPT

## 2025-03-31 PROCEDURE — 99233 SBSQ HOSP IP/OBS HIGH 50: CPT | Mod: GC

## 2025-03-31 PROCEDURE — 99233 SBSQ HOSP IP/OBS HIGH 50: CPT

## 2025-03-31 RX ORDER — LEVETIRACETAM 10 MG/ML
1 INJECTION, SOLUTION INTRAVENOUS
Qty: 60 | Refills: 0
Start: 2025-03-31 | End: 2025-04-29

## 2025-03-31 RX ORDER — AMLODIPINE BESYLATE 10 MG/1
1 TABLET ORAL
Refills: 0 | DISCHARGE

## 2025-03-31 RX ORDER — DEXAMETHASONE 0.5 MG/1
0 TABLET ORAL
Qty: 0 | Refills: 0 | DISCHARGE
Start: 2025-03-31

## 2025-03-31 RX ORDER — DEXAMETHASONE 0.5 MG/1
1 TABLET ORAL
Qty: 31 | Refills: 0
Start: 2025-03-31

## 2025-03-31 RX ORDER — DEXAMETHASONE 0.5 MG/1
1 TABLET ORAL
Qty: 34 | Refills: 0
Start: 2025-03-31 | End: 2025-04-16

## 2025-03-31 RX ORDER — AMLODIPINE BESYLATE 10 MG/1
1 TABLET ORAL
Qty: 30 | Refills: 0
Start: 2025-03-31 | End: 2025-04-29

## 2025-03-31 RX ADMIN — Medication 2 TABLET(S): at 21:32

## 2025-03-31 RX ADMIN — AMLODIPINE BESYLATE 5 MILLIGRAM(S): 10 TABLET ORAL at 06:12

## 2025-03-31 RX ADMIN — ENOXAPARIN SODIUM 40 MILLIGRAM(S): 100 INJECTION SUBCUTANEOUS at 21:32

## 2025-03-31 RX ADMIN — DEXAMETHASONE 2 MILLIGRAM(S): 0.5 TABLET ORAL at 06:12

## 2025-03-31 RX ADMIN — Medication 20 MILLIGRAM(S): at 06:12

## 2025-03-31 RX ADMIN — Medication 1 TABLET(S): at 11:33

## 2025-03-31 RX ADMIN — LEVETIRACETAM 1000 MILLIGRAM(S): 10 INJECTION, SOLUTION INTRAVENOUS at 06:12

## 2025-03-31 RX ADMIN — DEXAMETHASONE 2 MILLIGRAM(S): 0.5 TABLET ORAL at 17:44

## 2025-03-31 RX ADMIN — Medication 20 MILLIGRAM(S): at 17:44

## 2025-03-31 RX ADMIN — Medication 25 MILLIGRAM(S): at 21:33

## 2025-03-31 RX ADMIN — LEVETIRACETAM 1000 MILLIGRAM(S): 10 INJECTION, SOLUTION INTRAVENOUS at 17:44

## 2025-04-01 PROCEDURE — 99232 SBSQ HOSP IP/OBS MODERATE 35: CPT

## 2025-04-01 PROCEDURE — 70553 MRI BRAIN STEM W/O & W/DYE: CPT | Mod: 26

## 2025-04-01 PROCEDURE — 99233 SBSQ HOSP IP/OBS HIGH 50: CPT | Mod: GC

## 2025-04-01 RX ADMIN — DEXAMETHASONE 2 MILLIGRAM(S): 0.5 TABLET ORAL at 17:36

## 2025-04-01 RX ADMIN — LEVETIRACETAM 1000 MILLIGRAM(S): 10 INJECTION, SOLUTION INTRAVENOUS at 05:44

## 2025-04-01 RX ADMIN — Medication 20 MILLIGRAM(S): at 17:36

## 2025-04-01 RX ADMIN — Medication 20 MILLIGRAM(S): at 05:45

## 2025-04-01 RX ADMIN — Medication 25 MILLIGRAM(S): at 19:50

## 2025-04-01 RX ADMIN — AMLODIPINE BESYLATE 5 MILLIGRAM(S): 10 TABLET ORAL at 05:44

## 2025-04-01 RX ADMIN — DEXAMETHASONE 2 MILLIGRAM(S): 0.5 TABLET ORAL at 05:44

## 2025-04-01 RX ADMIN — Medication 1 TABLET(S): at 12:02

## 2025-04-01 RX ADMIN — ENOXAPARIN SODIUM 40 MILLIGRAM(S): 100 INJECTION SUBCUTANEOUS at 21:13

## 2025-04-01 RX ADMIN — LEVETIRACETAM 1000 MILLIGRAM(S): 10 INJECTION, SOLUTION INTRAVENOUS at 17:36

## 2025-04-01 RX ADMIN — Medication 2 TABLET(S): at 21:13

## 2025-04-02 PROCEDURE — 99233 SBSQ HOSP IP/OBS HIGH 50: CPT | Mod: GC

## 2025-04-02 PROCEDURE — 99231 SBSQ HOSP IP/OBS SF/LOW 25: CPT

## 2025-04-02 PROCEDURE — 99232 SBSQ HOSP IP/OBS MODERATE 35: CPT

## 2025-04-02 RX ADMIN — Medication 25 MILLIGRAM(S): at 21:25

## 2025-04-02 RX ADMIN — AMLODIPINE BESYLATE 5 MILLIGRAM(S): 10 TABLET ORAL at 06:39

## 2025-04-02 RX ADMIN — ENOXAPARIN SODIUM 40 MILLIGRAM(S): 100 INJECTION SUBCUTANEOUS at 21:25

## 2025-04-02 RX ADMIN — Medication 1 TABLET(S): at 12:47

## 2025-04-02 RX ADMIN — Medication 2 TABLET(S): at 21:25

## 2025-04-02 RX ADMIN — Medication 20 MILLIGRAM(S): at 06:38

## 2025-04-02 RX ADMIN — DEXAMETHASONE 2 MILLIGRAM(S): 0.5 TABLET ORAL at 06:38

## 2025-04-02 RX ADMIN — DEXAMETHASONE 2 MILLIGRAM(S): 0.5 TABLET ORAL at 17:25

## 2025-04-02 RX ADMIN — LEVETIRACETAM 1000 MILLIGRAM(S): 10 INJECTION, SOLUTION INTRAVENOUS at 06:38

## 2025-04-02 RX ADMIN — Medication 20 MILLIGRAM(S): at 17:25

## 2025-04-02 RX ADMIN — LEVETIRACETAM 1000 MILLIGRAM(S): 10 INJECTION, SOLUTION INTRAVENOUS at 17:25

## 2025-04-03 LAB
ALBUMIN SERPL ELPH-MCNC: 3 G/DL — LOW (ref 3.3–5)
ALP SERPL-CCNC: 85 U/L — SIGNIFICANT CHANGE UP (ref 40–120)
ALT FLD-CCNC: 36 U/L — SIGNIFICANT CHANGE UP (ref 10–45)
ANION GAP SERPL CALC-SCNC: 10 MMOL/L — SIGNIFICANT CHANGE UP (ref 5–17)
AST SERPL-CCNC: 17 U/L — SIGNIFICANT CHANGE UP (ref 10–40)
BASOPHILS # BLD AUTO: 0.06 K/UL — SIGNIFICANT CHANGE UP (ref 0–0.2)
BASOPHILS NFR BLD AUTO: 0.7 % — SIGNIFICANT CHANGE UP (ref 0–2)
BILIRUB SERPL-MCNC: 0.3 MG/DL — SIGNIFICANT CHANGE UP (ref 0.2–1.2)
BUN SERPL-MCNC: 22 MG/DL — SIGNIFICANT CHANGE UP (ref 7–23)
CALCIUM SERPL-MCNC: 8.8 MG/DL — SIGNIFICANT CHANGE UP (ref 8.4–10.5)
CHLORIDE SERPL-SCNC: 105 MMOL/L — SIGNIFICANT CHANGE UP (ref 96–108)
CO2 SERPL-SCNC: 28 MMOL/L — SIGNIFICANT CHANGE UP (ref 22–31)
CREAT SERPL-MCNC: 0.85 MG/DL — SIGNIFICANT CHANGE UP (ref 0.5–1.3)
EGFR: 95 ML/MIN/1.73M2 — SIGNIFICANT CHANGE UP
EGFR: 95 ML/MIN/1.73M2 — SIGNIFICANT CHANGE UP
EOSINOPHIL # BLD AUTO: 0 K/UL — SIGNIFICANT CHANGE UP (ref 0–0.5)
EOSINOPHIL NFR BLD AUTO: 0 % — SIGNIFICANT CHANGE UP (ref 0–6)
GLUCOSE SERPL-MCNC: 120 MG/DL — HIGH (ref 70–99)
HCT VFR BLD CALC: 41.2 % — SIGNIFICANT CHANGE UP (ref 39–50)
HGB BLD-MCNC: 13.7 G/DL — SIGNIFICANT CHANGE UP (ref 13–17)
IMM GRANULOCYTES NFR BLD AUTO: 13.7 % — HIGH (ref 0–0.9)
LYMPHOCYTES # BLD AUTO: 2.4 K/UL — SIGNIFICANT CHANGE UP (ref 1–3.3)
LYMPHOCYTES # BLD AUTO: 29 % — SIGNIFICANT CHANGE UP (ref 13–44)
MCHC RBC-ENTMCNC: 30.3 PG — SIGNIFICANT CHANGE UP (ref 27–34)
MCHC RBC-ENTMCNC: 33.3 G/DL — SIGNIFICANT CHANGE UP (ref 32–36)
MCV RBC AUTO: 91.2 FL — SIGNIFICANT CHANGE UP (ref 80–100)
MONOCYTES # BLD AUTO: 0.43 K/UL — SIGNIFICANT CHANGE UP (ref 0–0.9)
MONOCYTES NFR BLD AUTO: 5.2 % — SIGNIFICANT CHANGE UP (ref 2–14)
NEUTROPHILS # BLD AUTO: 4.25 K/UL — SIGNIFICANT CHANGE UP (ref 1.8–7.4)
NEUTROPHILS NFR BLD AUTO: 51.4 % — SIGNIFICANT CHANGE UP (ref 43–77)
NRBC BLD AUTO-RTO: 0 /100 WBCS — SIGNIFICANT CHANGE UP (ref 0–0)
PLATELET # BLD AUTO: 243 K/UL — SIGNIFICANT CHANGE UP (ref 150–400)
POTASSIUM SERPL-MCNC: 4.2 MMOL/L — SIGNIFICANT CHANGE UP (ref 3.5–5.3)
POTASSIUM SERPL-SCNC: 4.2 MMOL/L — SIGNIFICANT CHANGE UP (ref 3.5–5.3)
PROT SERPL-MCNC: 7.1 G/DL — SIGNIFICANT CHANGE UP (ref 6–8.3)
RBC # BLD: 4.52 M/UL — SIGNIFICANT CHANGE UP (ref 4.2–5.8)
RBC # FLD: 15.7 % — HIGH (ref 10.3–14.5)
SODIUM SERPL-SCNC: 143 MMOL/L — SIGNIFICANT CHANGE UP (ref 135–145)
WBC # BLD: 8.27 K/UL — SIGNIFICANT CHANGE UP (ref 3.8–10.5)
WBC # FLD AUTO: 8.27 K/UL — SIGNIFICANT CHANGE UP (ref 3.8–10.5)

## 2025-04-03 PROCEDURE — 99232 SBSQ HOSP IP/OBS MODERATE 35: CPT

## 2025-04-03 PROCEDURE — 99232 SBSQ HOSP IP/OBS MODERATE 35: CPT | Mod: GC

## 2025-04-03 RX ORDER — DEXAMETHASONE 0.5 MG/1
1 TABLET ORAL
Qty: 31 | Refills: 0
Start: 2025-04-03

## 2025-04-03 RX ORDER — AMLODIPINE BESYLATE 10 MG/1
1 TABLET ORAL
Qty: 30 | Refills: 0
Start: 2025-04-03 | End: 2025-05-02

## 2025-04-03 RX ORDER — LEVETIRACETAM 10 MG/ML
1 INJECTION, SOLUTION INTRAVENOUS
Qty: 60 | Refills: 0
Start: 2025-04-03 | End: 2025-05-02

## 2025-04-03 RX ADMIN — Medication 20 MILLIGRAM(S): at 17:19

## 2025-04-03 RX ADMIN — Medication 20 MILLIGRAM(S): at 05:58

## 2025-04-03 RX ADMIN — AMLODIPINE BESYLATE 5 MILLIGRAM(S): 10 TABLET ORAL at 05:58

## 2025-04-03 RX ADMIN — LEVETIRACETAM 1000 MILLIGRAM(S): 10 INJECTION, SOLUTION INTRAVENOUS at 17:19

## 2025-04-03 RX ADMIN — DEXAMETHASONE 2 MILLIGRAM(S): 0.5 TABLET ORAL at 17:19

## 2025-04-03 RX ADMIN — ENOXAPARIN SODIUM 40 MILLIGRAM(S): 100 INJECTION SUBCUTANEOUS at 21:37

## 2025-04-03 RX ADMIN — DEXAMETHASONE 2 MILLIGRAM(S): 0.5 TABLET ORAL at 05:58

## 2025-04-03 RX ADMIN — Medication 2 TABLET(S): at 21:36

## 2025-04-03 RX ADMIN — LEVETIRACETAM 1000 MILLIGRAM(S): 10 INJECTION, SOLUTION INTRAVENOUS at 05:58

## 2025-04-03 RX ADMIN — Medication 25 MILLIGRAM(S): at 21:36

## 2025-04-03 RX ADMIN — Medication 1 TABLET(S): at 11:03

## 2025-04-04 PROCEDURE — 99232 SBSQ HOSP IP/OBS MODERATE 35: CPT

## 2025-04-04 PROCEDURE — 99233 SBSQ HOSP IP/OBS HIGH 50: CPT | Mod: GC

## 2025-04-04 RX ADMIN — Medication 25 MILLIGRAM(S): at 21:36

## 2025-04-04 RX ADMIN — LEVETIRACETAM 1000 MILLIGRAM(S): 10 INJECTION, SOLUTION INTRAVENOUS at 05:58

## 2025-04-04 RX ADMIN — DEXAMETHASONE 2 MILLIGRAM(S): 0.5 TABLET ORAL at 17:54

## 2025-04-04 RX ADMIN — LEVETIRACETAM 1000 MILLIGRAM(S): 10 INJECTION, SOLUTION INTRAVENOUS at 17:54

## 2025-04-04 RX ADMIN — Medication 20 MILLIGRAM(S): at 17:54

## 2025-04-04 RX ADMIN — ENOXAPARIN SODIUM 40 MILLIGRAM(S): 100 INJECTION SUBCUTANEOUS at 21:36

## 2025-04-04 RX ADMIN — Medication 2 TABLET(S): at 21:36

## 2025-04-04 RX ADMIN — Medication 20 MILLIGRAM(S): at 05:58

## 2025-04-04 RX ADMIN — DEXAMETHASONE 2 MILLIGRAM(S): 0.5 TABLET ORAL at 05:58

## 2025-04-04 RX ADMIN — AMLODIPINE BESYLATE 5 MILLIGRAM(S): 10 TABLET ORAL at 05:58

## 2025-04-04 RX ADMIN — Medication 1 TABLET(S): at 11:18

## 2025-04-05 VITALS
RESPIRATION RATE: 14 BRPM | OXYGEN SATURATION: 95 % | DIASTOLIC BLOOD PRESSURE: 86 MMHG | SYSTOLIC BLOOD PRESSURE: 129 MMHG | TEMPERATURE: 98 F | HEART RATE: 79 BPM

## 2025-04-05 PROCEDURE — 99238 HOSP IP/OBS DSCHRG MGMT 30/<: CPT

## 2025-04-05 PROCEDURE — 99232 SBSQ HOSP IP/OBS MODERATE 35: CPT

## 2025-04-05 RX ADMIN — LEVETIRACETAM 1000 MILLIGRAM(S): 10 INJECTION, SOLUTION INTRAVENOUS at 05:28

## 2025-04-05 RX ADMIN — AMLODIPINE BESYLATE 5 MILLIGRAM(S): 10 TABLET ORAL at 05:28

## 2025-04-05 RX ADMIN — DEXAMETHASONE 2 MILLIGRAM(S): 0.5 TABLET ORAL at 05:27

## 2025-04-05 RX ADMIN — Medication 1 TABLET(S): at 11:29

## 2025-04-05 RX ADMIN — Medication 20 MILLIGRAM(S): at 05:27

## 2025-04-07 ENCOUNTER — NON-APPOINTMENT (OUTPATIENT)
Age: 70
End: 2025-04-07

## 2025-04-10 ENCOUNTER — APPOINTMENT (OUTPATIENT)
Dept: HEMATOLOGY ONCOLOGY | Facility: CLINIC | Age: 70
End: 2025-04-10

## 2025-04-10 ENCOUNTER — APPOINTMENT (OUTPATIENT)
Facility: CLINIC | Age: 70
End: 2025-04-10

## 2025-04-10 VITALS
RESPIRATION RATE: 18 BRPM | OXYGEN SATURATION: 95 % | HEIGHT: 70 IN | HEART RATE: 74 BPM | WEIGHT: 206 LBS | TEMPERATURE: 98.1 F | BODY MASS INDEX: 29.49 KG/M2 | SYSTOLIC BLOOD PRESSURE: 133 MMHG | DIASTOLIC BLOOD PRESSURE: 79 MMHG

## 2025-04-10 VITALS
BODY MASS INDEX: 28.49 KG/M2 | TEMPERATURE: 97.5 F | WEIGHT: 199 LBS | SYSTOLIC BLOOD PRESSURE: 123 MMHG | OXYGEN SATURATION: 96 % | HEIGHT: 70 IN | DIASTOLIC BLOOD PRESSURE: 81 MMHG | HEART RATE: 76 BPM

## 2025-04-10 DIAGNOSIS — Z99.3 DEPENDENCE ON WHEELCHAIR: ICD-10-CM

## 2025-04-10 DIAGNOSIS — A49.8 OTHER BACTERIAL INFECTIONS OF UNSPECIFIED SITE: ICD-10-CM

## 2025-04-10 DIAGNOSIS — I63.9 CEREBRAL INFARCTION, UNSPECIFIED: ICD-10-CM

## 2025-04-10 DIAGNOSIS — R53.1 WEAKNESS: ICD-10-CM

## 2025-04-10 DIAGNOSIS — Z79.899 OTHER LONG TERM (CURRENT) DRUG THERAPY: ICD-10-CM

## 2025-04-10 DIAGNOSIS — Z51.11 ENCOUNTER FOR ANTINEOPLASTIC CHEMOTHERAPY: ICD-10-CM

## 2025-04-10 PROCEDURE — 99203 OFFICE O/P NEW LOW 30 MIN: CPT

## 2025-04-10 PROCEDURE — G2211 COMPLEX E/M VISIT ADD ON: CPT

## 2025-04-10 PROCEDURE — 99214 OFFICE O/P EST MOD 30 MIN: CPT

## 2025-04-10 RX ORDER — DEXAMETHASONE 0.5 MG/.5MG
0.5 TABLET ORAL
Qty: 180 | Refills: 0 | Status: ACTIVE | COMMUNITY
Start: 2025-04-10 | End: 1900-01-01

## 2025-04-10 RX ORDER — ONDANSETRON 8 MG/1
8 TABLET, ORALLY DISINTEGRATING ORAL
Qty: 90 | Refills: 1 | Status: ACTIVE | COMMUNITY
Start: 2025-04-10 | End: 1900-01-01

## 2025-04-10 RX ORDER — LORATADINE 5 MG
17 TABLET,CHEWABLE ORAL DAILY
Qty: 1 | Refills: 3 | Status: ACTIVE | COMMUNITY
Start: 2025-04-10 | End: 1900-01-01

## 2025-04-11 ENCOUNTER — APPOINTMENT (OUTPATIENT)
Dept: NEUROSURGERY | Facility: CLINIC | Age: 70
End: 2025-04-11
Payer: MEDICARE

## 2025-04-11 DIAGNOSIS — C71.9 MALIGNANT NEOPLASM OF BRAIN, UNSPECIFIED: ICD-10-CM

## 2025-04-11 PROCEDURE — 99203 OFFICE O/P NEW LOW 30 MIN: CPT

## 2025-04-14 ENCOUNTER — NON-APPOINTMENT (OUTPATIENT)
Age: 70
End: 2025-04-14

## 2025-04-18 ENCOUNTER — APPOINTMENT (OUTPATIENT)
Dept: INTERNAL MEDICINE | Facility: CLINIC | Age: 70
End: 2025-04-18

## 2025-04-30 PROCEDURE — 83036 HEMOGLOBIN GLYCOSYLATED A1C: CPT

## 2025-04-30 PROCEDURE — 97110 THERAPEUTIC EXERCISES: CPT | Mod: GO

## 2025-04-30 PROCEDURE — 82610 CYSTATIN C: CPT

## 2025-04-30 PROCEDURE — 70553 MRI BRAIN STEM W/O & W/DYE: CPT | Mod: MC

## 2025-04-30 PROCEDURE — 87040 BLOOD CULTURE FOR BACTERIA: CPT

## 2025-04-30 PROCEDURE — 70450 CT HEAD/BRAIN W/O DYE: CPT | Mod: MC

## 2025-04-30 PROCEDURE — 81003 URINALYSIS AUTO W/O SCOPE: CPT

## 2025-04-30 PROCEDURE — 85025 COMPLETE CBC W/AUTO DIFF WBC: CPT

## 2025-04-30 PROCEDURE — 97535 SELF CARE MNGMENT TRAINING: CPT | Mod: GO

## 2025-04-30 PROCEDURE — 92610 EVALUATE SWALLOWING FUNCTION: CPT | Mod: GN

## 2025-04-30 PROCEDURE — 80053 COMPREHEN METABOLIC PANEL: CPT

## 2025-04-30 PROCEDURE — 97165 OT EVAL LOW COMPLEX 30 MIN: CPT | Mod: GO

## 2025-04-30 PROCEDURE — 84100 ASSAY OF PHOSPHORUS: CPT

## 2025-04-30 PROCEDURE — 85027 COMPLETE CBC AUTOMATED: CPT

## 2025-04-30 PROCEDURE — 97112 NEUROMUSCULAR REEDUCATION: CPT | Mod: GP

## 2025-04-30 PROCEDURE — 87637 SARSCOV2&INF A&B&RSV AMP PRB: CPT

## 2025-04-30 PROCEDURE — 83735 ASSAY OF MAGNESIUM: CPT

## 2025-04-30 PROCEDURE — 36415 COLL VENOUS BLD VENIPUNCTURE: CPT

## 2025-04-30 PROCEDURE — 71045 X-RAY EXAM CHEST 1 VIEW: CPT

## 2025-04-30 PROCEDURE — 80202 ASSAY OF VANCOMYCIN: CPT

## 2025-04-30 PROCEDURE — A9585: CPT

## 2025-04-30 PROCEDURE — 92507 TX SP LANG VOICE COMM INDIV: CPT | Mod: GN

## 2025-04-30 PROCEDURE — 97161 PT EVAL LOW COMPLEX 20 MIN: CPT | Mod: GP

## 2025-04-30 PROCEDURE — 97116 GAIT TRAINING THERAPY: CPT | Mod: GP

## 2025-04-30 PROCEDURE — 0225U NFCT DS DNA&RNA 21 SARSCOV2: CPT

## 2025-04-30 PROCEDURE — 92523 SPEECH SOUND LANG COMPREHEN: CPT | Mod: GN

## 2025-04-30 PROCEDURE — 97530 THERAPEUTIC ACTIVITIES: CPT | Mod: GP

## 2025-05-08 ENCOUNTER — APPOINTMENT (OUTPATIENT)
Dept: HEMATOLOGY ONCOLOGY | Facility: CLINIC | Age: 70
End: 2025-05-08

## 2025-05-08 DIAGNOSIS — R53.1 WEAKNESS: ICD-10-CM

## 2025-05-08 DIAGNOSIS — Z51.11 ENCOUNTER FOR ANTINEOPLASTIC CHEMOTHERAPY: ICD-10-CM

## 2025-05-08 DIAGNOSIS — I63.9 CEREBRAL INFARCTION, UNSPECIFIED: ICD-10-CM

## 2025-05-08 DIAGNOSIS — Z79.899 OTHER LONG TERM (CURRENT) DRUG THERAPY: ICD-10-CM

## 2025-05-08 PROCEDURE — 99214 OFFICE O/P EST MOD 30 MIN: CPT | Mod: 93

## 2025-05-08 PROCEDURE — G2211 COMPLEX E/M VISIT ADD ON: CPT | Mod: 93

## 2025-05-09 ENCOUNTER — APPOINTMENT (OUTPATIENT)
Dept: INTERNAL MEDICINE | Facility: CLINIC | Age: 70
End: 2025-05-09
Payer: MEDICARE

## 2025-05-09 VITALS
SYSTOLIC BLOOD PRESSURE: 136 MMHG | OXYGEN SATURATION: 95 % | HEART RATE: 74 BPM | DIASTOLIC BLOOD PRESSURE: 76 MMHG | HEIGHT: 70 IN

## 2025-05-09 DIAGNOSIS — Z99.3 DEPENDENCE ON WHEELCHAIR: ICD-10-CM

## 2025-05-09 DIAGNOSIS — I10 ESSENTIAL (PRIMARY) HYPERTENSION: ICD-10-CM

## 2025-05-09 DIAGNOSIS — C71.9 MALIGNANT NEOPLASM OF BRAIN, UNSPECIFIED: ICD-10-CM

## 2025-05-09 PROCEDURE — 36415 COLL VENOUS BLD VENIPUNCTURE: CPT

## 2025-05-09 PROCEDURE — 99204 OFFICE O/P NEW MOD 45 MIN: CPT

## 2025-05-09 PROCEDURE — G2211 COMPLEX E/M VISIT ADD ON: CPT

## 2025-05-09 RX ORDER — TEMOZOLOMIDE 100 MG/1
100 CAPSULE ORAL
Qty: 20 | Refills: 0 | Status: ACTIVE | COMMUNITY
Start: 2025-05-08 | End: 1900-01-01

## 2025-05-12 ENCOUNTER — NON-APPOINTMENT (OUTPATIENT)
Age: 70
End: 2025-05-12

## 2025-05-12 LAB
ALBUMIN SERPL ELPH-MCNC: 4.2 G/DL
ALP BLD-CCNC: 69 U/L
ALT SERPL-CCNC: 20 U/L
ANION GAP SERPL CALC-SCNC: 15 MMOL/L
AST SERPL-CCNC: 13 U/L
BASOPHILS # BLD AUTO: 0.02 K/UL
BASOPHILS NFR BLD AUTO: 0.4 %
BILIRUB DIRECT SERPL-MCNC: 0.1 MG/DL
BILIRUB INDIRECT SERPL-MCNC: 0.2 MG/DL
BILIRUB SERPL-MCNC: 0.3 MG/DL
BUN SERPL-MCNC: 17 MG/DL
CALCIUM SERPL-MCNC: 9.6 MG/DL
CHLORIDE SERPL-SCNC: 103 MMOL/L
CO2 SERPL-SCNC: 25 MMOL/L
CREAT SERPL-MCNC: 0.89 MG/DL
EGFRCR SERPLBLD CKD-EPI 2021: 93 ML/MIN/1.73M2
EOSINOPHIL # BLD AUTO: 0.06 K/UL
EOSINOPHIL NFR BLD AUTO: 1.1 %
GLUCOSE SERPL-MCNC: 80 MG/DL
HCT VFR BLD CALC: 39 %
HGB BLD-MCNC: 12.9 G/DL
IMM GRANULOCYTES NFR BLD AUTO: 1.5 %
LYMPHOCYTES # BLD AUTO: 1.65 K/UL
LYMPHOCYTES NFR BLD AUTO: 30.4 %
MAN DIFF?: NORMAL
MCHC RBC-ENTMCNC: 30.8 PG
MCHC RBC-ENTMCNC: 33.1 G/DL
MCV RBC AUTO: 93.1 FL
MONOCYTES # BLD AUTO: 0.47 K/UL
MONOCYTES NFR BLD AUTO: 8.7 %
NEUTROPHILS # BLD AUTO: 3.15 K/UL
NEUTROPHILS NFR BLD AUTO: 57.9 %
PLATELET # BLD AUTO: 344 K/UL
POTASSIUM SERPL-SCNC: 4.2 MMOL/L
PROT SERPL-MCNC: 6.6 G/DL
RBC # BLD: 4.19 M/UL
RBC # FLD: 15.1 %
SODIUM SERPL-SCNC: 144 MMOL/L
WBC # FLD AUTO: 5.43 K/UL

## 2025-05-30 ENCOUNTER — NON-APPOINTMENT (OUTPATIENT)
Age: 70
End: 2025-05-30

## 2025-06-03 ENCOUNTER — NON-APPOINTMENT (OUTPATIENT)
Age: 70
End: 2025-06-03

## 2025-06-09 ENCOUNTER — APPOINTMENT (OUTPATIENT)
Dept: NEUROSURGERY | Facility: CLINIC | Age: 70
End: 2025-06-09
Payer: MEDICARE

## 2025-06-09 ENCOUNTER — APPOINTMENT (OUTPATIENT)
Dept: MRI IMAGING | Facility: HOSPITAL | Age: 70
End: 2025-06-09

## 2025-06-13 ENCOUNTER — APPOINTMENT (OUTPATIENT)
Dept: NEUROSURGERY | Facility: CLINIC | Age: 70
End: 2025-06-13

## 2025-06-13 PROCEDURE — 99202 OFFICE O/P NEW SF 15 MIN: CPT | Mod: 93

## 2025-06-16 ENCOUNTER — APPOINTMENT (OUTPATIENT)
Dept: NEUROSURGERY | Facility: CLINIC | Age: 70
End: 2025-06-16
Payer: MEDICARE

## 2025-06-16 PROCEDURE — 99213 OFFICE O/P EST LOW 20 MIN: CPT | Mod: 2W

## 2025-06-19 ENCOUNTER — APPOINTMENT (OUTPATIENT)
Dept: HEMATOLOGY ONCOLOGY | Facility: CLINIC | Age: 70
End: 2025-06-19

## (undated) DEVICE — PREP BETADINE SPONGE STICKS

## (undated) DEVICE — PERFORATOR SOPHYSA ALPHA 11-14-S

## (undated) DEVICE — ARACHNOID BACKCUTTING SUPERFICIAL HANDLE 5"

## (undated) DEVICE — TUBING SUCTION 20FT

## (undated) DEVICE — SUT VICRYL PLUS 2-0 18" CT-2 (POP-OFF)

## (undated) DEVICE — BIPOLAR ISOCOOL TIP 2MM

## (undated) DEVICE — MIDAS REX MR8 MATCH HEAD FLUTED LG BORE 3MM X 14CM

## (undated) DEVICE — PREP BETADINE KIT

## (undated) DEVICE — ROUTER TAPR 2.3MM BLU RED

## (undated) DEVICE — SUT VICRYL PLUS 2-0 18" CT-1 (POP-OFF)

## (undated) DEVICE — TAPE SILK 3"

## (undated) DEVICE — SUT NUROLON 4-0 8-18" TF (POP-OFF)

## (undated) DEVICE — Device

## (undated) DEVICE — BUR STRYKER MULTI FLUTE ROUND 5MM

## (undated) DEVICE — ARACHNOID CIRCULAR LONG HANDLE 8"

## (undated) DEVICE — ARACHNOID BACKCUTTING LONG HANDLE 8"

## (undated) DEVICE — POSITIONER FOAM EGG CRATE ULNAR 2PCS (PINK)

## (undated) DEVICE — ARACHNOID CIRCULAR SUPERFICIAL HANDLE 5"

## (undated) DEVICE — MARKING PEN W RULER

## (undated) DEVICE — CLIPPER BLADE GENERAL USE

## (undated) DEVICE — BUR GILLEN SURGICAL ACORN SHORT 7.5MM

## (undated) DEVICE — TUBING SMOKE EVAC 3/8" X 10FT FOR NEPTUNE

## (undated) DEVICE — WARMING BLANKET LOWER ADULT

## (undated) DEVICE — STRYKER INSTRUMENT BATTERY

## (undated) DEVICE — DRILL BIT STRYKER PRECISION NEURO 3X3.8MM

## (undated) DEVICE — LONE STAR RETRACTOR RING 12MM BLUNT DISP

## (undated) DEVICE — GLV 8 PROTEXIS (WHITE)

## (undated) DEVICE — STAPLER SKIN PROXIMATE

## (undated) DEVICE — SUT ETHILON 3-0 18" PS-1

## (undated) DEVICE — GLV 8.5 PROTEXIS (WHITE)

## (undated) DEVICE — CODMAN PERFORATOR 14MM (BLUE)

## (undated) DEVICE — BIPOLAR FORCEP GILLEN SURGICAL NXT TIP 7" X 0.4MM

## (undated) DEVICE — SUT MONOCRYL 5-0 18" P-3 UNDYED

## (undated) DEVICE — DRSG TEGADERM 4X4.75"

## (undated) DEVICE — CRANIAL MASK TRACKER

## (undated) DEVICE — VENODYNE/SCD SLEEVE CALF MEDIUM

## (undated) DEVICE — DRSG TEGADERM 4 X 4.75"

## (undated) DEVICE — LAP PAD 18 X 18"

## (undated) DEVICE — SUT PDS II 3-0 27" SH UNDYED

## (undated) DEVICE — MIDAS REX MR8 TAPERED SM BORE 1.MM X 7CM

## (undated) DEVICE — SUT MONOCRYL 3-0 27" SH UNDYED

## (undated) DEVICE — FOLEY TRAY 16FR 5CC LF UMETER CLOSED

## (undated) DEVICE — DRAPE INSTRUMENT POUCH 6.75" X 11"

## (undated) DEVICE — BUR ROUTER 1.1X6MM

## (undated) DEVICE — ELCTR STRYKER NEPTUNE SMOKE EVACUATION PENCIL (GREEN)

## (undated) DEVICE — BIPOLAR FORCEP KOGENT IRRIGATING STRAIGHT 0.5MM X 7" DISP

## (undated) DEVICE — PACK CRANIOTOMY LNX SURGICOUNT

## (undated) DEVICE — DRAPE MAGNETIC INSTRUMENT MEDIUM

## (undated) DEVICE — DRSG TELFA 1 X 3

## (undated) DEVICE — DRAPE TOWEL BLUE 17" X 24"

## (undated) DEVICE — BLADE SURGICAL #15 CARBON

## (undated) DEVICE — STRYKER BATTERY AXS NEURO DRIVER

## (undated) DEVICE — BUR STRYKER ACORN PRECISION 6.0MM

## (undated) DEVICE — NEPTUNE 4-PORT MANIFOLD STANDARD

## (undated) DEVICE — BIPOLAR FORCEP KIRWAN JEWELERS STR 4" X 0.4MM W 12FT CORD (GREEN)

## (undated) DEVICE — DRSG MASTISOL

## (undated) DEVICE — PREP DURAPREP 26CC

## (undated) DEVICE — PROBE PRASS SLIM MONOPOLAR STIMULATOR

## (undated) DEVICE — FRAZIER CONNECTING TUBE 2FT 5MM

## (undated) DEVICE — STRYKER COLORADO N-SERIES 3CM STRAIGHT

## (undated) DEVICE — SUT ETHILON 2-0 18" PS

## (undated) DEVICE — AESCULAP SCALPFIX 10 CLIPS

## (undated) DEVICE — DRAPE SURGICAL #1010

## (undated) DEVICE — DRAPE VARI-LENS2 MICROSCPOPE 68MM

## (undated) DEVICE — DRSG TELFA .5 X 3

## (undated) DEVICE — BUR ROUTER MED

## (undated) DEVICE — LUBRICATING JELLY ONESHOT 1.25OZ

## (undated) DEVICE — MIDAS REX LEGEND TAPERED SM BORE 2.3MM X 8CM

## (undated) DEVICE — DRAPE MAYO STAND 30"

## (undated) DEVICE — BIPOLAR ISOCOOL TIP 1MM

## (undated) DEVICE — SUT ETHIBOND 3-0 36" RB-1

## (undated) DEVICE — DRAIN RESERVOIR FOR JACKSON PRATT 100CC CARDINAL

## (undated) DEVICE — MIDAS REX MR8 BALL FLUTED LG BORE 5MM X 9CM